# Patient Record
Sex: MALE | Race: BLACK OR AFRICAN AMERICAN | NOT HISPANIC OR LATINO | Employment: OTHER | URBAN - METROPOLITAN AREA
[De-identification: names, ages, dates, MRNs, and addresses within clinical notes are randomized per-mention and may not be internally consistent; named-entity substitution may affect disease eponyms.]

---

## 2017-02-17 ENCOUNTER — OFFICE VISIT (OUTPATIENT)
Dept: CARDIOLOGY | Facility: CLINIC | Age: 77
End: 2017-02-17
Attending: INTERNAL MEDICINE
Payer: MEDICARE

## 2017-02-17 ENCOUNTER — PRE VISIT (OUTPATIENT)
Dept: CARDIOLOGY | Facility: CLINIC | Age: 77
End: 2017-02-17

## 2017-02-17 VITALS
HEART RATE: 75 BPM | SYSTOLIC BLOOD PRESSURE: 168 MMHG | OXYGEN SATURATION: 98 % | HEIGHT: 67 IN | BODY MASS INDEX: 23.25 KG/M2 | WEIGHT: 148.1 LBS | DIASTOLIC BLOOD PRESSURE: 74 MMHG

## 2017-02-17 DIAGNOSIS — I48.91 ATRIAL FIBRILLATION, UNSPECIFIED TYPE (H): Primary | ICD-10-CM

## 2017-02-17 DIAGNOSIS — I48.91 ATRIAL FIBRILLATION, UNSPECIFIED TYPE (H): ICD-10-CM

## 2017-02-17 DIAGNOSIS — I10 HYPERTENSION: ICD-10-CM

## 2017-02-17 DIAGNOSIS — C22.0 HCC (HEPATOCELLULAR CARCINOMA) (H): ICD-10-CM

## 2017-02-17 PROCEDURE — 99213 OFFICE O/P EST LOW 20 MIN: CPT | Mod: ZF

## 2017-02-17 PROCEDURE — 93010 ELECTROCARDIOGRAM REPORT: CPT | Mod: ZP | Performed by: INTERNAL MEDICINE

## 2017-02-17 PROCEDURE — 99213 OFFICE O/P EST LOW 20 MIN: CPT | Mod: ZP | Performed by: INTERNAL MEDICINE

## 2017-02-17 ASSESSMENT — PAIN SCALES - GENERAL: PAINLEVEL: NO PAIN (0)

## 2017-02-17 NOTE — LETTER
2/17/2017      RE: Eh Callahan  [address]   [facility] [address]       Dear Colleague,    Thank you for the opportunity to participate in the care of your patient, Eh Callahan, at the [facility] at [facility]. Please see a copy of my visit note below.          Clinical Cardiac Electrophysiology    Chief Complaint: atrial fibrillation     11/15/16: I saw Mr. Callahan in the ED on 20Oct2016 when he presented with atrial fibrillation. He spontaneously converted back to sinus. His CHADS-Vasc score is 4. He is scheduled for surgery and was not started on oral anticoagulation. He reports (via ) that he has been feeling well. He has rare palpitations. He walks and exercises regularly without chest discomfort or unusual dyspnea on exertion.\    2/17/16 Interval History: ***      Current Outpatient Prescriptions   Medication Sig Dispense Refill     Multiple Vitamins-Minerals (MULTIVITAMIN & MINERAL PO)        oxyCODONE-acetaminophen (PERCOCET) 5-325 MG per tablet        UNABLE TO FIND 1 drop TIMOLOL OPHT       polyethylene glycol (MIRALAX/GLYCOLAX) packet Take 17 g by mouth daily as needed for constipation 30 packet 1     oxyCODONE (ROXICODONE) 5 MG immediate release tablet Take 1-2 tablets (5-10 mg) by mouth every 4 hours as needed for moderate to severe pain 30 tablet 0     acetaminophen (TYLENOL) 500 MG tablet Take 2 tablets (1,000 mg) by mouth 3 times daily (Patient taking differently: Take 500 mg by mouth 3 times daily as needed ) 100 tablet 0     senna-docusate (SENOKOT-S;PERICOLACE) 8.6-50 MG per tablet Take 1 tablet by mouth 2 times daily 14 tablet 0     FUROSEMIDE PO Take 40 mg by mouth daily       timolol (TIMOPTIC) 0.5 % ophthalmic solution Place 1 drop into both eyes daily       carboxymethylcellulose (REFRESH PLUS) 0.5 % SOLN Place 1 drop into both eyes 4 times daily        insulin detemir (LEVEMIR) 100 UNIT/ML soln Inject 15 Units Subcutaneous  "At Bedtime        insulin lispro (HUMALOG PEN) 100 UNIT/ML soln 4 - 12 units 3 x daily       pantoprazole (PROTONIX) 40 MG enteric coated tablet Take 40 mg by mouth daily        cholecalciferol (D 5000) 5000 UNITS CAPS 2 times weekly       alendronate (FOSAMAX) 70 MG tablet 70 mg every 7 days Take 1 tablet by mouth twice weekly.       Multiple vitamin TABS Take 1 tablet by mouth       simvastatin (ZOCOR) 20 MG tablet Take 1 tablet (20 mg) by mouth daily       losartan (COZAAR) 100 MG tablet Take 1 tablet by mouth daily         Past Medical History   Diagnosis Date     Anatomical narrow angle glaucoma      Atrial fibrillation (H)      Chronic infection      history of hepatitis C     CKD (chronic kidney disease) stage 2, GFR 60-89 ml/min      Diabetes mellitus (H)      Hepatitis C without mention of hepatic coma      Hypertension      Palpitations      PTSD (post-traumatic stress disorder)        Past Surgical History   Procedure Laterality Date     Appendectomy       Eye surgery       Laparoscopic hepatectomy partial Left 11/22/2016     Procedure: LAPAROSCOPIC HEPATECTOMY PARTIAL;  Surgeon: Rick Mckeon MD;  Location:  OR       Family History   Problem Relation Age of Onset     DIABETES No family hx of      He is not aware of any diabetes in his family     KIDNEY DISEASE No family hx of        Social History   Substance Use Topics     Smoking status: Former Smoker     Smokeless tobacco: Never Used     Alcohol use No       Allergies   Allergen Reactions     Amlodipine Swelling     Edema at 10 mg , fine at 5 mg     Lisinopril Cough     Metoprolol      Other reaction(s): Bradycardia  Metoprolol at 50mg bid -> HR 45 -50, unclear if sx (has fatigue)  May tolerate lower doses if needed         ROS: ten system review is negative.      Physical Examination:  Vitals: /74  Pulse 75  Ht 1.69 m (5' 6.53\")  Wt 67.2 kg (148 lb 1.6 oz)  SpO2 98%  BMI 23.52 kg/m2  BMI= Body mass index is 23.52 kg/(m^2).    GENERAL " APPEARANCE: elderly, healthy, alert and no distress  HEENT: no icterus, no xanthelasmas, mucosa moist, no cyanosis.  NECK: nJVP is not visible  CHEST: lungs clear to auscultation - no rales, rhonchi or wheezes, no use of accessory muscles, no retractions, respirations are unlabored, normal respiratory rate, no kyphosis, no scoliosis  CARDIOVASCULAR: regular rhythm, normal S1 with single S2, no S3 or S4 and no murmur, click or rub, precordium quiet with normal PMI.  ABDOMEN: soft, non tender, bowel sounds normal, no masses, no abdominal bruits  EXTREMITIES: no clubbing, cyanosis or edema  NEURO: alert, normal speech, gait and affect  VASC: Warm, well perfused      Laboratory:    EKG 2/17/17: ***    ========================================================    Assessment and Recommendations:    1) Paroxysmal atrial fibrillation - we discussed that this could recur after surgery but is not a reason to delay surgery. After surgery will need to discuss oral anticoagulation.    2) Pre-operative cardiovascular assessment - his functional status is excellent, he has no active cardiac conditions. His risk of cardiac complications should be low. No further pre-op testing needed from my standpoint.    I appreciate the chance to help with Mr. Callahan's care. Please let me know if you have any questions or concerns.    Yakov Tamez MD          Clinical Cardiac Electrophysiology    Chief Complaint: atrial fibrillation     HPI: I saw Mr. Callahan in the ED on 20Oct2016 when he presented with atrial fibrillation. He spontaneously converted back to sinus. His CHADS-Vasc score is 4. He is scheduled for surgery and was not started on oral anticoagulation.     He reports (via ) that he has been feeling well. He has rare palpitations. He walks and exercises regularly without chest discomfort or unusual dyspnea on exertion.    Current Outpatient Prescriptions   Medication Sig Dispense Refill     Multiple Vitamins-Minerals  (MULTIVITAMIN & MINERAL PO)        oxyCODONE-acetaminophen (PERCOCET) 5-325 MG per tablet        UNABLE TO FIND 1 drop TIMOLOL OPHT       polyethylene glycol (MIRALAX/GLYCOLAX) packet Take 17 g by mouth daily as needed for constipation 30 packet 1     oxyCODONE (ROXICODONE) 5 MG immediate release tablet Take 1-2 tablets (5-10 mg) by mouth every 4 hours as needed for moderate to severe pain 30 tablet 0     acetaminophen (TYLENOL) 500 MG tablet Take 2 tablets (1,000 mg) by mouth 3 times daily (Patient taking differently: Take 500 mg by mouth 3 times daily as needed ) 100 tablet 0     senna-docusate (SENOKOT-S;PERICOLACE) 8.6-50 MG per tablet Take 1 tablet by mouth 2 times daily 14 tablet 0     FUROSEMIDE PO Take 40 mg by mouth daily       timolol (TIMOPTIC) 0.5 % ophthalmic solution Place 1 drop into both eyes daily       carboxymethylcellulose (REFRESH PLUS) 0.5 % SOLN Place 1 drop into both eyes 4 times daily        insulin detemir (LEVEMIR) 100 UNIT/ML soln Inject 15 Units Subcutaneous At Bedtime        insulin lispro (HUMALOG PEN) 100 UNIT/ML soln 4 - 12 units 3 x daily       pantoprazole (PROTONIX) 40 MG enteric coated tablet Take 40 mg by mouth daily        cholecalciferol (D 5000) 5000 UNITS CAPS 2 times weekly       alendronate (FOSAMAX) 70 MG tablet 70 mg every 7 days Take 1 tablet by mouth twice weekly.       Multiple vitamin TABS Take 1 tablet by mouth       simvastatin (ZOCOR) 20 MG tablet Take 1 tablet (20 mg) by mouth daily       losartan (COZAAR) 100 MG tablet Take 1 tablet by mouth daily         Past Medical History   Diagnosis Date     Anatomical narrow angle glaucoma      Atrial fibrillation (H)      Chronic infection      history of hepatitis C     CKD (chronic kidney disease) stage 2, GFR 60-89 ml/min      Diabetes mellitus (H)      Hepatitis C without mention of hepatic coma      Hypertension      Palpitations      PTSD (post-traumatic stress disorder)        Past Surgical History   Procedure  "Laterality Date     Appendectomy       Eye surgery       Laparoscopic hepatectomy partial Left 11/22/2016     Procedure: LAPAROSCOPIC HEPATECTOMY PARTIAL;  Surgeon: Rick Mckeon MD;  Location: [facility]       Family History   Problem Relation Age of Onset     DIABETES No family hx of      He is not aware of any diabetes in his family     KIDNEY DISEASE No family hx of        Social History   Substance Use Topics     Smoking status: Former Smoker     Smokeless tobacco: Never Used     Alcohol use No       Allergies   Allergen Reactions     Amlodipine Swelling     Edema at 10 mg , fine at 5 mg     Lisinopril Cough     Metoprolol      Other reaction(s): Bradycardia  Metoprolol at 50mg bid -> HR 45 -50, unclear if sx (has fatigue)  May tolerate lower doses if needed         ROS: ten system review is negative. [id number]    Physical Examination:  Vitals: /74  Pulse 75  Ht 1.69 m (5' 6.53\")  Wt 67.2 kg (148 lb 1.6 oz)  SpO2 98%  BMI 23.52 kg/m2  BMI= Body mass index is 23.52 kg/(m^2).    GENERAL APPEARANCE: elderly, healthy, alert and no distress  HEENT: no icterus, no xanthelasmas, mucosa moist, no cyanosis.  NECK: nJVP is not visible  CHEST: lungs clear to auscultation - no rales, rhonchi or wheezes, no use of accessory muscles, no retractions, respirations are unlabored, normal respiratory rate, no kyphosis, no scoliosis  CARDIOVASCULAR: regular rhythm, normal S1 with single S2, no S3 or S4 and no murmur, click or rub, precordium quiet with normal PMI.  ABDOMEN: soft, non tender, bowel sounds normal, no masses, no abdominal bruits  EXTREMITIES: no clubbing, cyanosis or edema  NEURO: alert, normal speech, gait and affect  VASC: Warm, well perfused      Laboratory:    I personally reviewed the ECG obtained today. It shows sinus rhythm with early repolarization pattern.    Results for CHIKIS NEWBERRY (MRN [id number]) as of 11/15/2016 16:36   Ref. Range 10/20/2016 07:03   Sodium Latest Ref Range: 133-144 mmol/L 137 "   Potassium Latest Ref Range: 3.4-5.3 mmol/L 3.5   Chloride Latest Ref Range:  mmol/L 105   Carbon Dioxide Latest Ref Range: 20-32 mmol/L 24   Urea Nitrogen Latest Ref Range: 7-30 mg/dL 34 (H)   Creatinine Latest Ref Range: 0.66-1.25 mg/dL 1.06   GFR Estimate Latest Ref Range: >60 mL/min/1.7m2 68       Assessment and recommendations:    1) Paroxysmal atrial fibrillation - we discussed that this could recur after surgery but is not a reason to delay surgery. After surgery will need to discuss oral anticoagulation.    2) Pre-operative cardiovascular assessment - his functional status is excellent, he has no active cardiac conditions. His risk of cardiac complications should be low. No further pre-op testing needed from my standpoint.    I appreciate the chance to help with Mr. Callahan's care. Please let me know if you have any questions or concerns.    Yakov Tamez MD    Please do not hesitate to contact me if you have any questions/concerns.     Sincerely,     Yakov Tamez MD

## 2017-02-17 NOTE — PROGRESS NOTES
Clinical Cardiac Electrophysiology    Chief Complaint: atrial fibrillation     HPI: I saw Mr. Callahan in the ED on 20Oct2016 when he presented with atrial fibrillation. He spontaneously converted back to sinus. His CHADS-Vasc score is 4. He is scheduled for surgery and was not started on oral anticoagulation.     He reports (via ) that he has been feeling well. He has rare palpitations. He walks and exercises regularly without chest discomfort or unusual dyspnea on exertion.    [id number] Interval history: his surgery went well, he reports some continue abdominal pain but has not had any palpitations or other cardiac symptoms.     Current Outpatient Prescriptions   Medication Sig Dispense Refill     rivaroxaban ANTICOAGULANT (XARELTO) 20 MG TABS tablet Take 1 tablet (20 mg) by mouth daily (with dinner) 30 tablet 5     Multiple Vitamins-Minerals (MULTIVITAMIN & MINERAL PO)        oxyCODONE-acetaminophen (PERCOCET) 5-325 MG per tablet        UNABLE TO FIND 1 drop TIMOLOL OPHT       polyethylene glycol (MIRALAX/GLYCOLAX) packet Take 17 g by mouth daily as needed for constipation 30 packet 1     oxyCODONE (ROXICODONE) 5 MG immediate release tablet Take 1-2 tablets (5-10 mg) by mouth every 4 hours as needed for moderate to severe pain 30 tablet 0     acetaminophen (TYLENOL) 500 MG tablet Take 2 tablets (1,000 mg) by mouth 3 times daily (Patient taking differently: Take 500 mg by mouth 3 times daily as needed ) 100 tablet 0     senna-docusate (SENOKOT-S;PERICOLACE) 8.6-50 MG per tablet Take 1 tablet by mouth 2 times daily 14 tablet 0     FUROSEMIDE PO Take 40 mg by mouth daily       timolol (TIMOPTIC) 0.5 % ophthalmic solution Place 1 drop into both eyes daily       carboxymethylcellulose (REFRESH PLUS) 0.5 % SOLN Place 1 drop into both eyes 4 times daily        insulin detemir (LEVEMIR) 100 UNIT/ML soln Inject 15 Units Subcutaneous At Bedtime        insulin lispro (HUMALOG PEN) 100 UNIT/ML soln 4 - 12  "units 3 x daily       pantoprazole (PROTONIX) 40 MG enteric coated tablet Take 40 mg by mouth daily        cholecalciferol (D 5000) 5000 UNITS CAPS 2 times weekly       alendronate (FOSAMAX) 70 MG tablet 70 mg every 7 days Take 1 tablet by mouth twice weekly.       Multiple vitamin TABS Take 1 tablet by mouth       simvastatin (ZOCOR) 20 MG tablet Take 1 tablet (20 mg) by mouth daily       losartan (COZAAR) 100 MG tablet Take 1 tablet by mouth daily         Past Medical History   Diagnosis Date     Anatomical narrow angle glaucoma      Atrial fibrillation (H)      Chronic infection      history of hepatitis C     CKD (chronic kidney disease) stage 2, GFR 60-89 ml/min      Diabetes mellitus (H)      Hepatitis C without mention of hepatic coma      Hypertension      Palpitations      PTSD (post-traumatic stress disorder)        Past Surgical History   Procedure Laterality Date     Appendectomy       Eye surgery       Laparoscopic hepatectomy partial Left 11/22/2016     Procedure: LAPAROSCOPIC HEPATECTOMY PARTIAL;  Surgeon: Rick Mckeon MD;  Location:  OR       Family History   Problem Relation Age of Onset     DIABETES No family hx of      He is not aware of any diabetes in his family     KIDNEY DISEASE No family hx of        Social History   Substance Use Topics     Smoking status: Former Smoker     Smokeless tobacco: Never Used     Alcohol use No       Allergies   Allergen Reactions     Amlodipine Swelling     Edema at 10 mg , fine at 5 mg     Lisinopril Cough     Metoprolol      Other reaction(s): Bradycardia  Metoprolol at 50mg bid -> HR 45 -50, unclear if sx (has fatigue)  May tolerate lower doses if needed         ROS: ten system review is negative. [id number]    Physical Examination:  Vitals: /74  Pulse 75  Ht 1.69 m (5' 6.53\")  Wt 67.2 kg (148 lb 1.6 oz)  SpO2 98%  BMI 23.52 kg/m2  BMI= Body mass index is 23.52 kg/(m^2).    GENERAL APPEARANCE: elderly, healthy, alert and no distress  HEENT: no " icterus, no xanthelasmas, mucosa moist, no cyanosis.  NECK: JVP is not visible  CHEST: lungs clear to auscultation  CARDIOVASCULAR: regular rhythm, normal S1 with single S2, no murmur or gallop  ABDOMEN: soft, healing surgical incision, + BS  EXTREMITIES: no clubbing, cyanosis or edema  NEURO: alert  VASC: Warm, well perfused      Laboratory:    I personally reviewed the ECG obtained today. It shows sinus rhythm with early repolarization pattern. [id number]        Assessment and recommendations:    1) Paroxysmal atrial fibrillation - we discussed that this could recur after surgery but is not a reason to delay surgery. We again discussed recommendation for oral anticoagulation. After reviewing options we've elected to start rivaroxaban for thromboembolic prophylaxis.    I appreciate the chance to help with Mr. Callahan's care. Please let me know if you have any questions or concerns.    Yakov Tamez MD

## 2017-02-17 NOTE — PROGRESS NOTES
Clinical Cardiac Electrophysiology    Chief Complaint: atrial fibrillation     11/15/16: I saw Mr. Callahan in the ED on 20Oct2016 when he presented with atrial fibrillation. He spontaneously converted back to sinus. His CHADS-Vasc score is 4. He is scheduled for surgery and was not started on oral anticoagulation. He reports (via ) that he has been feeling well. He has rare palpitations. He walks and exercises regularly without chest discomfort or unusual dyspnea on exertion.    2/17/16 Interval History: Did well during partial hepatectomy. Mild abdominal pain and constipation since then, but recovering. Denies any known recurrence of palpitations, chest pain/pressure, dyspnea, syncope or presyncope. Now lower extremity edema. States overall he is feeling well.   Discussed indication and options for anticoagulation given paroxysmal AFib with CHADSVASc =4. No prior history of stroke. Reports occasional nose bleeds, and severe gastric ulcer bleed back in 1970s, no recurrence since then. No recent falls. Currently taking ASA.       Current Outpatient Prescriptions   Medication Sig Dispense Refill     Multiple Vitamins-Minerals (MULTIVITAMIN & MINERAL PO)        oxyCODONE-acetaminophen (PERCOCET) 5-325 MG per tablet        UNABLE TO FIND 1 drop TIMOLOL OPHT       polyethylene glycol (MIRALAX/GLYCOLAX) packet Take 17 g by mouth daily as needed for constipation 30 packet 1     oxyCODONE (ROXICODONE) 5 MG immediate release tablet Take 1-2 tablets (5-10 mg) by mouth every 4 hours as needed for moderate to severe pain 30 tablet 0     acetaminophen (TYLENOL) 500 MG tablet Take 2 tablets (1,000 mg) by mouth 3 times daily (Patient taking differently: Take 500 mg by mouth 3 times daily as needed ) 100 tablet 0     senna-docusate (SENOKOT-S;PERICOLACE) 8.6-50 MG per tablet Take 1 tablet by mouth 2 times daily 14 tablet 0     FUROSEMIDE PO Take 40 mg by mouth daily       timolol (TIMOPTIC) 0.5 % ophthalmic solution  Place 1 drop into both eyes daily       carboxymethylcellulose (REFRESH PLUS) 0.5 % SOLN Place 1 drop into both eyes 4 times daily        insulin detemir (LEVEMIR) 100 UNIT/ML soln Inject 15 Units Subcutaneous At Bedtime        insulin lispro (HUMALOG PEN) 100 UNIT/ML soln 4 - 12 units 3 x daily       pantoprazole (PROTONIX) 40 MG enteric coated tablet Take 40 mg by mouth daily        cholecalciferol (D 5000) 5000 UNITS CAPS 2 times weekly       alendronate (FOSAMAX) 70 MG tablet 70 mg every 7 days Take 1 tablet by mouth twice weekly.       Multiple vitamin TABS Take 1 tablet by mouth       simvastatin (ZOCOR) 20 MG tablet Take 1 tablet (20 mg) by mouth daily       losartan (COZAAR) 100 MG tablet Take 1 tablet by mouth daily         Past Medical History   Diagnosis Date     Anatomical narrow angle glaucoma      Atrial fibrillation (H)      Chronic infection      history of hepatitis C     CKD (chronic kidney disease) stage 2, GFR 60-89 ml/min      Diabetes mellitus (H)      Hepatitis C without mention of hepatic coma      Hypertension      Palpitations      PTSD (post-traumatic stress disorder)        Past Surgical History   Procedure Laterality Date     Appendectomy       Eye surgery       Laparoscopic hepatectomy partial Left 11/22/2016     Procedure: LAPAROSCOPIC HEPATECTOMY PARTIAL;  Surgeon: Rick Mckeon MD;  Location:  OR       Family History   Problem Relation Age of Onset     DIABETES No family hx of      He is not aware of any diabetes in his family     KIDNEY DISEASE No family hx of        Social History   Substance Use Topics     Smoking status: Former Smoker     Smokeless tobacco: Never Used     Alcohol use No       Allergies   Allergen Reactions     Amlodipine Swelling     Edema at 10 mg , fine at 5 mg     Lisinopril Cough     Metoprolol      Other reaction(s): Bradycardia  Metoprolol at 50mg bid -> HR 45 -50, unclear if sx (has fatigue)  May tolerate lower doses if needed         ROS: ten  "system review is negative (see HPI for pertinent positives).      Physical Examination:  Vitals: /74  Pulse 75  Ht 1.69 m (5' 6.53\")  Wt 67.2 kg (148 lb 1.6 oz)  SpO2 98%  BMI 23.52 kg/m2  BMI= Body mass index is 23.52 kg/(m^2).    GENERAL APPEARANCE: elderly, healthy, alert and no distress  HEENT: no icterus, no xanthelasmas, mucosa moist, no cyanosis.  NECK: nJVP is not visible  CHEST: lungs clear to auscultation - no rales, rhonchi or wheezes, no use of accessory muscles, no retractions, respirations are unlabored, normal respiratory rate, no kyphosis, no scoliosis  CARDIOVASCULAR: regular rhythm, normal S1 with single S2, no S3 or S4 and no murmur, click or rub, precordium quiet with normal PMI.  ABDOMEN: soft, non tender, bowel sounds normal, no masses, no abdominal bruits  EXTREMITIES: no clubbing, cyanosis or edema  NEURO: alert, normal speech, gait and affect  VASC: Warm, well perfused      Laboratory:    EKG 2/17/17: Sinus rhythm, normal axis, LVH by voltage criteria, normal intervals, early repolarization changes in anterior precordial leads, no ischemic ST-T changes.     ========================================================    Assessment and Recommendations:    1) Paroxysmal atrial fibrillation - today in sinus rhythm, no symptoms with paroxysmal AFib, no signs or symptoms of congestive heart failure. No need for rate or rhythm control agents at this time. Discussed at length the indication for anticoagulation for thromboembolic stroke protection, the risks and benefits of oral anticoagulants, and the choices for oral anticoagulants. CHADSVASc =4 (~4% annual stroke risk). We will recommend Rivaroxaban 20mg daily, to be taken with the largest meal of the day. Recommend to d/c ASA as he has no known CAD. He was agreeable to this plan.    RTC ~3 months w/ Emily Willoughby, APRN, CNP    We appreciate the chance to help with Mr. Callahan's care. Please let us know if you have any questions or " concerns.  Patient seen and discussed with Dr. Tamez.    Karen Garcia MD  Cardiology Fellow  [phone]

## 2017-02-17 NOTE — NURSING NOTE
Chief Complaint   Patient presents with     Follow Up For     EKG- afib, 3MFU (11/15/16)- here to discuss AC- s/p surgery.(liver ca- resection 12/2016) [id number] 4

## 2017-02-17 NOTE — PATIENT INSTRUCTIONS
You will be scheduled for a follow up visit as instructed.    Stop aspirin when you start xarelto.     If you have any questions regarding to your visit please contact your care team:     Cardiology  Telephone Number    Neida Mcmillan -[phone]   Or send a message to your provider via my chart.   For scheduling appts or procedures:    [facility]     [phone] or  [phone]   For the Device Clinic (Pacemakers and ICD's)   RN's :   Brenda Hoyos  During business hours: [phone]    After business hours:   [phone]- select option 4 and ask for job code [phone].    You can also contact us using My Chart. If you need assistance in setting this up, please contact our office or ask at your follow up visit.     If you need a medication refill please contact your pharmacy. Please allow 3 business days for your refill to be completed.     As always, Thank you for trusting us with your health care needs!

## 2017-02-17 NOTE — MR AVS SNAPSHOT
After Visit Summary   2/17/2017    Eh Callahan    MRN: [id number]           Patient Information     Date Of Birth          1940        Visit Information        Provider Department      2/17/2017 3:45 PM Julian Russell; Yakov Tamez MD [facility]        Today's Diagnoses     Atrial fibrillation, unspecified type (H)        Hypertension        HCC (hepatocellular carcinoma) (H)          Care Instructions    You will be scheduled for a follow up visit as instructed.    Stop aspirin when you start xarelto.     If you have any questions regarding to your visit please contact your care team:     Cardiology  Telephone Number    Neida Mcmillan [phone]   Or send a message to your provider via my chart.   For scheduling appts or procedures:    [facility]     [phone] or  [phone]   For the Device Clinic (Pacemakers and ICD's)   RN's :   Brenda Hoyos  During business hours: [phone]    After business hours:   [phone]- select option 4 and ask for job code [phone].    You can also contact us using My Chart. If you need assistance in setting this up, please contact our office or ask at your follow up visit.     If you need a medication refill please contact your pharmacy. Please allow 3 business days for your refill to be completed.     As always, Thank you for trusting us with your health care needs!        Follow-ups after your visit        Your next 10 appointments already scheduled     Mar 21, 2017  7:45 AM CDT   (Arrive by 7:30 AM)   MR ABDOMEN W/O & W CONTRAST with [address] [facility] ([facility])    [address] [address] [phone]           Take your medicines as usual, unless your doctor tells you not to. Bring a list of your current medicines to your exam (including vitamins, minerals and over-the-counter drugs). Also bring the results of similar scans you may  have had.    The day before your exam, drink extra fluids at least six 8-ounce glasses (unless your doctor tells you to restrict your fluids).   Have a blood test (creatinine test) within 30 days of your exam. Go to your clinic or Diagnostic Imaging Department for this test.   Do not eat or drink for 6 hours prior to exam.  The MRI machine uses a strong magnet. Please wear clothes without metal (snaps, zippers). A sweatsuit works well, or we may give you a hospital gown.  Please remove any body piercings and hair extensions before you arrive. You will also remove watches, jewelry, hairpins, wallets, dentures, partial dental plates and hearing aids. You may wear contact lenses, and you may be able to wear your rings. We have a safe place to keep your personal items, but it is safer to leave them at home.   **IMPORTANT** THE INSTRUCTIONS BELOW ARE ONLY FOR THOSE PATIENTS WHO HAVE BEEN TOLD THEY WILL RECEIVE SEDATION OR GENERAL ANESTHESIA DURING THEIR MRI PROCEDURE:  IF YOU WILL RECEIVE SEDATION (take medicine to help you relax during your exam):   You must get the medicine from your doctor before you arrive. Bring the medicine to the exam. Do not take it at home.   Arrive one hour early. Bring someone who can take you home after the test. Your medicine will make you sleepy. After the exam, you may not drive, take a bus or take a taxi by yourself.   No eating 8 hours before your exam. You may have clear liquids up until 4 hours before your exam. (Clear liquids include water, clear tea, black coffee and fruit juice without pulp.)  IF YOU WILL RECEIVE ANESTHESIA (be asleep for your exam):   Arrive 1 1/2 hours early. Bring someone who can take you home after the test. You may not drive, take a bus or take a taxi by yourself.   No eating 8 hours before your exam. You may have clear liquids up until 4 hours before your exam. (Clear liquids include water, clear tea, black coffee and fruit juice without pulp.)  If you have  any questions, please contact your Imaging Department exam site.            Mar 21, 2017  8:30 AM CDT   LAB with  LAB   [facility] ([facility])    [address] [place] [address]  [address] [facility] [address] [phone]           Patient must bring picture ID.  Patient should be prepared to give a urine specimen  Please do not eat 10-12 hours before your appointment if you are coming in fasting for labs on lipids, cholesterol, or glucose (sugar).  Pregnant women should follow their Care Team instructions. Water with medications is okay. Do not drink coffee or other fluids.   If you have concerns about taking  your medications, please ask at office or if scheduling via [employer], send a message by clicking on Secure Messaging, Message Your Care Team.            Mar 23, 2017  1:30 PM CDT   (Arrive by 1:15 PM)   Return Visit with Miri Antoine MD   [facility] ([facility])    [address] [place] [address]  [address] [facility] [address] [phone]            Apr 12, 2017 10:00 AM CDT   Lab with  LAB   [facility] ([facility])    [address] [place] [address] [phone]            Apr 12, 2017 11:10 AM CDT   (Arrive by 10:55 AM)   Return General Liver with Allan Justice MD   [facility] ([facility])    [address] [place] [id number] [phone]            Jun 05, 2017  6:00 PM CDT   (Arrive by 5:45 PM)   RETURN ARRHYTHMIA with Yakov Tamez MD   [facility] ([facility])    [address] [place] [address]  [address] [facility] [id number] [phone]              Who to contact     If you have questions or need follow up information about today's clinic visit or your schedule please contact [facility] directly at [phone].  Normal or non-critical lab and imaging  "results will be communicated to you by MyChart, letter or phone within 4 business days after the clinic has received the results. If you do not hear from us within 7 days, please contact the clinic through ShopGot or phone. If you have a critical or abnormal lab result, we will notify you by phone as soon as possible.  Submit refill requests through kalidea or call your pharmacy and they will forward the refill request to us. Please allow 3 business days for your refill to be completed.          Additional Information About Your Visit        [employer] Information     kalidea lets you send messages to your doctor, view your test results, renew your prescriptions, schedule appointments and more. To sign up, go to [email or web address] . Click on \"Log in\" on the left side of the screen, which will take you to the Welcome page. Then click on \"Sign up Now\" on the right side of the page.     You will be asked to enter the access code listed below, as well as some personal information. Please follow the directions to create your username and password.     Your access code is: [email or web address]  Expires: 3/6/2017 11:03 AM     Your access code will  in 90 days. If you need help or a new code, please call your [facility] clinic or [phone].        Care EveryWhere ID     This is your Care EveryWhere ID. This could be used by other organizations to access your [facility] medical records  [id number]        Your Vitals Were     Pulse Height Pulse Oximetry BMI (Body Mass Index)          75 1.69 m (5' 6.53\") 98% 23.52 kg/m2         Blood Pressure from Last 3 Encounters:   17 168/74   16 145/84   16 125/78    Weight from Last 3 Encounters:   17 67.2 kg (148 lb 1.6 oz)   16 64.5 kg (142 lb 4.8 oz)   16 65.3 kg (144 lb)              We Performed the Following     AFP tumor marker     EKG 12-lead, tracing only (Future)          Today's Medication Changes          These changes are accurate as of: " 2/17/17  5:02 PM.  If you have any questions, ask your nurse or doctor.               Start taking these medicines.        Dose/Directions    rivaroxaban ANTICOAGULANT 20 MG Tabs tablet   Commonly known as:  XARELTO   Used for:  Atrial fibrillation, unspecified type (H)   Started by:  Yakov Tamez MD        Dose:  20 mg   Take 1 tablet (20 mg) by mouth daily (with dinner)   Quantity:  30 tablet   Refills:  5         These medicines have changed or have updated prescriptions.        Dose/Directions    acetaminophen 500 MG tablet   Commonly known as:  TYLENOL   This may have changed:    - how much to take  - when to take this  - reasons to take this   Used for:  History of liver excision        Dose:  1000 mg   Take 2 tablets (1,000 mg) by mouth 3 times daily   Quantity:  100 tablet   Refills:  0            Where to get your medicines      These medications were sent to [facility] - [address] [address]     Phone:  [phone]     rivaroxaban ANTICOAGULANT 20 MG Tabs tablet                Primary Care Provider Office Phone # Fax #    Shalini Mac PA-C [phone]       [facility] [address]  [facility] [address]        Thank you!     Thank you for choosing [facility]  for your care. Our goal is always to provide you with excellent care. Hearing back from our patients is one way we can continue to improve our services. Please take a few minutes to complete the written survey that you may receive in the mail after your visit with us. Thank you!             Your Updated Medication List - Protect others around you: Learn how to safely use, store and throw away your medicines at www.disposemymeds.org.          This list is accurate as of: 2/17/17  5:02 PM.  Always use your most recent med list.                   Brand Name Dispense Instructions for use    acetaminophen 500 MG tablet    TYLENOL    100 tablet    Take 2  tablets (1,000 mg) by mouth 3 times daily       alendronate 70 MG tablet    FOSAMAX     70 mg every 7 days Take 1 tablet by mouth twice weekly.       carboxymethylcellulose 0.5 % Soln ophthalmic solution    REFRESH PLUS     Place 1 drop into both eyes 4 times daily       D 5000 5000 UNITS Caps   Generic drug:  cholecalciferol      2 times weekly       FUROSEMIDE PO      Take 40 mg by mouth daily       insulin detemir 100 UNIT/ML injection    LEVEMIR     Inject 15 Units Subcutaneous At Bedtime       insulin lispro 100 UNIT/ML injection    HumaLOG PEN     4 - 12 units 3 x daily       losartan 100 MG tablet    COZAAR     Take 1 tablet by mouth daily       Multiple vitamin Tabs      Take 1 tablet by mouth       MULTIVITAMIN & MINERAL PO          oxyCODONE 5 MG IR tablet    ROXICODONE    30 tablet    Take 1-2 tablets (5-10 mg) by mouth every 4 hours as needed for moderate to severe pain       oxyCODONE-acetaminophen 5-325 MG per tablet    PERCOCET         pantoprazole 40 MG EC tablet    PROTONIX     Take 40 mg by mouth daily       polyethylene glycol Packet    MIRALAX/GLYCOLAX    30 packet    Take 17 g by mouth daily as needed for constipation       rivaroxaban ANTICOAGULANT 20 MG Tabs tablet    XARELTO    30 tablet    Take 1 tablet (20 mg) by mouth daily (with dinner)       senna-docusate 8.6-50 MG per tablet    SENOKOT-S;PERICOLACE    14 tablet    Take 1 tablet by mouth 2 times daily       simvastatin 20 MG tablet    ZOCOR     Take 1 tablet (20 mg) by mouth daily       timolol 0.5 % ophthalmic solution    TIMOPTIC     Place 1 drop into both eyes daily       UNABLE TO FIND      1 drop TIMOLOL OPHT

## 2017-02-17 NOTE — LETTER
2/17/2017      RE: Eh Callahan  [address]   [facility] [address]             Clinical Cardiac Electrophysiology    Chief Complaint: atrial fibrillation     11/15/16: I saw Mr. Callahan in the ED on 20Oct2016 when he presented with atrial fibrillation. He spontaneously converted back to sinus. His CHADS-Vasc score is 4. He is scheduled for surgery and was not started on oral anticoagulation. He reports (via ) that he has been feeling well. He has rare palpitations. He walks and exercises regularly without chest discomfort or unusual dyspnea on exertion.    2/17/16 Interval History: Did well during partial hepatectomy. Mild abdominal pain and constipation since then, but recovering. Denies any known recurrence of palpitations, chest pain/pressure, dyspnea, syncope or presyncope. Now lower extremity edema. States overall he is feeling well.   Discussed indication and options for anticoagulation given paroxysmal AFib with CHADSVASc =4. No prior history of stroke. Reports occasional nose bleeds, and severe gastric ulcer bleed back in 1970s, no recurrence since then. No recent falls. Currently taking ASA.       Current Outpatient Prescriptions   Medication Sig Dispense Refill     Multiple Vitamins-Minerals (MULTIVITAMIN & MINERAL PO)        oxyCODONE-acetaminophen (PERCOCET) 5-325 MG per tablet        UNABLE TO FIND 1 drop TIMOLOL OPHT       polyethylene glycol (MIRALAX/GLYCOLAX) packet Take 17 g by mouth daily as needed for constipation 30 packet 1     oxyCODONE (ROXICODONE) 5 MG immediate release tablet Take 1-2 tablets (5-10 mg) by mouth every 4 hours as needed for moderate to severe pain 30 tablet 0     acetaminophen (TYLENOL) 500 MG tablet Take 2 tablets (1,000 mg) by mouth 3 times daily (Patient taking differently: Take 500 mg by mouth 3 times daily as needed ) 100 tablet 0     senna-docusate (SENOKOT-S;PERICOLACE) 8.6-50 MG per tablet Take 1 tablet by mouth 2 times daily 14 tablet 0      FUROSEMIDE PO Take 40 mg by mouth daily       timolol (TIMOPTIC) 0.5 % ophthalmic solution Place 1 drop into both eyes daily       carboxymethylcellulose (REFRESH PLUS) 0.5 % SOLN Place 1 drop into both eyes 4 times daily        insulin detemir (LEVEMIR) 100 UNIT/ML soln Inject 15 Units Subcutaneous At Bedtime        insulin lispro (HUMALOG PEN) 100 UNIT/ML soln 4 - 12 units 3 x daily       pantoprazole (PROTONIX) 40 MG enteric coated tablet Take 40 mg by mouth daily        cholecalciferol (D 5000) 5000 UNITS CAPS 2 times weekly       alendronate (FOSAMAX) 70 MG tablet 70 mg every 7 days Take 1 tablet by mouth twice weekly.       Multiple vitamin TABS Take 1 tablet by mouth       simvastatin (ZOCOR) 20 MG tablet Take 1 tablet (20 mg) by mouth daily       losartan (COZAAR) 100 MG tablet Take 1 tablet by mouth daily         Past Medical History   Diagnosis Date     Anatomical narrow angle glaucoma      Atrial fibrillation (H)      Chronic infection      history of hepatitis C     CKD (chronic kidney disease) stage 2, GFR 60-89 ml/min      Diabetes mellitus (H)      Hepatitis C without mention of hepatic coma      Hypertension      Palpitations      PTSD (post-traumatic stress disorder)        Past Surgical History   Procedure Laterality Date     Appendectomy       Eye surgery       Laparoscopic hepatectomy partial Left 11/22/2016     Procedure: LAPAROSCOPIC HEPATECTOMY PARTIAL;  Surgeon: Rick Mckeon MD;  Location:  OR       Family History   Problem Relation Age of Onset     DIABETES No family hx of      He is not aware of any diabetes in his family     KIDNEY DISEASE No family hx of        Social History   Substance Use Topics     Smoking status: Former Smoker     Smokeless tobacco: Never Used     Alcohol use No       Allergies   Allergen Reactions     Amlodipine Swelling     Edema at 10 mg , fine at 5 mg     Lisinopril Cough     Metoprolol      Other reaction(s): Bradycardia  Metoprolol at 50mg bid -> HR 45  "-50, unclear if sx (has fatigue)  May tolerate lower doses if needed         ROS: ten system review is negative (see HPI for pertinent positives).      Physical Examination:  Vitals: /74  Pulse 75  Ht 1.69 m (5' 6.53\")  Wt 67.2 kg (148 lb 1.6 oz)  SpO2 98%  BMI 23.52 kg/m2  BMI= Body mass index is 23.52 kg/(m^2).    GENERAL APPEARANCE: elderly, healthy, alert and no distress  HEENT: no icterus, no xanthelasmas, mucosa moist, no cyanosis.  NECK: nJVP is not visible  CHEST: lungs clear to auscultation - no rales, rhonchi or wheezes, no use of accessory muscles, no retractions, respirations are unlabored, normal respiratory rate, no kyphosis, no scoliosis  CARDIOVASCULAR: regular rhythm, normal S1 with single S2, no S3 or S4 and no murmur, click or rub, precordium quiet with normal PMI.  ABDOMEN: soft, non tender, bowel sounds normal, no masses, no abdominal bruits  EXTREMITIES: no clubbing, cyanosis or edema  NEURO: alert, normal speech, gait and affect  VASC: Warm, well perfused      Laboratory:    EKG 2/17/17: Sinus rhythm, normal axis, LVH by voltage criteria, normal intervals, early repolarization changes in anterior precordial leads, no ischemic ST-T changes.     ========================================================    Assessment and Recommendations:    1) Paroxysmal atrial fibrillation - today in sinus rhythm, no symptoms with paroxysmal AFib, no signs or symptoms of congestive heart failure. No need for rate or rhythm control agents at this time. Discussed at length the indication for anticoagulation for thromboembolic stroke protection, the risks and benefits of oral anticoagulants, and the choices for oral anticoagulants. CHADSVASc =4 (~4% annual stroke risk). We will recommend Rivaroxaban 20mg daily, to be taken with the largest meal of the day. Recommend to d/c ASA as he has no known CAD. He was agreeable to this plan.    RTC ~3 months w/ Emily Stoesz, APRN, CNP    We appreciate the chance to " help with Mr. Callahan's care. Please let us know if you have any questions or concerns.  Patient seen and discussed with Dr. Tamez.    Karen Garcia MD  Cardiology Fellow  [phone]            Clinical Cardiac Electrophysiology    Chief Complaint: atrial fibrillation     HPI: I saw Mr. Callahan in the ED on 20Oct2016 when he presented with atrial fibrillation. He spontaneously converted back to sinus. His CHADS-Vasc score is 4. He is scheduled for surgery and was not started on oral anticoagulation.     He reports (via ) that he has been feeling well. He has rare palpitations. He walks and exercises regularly without chest discomfort or unusual dyspnea on exertion.    [id number] Interval history: his surgery went well, he reports some continue abdominal pain but has not had any palpitations or other cardiac symptoms.     Current Outpatient Prescriptions   Medication Sig Dispense Refill     rivaroxaban ANTICOAGULANT (XARELTO) 20 MG TABS tablet Take 1 tablet (20 mg) by mouth daily (with dinner) 30 tablet 5     Multiple Vitamins-Minerals (MULTIVITAMIN & MINERAL PO)        oxyCODONE-acetaminophen (PERCOCET) 5-325 MG per tablet        UNABLE TO FIND 1 drop TIMOLOL OPHT       polyethylene glycol (MIRALAX/GLYCOLAX) packet Take 17 g by mouth daily as needed for constipation 30 packet 1     oxyCODONE (ROXICODONE) 5 MG immediate release tablet Take 1-2 tablets (5-10 mg) by mouth every 4 hours as needed for moderate to severe pain 30 tablet 0     acetaminophen (TYLENOL) 500 MG tablet Take 2 tablets (1,000 mg) by mouth 3 times daily (Patient taking differently: Take 500 mg by mouth 3 times daily as needed ) 100 tablet 0     senna-docusate (SENOKOT-S;PERICOLACE) 8.6-50 MG per tablet Take 1 tablet by mouth 2 times daily 14 tablet 0     FUROSEMIDE PO Take 40 mg by mouth daily       timolol (TIMOPTIC) 0.5 % ophthalmic solution Place 1 drop into both eyes daily       carboxymethylcellulose (REFRESH PLUS) 0.5 % SOLN  Place 1 drop into both eyes 4 times daily        insulin detemir (LEVEMIR) 100 UNIT/ML soln Inject 15 Units Subcutaneous At Bedtime        insulin lispro (HUMALOG PEN) 100 UNIT/ML soln 4 - 12 units 3 x daily       pantoprazole (PROTONIX) 40 MG enteric coated tablet Take 40 mg by mouth daily        cholecalciferol (D 5000) 5000 UNITS CAPS 2 times weekly       alendronate (FOSAMAX) 70 MG tablet 70 mg every 7 days Take 1 tablet by mouth twice weekly.       Multiple vitamin TABS Take 1 tablet by mouth       simvastatin (ZOCOR) 20 MG tablet Take 1 tablet (20 mg) by mouth daily       losartan (COZAAR) 100 MG tablet Take 1 tablet by mouth daily         Past Medical History   Diagnosis Date     Anatomical narrow angle glaucoma      Atrial fibrillation (H)      Chronic infection      history of hepatitis C     CKD (chronic kidney disease) stage 2, GFR 60-89 ml/min      Diabetes mellitus (H)      Hepatitis C without mention of hepatic coma      Hypertension      Palpitations      PTSD (post-traumatic stress disorder)        Past Surgical History   Procedure Laterality Date     Appendectomy       Eye surgery       Laparoscopic hepatectomy partial Left 11/22/2016     Procedure: LAPAROSCOPIC HEPATECTOMY PARTIAL;  Surgeon: Rick Mckeon MD;  Location:  OR       Family History   Problem Relation Age of Onset     DIABETES No family hx of      He is not aware of any diabetes in his family     KIDNEY DISEASE No family hx of        Social History   Substance Use Topics     Smoking status: Former Smoker     Smokeless tobacco: Never Used     Alcohol use No       Allergies   Allergen Reactions     Amlodipine Swelling     Edema at 10 mg , fine at 5 mg     Lisinopril Cough     Metoprolol      Other reaction(s): Bradycardia  Metoprolol at 50mg bid -> HR 45 -50, unclear if sx (has fatigue)  May tolerate lower doses if needed         ROS: ten system review is negative. [id number]    Physical Examination:  Vitals: /74  Pulse 75  " Ht 1.69 m (5' 6.53\")  Wt 67.2 kg (148 lb 1.6 oz)  SpO2 98%  BMI 23.52 kg/m2  BMI= Body mass index is 23.52 kg/(m^2).    GENERAL APPEARANCE: elderly, healthy, alert and no distress  HEENT: no icterus, no xanthelasmas, mucosa moist, no cyanosis.  NECK: JVP is not visible  CHEST: lungs clear to auscultation  CARDIOVASCULAR: regular rhythm, normal S1 with single S2, no murmur or gallop  ABDOMEN: soft, healing surgical incision, + BS  EXTREMITIES: no clubbing, cyanosis or edema  NEURO: alert  VASC: Warm, well perfused      Laboratory:    I personally reviewed the ECG obtained today. It shows sinus rhythm with early repolarization pattern. [id number]        Assessment and recommendations:    1) Paroxysmal atrial fibrillation - we discussed that this could recur after surgery but is not a reason to delay surgery. We again discussed recommendation for oral anticoagulation. After reviewing options we've elected to start rivaroxaban for thromboembolic prophylaxis.    I appreciate the chance to help with Mr. Callahan's care. Please let me know if you have any questions or concerns.    Yakov Tamez MD    "

## 2017-02-20 LAB — INTERPRETATION ECG - MUSE: NORMAL

## 2017-03-02 DIAGNOSIS — C22.0 HCC (HEPATOCELLULAR CARCINOMA) (H): ICD-10-CM

## 2017-03-02 LAB
AFP SERPL-MCNC: NORMAL UG/L (ref 0–8)
ALBUMIN SERPL-MCNC: 3.3 G/DL (ref 3.4–5)
ALP SERPL-CCNC: 146 U/L (ref 40–150)
ALT SERPL W P-5'-P-CCNC: 48 U/L (ref 0–70)
ANION GAP SERPL CALCULATED.3IONS-SCNC: 8 MMOL/L (ref 3–14)
AST SERPL W P-5'-P-CCNC: 29 U/L (ref 0–45)
BASOPHILS # BLD AUTO: 0 10E9/L (ref 0–0.2)
BASOPHILS NFR BLD AUTO: 0.7 %
BILIRUB SERPL-MCNC: 0.8 MG/DL (ref 0.2–1.3)
BUN SERPL-MCNC: 30 MG/DL (ref 7–30)
CALCIUM SERPL-MCNC: 8.6 MG/DL (ref 8.5–10.1)
CHLORIDE SERPL-SCNC: 103 MMOL/L (ref 94–109)
CO2 SERPL-SCNC: 29 MMOL/L (ref 20–32)
CREAT SERPL-MCNC: 1.26 MG/DL (ref 0.66–1.25)
DIFFERENTIAL METHOD BLD: ABNORMAL
EOSINOPHIL # BLD AUTO: 0.3 10E9/L (ref 0–0.7)
EOSINOPHIL NFR BLD AUTO: 5.9 %
ERYTHROCYTE [DISTWIDTH] IN BLOOD BY AUTOMATED COUNT: 12.9 % (ref 10–15)
GFR SERPL CREATININE-BSD FRML MDRD: 55 ML/MIN/1.7M2
GLUCOSE SERPL-MCNC: 166 MG/DL (ref 70–99)
HCT VFR BLD AUTO: 41.1 % (ref 40–53)
HGB BLD-MCNC: 13.7 G/DL (ref 13.3–17.7)
IMM GRANULOCYTES # BLD: 0 10E9/L (ref 0–0.4)
IMM GRANULOCYTES NFR BLD: 0.2 %
LYMPHOCYTES # BLD AUTO: 1.3 10E9/L (ref 0.8–5.3)
LYMPHOCYTES NFR BLD AUTO: 28.2 %
MCH RBC QN AUTO: 31.2 PG (ref 26.5–33)
MCHC RBC AUTO-ENTMCNC: 33.3 G/DL (ref 31.5–36.5)
MCV RBC AUTO: 94 FL (ref 78–100)
MONOCYTES # BLD AUTO: 0.5 10E9/L (ref 0–1.3)
MONOCYTES NFR BLD AUTO: 11 %
NEUTROPHILS # BLD AUTO: 2.5 10E9/L (ref 1.6–8.3)
NEUTROPHILS NFR BLD AUTO: 54 %
NRBC # BLD AUTO: 0 10*3/UL
NRBC BLD AUTO-RTO: 0 /100
PLATELET # BLD AUTO: 114 10E9/L (ref 150–450)
POTASSIUM SERPL-SCNC: 4 MMOL/L (ref 3.4–5.3)
PROT SERPL-MCNC: 6.8 G/DL (ref 6.8–8.8)
RBC # BLD AUTO: 4.39 10E12/L (ref 4.4–5.9)
SODIUM SERPL-SCNC: 141 MMOL/L (ref 133–144)
WBC # BLD AUTO: 4.5 10E9/L (ref 4–11)

## 2017-03-02 PROCEDURE — 82105 ALPHA-FETOPROTEIN SERUM: CPT | Performed by: INTERNAL MEDICINE

## 2017-03-21 DIAGNOSIS — C78.7 SECONDARY MALIGNANT NEOPLASM OF LIVER (H): ICD-10-CM

## 2017-03-21 DIAGNOSIS — C78.7 SECONDARY MALIGNANT NEOPLASM OF LIVER (H): Primary | ICD-10-CM

## 2017-03-21 LAB
AFP SERPL-MCNC: 1.7 UG/L (ref 0–8)
ALBUMIN SERPL-MCNC: 3.1 G/DL (ref 3.4–5)
ALP SERPL-CCNC: 87 U/L (ref 40–150)
ALT SERPL W P-5'-P-CCNC: 32 U/L (ref 0–70)
ANION GAP SERPL CALCULATED.3IONS-SCNC: 7 MMOL/L (ref 3–14)
AST SERPL W P-5'-P-CCNC: 20 U/L (ref 0–45)
BASOPHILS # BLD AUTO: 0 10E9/L (ref 0–0.2)
BASOPHILS NFR BLD AUTO: 0.5 %
BILIRUB SERPL-MCNC: 1.3 MG/DL (ref 0.2–1.3)
BUN SERPL-MCNC: 22 MG/DL (ref 7–30)
CALCIUM SERPL-MCNC: 8.3 MG/DL (ref 8.5–10.1)
CHLORIDE SERPL-SCNC: 107 MMOL/L (ref 94–109)
CO2 SERPL-SCNC: 29 MMOL/L (ref 20–32)
CREAT SERPL-MCNC: 1.14 MG/DL (ref 0.66–1.25)
DIFFERENTIAL METHOD BLD: ABNORMAL
EOSINOPHIL # BLD AUTO: 0.2 10E9/L (ref 0–0.7)
EOSINOPHIL NFR BLD AUTO: 5.3 %
ERYTHROCYTE [DISTWIDTH] IN BLOOD BY AUTOMATED COUNT: 12.6 % (ref 10–15)
GFR SERPL CREATININE-BSD FRML MDRD: 62 ML/MIN/1.7M2
GLUCOSE SERPL-MCNC: 78 MG/DL (ref 70–99)
HCT VFR BLD AUTO: 39.8 % (ref 40–53)
HGB BLD-MCNC: 13.3 G/DL (ref 13.3–17.7)
IMM GRANULOCYTES # BLD: 0 10E9/L (ref 0–0.4)
IMM GRANULOCYTES NFR BLD: 0.3 %
LYMPHOCYTES # BLD AUTO: 1.2 10E9/L (ref 0.8–5.3)
LYMPHOCYTES NFR BLD AUTO: 30.5 %
MCH RBC QN AUTO: 31.4 PG (ref 26.5–33)
MCHC RBC AUTO-ENTMCNC: 33.4 G/DL (ref 31.5–36.5)
MCV RBC AUTO: 94 FL (ref 78–100)
MONOCYTES # BLD AUTO: 0.4 10E9/L (ref 0–1.3)
MONOCYTES NFR BLD AUTO: 10.5 %
NEUTROPHILS # BLD AUTO: 2.1 10E9/L (ref 1.6–8.3)
NEUTROPHILS NFR BLD AUTO: 52.9 %
NRBC # BLD AUTO: 0 10*3/UL
NRBC BLD AUTO-RTO: 0 /100
PLATELET # BLD AUTO: 103 10E9/L (ref 150–450)
POTASSIUM SERPL-SCNC: 3.6 MMOL/L (ref 3.4–5.3)
PROT SERPL-MCNC: 6.4 G/DL (ref 6.8–8.8)
RBC # BLD AUTO: 4.23 10E12/L (ref 4.4–5.9)
SODIUM SERPL-SCNC: 143 MMOL/L (ref 133–144)
WBC # BLD AUTO: 4 10E9/L (ref 4–11)

## 2017-03-21 PROCEDURE — 82105 ALPHA-FETOPROTEIN SERUM: CPT | Performed by: INTERNAL MEDICINE

## 2017-03-23 ENCOUNTER — ONCOLOGY VISIT (OUTPATIENT)
Dept: ONCOLOGY | Facility: CLINIC | Age: 77
End: 2017-03-23
Attending: INTERNAL MEDICINE
Payer: MEDICARE

## 2017-03-23 VITALS
SYSTOLIC BLOOD PRESSURE: 165 MMHG | RESPIRATION RATE: 16 BRPM | TEMPERATURE: 97.6 F | DIASTOLIC BLOOD PRESSURE: 65 MMHG | OXYGEN SATURATION: 98 % | HEART RATE: 69 BPM | WEIGHT: 150.35 LBS | BODY MASS INDEX: 23.88 KG/M2

## 2017-03-23 DIAGNOSIS — C22.0 HCC (HEPATOCELLULAR CARCINOMA) (H): Primary | ICD-10-CM

## 2017-03-23 PROCEDURE — 99212 OFFICE O/P EST SF 10 MIN: CPT

## 2017-03-23 PROCEDURE — 99215 OFFICE O/P EST HI 40 MIN: CPT | Mod: ZP | Performed by: INTERNAL MEDICINE

## 2017-03-23 RX ORDER — TIMOLOL MALEATE 5 MG/ML
SOLUTION/ DROPS OPHTHALMIC
Status: ON HOLD | COMMUNITY
Start: 2017-02-20 | End: 2020-05-23

## 2017-03-23 RX ORDER — LANCETS
EACH MISCELLANEOUS
Status: ON HOLD | COMMUNITY
Start: 2017-03-09 | End: 2020-01-18

## 2017-03-23 RX ORDER — PEN NEEDLE, DIABETIC 30 GX5/16"
NEEDLE, DISPOSABLE MISCELLANEOUS
Status: ON HOLD | COMMUNITY
Start: 2017-03-09 | End: 2020-05-23

## 2017-03-23 RX ORDER — BLOOD PRESSURE TEST KIT-MEDIUM
KIT MISCELLANEOUS
Status: ON HOLD | COMMUNITY
Start: 2017-02-20 | End: 2021-08-05

## 2017-03-23 RX ORDER — TRAZODONE HYDROCHLORIDE 50 MG/1
50 TABLET, FILM COATED ORAL AT BEDTIME
Status: ON HOLD | COMMUNITY
Start: 2017-02-28 | End: 2020-01-18

## 2017-03-23 ASSESSMENT — PAIN SCALES - GENERAL: PAINLEVEL: NO PAIN (0)

## 2017-03-23 NOTE — LETTER
3/23/2017     RE: Eh Callahan  [address]   [facility] [address]     Dear Colleague,    Thank you for referring your patient, Eh Callahan, to the [facility]. Please see a copy of my visit note below.    Oncology Follow up visit:  Date on this visit: 3/23/2017      DIAGNOSIS  HCC    Primary Physician: Shalini Mac     History Of Present Illness:       Copied and updated from prior     Mr. Callahan is a 76-year-old male who has a history of hepatitis C for which he was treated with interferon and ribavirin in 2005 and 2006, and since then he has attained a sustained virologic response.  Then he was found to have a 3.6 cm lesion in the liver, which was concerning for malignancy.  A subsequent MRI of the liver confirmed the findings of the lesion; although, this lesion was not diagnostic of hepatocellular carcinoma, and the differential also included focal nodular hyperplasia.  On 11/22/16, he had a hand-assisted exploratory laparoscopy with extensive lysis of adhesion, and segment 2 and 3 of the liver were removed by Dr Mckeon.    The final pathology from the surgery revealed moderately differentiated hepatocellular carcinoma with negative margins, with a tumor size being 4.2 x 3.6 x 3 cm.  The tumors were confined to the liver.  All margins were negative.  There was no lymphovascular or perineural invasion present.  No lymph nodes were examined in the surgical specimen.  It was a pT1 solitary tumor without vascular invasion.  Prior to the surgery, he had a CAT scan of the chest, which showed subcentimeter pulmonary nodules, but no definite evidence of metastasis.   He recovered well after surgery. We opted for repeat scanning in 3 months      He comes in today for followup.  He tells me he has been doing well.  He has noticed very occasional right upper quadrant pain, but he is able to eat and drink well without any nausea, vomiting, diarrhea or constipation.  He denies any new  "pain.  He does feel off and on itching in his body.  He has not noticed any new skin rashes, per se.  He has noticed mild bilateral edema for which he takes Lasix and at times he has noticed it is worse if he lies down.  He denies any interval infections.  He denies any neurological problems.  No trouble breathing.  No bleeding.  His energy continues to be the same.     ROS:  Rest of ROS was negative    I reviewed other hx as below      Past Medical/Surgical History:  Past Medical History:   Diagnosis Date     Anatomical narrow angle glaucoma      Atrial fibrillation (H)      Chronic infection     history of hepatitis C     CKD (chronic kidney disease) stage 2, GFR 60-89 ml/min      Diabetes mellitus (H)      Hepatitis C without mention of hepatic coma      Hypertension      Palpitations      PTSD (post-traumatic stress disorder)      Past Surgical History:   Procedure Laterality Date     APPENDECTOMY       EYE SURGERY       LAPAROSCOPIC HEPATECTOMY PARTIAL Left 11/22/2016    Procedure: LAPAROSCOPIC HEPATECTOMY PARTIAL;  Surgeon: Rick Mckeon MD;  Location: [facility] OR     Cancer History:     As above    Allergies:  Allergies as of 03/23/2017 - Raul as Reviewed 03/23/2017   Allergen Reaction Noted     Amlodipine Swelling 09/26/2016     Lisinopril Cough 09/26/2016     Metoprolol  09/26/2016     Current Medications:  Current Outpatient Prescriptions   Medication Sig Dispense Refill     Blood Pressure Monitoring (RA BLOOD PRESSURE CUFF MONITOR) MISC Take blood pressure once daily       timolol (TIMOPTIC) 0.5 % ophthalmic solution PLACE 1 DROP INTO BOTH EYES ONCE DAILY.       traZODone (DESYREL) 50 MG tablet Take 50 mg by mouth       [employer] LANCETS 21G MISC Uses 6 times daily       blood glucose monitoring (GREGORIO CONTOUR) test strip USE TO MEASURE YOUR BLOOD GLUCOSE 6 TIMES DAILY       insulin degludec (TRESIBA) 100 UNIT/ML pen Inject 14 Units Subcutaneous       Insulin Pen Needle (PEN NEEDLES 5/16\") 31G X 8 MM MISC 4 x " daily       rivaroxaban ANTICOAGULANT (XARELTO) 20 MG TABS tablet Take 1 tablet (20 mg) by mouth daily (with dinner) 30 tablet 5     Multiple Vitamins-Minerals (MULTIVITAMIN & MINERAL PO)        oxyCODONE-acetaminophen (PERCOCET) 5-325 MG per tablet        UNABLE TO FIND 1 drop TIMOLOL OPHT       polyethylene glycol (MIRALAX/GLYCOLAX) packet Take 17 g by mouth daily as needed for constipation 30 packet 1     oxyCODONE (ROXICODONE) 5 MG immediate release tablet Take 1-2 tablets (5-10 mg) by mouth every 4 hours as needed for moderate to severe pain 30 tablet 0     acetaminophen (TYLENOL) 500 MG tablet Take 2 tablets (1,000 mg) by mouth 3 times daily (Patient taking differently: Take 500 mg by mouth 3 times daily as needed ) 100 tablet 0     senna-docusate (SENOKOT-S;PERICOLACE) 8.6-50 MG per tablet Take 1 tablet by mouth 2 times daily 14 tablet 0     FUROSEMIDE PO Take 40 mg by mouth daily       timolol (TIMOPTIC) 0.5 % ophthalmic solution Place 1 drop into both eyes daily       carboxymethylcellulose (REFRESH PLUS) 0.5 % SOLN Place 1 drop into both eyes 4 times daily        insulin detemir (LEVEMIR) 100 UNIT/ML soln Inject 15 Units Subcutaneous At Bedtime        insulin lispro (HUMALOG PEN) 100 UNIT/ML soln 4 - 12 units 3 x daily       pantoprazole (PROTONIX) 40 MG enteric coated tablet Take 40 mg by mouth daily        cholecalciferol (D 5000) 5000 UNITS CAPS 2 times weekly       alendronate (FOSAMAX) 70 MG tablet 70 mg every 7 days Take 1 tablet by mouth twice weekly.       Multiple vitamin TABS Take 1 tablet by mouth       simvastatin (ZOCOR) 20 MG tablet Take 1 tablet (20 mg) by mouth daily       losartan (COZAAR) 100 MG tablet Take 1 tablet by mouth daily        Family History:  Family History   Problem Relation Age of Onset     DIABETES No family hx of      He is not aware of any diabetes in his family     KIDNEY DISEASE No family hx of      Social History:  Social History     Social History     Marital status:       Spouse name: N/A     Number of children: N/A     Years of education: N/A     Occupational History     Not on file.     Social History Main Topics     Smoking status: Former Smoker     Smokeless tobacco: Never Used     Alcohol use No     Drug use: No     Sexual activity: Not on file     Other Topics Concern     Not on file     Social History Narrative    [facility]  Previously lived in [place] before moving here.  Daughter lives locally as well.       Physical Exam:  /65 (BP Location: Right arm)  Pulse 69  Temp 97.6  F (36.4  C) (Oral)  Resp 16  Wt 68.2 kg (150 lb 5.7 oz)  SpO2 98%  BMI 23.88 kg/m2  CONSTITUTIONAL: elderly male in no acute distress  EYES: PERRLA, no palor no icterus.   ENT/MOUTH: no mouth lesions. Ears normal  CVS: s1s2 normal .   RESPIRATORY: chest clear   GI: surgical scars have healed wel. There is minimal tenderness around the RUQ . Abdomen is soft otherwise non tender. Liver palpable 2-3 FBBCM  NEURO: AAOX3  Grossly non focal neuro exam  INTEGUMENT: no concerning skin rashes  LYMPHATIC: no palpable LNs  MUSCULOSKELETAL: Unremarkable. No bony tenderness.   EXTREMITIES: 1+ b/l pedal edema  PSYCH: Mentation, mood and affect are normal. Decision making capacity is intact      Laboratory/Imaging Studies    Results for CHIKIS NEWBERRY (MRN [id number]) as of 3/23/2017 12:37   Ref. Range 3/21/2017 09:01   Sodium Latest Ref Range: 133 - 144 mmol/L 143   Potassium Latest Ref Range: 3.4 - 5.3 mmol/L 3.6   Chloride Latest Ref Range: 94 - 109 mmol/L 107   Carbon Dioxide Latest Ref Range: 20 - 32 mmol/L 29   Urea Nitrogen Latest Ref Range: 7 - 30 mg/dL 22   Creatinine Latest Ref Range: 0.66 - 1.25 mg/dL 1.14   GFR Estimate Latest Ref Range: >60 mL/min/1.7m2 62   GFR Estimate If Black Latest Ref Range: >60 mL/min/1.7m2 75   Calcium Latest Ref Range: 8.5 - 10.1 mg/dL 8.3 (L)   Anion Gap Latest Ref Range: 3 - 14 mmol/L 7   Albumin Latest Ref Range: 3.4 - 5.0 g/dL 3.1  (L)   Protein Total Latest Ref Range: 6.8 - 8.8 g/dL 6.4 (L)   Bilirubin Total Latest Ref Range: 0.2 - 1.3 mg/dL 1.3   Alkaline Phosphatase Latest Ref Range: 40 - 150 U/L 87   ALT Latest Ref Range: 0 - 70 U/L 32   AST Latest Ref Range: 0 - 45 U/L 20   Alpha Fetoprotein Latest Ref Range: 0 - 8 ug/L 1.7   Glucose Latest Ref Range: 70 - 99 mg/dL 78   WBC Latest Ref Range: 4.0 - 11.0 10e9/L 4.0   Hemoglobin Latest Ref Range: 13.3 - 17.7 g/dL 13.3   Hematocrit Latest Ref Range: 40.0 - 53.0 % 39.8 (L)   Platelet Count Latest Ref Range: 150 - 450 10e9/L 103 (L)   RBC Count Latest Ref Range: 4.4 - 5.9 10e12/L 4.23 (L)   MCV Latest Ref Range: 78 - 100 fl 94   MCH Latest Ref Range: 26.5 - 33.0 pg 31.4   MCHC Latest Ref Range: 31.5 - 36.5 g/dL 33.4   RDW Latest Ref Range: 10.0 - 15.0 % 12.6   Diff Method Unknown Automated Method   % Neutrophils Latest Units: % 52.9   % Lymphocytes Latest Units: % 30.5   % Monocytes Latest Units: % 10.5   % Eosinophils Latest Units: % 5.3   % Basophils Latest Units: % 0.5   % Immature Granulocytes Latest Units: % 0.3   Nucleated RBCs Latest Ref Range: 0 /100 0   Absolute Neutrophil Latest Ref Range: 1.6 - 8.3 10e9/L 2.1     MRI Abdomen 3/21/17  FINDINGS:     Comparison study: 9/23/2016 abdominal MRI     Liver: New surgical changes of hepatic segment 2 and 3 resection with  irregularity of the resection margin but no suspicious enhancing  lesion at this location.     Lesion 1: New 12 x 11 x 12 mm late arterial early enhancing lesion at  the junction of hepatic segments 5 and 8 (series 8, image 32)  demonstrates washout on portal venous and later phase images (series  10, image 32) as well as a thin pseudocapsule. This lesion  demonstrates restricted diffusion and mild T2 hyperintensity.  Heterogeneous late arterial enhancement in the melisa hepatis without  associated washout or pseudocapsule. No evidence of fat or iron  deposition within the liver. No evidence of  cirrhosis.     Gallbladder/biliary tree: Tiny T2 hypointense stones layering in the  gallbladder neck. No gallbladder wall thickening or pericholecystic  fluid. No intra or extrahepatic biliary dilation.     Spleen: Normal.     Kidneys: Cortical thinning or along the anterior aspect of the right  kidney, likely scarring related to prior insult. Otherwise normal.     Adrenal glands: Normal.     Pancreas: Normal T1 parenchymal signal. No ductal dilation or mass.     Bowel: Normal where visualized.     Lymph nodes: No lymphadenopathy.     Blood vessels: All patent where visualized.     Lung bases: Linear atelectasis in the right lung base. Otherwise  clear.     Bones and soft tissues: Mild bilateral gynecomastia.     Mesentery and abdominal wall: Unremarkable.     Ascites: None.         IMPRESSION:   1. New surgical changes of hepatic segment 2 and 3 resection without  suspicious lesion along the resection margin.  2. New, 12 mm arterially enhancing lesion at the junction of hepatic  segments 5 and 8 which demonstrates washout and pseudocapsule, most  compatible with hepatocellular carcinoma but technically indeterminate  given lack of underlying cirrhosis.   3. Cholelithiasis.    ASSESSMENT/PLAN:    1.  Stage I, pT1 NX MX, moderately differentiated hepatocellular carcinoma with maximum tumor size being 4.2 cm of the left lower lobe, now status post resection with negative margins and no lymphovascular or perineural invasion found.  The background liver had minimal to mild focal chronic portal  grade 1 out of 4, and fibrosis and portal expansion, which is also stage 1-2 out of 4.  He does not have evidence of cirrhosis.      His repeat MRI shows a new 12 mm concerning lesion in Hepatic seg 5/8  AFP is stable.  I will have him seen by IR for liver directed therapy for what looks like recurrence of HCC  Going forward we will continue with every 3 months scans and labs    3.  Hepatitis C.  This has been treated in 2005  and 2006, and most recently in 11/2015.  His HCV RNA was undetectable.     4. HTN: BP elevated- follow with PCP    5. Itching: no concerning lesions- Can try Claritin prn     I would like to see him back in 3 months with repeat labs and imaging    All questions answered and they seem agreeable and comfortable with the plan    Miri Antoine

## 2017-03-23 NOTE — PROGRESS NOTES
Oncology Follow up visit:  Date on this visit: 3/23/2017      DIAGNOSIS  HCC    Primary Physician: Shalini Mac     History Of Present Illness:       Copied and updated from prior     Mr. Callahan is a 76-year-old male who has a history of hepatitis C for which he was treated with interferon and ribavirin in 2005 and 2006, and since then he has attained a sustained virologic response.  Then he was found to have a 3.6 cm lesion in the liver, which was concerning for malignancy.  A subsequent MRI of the liver confirmed the findings of the lesion; although, this lesion was not diagnostic of hepatocellular carcinoma, and the differential also included focal nodular hyperplasia.  On 11/22/16, he had a hand-assisted exploratory laparoscopy with extensive lysis of adhesion, and segment 2 and 3 of the liver were removed by Dr Mckeon.    The final pathology from the surgery revealed moderately differentiated hepatocellular carcinoma with negative margins, with a tumor size being 4.2 x 3.6 x 3 cm.  The tumors were confined to the liver.  All margins were negative.  There was no lymphovascular or perineural invasion present.  No lymph nodes were examined in the surgical specimen.  It was a pT1 solitary tumor without vascular invasion.  Prior to the surgery, he had a CAT scan of the chest, which showed subcentimeter pulmonary nodules, but no definite evidence of metastasis.   He recovered well after surgery. We opted for repeat scanning in 3 months      He comes in today for followup.  He tells me he has been doing well.  He has noticed very occasional right upper quadrant pain, but he is able to eat and drink well without any nausea, vomiting, diarrhea or constipation.  He denies any new pain.  He does feel off and on itching in his body.  He has not noticed any new skin rashes, per se.  He has noticed mild bilateral edema for which he takes Lasix and at times he has noticed it is worse if he lies down.  He denies any  "interval infections.  He denies any neurological problems.  No trouble breathing.  No bleeding.  His energy continues to be the same.     ROS:  Rest of ROS was negative    I reviewed other hx as below      Past Medical/Surgical History:  Past Medical History:   Diagnosis Date     Anatomical narrow angle glaucoma      Atrial fibrillation (H)      Chronic infection     history of hepatitis C     CKD (chronic kidney disease) stage 2, GFR 60-89 ml/min      Diabetes mellitus (H)      Hepatitis C without mention of hepatic coma      Hypertension      Palpitations      PTSD (post-traumatic stress disorder)      Past Surgical History:   Procedure Laterality Date     APPENDECTOMY       EYE SURGERY       LAPAROSCOPIC HEPATECTOMY PARTIAL Left 11/22/2016    Procedure: LAPAROSCOPIC HEPATECTOMY PARTIAL;  Surgeon: Rick Mckeon MD;  Location: [facility] OR     Cancer History:     As above    Allergies:  Allergies as of 03/23/2017 - Raul as Reviewed 03/23/2017   Allergen Reaction Noted     Amlodipine Swelling 09/26/2016     Lisinopril Cough 09/26/2016     Metoprolol  09/26/2016     Current Medications:  Current Outpatient Prescriptions   Medication Sig Dispense Refill     Blood Pressure Monitoring (RA BLOOD PRESSURE CUFF MONITOR) MISC Take blood pressure once daily       timolol (TIMOPTIC) 0.5 % ophthalmic solution PLACE 1 DROP INTO BOTH EYES ONCE DAILY.       traZODone (DESYREL) 50 MG tablet Take 50 mg by mouth       [employer] LANCETS 21G MISC Uses 6 times daily       blood glucose monitoring (GREGORIO CONTOUR) test strip USE TO MEASURE YOUR BLOOD GLUCOSE 6 TIMES DAILY       insulin degludec (TRESIBA) 100 UNIT/ML pen Inject 14 Units Subcutaneous       Insulin Pen Needle (PEN NEEDLES 5/16\") 31G X 8 MM MISC 4 x daily       rivaroxaban ANTICOAGULANT (XARELTO) 20 MG TABS tablet Take 1 tablet (20 mg) by mouth daily (with dinner) 30 tablet 5     Multiple Vitamins-Minerals (MULTIVITAMIN & MINERAL PO)        oxyCODONE-acetaminophen (PERCOCET) " 5-325 MG per tablet        UNABLE TO FIND 1 drop TIMOLOL OPHT       polyethylene glycol (MIRALAX/GLYCOLAX) packet Take 17 g by mouth daily as needed for constipation 30 packet 1     oxyCODONE (ROXICODONE) 5 MG immediate release tablet Take 1-2 tablets (5-10 mg) by mouth every 4 hours as needed for moderate to severe pain 30 tablet 0     acetaminophen (TYLENOL) 500 MG tablet Take 2 tablets (1,000 mg) by mouth 3 times daily (Patient taking differently: Take 500 mg by mouth 3 times daily as needed ) 100 tablet 0     senna-docusate (SENOKOT-S;PERICOLACE) 8.6-50 MG per tablet Take 1 tablet by mouth 2 times daily 14 tablet 0     FUROSEMIDE PO Take 40 mg by mouth daily       timolol (TIMOPTIC) 0.5 % ophthalmic solution Place 1 drop into both eyes daily       carboxymethylcellulose (REFRESH PLUS) 0.5 % SOLN Place 1 drop into both eyes 4 times daily        insulin detemir (LEVEMIR) 100 UNIT/ML soln Inject 15 Units Subcutaneous At Bedtime        insulin lispro (HUMALOG PEN) 100 UNIT/ML soln 4 - 12 units 3 x daily       pantoprazole (PROTONIX) 40 MG enteric coated tablet Take 40 mg by mouth daily        cholecalciferol (D 5000) 5000 UNITS CAPS 2 times weekly       alendronate (FOSAMAX) 70 MG tablet 70 mg every 7 days Take 1 tablet by mouth twice weekly.       Multiple vitamin TABS Take 1 tablet by mouth       simvastatin (ZOCOR) 20 MG tablet Take 1 tablet (20 mg) by mouth daily       losartan (COZAAR) 100 MG tablet Take 1 tablet by mouth daily        Family History:  Family History   Problem Relation Age of Onset     DIABETES No family hx of      He is not aware of any diabetes in his family     KIDNEY DISEASE No family hx of      Social History:  Social History     Social History     Marital status:      Spouse name: N/A     Number of children: N/A     Years of education: N/A     Occupational History     Not on file.     Social History Main Topics     Smoking status: Former Smoker     Smokeless tobacco: Never Used      Alcohol use No     Drug use: No     Sexual activity: Not on file     Other Topics Concern     Not on file     Social History Narrative    Immigrated in 2002 from [place].  Previously lived in [place] before moving here.  Daughter lives locally as well.       Physical Exam:  /65 (BP Location: Right arm)  Pulse 69  Temp 97.6  F (36.4  C) (Oral)  Resp 16  Wt 68.2 kg (150 lb 5.7 oz)  SpO2 98%  BMI 23.88 kg/m2  CONSTITUTIONAL: elderly male in no acute distress  EYES: PERRLA, no palor no icterus.   ENT/MOUTH: no mouth lesions. Ears normal  CVS: s1s2 normal .   RESPIRATORY: chest clear   GI: surgical scars have healed wel. There is minimal tenderness around the RUQ . Abdomen is soft otherwise non tender. Liver palpable 2-3 FBBCM  NEURO: AAOX3  Grossly non focal neuro exam  INTEGUMENT: no concerning skin rashes  LYMPHATIC: no palpable LNs  MUSCULOSKELETAL: Unremarkable. No bony tenderness.   EXTREMITIES: 1+ b/l pedal edema  PSYCH: Mentation, mood and affect are normal. Decision making capacity is intact      Laboratory/Imaging Studies    Results for CHIKIS NEWBERRY (MRN [id number]) as of 3/23/2017 12:37   Ref. Range 3/21/2017 09:01   Sodium Latest Ref Range: 133 - 144 mmol/L 143   Potassium Latest Ref Range: 3.4 - 5.3 mmol/L 3.6   Chloride Latest Ref Range: 94 - 109 mmol/L 107   Carbon Dioxide Latest Ref Range: 20 - 32 mmol/L 29   Urea Nitrogen Latest Ref Range: 7 - 30 mg/dL 22   Creatinine Latest Ref Range: 0.66 - 1.25 mg/dL 1.14   GFR Estimate Latest Ref Range: >60 mL/min/1.7m2 62   GFR Estimate If Black Latest Ref Range: >60 mL/min/1.7m2 75   Calcium Latest Ref Range: 8.5 - 10.1 mg/dL 8.3 (L)   Anion Gap Latest Ref Range: 3 - 14 mmol/L 7   Albumin Latest Ref Range: 3.4 - 5.0 g/dL 3.1 (L)   Protein Total Latest Ref Range: 6.8 - 8.8 g/dL 6.4 (L)   Bilirubin Total Latest Ref Range: 0.2 - 1.3 mg/dL 1.3   Alkaline Phosphatase Latest Ref Range: 40 - 150 U/L 87   ALT Latest Ref Range: 0 - 70 U/L 32   AST Latest Ref  Range: 0 - 45 U/L 20   Alpha Fetoprotein Latest Ref Range: 0 - 8 ug/L 1.7   Glucose Latest Ref Range: 70 - 99 mg/dL 78   WBC Latest Ref Range: 4.0 - 11.0 10e9/L 4.0   Hemoglobin Latest Ref Range: 13.3 - 17.7 g/dL 13.3   Hematocrit Latest Ref Range: 40.0 - 53.0 % 39.8 (L)   Platelet Count Latest Ref Range: 150 - 450 10e9/L 103 (L)   RBC Count Latest Ref Range: 4.4 - 5.9 10e12/L 4.23 (L)   MCV Latest Ref Range: 78 - 100 fl 94   MCH Latest Ref Range: 26.5 - 33.0 pg 31.4   MCHC Latest Ref Range: 31.5 - 36.5 g/dL 33.4   RDW Latest Ref Range: 10.0 - 15.0 % 12.6   Diff Method Unknown Automated Method   % Neutrophils Latest Units: % 52.9   % Lymphocytes Latest Units: % 30.5   % Monocytes Latest Units: % 10.5   % Eosinophils Latest Units: % 5.3   % Basophils Latest Units: % 0.5   % Immature Granulocytes Latest Units: % 0.3   Nucleated RBCs Latest Ref Range: 0 /100 0   Absolute Neutrophil Latest Ref Range: 1.6 - 8.3 10e9/L 2.1     MRI Abdomen 3/21/17  FINDINGS:     Comparison study: 9/23/2016 abdominal MRI     Liver: New surgical changes of hepatic segment 2 and 3 resection with  irregularity of the resection margin but no suspicious enhancing  lesion at this location.     Lesion 1: New 12 x 11 x 12 mm late arterial early enhancing lesion at  the junction of hepatic segments 5 and 8 (series 8, image 32)  demonstrates washout on portal venous and later phase images (series  10, image 32) as well as a thin pseudocapsule. This lesion  demonstrates restricted diffusion and mild T2 hyperintensity.  Heterogeneous late arterial enhancement in the melisa hepatis without  associated washout or pseudocapsule. No evidence of fat or iron  deposition within the liver. No evidence of cirrhosis.     Gallbladder/biliary tree: Tiny T2 hypointense stones layering in the  gallbladder neck. No gallbladder wall thickening or pericholecystic  fluid. No intra or extrahepatic biliary dilation.     Spleen: Normal.     Kidneys: Cortical thinning or  along the anterior aspect of the right  kidney, likely scarring related to prior insult. Otherwise normal.     Adrenal glands: Normal.     Pancreas: Normal T1 parenchymal signal. No ductal dilation or mass.     Bowel: Normal where visualized.     Lymph nodes: No lymphadenopathy.     Blood vessels: All patent where visualized.     Lung bases: Linear atelectasis in the right lung base. Otherwise  clear.     Bones and soft tissues: Mild bilateral gynecomastia.     Mesentery and abdominal wall: Unremarkable.     Ascites: None.         IMPRESSION:   1. New surgical changes of hepatic segment 2 and 3 resection without  suspicious lesion along the resection margin.  2. New, 12 mm arterially enhancing lesion at the junction of hepatic  segments 5 and 8 which demonstrates washout and pseudocapsule, most  compatible with hepatocellular carcinoma but technically indeterminate  given lack of underlying cirrhosis.   3. Cholelithiasis.      ASSESSMENT/PLAN:    1.  Stage I, pT1 NX MX, moderately differentiated hepatocellular carcinoma with maximum tumor size being 4.2 cm of the left lower lobe, now status post resection with negative margins and no lymphovascular or perineural invasion found.  The background liver had minimal to mild focal chronic portal  grade 1 out of 4, and fibrosis and portal expansion, which is also stage 1-2 out of 4.  He does not have evidence of cirrhosis.      His repeat MRI shows a new 12 mm concerning lesion in Hepatic seg 5/8  AFP is stable.  I will have him seen by IR for liver directed therapy for what looks like recurrence of HCC  Going forward we will continue with every 3 months scans and labs    3.  Hepatitis C.  This has been treated in 2005 and 2006, and most recently in 11/2015.  His HCV RNA was undetectable.     4. HTN: BP elevated- follow with PCP    5. Itching: no concerning lesions- Can try Claritin prn     I would like to see him back in 3 months with repeat labs and imaging    All  questions answered and they seem agreeable and comfortable with the plan        Miri Antoine

## 2017-03-23 NOTE — NURSING NOTE
Pt states his legs are swallen, when he was in the hospital he has has itching and thinks its from the IV, per patient .

## 2017-03-23 NOTE — NURSING NOTE
"Eh Callahan is a 77 year old male who presents for:  Chief Complaint   Patient presents with     Oncology Clinic Visit     Return for HCC        Initial Vitals:  /84 (BP Location: Left arm, Patient Position: Chair, Cuff Size: Adult Regular)  Pulse 69  Temp 97.6  F (36.4  C) (Oral)  Resp 16  Wt 68.2 kg (150 lb 5.7 oz)  SpO2 98%  BMI 23.88 kg/m2 Estimated body mass index is 23.88 kg/(m^2) as calculated from the following:    Height as of 2/17/17: 1.69 m (5' 6.53\").    Weight as of this encounter: 68.2 kg (150 lb 5.7 oz).. Body surface area is 1.79 meters squared. BP completed using cuff size: regular  No Pain (0) No LMP for male patient. Allergies and medications reviewed.     Medications: Medication refills not needed today.  Pharmacy name entered into [employer]: [facility] - [place], [place] - [facility] [address]    Comments:     7  minutes for nursing intake (face to face time)   Neema Zaman MA        "

## 2017-03-23 NOTE — PATIENT INSTRUCTIONS
Schedule appointment with IR asap    See me back in 3 months with repeat labs and MRI a few days prior to seeing me    Follow with PCP for BP control    Can try Claritin 10 mg daily for itching

## 2017-03-23 NOTE — MR AVS SNAPSHOT
After Visit Summary   3/23/2017    Eh Callahan    MRN: [id number]           Patient Information     Date Of Birth          1940        Visit Information        Provider Department      3/23/2017 1:15 PM Miri Antoine MD; [employer] [facility]        Today's Diagnoses     HCC (hepatocellular carcinoma) (H)    -  1      Care Instructions    Schedule appointment with IR asap    See me back in 3 months with repeat labs and MRI a few days prior to seeing me    Follow with PCP for BP control    Can try Claritin 10 mg daily for itching        Follow-ups after your visit        Additional Services     IR REFERRAL       [facility] REFERRAL  Call [phone] to schedule.    IR ONCOLOGY referral  Procedure requested: liver directed therapy for HCC  Associated Diagnosis: HCC  Date preferred: ASAP       The Interventional Radiologist will consult patients for these procedures:    Chemoembolization for Tumor -Referral from ONCOLOGIST only at this time  RFA (radio frequency ablation) Liver lesions, Kidney lesion, Lung lesion  Prostate Embolization for benign prostatic hyperplasia (BPH) ONLY at this time  Y90 embolization of liver lesions- Referral from ONCOLOGIST only at this time    If the procedure requested is not in the list above, please place this referral and call [phone].    The purpose of this referral is to make sure that   You are a candidate for this procedure  Adequate imaging is available to perform necessary procedures if indicated.    Please be aware that coverage of these services is subject to the terms and limitations of your health insurance plan.  Call member services at your health plan with any benefit or coverage questions.       Please bring the following to your appointment:  >>   Any x-rays, CTs or MRIs which have been performed related to this condition.  Please contact the facility where they were done to arrange for  prior to your scheduled  appointment.   We will need both images as well as reports.  Any new CT, MRI or other procedures ordered by your specialist must be performed at a [place] facility or coordinated by your clinic's referral office to ensure proper imaging can be obtained.     >>   List of current medications doses and, schedules.  >>   This referral request   >>   Any documents/labs given to you for this referral                  Your next 10 appointments already scheduled     Mar 28, 2017 12:00 PM CDT   (Arrive by 11:45 AM)   New Patient Visit with Michael Nair MD   [facility] ([facility])    [address] [place] [address]  2nd [facility] [address]   [phone]            Apr 12, 2017 10:00 AM CDT   Lab with  LAB   [facility] ([facility])    [address] [place] [address]   [phone]            Apr 12, 2017 11:10 AM CDT   (Arrive by 10:55 AM)   Return General Liver with Allan Justice MD   [facility] ([facility])    [address] [place] [address]   [phone]            Jun 05, 2017  6:00 PM CDT   (Arrive by 5:45 PM)   RETURN ARRHYTHMIA with Yakov Tamez MD   [facility] ([facility])    [address] [place] [address]   [phone]            Jun 19, 2017  9:30 AM CDT   LAB with  LAB   [facility] ([facility])    [address] [place] [address]   [phone]           Patient must bring picture ID.  Patient should be prepared to give a urine specimen  Please do not eat 10-12 hours before your appointment if you are coming in fasting for labs on lipids, cholesterol, or glucose (sugar).  Pregnant women should follow their Care Team instructions. Water with medications is okay. Do not drink coffee or other fluids.   If you  have concerns about taking  your medications, please ask at office or if scheduling via [employer], send a message by clicking on Secure Messaging, Message Your Care Team.            Jun 19, 2017 10:00 AM CDT   (Arrive by 9:45 AM)   MR ABDOMEN W/O & W CONTRAST with UCMR1   [facility] ([facility])    [address] [address] [phone]           Take your medicines as usual, unless your doctor tells you not to. Bring a list of your current medicines to your exam (including vitamins, minerals and over-the-counter drugs). Also bring the results of similar scans you may have had.    The day before your exam, drink extra fluids at least six 8-ounce glasses (unless your doctor tells you to restrict your fluids).   Have a blood test (creatinine test) within 30 days of your exam. Go to your clinic or Diagnostic Imaging Department for this test.   Do not eat or drink for 6 hours prior to exam.  The MRI machine uses a strong magnet. Please wear clothes without metal (snaps, zippers). A sweatsuit works well, or we may give you a hospital gown.  Please remove any body piercings and hair extensions before you arrive. You will also remove watches, jewelry, hairpins, wallets, dentures, partial dental plates and hearing aids. You may wear contact lenses, and you may be able to wear your rings. We have a safe place to keep your personal items, but it is safer to leave them at home.   **IMPORTANT** THE INSTRUCTIONS BELOW ARE ONLY FOR THOSE PATIENTS WHO HAVE BEEN TOLD THEY WILL RECEIVE SEDATION OR GENERAL ANESTHESIA DURING THEIR MRI PROCEDURE:  IF YOU WILL RECEIVE SEDATION (take medicine to help you relax during your exam):   You must get the medicine from your doctor before you arrive. Bring the medicine to the exam. Do not take it at home.   Arrive one hour early. Bring someone who can take you home after the test. Your medicine will make you sleepy. After  the exam, you may not drive, take a bus or take a taxi by yourself.   No eating 8 hours before your exam. You may have clear liquids up until 4 hours before your exam. (Clear liquids include water, clear tea, black coffee and fruit juice without pulp.)  IF YOU WILL RECEIVE ANESTHESIA (be asleep for your exam):   Arrive 1 1/2 hours early. Bring someone who can take you home after the test. You may not drive, take a bus or take a taxi by yourself.   No eating 8 hours before your exam. You may have clear liquids up until 4 hours before your exam. (Clear liquids include water, clear tea, black coffee and fruit juice without pulp.)  If you have any questions, please contact your Imaging Department exam site.            Jun 22, 2017  2:00 PM CDT   (Arrive by 1:45 PM)   Return Visit with Miri Antoine MD   [facility] ([facility])    [address] [place] [address] [phone]              Future tests that were ordered for you today     Open Future Orders        Priority Expected Expires Ordered    *CBC with platelets differential Routine 7/3/2017 3/23/2018 3/23/2017    Comprehensive metabolic panel Routine 7/3/2017 3/23/2018 3/23/2017    AFP tumor marker Routine 7/3/2017 3/23/2018 3/23/2017    MRI Abdomen w & w/o contrast Routine 7/3/2017 3/23/2018 3/23/2017            Who to contact     If you have questions or need follow up information about today's clinic visit or your schedule please contact [facility] directly at [phone].  Normal or non-critical lab and imaging results will be communicated to you by MyChart, letter or phone within 4 business days after the clinic has received the results. If you do not hear from us within 7 days, please contact the clinic through MyChart or phone. If you have a critical or abnormal lab result, we will notify you by phone as soon as possible.  Submit refill requests through [employer]ise or  "call your pharmacy and they will forward the refill request to us. Please allow 3 business days for your refill to be completed.          Additional Information About Your Visit        MyChart Information     [employer] lets you send messages to your doctor, view your test results, renew your prescriptions, schedule appointments and more. To sign up, go to [email or web address] . Click on \"Log in\" on the left side of the screen, which will take you to the Welcome page. Then click on \"Sign up Now\" on the right side of the page.     You will be asked to enter the access code listed below, as well as some personal information. Please follow the directions to create your username and password.     Your access code is: [email or web address]  Expires: 2017  7:30 AM     Your access code will  in 90 days. If you need help or a new code, please call your [place] clinic or [phone].        Care EveryWhere ID     This is your Care EveryWhere ID. This could be used by other organizations to access your [place] medical records  [id number]        Your Vitals Were     Pulse Temperature Respirations Pulse Oximetry BMI (Body Mass Index)       69 97.6  F (36.4  C) (Oral) 16 98% 23.88 kg/m2        Blood Pressure from Last 3 Encounters:   17 165/65   17 168/74   16 145/84    Weight from Last 3 Encounters:   17 68.2 kg (150 lb 5.7 oz)   17 67.2 kg (148 lb 1.6 oz)   16 64.5 kg (142 lb 4.8 oz)              We Performed the Following     IR REFERRAL          Today's Medication Changes          These changes are accurate as of: 3/23/17  2:39 PM.  If you have any questions, ask your nurse or doctor.               These medicines have changed or have updated prescriptions.        Dose/Directions    acetaminophen 500 MG tablet   Commonly known as:  TYLENOL   This may have changed:    - how much to take  - when to take this  - reasons to take this   Used for:  History of liver excision        Dose:  " 1000 mg   Take 2 tablets (1,000 mg) by mouth 3 times daily   Quantity:  100 tablet   Refills:  0                Primary Care Provider Office Phone # Fax #    Shalini CIERA Mac PA-C [phone]       [facility] [address] [facility] [address]        Thank you!     Thank you for choosing [facility]  for your care. Our goal is always to provide you with excellent care. Hearing back from our patients is one way we can continue to improve our services. Please take a few minutes to complete the written survey that you may receive in the mail after your visit with us. Thank you!             Your Updated Medication List - Protect others around you: Learn how to safely use, store and throw away your medicines at www.disposemymeds.org.          This list is accurate as of: 3/23/17  2:39 PM.  Always use your most recent med list.                   Brand Name Dispense Instructions for use    acetaminophen 500 MG tablet    TYLENOL    100 tablet    Take 2 tablets (1,000 mg) by mouth 3 times daily       alendronate 70 MG tablet    FOSAMAX     70 mg every 7 days Take 1 tablet by mouth twice weekly.       GREGORIO CONTOUR test strip   Generic drug:  blood glucose monitoring      USE TO MEASURE YOUR BLOOD GLUCOSE 6 TIMES DAILY       carboxymethylcellulose 0.5 % Soln ophthalmic solution    REFRESH PLUS     Place 1 drop into both eyes 4 times daily       D 5000 5000 UNITS Caps   Generic drug:  cholecalciferol      2 times weekly       FUROSEMIDE PO      Take 40 mg by mouth daily       insulin degludec 100 UNIT/ML pen    TRESIBA     Inject 14 Units Subcutaneous       insulin detemir 100 UNIT/ML injection    LEVEMIR     Inject 15 Units Subcutaneous At Bedtime       insulin lispro 100 UNIT/ML injection    HumaLOG PEN     4 - 12 units 3 x daily       [employer] LANCETS 21G Misc      Uses 6 times daily       losartan 100 MG tablet    COZAAR     Take 1 tablet by mouth daily       Multiple vitamin Tabs  "     Take 1 tablet by mouth       MULTIVITAMIN & MINERAL PO          oxyCODONE 5 MG IR tablet    ROXICODONE    30 tablet    Take 1-2 tablets (5-10 mg) by mouth every 4 hours as needed for moderate to severe pain       oxyCODONE-acetaminophen 5-325 MG per tablet    PERCOCET         pantoprazole 40 MG EC tablet    PROTONIX     Take 40 mg by mouth daily       pen needles 5/16\" 31G X 8 MM Misc      4 x daily       polyethylene glycol Packet    MIRALAX/GLYCOLAX    30 packet    Take 17 g by mouth daily as needed for constipation       RA BLOOD PRESSURE CUFF MONITOR Misc      Take blood pressure once daily       rivaroxaban ANTICOAGULANT 20 MG Tabs tablet    XARELTO    30 tablet    Take 1 tablet (20 mg) by mouth daily (with dinner)       senna-docusate 8.6-50 MG per tablet    SENOKOT-S;PERICOLACE    14 tablet    Take 1 tablet by mouth 2 times daily       simvastatin 20 MG tablet    ZOCOR     Take 1 tablet (20 mg) by mouth daily       * timolol 0.5 % ophthalmic solution    TIMOPTIC     Place 1 drop into both eyes daily       * timolol 0.5 % ophthalmic solution    TIMOPTIC     PLACE 1 DROP INTO BOTH EYES ONCE DAILY.       traZODone 50 MG tablet    DESYREL     Take 50 mg by mouth       UNABLE TO FIND      1 drop TIMOLOL OPHT       * Notice:  This list has 2 medication(s) that are the same as other medications prescribed for you. Read the directions carefully, and ask your doctor or other care provider to review them with you.      "

## 2017-03-28 ENCOUNTER — OFFICE VISIT (OUTPATIENT)
Dept: RADIOLOGY | Facility: CLINIC | Age: 77
End: 2017-03-28
Attending: RADIOLOGY
Payer: MEDICARE

## 2017-03-28 VITALS
OXYGEN SATURATION: 99 % | DIASTOLIC BLOOD PRESSURE: 79 MMHG | TEMPERATURE: 97.6 F | SYSTOLIC BLOOD PRESSURE: 153 MMHG | WEIGHT: 149.47 LBS | RESPIRATION RATE: 16 BRPM | BODY MASS INDEX: 23.74 KG/M2 | HEART RATE: 69 BPM

## 2017-03-28 DIAGNOSIS — C22.0 HCC (HEPATOCELLULAR CARCINOMA) (H): Primary | ICD-10-CM

## 2017-03-28 DIAGNOSIS — C22.8 MALIGNANT NEOPLASM OF LIVER, PRIMARY (H): Primary | ICD-10-CM

## 2017-03-28 PROCEDURE — 99212 OFFICE O/P EST SF 10 MIN: CPT

## 2017-03-28 PROCEDURE — T1013 SIGN LANG/ORAL INTERPRETER: HCPCS | Mod: U3,ZF

## 2017-03-28 ASSESSMENT — PAIN SCALES - GENERAL: PAINLEVEL: MILD PAIN (3)

## 2017-03-28 NOTE — NURSING NOTE
"Eh Callahan is a 77 year old male who presents for:  Chief Complaint   Patient presents with     Oncology Clinic Visit     New for HCC        Initial Vitals:  /79 (BP Location: Left arm, Patient Position: Chair, Cuff Size: Adult Regular)  Pulse 69  Temp 97.6  F (36.4  C) (Oral)  Resp 16  Wt 67.8 kg (149 lb 7.6 oz)  SpO2 99%  BMI 23.74 kg/m2 Estimated body mass index is 23.74 kg/(m^2) as calculated from the following:    Height as of 2/17/17: 1.69 m (5' 6.53\").    Weight as of this encounter: 67.8 kg (149 lb 7.6 oz).. Body surface area is 1.78 meters squared. BP completed using cuff size: regular  Mild Pain (3) No LMP for male patient. Allergies and medications reviewed.     Medications: Medication refills not needed today.  Pharmacy name entered into [facility]: [facility] - [place], [place] - [address]    Comments: pt states there is no change in his Medications , Per patient    6  minutes for nursing intake (face to face time)   Neema Zaman MA        "

## 2017-03-28 NOTE — MR AVS SNAPSHOT
After Visit Summary   3/28/2017    Eh Callahan    MRN: [id number]           Patient Information     Date Of Birth          1940        Visit Information        Provider Department      3/28/2017 11:45 AM Jose Harrington Jafar, MD [facility]        Today's Diagnoses     Malignant neoplasm of liver, primary (H)    -  1      Care Instructions    We will call you with the appointment details of your Chemoembolization procedure.     Please check into the [facility] Waiting room, located on the main level, at [facility], located at [address] [place] [address]    -You will check in 1.5 hours prior  to the appointment time.    Reminders:    -No solids or milk products 6 hours prior to procedure time.    -No clear liquids 2 hours prior to procedure time.     -Please take your morning medications as indicated with enough water to swallow.     --You will be admitted overnight after the procedure, so therefore you may pack an overnight bag.     -Post follow up: We will call you 2-3 days after you leave to follow up after the procedure.     We will also have you return in 1 mos with an MRI and follow upappt.       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  The reason for this is because the tumor is dying.     This includes:    -high fevers 101-102, which may last for a couple of days. We recommend using over the counter medications such as Tylenol or Ibuprofen.     -Nausea, in which we will give you medications after you are discharged home.     -Decreased appetite: We don't expect you to eat 3 full meals. Instead, we prefer that you  snack through out the day.     -Feeling fatigue: this is normal, however we recommend that you get up and walk around.     **Please keep in mind to stay hydrated after the procedure. At  Least 6 glasses of water a day.      *Abnormal symptoms in which to call or seek immediate help:  1.  Confusion!!-GO TO THE EMERGENCY ROOM.  2. Swelling of the lower legs  3. Severe abdominal pain that doesn't go away with pain medications.   4. High fevers that do not come down with over the counter medications.     Should ANY of the above symptoms are exhibited, please seek immediate help or call our hospital at [phone] and ask for the Interventional Radiologist On-call (after hours) or you can contact me (during business hours) 8-4pm.    Should you have any further questions, please call us at anytime. It has been a pleasure to see you today.        Otilia Arora RN  Interventional Radiology Nurse Coordinator   Phone: [phone]            Follow-ups after your visit        Your next 10 appointments already scheduled     May 10, 2017  9:00 AM CDT   (Arrive by 8:45 AM)   Return General Liver with Fay Bazan MD   [facility] ([facility] [place])    [address] [address] [phone]            May 26, 2017  8:00 AM CDT   MR ABDOMEN W/O & W CONTRAST with UUMR1   [facility] [place], MRI ([facility])    [address] [facility] [address] [phone]           Take your medicines as usual, unless your doctor tells you not to. Bring a list of your current medicines to your exam (including vitamins, minerals and over-the-counter drugs). Also bring the results of similar scans you may have had.    The day before your exam, drink extra fluids at least six 8-ounce glasses (unless your doctor tells you to restrict your fluids).   Have a blood test (creatinine test) within 30 days of your exam. Go to your clinic or Diagnostic Imaging Department for this test.   Do not eat or drink for 6 hours prior to exam.  The MRI machine uses a strong magnet. Please wear clothes without metal (snaps, zippers). A sweatsuit works well, or we may give you a hospital gown.  Please remove any  body piercings and hair extensions before you arrive. You will also remove watches, jewelry, hairpins, wallets, dentures, partial dental plates and hearing aids. You may wear contact lenses, and you may be able to wear your rings. We have a safe place to keep your personal items, but it is safer to leave them at home.   **IMPORTANT** THE INSTRUCTIONS BELOW ARE ONLY FOR THOSE PATIENTS WHO HAVE BEEN TOLD THEY WILL RECEIVE SEDATION OR GENERAL ANESTHESIA DURING THEIR MRI PROCEDURE:  IF YOU WILL RECEIVE SEDATION (take medicine to help you relax during your exam):   You must get the medicine from your doctor before you arrive. Bring the medicine to the exam. Do not take it at home.   Arrive one hour early. Bring someone who can take you home after the test. Your medicine will make you sleepy. After the exam, you may not drive, take a bus or take a taxi by yourself.   No eating 8 hours before your exam. You may have clear liquids up until 4 hours before your exam. (Clear liquids include water, clear tea, black coffee and fruit juice without pulp.)  IF YOU WILL RECEIVE ANESTHESIA (be asleep for your exam):   Arrive 1 1/2 hours early. Bring someone who can take you home after the test. You may not drive, take a bus or take a taxi by yourself.   No eating 8 hours before your exam. You may have clear liquids up until 4 hours before your exam. (Clear liquids include water, clear tea, black coffee and fruit juice without pulp.)  If you have any questions, please contact your Imaging Department exam site.            May 26, 2017  9:15 AM CDT   [facility] Lab Draw with  [employer] LAB DRAW   [facility] Lab Draw ([facility])    [address] [place] [id number] [phone]            May 26, 2017  9:45 AM CDT   (Arrive by 9:30 AM)   Return Visit with Adri Kidd MD   [facility] ([facility])    [address] [place] [address]  [facility] [address]   [phone]            Jun 05, 2017  6:00 PM CDT   (Arrive by 5:45 PM)   RETURN ARRHYTHMIA with Yakov Tamez MD   [facility] ([facility])    [address] [place] [address]  [address]rd [facility] [address]   [phone]            Jun 19, 2017  9:30 AM CDT   LAB with  LAB   [facility] Lab ([facility])    [address] [place] [id number] [phone]           Patient must bring picture ID.  Patient should be prepared to give a urine specimen  Please do not eat 10-12 hours before your appointment if you are coming in fasting for labs on lipids, cholesterol, or glucose (sugar).  Pregnant women should follow their Care Team instructions. Water with medications is okay. Do not drink coffee or other fluids.   If you have concerns about taking  your medications, please ask at office or if scheduling via [employer], send a message by clicking on Secure Messaging, Message Your Care Team.            Jun 19, 2017 10:00 AM CDT   (Arrive by 9:45 AM)   MR ABDOMEN W/O & W CONTRAST with [address] MRI ([facility])    [address] [place] [address] [phone]           Take your medicines as usual, unless your doctor tells you not to. Bring a list of your current medicines to your exam (including vitamins, minerals and over-the-counter drugs). Also bring the results of similar scans you may have had.    The day before your exam, drink extra fluids at least six 8-ounce glasses (unless your doctor tells you to restrict your fluids).   Have a blood test (creatinine test) within 30 days of your exam. Go to your clinic or Diagnostic Imaging Department for this test.   Do not eat or drink for 6 hours prior to exam.  The MRI machine uses a strong magnet. Please wear clothes without metal (snaps, zippers). A sweatsuit works well, or we may  give you a hospital gown.  Please remove any body piercings and hair extensions before you arrive. You will also remove watches, jewelry, hairpins, wallets, dentures, partial dental plates and hearing aids. You may wear contact lenses, and you may be able to wear your rings. We have a safe place to keep your personal items, but it is safer to leave them at home.   **IMPORTANT** THE INSTRUCTIONS BELOW ARE ONLY FOR THOSE PATIENTS WHO HAVE BEEN TOLD THEY WILL RECEIVE SEDATION OR GENERAL ANESTHESIA DURING THEIR MRI PROCEDURE:  IF YOU WILL RECEIVE SEDATION (take medicine to help you relax during your exam):   You must get the medicine from your doctor before you arrive. Bring the medicine to the exam. Do not take it at home.   Arrive one hour early. Bring someone who can take you home after the test. Your medicine will make you sleepy. After the exam, you may not drive, take a bus or take a taxi by yourself.   No eating 8 hours before your exam. You may have clear liquids up until 4 hours before your exam. (Clear liquids include water, clear tea, black coffee and fruit juice without pulp.)  IF YOU WILL RECEIVE ANESTHESIA (be asleep for your exam):   Arrive 1 1/2 hours early. Bring someone who can take you home after the test. You may not drive, take a bus or take a taxi by yourself.   No eating 8 hours before your exam. You may have clear liquids up until 4 hours before your exam. (Clear liquids include water, clear tea, black coffee and fruit juice without pulp.)  If you have any questions, please contact your Imaging Department exam site.            Jun 22, 2017  2:00 PM CDT   (Arrive by 1:45 PM)   Return Visit with Miri Antoine MD   [facility] ([facility])    [address] [facility]  2nd Floor  [facility] [address] [phone]              Future tests that were ordered for you today     Open Future Orders        Priority Expected Expires Ordered    CBC with platelets  "Routine  2018    Basic metabolic panel Routine  2018    INR Routine  2018    Hepatic panel Routine  2018    AFP tumor marker Routine  2018    MR Abdomen w/o & w Contrast Routine  2018            Who to contact     If you have questions or need follow up information about today's clinic visit or your schedule please contact [facility] directly at [phone].  Normal or non-critical lab and imaging results will be communicated to you by [employer], letter or phone within 4 business days after the clinic has received the results. If you do not hear from us within 7 days, please contact the clinic through [employer] or phone. If you have a critical or abnormal lab result, we will notify you by phone as soon as possible.  Submit refill requests through [employer] or call your pharmacy and they will forward the refill request to us. Please allow 3 business days for your refill to be completed.          Additional Information About Your Visit        MyChart Information     [employer] lets you send messages to your doctor, view your test results, renew your prescriptions, schedule appointments and more. To sign up, go to [email or web address] . Click on \"Log in\" on the left side of the screen, which will take you to the Welcome page. Then click on \"Sign up Now\" on the right side of the page.     You will be asked to enter the access code listed below, as well as some personal information. Please follow the directions to create your username and password.     Your access code is: [email or web address]  Expires: 2017  7:30 AM     Your access code will  in 90 days. If you need help or a new code, please call your [place] clinic or [phone].        Care EveryWhere ID     This is your Care EveryWhere ID. This could be used by other organizations to access your [place] medical records  [id number]        Your Vitals Were  "    Pulse Temperature Respirations Pulse Oximetry BMI (Body Mass Index)       69 97.6  F (36.4  C) (Oral) 16 99% 23.74 kg/m2        Blood Pressure from Last 3 Encounters:   04/19/17 110/70   04/12/17 146/76   04/05/17 157/75    Weight from Last 3 Encounters:   04/19/17 68.5 kg (151 lb 0.2 oz)   04/12/17 66.6 kg (146 lb 12.8 oz)   04/05/17 63.5 kg (140 lb)              Today, you had the following     No orders found for display         Today's Medication Changes          These changes are accurate as of: 3/28/17 11:59 PM.  If you have any questions, ask your nurse or doctor.               These medicines have changed or have updated prescriptions.        Dose/Directions    acetaminophen 500 MG tablet   Commonly known as:  TYLENOL   This may have changed:    - how much to take  - when to take this  - reasons to take this   Used for:  History of liver excision        Dose:  1000 mg   Take 2 tablets (1,000 mg) by mouth 3 times daily   Quantity:  100 tablet   Refills:  0                Primary Care Provider Office Phone # Fax #    Shalini VANG KAYCEE Mac [phone]       [place] [address]        Thank you!     Thank you for choosing [facility]  for your care. Our goal is always to provide you with excellent care. Hearing back from our patients is one way we can continue to improve our services. Please take a few minutes to complete the written survey that you may receive in the mail after your visit with us. Thank you!             Your Updated Medication List - Protect others around you: Learn how to safely use, store and throw away your medicines at www.disposemymeds.org.          This list is accurate as of: 3/28/17 11:59 PM.  Always use your most recent med list.                   Brand Name Dispense Instructions for use    acetaminophen 500 MG tablet    TYLENOL    100 tablet    Take 2 tablets (1,000 mg) by mouth 3 times daily       alendronate 70  "MG tablet    FOSAMAX     70 mg every 7 days Take 1 tablet by mouth twice weekly.       GREGORIO CONTOUR test strip   Generic drug:  blood glucose monitoring      USE TO MEASURE YOUR BLOOD GLUCOSE 6 TIMES DAILY       carboxymethylcellulose 0.5 % Soln ophthalmic solution    REFRESH PLUS     Place 1 drop into both eyes 4 times daily       D 5000 5000 UNITS Caps   Generic drug:  cholecalciferol      Reported on 4/12/2017       insulin degludec 100 UNIT/ML pen    TRESIBA     Inject 14 Units Subcutaneous       insulin lispro 100 UNIT/ML injection    HumaLOG PEN     4 - 12 units 3 x daily       [employer] LANCETS 21G Misc      Uses 6 times daily       losartan 100 MG tablet    COZAAR     Take 1 tablet by mouth daily       Multiple vitamin Tabs      Take 1 tablet by mouth       MULTIVITAMIN & MINERAL PO          oxyCODONE 5 MG IR tablet    ROXICODONE    30 tablet    Take 1-2 tablets (5-10 mg) by mouth every 4 hours as needed for moderate to severe pain       pantoprazole 40 MG EC tablet    PROTONIX     Take 40 mg by mouth daily       pen needles 5/16\" 31G X 8 MM Misc      4 x daily       polyethylene glycol Packet    MIRALAX/GLYCOLAX    30 packet    Take 17 g by mouth daily as needed for constipation       RA BLOOD PRESSURE CUFF MONITOR Misc      Take blood pressure once daily       rivaroxaban ANTICOAGULANT 20 MG Tabs tablet    XARELTO    30 tablet    Take 1 tablet (20 mg) by mouth daily (with dinner)       senna-docusate 8.6-50 MG per tablet    SENOKOT-S;PERICOLACE    14 tablet    Take 1 tablet by mouth 2 times daily       simvastatin 20 MG tablet    ZOCOR     Take 1 tablet (20 mg) by mouth daily       timolol 0.5 % ophthalmic solution    TIMOPTIC     PLACE 1 DROP INTO BOTH EYES ONCE DAILY.       traZODone 50 MG tablet    DESYREL     Take 50 mg by mouth         "

## 2017-03-28 NOTE — LETTER
"3/28/2017     RE: Eh Callahan  [address]   [facility] [address]     Dear Colleague,    Thank you for referring your patient, Eh Callahan, to the [facility]. Please see a copy of my visit note below.      Mr. Stauffer is a nice 77 year-old gentleman with HCC who is referred by Dr Floyd for evaluation for liver directed therapy. He had a left hepatectomy (segments 2,3) in Nov 2016. Imaging shows two lesions in segment 5 and 8 with washout (OPTN 5). Patient is complaining of fatigue, leg swelling and itching. His hepatic panel is good. I looked at the imaging that shows the two lesion previously discribed.    Lab Results   Component Value Date    AST 20 03/21/2017     Lab Results   Component Value Date    ALT 32 03/21/2017     Lab Results   Component Value Date    BILICONJ 0.0 10/02/2013      Lab Results   Component Value Date    BILITOTAL 1.3 03/21/2017     Lab Results   Component Value Date    ALBUMIN 3.1 03/21/2017     Lab Results   Component Value Date    PROTTOTAL 6.4 03/21/2017      Lab Results   Component Value Date    ALKPHOS 87 03/21/2017     Current Outpatient Prescriptions   Medication     Blood Pressure Monitoring (RA BLOOD PRESSURE CUFF MONITOR) MISC     timolol (TIMOPTIC) 0.5 % ophthalmic solution     traZODone (DESYREL) 50 MG tablet     [employer] LANCETS 21G MISC     blood glucose monitoring (GREGORIO CONTOUR) test strip     insulin degludec (TRESIBA) 100 UNIT/ML pen     Insulin Pen Needle (PEN NEEDLES 5/16\") 31G X 8 MM MISC     rivaroxaban ANTICOAGULANT (XARELTO) 20 MG TABS tablet     Multiple Vitamins-Minerals (MULTIVITAMIN & MINERAL PO)     oxyCODONE-acetaminophen (PERCOCET) 5-325 MG per tablet     UNABLE TO FIND     polyethylene glycol (MIRALAX/GLYCOLAX) packet     oxyCODONE (ROXICODONE) 5 MG immediate release tablet     acetaminophen (TYLENOL) 500 MG tablet     senna-docusate (SENOKOT-S;PERICOLACE) 8.6-50 MG per tablet     FUROSEMIDE PO     timolol (TIMOPTIC) 0.5 % ophthalmic " solution     carboxymethylcellulose (REFRESH PLUS) 0.5 % SOLN     insulin detemir (LEVEMIR) 100 UNIT/ML soln     insulin lispro (HUMALOG PEN) 100 UNIT/ML soln     pantoprazole (PROTONIX) 40 MG enteric coated tablet     cholecalciferol (D 5000) 5000 UNITS CAPS     alendronate (FOSAMAX) 70 MG tablet     Multiple vitamin TABS     simvastatin (ZOCOR) 20 MG tablet     losartan (COZAAR) 100 MG tablet     No current facility-administered medications for this visit.      Past Medical History:   Diagnosis Date     Anatomical narrow angle glaucoma      Atrial fibrillation (H)      Chronic infection     history of hepatitis C     CKD (chronic kidney disease) stage 2, GFR 60-89 ml/min      Diabetes mellitus (H)      Hepatitis C without mention of hepatic coma      Hypertension      Palpitations      PTSD (post-traumatic stress disorder)        Past Surgical History:   Procedure Laterality Date     APPENDECTOMY       EYE SURGERY       LAPAROSCOPIC HEPATECTOMY PARTIAL Left 11/22/2016    Procedure: LAPAROSCOPIC HEPATECTOMY PARTIAL;  Surgeon: Rick Mckeon MD;  Location:  OR       Family History   Problem Relation Age of Onset     DIABETES No family hx of      He is not aware of any diabetes in his family     KIDNEY DISEASE No family hx of        Social History   Substance Use Topics     Smoking status: Former Smoker     Smokeless tobacco: Never Used     Alcohol use No     /79 (BP Location: Left arm, Patient Position: Chair, Cuff Size: Adult Regular)  Pulse 69  Temp 97.6  F (36.4  C) (Oral)  Resp 16  Wt 67.8 kg (149 lb 7.6 oz)  SpO2 99%  BMI 23.74 kg/m2    Impression and Plan:  Patient seems to be a good candidate for cTACE. I reviewed the risks and benefits of the procedure with the patient and his wife with the help of an interpretor. They agreed with the plan and would like to proceed.    Again, thank you for allowing me to participate in the care of your patient.      Sincerely,    Michael Nair MD

## 2017-03-28 NOTE — PATIENT INSTRUCTIONS
We will call you with the appointment details of your Chemoembolization procedure.     Please check into the Gold Waiting room, located on the main level, at [facility], located at [address] [facility], [place] [address]    -You will check in 1.5 hours prior  to the appointment time.    Reminders:    -No solids or milk products 6 hours prior to procedure time.    -No clear liquids 2 hours prior to procedure time.     -Please take your morning medications as indicated with enough water to swallow.     --You will be admitted overnight after the procedure, so therefore you may pack an overnight bag.     -Post follow up: We will call you 2-3 days after you leave to follow up after the procedure.     We will also have you return in 1 mos with an MRI and follow upappt.       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  The reason for this is because the tumor is dying.     This includes:    -high fevers 101-102, which may last for a couple of days. We recommend using over the counter medications such as Tylenol or Ibuprofen.     -Nausea, in which we will give you medications after you are discharged home.     -Decreased appetite: We don't expect you to eat 3 full meals. Instead, we prefer that you  snack through out the day.     -Feeling fatigue: this is normal, however we recommend that you get up and walk around.     **Please keep in mind to stay hydrated after the procedure. At  Least 6 glasses of water a day.      *Abnormal symptoms in which to call or seek immediate help:  1. Confusion!!-GO TO THE EMERGENCY ROOM.  2. Swelling of the lower legs  3. Severe abdominal pain that doesn't go away with pain medications.   4. High fevers that do not come down with over the counter medications.     Should ANY of the above symptoms are exhibited, please seek immediate help or call our hospital at [phone] and ask for the Interventional Radiologist On-call (after hours) or you  can contact me (during business hours) 8-4pm.    Should you have any further questions, please call us at anytime. It has been a pleasure to see you today.        Otilia Arora RN  Interventional Radiology Nurse Coordinator   Phone: [phone]

## 2017-03-28 NOTE — PROGRESS NOTES
"  Mr. Stauffer is a nice 77 year-old gentleman with HCC who is referred by Dr Floyd for evaluation for liver directed therapy. He had a left hepatectomy (segments 2,3) in Nov 2016. Imaging shows two lesions in segment 5 and 8 with washout (OPTN 5). Patient is complaining of fatigue, leg swelling and itching. His hepatic panel is good. I looked at the imaging that shows the two lesion previously discribed.    Lab Results   Component Value Date    AST 20 03/21/2017     Lab Results   Component Value Date    ALT 32 03/21/2017     Lab Results   Component Value Date    BILICONJ 0.0 10/02/2013      Lab Results   Component Value Date    BILITOTAL 1.3 03/21/2017     Lab Results   Component Value Date    ALBUMIN 3.1 03/21/2017     Lab Results   Component Value Date    PROTTOTAL 6.4 03/21/2017      Lab Results   Component Value Date    ALKPHOS 87 03/21/2017     Current Outpatient Prescriptions   Medication     Blood Pressure Monitoring (RA BLOOD PRESSURE CUFF MONITOR) MISC     timolol (TIMOPTIC) 0.5 % ophthalmic solution     traZODone (DESYREL) 50 MG tablet     [employer] LANCETS 21G MISC     blood glucose monitoring (GREGORIO CONTOUR) test strip     insulin degludec (TRESIBA) 100 UNIT/ML pen     Insulin Pen Needle (PEN NEEDLES 5/16\") 31G X 8 MM MISC     rivaroxaban ANTICOAGULANT (XARELTO) 20 MG TABS tablet     Multiple Vitamins-Minerals (MULTIVITAMIN & MINERAL PO)     oxyCODONE-acetaminophen (PERCOCET) 5-325 MG per tablet     UNABLE TO FIND     polyethylene glycol (MIRALAX/GLYCOLAX) packet     oxyCODONE (ROXICODONE) 5 MG immediate release tablet     acetaminophen (TYLENOL) 500 MG tablet     senna-docusate (SENOKOT-S;PERICOLACE) 8.6-50 MG per tablet     FUROSEMIDE PO     timolol (TIMOPTIC) 0.5 % ophthalmic solution     carboxymethylcellulose (REFRESH PLUS) 0.5 % SOLN     insulin detemir (LEVEMIR) 100 UNIT/ML soln     insulin lispro (HUMALOG PEN) 100 UNIT/ML soln     pantoprazole (PROTONIX) 40 MG enteric coated tablet     cholecalciferol " (D 5000) 5000 UNITS CAPS     alendronate (FOSAMAX) 70 MG tablet     Multiple vitamin TABS     simvastatin (ZOCOR) 20 MG tablet     losartan (COZAAR) 100 MG tablet     No current facility-administered medications for this visit.      Past Medical History:   Diagnosis Date     Anatomical narrow angle glaucoma      Atrial fibrillation (H)      Chronic infection     history of hepatitis C     CKD (chronic kidney disease) stage 2, GFR 60-89 ml/min      Diabetes mellitus (H)      Hepatitis C without mention of hepatic coma      Hypertension      Palpitations      PTSD (post-traumatic stress disorder)        Past Surgical History:   Procedure Laterality Date     APPENDECTOMY       EYE SURGERY       LAPAROSCOPIC HEPATECTOMY PARTIAL Left 11/22/2016    Procedure: LAPAROSCOPIC HEPATECTOMY PARTIAL;  Surgeon: Rick Mckeon MD;  Location:  OR       Family History   Problem Relation Age of Onset     DIABETES No family hx of      He is not aware of any diabetes in his family     KIDNEY DISEASE No family hx of        Social History   Substance Use Topics     Smoking status: Former Smoker     Smokeless tobacco: Never Used     Alcohol use No     /79 (BP Location: Left arm, Patient Position: Chair, Cuff Size: Adult Regular)  Pulse 69  Temp 97.6  F (36.4  C) (Oral)  Resp 16  Wt 67.8 kg (149 lb 7.6 oz)  SpO2 99%  BMI 23.74 kg/m2      Impression and Plan:  Patient seems to be a good candidate for cTACE. I reviewed the risks and benefits of the procedure with the patient and his wife with the help of an interpretor. They agreed with the plan and would like to proceed.

## 2017-03-29 ENCOUNTER — TELEPHONE (OUTPATIENT)
Dept: INTERVENTIONAL RADIOLOGY/VASCULAR | Facility: CLINIC | Age: 77
End: 2017-03-29

## 2017-03-29 NOTE — TELEPHONE ENCOUNTER
=Xarelto- hold the day before  =Hold Humalog  =80% of levemir and degludec    Called pt using [facility]  to inform him of his scheduled TACE procedure with Dr. Nair.    Left VM  of his appt details. Informed him that this is scheduled for next week Wed 4/5/17. He is to check into [facility] waiting room at 10am for an 1130am procedure.   All prep reiterated and did go over insuline and Xarelto details however VM got cutt off. Will try again later.    Otilia Arora, RN, BSN  Interventional Radiology Nurse Coordinator   Phone: [phone]

## 2017-03-31 ENCOUNTER — TELEPHONE (OUTPATIENT)
Dept: INTERVENTIONAL RADIOLOGY/VASCULAR | Facility: CLINIC | Age: 77
End: 2017-03-31

## 2017-04-05 ENCOUNTER — APPOINTMENT (OUTPATIENT)
Dept: ULTRASOUND IMAGING | Facility: CLINIC | Age: 77
End: 2017-04-05
Attending: RADIOLOGY
Payer: MEDICARE

## 2017-04-05 ENCOUNTER — APPOINTMENT (OUTPATIENT)
Dept: INTERVENTIONAL RADIOLOGY/VASCULAR | Facility: CLINIC | Age: 77
End: 2017-04-05
Attending: RADIOLOGY
Payer: MEDICARE

## 2017-04-05 ENCOUNTER — HOSPITAL ENCOUNTER (OUTPATIENT)
Facility: CLINIC | Age: 77
Discharge: HOME | End: 2017-04-05
Attending: RADIOLOGY | Admitting: RADIOLOGY
Payer: MEDICARE

## 2017-04-05 ENCOUNTER — APPOINTMENT (OUTPATIENT)
Dept: MEDSURG UNIT | Facility: CLINIC | Age: 77
End: 2017-04-05
Attending: RADIOLOGY
Payer: MEDICARE

## 2017-04-05 VITALS
BODY MASS INDEX: 21.97 KG/M2 | HEIGHT: 67 IN | SYSTOLIC BLOOD PRESSURE: 157 MMHG | OXYGEN SATURATION: 100 % | RESPIRATION RATE: 16 BRPM | DIASTOLIC BLOOD PRESSURE: 75 MMHG | WEIGHT: 140 LBS

## 2017-04-05 DIAGNOSIS — C22.0 HCC (HEPATOCELLULAR CARCINOMA) (H): ICD-10-CM

## 2017-04-05 LAB
ALBUMIN SERPL-MCNC: 3.5 G/DL (ref 3.4–5)
ALP SERPL-CCNC: 119 U/L (ref 40–150)
ALT SERPL W P-5'-P-CCNC: 36 U/L (ref 0–70)
ANION GAP SERPL CALCULATED.3IONS-SCNC: 7 MMOL/L (ref 3–14)
AST SERPL W P-5'-P-CCNC: 20 U/L (ref 0–45)
BILIRUB DIRECT SERPL-MCNC: 0.2 MG/DL (ref 0–0.2)
BILIRUB SERPL-MCNC: 1.4 MG/DL (ref 0.2–1.3)
BUN SERPL-MCNC: 36 MG/DL (ref 7–30)
CALCIUM SERPL-MCNC: 8.8 MG/DL (ref 8.5–10.1)
CHLORIDE SERPL-SCNC: 100 MMOL/L (ref 94–109)
CO2 SERPL-SCNC: 31 MMOL/L (ref 20–32)
CREAT SERPL-MCNC: 1.38 MG/DL (ref 0.66–1.25)
ERYTHROCYTE [DISTWIDTH] IN BLOOD BY AUTOMATED COUNT: 12.8 % (ref 10–15)
GFR SERPL CREATININE-BSD FRML MDRD: 50 ML/MIN/1.7M2
GLUCOSE BLDC GLUCOMTR-MCNC: 176 MG/DL (ref 70–99)
GLUCOSE SERPL-MCNC: 205 MG/DL (ref 70–99)
HCT VFR BLD AUTO: 44.1 % (ref 40–53)
HGB BLD-MCNC: 15.1 G/DL (ref 13.3–17.7)
INR PPP: 1.53 (ref 0.86–1.14)
MCH RBC QN AUTO: 31.7 PG (ref 26.5–33)
MCHC RBC AUTO-ENTMCNC: 34.2 G/DL (ref 31.5–36.5)
MCV RBC AUTO: 93 FL (ref 78–100)
PLATELET # BLD AUTO: 128 10E9/L (ref 150–450)
POTASSIUM SERPL-SCNC: 3.5 MMOL/L (ref 3.4–5.3)
PROT SERPL-MCNC: 7.5 G/DL (ref 6.8–8.8)
RBC # BLD AUTO: 4.77 10E12/L (ref 4.4–5.9)
SODIUM SERPL-SCNC: 138 MMOL/L (ref 133–144)
WBC # BLD AUTO: 4.3 10E9/L (ref 4–11)

## 2017-04-05 PROCEDURE — 76705 ECHO EXAM OF ABDOMEN: CPT

## 2017-04-05 PROCEDURE — 36247 INS CATH ABD/L-EXT ART 3RD: CPT

## 2017-04-05 PROCEDURE — 27210995 ZZH RX 272: Performed by: RADIOLOGY

## 2017-04-05 PROCEDURE — 25000128 H RX IP 250 OP 636: Performed by: RADIOLOGY

## 2017-04-05 PROCEDURE — 99153 MOD SED SAME PHYS/QHP EA: CPT

## 2017-04-05 PROCEDURE — C1769 GUIDE WIRE: HCPCS

## 2017-04-05 PROCEDURE — 27210908 ZZH NEEDLE CR4

## 2017-04-05 PROCEDURE — 27210732 ZZH ACCESSORY CR1

## 2017-04-05 PROCEDURE — 75726 ARTERY X-RAYS ABDOMEN: CPT

## 2017-04-05 PROCEDURE — 85610 PROTHROMBIN TIME: CPT | Performed by: RADIOLOGY

## 2017-04-05 PROCEDURE — 80048 BASIC METABOLIC PNL TOTAL CA: CPT | Performed by: RADIOLOGY

## 2017-04-05 PROCEDURE — 25000128 H RX IP 250 OP 636: Performed by: PHYSICIAN ASSISTANT

## 2017-04-05 PROCEDURE — 85027 COMPLETE CBC AUTOMATED: CPT | Performed by: RADIOLOGY

## 2017-04-05 PROCEDURE — C1887 CATHETER, GUIDING: HCPCS

## 2017-04-05 PROCEDURE — 99152 MOD SED SAME PHYS/QHP 5/>YRS: CPT

## 2017-04-05 PROCEDURE — 25000125 ZZHC RX 250: Performed by: PHYSICIAN ASSISTANT

## 2017-04-05 PROCEDURE — 27210804 ZZH SHEATH CR3

## 2017-04-05 PROCEDURE — 27210780 ZZH KIT CR3

## 2017-04-05 PROCEDURE — 75774 ARTERY X-RAY EACH VESSEL: CPT

## 2017-04-05 PROCEDURE — 80076 HEPATIC FUNCTION PANEL: CPT | Performed by: RADIOLOGY

## 2017-04-05 PROCEDURE — 36248 INS CATH ABD/L-EXT ART ADDL: CPT

## 2017-04-05 PROCEDURE — 25000125 ZZHC RX 250: Performed by: RADIOLOGY

## 2017-04-05 PROCEDURE — 25500064 ZZH RX 255 OP 636: Performed by: RADIOLOGY

## 2017-04-05 PROCEDURE — 51702 INSERT TEMP BLADDER CATH: CPT

## 2017-04-05 PROCEDURE — 40000167 ZZH STATISTIC PP CARE STAGE 2

## 2017-04-05 PROCEDURE — 82962 GLUCOSE BLOOD TEST: CPT

## 2017-04-05 PROCEDURE — 27210905 ZZH KIT CR7

## 2017-04-05 RX ORDER — SCOLOPAMINE TRANSDERMAL SYSTEM 1 MG/1
1 PATCH, EXTENDED RELEASE TRANSDERMAL ONCE
Status: COMPLETED | OUTPATIENT
Start: 2017-04-05 | End: 2017-04-05

## 2017-04-05 RX ORDER — AMPICILLIN AND SULBACTAM 2; 1 G/1; G/1
3 INJECTION, POWDER, FOR SOLUTION INTRAMUSCULAR; INTRAVENOUS
Status: COMPLETED | OUTPATIENT
Start: 2017-04-05 | End: 2017-04-05

## 2017-04-05 RX ORDER — ONDANSETRON 2 MG/ML
4 INJECTION INTRAMUSCULAR; INTRAVENOUS ONCE
Status: COMPLETED | OUTPATIENT
Start: 2017-04-05 | End: 2017-04-05

## 2017-04-05 RX ORDER — IODIXANOL 320 MG/ML
150 INJECTION, SOLUTION INTRAVASCULAR ONCE
Status: COMPLETED | OUTPATIENT
Start: 2017-04-05 | End: 2017-04-05

## 2017-04-05 RX ORDER — FENTANYL CITRATE 50 UG/ML
25-50 INJECTION, SOLUTION INTRAMUSCULAR; INTRAVENOUS EVERY 5 MIN PRN
Status: DISCONTINUED | OUTPATIENT
Start: 2017-04-05 | End: 2017-04-05 | Stop reason: HOSPADM

## 2017-04-05 RX ORDER — FLUMAZENIL 0.1 MG/ML
0.2 INJECTION, SOLUTION INTRAVENOUS
Status: DISCONTINUED | OUTPATIENT
Start: 2017-04-05 | End: 2017-04-05 | Stop reason: HOSPADM

## 2017-04-05 RX ORDER — SODIUM CHLORIDE 9 MG/ML
INJECTION, SOLUTION INTRAVENOUS CONTINUOUS
Status: DISCONTINUED | OUTPATIENT
Start: 2017-04-05 | End: 2017-04-05 | Stop reason: HOSPADM

## 2017-04-05 RX ORDER — ACETAMINOPHEN 500 MG
500-1000 TABLET ORAL EVERY 6 HOURS PRN
Status: DISCONTINUED | OUTPATIENT
Start: 2017-04-05 | End: 2017-04-05 | Stop reason: HOSPADM

## 2017-04-05 RX ORDER — NALOXONE HYDROCHLORIDE 0.4 MG/ML
.1-.4 INJECTION, SOLUTION INTRAMUSCULAR; INTRAVENOUS; SUBCUTANEOUS
Status: DISCONTINUED | OUTPATIENT
Start: 2017-04-05 | End: 2017-04-05 | Stop reason: HOSPADM

## 2017-04-05 RX ADMIN — FENTANYL CITRATE 75 MCG: 50 INJECTION INTRAMUSCULAR; INTRAVENOUS at 14:30

## 2017-04-05 RX ADMIN — SCOPALAMINE 1 PATCH: 1 PATCH, EXTENDED RELEASE TRANSDERMAL at 11:37

## 2017-04-05 RX ADMIN — IODIXANOL 110 ML: 320 INJECTION, SOLUTION INTRAVASCULAR at 14:30

## 2017-04-05 RX ADMIN — LIDOCAINE HYDROCHLORIDE 15 ML: 10 INJECTION, SOLUTION EPIDURAL; INFILTRATION; INTRACAUDAL; PERINEURAL at 14:30

## 2017-04-05 RX ADMIN — HYDROCORTISONE SODIUM SUCCINATE 100 MG: 100 INJECTION, POWDER, FOR SOLUTION INTRAMUSCULAR; INTRAVENOUS at 11:34

## 2017-04-05 RX ADMIN — AMPICILLIN SODIUM AND SULBACTAM SODIUM 3 G: 2; 1 INJECTION, POWDER, FOR SOLUTION INTRAMUSCULAR; INTRAVENOUS at 11:38

## 2017-04-05 RX ADMIN — ONDANSETRON 4 MG: 2 INJECTION INTRAMUSCULAR; INTRAVENOUS at 11:35

## 2017-04-05 RX ADMIN — MIDAZOLAM 1.5 MG: 1 INJECTION INTRAMUSCULAR; INTRAVENOUS at 14:29

## 2017-04-05 RX ADMIN — SODIUM CHLORIDE: 9 INJECTION, SOLUTION INTRAVENOUS at 11:34

## 2017-04-05 NOTE — IP AVS SNAPSHOT
[address] [address]                                       After Visit Summary   4/5/2017    Eh Callahan    MRN: [id number]           After Visit Summary Signature Page     I have received my discharge instructions, and my questions have been answered. I have discussed any challenges I see with this plan with the nurse or doctor.    ..........................................................................................................................................  Patient/Patient Representative Signature      ..........................................................................................................................................  Patient Representative Print Name and Relationship to Patient    ..................................................               ................................................  Date                                            Time    ..........................................................................................................................................  Reviewed by Signature/Title    ...................................................              ..............................................  Date                                                            Time

## 2017-04-05 NOTE — PROGRESS NOTES
Interventional Radiology Intra-procedural Nursing Note    Patient Name: Eh Callahan  Medical Record Number: [id number]  Today's Date: April 5, 2017         Start Time: 1300  End of procedure time: 1420  Procedure: Chemotherapy embolization (TACE)  Fire Safety Score: 1    Consent Review/Timeout Performed by: Dr Alex Pallas/ Dr Michael Nair   Procedure Performed By: Dr Alex Pallas/ Dr Michael Nair    Fentanyl administered: 75 mcg  Versed administered: 1.5 mg    Start of Sedation Time: 1300  End of Sedation Time: 1420  Total Sedation Time: 80 minutes    Report given to: Yudith ABAD  Time pt departs:  1435  : Melissa ALCANTAR)    Other Notes:  Alert male transported via cart from 2A to IR Procedure Room 3 for planned intervention.  ID band confirmed and patient acknowledges understanding of planned procedure. Patient repositioned to procedure table via hover-mat and positioned supine.  Patient prepped and draped per policy see VS flowsheet, MAR for further information.       Prior to procedure, distal pulses were identified and marked via papation.    RFA identified under image guidance.  Sheath in place.      Introducer removed at 1420.   MYNXGRIP Closure device(LOT # [id number] / Expiration date 2019-02-28) deployed at 1420.  Groin site appearance is WDL.  Distal pulses post procedure are unchanged.  Right groin site is cleansed and dressed per protocol.     Per MD, bedrest for 3  Hours. BEDREST UNTIL 1730.    Patient condition post procedure is stable.   Patient admitted to   for post-procedure monitoring and overnight pain control.    Janeen Blanco RN

## 2017-04-05 NOTE — DISCHARGE INSTRUCTIONS
[facility]   Interventional Radiology  Discharge Instructions Post Peripheral Angiogram     AFTER YOU GO HOME          Do relax and take it easy for 24 hours.       Do drink plenty of fluids.       Do resume your regular diet, unless otherwise instructed by your Primary Physician.       DO NOT smoke for at least 24 hours, if you were given any sedation.       DO NOT drink alcoholic beverages the day of your procedure.       Do not drive or operate machinery at home or at work for 24 hours.          DO NOT do any strenuous exercise or lifting for at least 2 days following your Procedure.       DO NOT take a bath or shower for at least 12 hours following your procedure.       DO NOT make any important or legal decisions for 24 hours following your procedure.    CALL THE PHYSICIAN IF:      - You start bleeding from the procedure site.  If you do start to bleed from the site, lie down flat and hold pressure on the site. A small lump or bruise is common at the puncture site.Your physician will tell you if you need to return to the hospital.      - You develop numbness, coolness or a change in color of the arm or leg that was punctured.      - You experience increased pain or redness at the puncture site.      - You develop hives or a rash or unexplained itching.      - You develop a temperature of 101 degrees F or greater    Instructions for closure device: Minx Closure Device          [facility] INTERVENTIONAL RADIOLOGY DEPARTMENT         Procedure Physician: Drs. Pallas and Korey                            Date of Procedure: April 5, 2017       Telephone Numbers:   [phone]......Monday-Friday 8:00 to 4:30 pm                                        [phone].....After 4:30 pm Monday-Friday, Weekends and  Holidays. Ask for the Interventional Radiologist on call. Someone is on call 24 hrs/day.

## 2017-04-05 NOTE — PROGRESS NOTES
Returned from IR s/p failed TACE delivery.  Denies pain.  3+ pedal pulses.   at bedside.  Resting in bed.

## 2017-04-05 NOTE — PROGRESS NOTES
Interventional Radiology Pre-Procedure Sedation Assessment   Time of Assessment: 12:10 PM    Expected Level: Moderate Sedation    Indication: Sedation is required for the following type of Procedure: Arterial    Sedation and procedural consent: Risks, benefits and alternatives were discussed with Patient    PO Intake: Appropriately NPO for procedure    ASA Class: Class 3 - SEVERE SYSTEMIC DISEASE, DEFINITE FUNCTIONAL LIMITATIONS.    Mallampati: Grade 2:  Soft palate, base of uvula, tonsillar pillars, and portion of posterior pharyngeal wall visible    Lungs: Lungs Clear with good breath sounds bilaterally    Heart: Normal heart sounds and rate    History and physical reviewed and no updates needed. I have reviewed the lab findings, diagnostic data, medications, and the plan for sedation. I have determined this patient to be an appropriate candidate for the planned sedation and procedure and have reassessed the patient IMMEDIATELY PRIOR to sedation and procedure.    Alex P. Pallas, DO

## 2017-04-05 NOTE — PROGRESS NOTES
Unable to void, so fischer catheter was placed with 1100cc return of clear yellow urine.  Tolerated bedrest without problems.  Tolerated liquids without problems.  Fischer catheter removed.  Tolerated ambulation without problems.  Reviewed discharge instructions with patient, his daughters, and .  PIV removed.  Discharged to home with daughters.

## 2017-04-05 NOTE — IP AVS SNAPSHOT
MRN:[id number]                      After Visit Summary   4/5/2017    Eh Callahan    MRN: [id number]           Visit Information        Department      4/5/2017 10:47 AM [address] [facility]          Review of your medicines      UNREVIEWED medicines. Ask your doctor about these medicines        Dose / Directions    acetaminophen 500 MG tablet   Commonly known as:  TYLENOL   Used for:  History of liver excision        Dose:  1000 mg   Take 2 tablets (1,000 mg) by mouth 3 times daily   Quantity:  100 tablet   Refills:  0       alendronate 70 MG tablet   Commonly known as:  FOSAMAX        Dose:  70 mg   70 mg every 7 days Take 1 tablet by mouth twice weekly.   Refills:  0       carboxymethylcellulose 0.5 % Soln ophthalmic solution   Commonly known as:  REFRESH PLUS        Dose:  1 drop   Place 1 drop into both eyes 4 times daily   Refills:  0       D 5000 5000 UNITS Caps   Generic drug:  cholecalciferol        2 times weekly   Refills:  0       FUROSEMIDE PO        Dose:  40 mg   Take 40 mg by mouth daily   Refills:  0       insulin degludec 100 UNIT/ML pen   Commonly known as:  TRESIBA   Used for:  HCC (hepatocellular carcinoma) (H)        Dose:  14 Units   Inject 14 Units Subcutaneous   Refills:  0       insulin lispro 100 UNIT/ML injection   Commonly known as:  HumaLOG PEN        4 - 12 units 3 x daily   Refills:  0       losartan 100 MG tablet   Commonly known as:  COZAAR        Dose:  1 tablet   Take 1 tablet by mouth daily   Refills:  0       Multiple vitamin Tabs        Dose:  1 tablet   Take 1 tablet by mouth   Refills:  0       MULTIVITAMIN & MINERAL PO   Used for:  HCC (hepatocellular carcinoma) (H)        Refills:  0       oxyCODONE 5 MG IR tablet   Commonly known as:  ROXICODONE   Used for:  History of hepatitis C, History of liver excision        Dose:  5-10 mg   Take 1-2 tablets (5-10 mg) by mouth every 4 hours as needed for moderate to severe pain   Quantity:  30 tablet   Refills:  0        oxyCODONE-acetaminophen 5-325 MG per tablet   Commonly known as:  PERCOCET   Used for:  HCC (hepatocellular carcinoma) (H)        Refills:  0       pantoprazole 40 MG EC tablet   Commonly known as:  PROTONIX        Dose:  40 mg   Take 40 mg by mouth daily   Refills:  0       polyethylene glycol Packet   Commonly known as:  MIRALAX/GLYCOLAX   Used for:  History of liver excision        Dose:  17 g   Take 17 g by mouth daily as needed for constipation   Quantity:  30 packet   Refills:  1       rivaroxaban ANTICOAGULANT 20 MG Tabs tablet   Commonly known as:  XARELTO   Used for:  Atrial fibrillation, unspecified type (H)        Dose:  20 mg   Take 1 tablet (20 mg) by mouth daily (with dinner)   Quantity:  30 tablet   Refills:  5       senna-docusate 8.6-50 MG per tablet   Commonly known as:  SENOKOT-S;PERICOLACE   Used for:  History of liver excision        Dose:  1 tablet   Take 1 tablet by mouth 2 times daily   Quantity:  14 tablet   Refills:  0       simvastatin 20 MG tablet   Commonly known as:  ZOCOR   Used for:  Dyslipidaemia        Dose:  20 mg   Take 1 tablet (20 mg) by mouth daily   Refills:  0       * timolol 0.5 % ophthalmic solution   Commonly known as:  TIMOPTIC        Dose:  1 drop   Place 1 drop into both eyes daily   Refills:  0       * timolol 0.5 % ophthalmic solution   Commonly known as:  TIMOPTIC   Used for:  HCC (hepatocellular carcinoma) (H)        PLACE 1 DROP INTO BOTH EYES ONCE DAILY.   Refills:  0       traZODone 50 MG tablet   Commonly known as:  DESYREL   Used for:  HCC (hepatocellular carcinoma) (H)        Dose:  50 mg   Take 50 mg by mouth   Refills:  0       UNABLE TO FIND   Used for:  HCC (hepatocellular carcinoma) (H)        Dose:  1 drop   1 drop TIMOLOL OPHT   Refills:  0       * Notice:  This list has 2 medication(s) that are the same as other medications prescribed for you. Read the directions carefully, and ask your doctor or other care provider to review them with you.     "  CONTINUE these medicines which have NOT CHANGED        Dose / Directions    GREGORIO CONTOUR test strip   Used for:  HCC (hepatocellular carcinoma) (H)   Generic drug:  blood glucose monitoring        USE TO MEASURE YOUR BLOOD GLUCOSE 6 TIMES DAILY   Refills:  0       [employer] LANCETS 21G Misc   Used for:  HCC (hepatocellular carcinoma) (H)        Uses 6 times daily   Refills:  0       pen needles 5/16\" 31G X 8 MM Misc   Used for:  HCC (hepatocellular carcinoma) (H)        4 x daily   Refills:  0       RA BLOOD PRESSURE CUFF MONITOR Misc   Used for:  HCC (hepatocellular carcinoma) (H)        Take blood pressure once daily   Refills:  0                Protect others around you: Learn how to safely use, store and throw away your medicines at www.disposemymeds.org.         Follow-ups after your visit        Your next 10 appointments already scheduled     Apr 12, 2017 10:00 AM CDT   Lab with  LAB    [facility] ([facility])    [address] [place] [id number]   [phone]            Apr 12, 2017 11:10 AM CDT   (Arrive by 10:55 AM)   Return General Liver with Allan Justice MD   [facility] Hepatology ([facility])    [address] [place] [id number]   [phone]            Jun 05, 2017  6:00 PM CDT   (Arrive by 5:45 PM)   RETURN ARRHYTHMIA with Yakov Tamez MD   [facility] Heart Care ([facility])    [address] [place] [id number]   [phone]            Jun 19, 2017  9:30 AM CDT   LAB with [facility]    [facility] ([facility])    [address] [place] [id number]   [phone]           Patient must bring picture ID.  Patient should be prepared to give a urine specimen  Please do not eat 10-12 hours before your appointment if you are coming in fasting for labs on lipids, cholesterol, or glucose " (sugar).  Pregnant women should follow their Care Team instructions. Water with medications is okay. Do not drink coffee or other fluids.   If you have concerns about taking  your medications, please ask at office or if scheduling via [facility], send a message by clicking on Secure Messaging, Message Your Care Team.            Jun 19, 2017 10:00 AM CDT   (Arrive by 9:45 AM)   MR ABDOMEN W/O & W CONTRAST with [address] MRI ([facility])    [address] [address] [phone]           Take your medicines as usual, unless your doctor tells you not to. Bring a list of your current medicines to your exam (including vitamins, minerals and over-the-counter drugs). Also bring the results of similar scans you may have had.    The day before your exam, drink extra fluids at least six 8-ounce glasses (unless your doctor tells you to restrict your fluids).   Have a blood test (creatinine test) within 30 days of your exam. Go to your clinic or Diagnostic Imaging Department for this test.   Do not eat or drink for 6 hours prior to exam.  The MRI machine uses a strong magnet. Please wear clothes without metal (snaps, zippers). A sweatsuit works well, or we may give you a hospital gown.  Please remove any body piercings and hair extensions before you arrive. You will also remove watches, jewelry, hairpins, wallets, dentures, partial dental plates and hearing aids. You may wear contact lenses, and you may be able to wear your rings. We have a safe place to keep your personal items, but it is safer to leave them at home.   **IMPORTANT** THE INSTRUCTIONS BELOW ARE ONLY FOR THOSE PATIENTS WHO HAVE BEEN TOLD THEY WILL RECEIVE SEDATION OR GENERAL ANESTHESIA DURING THEIR MRI PROCEDURE:  IF YOU WILL RECEIVE SEDATION (take medicine to help you relax during your exam):   You must get the medicine from your doctor before you arrive. Bring the medicine to the  exam. Do not take it at home.   Arrive one hour early. Bring someone who can take you home after the test. Your medicine will make you sleepy. After the exam, you may not drive, take a bus or take a taxi by yourself.   No eating 8 hours before your exam. You may have clear liquids up until 4 hours before your exam. (Clear liquids include water, clear tea, black coffee and fruit juice without pulp.)  IF YOU WILL RECEIVE ANESTHESIA (be asleep for your exam):   Arrive 1 1/2 hours early. Bring someone who can take you home after the test. You may not drive, take a bus or take a taxi by yourself.   No eating 8 hours before your exam. You may have clear liquids up until 4 hours before your exam. (Clear liquids include water, clear tea, black coffee and fruit juice without pulp.)  If you have any questions, please contact your Imaging Department exam site.            Jun 22, 2017  2:00 PM CDT   (Arrive by 1:45 PM)   Return Visit with Miri Antoine MD   [facility] ([facility])    [address] [place] [address] [phone]               Care Instructions        Further instructions from your care team       [facility]   Interventional Radiology  Discharge Instructions Post Peripheral Angiogram     AFTER YOU GO HOME          Do relax and take it easy for 24 hours.       Do drink plenty of fluids.       Do resume your regular diet, unless otherwise instructed by your Primary Physician.       DO NOT smoke for at least 24 hours, if you were given any sedation.       DO NOT drink alcoholic beverages the day of your procedure.       Do not drive or operate machinery at home or at work for 24 hours.          DO NOT do any strenuous exercise or lifting for at least 2 days following your Procedure.       DO NOT take a bath or shower for at least 12 hours following your procedure.       DO NOT make any important or legal decisions for 24  "hours following your procedure.    CALL THE PHYSICIAN IF:      - You start bleeding from the procedure site.  If you do start to bleed from the site, lie down flat and hold pressure on the site. A small lump or bruise is common at the puncture site.Your physician will tell you if you need to return to the hospital.      - You develop numbness, coolness or a change in color of the arm or leg that was punctured.      - You experience increased pain or redness at the puncture site.      - You develop hives or a rash or unexplained itching.      - You develop a temperature of 101 degrees F or greater    Instructions for closure device: Minx Closure Device          [facility] INTERVENTIONAL RADIOLOGY DEPARTMENT         Procedure Physician: Drs. Pallas and Korey                            Date of Procedure: 2017       Telephone Numbers:   [phone]......Monday-Friday 8:00 to 4:30 pm                                        [phone].....After 4:30 pm Monday-Friday, Weekends and  Holidays. Ask for the Interventional Radiologist on call. Someone is on call 24 hrs/day.              Additional Information About Your Visit        [employer] Information     [employer] lets you send messages to your doctor, view your test results, renew your prescriptions, schedule appointments and more. To sign up, go to [email or web address] . Click on \"Log in\" on the left side of the screen, which will take you to the Welcome page. Then click on \"Sign up Now\" on the right side of the page.     You will be asked to enter the access code listed below, as well as some personal information. Please follow the directions to create your username and password.     Your access code is: [email or web address]  Expires: 2017  7:30 AM     Your access code will  in 90 days. If you need help or a new code, please call your [facility] clinic or [phone].        Care EveryWhere ID     This is your Care EveryWhere ID. This could be used by other " "organizations to access your [facility] medical records  [id number]        Your Vitals Were     Blood Pressure Respirations Height Weight Pulse Oximetry BMI (Body Mass Index)    165/83 (BP Location: Left arm) 16 1.7 m (5' 6.93\") 63.5 kg (140 lb) 100% 21.97 kg/m2       Primary Care Provider Office Phone # Fax #    Shalini VANG KAYCEE Mac [phone]      Thank you!     Thank you for choosing [facility] for your care. Our goal is always to provide you with excellent care. Hearing back from our patients is one way we can continue to improve our services. Please take a few minutes to complete the written survey that you may receive in the mail after you visit with us. Thank you!             Medication List: This is a list of all your medications and when to take them. Check marks below indicate your daily home schedule. Keep this list as a reference.      Medications           Morning Afternoon Evening Bedtime As Needed    acetaminophen 500 MG tablet   Commonly known as:  TYLENOL   Take 2 tablets (1,000 mg) by mouth 3 times daily                                alendronate 70 MG tablet   Commonly known as:  FOSAMAX   70 mg every 7 days Take 1 tablet by mouth twice weekly.                                GREGORIO CONTOUR test strip   USE TO MEASURE YOUR BLOOD GLUCOSE 6 TIMES DAILY   Generic drug:  blood glucose monitoring                                carboxymethylcellulose 0.5 % Soln ophthalmic solution   Commonly known as:  REFRESH PLUS   Place 1 drop into both eyes 4 times daily                                D 5000 5000 UNITS Caps   2 times weekly   Generic drug:  cholecalciferol                                FUROSEMIDE PO   Take 40 mg by mouth daily                                insulin degludec 100 UNIT/ML pen   Commonly known as:  TRESIBA   Inject 14 Units Subcutaneous                                insulin lispro 100 UNIT/ML injection   Commonly known as:  HumaLOG PEN   4 - 12 units 3 x daily          " "                      [employer] LANCETS 21G Misc   Uses 6 times daily                                losartan 100 MG tablet   Commonly known as:  COZAAR   Take 1 tablet by mouth daily                                Multiple vitamin Tabs   Take 1 tablet by mouth                                MULTIVITAMIN & MINERAL PO                                oxyCODONE 5 MG IR tablet   Commonly known as:  ROXICODONE   Take 1-2 tablets (5-10 mg) by mouth every 4 hours as needed for moderate to severe pain                                oxyCODONE-acetaminophen 5-325 MG per tablet   Commonly known as:  PERCOCET                                pantoprazole 40 MG EC tablet   Commonly known as:  PROTONIX   Take 40 mg by mouth daily                                pen needles 5/16\" 31G X 8 MM Misc   4 x daily                                polyethylene glycol Packet   Commonly known as:  MIRALAX/GLYCOLAX   Take 17 g by mouth daily as needed for constipation                                RA BLOOD PRESSURE CUFF MONITOR Misc   Take blood pressure once daily                                rivaroxaban ANTICOAGULANT 20 MG Tabs tablet   Commonly known as:  XARELTO   Take 1 tablet (20 mg) by mouth daily (with dinner)                                senna-docusate 8.6-50 MG per tablet   Commonly known as:  SENOKOT-S;PERICOLACE   Take 1 tablet by mouth 2 times daily                                simvastatin 20 MG tablet   Commonly known as:  ZOCOR   Take 1 tablet (20 mg) by mouth daily                                * timolol 0.5 % ophthalmic solution   Commonly known as:  TIMOPTIC   Place 1 drop into both eyes daily                                * timolol 0.5 % ophthalmic solution   Commonly known as:  TIMOPTIC   PLACE 1 DROP INTO BOTH EYES ONCE DAILY.                                traZODone 50 MG tablet   Commonly known as:  DESYREL   Take 50 mg by mouth                                UNABLE TO FIND   1 drop TIMOLOL OPHT                         "        * Notice:  This list has 2 medication(s) that are the same as other medications prescribed for you. Read the directions carefully, and ask your doctor or other care provider to review them with you.

## 2017-04-05 NOTE — PROGRESS NOTES
Interventional Radiology Brief Post Procedure Note    Procedure: @FVRISFRMTLINK([id number])@    Proceduralist: Michael Nair MD    Assistant: Alex P. Pallas,     Time Out: Prior to the start of the procedure and with procedural staff participation, I verbally confirmed the patient s identity using two indicators, relevant allergies, that the procedure was appropriate and matched the consent or emergent situation, and that the correct equipment/implants were available. Immediately prior to starting the procedure I conducted the Time Out with the procedural staff and re-confirmed the patient s name, procedure, and site/side. (The [facility] universal protocol was followed.)  Yes    Sedation: IR Nurse Monitored Care   Post Procedure Summary:  Prior to the start of the procedure and with procedural staff participation, I verbally confirmed the patient s identity using two indicators, relevant allergies, that the procedure was appropriate and matched the consent or emergent situation, and that the correct equipment/implants were available. Immediately prior to starting the procedure I conducted the Time Out with the procedural staff and re-confirmed the patient s name, procedure, and site/side. (The Joint Commission universal protocol was followed.)  Yes       Sedatives: Fentanyl and Midazolam (Versed)    Vital signs, airway and pulse oximetry were monitored and remained stable throughout the procedure and sedation was maintained until the procedure was complete.  The patient was monitored by staff until sedation discharge criteria were met.    Patient tolerance: Patient tolerated the procedure well with no immediate complications.    Time of sedation in minutes: 90 Minutes minutes from beginning to end of physician one to one monitoring.    Findings: Could not cannulate hepatic artery despite mutliple catheters. 50 mins fluoro time. No TACE delivered.    Estimated Blood Loss: Minimal    Fluoroscopy Time: 49  minutes    SPECIMENS: None    Complications: 1. None     Condition: Stable    Plan: See dictation.    Comments: See dictated procedure note for full details.    Alex P. Pallas, DO

## 2017-04-05 NOTE — PROGRESS NOTES
Arrived from home for a TACE procedure.  VSS.  C/O some tenderness at abdomen from surgery that he had last November.  PIV placed and labs sent.  H&P current.  Needs consent.   at bedside.  Resting in bed.  Is diabetic, and states that he has a tendency for blood sugar to plummet.

## 2017-04-06 ENCOUNTER — PRE VISIT (OUTPATIENT)
Dept: GASTROENTEROLOGY | Facility: CLINIC | Age: 77
End: 2017-04-06

## 2017-04-06 DIAGNOSIS — C22.0 HCC (HEPATOCELLULAR CARCINOMA) (H): Primary | ICD-10-CM

## 2017-04-06 NOTE — TELEPHONE ENCOUNTER
Was the patient contacted by phone and reminded of the upcoming visit? Yes via [place]      Was the patient instructed to bring a current list of all medications to the appointment or instructed to bring in all medication bottles? Yes, patient verbalized understanding    Patient instructed to arrive early for check-in    Carina Guillermo CMA

## 2017-04-11 ENCOUNTER — TELEPHONE (OUTPATIENT)
Dept: INTERVENTIONAL RADIOLOGY/VASCULAR | Facility: CLINIC | Age: 77
End: 2017-04-11

## 2017-04-11 DIAGNOSIS — C22.0 HCC (HEPATOCELLULAR CARCINOMA) (H): Primary | ICD-10-CM

## 2017-04-11 NOTE — TELEPHONE ENCOUNTER
Called pt using [facility]  to inform him that I am calling to f/u on the attempted TACE procedure last week. From my understanding Dr. Nair wanted him to come back and have a liver ablation with his colleague. Pt states that he has been waiting for a phone call    I informed him that Dr. Nair's colleague did review the images and states that she can ablate the lesion. With that said, I am wondering if he has any dates that he would like me to look at it. Pt states that he would like the sooner the better. He is wondering if this is surgery or so. I informed him that it is not and that this is percutaneous ablation. I asked if Dr. Nair did review the images and plan with pt after TACE procedure in which the pt states that he did. He was just wondering about surgery. 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 Plan is that since Dr Nair was not able to get a catheter into the feeding vessel then the next plan was to do an ablation in which we 'burn' the lesion and it does not come back.     I informed pt that I will look for dates and then call him back.   *called pt back. Spoke to Candis. Informed her that we have scheduled pt for liver ablation with Dr Ludwig for next week Weds 4/19 @ 11 am. He is to check into the 3rd floor Preop at 9am. All prep reviewed with her. Will also send out an appt reminder letter. I've asked them to return my call with any other questions.     Otilia Arora RN, BSN  Interventional Radiology Nurse Coordinator   Phone: [phone]

## 2017-04-12 ENCOUNTER — OFFICE VISIT (OUTPATIENT)
Dept: GASTROENTEROLOGY | Facility: CLINIC | Age: 77
End: 2017-04-12
Attending: INTERNAL MEDICINE
Payer: MEDICARE

## 2017-04-12 VITALS
SYSTOLIC BLOOD PRESSURE: 146 MMHG | HEIGHT: 67 IN | DIASTOLIC BLOOD PRESSURE: 76 MMHG | OXYGEN SATURATION: 98 % | HEART RATE: 73 BPM | WEIGHT: 146.8 LBS | BODY MASS INDEX: 23.04 KG/M2 | TEMPERATURE: 98.1 F

## 2017-04-12 DIAGNOSIS — Z86.19 HISTORY OF HEPATITIS C: ICD-10-CM

## 2017-04-12 DIAGNOSIS — R16.0 LIVER MASS: Primary | ICD-10-CM

## 2017-04-12 PROCEDURE — 99212 OFFICE O/P EST SF 10 MIN: CPT | Mod: ZF

## 2017-04-12 ASSESSMENT — PAIN SCALES - GENERAL: PAINLEVEL: MODERATE PAIN (5)

## 2017-04-12 NOTE — MR AVS SNAPSHOT
After Visit Summary   4/12/2017    Eh Callahan    MRN: [id number]           Patient Information     Date Of Birth          1940        Visit Information        Provider Department      4/12/2017 10:45 AM Dell Bolton; Fay Bazan MD [facility] Hepatology         Follow-ups after your visit        Your next 10 appointments already scheduled     Apr 19, 2017   Procedure with GENERIC ANESTHESIA PROVIDER   [facility], Same Day Surgery (--)    [address] [facility] [address] [phone]            Apr 19, 2017 11:00 AM CDT   Test/Procedure with  Generic    Services Department ([facility])    [facility] [facility] [address] [phone]            Apr 19, 2017 11:00 AM CDT   CT LIVER ABLATION with UUCT2   [facility] [place] ([facility])    [address] [facility] [address] [phone]           Plan for an adult to drive you home and stay with you until morning.  Tell your doctor in advance:   If you have any allergies.   If you are breastfeeding or there s any chance you are pregnant.  Please bring any scans or X-rays taken at other hospitals, if they may be helpful. Also bring a list of your medicines, including vitamins, minerals and over-the-counter drugs. It is safest to leave valuables at home.  If you take blood thinners, you may need to stop taking them a few days before treatment. Talk to your doctor before stopping these medicines. You will need a blood test the morning of your exam.   Stop taking Coumadin (warfarin) 5 days before treatment. Restart the day after treatment.   If you take aspirin, you may need to stop taking it 3 days before treatment.   If you take Plavix, Ticlid, Pletal or Persantine, you may need to stop taking them 5 days before your scan.  If you have diabetes:   If you take insulin, call your diabetes  care team. Ask if you should adjust your insulin before this test.   If your kidney function is normal, continue taking your metformin (Avandamet, Glucophage, Glucovance, Metaglip) on the day of your exam.   If your kidney function is abnormal, wait 48 hours before restarting this medicine.  If you have any questions, please call the imaging department where you will have your exam.  The day before your exam: Drink extra fluids at least six 8-ounce glasses (unless your doctor tells you to restrict fluids). The day of your exam: No eating or drinking for 4 hours before your test. You may take medicine with small sips of water.            May 10, 2017  9:00 AM CDT   (Arrive by 8:45 AM)   Return General Liver with Fay Bazan MD   [facility] Hepatology ([facility])    [address] [place] [address]   [phone]            Jun 05, 2017  6:00 PM CDT   (Arrive by 5:45 PM)   RETURN ARRHYTHMIA with Yakov Tamez MD   [facility] Heart Care ([facility])    [address] [place] [address] [phone]            Jun 19, 2017  9:30 AM CDT   LAB with  LAB   [facility] Lab ([facility])    [address] [place] [id number] [phone]           Patient must bring picture ID.  Patient should be prepared to give a urine specimen  Please do not eat 10-12 hours before your appointment if you are coming in fasting for labs on lipids, cholesterol, or glucose (sugar).  Pregnant women should follow their Care Team instructions. Water with medications is okay. Do not drink coffee or other fluids.   If you have concerns about taking  your medications, please ask at office or if scheduling via [employer], send a message by clicking on Secure Messaging, Message Your Care Team.            Jun 19, 2017 10:00 AM CDT   (Arrive by 9:45 AM)   MR ABDOMEN W/O & W CONTRAST with  UCMR1   [facility] MRI ([facility])    [address] [facility]  1st Floor  [facility] [address] [phone]           Take your medicines as usual, unless your doctor tells you not to. Bring a list of your current medicines to your exam (including vitamins, minerals and over-the-counter drugs). Also bring the results of similar scans you may have had.    The day before your exam, drink extra fluids at least six 8-ounce glasses (unless your doctor tells you to restrict your fluids).   Have a blood test (creatinine test) within 30 days of your exam. Go to your clinic or Diagnostic Imaging Department for this test.   Do not eat or drink for 6 hours prior to exam.  The MRI machine uses a strong magnet. Please wear clothes without metal (snaps, zippers). A sweatsuit works well, or we may give you a hospital gown.  Please remove any body piercings and hair extensions before you arrive. You will also remove watches, jewelry, hairpins, wallets, dentures, partial dental plates and hearing aids. You may wear contact lenses, and you may be able to wear your rings. We have a safe place to keep your personal items, but it is safer to leave them at home.   **IMPORTANT** THE INSTRUCTIONS BELOW ARE ONLY FOR THOSE PATIENTS WHO HAVE BEEN TOLD THEY WILL RECEIVE SEDATION OR GENERAL ANESTHESIA DURING THEIR MRI PROCEDURE:  IF YOU WILL RECEIVE SEDATION (take medicine to help you relax during your exam):   You must get the medicine from your doctor before you arrive. Bring the medicine to the exam. Do not take it at home.   Arrive one hour early. Bring someone who can take you home after the test. Your medicine will make you sleepy. After the exam, you may not drive, take a bus or take a taxi by yourself.   No eating 8 hours before your exam. You may have clear liquids up until 4 hours before your exam. (Clear liquids include water, clear tea, black coffee and fruit juice without pulp.)  IF YOU  "WILL RECEIVE ANESTHESIA (be asleep for your exam):   Arrive 1 1/2 hours early. Bring someone who can take you home after the test. You may not drive, take a bus or take a taxi by yourself.   No eating 8 hours before your exam. You may have clear liquids up until 4 hours before your exam. (Clear liquids include water, clear tea, black coffee and fruit juice without pulp.)  If you have any questions, please contact your Imaging Department exam site.            Jun 22, 2017  2:00 PM CDT   (Arrive by 1:45 PM)   Return Visit with Miri Antoine MD   [facility] ([facility])    [address] [facility]  2nd Floor  [facility] [address] [phone]              Who to contact     If you have questions or need follow up information about today's clinic visit or your schedule please contact [facility] HEPATOLOGY directly at [phone].  Normal or non-critical lab and imaging results will be communicated to you by [employer], letter or phone within 4 business days after the clinic has received the results. If you do not hear from us within 7 days, please contact the clinic through [employer] or phone. If you have a critical or abnormal lab result, we will notify you by phone as soon as possible.  Submit refill requests through [employer] or call your pharmacy and they will forward the refill request to us. Please allow 3 business days for your refill to be completed.          Additional Information About Your Visit        [employer] Information     [employer] lets you send messages to your doctor, view your test results, renew your prescriptions, schedule appointments and more. To sign up, go to [email or web address] . Click on \"Log in\" on the left side of the screen, which will take you to the Welcome page. Then click on \"Sign up Now\" on the right side of the page.     You will be asked to enter the access code listed below, as well as some personal information. Please follow the directions " "to create your username and password.     Your access code is: [email or web address]  Expires: 2017  7:30 AM     Your access code will  in 90 days. If you need help or a new code, please call your [facility] clinic or [phone].        Care EveryWhere ID     This is your Care EveryWhere ID. This could be used by other organizations to access your [facility] medical records  [id number]        Your Vitals Were     Pulse Temperature Height Pulse Oximetry BMI (Body Mass Index)       73 98.1  F (36.7  C) (Oral) 1.7 m (5' 6.93\") 98% 23.04 kg/m2        Blood Pressure from Last 3 Encounters:   17 146/76   17 157/75   17 153/79    Weight from Last 3 Encounters:   17 66.6 kg (146 lb 12.8 oz)   17 63.5 kg (140 lb)   17 67.8 kg (149 lb 7.6 oz)              Today, you had the following     No orders found for display         Today's Medication Changes          These changes are accurate as of: 17 12:34 PM.  If you have any questions, ask your nurse or doctor.               These medicines have changed or have updated prescriptions.        Dose/Directions    acetaminophen 500 MG tablet   Commonly known as:  TYLENOL   This may have changed:    - how much to take  - when to take this  - reasons to take this   Used for:  History of liver excision        Dose:  1000 mg   Take 2 tablets (1,000 mg) by mouth 3 times daily   Quantity:  100 tablet   Refills:  0                Primary Care Provider Office Phone # Fax #    Mendozaleoncioandrzej CIERA Mac PA-C [phone]       [facility] [address]  [facility] [address]        Thank you!     Thank you for choosing [facility] HEPATOLOGY  for your care. Our goal is always to provide you with excellent care. Hearing back from our patients is one way we can continue to improve our services. Please take a few minutes to complete the written survey that you may receive in the mail after your visit with us. Thank you!             Your Updated " "Medication List - Protect others around you: Learn how to safely use, store and throw away your medicines at www.disposemymeds.org.          This list is accurate as of: 4/12/17 12:34 PM.  Always use your most recent med list.                   Brand Name Dispense Instructions for use    acetaminophen 500 MG tablet    TYLENOL    100 tablet    Take 2 tablets (1,000 mg) by mouth 3 times daily       alendronate 70 MG tablet    FOSAMAX     70 mg every 7 days Take 1 tablet by mouth twice weekly.       GREGORIO CONTOUR test strip   Generic drug:  blood glucose monitoring      USE TO MEASURE YOUR BLOOD GLUCOSE 6 TIMES DAILY       carboxymethylcellulose 0.5 % Soln ophthalmic solution    REFRESH PLUS     Place 1 drop into both eyes 4 times daily       D 5000 5000 UNITS Caps   Generic drug:  cholecalciferol      Reported on 4/12/2017       FUROSEMIDE PO      Take 40 mg by mouth daily       insulin degludec 100 UNIT/ML pen    TRESIBA     Inject 14 Units Subcutaneous       insulin lispro 100 UNIT/ML injection    HumaLOG PEN     4 - 12 units 3 x daily       KROGER LANCETS 21G Misc      Uses 6 times daily       losartan 100 MG tablet    COZAAR     Take 1 tablet by mouth daily       Multiple vitamin Tabs      Take 1 tablet by mouth       MULTIVITAMIN & MINERAL PO          oxyCODONE 5 MG IR tablet    ROXICODONE    30 tablet    Take 1-2 tablets (5-10 mg) by mouth every 4 hours as needed for moderate to severe pain       oxyCODONE-acetaminophen 5-325 MG per tablet    PERCOCET         pantoprazole 40 MG EC tablet    PROTONIX     Take 40 mg by mouth daily       pen needles 5/16\" 31G X 8 MM Misc      4 x daily       polyethylene glycol Packet    MIRALAX/GLYCOLAX    30 packet    Take 17 g by mouth daily as needed for constipation       RA BLOOD PRESSURE CUFF MONITOR Misc      Take blood pressure once daily       rivaroxaban ANTICOAGULANT 20 MG Tabs tablet    XARELTO    30 tablet    Take 1 tablet (20 mg) by mouth daily (with dinner)       " senna-docusate 8.6-50 MG per tablet    SENOKOT-S;PERICOLACE    14 tablet    Take 1 tablet by mouth 2 times daily       simvastatin 20 MG tablet    ZOCOR     Take 1 tablet (20 mg) by mouth daily       timolol 0.5 % ophthalmic solution    TIMOPTIC     PLACE 1 DROP INTO BOTH EYES ONCE DAILY.       traZODone 50 MG tablet    DESYREL     Take 50 mg by mouth

## 2017-04-12 NOTE — NURSING NOTE
Chief Complaint   Patient presents with     RECHECK     HCC   Pt roomed, vitals, meds, and allergies reviewed with pt. Pt ready for provider.  Teo Crisostomo, CMA

## 2017-04-12 NOTE — PROGRESS NOTES
GI Clinic Fellow Note     Reason for Visit: Hepatocellular carcinoma     CC: Follow-up    HPI: Mr. Callahan is a 76 yo [place] immigrant with a history of hepatitis C status post treatment with interferon and ribavirin in the year 2005.  He achieved a sustained virological response.      The patient is well known to Dr. Justice.  He underwent imaging of the lungs which incidentally found a 3.6 cm lesion concerning for malignancy.  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  He underwent surgery for removal with Dr. Mckeon on 11/22/2016; s/p removal of segment 2 and 3, pathology with moderately differentiated hepatocellular carcinoma.  The background liver had minimal to mild focal chronic portal inflammation grade 1/4 and fibrosis portal expansion which is also stage 1-2/4.      Post-operatively the patient was noted to have two lesions in segment 5 and 8 (OPTN 5).  He underwent attempt at TACE last week, however the procedure was unsuccessful due to inability to cannulate the hepatic artery.  He is planned for a repeat attempt at the procedure next week.      The patient is otherwise feeling well today.  He denies nausea, vomiting, abdominal pain, diarrhea, constipation.  He has minimal lower extremity edema on lasix, resolves with leg elevation.  He does not have ascites.  No blood in his stool.  No jaundice or confusion.  He continues follow up with both IR and Oncology.  The patient tells me he is not interested in liver transplant.      ROS: otherwise negative    Medications:  Current Outpatient Prescriptions   Medication     Blood Pressure Monitoring (RA BLOOD PRESSURE CUFF MONITOR) MISC     timolol (TIMOPTIC) 0.5 % ophthalmic solution     traZODone (DESYREL) 50 MG tablet     KROGER LANCETS 21G MISC     blood glucose monitoring (GREGORIO CONTOUR) test strip     insulin degludec (TRESIBA) 100 UNIT/ML pen     Insulin  "Pen Needle (PEN NEEDLES 5/16\") 31G X 8 MM MISC     rivaroxaban ANTICOAGULANT (XARELTO) 20 MG TABS tablet     Multiple Vitamins-Minerals (MULTIVITAMIN & MINERAL PO)     oxyCODONE-acetaminophen (PERCOCET) 5-325 MG per tablet     polyethylene glycol (MIRALAX/GLYCOLAX) packet     oxyCODONE (ROXICODONE) 5 MG immediate release tablet     acetaminophen (TYLENOL) 500 MG tablet     senna-docusate (SENOKOT-S;PERICOLACE) 8.6-50 MG per tablet     FUROSEMIDE PO     carboxymethylcellulose (REFRESH PLUS) 0.5 % SOLN     insulin lispro (HUMALOG PEN) 100 UNIT/ML soln     pantoprazole (PROTONIX) 40 MG enteric coated tablet     alendronate (FOSAMAX) 70 MG tablet     Multiple vitamin TABS     simvastatin (ZOCOR) 20 MG tablet     losartan (COZAAR) 100 MG tablet     cholecalciferol (D 5000) 5000 UNITS CAPS     No current facility-administered medications for this visit.      Physical Exam  /76  Pulse 73  Temp 98.1  F (36.7  C) (Oral)  Ht 5' 6.93\" (1.7 m)  Wt 146 lb 12.8 oz (66.6 kg)  SpO2 98%  BMI 23.04 kg/m2  Well appearing male in nad, alert and oriented  Aniceteric, eomi, mmm  ctab  rrr  Soft, nt, nd, +BS   Warm and well perfused, trace edema b/l  Neuro grossly intact    Labs reviewed and notable for  INR 1.53 on rivarox  T Bili 1.4  Platelets 128  Cr 1.38  Otherwise acceptable  Last AFP was normal     Imaging reviewed  US 4/5/2017:  1. There is a 0.9 x 0.7 x 0.6 hyperechogenicity in the right lobe of  the liver, which correlates with the arterial enhancing lesion at the  junction of segments 5 and 8 seen on prior MR. This appears amenable  to ultrasound-guided procedure.  2. Cholelithiasis without evidence for cholecystitis    A/P:  Mr. Callahan is a 78 yo Maltese immigrant with a history of hepatitis C status post treatment with interferon and ribavirin in the year 2005 with SVR and recently diagnosed HCC s/p surgical resection hepatic segments 2 and 3 on 11/2016, now with a new 12 mm concerning lesion is hepatic segment " 5/8.  AFP is stable.  Recommended for TACE which is planned next week.      Background liver with minimal to mild focal chronic portal inflammation grade 1/4 and fibrosis portal expansion which is also stage 1-2/4.      -Labs reviewed with the patient today.  INR elevation in the setting of xarelto  -Stop lasix given mild acute kidney injury. No significant lower extremity edema or ascites. Follow up with PMD next week to reassess.    -Follow up with IR as scheduled for TACE  -Follow up with oncology   -The patient is not interested in liver transplant.    -Return to clinic in 3 months with Fay Bazan    Case d/w Dr. Justice who is in agremeent with the plan of care.  Please page with ? Or change in clinical status.    Fay Bazan   GI Fellow   [phone]    Attestation:  This patient has been seen and evaluated by me, Allan Justice.  Discussed with the house staff team or resident(s) and agree with the findings and plan in this note.

## 2017-04-12 NOTE — LETTER
4/12/2017      RE: Eh Callahan  [address]   [facility] [address]       GI Clinic Fellow Note     Reason for Visit: Hepatocellular carcinoma     CC: Follow-up    HPI: Mr. Callahan is a 76 yo [place] immigrant with a history of hepatitis C status post treatment with interferon and ribavirin in the year 2005.  He achieved a sustained virological response.      The patient is well known to Dr. Justice.  He underwent imaging of the lungs which incidentally found a 3.6 cm lesion concerning for malignancy.  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  He underwent surgery for removal with Dr. Mckeon on 11/22/2016; s/p removal of segment 2 and 3, pathology with moderately differentiated hepatocellular carcinoma.  The background liver had minimal to mild focal chronic portal inflammation grade 1/4 and fibrosis portal expansion which is also stage 1-2/4.      Post-operatively the patient was noted to have two lesions in segment 5 and 8 (OPTN 5).  He underwent attempt at TACE last week, however the procedure was unsuccessful due to inability to cannulate the hepatic artery.  He is planned for a repeat attempt at the procedure next week.      The patient is otherwise feeling well today.  He denies nausea, vomiting, abdominal pain, diarrhea, constipation.  He has minimal lower extremity edema on lasix, resolves with leg elevation.  He does not have ascites.  No blood in his stool.  No jaundice or confusion.  He continues follow up with both IR and Oncology.  The patient tells me he is not interested in liver transplant.      ROS: otherwise negative    Medications:  Current Outpatient Prescriptions   Medication     Blood Pressure Monitoring (RA BLOOD PRESSURE CUFF MONITOR) MISC     timolol (TIMOPTIC) 0.5 % ophthalmic solution     traZODone (DESYREL) 50 MG tablet     KROGER LANCETS 21G MISC     blood glucose monitoring  "(GREGORIO CONTOUR) test strip     insulin degludec (TRESIBA) 100 UNIT/ML pen     Insulin Pen Needle (PEN NEEDLES 5/16\") 31G X 8 MM MISC     rivaroxaban ANTICOAGULANT (XARELTO) 20 MG TABS tablet     Multiple Vitamins-Minerals (MULTIVITAMIN & MINERAL PO)     oxyCODONE-acetaminophen (PERCOCET) 5-325 MG per tablet     polyethylene glycol (MIRALAX/GLYCOLAX) packet     oxyCODONE (ROXICODONE) 5 MG immediate release tablet     acetaminophen (TYLENOL) 500 MG tablet     senna-docusate (SENOKOT-S;PERICOLACE) 8.6-50 MG per tablet     FUROSEMIDE PO     carboxymethylcellulose (REFRESH PLUS) 0.5 % SOLN     insulin lispro (HUMALOG PEN) 100 UNIT/ML soln     pantoprazole (PROTONIX) 40 MG enteric coated tablet     alendronate (FOSAMAX) 70 MG tablet     Multiple vitamin TABS     simvastatin (ZOCOR) 20 MG tablet     losartan (COZAAR) 100 MG tablet     cholecalciferol (D 5000) 5000 UNITS CAPS     No current facility-administered medications for this visit.      Physical Exam  /76  Pulse 73  Temp 98.1  F (36.7  C) (Oral)  Ht 5' 6.93\" (1.7 m)  Wt 146 lb 12.8 oz (66.6 kg)  SpO2 98%  BMI 23.04 kg/m2  Well appearing male in nad, alert and oriented  Aniceteric, eomi, mmm  ctab  rrr  Soft, nt, nd, +BS   Warm and well perfused, trace edema b/l  Neuro grossly intact    Labs reviewed and notable for  INR 1.53 on rivarox  T Bili 1.4  Platelets 128  Cr 1.38  Otherwise acceptable  Last AFP was normal     Imaging reviewed  US 4/5/2017:  1. There is a 0.9 x 0.7 x 0.6 hyperechogenicity in the right lobe of  the liver, which correlates with the arterial enhancing lesion at the  junction of segments 5 and 8 seen on prior MR. This appears amenable  to ultrasound-guided procedure.  2. Cholelithiasis without evidence for cholecystitis    A/P:  Mr. Callahan is a 78 yo [place] immigrant with a history of hepatitis C status post treatment with interferon and ribavirin in the year 2005 with SVR and recently diagnosed HCC s/p surgical resection hepatic " segments 2 and 3 on 11/2016, now with a new 12 mm concerning lesion is hepatic segment 5/8.  AFP is stable.  Recommended for TACE which is planned next week.      Background liver with minimal to mild focal chronic portal inflammation grade 1/4 and fibrosis portal expansion which is also stage 1-2/4.      -Labs reviewed with the patient today.  INR elevation in the setting of xarelto  -Stop lasix given mild acute kidney injury. No significant lower extremity edema or ascites. Follow up with PMD next week to reassess.    -Follow up with IR as scheduled for TACE  -Follow up with oncology   -The patient is not interested in liver transplant.    -Return to clinic in 3 months with Fay Bazan    Case d/w Dr. Justice who is in agremeent with the plan of care.  Please page with ? Or change in clinical status.    Fay Bazan   GI Fellow   [phone]    Attestation:  This patient has been seen and evaluated by me, Allan Justice.  Discussed with the house staff team or resident(s) and agree with the findings and plan in this note.       Fay Bazan MD

## 2017-04-18 ENCOUNTER — ANESTHESIA EVENT (OUTPATIENT)
Dept: SURGERY | Facility: CLINIC | Age: 77
End: 2017-04-18
Payer: MEDICARE

## 2017-04-19 ENCOUNTER — OFFICE VISIT (OUTPATIENT)
Dept: INTERPRETER SERVICES | Facility: CLINIC | Age: 77
End: 2017-04-19

## 2017-04-19 ENCOUNTER — HOSPITAL ENCOUNTER (OUTPATIENT)
Dept: CT IMAGING | Facility: CLINIC | Age: 77
End: 2017-04-19
Attending: RADIOLOGY | Admitting: RADIOLOGY
Payer: MEDICARE

## 2017-04-19 ENCOUNTER — ANESTHESIA (OUTPATIENT)
Dept: SURGERY | Facility: CLINIC | Age: 77
End: 2017-04-19
Payer: MEDICARE

## 2017-04-19 ENCOUNTER — HOSPITAL ENCOUNTER (OUTPATIENT)
Facility: CLINIC | Age: 77
Discharge: HOME | End: 2017-04-19
Attending: RADIOLOGY | Admitting: RADIOLOGY
Payer: MEDICARE

## 2017-04-19 VITALS
DIASTOLIC BLOOD PRESSURE: 70 MMHG | SYSTOLIC BLOOD PRESSURE: 110 MMHG | BODY MASS INDEX: 23.7 KG/M2 | TEMPERATURE: 97.4 F | HEIGHT: 67 IN | RESPIRATION RATE: 16 BRPM | OXYGEN SATURATION: 98 % | WEIGHT: 151.01 LBS

## 2017-04-19 DIAGNOSIS — R16.0 LIVER MASS: Primary | ICD-10-CM

## 2017-04-19 DIAGNOSIS — C22.0 HCC (HEPATOCELLULAR CARCINOMA) (H): ICD-10-CM

## 2017-04-19 LAB
ABO + RH BLD: NORMAL
ABO + RH BLD: NORMAL
ALBUMIN SERPL-MCNC: 3.2 G/DL (ref 3.4–5)
ALP SERPL-CCNC: 91 U/L (ref 40–150)
ALT SERPL W P-5'-P-CCNC: 44 U/L (ref 0–70)
ANION GAP SERPL CALCULATED.3IONS-SCNC: 4 MMOL/L (ref 3–14)
AST SERPL W P-5'-P-CCNC: 36 U/L (ref 0–45)
BILIRUB SERPL-MCNC: 0.8 MG/DL (ref 0.2–1.3)
BLD GP AB SCN SERPL QL: NORMAL
BLOOD BANK CMNT PATIENT-IMP: NORMAL
BUN SERPL-MCNC: 26 MG/DL (ref 7–30)
CALCIUM SERPL-MCNC: 8.4 MG/DL (ref 8.5–10.1)
CHLORIDE SERPL-SCNC: 102 MMOL/L (ref 94–109)
CO2 SERPL-SCNC: 29 MMOL/L (ref 20–32)
CREAT SERPL-MCNC: 1.09 MG/DL (ref 0.66–1.25)
ERYTHROCYTE [DISTWIDTH] IN BLOOD BY AUTOMATED COUNT: 12.6 % (ref 10–15)
GFR SERPL CREATININE-BSD FRML MDRD: 66 ML/MIN/1.7M2
GLUCOSE BLDC GLUCOMTR-MCNC: 193 MG/DL (ref 70–99)
GLUCOSE BLDC GLUCOMTR-MCNC: 204 MG/DL (ref 70–99)
GLUCOSE BLDC GLUCOMTR-MCNC: 212 MG/DL (ref 70–99)
GLUCOSE SERPL-MCNC: 222 MG/DL (ref 70–99)
HCT VFR BLD AUTO: 39.9 % (ref 40–53)
HGB BLD-MCNC: 13.3 G/DL (ref 13.3–17.7)
INR PPP: 1.34 (ref 0.86–1.14)
MCH RBC QN AUTO: 31.3 PG (ref 26.5–33)
MCHC RBC AUTO-ENTMCNC: 33.3 G/DL (ref 31.5–36.5)
MCV RBC AUTO: 94 FL (ref 78–100)
PLATELET # BLD AUTO: 123 10E9/L (ref 150–450)
POTASSIUM SERPL-SCNC: 4.1 MMOL/L (ref 3.4–5.3)
PROT SERPL-MCNC: 6.5 G/DL (ref 6.8–8.8)
RBC # BLD AUTO: 4.25 10E12/L (ref 4.4–5.9)
SODIUM SERPL-SCNC: 136 MMOL/L (ref 133–144)
SPECIMEN EXP DATE BLD: NORMAL
WBC # BLD AUTO: 3.7 10E9/L (ref 4–11)

## 2017-04-19 PROCEDURE — C9399 UNCLASSIFIED DRUGS OR BIOLOG: HCPCS | Performed by: NURSE ANESTHETIST, CERTIFIED REGISTERED

## 2017-04-19 PROCEDURE — 86850 RBC ANTIBODY SCREEN: CPT | Performed by: RADIOLOGY

## 2017-04-19 PROCEDURE — 80053 COMPREHEN METABOLIC PANEL: CPT | Performed by: RADIOLOGY

## 2017-04-19 PROCEDURE — 82962 GLUCOSE BLOOD TEST: CPT

## 2017-04-19 PROCEDURE — T1013 SIGN LANG/ORAL INTERPRETER: HCPCS | Mod: U3

## 2017-04-19 PROCEDURE — 37000009 ZZH ANESTHESIA TECHNICAL FEE, EACH ADDTL 15 MIN

## 2017-04-19 PROCEDURE — 37000008 ZZH ANESTHESIA TECHNICAL FEE, 1ST 30 MIN

## 2017-04-19 PROCEDURE — 25500064 ZZH RX 255 OP 636: Performed by: RADIOLOGY

## 2017-04-19 PROCEDURE — 25000128 H RX IP 250 OP 636: Performed by: NURSE ANESTHETIST, CERTIFIED REGISTERED

## 2017-04-19 PROCEDURE — 25000128 H RX IP 250 OP 636: Performed by: RADIOLOGY

## 2017-04-19 PROCEDURE — 71000014 ZZH RECOVERY PHASE 1 LEVEL 2 FIRST HR

## 2017-04-19 PROCEDURE — 25000125 ZZHC RX 250: Performed by: NURSE ANESTHETIST, CERTIFIED REGISTERED

## 2017-04-19 PROCEDURE — 25000125 ZZHC RX 250: Performed by: RADIOLOGY

## 2017-04-19 PROCEDURE — 36415 COLL VENOUS BLD VENIPUNCTURE: CPT | Performed by: RADIOLOGY

## 2017-04-19 PROCEDURE — 25800025 ZZH RX 258: Performed by: NURSE ANESTHETIST, CERTIFIED REGISTERED

## 2017-04-19 PROCEDURE — 86900 BLOOD TYPING SEROLOGIC ABO: CPT | Performed by: RADIOLOGY

## 2017-04-19 PROCEDURE — 85027 COMPLETE CBC AUTOMATED: CPT | Performed by: RADIOLOGY

## 2017-04-19 PROCEDURE — 25000565 ZZH ISOFLURANE, EA 15 MIN

## 2017-04-19 PROCEDURE — 71000027 ZZH RECOVERY PHASE 2 EACH 15 MINS

## 2017-04-19 PROCEDURE — 71000015 ZZH RECOVERY PHASE 1 LEVEL 2 EA ADDTL HR

## 2017-04-19 PROCEDURE — P9041 ALBUMIN (HUMAN),5%, 50ML: HCPCS | Performed by: NURSE ANESTHETIST, CERTIFIED REGISTERED

## 2017-04-19 PROCEDURE — 85610 PROTHROMBIN TIME: CPT | Performed by: RADIOLOGY

## 2017-04-19 PROCEDURE — 40000170 ZZH STATISTIC PRE-PROCEDURE ASSESSMENT II

## 2017-04-19 PROCEDURE — 86901 BLOOD TYPING SEROLOGIC RH(D): CPT | Performed by: RADIOLOGY

## 2017-04-19 PROCEDURE — 27210258 CT LIVER ABLATION (RF)

## 2017-04-19 RX ORDER — ALBUMIN, HUMAN INJ 5% 5 %
SOLUTION INTRAVENOUS CONTINUOUS PRN
Status: DISCONTINUED | OUTPATIENT
Start: 2017-04-19 | End: 2017-04-19

## 2017-04-19 RX ORDER — CEFAZOLIN SODIUM 2 G/100ML
2 INJECTION, SOLUTION INTRAVENOUS
Status: COMPLETED | OUTPATIENT
Start: 2017-04-19 | End: 2017-04-19

## 2017-04-19 RX ORDER — HYDROMORPHONE HYDROCHLORIDE 1 MG/ML
.3-.5 INJECTION, SOLUTION INTRAMUSCULAR; INTRAVENOUS; SUBCUTANEOUS EVERY 5 MIN PRN
Status: DISCONTINUED | OUTPATIENT
Start: 2017-04-19 | End: 2017-04-19 | Stop reason: HOSPADM

## 2017-04-19 RX ORDER — OXYCODONE HYDROCHLORIDE 5 MG/1
5 TABLET ORAL EVERY 4 HOURS PRN
Qty: 18 TABLET | Refills: 0 | Status: SHIPPED | OUTPATIENT
Start: 2017-04-19 | End: 2017-12-21

## 2017-04-19 RX ORDER — FENTANYL CITRATE 50 UG/ML
25-50 INJECTION, SOLUTION INTRAMUSCULAR; INTRAVENOUS
Status: DISCONTINUED | OUTPATIENT
Start: 2017-04-19 | End: 2017-04-19 | Stop reason: HOSPADM

## 2017-04-19 RX ORDER — ONDANSETRON 2 MG/ML
4 INJECTION INTRAMUSCULAR; INTRAVENOUS EVERY 30 MIN PRN
Status: DISCONTINUED | OUTPATIENT
Start: 2017-04-19 | End: 2017-04-19 | Stop reason: HOSPADM

## 2017-04-19 RX ORDER — FENTANYL CITRATE 50 UG/ML
INJECTION, SOLUTION INTRAMUSCULAR; INTRAVENOUS PRN
Status: DISCONTINUED | OUTPATIENT
Start: 2017-04-19 | End: 2017-04-19

## 2017-04-19 RX ORDER — EPHEDRINE SULFATE 50 MG/ML
INJECTION, SOLUTION INTRAMUSCULAR; INTRAVENOUS; SUBCUTANEOUS PRN
Status: DISCONTINUED | OUTPATIENT
Start: 2017-04-19 | End: 2017-04-19

## 2017-04-19 RX ORDER — SODIUM CHLORIDE, SODIUM LACTATE, POTASSIUM CHLORIDE, CALCIUM CHLORIDE 600; 310; 30; 20 MG/100ML; MG/100ML; MG/100ML; MG/100ML
INJECTION, SOLUTION INTRAVENOUS CONTINUOUS PRN
Status: DISCONTINUED | OUTPATIENT
Start: 2017-04-19 | End: 2017-04-19

## 2017-04-19 RX ORDER — IOPAMIDOL 755 MG/ML
92 INJECTION, SOLUTION INTRAVASCULAR ONCE
Status: COMPLETED | OUTPATIENT
Start: 2017-04-19 | End: 2017-04-19

## 2017-04-19 RX ORDER — NEOSTIGMINE METHYLSULFATE 1 MG/ML
VIAL (ML) INJECTION PRN
Status: DISCONTINUED | OUTPATIENT
Start: 2017-04-19 | End: 2017-04-19

## 2017-04-19 RX ORDER — LIDOCAINE HYDROCHLORIDE 20 MG/ML
INJECTION, SOLUTION INFILTRATION; PERINEURAL PRN
Status: DISCONTINUED | OUTPATIENT
Start: 2017-04-19 | End: 2017-04-19

## 2017-04-19 RX ORDER — ONDANSETRON 4 MG/1
4 TABLET, ORALLY DISINTEGRATING ORAL EVERY 30 MIN PRN
Status: DISCONTINUED | OUTPATIENT
Start: 2017-04-19 | End: 2017-04-19 | Stop reason: HOSPADM

## 2017-04-19 RX ORDER — HYDRALAZINE HYDROCHLORIDE 20 MG/ML
2.5-5 INJECTION INTRAMUSCULAR; INTRAVENOUS EVERY 10 MIN PRN
Status: DISCONTINUED | OUTPATIENT
Start: 2017-04-19 | End: 2017-04-19 | Stop reason: HOSPADM

## 2017-04-19 RX ORDER — CEFAZOLIN SODIUM 1 G/3ML
1 INJECTION, POWDER, FOR SOLUTION INTRAMUSCULAR; INTRAVENOUS SEE ADMIN INSTRUCTIONS
Status: DISCONTINUED | OUTPATIENT
Start: 2017-04-19 | End: 2017-04-19 | Stop reason: HOSPADM

## 2017-04-19 RX ORDER — GLYCOPYRROLATE 0.2 MG/ML
INJECTION, SOLUTION INTRAMUSCULAR; INTRAVENOUS PRN
Status: DISCONTINUED | OUTPATIENT
Start: 2017-04-19 | End: 2017-04-19

## 2017-04-19 RX ORDER — ACETAMINOPHEN 500 MG
1000 TABLET ORAL
Status: DISCONTINUED | OUTPATIENT
Start: 2017-04-19 | End: 2017-04-19 | Stop reason: HOSPADM

## 2017-04-19 RX ORDER — PROPOFOL 10 MG/ML
INJECTION, EMULSION INTRAVENOUS PRN
Status: DISCONTINUED | OUTPATIENT
Start: 2017-04-19 | End: 2017-04-19

## 2017-04-19 RX ORDER — SODIUM CHLORIDE, SODIUM LACTATE, POTASSIUM CHLORIDE, CALCIUM CHLORIDE 600; 310; 30; 20 MG/100ML; MG/100ML; MG/100ML; MG/100ML
INJECTION, SOLUTION INTRAVENOUS CONTINUOUS
Status: DISCONTINUED | OUTPATIENT
Start: 2017-04-19 | End: 2017-04-19 | Stop reason: HOSPADM

## 2017-04-19 RX ADMIN — FENTANYL CITRATE 200 MCG: 50 INJECTION, SOLUTION INTRAMUSCULAR; INTRAVENOUS at 11:34

## 2017-04-19 RX ADMIN — IOPAMIDOL 92 ML: 755 INJECTION, SOLUTION INTRAVENOUS at 13:52

## 2017-04-19 RX ADMIN — PROPOFOL 20 MG: 10 INJECTION, EMULSION INTRAVENOUS at 13:20

## 2017-04-19 RX ADMIN — PROPOFOL 40 MG: 10 INJECTION, EMULSION INTRAVENOUS at 13:25

## 2017-04-19 RX ADMIN — GLYCOPYRROLATE 0.1 MG: 0.2 INJECTION, SOLUTION INTRAMUSCULAR; INTRAVENOUS at 12:14

## 2017-04-19 RX ADMIN — ALBUMIN (HUMAN): 12.5 SOLUTION INTRAVENOUS at 12:06

## 2017-04-19 RX ADMIN — ROCURONIUM BROMIDE 50 MG: 10 INJECTION INTRAVENOUS at 11:34

## 2017-04-19 RX ADMIN — Medication 3.5 MG: at 13:38

## 2017-04-19 RX ADMIN — LIDOCAINE HYDROCHLORIDE 100 MG: 20 INJECTION, SOLUTION INFILTRATION; PERINEURAL at 11:34

## 2017-04-19 RX ADMIN — SODIUM CHLORIDE, POTASSIUM CHLORIDE, SODIUM LACTATE AND CALCIUM CHLORIDE: 600; 310; 30; 20 INJECTION, SOLUTION INTRAVENOUS at 11:10

## 2017-04-19 RX ADMIN — Medication 10 MG: at 12:12

## 2017-04-19 RX ADMIN — CEFAZOLIN SODIUM 2 G: 2 INJECTION, SOLUTION INTRAVENOUS at 12:05

## 2017-04-19 RX ADMIN — SUGAMMADEX 140 MG: 100 INJECTION, SOLUTION INTRAVENOUS at 14:07

## 2017-04-19 RX ADMIN — PROPOFOL 120 MG: 10 INJECTION, EMULSION INTRAVENOUS at 11:34

## 2017-04-19 RX ADMIN — MIDAZOLAM HYDROCHLORIDE 1 MG: 1 INJECTION, SOLUTION INTRAMUSCULAR; INTRAVENOUS at 11:10

## 2017-04-19 RX ADMIN — FENTANYL CITRATE 50 MCG: 50 INJECTION, SOLUTION INTRAMUSCULAR; INTRAVENOUS at 13:50

## 2017-04-19 RX ADMIN — GLYCOPYRROLATE 0.6 MG: 0.2 INJECTION, SOLUTION INTRAMUSCULAR; INTRAVENOUS at 13:38

## 2017-04-19 RX ADMIN — SODIUM CHLORIDE, PRESERVATIVE FREE 73 ML: 5 INJECTION INTRAVENOUS at 13:53

## 2017-04-19 ASSESSMENT — ENCOUNTER SYMPTOMS: DYSRHYTHMIAS: 1

## 2017-04-19 NOTE — IP AVS SNAPSHOT
Post Anesthesia Care Unit [address] [address]    Phone:  [phone]                                       After Visit Summary   4/19/2017    Eh Callahan    MRN: [id number]           After Visit Summary Signature Page     I have received my discharge instructions, and my questions have been answered. I have discussed any challenges I see with this plan with the nurse or doctor.    ..........................................................................................................................................  Patient/Patient Representative Signature      ..........................................................................................................................................  Patient Representative Print Name and Relationship to Patient    ..................................................               ................................................  Date                                            Time    ..........................................................................................................................................  Reviewed by Signature/Title    ...................................................              ..............................................  Date                                                            Time

## 2017-04-19 NOTE — ANESTHESIA PREPROCEDURE EVALUATION
Anesthesia Evaluation     . Pt has had prior anesthetic. Type: General and MAC    No history of anesthetic complications          ROS/MED HX    ENT/Pulmonary:     (+)BEULAH risk factors hypertension, other ENT- Narrow angle glaucoma, , . .    Neurologic:  - neg neurologic ROS     Cardiovascular:     (+) hypertension----. : . . . :. dysrhythmias a-fib, Irregular Heartbeat/Palpitations, .      (-) CHF and SAMS   METS/Exercise Tolerance:  >4 METS   Hematologic:  - neg hematologic  ROS       Musculoskeletal:  - neg musculoskeletal ROS       GI/Hepatic: Comment: History of Hepatitis C (treated)    (+) GERD Asymptomatic on medication, hepatitis type C, liver disease,       Renal/Genitourinary:     (+) chronic renal disease, type: CRI,       Endo:     (+) type II DM Using insulin - not using insulin pump Normal glucose range: 300s--difficult to regulate blood glucose levels not previously admitted for DM/DKA .      Psychiatric: Comment: PTSD    (+) psychiatric history anxiety, depression and other (comment)      Infectious Disease:  - neg infectious disease ROS       Malignancy:   (+) Malignancy History of GI  GI CA Active status post, Hepatocellular carcinoma        Other:    - neg other ROS                  Allergies   Allergen Reactions     Amlodipine Swelling     Edema at 10 mg , fine at 5 mg     Lisinopril Cough     Metoprolol      Other reaction(s): Bradycardia  Metoprolol at 50mg bid -> HR 45 -50, unclear if sx (has fatigue)  May tolerate lower doses if needed     Prescription Medications as of 4/19/2017             Blood Pressure Monitoring (RA BLOOD PRESSURE CUFF MONITOR) MISC Take blood pressure once daily    timolol (TIMOPTIC) 0.5 % ophthalmic solution PLACE 1 DROP INTO BOTH EYES ONCE DAILY.    traZODone (DESYREL) 50 MG tablet Take 50 mg by mouth    [employer] LANCETS 21G MISC Uses 6 times daily    blood glucose monitoring (Tattva CONTOUR) test strip USE TO MEASURE YOUR BLOOD GLUCOSE 6 TIMES DAILY    insulin degludec  "(TRESIBA) 100 UNIT/ML pen Inject 14 Units Subcutaneous    Insulin Pen Needle (PEN NEEDLES 5/16\") 31G X 8 MM MISC 4 x daily    rivaroxaban ANTICOAGULANT (XARELTO) 20 MG TABS tablet Take 1 tablet (20 mg) by mouth daily (with dinner)    Multiple Vitamins-Minerals (MULTIVITAMIN & MINERAL PO)     polyethylene glycol (MIRALAX/GLYCOLAX) packet Take 17 g by mouth daily as needed for constipation    oxyCODONE (ROXICODONE) 5 MG immediate release tablet Take 1-2 tablets (5-10 mg) by mouth every 4 hours as needed for moderate to severe pain    acetaminophen (TYLENOL) 500 MG tablet Take 2 tablets (1,000 mg) by mouth 3 times daily    senna-docusate (SENOKOT-S;PERICOLACE) 8.6-50 MG per tablet Take 1 tablet by mouth 2 times daily    carboxymethylcellulose (REFRESH PLUS) 0.5 % SOLN Place 1 drop into both eyes 4 times daily     insulin lispro (HUMALOG PEN) 100 UNIT/ML soln 4 - 12 units 3 x daily    pantoprazole (PROTONIX) 40 MG enteric coated tablet Take 40 mg by mouth daily     cholecalciferol (D 5000) 5000 UNITS CAPS Reported on 4/12/2017    alendronate (FOSAMAX) 70 MG tablet 70 mg every 7 days Take 1 tablet by mouth twice weekly.    Multiple vitamin TABS Take 1 tablet by mouth    simvastatin (ZOCOR) 20 MG tablet Take 1 tablet (20 mg) by mouth daily    losartan (COZAAR) 100 MG tablet Take 1 tablet by mouth daily      Facility Administered Medications as of 4/19/2017             ceFAZolin sodium-dextrose (ANCEF) infusion 2 g Inject 100 mLs (2 g) into the vein Pre-Op/Pre-procedure x 1 dose    ceFAZolin (ANCEF) 1 g vial to attach to  ml bag for ADULT or 50 ml bag for PEDS Inject 1 g into the vein See Admin Instructions    lactated ringers infusion Inject into the vein continuous prn    midazolam (VERSED) injection Inject into the vein as needed for anxiety    lidocaine injection 2% (MDV) Inject into the vein as needed    propofol (DIPRIVAN) injection 10 mg/mL vial Inject into the vein as needed    rocuronium (ZEMURON) injection " Inject into the vein as needed    glycopyrrolate (ROBINUL) injection Inject into the vein as needed    fentaNYL Citrate (PF) (SUBLIMAZE) injection Inject into the vein as needed for moderate to severe pain    albumin human 5 % injection continuous prn for other    ePHEDrine injection Inject into the vein as needed      No results found for this or any previous visit (from the past 4320 hour(s)).  PAST MEDICAL HISTORY:   Past Medical History:   Diagnosis Date     Anatomical narrow angle glaucoma      Atrial fibrillation (H)      Chronic infection     history of hepatitis C     CKD (chronic kidney disease) stage 2, GFR 60-89 ml/min      Diabetes mellitus (H)      Hepatitis C without mention of hepatic coma      Hypertension      Palpitations      PTSD (post-traumatic stress disorder)        PAST SURGICAL HISTORY:   Past Surgical History:   Procedure Laterality Date     APPENDECTOMY       EYE SURGERY       LAPAROSCOPIC HEPATECTOMY PARTIAL Left 11/22/2016    Procedure: LAPAROSCOPIC HEPATECTOMY PARTIAL;  Surgeon: Rick Mckeon MD;  Location:  OR       FAMILY HISTORY:   Family History   Problem Relation Age of Onset     DIABETES No family hx of      He is not aware of any diabetes in his family     KIDNEY DISEASE No family hx of        SOCIAL HISTORY:   Social History   Substance Use Topics     Smoking status: Former Smoker     Smokeless tobacco: Never Used     Alcohol use No     Lab Results   Component Value Date    WBC 3.7 (L) 04/19/2017    HGB 13.3 04/19/2017    HCT 39.9 (L) 04/19/2017     (L) 04/19/2017    ALT 44 04/19/2017    AST 36 04/19/2017     04/19/2017    BUN 26 04/19/2017    CO2 29 04/19/2017    TSH 2.18 10/20/2016    INR 1.34 (H) 04/19/2017                      Anesthesia Plan      History & Physical Review  History and physical reviewed and following examination; no interval change.    ASA Status:  3 .    NPO Status:  > 8 hours    Plan for General with Intravenous induction. Maintenance  will be Balanced.           Postoperative Care      Consents                          .

## 2017-04-19 NOTE — PROGRESS NOTES
Pt had CT guided liver ablation done with offsite anesthesia monitoring and cares.  Timeout done with staff MD CATIE Soni.  Pt tolerated procedure with anesthesia team doing cares.  Pt to PACU via stretcher still intubated and sedated;anesthesia team transporting pt.

## 2017-04-19 NOTE — DISCHARGE INSTRUCTIONS
[facility]  Same-Day Surgery   Adult Discharge Orders & Instructions     For 24 hours after surgery    1. Get plenty of rest.  A responsible adult must stay with you for at least 24 hours after you leave the hospital.   2. Do not drive or use heavy equipment.  If you have weakness or tingling, don't drive or use heavy equipment until this feeling goes away.  3. Do not drink alcohol.  4. Avoid strenuous or risky activities.  Ask for help when climbing stairs.   5. You may feel lightheaded.  IF so, sit for a few minutes before standing.  Have someone help you get up.   6. If you have nausea (feel sick to your stomach): Drink only clear liquids such as apple juice, ginger ale, broth or 7-Up.  Rest may also help.  Be sure to drink enough fluids.  Move to a regular diet as you feel able.  7. You may have a slight fever. Call the doctor if your fever is over 100 F (37.7 C) (taken under the tongue) or lasts longer than 24 hours.  8. You may have a dry mouth, a sore throat, muscle aches or trouble sleeping.  These should go away after 24 hours.  9. Do not make important or legal decisions.   Call your doctor for any of the followin.  Signs of infection (fever, growing tenderness at the surgery site, a large amount of drainage or bleeding, severe pain, foul-smelling drainage, redness, swelling).    2. It has been over 8 to 10 hours since surgery and you are still not able to urinate (pass water).    3.  Headache for over 24 hours.    To contact a doctor, call Dr Kidd's office at [phone] or:        [phone] and ask for the resident on call for  (answered 24 hours a day)      Emergency Department:    [facility]: [phone]       (TTY for hearing impaired: [phone])    [facility]: [phone]       (TTY for hearing impaired: [phone])      1. No heavy lifting for 2 days (no greater than 10 pounds)    2. Resume usual diet    3. Can shower  tomorrow    4. Dressing can come off at time of next shower    5. Pain killers as needed    6. Interventional Radiology will call you and arrange your follow up

## 2017-04-19 NOTE — OR NURSING
Dr. Kasey garcia-family and pt would like to have MD explain procedure. MD came to bedside in phase 2 and explained care to pt and family via . MD also prescribed narcotic pain medication and family will take with to have filled at their pharmacy.

## 2017-04-19 NOTE — OR NURSING
1400 Pt. Arrived PACU, intubated, placed on vent. BBS equal, clear.  Patient hypertensive.  1415 pt. Placed on CPAP, hydralazine given per anesthesia.  1420 Patient follows commands, suctioned and extubated per anesthesia. RR12, regular, O2 sat 100% on oxiplus mask.

## 2017-04-19 NOTE — PROGRESS NOTES
Interventional Radiology Brief Post Procedure Note    Procedure: Radiofrequency ablation of a hepatic lesion    Proceduralist: Adri Soni MD    Assistant: Killian Samson MD    Time Out: Prior to the start of the procedure and with procedural staff participation, I verbally confirmed the patient s identity using two indicators, relevant allergies, that the procedure was appropriate and matched the consent or emergent situation, and that the correct equipment/implants were available. Immediately prior to starting the procedure I conducted the Time Out with the procedural staff and re-confirmed the patient s name, procedure, and site/side. (The Joint Commission universal protocol was followed.)  Yes    Sedation: General Endotracheal Anesthesia (GET) administered and documented by Anesthesia Care Provider    Findings: Successful RF ablation of a hepatic segment 5/8 lesion.  Post procedure CT with contrast demonstrated treatment of the entire lesion.    Estimated Blood Loss: Less than 10 ml    Fluoroscopy Time: Not applicable    SPECIMENS: None    Complications: 1. None     Condition: Stable    Plan: Return to 3C.     Comments: See dictated procedure note for full details.    Killian Samson MD

## 2017-04-19 NOTE — IP AVS SNAPSHOT
MRN:[id number]                      After Visit Summary   4/19/2017    Eh Callahan    MRN: [id number]           Thank you!     Thank you for choosing [facility] for your care. Our goal is always to provide you with excellent care. Hearing back from our patients is one way we can continue to improve our services. Please take a few minutes to complete the written survey that you may receive in the mail after you visit with us. Thank you!        Patient Information     Date Of Birth          1940        About your hospital stay     You were admitted on:  April 19, 2017 You last received care in the:  Post Anesthesia Care Unit [facility]    You were discharged on:  April 19, 2017       Who to Call     For medical emergencies, please call 911.  For non-urgent questions about your medical care, please call your primary care provider or clinic, [phone]  For questions related to your surgery, please call your surgery clinic        Attending Provider     Provider Specialty    Adri Kidd MD Vascular and Interventional Radiology       Primary Care Provider Office Phone # Fax #    Shalini Mac PA-C [phone]       [address] [address]        Your next 10 appointments already scheduled     May 10, 2017  9:00 AM CDT   (Arrive by 8:45 AM)   Return General Liver with Fay Bazan MD   [facility] Hepatology ([facility])    [address] [place] [id number] [phone]            Jun 05, 2017  6:00 PM CDT   (Arrive by 5:45 PM)   RETURN ARRHYTHMIA with Yakov Tamez MD   [facility] Heart Care ([facility])    [address] [place] [id number] [phone]            Jun 19, 2017  9:30 AM CDT   LAB with  LAB   [facility] Lab ([facility])    [address] [place] [id number]    [phone]           Patient must bring picture ID.  Patient should be prepared to give a urine specimen  Please do not eat 10-12 hours before your appointment if you are coming in fasting for labs on lipids, cholesterol, or glucose (sugar).  Pregnant women should follow their Care Team instructions. Water with medications is okay. Do not drink coffee or other fluids.   If you have concerns about taking  your medications, please ask at office or if scheduling via [email or web address], send a message by clicking on Secure Messaging, Message Your Care Team.            Jun 19, 2017 10:00 AM CDT   (Arrive by 9:45 AM)   MR ABDOMEN W/O & W CONTRAST with [address] MRI ([facility])    [address] [facility]  1st Floor  [facility] [address] [phone]           Take your medicines as usual, unless your doctor tells you not to. Bring a list of your current medicines to your exam (including vitamins, minerals and over-the-counter drugs). Also bring the results of similar scans you may have had.    The day before your exam, drink extra fluids at least six 8-ounce glasses (unless your doctor tells you to restrict your fluids).   Have a blood test (creatinine test) within 30 days of your exam. Go to your clinic or Diagnostic Imaging Department for this test.   Do not eat or drink for 6 hours prior to exam.  The MRI machine uses a strong magnet. Please wear clothes without metal (snaps, zippers). A sweatsuit works well, or we may give you a hospital gown.  Please remove any body piercings and hair extensions before you arrive. You will also remove watches, jewelry, hairpins, wallets, dentures, partial dental plates and hearing aids. You may wear contact lenses, and you may be able to wear your rings. We have a safe place to keep your personal items, but it is safer to leave them at home.   **IMPORTANT** THE INSTRUCTIONS BELOW ARE ONLY FOR THOSE PATIENTS WHO HAVE BEEN TOLD THEY WILL  RECEIVE SEDATION OR GENERAL ANESTHESIA DURING THEIR MRI PROCEDURE:  IF YOU WILL RECEIVE SEDATION (take medicine to help you relax during your exam):   You must get the medicine from your doctor before you arrive. Bring the medicine to the exam. Do not take it at home.   Arrive one hour early. Bring someone who can take you home after the test. Your medicine will make you sleepy. After the exam, you may not drive, take a bus or take a taxi by yourself.   No eating 8 hours before your exam. You may have clear liquids up until 4 hours before your exam. (Clear liquids include water, clear tea, black coffee and fruit juice without pulp.)  IF YOU WILL RECEIVE ANESTHESIA (be asleep for your exam):   Arrive 1 1/2 hours early. Bring someone who can take you home after the test. You may not drive, take a bus or take a taxi by yourself.   No eating 8 hours before your exam. You may have clear liquids up until 4 hours before your exam. (Clear liquids include water, clear tea, black coffee and fruit juice without pulp.)  If you have any questions, please contact your Imaging Department exam site.            Jun 22, 2017  2:00 PM CDT   (Arrive by 1:45 PM)   Return Visit with Miri Antoine MD   [facility] ([facility])    [address] [place] [address] [phone]              Further instructions from your care team       [facility]  Same-Day Surgery   Adult Discharge Orders & Instructions     For 24 hours after surgery    1. Get plenty of rest.  A responsible adult must stay with you for at least 24 hours after you leave the hospital.   2. Do not drive or use heavy equipment.  If you have weakness or tingling, don't drive or use heavy equipment until this feeling goes away.  3. Do not drink alcohol.  4. Avoid strenuous or risky activities.  Ask for help when climbing stairs.   5. You may feel lightheaded.  IF so, sit  for a few minutes before standing.  Have someone help you get up.   6. If you have nausea (feel sick to your stomach): Drink only clear liquids such as apple juice, ginger ale, broth or 7-Up.  Rest may also help.  Be sure to drink enough fluids.  Move to a regular diet as you feel able.  7. You may have a slight fever. Call the doctor if your fever is over 100 F (37.7 C) (taken under the tongue) or lasts longer than 24 hours.  8. You may have a dry mouth, a sore throat, muscle aches or trouble sleeping.  These should go away after 24 hours.  9. Do not make important or legal decisions.   Call your doctor for any of the followin.  Signs of infection (fever, growing tenderness at the surgery site, a large amount of drainage or bleeding, severe pain, foul-smelling drainage, redness, swelling).    2. It has been over 8 to 10 hours since surgery and you are still not able to urinate (pass water).    3.  Headache for over 24 hours.    To contact a doctor, call Dr Kidd's office at [phone] or:        [phone] and ask for the resident on call for  (answered 24 hours a day)      Emergency Department:    [facility]: [phone]       (TTY for hearing impaired: [phone])    [facility]: [phone]       (TTY for hearing impaired: [phone])      1. No heavy lifting for 2 days (no greater than 10 pounds)    2. Resume usual diet    3. Can shower tomorrow    4. Dressing can come off at time of next shower    5. Pain killers as needed    6. Interventional Radiology will call you and arrange your follow up      Pending Results     Date and Time Order Name Status Description    2017 1055 CT Liver Ablation (RF) In process             Statement of Approval     Ordered          17 8270  I have reviewed and agree with all the recommendations and orders detailed in this document.  EFFECTIVE NOW     Approved and electronically signed by:  Adri Kidd MD             Admission  "Information     Date & Time Provider Department Dept. Phone    2017 Adri Kidd MD Post Anesthesia Care Unit [facility] [phone]      Your Vitals Were     Blood Pressure Temperature Respirations Height Weight Pulse Oximetry    128/68 96.8  F (36  C) (Tympanic) 18 1.7 m (5' 6.93\") 68.5 kg (151 lb 0.2 oz) 100%    BMI (Body Mass Index)                   23.7 kg/m2           Getyoo Information     Getyoo lets you send messages to your doctor, view your test results, renew your prescriptions, schedule appointments and more. To sign up, go to [email or web address] . Click on \"Log in\" on the left side of the screen, which will take you to the Welcome page. Then click on \"Sign up Now\" on the right side of the page.     You will be asked to enter the access code listed below, as well as some personal information. Please follow the directions to create your username and password.     Your access code is: [email or web address]  Expires: 2017  7:30 AM     Your access code will  in 90 days. If you need help or a new code, please call your [facility] clinic or [phone].        Care EveryWhere ID     This is your Care EveryWhere ID. This could be used by other organizations to access your [facility] medical records  [id number]           Review of your medicines      CONTINUE these medicines which may have CHANGED, or have new prescriptions. If we are uncertain of the size of tablets/capsules you have at home, strength may be listed as something that might have changed.        Dose / Directions    acetaminophen 500 MG tablet   Commonly known as:  TYLENOL   This may have changed:    - how much to take  - when to take this  - reasons to take this   Used for:  History of liver excision        Dose:  1000 mg   Take 2 tablets (1,000 mg) by mouth 3 times daily   Quantity:  100 tablet   Refills:  0         CONTINUE these medicines which have NOT CHANGED        Dose / Directions    alendronate 70 MG tablet " "  Commonly known as:  FOSAMAX        Dose:  70 mg   70 mg every 7 days Take 1 tablet by mouth twice weekly.   Refills:  0       GREGORIO CONTOUR test strip   Used for:  HCC (hepatocellular carcinoma) (H)   Generic drug:  blood glucose monitoring        USE TO MEASURE YOUR BLOOD GLUCOSE 6 TIMES DAILY   Refills:  0       carboxymethylcellulose 0.5 % Soln ophthalmic solution   Commonly known as:  REFRESH PLUS        Dose:  1 drop   Place 1 drop into both eyes 4 times daily   Refills:  0       D 5000 5000 UNITS Caps   Generic drug:  cholecalciferol        Reported on 4/12/2017   Refills:  0       insulin degludec 100 UNIT/ML pen   Commonly known as:  TRESIBA   Used for:  HCC (hepatocellular carcinoma) (H)        Dose:  14 Units   Inject 14 Units Subcutaneous   Refills:  0       insulin lispro 100 UNIT/ML injection   Commonly known as:  HumaLOG PEN        4 - 12 units 3 x daily   Refills:  0       [employer] LANCETS 21G Misc   Used for:  HCC (hepatocellular carcinoma) (H)        Uses 6 times daily   Refills:  0       losartan 100 MG tablet   Commonly known as:  COZAAR        Dose:  1 tablet   Take 1 tablet by mouth daily   Refills:  0       Multiple vitamin Tabs        Dose:  1 tablet   Take 1 tablet by mouth   Refills:  0       MULTIVITAMIN & MINERAL PO   Used for:  HCC (hepatocellular carcinoma) (H)        Refills:  0       oxyCODONE 5 MG IR tablet   Commonly known as:  ROXICODONE   Used for:  History of hepatitis C, History of liver excision        Dose:  5-10 mg   Take 1-2 tablets (5-10 mg) by mouth every 4 hours as needed for moderate to severe pain   Quantity:  30 tablet   Refills:  0       pantoprazole 40 MG EC tablet   Commonly known as:  PROTONIX        Dose:  40 mg   Take 40 mg by mouth daily   Refills:  0       pen needles 5/16\" 31G X 8 MM Misc   Used for:  HCC (hepatocellular carcinoma) (H)        4 x daily   Refills:  0       polyethylene glycol Packet   Commonly known as:  MIRALAX/GLYCOLAX   Used for:  History of " liver excision        Dose:  17 g   Take 17 g by mouth daily as needed for constipation   Quantity:  30 packet   Refills:  1       RA BLOOD PRESSURE CUFF MONITOR Misc   Used for:  HCC (hepatocellular carcinoma) (H)        Take blood pressure once daily   Refills:  0       rivaroxaban ANTICOAGULANT 20 MG Tabs tablet   Commonly known as:  XARELTO   Used for:  Atrial fibrillation, unspecified type (H)        Dose:  20 mg   Take 1 tablet (20 mg) by mouth daily (with dinner)   Quantity:  30 tablet   Refills:  5       senna-docusate 8.6-50 MG per tablet   Commonly known as:  SENOKOT-S;PERICOLACE   Used for:  History of liver excision        Dose:  1 tablet   Take 1 tablet by mouth 2 times daily   Quantity:  14 tablet   Refills:  0       simvastatin 20 MG tablet   Commonly known as:  ZOCOR   Used for:  Dyslipidaemia        Dose:  20 mg   Take 1 tablet (20 mg) by mouth daily   Refills:  0       timolol 0.5 % ophthalmic solution   Commonly known as:  TIMOPTIC   Used for:  HCC (hepatocellular carcinoma) (H)        PLACE 1 DROP INTO BOTH EYES ONCE DAILY.   Refills:  0       traZODone 50 MG tablet   Commonly known as:  DESYREL   Used for:  HCC (hepatocellular carcinoma) (H)        Dose:  50 mg   Take 50 mg by mouth   Refills:  0                Protect others around you: Learn how to safely use, store and throw away your medicines at www.disposemymeds.org.             Medication List: This is a list of all your medications and when to take them. Check marks below indicate your daily home schedule. Keep this list as a reference.      Medications           Morning Afternoon Evening Bedtime As Needed    acetaminophen 500 MG tablet   Commonly known as:  TYLENOL   Take 2 tablets (1,000 mg) by mouth 3 times daily                                alendronate 70 MG tablet   Commonly known as:  FOSAMAX   70 mg every 7 days Take 1 tablet by mouth twice weekly.                                GREGORIO CONTOUR test strip   USE TO MEASURE YOUR  "BLOOD GLUCOSE 6 TIMES DAILY   Generic drug:  blood glucose monitoring                                carboxymethylcellulose 0.5 % Soln ophthalmic solution   Commonly known as:  REFRESH PLUS   Place 1 drop into both eyes 4 times daily                                D 5000 5000 UNITS Caps   Reported on 4/12/2017   Generic drug:  cholecalciferol                                insulin degludec 100 UNIT/ML pen   Commonly known as:  TRESIBA   Inject 14 Units Subcutaneous                                insulin lispro 100 UNIT/ML injection   Commonly known as:  HumaLOG PEN   4 - 12 units 3 x daily                                [employer] LANCETS 21G Misc   Uses 6 times daily                                losartan 100 MG tablet   Commonly known as:  COZAAR   Take 1 tablet by mouth daily                                Multiple vitamin Tabs   Take 1 tablet by mouth                                MULTIVITAMIN & MINERAL PO                                oxyCODONE 5 MG IR tablet   Commonly known as:  ROXICODONE   Take 1-2 tablets (5-10 mg) by mouth every 4 hours as needed for moderate to severe pain                                pantoprazole 40 MG EC tablet   Commonly known as:  PROTONIX   Take 40 mg by mouth daily                                pen needles 5/16\" 31G X 8 MM Misc   4 x daily                                polyethylene glycol Packet   Commonly known as:  MIRALAX/GLYCOLAX   Take 17 g by mouth daily as needed for constipation                                RA BLOOD PRESSURE CUFF MONITOR Misc   Take blood pressure once daily                                rivaroxaban ANTICOAGULANT 20 MG Tabs tablet   Commonly known as:  XARELTO   Take 1 tablet (20 mg) by mouth daily (with dinner)                                senna-docusate 8.6-50 MG per tablet   Commonly known as:  SENOKOT-S;PERICOLACE   Take 1 tablet by mouth 2 times daily                                simvastatin 20 MG tablet   Commonly known as:  ZOCOR   Take 1 " tablet (20 mg) by mouth daily                                timolol 0.5 % ophthalmic solution   Commonly known as:  TIMOPTIC   PLACE 1 DROP INTO BOTH EYES ONCE DAILY.                                traZODone 50 MG tablet   Commonly known as:  DESYREL   Take 50 mg by mouth

## 2017-04-19 NOTE — ANESTHESIA CARE TRANSFER NOTE
Patient: Awed Ismail    Procedure(s):  Anesthesia Offsite Coverage Liver Ablation @1100    Diagnosis: Hepatocellular Carcinoma  Diagnosis Additional Information: No value filed.    Anesthesia Type:   No value filed.     Note:  Airway :ETT and Ventilator  Patient transferred to:PACU  Comments: VSS on arrival, BP elevated with sedation off. MDA aware. Report to RN.      Vitals: (Last set prior to Anesthesia Care Transfer)    CRNA VITALS  4/19/2017 1330 - 4/19/2017 1408      4/19/2017             Resp Rate (set): 10                Electronically Signed By: TANNER Walker CRNA  April 19, 2017  2:08 PM

## 2017-04-20 DIAGNOSIS — C22.0 HCC (HEPATOCELLULAR CARCINOMA) (H): Primary | ICD-10-CM

## 2017-04-24 ENCOUNTER — TELEPHONE (OUTPATIENT)
Dept: INTERVENTIONAL RADIOLOGY/VASCULAR | Facility: CLINIC | Age: 77
End: 2017-04-24

## 2017-04-24 NOTE — TELEPHONE ENCOUNTER
Called pt to f/u on him s/p liver ablation. He states that he is doing fine but he states the puncture sites are painful. He did take the pain medication for this, which helps. . I advised that he can put a warm pack over the sites to help alleviate this pain also.     He also states that his lower legs are swollen. They are swollen from Knee to toes. I did review his chart and noticed that he saw Dr. Justice team a couple of days after the first attempted TACE procedure.  I will send a msg to see if they can put him back on it for the swelling.     Otherwise pt states that he does have fevers on and off. This is under control at this time.     I did inform pt of his followup appt. Will also send letter as I explained that the MRI is scheduled at the hospital and his labs and f/u appt with Dr Soni is at the [facility].    I've asked him to call me with any other questions.     Otilia Arora RN, BSN  Interventional Radiology Nurse Coordinator   Phone: [phone]

## 2017-04-25 ENCOUNTER — TELEPHONE (OUTPATIENT)
Dept: GASTROENTEROLOGY | Facility: CLINIC | Age: 77
End: 2017-04-25

## 2017-04-25 DIAGNOSIS — R60.0 PERIPHERAL EDEMA: Primary | ICD-10-CM

## 2017-04-25 DIAGNOSIS — Z86.19 HISTORY OF HEPATITIS C: ICD-10-CM

## 2017-04-25 RX ORDER — FUROSEMIDE 20 MG
20 TABLET ORAL DAILY
Qty: 30 TABLET | Refills: 3 | Status: ON HOLD | OUTPATIENT
Start: 2017-04-25 | End: 2020-01-18

## 2017-04-25 NOTE — TELEPHONE ENCOUNTER
Writer spoke with patient regarding restarting Lasix once daily. Sent to patient's preferred pharmacy. Updated patient of upcoming appointments in Hepatology/lab/Imaging/Interventional Radiology.

## 2017-04-25 NOTE — TELEPHONE ENCOUNTER
----- Message from Augustin Arora, RN sent at 4/25/2017  1:42 PM CDT -----  Alla Hurt, can you call pt to inform.     Thanks augustin    ----- Message -----     From: Allan Justice MD     Sent: 4/24/2017  10:09 PM       To: Raghu Self LPN, Augustin Arora RN    If creatinine remains normal. Can restart at lasix 20 mg qd.    ----- Message -----     From: Augustin Arora, POLLO     Sent: 4/24/2017   1:09 PM       To: Allan Justice MD, #    Hi, I spoke with pt for f/u today and his legs are swollen. I see that Res[facility] took him off his lasix prior to his TACE appt. . Please check to see if he can go back on it and call him back.     Thanks augustin

## 2017-05-10 ENCOUNTER — OFFICE VISIT (OUTPATIENT)
Dept: GASTROENTEROLOGY | Facility: CLINIC | Age: 77
End: 2017-05-10
Attending: INTERNAL MEDICINE
Payer: MEDICARE

## 2017-05-10 VITALS
SYSTOLIC BLOOD PRESSURE: 168 MMHG | WEIGHT: 146.2 LBS | BODY MASS INDEX: 22.95 KG/M2 | OXYGEN SATURATION: 100 % | HEIGHT: 67 IN | HEART RATE: 67 BPM | DIASTOLIC BLOOD PRESSURE: 95 MMHG | TEMPERATURE: 97.5 F

## 2017-05-10 DIAGNOSIS — C22.0 HCC (HEPATOCELLULAR CARCINOMA) (H): Primary | ICD-10-CM

## 2017-05-10 DIAGNOSIS — C22.0 HCC (HEPATOCELLULAR CARCINOMA) (H): ICD-10-CM

## 2017-05-10 LAB
AFP SERPL-MCNC: 1.5 UG/L (ref 0–8)
ALBUMIN SERPL-MCNC: 3.1 G/DL (ref 3.4–5)
ALP SERPL-CCNC: 188 U/L (ref 40–150)
ALT SERPL W P-5'-P-CCNC: 145 U/L (ref 0–70)
ANION GAP SERPL CALCULATED.3IONS-SCNC: 6 MMOL/L (ref 3–14)
AST SERPL W P-5'-P-CCNC: 37 U/L (ref 0–45)
BILIRUB DIRECT SERPL-MCNC: 0.3 MG/DL (ref 0–0.2)
BILIRUB SERPL-MCNC: 1.1 MG/DL (ref 0.2–1.3)
BUN SERPL-MCNC: 37 MG/DL (ref 7–30)
CALCIUM SERPL-MCNC: 8.8 MG/DL (ref 8.5–10.1)
CHLORIDE SERPL-SCNC: 102 MMOL/L (ref 94–109)
CO2 SERPL-SCNC: 31 MMOL/L (ref 20–32)
CREAT SERPL-MCNC: 1.16 MG/DL (ref 0.66–1.25)
ERYTHROCYTE [DISTWIDTH] IN BLOOD BY AUTOMATED COUNT: 12.6 % (ref 10–15)
GFR SERPL CREATININE-BSD FRML MDRD: 61 ML/MIN/1.7M2
GLUCOSE SERPL-MCNC: 208 MG/DL (ref 70–99)
HCT VFR BLD AUTO: 41.7 % (ref 40–53)
HGB BLD-MCNC: 13.7 G/DL (ref 13.3–17.7)
INR PPP: 1.03 (ref 0.86–1.14)
MCH RBC QN AUTO: 31.2 PG (ref 26.5–33)
MCHC RBC AUTO-ENTMCNC: 32.9 G/DL (ref 31.5–36.5)
MCV RBC AUTO: 95 FL (ref 78–100)
PLATELET # BLD AUTO: 117 10E9/L (ref 150–450)
POTASSIUM SERPL-SCNC: 4.4 MMOL/L (ref 3.4–5.3)
PROT SERPL-MCNC: 6.8 G/DL (ref 6.8–8.8)
RBC # BLD AUTO: 4.39 10E12/L (ref 4.4–5.9)
SODIUM SERPL-SCNC: 139 MMOL/L (ref 133–144)
WBC # BLD AUTO: 3.6 10E9/L (ref 4–11)

## 2017-05-10 PROCEDURE — 36415 COLL VENOUS BLD VENIPUNCTURE: CPT | Performed by: RADIOLOGY

## 2017-05-10 PROCEDURE — 99212 OFFICE O/P EST SF 10 MIN: CPT | Mod: ZF

## 2017-05-10 PROCEDURE — 82105 ALPHA-FETOPROTEIN SERUM: CPT | Performed by: RADIOLOGY

## 2017-05-10 ASSESSMENT — PAIN SCALES - GENERAL: PAINLEVEL: MODERATE PAIN (5)

## 2017-05-10 NOTE — MR AVS SNAPSHOT
After Visit Summary   5/10/2017    Eh Callahan    MRN: [id number]           Patient Information     Date Of Birth          1940        Visit Information        Provider Department      5/10/2017 8:45 AM Thom Justice; Fay Bazan MD [facility]        Today's Diagnoses     HCC (hepatocellular carcinoma) (H)    -  1       Follow-ups after your visit        Follow-up notes from your care team     Return in about 3 months (around 8/10/2017).      Your next 10 appointments already scheduled     May 10, 2017 10:15 AM CDT   Lab with  LAB    Health Lab ([facility])    [address]  [facility] [address] [phone]            May 26, 2017  8:00 AM CDT   MR ABDOMEN W/O & W CONTRAST with UUMR1   [facility] [place], MRI ([facility], [facility])    [address] [facility] [address] [phone]           Take your medicines as usual, unless your doctor tells you not to. Bring a list of your current medicines to your exam (including vitamins, minerals and over-the-counter drugs). Also bring the results of similar scans you may have had.    The day before your exam, drink extra fluids at least six 8-ounce glasses (unless your doctor tells you to restrict your fluids).   Have a blood test (creatinine test) within 30 days of your exam. Go to your clinic or Diagnostic Imaging Department for this test.   Do not eat or drink for 6 hours prior to exam.  The MRI machine uses a strong magnet. Please wear clothes without metal (snaps, zippers). A sweatsuit works well, or we may give you a hospital gown.  Please remove any body piercings and hair extensions before you arrive. You will also remove watches, jewelry, hairpins, wallets, dentures, partial dental plates and hearing aids. You may wear contact lenses, and you may be able to wear your rings. We have a safe place to keep your personal  items, but it is safer to leave them at home.   **IMPORTANT** THE INSTRUCTIONS BELOW ARE ONLY FOR THOSE PATIENTS WHO HAVE BEEN TOLD THEY WILL RECEIVE SEDATION OR GENERAL ANESTHESIA DURING THEIR MRI PROCEDURE:  IF YOU WILL RECEIVE SEDATION (take medicine to help you relax during your exam):   You must get the medicine from your doctor before you arrive. Bring the medicine to the exam. Do not take it at home.   Arrive one hour early. Bring someone who can take you home after the test. Your medicine will make you sleepy. After the exam, you may not drive, take a bus or take a taxi by yourself.   No eating 8 hours before your exam. You may have clear liquids up until 4 hours before your exam. (Clear liquids include water, clear tea, black coffee and fruit juice without pulp.)  IF YOU WILL RECEIVE ANESTHESIA (be asleep for your exam):   Arrive 1 1/2 hours early. Bring someone who can take you home after the test. You may not drive, take a bus or take a taxi by yourself.   No eating 8 hours before your exam. You may have clear liquids up until 4 hours before your exam. (Clear liquids include water, clear tea, black coffee and fruit juice without pulp.)  If you have any questions, please contact your Imaging Department exam site.            May 26, 2017  9:15 AM CDT   [facility] Lab Draw with  [facility] LAB DRAW   [facility] Lab Draw ([facility])    [address] [place] [address] [id number] [phone]            May 26, 2017 10:00 AM CDT   (Arrive by 9:45 AM)   Return Visit with Adri Kidd MD   [facility] ([facility])    [address] [place] [id number] [phone]            Jun 05, 2017  6:00 PM CDT   (Arrive by 5:45 PM)   RETURN ARRHYTHMIA with Yakov Tamez MD   [facility] ([facility])    [address] [place] [address]    [phone]            Jun 19, 2017  9:30 AM CDT   LAB with  LAB   [facility] ([facility])    [address] [place] [address] [phone]           Patient must bring picture ID.  Patient should be prepared to give a urine specimen  Please do not eat 10-12 hours before your appointment if you are coming in fasting for labs on lipids, cholesterol, or glucose (sugar).  Pregnant women should follow their Care Team instructions. Water with medications is okay. Do not drink coffee or other fluids.   If you have concerns about taking  your medications, please ask at office or if scheduling via Cubicl, send a message by clicking on Secure Messaging, Message Your Care Team.            Jun 19, 2017 10:00 AM CDT   (Arrive by 9:45 AM)   MR ABDOMEN W/O & W CONTRAST with [address] MRI ([facility])    [address] [place] [id number] [phone]           Take your medicines as usual, unless your doctor tells you not to. Bring a list of your current medicines to your exam (including vitamins, minerals and over-the-counter drugs). Also bring the results of similar scans you may have had.    The day before your exam, drink extra fluids at least six 8-ounce glasses (unless your doctor tells you to restrict your fluids).   Have a blood test (creatinine test) within 30 days of your exam. Go to your clinic or Diagnostic Imaging Department for this test.   Do not eat or drink for 6 hours prior to exam.  The MRI machine uses a strong magnet. Please wear clothes without metal (snaps, zippers). A sweatsuit works well, or we may give you a hospital gown.  Please remove any body piercings and hair extensions before you arrive. You will also remove watches, jewelry, hairpins, wallets, dentures, partial dental plates and hearing aids. You may wear contact lenses, and you may be able to wear your rings. We  have a safe place to keep your personal items, but it is safer to leave them at home.   **IMPORTANT** THE INSTRUCTIONS BELOW ARE ONLY FOR THOSE PATIENTS WHO HAVE BEEN TOLD THEY WILL RECEIVE SEDATION OR GENERAL ANESTHESIA DURING THEIR MRI PROCEDURE:  IF YOU WILL RECEIVE SEDATION (take medicine to help you relax during your exam):   You must get the medicine from your doctor before you arrive. Bring the medicine to the exam. Do not take it at home.   Arrive one hour early. Bring someone who can take you home after the test. Your medicine will make you sleepy. After the exam, you may not drive, take a bus or take a taxi by yourself.   No eating 8 hours before your exam. You may have clear liquids up until 4 hours before your exam. (Clear liquids include water, clear tea, black coffee and fruit juice without pulp.)  IF YOU WILL RECEIVE ANESTHESIA (be asleep for your exam):   Arrive 1 1/2 hours early. Bring someone who can take you home after the test. You may not drive, take a bus or take a taxi by yourself.   No eating 8 hours before your exam. You may have clear liquids up until 4 hours before your exam. (Clear liquids include water, clear tea, black coffee and fruit juice without pulp.)  If you have any questions, please contact your Imaging Department exam site.            Jun 29, 2017 12:00 PM CDT   (Arrive by 11:45 AM)   Return Visit with Miri Antoine MD   [facility] ([facility])    [address] [place] [address]  [address] [facility] [address]   [phone]            Aug 16, 2017  9:30 AM CDT   Lab with  LAB   [facility])    [address] [place] [address]   [phone]            Aug 16, 2017 10:30 AM CDT   (Arrive by 10:15 AM)   Return General Liver with Fay Bazan MD   [facility])    [address] [place] [address]  [id number] [phone]              Future tests that were ordered for you today     Open Future Orders        Priority Expected Expires Ordered    Hepatic panel Routine  2017 5/10/2017    INR Routine  2017 5/10/2017    Basic metabolic panel Routine  2017 5/10/2017            Who to contact     If you have questions or need follow up information about today's clinic visit or your schedule please contact [facility] directly at [phone].  Normal or non-critical lab and imaging results will be communicated to you by ONtheAIRhart, letter or phone within 4 business days after the clinic has received the results. If you do not hear from us within 7 days, please contact the clinic through ONtheAIRhart or phone. If you have a critical or abnormal lab result, we will notify you by phone as soon as possible.  Submit refill requests through Mosaic or call your pharmacy and they will forward the refill request to us. Please allow 3 business days for your refill to be completed.          Additional Information About Your Visit        Mosaic Information     Mosaic lets you send messages to your doctor, view your test results, renew your prescriptions, schedule appointments and more. To sign up, go to [email or web address] . Click on \"Log in\" on the left side of the screen, which will take you to the Welcome page. Then click on \"Sign up Now\" on the right side of the page.     You will be asked to enter the access code listed below, as well as some personal information. Please follow the directions to create your username and password.     Your access code is: [email or web address]  Expires: 2017  7:30 AM     Your access code will  in 90 days. If you need help or a new code, please call your [facility] clinic or [phone].        Care EveryWhere ID     This is your Care EveryWhere ID. This could be used by other organizations to access your [facility] medical records  [id number]        Your Vitals Were     " "Pulse Temperature Height Pulse Oximetry BMI (Body Mass Index)       67 97.5  F (36.4  C) (Oral) 1.7 m (5' 6.93\") 100% 22.95 kg/m2        Blood Pressure from Last 3 Encounters:   05/10/17 (!) 168/95   04/19/17 110/70   04/12/17 146/76    Weight from Last 3 Encounters:   05/10/17 66.3 kg (146 lb 3.2 oz)   04/19/17 68.5 kg (151 lb 0.2 oz)   04/12/17 66.6 kg (146 lb 12.8 oz)                 Today's Medication Changes          These changes are accurate as of: 5/10/17 10:12 AM.  If you have any questions, ask your nurse or doctor.               These medicines have changed or have updated prescriptions.        Dose/Directions    acetaminophen 500 MG tablet   Commonly known as:  TYLENOL   This may have changed:    - how much to take  - when to take this  - reasons to take this   Used for:  History of liver excision        Dose:  1000 mg   Take 2 tablets (1,000 mg) by mouth 3 times daily   Quantity:  100 tablet   Refills:  0                Primary Care Provider Office Phone # Fax #    Shalini VANG KAYCEE Mac [phone]       [place] [address]        Thank you!     Thank you for choosing [facility]  for your care. Our goal is always to provide you with excellent care. Hearing back from our patients is one way we can continue to improve our services. Please take a few minutes to complete the written survey that you may receive in the mail after your visit with us. Thank you!             Your Updated Medication List - Protect others around you: Learn how to safely use, store and throw away your medicines at www.disposemymeds.org.          This list is accurate as of: 5/10/17 10:12 AM.  Always use your most recent med list.                   Brand Name Dispense Instructions for use    acetaminophen 500 MG tablet    TYLENOL    100 tablet    Take 2 tablets (1,000 mg) by mouth 3 times daily       alendronate 70 MG tablet    FOSAMAX     70 mg every 7 days Take 1 tablet by " "mouth twice weekly.       GREGORIO CONTOUR test strip   Generic drug:  blood glucose monitoring      USE TO MEASURE YOUR BLOOD GLUCOSE 6 TIMES DAILY       carboxymethylcellulose 0.5 % Soln ophthalmic solution    REFRESH PLUS     Place 1 drop into both eyes 4 times daily       D 5000 5000 UNITS Caps   Generic drug:  cholecalciferol      Reported on 4/12/2017       furosemide 20 MG tablet    LASIX    30 tablet    Take 1 tablet (20 mg) by mouth daily       insulin degludec 100 UNIT/ML pen    TRESIBA     Inject 14 Units Subcutaneous       insulin lispro 100 UNIT/ML injection    HumaLOG PEN     4 - 12 units 3 x daily       KROGER LANCETS 21G Misc      Uses 6 times daily       losartan 100 MG tablet    COZAAR     Take 1 tablet by mouth daily       Multiple vitamin Tabs      Take 1 tablet by mouth       MULTIVITAMIN & MINERAL PO          * oxyCODONE 5 MG IR tablet    ROXICODONE    30 tablet    Take 1-2 tablets (5-10 mg) by mouth every 4 hours as needed for moderate to severe pain       * oxyCODONE 5 MG IR tablet    ROXICODONE    18 tablet    Take 1 tablet (5 mg) by mouth every 4 hours as needed for pain       pantoprazole 40 MG EC tablet    PROTONIX     Take 40 mg by mouth daily       pen needles 5/16\" 31G X 8 MM Misc      4 x daily       polyethylene glycol Packet    MIRALAX/GLYCOLAX    30 packet    Take 17 g by mouth daily as needed for constipation       RA BLOOD PRESSURE CUFF MONITOR Misc      Take blood pressure once daily       rivaroxaban ANTICOAGULANT 20 MG Tabs tablet    XARELTO    30 tablet    Take 1 tablet (20 mg) by mouth daily (with dinner)       senna-docusate 8.6-50 MG per tablet    SENOKOT-S;PERICOLACE    14 tablet    Take 1 tablet by mouth 2 times daily       simvastatin 20 MG tablet    ZOCOR     Take 1 tablet (20 mg) by mouth daily       timolol 0.5 % ophthalmic solution    TIMOPTIC     PLACE 1 DROP INTO BOTH EYES ONCE DAILY.       traZODone 50 MG tablet    DESYREL     Take 50 mg by mouth       * Notice:  " This list has 2 medication(s) that are the same as other medications prescribed for you. Read the directions carefully, and ask your doctor or other care provider to review them with you.

## 2017-05-10 NOTE — LETTER
5/10/2017      RE: Eh Callahan  [address]   [facility] [address]       GI Clinic Fellow Note     Reason for Visit: Hepatocellular carcinoma      CC: Follow-up     HPI: Mr. Callahan is a 78 yo [place] immigrant with a history of hepatitis C status post treatment with interferon and ribavirin in the year 2005. He achieved a sustained virological response.      The patient is well known to Dr. Justice. He underwent imaging of the lungs which incidentally found a 3.6 cm lesion concerning for malignancy. 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 He underwent surgery for removal with Dr. Mckeon on 11/22/2016; s/p removal of segment 2 and 3, pathology with moderately differentiated hepatocellular carcinoma. The background liver had minimal to mild focal chronic portal inflammation grade 1/4 and fibrosis portal expansion which is also stage 1-2/4.      Post-operatively the patient was noted to a new 12 mm lesion at the junction of segment 5 and 8 (OPTN 5). He underwent an attempt at TACE, however the procedure was unsuccessful due to inability to cannulate the hepatic artery.  Attempt for CT guided microwave ablation of segment 5 lesion on 4/19/2017 was successful.   Post-procedure the patient does report intermittent RUQ abdominal pain, every other day but overall improving.  Associated nausea and vomiting, also improving.  He has minimal lower extremity edema now back on lasix (held at last visit due to TRANG), resolves with leg elevation. He denies abdominal distension. No blood in his stool. No jaundice or confusion. He is scheduled for follow up with both IR and Oncology. The patient tells me he is not interested in liver transplant.      ROS: otherwise negative     Medications:  Current Outpatient Prescriptions   Medication     furosemide (LASIX) 20 MG tablet     oxyCODONE (ROXICODONE) 5 MG IR tablet      "Blood Pressure Monitoring ( BLOOD PRESSURE CUFF MONITOR) MISC     timolol (TIMOPTIC) 0.5 % ophthalmic solution     traZODone (DESYREL) 50 MG tablet     KROGER LANCETS 21G MISC     blood glucose monitoring (GREGORIO CONTOUR) test strip     insulin degludec (TRESIBA) 100 UNIT/ML pen     Insulin Pen Needle (PEN NEEDLES 5/16\") 31G X 8 MM MISC     rivaroxaban ANTICOAGULANT (XARELTO) 20 MG TABS tablet     Multiple Vitamins-Minerals (MULTIVITAMIN & MINERAL PO)     polyethylene glycol (MIRALAX/GLYCOLAX) packet     oxyCODONE (ROXICODONE) 5 MG immediate release tablet     acetaminophen (TYLENOL) 500 MG tablet     senna-docusate (SENOKOT-S;PERICOLACE) 8.6-50 MG per tablet     carboxymethylcellulose (REFRESH PLUS) 0.5 % SOLN     insulin lispro (HUMALOG PEN) 100 UNIT/ML soln     pantoprazole (PROTONIX) 40 MG enteric coated tablet     cholecalciferol (D 5000) 5000 UNITS CAPS     alendronate (FOSAMAX) 70 MG tablet     Multiple vitamin TABS     simvastatin (ZOCOR) 20 MG tablet     losartan (COZAAR) 100 MG tablet     No current facility-administered medications for this visit.      Physical Exam  BP (!) 168/95  Pulse 67  Temp 97.5  F (36.4  C) (Oral)  Ht 5' 6.93\" (1.7 m)  Wt 146 lb 3.2 oz (66.3 kg)  SpO2 100%  BMI 22.95 kg/m2  Well appearing male in nad, alert and oriented  Aniceteric, eomi, mmm  ctab  rrr  Soft, nt, nd, +BS   Warm and well perfused, trace edema b/l  Neuro grossly intact     Labs reviewed.       Imaging reviewed  US 4/5/2017:  1. There is a 0.9 x 0.7 x 0.6 hyperechogenicity in the right lobe of  the liver, which correlates with the arterial enhancing lesion at the  junction of segments 5 and 8 seen on prior MR. This appears amenable  to ultrasound-guided procedure.  2. Cholelithiasis without evidence for cholecystitis     A/P:  Mr. Callahan is a 76 yo [place] immigrant with a history of hepatitis C status post treatment with interferon and ribavirin in the year 2005 with SVR and recently diagnosed HCC s/p surgical " resection hepatic segments 2 and 3 on 11/2016, now with a new 12 mm concerning lesion is hepatic segment 5/8. AFP is stable. Now s/p successful microwave ablation of lesion on 4/19 with intermittent post-procedure pain and nausea, overall improving.      Background liver with minimal to mild focal chronic portal inflammation grade 1/4 and fibrosis portal expansion which is also stage 1-2/4.      -Recheck labs today.    -Follow up with IR and Oncology as scheduled.    -Return to clinic in 3 months with Fay Bazan    Case d/w Dr. Justice who is in agremeent with the plan of care. Please page with ? Or change in clinical status.     Fay Bazan   GI Fellow   [phone]  Attestation:  This patient has been seen and evaluated by me, Allan Justice.  Discussed with the house staff team or resident(s) and agree with the findings and plan in this note.       Fay Bazan MD

## 2017-05-10 NOTE — PROGRESS NOTES
GI Clinic Fellow Note     Reason for Visit: Hepatocellular carcinoma      CC: Follow-up     HPI: Mr. Callahan is a 78 yo [place]ian immigrant with a history of hepatitis C status post treatment with interferon and ribavirin in the year 2005. He achieved a sustained virological response.      The patient is well known to Dr. Justice. He underwent imaging of the lungs which incidentally found a 3.6 cm lesion concerning for malignancy. 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 He underwent surgery for removal with Dr. Mckeon on 11/22/2016; s/p removal of segment 2 and 3, pathology with moderately differentiated hepatocellular carcinoma. The background liver had minimal to mild focal chronic portal inflammation grade 1/4 and fibrosis portal expansion which is also stage 1-2/4.      Post-operatively the patient was noted to a new 12 mm lesion at the junction of segment 5 and 8 (OPTN 5). He underwent an attempt at TACE, however the procedure was unsuccessful due to inability to cannulate the hepatic artery.  Attempt for CT guided microwave ablation of segment 5 lesion on 4/19/2017 was successful.   Post-procedure the patient does report intermittent RUQ abdominal pain, every other day but overall improving.  Associated nausea and vomiting, also improving.  He has minimal lower extremity edema now back on lasix (held at last visit due to TRANG), resolves with leg elevation. He denies abdominal distension. No blood in his stool. No jaundice or confusion. He is scheduled for follow up with both IR and Oncology. The patient tells me he is not interested in liver transplant.      ROS: otherwise negative     Medications:  Current Outpatient Prescriptions   Medication     furosemide (LASIX) 20 MG tablet     oxyCODONE (ROXICODONE) 5 MG IR tablet     Blood Pressure Monitoring (RA BLOOD PRESSURE CUFF MONITOR) MISC     timolol (TIMOPTIC)  "0.5 % ophthalmic solution     traZODone (DESYREL) 50 MG tablet     KROGER LANCETS 21G MISC     blood glucose monitoring (GREGORIO CONTOUR) test strip     insulin degludec (TRESIBA) 100 UNIT/ML pen     Insulin Pen Needle (PEN NEEDLES 5/16\") 31G X 8 MM MISC     rivaroxaban ANTICOAGULANT (XARELTO) 20 MG TABS tablet     Multiple Vitamins-Minerals (MULTIVITAMIN & MINERAL PO)     polyethylene glycol (MIRALAX/GLYCOLAX) packet     oxyCODONE (ROXICODONE) 5 MG immediate release tablet     acetaminophen (TYLENOL) 500 MG tablet     senna-docusate (SENOKOT-S;PERICOLACE) 8.6-50 MG per tablet     carboxymethylcellulose (REFRESH PLUS) 0.5 % SOLN     insulin lispro (HUMALOG PEN) 100 UNIT/ML soln     pantoprazole (PROTONIX) 40 MG enteric coated tablet     cholecalciferol (D 5000) 5000 UNITS CAPS     alendronate (FOSAMAX) 70 MG tablet     Multiple vitamin TABS     simvastatin (ZOCOR) 20 MG tablet     losartan (COZAAR) 100 MG tablet     No current facility-administered medications for this visit.      Physical Exam  BP (!) 168/95  Pulse 67  Temp 97.5  F (36.4  C) (Oral)  Ht 5' 6.93\" (1.7 m)  Wt 146 lb 3.2 oz (66.3 kg)  SpO2 100%  BMI 22.95 kg/m2  Well appearing male in nad, alert and oriented  Aniceteric, eomi, mmm  ctab  rrr  Soft, nt, nd, +BS   Warm and well perfused, trace edema b/l  Neuro grossly intact     Labs reviewed.       Imaging reviewed  US 4/5/2017:  1. There is a 0.9 x 0.7 x 0.6 hyperechogenicity in the right lobe of  the liver, which correlates with the arterial enhancing lesion at the  junction of segments 5 and 8 seen on prior MR. This appears amenable  to ultrasound-guided procedure.  2. Cholelithiasis without evidence for cholecystitis     A/P:  Mr. Callahan is a 78 yo [place]ian immigrant with a history of hepatitis C status post treatment with interferon and ribavirin in the year 2005 with SVR and recently diagnosed HCC s/p surgical resection hepatic segments 2 and 3 on 11/2016, now with a new 12 mm concerning lesion " is hepatic segment 5/8. AFP is stable. Now s/p successful microwave ablation of lesion on 4/19 with intermittent post-procedure pain and nausea, overall improving.      Background liver with minimal to mild focal chronic portal inflammation grade 1/4 and fibrosis portal expansion which is also stage 1-2/4.      -Recheck labs today.    -Follow up with IR and Oncology as scheduled.    -Return to clinic in 3 months with Fay Bazan    Case d/w Dr. Justice who is in agremeent with the plan of care. Please page with ? Or change in clinical status.     Fay Bazan   GI Fellow   [phone]  Attestation:  This patient has been seen and evaluated by me, Allan Justice.  Discussed with the house staff team or resident(s) and agree with the findings and plan in this note.

## 2017-05-25 DIAGNOSIS — C22.0 HCC (HEPATOCELLULAR CARCINOMA) (H): Primary | ICD-10-CM

## 2017-05-26 ENCOUNTER — OFFICE VISIT (OUTPATIENT)
Dept: RADIOLOGY | Facility: CLINIC | Age: 77
End: 2017-05-26
Attending: RADIOLOGY
Payer: MEDICARE

## 2017-05-26 ENCOUNTER — HOSPITAL ENCOUNTER (OUTPATIENT)
Dept: MRI IMAGING | Facility: CLINIC | Age: 77
Discharge: HOME | End: 2017-05-26
Attending: RADIOLOGY | Admitting: RADIOLOGY
Payer: MEDICARE

## 2017-05-26 DIAGNOSIS — C22.8 MALIGNANT NEOPLASM OF LIVER, PRIMARY (H): Primary | ICD-10-CM

## 2017-05-26 DIAGNOSIS — C22.0 HCC (HEPATOCELLULAR CARCINOMA) (H): ICD-10-CM

## 2017-05-26 LAB
AFP SERPL-MCNC: NORMAL UG/L (ref 0–8)
ALBUMIN SERPL-MCNC: 2.8 G/DL (ref 3.4–5)
ALP SERPL-CCNC: 127 U/L (ref 40–150)
ALT SERPL W P-5'-P-CCNC: 25 U/L (ref 0–70)
ANION GAP SERPL CALCULATED.3IONS-SCNC: 8 MMOL/L (ref 3–14)
AST SERPL W P-5'-P-CCNC: 21 U/L (ref 0–45)
BILIRUB DIRECT SERPL-MCNC: 0.2 MG/DL (ref 0–0.2)
BILIRUB SERPL-MCNC: 1 MG/DL (ref 0.2–1.3)
BUN SERPL-MCNC: 27 MG/DL (ref 7–30)
CALCIUM SERPL-MCNC: 8.4 MG/DL (ref 8.5–10.1)
CHLORIDE SERPL-SCNC: 101 MMOL/L (ref 94–109)
CO2 SERPL-SCNC: 28 MMOL/L (ref 20–32)
CREAT SERPL-MCNC: 1.03 MG/DL (ref 0.66–1.25)
ERYTHROCYTE [DISTWIDTH] IN BLOOD BY AUTOMATED COUNT: 12.3 % (ref 10–15)
GFR SERPL CREATININE-BSD FRML MDRD: 70 ML/MIN/1.7M2
GLUCOSE SERPL-MCNC: 266 MG/DL (ref 70–99)
HCT VFR BLD AUTO: 39.9 % (ref 40–53)
HGB BLD-MCNC: 13.2 G/DL (ref 13.3–17.7)
INR PPP: 1.13 (ref 0.86–1.14)
MCH RBC QN AUTO: 30.8 PG (ref 26.5–33)
MCHC RBC AUTO-ENTMCNC: 33.1 G/DL (ref 31.5–36.5)
MCV RBC AUTO: 93 FL (ref 78–100)
PLATELET # BLD AUTO: 104 10E9/L (ref 150–450)
POTASSIUM SERPL-SCNC: 4.3 MMOL/L (ref 3.4–5.3)
PROT SERPL-MCNC: 6.4 G/DL (ref 6.8–8.8)
RBC # BLD AUTO: 4.28 10E12/L (ref 4.4–5.9)
SODIUM SERPL-SCNC: 137 MMOL/L (ref 133–144)
WBC # BLD AUTO: 3.7 10E9/L (ref 4–11)

## 2017-05-26 PROCEDURE — A9581 GADOXETATE DISODIUM INJ: HCPCS | Performed by: RADIOLOGY

## 2017-05-26 PROCEDURE — 25000128 H RX IP 250 OP 636: Performed by: RADIOLOGY

## 2017-05-26 PROCEDURE — 25500064 ZZH RX 255 OP 636: Performed by: RADIOLOGY

## 2017-05-26 PROCEDURE — 82105 ALPHA-FETOPROTEIN SERUM: CPT | Performed by: RADIOLOGY

## 2017-05-26 PROCEDURE — 74183 MRI ABD W/O CNTR FLWD CNTR: CPT

## 2017-05-26 RX ADMIN — SODIUM CHLORIDE 50 ML: 9 INJECTION, SOLUTION INTRAVENOUS at 08:30

## 2017-05-26 RX ADMIN — GADOXETATE DISODIUM 7 ML: 181.43 INJECTION, SOLUTION INTRAVENOUS at 08:29

## 2017-05-26 NOTE — MR AVS SNAPSHOT
After Visit Summary   5/26/2017    Eh Callahan    MRN: [id number]           Patient Information     Date Of Birth          1940        Visit Information        Provider Department      5/26/2017 9:45 AM Adri Kidd MD; [employer] [facility]        Today's Diagnoses     Malignant neoplasm of liver, primary (H)    -  1       Follow-ups after your visit        Your next 10 appointments already scheduled     Jun 05, 2017  5:45 PM CDT   RETURN ARRHYTHMIA with Yakov Tamez MD   [facility] ([facility])    [address] [place] [address] [id number] [phone]            Jun 19, 2017  9:30 AM CDT   LAB with  LAB   [facility] ([facility])    [address] [place] [address] [id number] [phone]           Patient must bring picture ID.  Patient should be prepared to give a urine specimen  Please do not eat 10-12 hours before your appointment if you are coming in fasting for labs on lipids, cholesterol, or glucose (sugar).  Pregnant women should follow their Care Team instructions. Water with medications is okay. Do not drink coffee or other fluids.   If you have concerns about taking  your medications, please ask at office or if scheduling via [employer], send a message by clicking on Secure Messaging, Message Your Care Team.            Jun 19, 2017 10:00 AM CDT   (Arrive by 9:45 AM)   MR ABDOMEN W/O & W CONTRAST with [address] [facility] [place] [facility] ([facility])    [address] [place] [address] [id number] [phone]           Take your medicines as usual, unless your doctor tells you not to. Bring a list of your current medicines to your exam (including vitamins, minerals and over-the-counter drugs). Also bring the results of similar scans you may have had.    The day before your exam, drink extra fluids at least six  8-ounce glasses (unless your doctor tells you to restrict your fluids).   Have a blood test (creatinine test) within 30 days of your exam. Go to your clinic or Diagnostic Imaging Department for this test.   Do not eat or drink for 6 hours prior to exam.  The MRI machine uses a strong magnet. Please wear clothes without metal (snaps, zippers). A sweatsuit works well, or we may give you a hospital gown.  Please remove any body piercings and hair extensions before you arrive. You will also remove watches, jewelry, hairpins, wallets, dentures, partial dental plates and hearing aids. You may wear contact lenses, and you may be able to wear your rings. We have a safe place to keep your personal items, but it is safer to leave them at home.   **IMPORTANT** THE INSTRUCTIONS BELOW ARE ONLY FOR THOSE PATIENTS WHO HAVE BEEN TOLD THEY WILL RECEIVE SEDATION OR GENERAL ANESTHESIA DURING THEIR MRI PROCEDURE:  IF YOU WILL RECEIVE SEDATION (take medicine to help you relax during your exam):   You must get the medicine from your doctor before you arrive. Bring the medicine to the exam. Do not take it at home.   Arrive one hour early. Bring someone who can take you home after the test. Your medicine will make you sleepy. After the exam, you may not drive, take a bus or take a taxi by yourself.   No eating 8 hours before your exam. You may have clear liquids up until 4 hours before your exam. (Clear liquids include water, clear tea, black coffee and fruit juice without pulp.)  IF YOU WILL RECEIVE ANESTHESIA (be asleep for your exam):   Arrive 1 1/2 hours early. Bring someone who can take you home after the test. You may not drive, take a bus or take a taxi by yourself.   No eating 8 hours before your exam. You may have clear liquids up until 4 hours before your exam. (Clear liquids include water, clear tea, black coffee and fruit juice without pulp.)  If you have any questions, please contact your Imaging Department exam site.          "   Jun 29, 2017 12:00 PM CDT   (Arrive by 11:45 AM)   Return Visit with Miri Antoine MD   [facility] ([facility])    [address]  2nd Floor  [facility] [place] [address] [phone]            Aug 16, 2017  9:30 AM CDT   Lab with UC LAB   [facility] ([facility])    [address]  1st Floor  [place] [address] [phone]            Aug 16, 2017 10:30 AM CDT   (Arrive by 10:15 AM)   Return General Liver with Fay Bazan MD   [facility] ([facility])    [address]  3rd Floor  [place] [address] [phone]              Who to contact     If you have questions or need follow up information about today's clinic visit or your schedule please contact [facility] directly at [phone].  Normal or non-critical lab and imaging results will be communicated to you by [employer], letter or phone within 4 business days after the clinic has received the results. If you do not hear from us within 7 days, please contact the clinic through [facility]t or phone. If you have a critical or abnormal lab result, we will notify you by phone as soon as possible.  Submit refill requests through [facility] or call your pharmacy and they will forward the refill request to us. Please allow 3 business days for your refill to be completed.          Additional Information About Your Visit        [facility] Information     [facility] lets you send messages to your doctor, view your test results, renew your prescriptions, schedule appointments and more. To sign up, go to [email or web address] . Click on \"Log in\" on the left side of the screen, which will take you to the Welcome page. Then click on \"Sign up Now\" on the right side of the page.     You will be asked to enter the access code listed below, as well as some personal information. Please follow the directions to " create your username and password.     Your access code is: [email or web address]  Expires: 2017  7:30 AM     Your access code will  in 90 days. If you need help or a new code, please call your [facility] or [phone].        Care EveryWhere ID     This is your Care EveryWhere ID. This could be used by other organizations to access your [facility] medical records  [id number]         Blood Pressure from Last 3 Encounters:   05/10/17 (!) 168/95   17 110/70   17 146/76    Weight from Last 3 Encounters:   05/10/17 66.3 kg (146 lb 3.2 oz)   17 68.5 kg (151 lb 0.2 oz)   17 66.6 kg (146 lb 12.8 oz)              Today, you had the following     No orders found for display         Today's Medication Changes          These changes are accurate as of: 17 11:59 PM.  If you have any questions, ask your nurse or doctor.               These medicines have changed or have updated prescriptions.        Dose/Directions    acetaminophen 500 MG tablet   Commonly known as:  TYLENOL   This may have changed:    - how much to take  - when to take this  - reasons to take this   Used for:  History of liver excision        Dose:  1000 mg   Take 2 tablets (1,000 mg) by mouth 3 times daily   Quantity:  100 tablet   Refills:  0                Primary Care Provider Office Phone # Fax #    Shalini I KAYCEE Mac [phone]       [address] [address]        Thank you!     Thank you for choosing [facility]  for your care. Our goal is always to provide you with excellent care. Hearing back from our patients is one way we can continue to improve our services. Please take a few minutes to complete the written survey that you may receive in the mail after your visit with us. Thank you!             Your Updated Medication List - Protect others around you: Learn how to safely use, store and throw away your medicines at www.disposemymeds.org.         "  This list is accurate as of: 5/26/17 11:59 PM.  Always use your most recent med list.                   Brand Name Dispense Instructions for use    acetaminophen 500 MG tablet    TYLENOL    100 tablet    Take 2 tablets (1,000 mg) by mouth 3 times daily       alendronate 70 MG tablet    FOSAMAX     70 mg every 7 days Take 1 tablet by mouth twice weekly.       GREGORIO CONTOUR test strip   Generic drug:  blood glucose monitoring      USE TO MEASURE YOUR BLOOD GLUCOSE 6 TIMES DAILY       carboxymethylcellulose 0.5 % Soln ophthalmic solution    REFRESH PLUS     Place 1 drop into both eyes 4 times daily       D 5000 5000 UNITS Caps   Generic drug:  cholecalciferol      Reported on 4/12/2017       furosemide 20 MG tablet    LASIX    30 tablet    Take 1 tablet (20 mg) by mouth daily       insulin degludec 100 UNIT/ML pen    TRESIBA     Inject 14 Units Subcutaneous       insulin lispro 100 UNIT/ML injection    HumaLOG PEN     4 - 12 units 3 x daily       [employer] LANCETS 21G Misc      Uses 6 times daily       losartan 100 MG tablet    COZAAR     Take 1 tablet by mouth daily       Multiple vitamin Tabs      Take 1 tablet by mouth       MULTIVITAMIN & MINERAL PO          * oxyCODONE 5 MG IR tablet    ROXICODONE    30 tablet    Take 1-2 tablets (5-10 mg) by mouth every 4 hours as needed for moderate to severe pain       * oxyCODONE 5 MG IR tablet    ROXICODONE    18 tablet    Take 1 tablet (5 mg) by mouth every 4 hours as needed for pain       pantoprazole 40 MG EC tablet    PROTONIX     Take 40 mg by mouth daily       pen needles 5/16\" 31G X 8 MM Misc      4 x daily       polyethylene glycol Packet    MIRALAX/GLYCOLAX    30 packet    Take 17 g by mouth daily as needed for constipation       RA BLOOD PRESSURE CUFF MONITOR Misc      Take blood pressure once daily       rivaroxaban ANTICOAGULANT 20 MG Tabs tablet    XARELTO    30 tablet    Take 1 tablet (20 mg) by mouth daily (with dinner)       senna-docusate 8.6-50 MG per tablet "    SENOKOT-S;PERICOLACE    14 tablet    Take 1 tablet by mouth 2 times daily       simvastatin 20 MG tablet    ZOCOR     Take 1 tablet (20 mg) by mouth daily       timolol 0.5 % ophthalmic solution    TIMOPTIC     PLACE 1 DROP INTO BOTH EYES ONCE DAILY.       traZODone 50 MG tablet    DESYREL     Take 50 mg by mouth       * Notice:  This list has 2 medication(s) that are the same as other medications prescribed for you. Read the directions carefully, and ask your doctor or other care provider to review them with you.

## 2017-05-26 NOTE — LETTER
5/26/2017       RE: Eh Callahan  [address]   [facility] [address]     Dear Colleague,    Thank you for referring your patient, Eh Callahan, to the [facility]. Please see a copy of my visit note below.        Interventional Radiology Clinic Visit    Date of this visit: 5/26/2017    Eh Callahan returns for follow up post microwave ablation of segment 5 hepatocellular carcinoma (HCC) on 4/19/2017.     Primary Physician: Shalini Mac        History Of Present Illness:    Eh Callahan is a 77 year old male with a diagnosis of HCC on a background of HCV who underwent microwave ablation of segment 5 HCC on 4/19/2017. He has nausea post ablation which is resolved. Otherwise, his recovery went well. He has no abdominal pain and did not have a fever.     Review of Systems:    General: Negative for recent fever.  Skin: Negative for jaundice.  Eyes: Negative for jaundice.  Respiratory: Negative for shortness of breath.  Cardiovascular: Negative for chest pain.  Gastrointestinal: Negative for abdominal pain or swelling, nausea, vomiting, or diarrhea.  Musculoskeletal: Negative for ankle swelling.       Past Medical/Surgical History:    Past Medical History:   Diagnosis Date     Anatomical narrow angle glaucoma      Atrial fibrillation (H)      Chronic infection     history of hepatitis C     CKD (chronic kidney disease) stage 2, GFR 60-89 ml/min      Diabetes mellitus (H)      Hepatitis C without mention of hepatic coma      Hypertension      Palpitations      PTSD (post-traumatic stress disorder)      Past Surgical History:   Procedure Laterality Date     APPENDECTOMY       EYE SURGERY       LAPAROSCOPIC HEPATECTOMY PARTIAL Left 11/22/2016    Procedure: LAPAROSCOPIC HEPATECTOMY PARTIAL;  Surgeon: Rick Mckeon MD;  Location:  OR       Current Medications:    Current Outpatient Prescriptions   Medication Sig Dispense Refill     furosemide (LASIX) 20 MG tablet Take 1 tablet (20 mg)  "by mouth daily 30 tablet 3     oxyCODONE (ROXICODONE) 5 MG IR tablet Take 1 tablet (5 mg) by mouth every 4 hours as needed for pain 18 tablet 0     Blood Pressure Monitoring (RA BLOOD PRESSURE CUFF MONITOR) MISC Take blood pressure once daily       timolol (TIMOPTIC) 0.5 % ophthalmic solution PLACE 1 DROP INTO BOTH EYES ONCE DAILY.       traZODone (DESYREL) 50 MG tablet Take 50 mg by mouth       [employer]ER LANCETS 21G MISC Uses 6 times daily       blood glucose monitoring ([employer] CONTOUR) test strip USE TO MEASURE YOUR BLOOD GLUCOSE 6 TIMES DAILY       insulin degludec (TRESIBA) 100 UNIT/ML pen Inject 14 Units Subcutaneous       Insulin Pen Needle (PEN NEEDLES 5/16\") 31G X 8 MM MISC 4 x daily       rivaroxaban ANTICOAGULANT (XARELTO) 20 MG TABS tablet Take 1 tablet (20 mg) by mouth daily (with dinner) 30 tablet 5     Multiple Vitamins-Minerals (MULTIVITAMIN & MINERAL PO)        polyethylene glycol (MIRALAX/GLYCOLAX) packet Take 17 g by mouth daily as needed for constipation 30 packet 1     oxyCODONE (ROXICODONE) 5 MG immediate release tablet Take 1-2 tablets (5-10 mg) by mouth every 4 hours as needed for moderate to severe pain 30 tablet 0     acetaminophen (TYLENOL) 500 MG tablet Take 2 tablets (1,000 mg) by mouth 3 times daily (Patient taking differently: Take 500 mg by mouth 3 times daily as needed ) 100 tablet 0     senna-docusate (SENOKOT-S;PERICOLACE) 8.6-50 MG per tablet Take 1 tablet by mouth 2 times daily 14 tablet 0     carboxymethylcellulose (REFRESH PLUS) 0.5 % SOLN Place 1 drop into both eyes 4 times daily        insulin lispro (HUMALOG PEN) 100 UNIT/ML soln 4 - 12 units 3 x daily       pantoprazole (PROTONIX) 40 MG enteric coated tablet Take 40 mg by mouth daily        cholecalciferol (D 5000) 5000 UNITS CAPS Reported on 4/12/2017       alendronate (FOSAMAX) 70 MG tablet 70 mg every 7 days Take 1 tablet by mouth twice weekly.       Multiple vitamin TABS Take 1 tablet by mouth       simvastatin (ZOCOR) 20 MG " tablet Take 1 tablet (20 mg) by mouth daily       losartan (COZAAR) 100 MG tablet Take 1 tablet by mouth daily         Allergies:    Amlodipine; Lisinopril; and Metoprolol    Family History:    Family History   Problem Relation Age of Onset     DIABETES No family hx of      He is not aware of any diabetes in his family     KIDNEY DISEASE No family hx of        Social History:      Social History     Social History     Marital status:      Spouse name: N/A     Number of children: N/A     Years of education: N/A     Social History Main Topics     Smoking status: Former Smoker     Smokeless tobacco: Never Used     Alcohol use No     Drug use: No     Sexual activity: Not on file     Other Topics Concern     Parent/Sibling W/ Cabg, Mi Or Angioplasty Before 65f 55m? No     Social History Narrative    Immigrated in 2002 from [facility] [place].  Previously lived in [place] before moving here.  Daughter lives locally as well.         Physical Exam:    GENERAL APPEARANCE: healthy, alert and no distress  PSYCHIATRIC: mentation appears normal and affect normal.  EYES: No jaundice.  SKIN: No jaundice.     Laboratory Studies:    Lab Results   Component Value Date    HGB 13.2 05/26/2017     Lab Results   Component Value Date     05/26/2017     Lab Results   Component Value Date    WBC 3.7 05/26/2017       Lab Results   Component Value Date    INR 1.13 05/26/2017       Lab Results   Component Value Date    PROTTOTAL 6.4 05/26/2017      Lab Results   Component Value Date    ALBUMIN 2.8 05/26/2017     Lab Results   Component Value Date    BILITOTAL 1.0 05/26/2017     Lab Results   Component Value Date    BILICONJ 0.0 10/02/2013      Lab Results   Component Value Date    ALKPHOS 127 05/26/2017     Lab Results   Component Value Date    AST 21 05/26/2017     Lab Results   Component Value Date    ALT 25 05/26/2017       Lab Results   Component Value Date    CR 1.03 05/26/2017     Lab Results   Component Value Date    BUN 27  05/26/2017       Alpha Fetoprotein   Date Value Ref Range Status   05/26/2017  0 - 8 ug/L Final    <1.5  Assay Method:  Chemiluminescence using Siemens Centaur XP         Imaging:     I reviewed his MRI 5/26/2017 and it shows no evidence of residual tumor at the ablation site and no definite new HCC lesion in the liver.      ASSESSMENT/PLAN:      Eh Callahan is a 77 year old male with a diagnosis of HCC on a background of HCV who underwent microwave ablation of segment 5 HCC on 4/19/2017 with good recovery. Recent MRI demonstrates no residual tumor. At this point, we would refer him back to his oncologist, Dr Antoine to continue his tumour surveillance. We are happy to be involved in his care again, if there is any need for liver directed therapy again in future.       A total of 20 minutes was spent in care for the patient.    It was a pleasure seeing the patient.     Signed,    Adri Soni M.D. & Azalea Martínez MD  Department of Interventional Radiology  [facility]      I, Dr Adri Soni, was present with the fellow during the history and exam. I discussed the case with the fellow and agree with the findings as documented in the assessment and plan.      CC  Patient Care Team:  Shalini Mac PA-C as PCP - General  Della Hair MD as MD (Nephrology)  Shalini Mac PA-C Thompson, Carrie A, MD, MD as MD (Oncology)  Michael Nair MD as MD (Vascular and Interventional Radiology)  Master Antoine MD as MD (Hematology & Oncology)  Yakov Tamez MD as MD (Clinical Cardiac Electrophysiology)  MASTER ANTOINE

## 2017-05-27 NOTE — PROGRESS NOTES
Interventional Radiology Clinic Visit    Date of this visit: 5/26/2017    Eh Callahan returns for follow up post microwave ablation of segment 5 hepatocellular carcinoma (HCC) on 4/19/2017.     Primary Physician: Shalini Mac        History Of Present Illness:    Eh Callahan is a 77 year old male with a diagnosis of HCC on a background of HCV who underwent microwave ablation of segment 5 HCC on 4/19/2017. He has nausea post ablation which is resolved. Otherwise, his recovery went well. He has no abdominal pain and did not have a fever.     Review of Systems:    General: Negative for recent fever.  Skin: Negative for jaundice.  Eyes: Negative for jaundice.  Respiratory: Negative for shortness of breath.  Cardiovascular: Negative for chest pain.  Gastrointestinal: Negative for abdominal pain or swelling, nausea, vomiting, or diarrhea.  Musculoskeletal: Negative for ankle swelling.       Past Medical/Surgical History:    Past Medical History:   Diagnosis Date     Anatomical narrow angle glaucoma      Atrial fibrillation (H)      Chronic infection     history of hepatitis C     CKD (chronic kidney disease) stage 2, GFR 60-89 ml/min      Diabetes mellitus (H)      Hepatitis C without mention of hepatic coma      Hypertension      Palpitations      PTSD (post-traumatic stress disorder)      Past Surgical History:   Procedure Laterality Date     APPENDECTOMY       EYE SURGERY       LAPAROSCOPIC HEPATECTOMY PARTIAL Left 11/22/2016    Procedure: LAPAROSCOPIC HEPATECTOMY PARTIAL;  Surgeon: Rick Mckeon MD;  Location:  OR       Current Medications:    Current Outpatient Prescriptions   Medication Sig Dispense Refill     furosemide (LASIX) 20 MG tablet Take 1 tablet (20 mg) by mouth daily 30 tablet 3     oxyCODONE (ROXICODONE) 5 MG IR tablet Take 1 tablet (5 mg) by mouth every 4 hours as needed for pain 18 tablet 0     Blood Pressure Monitoring (RA BLOOD PRESSURE CUFF MONITOR) MISC Take blood pressure once  "daily       timolol (TIMOPTIC) 0.5 % ophthalmic solution PLACE 1 DROP INTO BOTH EYES ONCE DAILY.       traZODone (DESYREL) 50 MG tablet Take 50 mg by mouth       [employer] LANCETS 21G MISC Uses 6 times daily       blood glucose monitoring (IM-Sense CONTOUR) test strip USE TO MEASURE YOUR BLOOD GLUCOSE 6 TIMES DAILY       insulin degludec (TRESIBA) 100 UNIT/ML pen Inject 14 Units Subcutaneous       Insulin Pen Needle (PEN NEEDLES 5/16\") 31G X 8 MM MISC 4 x daily       rivaroxaban ANTICOAGULANT (XARELTO) 20 MG TABS tablet Take 1 tablet (20 mg) by mouth daily (with dinner) 30 tablet 5     Multiple Vitamins-Minerals (MULTIVITAMIN & MINERAL PO)        polyethylene glycol (MIRALAX/GLYCOLAX) packet Take 17 g by mouth daily as needed for constipation 30 packet 1     oxyCODONE (ROXICODONE) 5 MG immediate release tablet Take 1-2 tablets (5-10 mg) by mouth every 4 hours as needed for moderate to severe pain 30 tablet 0     acetaminophen (TYLENOL) 500 MG tablet Take 2 tablets (1,000 mg) by mouth 3 times daily (Patient taking differently: Take 500 mg by mouth 3 times daily as needed ) 100 tablet 0     senna-docusate (SENOKOT-S;PERICOLACE) 8.6-50 MG per tablet Take 1 tablet by mouth 2 times daily 14 tablet 0     carboxymethylcellulose (REFRESH PLUS) 0.5 % SOLN Place 1 drop into both eyes 4 times daily        insulin lispro (HUMALOG PEN) 100 UNIT/ML soln 4 - 12 units 3 x daily       pantoprazole (PROTONIX) 40 MG enteric coated tablet Take 40 mg by mouth daily        cholecalciferol (D 5000) 5000 UNITS CAPS Reported on 4/12/2017       alendronate (FOSAMAX) 70 MG tablet 70 mg every 7 days Take 1 tablet by mouth twice weekly.       Multiple vitamin TABS Take 1 tablet by mouth       simvastatin (ZOCOR) 20 MG tablet Take 1 tablet (20 mg) by mouth daily       losartan (COZAAR) 100 MG tablet Take 1 tablet by mouth daily         Allergies:    Amlodipine; Lisinopril; and Metoprolol    Family History:    Family History   Problem Relation Age of " Onset     DIABETES No family hx of      He is not aware of any diabetes in his family     KIDNEY DISEASE No family hx of        Social History:      Social History     Social History     Marital status:      Spouse name: N/A     Number of children: N/A     Years of education: N/A     Social History Main Topics     Smoking status: Former Smoker     Smokeless tobacco: Never Used     Alcohol use No     Drug use: No     Sexual activity: Not on file     Other Topics Concern     Parent/Sibling W/ Cabg, Mi Or Angioplasty Before 65f 55m? No     Social History Narrative    Immigrated in 2002 from [facility].  Previously lived in [place] before moving here.  Daughter lives locally as well.         Physical Exam:    GENERAL APPEARANCE: healthy, alert and no distress  PSYCHIATRIC: mentation appears normal and affect normal.  EYES: No jaundice.  SKIN: No jaundice.     Laboratory Studies:    Lab Results   Component Value Date    HGB 13.2 05/26/2017     Lab Results   Component Value Date     05/26/2017     Lab Results   Component Value Date    WBC 3.7 05/26/2017       Lab Results   Component Value Date    INR 1.13 05/26/2017       Lab Results   Component Value Date    PROTTOTAL 6.4 05/26/2017      Lab Results   Component Value Date    ALBUMIN 2.8 05/26/2017     Lab Results   Component Value Date    BILITOTAL 1.0 05/26/2017     Lab Results   Component Value Date    BILICONJ 0.0 10/02/2013      Lab Results   Component Value Date    ALKPHOS 127 05/26/2017     Lab Results   Component Value Date    AST 21 05/26/2017     Lab Results   Component Value Date    ALT 25 05/26/2017       Lab Results   Component Value Date    CR 1.03 05/26/2017     Lab Results   Component Value Date    BUN 27 05/26/2017       Alpha Fetoprotein   Date Value Ref Range Status   05/26/2017  0 - 8 ug/L Final    <1.5  Assay Method:  Chemiluminescence using Siemens Centaur XP         Imaging:     I reviewed his MRI 5/26/2017 and it shows no evidence of  residual tumor at the ablation site and no definite new HCC lesion in the liver.      ASSESSMENT/PLAN:      Eh Callahan is a 77 year old male with a diagnosis of HCC on a background of HCV who underwent microwave ablation of segment 5 HCC on 4/19/2017 with good recovery. Recent MRI demonstrates no residual tumor. At this point, we would refer him back to his oncologist, Dr Antoine to continue his tumour surveillance. We are happy to be involved in his care again, if there is any need for liver directed therapy again in future.       A total of 20 minutes was spent in care for the patient.    It was a pleasure seeing the patient.     SignedAdri M.D. & Azalea Martínez MD  Department of Interventional Radiology  [facility]      I, Dr Adri Soni, was present with the fellow during the history and exam. I discussed the case with the fellow and agree with the findings as documented in the assessment and plan.      CC  Patient Care Team:  Shalini Mac PA-C as PCP - General  Della Hair MD as MD (Nephrology)  Shalini Mac PA-C Thompson, Carrie A, MD MD as MD (Oncology)  Michael Nair MD as MD (Vascular and Interventional Radiology)  Mastre Antoine MD as MD (Hematology & Oncology)  Yakov Tamez MD as MD (Clinical Cardiac Electrophysiology)  MASTER [employer]TOINE

## 2017-08-04 DIAGNOSIS — R16.0 LIVER MASS: ICD-10-CM

## 2017-08-04 DIAGNOSIS — Z86.19 HISTORY OF HEPATITIS C: Primary | ICD-10-CM

## 2017-09-11 ENCOUNTER — HOSPITAL ENCOUNTER (OUTPATIENT)
Dept: MRI IMAGING | Facility: CLINIC | Age: 77
Discharge: HOME | End: 2017-09-11
Attending: INTERNAL MEDICINE | Admitting: INTERNAL MEDICINE
Payer: MEDICARE

## 2017-09-11 DIAGNOSIS — C22.0 HCC (HEPATOCELLULAR CARCINOMA) (H): ICD-10-CM

## 2017-09-11 LAB
CREAT BLD-MCNC: 1.2 MG/DL (ref 0.66–1.25)
GFR SERPL CREATININE-BSD FRML MDRD: 59 ML/MIN/1.7M2

## 2017-09-11 PROCEDURE — T1013 SIGN LANG/ORAL INTERPRETER: HCPCS | Mod: U3

## 2017-09-11 PROCEDURE — 25000128 H RX IP 250 OP 636: Performed by: INTERNAL MEDICINE

## 2017-09-11 PROCEDURE — 74183 MRI ABD W/O CNTR FLWD CNTR: CPT

## 2017-09-11 PROCEDURE — A9585 GADOBUTROL INJECTION: HCPCS | Performed by: INTERNAL MEDICINE

## 2017-09-11 PROCEDURE — 82565 ASSAY OF CREATININE: CPT

## 2017-09-11 RX ORDER — GADOBUTROL 604.72 MG/ML
7.5 INJECTION INTRAVENOUS ONCE
Status: COMPLETED | OUTPATIENT
Start: 2017-09-11 | End: 2017-09-11

## 2017-09-11 RX ADMIN — SODIUM CHLORIDE 50 ML: 9 INJECTION, SOLUTION INTRAVENOUS at 08:08

## 2017-09-11 RX ADMIN — GADOBUTROL 7 ML: 604.72 INJECTION INTRAVENOUS at 08:07

## 2017-09-14 ENCOUNTER — ONCOLOGY VISIT (OUTPATIENT)
Dept: ONCOLOGY | Facility: CLINIC | Age: 77
End: 2017-09-14
Attending: INTERNAL MEDICINE
Payer: MEDICARE

## 2017-09-14 ENCOUNTER — APPOINTMENT (OUTPATIENT)
Dept: LAB | Facility: CLINIC | Age: 77
End: 2017-09-14
Attending: INTERNAL MEDICINE
Payer: MEDICARE

## 2017-09-14 VITALS
BODY MASS INDEX: 23.13 KG/M2 | WEIGHT: 147.4 LBS | OXYGEN SATURATION: 99 % | HEART RATE: 83 BPM | SYSTOLIC BLOOD PRESSURE: 174 MMHG | HEIGHT: 67 IN | RESPIRATION RATE: 16 BRPM | TEMPERATURE: 98.3 F | DIASTOLIC BLOOD PRESSURE: 89 MMHG

## 2017-09-14 DIAGNOSIS — C22.0 HCC (HEPATOCELLULAR CARCINOMA) (H): ICD-10-CM

## 2017-09-14 LAB
AFP SERPL-MCNC: <1.5 UG/L (ref 0–8)
ALBUMIN SERPL-MCNC: 2.8 G/DL (ref 3.4–5)
ALP SERPL-CCNC: 113 U/L (ref 40–150)
ALT SERPL W P-5'-P-CCNC: 35 U/L (ref 0–70)
ANION GAP SERPL CALCULATED.3IONS-SCNC: 4 MMOL/L (ref 3–14)
AST SERPL W P-5'-P-CCNC: 27 U/L (ref 0–45)
BASOPHILS # BLD AUTO: 0 10E9/L (ref 0–0.2)
BASOPHILS NFR BLD AUTO: 0.5 %
BILIRUB SERPL-MCNC: 1 MG/DL (ref 0.2–1.3)
BUN SERPL-MCNC: 35 MG/DL (ref 7–30)
CALCIUM SERPL-MCNC: 8.5 MG/DL (ref 8.5–10.1)
CHLORIDE SERPL-SCNC: 101 MMOL/L (ref 94–109)
CO2 SERPL-SCNC: 32 MMOL/L (ref 20–32)
CREAT SERPL-MCNC: 1.26 MG/DL (ref 0.66–1.25)
DIFFERENTIAL METHOD BLD: ABNORMAL
EOSINOPHIL # BLD AUTO: 0.1 10E9/L (ref 0–0.7)
EOSINOPHIL NFR BLD AUTO: 2 %
ERYTHROCYTE [DISTWIDTH] IN BLOOD BY AUTOMATED COUNT: 12.1 % (ref 10–15)
GFR SERPL CREATININE-BSD FRML MDRD: 55 ML/MIN/1.7M2
GLUCOSE SERPL-MCNC: 202 MG/DL (ref 70–99)
HCT VFR BLD AUTO: 42.5 % (ref 40–53)
HGB BLD-MCNC: 14.6 G/DL (ref 13.3–17.7)
IMM GRANULOCYTES # BLD: 0 10E9/L (ref 0–0.4)
IMM GRANULOCYTES NFR BLD: 0.3 %
LYMPHOCYTES # BLD AUTO: 1.2 10E9/L (ref 0.8–5.3)
LYMPHOCYTES NFR BLD AUTO: 30.3 %
MCH RBC QN AUTO: 32.1 PG (ref 26.5–33)
MCHC RBC AUTO-ENTMCNC: 34.4 G/DL (ref 31.5–36.5)
MCV RBC AUTO: 93 FL (ref 78–100)
MONOCYTES # BLD AUTO: 0.4 10E9/L (ref 0–1.3)
MONOCYTES NFR BLD AUTO: 9.1 %
NEUTROPHILS # BLD AUTO: 2.3 10E9/L (ref 1.6–8.3)
NEUTROPHILS NFR BLD AUTO: 57.8 %
NRBC # BLD AUTO: 0 10*3/UL
NRBC BLD AUTO-RTO: 0 /100
PLATELET # BLD AUTO: 121 10E9/L (ref 150–450)
POTASSIUM SERPL-SCNC: 4.5 MMOL/L (ref 3.4–5.3)
PROT SERPL-MCNC: 6.7 G/DL (ref 6.8–8.8)
RBC # BLD AUTO: 4.55 10E12/L (ref 4.4–5.9)
SODIUM SERPL-SCNC: 137 MMOL/L (ref 133–144)
WBC # BLD AUTO: 4 10E9/L (ref 4–11)

## 2017-09-14 PROCEDURE — 99214 OFFICE O/P EST MOD 30 MIN: CPT | Mod: ZP | Performed by: INTERNAL MEDICINE

## 2017-09-14 PROCEDURE — 36415 COLL VENOUS BLD VENIPUNCTURE: CPT

## 2017-09-14 PROCEDURE — 80053 COMPREHEN METABOLIC PANEL: CPT | Performed by: INTERNAL MEDICINE

## 2017-09-14 PROCEDURE — 85025 COMPLETE CBC W/AUTO DIFF WBC: CPT | Performed by: INTERNAL MEDICINE

## 2017-09-14 PROCEDURE — 82105 ALPHA-FETOPROTEIN SERUM: CPT | Performed by: INTERNAL MEDICINE

## 2017-09-14 PROCEDURE — 99213 OFFICE O/P EST LOW 20 MIN: CPT | Mod: ZF

## 2017-09-14 RX ORDER — LORAZEPAM 1 MG/1
1 TABLET ORAL PRN
Status: ON HOLD | COMMUNITY
End: 2020-01-18

## 2017-09-14 RX ORDER — INSULIN PUMP SYRINGE, 3 ML
EACH MISCELLANEOUS
Status: ON HOLD | COMMUNITY
Start: 2017-07-10 | End: 2021-08-05

## 2017-09-14 ASSESSMENT — PAIN SCALES - GENERAL: PAINLEVEL: MODERATE PAIN (4)

## 2017-09-14 NOTE — MR AVS SNAPSHOT
After Visit Summary   9/14/2017    Eh Callahan    MRN: [id number]           Patient Information     Date Of Birth          1940        Visit Information        Provider Department      9/14/2017 4:15 PM Mary Russell Aazim K, MD [facility]        Today's Diagnoses     HCC (hepatocellular carcinoma) (H)          Care Instructions    In 3months, repeat labs and MRI and a few days after that, see me back          Follow-ups after your visit        Your next 10 appointments already scheduled     Dec 18, 2017 11:45 AM CST   Lab with  LAB   [facility] ([facility])    [address] [place] [address] [id number] [phone]            Dec 18, 2017 12:15 PM CST   (Arrive by 12:00 PM)   MR ABDOMEN W/O & W CONTRAST with [address] MRI ([facility])    [address] [place] [address] [id number] [phone]           Take your medicines as usual, unless your doctor tells you not to. Bring a list of your current medicines to your exam (including vitamins, minerals and over-the-counter drugs). Also bring the results of similar scans you may have had.    The day before your exam, drink extra fluids at least six 8-ounce glasses (unless your doctor tells you to restrict your fluids).   Have a blood test (creatinine test) within 30 days of your exam. Go to your clinic or Diagnostic Imaging Department for this test.   Do not eat or drink for 6 hours prior to exam.  The MRI machine uses a strong magnet. Please wear clothes without metal (snaps, zippers). A sweatsuit works well, or we may give you a hospital gown.  Please remove any body piercings and hair extensions before you arrive. You will also remove watches, jewelry, hairpins, wallets, dentures, partial dental plates and hearing aids. You may wear contact lenses, and you may be able to wear your rings. We have a safe place  to keep your personal items, but it is safer to leave them at home.   **IMPORTANT** THE INSTRUCTIONS BELOW ARE ONLY FOR THOSE PATIENTS WHO HAVE BEEN TOLD THEY WILL RECEIVE SEDATION OR GENERAL ANESTHESIA DURING THEIR MRI PROCEDURE:  IF YOU WILL RECEIVE SEDATION (take medicine to help you relax during your exam):   You must get the medicine from your doctor before you arrive. Bring the medicine to the exam. Do not take it at home.   Arrive one hour early. Bring someone who can take you home after the test. Your medicine will make you sleepy. After the exam, you may not drive, take a bus or take a taxi by yourself.   No eating 8 hours before your exam. You may have clear liquids up until 4 hours before your exam. (Clear liquids include water, clear tea, black coffee and fruit juice without pulp.)  IF YOU WILL RECEIVE ANESTHESIA (be asleep for your exam):   Arrive 1 1/2 hours early. Bring someone who can take you home after the test. You may not drive, take a bus or take a taxi by yourself.   No eating 8 hours before your exam. You may have clear liquids up until 4 hours before your exam. (Clear liquids include water, clear tea, black coffee and fruit juice without pulp.)  If you have any questions, please contact your Imaging Department exam site.            Dec 21, 2017  4:00 PM CST   (Arrive by 3:45 PM)   Return Visit with Miri Antoine MD   [facility] ([facility])    [address] [id number] [phone]              Who to contact     If you have questions or need follow up information about today's clinic visit or your schedule please contact [facility] directly at [phone].  Normal or non-critical lab and imaging results will be communicated to you by MyChart, letter or phone within 4 business days after the clinic has received the results. If you do not hear from us within 7 days, please contact the clinic  "through [employer] or phone. If you have a critical or abnormal lab result, we will notify you by phone as soon as possible.  Submit refill requests through [employer] or call your pharmacy and they will forward the refill request to us. Please allow 3 business days for your refill to be completed.          Additional Information About Your Visit        [employer] Information     [employer] lets you send messages to your doctor, view your test results, renew your prescriptions, schedule appointments and more. To sign up, go to [email or web address] . Click on \"Log in\" on the left side of the screen, which will take you to the Welcome page. Then click on \"Sign up Now\" on the right side of the page.     You will be asked to enter the access code listed below, as well as some personal information. Please follow the directions to create your username and password.     Your access code is: [email or web address]  Expires: 2017  6:30 AM     Your access code will  in 90 days. If you need help or a new code, please call your [facility] clinic or [phone].        Care EveryWhere ID     This is your Care EveryWhere ID. This could be used by other organizations to access your [facility] medical records  [id number]        Your Vitals Were     Pulse Temperature Respirations Height Pulse Oximetry BMI (Body Mass Index)    83 98.3  F (36.8  C) (Oral) 16 1.7 m (5' 6.93\") 99% 23.14 kg/m2       Blood Pressure from Last 3 Encounters:   17 174/89   05/10/17 (!) 168/95   17 110/70    Weight from Last 3 Encounters:   17 66.9 kg (147 lb 6.4 oz)   05/10/17 66.3 kg (146 lb 3.2 oz)   17 68.5 kg (151 lb 0.2 oz)              We Performed the Following     *CBC with platelets differential     AFP tumor marker     Comprehensive metabolic panel          Today's Medication Changes          These changes are accurate as of: 17 11:59 PM.  If you have any questions, ask your nurse or doctor.               These medicines have " changed or have updated prescriptions.        Dose/Directions    acetaminophen 500 MG tablet   Commonly known as:  TYLENOL   This may have changed:    - how much to take  - when to take this  - reasons to take this   Used for:  History of liver excision        Dose:  1000 mg   Take 2 tablets (1,000 mg) by mouth 3 times daily   Quantity:  100 tablet   Refills:  0                Primary Care Provider Office Phone # Fax #    Shalini VANG KAYCEE Mac [phone]       [facility] [address]  [facility] [address]        Equal Access to Services     DEE COOMBS : Hadii dawit ku hadasho Soomaali, waaxda luqadaha, qaybta kaalmada adeegyada, beryl mendiola . So [facility] [phone].    ATENCIÓN: Si habla español, tiene a jones disposición servicios gratuitos de asistencia lingüística. Ann Marie al [phone].    We comply with applicable federal civil rights laws and [place] laws. We do not discriminate on the basis of race, color, national origin, age, disability sex, sexual orientation or gender identity.            Thank you!     Thank you for choosing [facility]  for your care. Our goal is always to provide you with excellent care. Hearing back from our patients is one way we can continue to improve our services. Please take a few minutes to complete the written survey that you may receive in the mail after your visit with us. Thank you!             Your Updated Medication List - Protect others around you: Learn how to safely use, store and throw away your medicines at www.disposemymeds.org.          This list is accurate as of: 9/14/17 11:59 PM.  Always use your most recent med list.                   Brand Name Dispense Instructions for use Diagnosis    acetaminophen 500 MG tablet    TYLENOL    100 tablet    Take 2 tablets (1,000 mg) by mouth 3 times daily    History of liver excision       alendronate 70 MG tablet    FOSAMAX     70 mg every 7 days Take 1  "tablet by mouth twice weekly.        GREGORIO CONTOUR test strip   Generic drug:  blood glucose monitoring      USE TO MEASURE YOUR BLOOD GLUCOSE 6 TIMES DAILY    HCC (hepatocellular carcinoma) (H)       carboxymethylcellulose 0.5 % Soln ophthalmic solution    REFRESH PLUS     Place 1 drop into both eyes 4 times daily        D 5000 5000 UNITS Caps   Generic drug:  cholecalciferol      Reported on 4/12/2017        FIFTY50 GLUCOSE METER 2.0 W/DEVICE Kit      as needed for blood glucose monitoring    HCC (hepatocellular carcinoma) (H)       furosemide 20 MG tablet    LASIX    30 tablet    Take 1 tablet (20 mg) by mouth daily    Peripheral edema, History of hepatitis C       insulin aspart 100 UNIT/ML injection    NovoLOG PEN     3 units before each meal.    HCC (hepatocellular carcinoma) (H)       insulin degludec 100 UNIT/ML pen    TRESIBA     Inject 14 Units Subcutaneous    HCC (hepatocellular carcinoma) (H)       insulin lispro 100 UNIT/ML injection    HumaLOG PEN     4 - 12 units 3 x daily        KROGER LANCETS 21G Misc      Uses 6 times daily    HCC (hepatocellular carcinoma) (H)       LORazepam 1 MG tablet    ATIVAN     Take 1 mg by mouth as needed    HCC (hepatocellular carcinoma) (H)       losartan 100 MG tablet    COZAAR     Take 1 tablet by mouth daily        Multiple vitamin Tabs      Take 1 tablet by mouth        MULTIVITAMIN & MINERAL PO       HCC (hepatocellular carcinoma) (H)       * oxyCODONE 5 MG IR tablet    ROXICODONE    30 tablet    Take 1-2 tablets (5-10 mg) by mouth every 4 hours as needed for moderate to severe pain    History of hepatitis C, History of liver excision       * oxyCODONE 5 MG IR tablet    ROXICODONE    18 tablet    Take 1 tablet (5 mg) by mouth every 4 hours as needed for pain    Liver mass       pantoprazole 40 MG EC tablet    PROTONIX     Take 40 mg by mouth daily        pen needles 5/16\" 31G X 8 MM Misc      4 x daily    HCC (hepatocellular carcinoma) (H)       polyethylene glycol " Packet    MIRALAX/GLYCOLAX    30 packet    Take 17 g by mouth daily as needed for constipation    History of liver excision       RA BLOOD PRESSURE CUFF MONITOR Misc      Take blood pressure once daily    HCC (hepatocellular carcinoma) (H)       REFRESH OPTIVE ADVANCED 0.5-1-0.5 % Soln   Generic drug:  Carboxymeth-Glycerin-Polysorb      PLACE 1 DROP INTO BOTH EYES 4 (FOUR) TIMES DAILY.    HCC (hepatocellular carcinoma) (H)       rivaroxaban ANTICOAGULANT 20 MG Tabs tablet    XARELTO    30 tablet    Take 1 tablet (20 mg) by mouth daily (with dinner)    Atrial fibrillation, unspecified type (H)       senna-docusate 8.6-50 MG per tablet    SENOKOT-S;PERICOLACE    14 tablet    Take 1 tablet by mouth 2 times daily    History of liver excision       simvastatin 20 MG tablet    ZOCOR     Take 1 tablet (20 mg) by mouth daily    Dyslipidaemia       timolol 0.5 % ophthalmic solution    TIMOPTIC     PLACE 1 DROP INTO BOTH EYES ONCE DAILY.    HCC (hepatocellular carcinoma) (H)       traZODone 50 MG tablet    DESYREL     Take 50 mg by mouth    HCC (hepatocellular carcinoma) (H)       * Notice:  This list has 2 medication(s) that are the same as other medications prescribed for you. Read the directions carefully, and ask your doctor or other care provider to review them with you.

## 2017-09-14 NOTE — PROGRESS NOTES
Oncology Follow up visit:  Date on this visit: 9/14/2017          DIAGNOSIS  HCC    Primary Physician: Shalini Mac     History Of Present Illness:       Copied and updated from prior     Mr. Callahan is a 77-year-old male who has a history of hepatitis C for which he was treated with interferon and ribavirin in 2005 and 2006, and since then he has attained a sustained virologic response.  Then he was found to have a 3.6 cm lesion in the liver, which was concerning for malignancy.  A subsequent MRI of the liver confirmed the findings of the lesion; although, this lesion was not diagnostic of hepatocellular carcinoma, and the differential also included focal nodular hyperplasia.  On 11/22/16, he had a hand-assisted exploratory laparoscopy with extensive lysis of adhesion, and segment 2 and 3 of the liver were removed by Dr Mckeon.    The final pathology from the surgery revealed moderately differentiated hepatocellular carcinoma with negative margins, with a tumor size being 4.2 x 3.6 x 3 cm.  The tumors were confined to the liver.  All margins were negative.  There was no lymphovascular or perineural invasion present.  No lymph nodes were examined in the surgical specimen.  It was a pT1 solitary tumor without vascular invasion.  Prior to the surgery, he had a CAT scan of the chest, which showed subcentimeter pulmonary nodules, but no definite evidence of metastasis.   He recovered well after surgery. We opted for repeat scanning in 3 months  His repeat MRI shows a new 12 mm concerning lesion in Hepatic seg 5/8. He underwent microwave ablation of segment 5 HCC on 4/19/2017.  Repeat MRI after the MWA on 5/26/17 showed post treatment changes. There was an indeterminate lesion in seg 5       Interval History  He tells me that he is doing well. Off-and-on he has noticed pain in the right upper quadrant for which he has to take occasional Tylenol or ibuprofen. He notices it more if he twists or turns. He has not  noticed any nausea vomiting or diarrhea. Off-and-on he has become constipated for which he has to take MiraLAX or Dulcolax. He tries to eat fruits and vegetables and eat healthy. His energy is good and he continues to be functional. He walks regularly. Denies interval infections. Denies any other pains or any other swellings apart from occasional bilateral lower extremity swelling. He denies any bleeding. He continues to take xarelto for atrial fibrillation. He is working with his primary care physician to control his blood pressure which has been an issue.     I reviewed other hx in EPIC as below      Past Medical/Surgical History:  Past Medical History:   Diagnosis Date     Anatomical narrow angle glaucoma      Atrial fibrillation (H)      Chronic infection     history of hepatitis C     CKD (chronic kidney disease) stage 2, GFR 60-89 ml/min      Diabetes mellitus (H)      Hepatitis C without mention of hepatic coma      Hypertension      Palpitations      PTSD (post-traumatic stress disorder)      Past Surgical History:   Procedure Laterality Date     APPENDECTOMY       EYE SURGERY       LAPAROSCOPIC HEPATECTOMY PARTIAL Left 11/22/2016    Procedure: LAPAROSCOPIC HEPATECTOMY PARTIAL;  Surgeon: Rick Mckeon MD;  Location: [facility] OR     Cancer History:     As above    Allergies:  Allergies as of 09/14/2017 - Raul as Reviewed 05/10/2017   Allergen Reaction Noted     Amlodipine Swelling 09/26/2016     Lisinopril Cough 09/26/2016     Metoprolol  09/26/2016     Current Medications:  Current Outpatient Prescriptions   Medication Sig Dispense Refill     furosemide (LASIX) 20 MG tablet Take 1 tablet (20 mg) by mouth daily 30 tablet 3     oxyCODONE (ROXICODONE) 5 MG IR tablet Take 1 tablet (5 mg) by mouth every 4 hours as needed for pain 18 tablet 0     Blood Pressure Monitoring (RA BLOOD PRESSURE CUFF MONITOR) MISC Take blood pressure once daily       timolol (TIMOPTIC) 0.5 % ophthalmic solution PLACE 1 DROP INTO BOTH  "EYES ONCE DAILY.       traZODone (DESYREL) 50 MG tablet Take 50 mg by mouth       [employer] LANCETS 21G MISC Uses 6 times daily       blood glucose monitoring (GREGORIO CONTOUR) test strip USE TO MEASURE YOUR BLOOD GLUCOSE 6 TIMES DAILY       insulin degludec (TRESIBA) 100 UNIT/ML pen Inject 14 Units Subcutaneous       Insulin Pen Needle (PEN NEEDLES 5/16\") 31G X 8 MM MISC 4 x daily       rivaroxaban ANTICOAGULANT (XARELTO) 20 MG TABS tablet Take 1 tablet (20 mg) by mouth daily (with dinner) 30 tablet 5     Multiple Vitamins-Minerals (MULTIVITAMIN & MINERAL PO)        polyethylene glycol (MIRALAX/GLYCOLAX) packet Take 17 g by mouth daily as needed for constipation 30 packet 1     oxyCODONE (ROXICODONE) 5 MG immediate release tablet Take 1-2 tablets (5-10 mg) by mouth every 4 hours as needed for moderate to severe pain 30 tablet 0     acetaminophen (TYLENOL) 500 MG tablet Take 2 tablets (1,000 mg) by mouth 3 times daily (Patient taking differently: Take 500 mg by mouth 3 times daily as needed ) 100 tablet 0     senna-docusate (SENOKOT-S;PERICOLACE) 8.6-50 MG per tablet Take 1 tablet by mouth 2 times daily 14 tablet 0     carboxymethylcellulose (REFRESH PLUS) 0.5 % SOLN Place 1 drop into both eyes 4 times daily        insulin lispro (HUMALOG PEN) 100 UNIT/ML soln 4 - 12 units 3 x daily       pantoprazole (PROTONIX) 40 MG enteric coated tablet Take 40 mg by mouth daily        cholecalciferol (D 5000) 5000 UNITS CAPS Reported on 4/12/2017       alendronate (FOSAMAX) 70 MG tablet 70 mg every 7 days Take 1 tablet by mouth twice weekly.       Multiple vitamin TABS Take 1 tablet by mouth       simvastatin (ZOCOR) 20 MG tablet Take 1 tablet (20 mg) by mouth daily       losartan (COZAAR) 100 MG tablet Take 1 tablet by mouth daily        Family History:  Family History   Problem Relation Age of Onset     DIABETES No family hx of      He is not aware of any diabetes in his family     KIDNEY DISEASE No family hx of      Social " History:  Social History     Social History     Marital status:      Spouse name: N/A     Number of children: N/A     Years of education: N/A     Occupational History     Not on file.     Social History Main Topics     Smoking status: Former Smoker     Smokeless tobacco: Never Used     Alcohol use No     Drug use: No     Sexual activity: Not on file     Other Topics Concern     Parent/Sibling W/ Cabg, Mi Or Angioplasty Before 65f 55m? No     Social History Narrative    Immigrated in 2002 from [place].  Previously lived in [place] before moving here.  Daughter lives locally as well.       Physical Exam:  /89 (BP Location: Right arm, Patient Position: Sitting, Cuff Size: Adult Regular)  Pulse 83  Temp 98.3  F (36.8  C) (Oral)  Resp 16  Wt 66.9 kg (147 lb 6.4 oz)  SpO2 99%  BMI 23.14 kg/m2    CONSTITUTIONAL: no acute distress  EYES: PERRLA, no palor or icterus.   ENT/MOUTH: no mouth lesions. Oropharynx normal  CVS: s1s2 no m r g .   RESPIRATORY: clear to auscultation b/l  GI: soft, there is mild tenderness along the lower part of the right lower quadrant. Liver edge is palpable 3-4 fingerbreadths below the costal margin. There is no guarding or rigidity or rebound. No splenomegaly  Neurological: AAOX3  Grossly non focal neuro exam  INTEGUMENT: no obvious rashes  LYMPHATIC: no palpable cervical, supraclavicular, axillary or inguinal LAD  MUSCULOSKELETAL: Unremarkable. No bony tenderness.   EXTREMTrace pedal edema bilaterally  PSYCH: Mentation, mood and affect are normal. Decision making capacity is intact      Laboratory/Imaging Studies  CBC RESULTS:   Recent Labs   Lab Test  09/14/17   1626   WBC  4.0   RBC  4.55   HGB  14.6   HCT  42.5   MCV  93   MCH  32.1   MCHC  34.4   RDW  12.1   PLT  121*     Recent Labs   Lab Test  09/14/17   1626  05/26/17   1037   NA  137  137   POTASSIUM  4.5  4.3   CHLORIDE  101  101   CO2  32  28   ANIONGAP  4  8   GLC  202*  266*   BUN  35*  27   CR  1.26*  1.03   DOMINGO   8.5  8.4*     Liver Function Studies -   Recent Labs   Lab Test  09/14/17   1626   PROTTOTAL  6.7*   ALBUMIN  2.8*   BILITOTAL  1.0   ALKPHOS  113   AST  27   ALT  35       MRI Abdomen 9/11/17  IMPRESSION:    1. Cirrhosis. Postsurgical changes of partial hepatectomy with  resection of segments 2 and 3. No evidence for residual and/or  recurrent tumor at the surgical margin.  2. Posttreatment changes of microwave ablation in the segment 5 of the  liver with no evidence for residual or recurrent tumor.   3. 5 mm arterially enhancing lesion in segment 5 near the gallbladder,  as described above, with washout and mild diffusion restriction,  previously hypoenhancing. OPTN/LIRADS 4. Close attention on follow-up  studies is recommended.  4. Additional subcentimeter arterially enhancing lesions in segment  4A, 5 and 6, indeterminant. OPTN/LI-RADS 3. Continued attention on  follow-up studies is recommended.  5. Based on this exam only, the patient is within [place] criteria.  6. Cholelithiasis.    MRI abdomen 5/26/17      IMPRESSION:    1. Postoperative changes of partial left hepatectomy and right lobe  microwave ablation with no residual tumor at these sites.  2. Indeterminate, hypoenhancing lesion near the gallbladder fossa  hepatic segment 5, not definitively visualized on prior exams. This is  indeterminate, and follow-up is recommended.   3. Cholelithiasis.            ASSESSMENT/PLAN:      1.  Stage I, pT1 NX MX, moderately differentiated hepatocellular carcinoma with maximum tumor size being 4.2 cm of the left lower lobe, now status post resection with negative margins and no lymphovascular or perineural invasion found.  The background liver had minimal to mild focal chronic portal  grade 1 out of 4, and fibrosis and portal expansion, which is also stage 1-2 out of 4.  He does not have evidence of cirrhosis.      His repeat MRI shows a new 12 mm concerning lesion in Hepatic seg 5/8. He underwent microwave ablation of  segment 5 HCC on 4/19/2017. Repeat MRI after the MWA on 5/26/17 showed post treatment changes. There was an indeterminate lesion in seg 5   His repeat imaging does not show any definite evidence off residual or recurrent cancer but it shows findings consistent with postsurgical changes in hepatic segments 2 and 3 and post-ablation changes in segment 5 and without evidence of recurrence or residual cancer. He has a 5 mm OPTN/LIRADS 4 lesion close to hepatic segment 5. This was also seen on the MRI in May although at that time it was hypoenhancing. This will need close attention.  In addition to that he has LIRADS 3 lesions which would also need attention on follow-up imaging      AFP is pending from today.    I would like to repeat his labs and imaging in 3 months    2. He does have mild stable asymptomatic thrombocytopenia and we will keep observing that with serial blood counts every 3 months    3.  Hepatitis C.  This has been treated in 2005 and 2006, and most recently in 11/2015, HCV RNA was undetectable.     4. He is complaining of off and on pain in the right upper quadrant. He occasionally has to take ibuprofen or Tylenol for it. He does not take it on an daily basis. I wonder if this could be related to post microwave ablation although it has been few months since the procedure was done. There is no evidence of cancer recurrence on the MRI and otherwise there is no acute pathology seen on the MRI. Although he is mildly tender but there is no other concerning finding on physical exam. At this time I plan would be to wait and watch and treated with as needed pain medications. If it gets worse then we will need to be evaluated. Hopefully with time it will get better.    5. He is hypertensive. At this time is asymptomatic from it. I told him to follow closely with his primary care physician regarding hypertension.    I would like to see him back in 3 months with repeat labs and imaging prior to that    I answered  all of his questions to his satisfaction and he is agreeable and comfortable with the plan      Miri Antoine

## 2017-09-14 NOTE — NURSING NOTE
Chief Complaint   Patient presents with     Blood Draw     labs drawn by vpt by rn.  vs taken.     Labs drawn by vpt by rn.  Vital signs taken.  Pt checked in to next appointment.  Cindy Levin RN

## 2017-09-14 NOTE — LETTER
9/14/2017       RE: Eh Callahan  [address]   [facility] [address]     Dear Colleague,    Thank you for referring your patient, Eh Callahan, to [facility]. Please see a copy of my visit note below.    Oncology Follow up visit:  Date on this visit: 9/14/2017          DIAGNOSIS  HCC    Primary Physician: Shalini Mac     History Of Present Illness:       Copied and updated from prior     Mr. Callahan is a 77-year-old male who has a history of hepatitis C for which he was treated with interferon and ribavirin in 2005 and 2006, and since then he has attained a sustained virologic response.  Then he was found to have a 3.6 cm lesion in the liver, which was concerning for malignancy.  A subsequent MRI of the liver confirmed the findings of the lesion; although, this lesion was not diagnostic of hepatocellular carcinoma, and the differential also included focal nodular hyperplasia.  On 11/22/16, he had a hand-assisted exploratory laparoscopy with extensive lysis of adhesion, and segment 2 and 3 of the liver were removed by Dr Mckeon.    The final pathology from the surgery revealed moderately differentiated hepatocellular carcinoma with negative margins, with a tumor size being 4.2 x 3.6 x 3 cm.  The tumors were confined to the liver.  All margins were negative.  There was no lymphovascular or perineural invasion present.  No lymph nodes were examined in the surgical specimen.  It was a pT1 solitary tumor without vascular invasion.  Prior to the surgery, he had a CAT scan of the chest, which showed subcentimeter pulmonary nodules, but no definite evidence of metastasis.   He recovered well after surgery. We opted for repeat scanning in 3 months  His repeat MRI shows a new 12 mm concerning lesion in Hepatic seg 5/8. He underwent microwave ablation of segment 5 HCC on 4/19/2017.  Repeat MRI after the MWA on 5/26/17 showed post treatment changes. There was an indeterminate lesion in  seg 5       Interval History  He tells me that he is doing well. Off-and-on he has noticed pain in the right upper quadrant for which he has to take occasional Tylenol or ibuprofen. He notices it more if he twists or turns. He has not noticed any nausea vomiting or diarrhea. Off-and-on he has become constipated for which he has to take MiraLAX or Dulcolax. He tries to eat fruits and vegetables and eat healthy. His energy is good and he continues to be functional. He walks regularly. Denies interval infections. Denies any other pains or any other swellings apart from occasional bilateral lower extremity swelling. He denies any bleeding. He continues to take xarelto for atrial fibrillation. He is working with his primary care physician to control his blood pressure which has been an issue.     I reviewed other hx in EPIC as below      Past Medical/Surgical History:  Past Medical History:   Diagnosis Date     Anatomical narrow angle glaucoma      Atrial fibrillation (H)      Chronic infection     history of hepatitis C     CKD (chronic kidney disease) stage 2, GFR 60-89 ml/min      Diabetes mellitus (H)      Hepatitis C without mention of hepatic coma      Hypertension      Palpitations      PTSD (post-traumatic stress disorder)      Past Surgical History:   Procedure Laterality Date     APPENDECTOMY       EYE SURGERY       LAPAROSCOPIC HEPATECTOMY PARTIAL Left 11/22/2016    Procedure: LAPAROSCOPIC HEPATECTOMY PARTIAL;  Surgeon: Rick Mckeon MD;  Location: [facility] OR     Cancer History:     As above    Allergies:  Allergies as of 09/14/2017 - Raul as Reviewed 05/10/2017   Allergen Reaction Noted     Amlodipine Swelling 09/26/2016     Lisinopril Cough 09/26/2016     Metoprolol  09/26/2016     Current Medications:  Current Outpatient Prescriptions   Medication Sig Dispense Refill     furosemide (LASIX) 20 MG tablet Take 1 tablet (20 mg) by mouth daily 30 tablet 3     oxyCODONE (ROXICODONE) 5 MG IR tablet Take 1 tablet  "(5 mg) by mouth every 4 hours as needed for pain 18 tablet 0     Blood Pressure Monitoring (RA BLOOD PRESSURE CUFF MONITOR) MISC Take blood pressure once daily       timolol (TIMOPTIC) 0.5 % ophthalmic solution PLACE 1 DROP INTO BOTH EYES ONCE DAILY.       traZODone (DESYREL) 50 MG tablet Take 50 mg by mouth       [employer] LANCETS 21G MISC Uses 6 times daily       blood glucose monitoring (GREGORIO CONTOUR) test strip USE TO MEASURE YOUR BLOOD GLUCOSE 6 TIMES DAILY       insulin degludec (TRESIBA) 100 UNIT/ML pen Inject 14 Units Subcutaneous       Insulin Pen Needle (PEN NEEDLES 5/16\") 31G X 8 MM MISC 4 x daily       rivaroxaban ANTICOAGULANT (XARELTO) 20 MG TABS tablet Take 1 tablet (20 mg) by mouth daily (with dinner) 30 tablet 5     Multiple Vitamins-Minerals (MULTIVITAMIN & MINERAL PO)        polyethylene glycol (MIRALAX/GLYCOLAX) packet Take 17 g by mouth daily as needed for constipation 30 packet 1     oxyCODONE (ROXICODONE) 5 MG immediate release tablet Take 1-2 tablets (5-10 mg) by mouth every 4 hours as needed for moderate to severe pain 30 tablet 0     acetaminophen (TYLENOL) 500 MG tablet Take 2 tablets (1,000 mg) by mouth 3 times daily (Patient taking differently: Take 500 mg by mouth 3 times daily as needed ) 100 tablet 0     senna-docusate (SENOKOT-S;PERICOLACE) 8.6-50 MG per tablet Take 1 tablet by mouth 2 times daily 14 tablet 0     carboxymethylcellulose (REFRESH PLUS) 0.5 % SOLN Place 1 drop into both eyes 4 times daily        insulin lispro (HUMALOG PEN) 100 UNIT/ML soln 4 - 12 units 3 x daily       pantoprazole (PROTONIX) 40 MG enteric coated tablet Take 40 mg by mouth daily        cholecalciferol (D 5000) 5000 UNITS CAPS Reported on 4/12/2017       alendronate (FOSAMAX) 70 MG tablet 70 mg every 7 days Take 1 tablet by mouth twice weekly.       Multiple vitamin TABS Take 1 tablet by mouth       simvastatin (ZOCOR) 20 MG tablet Take 1 tablet (20 mg) by mouth daily       losartan (COZAAR) 100 MG tablet " Take 1 tablet by mouth daily        Family History:  Family History   Problem Relation Age of Onset     DIABETES No family hx of      He is not aware of any diabetes in his family     KIDNEY DISEASE No family hx of      Social History:  Social History     Social History     Marital status:      Spouse name: N/A     Number of children: N/A     Years of education: N/A     Occupational History     Not on file.     Social History Main Topics     Smoking status: Former Smoker     Smokeless tobacco: Never Used     Alcohol use No     Drug use: No     Sexual activity: Not on file     Other Topics Concern     Parent/Sibling W/ Cabg, Mi Or Angioplasty Before 65f 55m? No     Social History Narrative    Immigrated in 2002 from [place].  Previously lived in [place] before moving here.  Daughter lives locally as well.       Physical Exam:  /89 (BP Location: Right arm, Patient Position: Sitting, Cuff Size: Adult Regular)  Pulse 83  Temp 98.3  F (36.8  C) (Oral)  Resp 16  Wt 66.9 kg (147 lb 6.4 oz)  SpO2 99%  BMI 23.14 kg/m2    CONSTITUTIONAL: no acute distress  EYES: PERRLA, no palor or icterus.   ENT/MOUTH: no mouth lesions. Oropharynx normal  CVS: s1s2 no m r g .   RESPIRATORY: clear to auscultation b/l  GI: soft, there is mild tenderness along the lower part of the right lower quadrant. Liver edge is palpable 3-4 fingerbreadths below the costal margin. There is no guarding or rigidity or rebound. No splenomegaly  Neurological: AAOX3  Grossly non focal neuro exam  INTEGUMENT: no obvious rashes  LYMPHATIC: no palpable cervical, supraclavicular, axillary or inguinal LAD  MUSCULOSKELETAL: Unremarkable. No bony tenderness.   EXTREMTrace pedal edema bilaterally  PSYCH: Mentation, mood and affect are normal. Decision making capacity is intact      Laboratory/Imaging Studies  CBC RESULTS:   Recent Labs   Lab Test  09/14/17   1626   WBC  4.0   RBC  4.55   HGB  14.6   HCT  42.5   MCV  93   MCH  32.1   MCHC  34.4   RDW   12.1   PLT  121*     Recent Labs   Lab Test  09/14/17   1626  05/26/17   1037   NA  137  137   POTASSIUM  4.5  4.3   CHLORIDE  101  101   CO2  32  28   ANIONGAP  4  8   GLC  202*  266*   BUN  35*  27   CR  1.26*  1.03   DOMINGO  8.5  8.4*     Liver Function Studies -   Recent Labs   Lab Test  09/14/17   1626   PROTTOTAL  6.7*   ALBUMIN  2.8*   BILITOTAL  1.0   ALKPHOS  113   AST  27   ALT  35       MRI Abdomen 9/11/17  IMPRESSION:    1. Cirrhosis. Postsurgical changes of partial hepatectomy with  resection of segments 2 and 3. No evidence for residual and/or  recurrent tumor at the surgical margin.  2. Posttreatment changes of microwave ablation in the segment 5 of the  liver with no evidence for residual or recurrent tumor.   3. 5 mm arterially enhancing lesion in segment 5 near the gallbladder,  as described above, with washout and mild diffusion restriction,  previously hypoenhancing. OPTN/LIRADS 4. Close attention on follow-up  studies is recommended.  4. Additional subcentimeter arterially enhancing lesions in segment  4A, 5 and 6, indeterminant. OPTN/LI-RADS 3. Continued attention on  follow-up studies is recommended.  5. Based on this exam only, the patient is within [place] criteria.  6. Cholelithiasis.    MRI abdomen 5/26/17      IMPRESSION:    1. Postoperative changes of partial left hepatectomy and right lobe  microwave ablation with no residual tumor at these sites.  2. Indeterminate, hypoenhancing lesion near the gallbladder fossa  hepatic segment 5, not definitively visualized on prior exams. This is  indeterminate, and follow-up is recommended.   3. Cholelithiasis.            ASSESSMENT/PLAN:      1.  Stage I, pT1 NX MX, moderately differentiated hepatocellular carcinoma with maximum tumor size being 4.2 cm of the left lower lobe, now status post resection with negative margins and no lymphovascular or perineural invasion found.  The background liver had minimal to mild focal chronic portal  grade 1 out of  4, and fibrosis and portal expansion, which is also stage 1-2 out of 4.  He does not have evidence of cirrhosis.      His repeat MRI shows a new 12 mm concerning lesion in Hepatic seg 5/8. He underwent microwave ablation of segment 5 HCC on 4/19/2017. Repeat MRI after the MWA on 5/26/17 showed post treatment changes. There was an indeterminate lesion in seg 5   His repeat imaging does not show any definite evidence off residual or recurrent cancer but it shows findings consistent with postsurgical changes in hepatic segments 2 and 3 and post-ablation changes in segment 5 and without evidence of recurrence or residual cancer. He has a 5 mm OPTN/LIRADS 4 lesion close to hepatic segment 5. This was also seen on the MRI in May although at that time it was hypoenhancing. This will need close attention.  In addition to that he has LIRADS 3 lesions which would also need attention on follow-up imaging      AFP is pending from today.    I would like to repeat his labs and imaging in 3 months    2. He does have mild stable asymptomatic thrombocytopenia and we will keep observing that with serial blood counts every 3 months    3.  Hepatitis C.  This has been treated in 2005 and 2006, and most recently in 11/2015, HCV RNA was undetectable.     4. He is complaining of off and on pain in the right upper quadrant. He occasionally has to take ibuprofen or Tylenol for it. He does not take it on an daily basis. I wonder if this could be related to post microwave ablation although it has been few months since the procedure was done. There is no evidence of cancer recurrence on the MRI and otherwise there is no acute pathology seen on the MRI. Although he is mildly tender but there is no other concerning finding on physical exam. At this time I plan would be to wait and watch and treated with as needed pain medications. If it gets worse then we will need to be evaluated. Hopefully with time it will get better.    5. He is hypertensive.  At this time is asymptomatic from it. I told him to follow closely with his primary care physician regarding hypertension.    I would like to see him back in 3 months with repeat labs and imaging prior to that    I answered all of his questions to his satisfaction and he is agreeable and comfortable with the plan        Again, thank you for allowing me to participate in the care of your patient.      Sincerely,    iMri Antoine MD

## 2017-09-14 NOTE — NURSING NOTE
"Oncology Rooming Note    September 14, 2017 5:00 PM   Eh Callahan is a 77 year old male who presents for:    Chief Complaint   Patient presents with     Blood Draw     labs drawn by vpt by rn.  vs taken.     Oncology Clinic Visit     Return visit related to HCC     Initial Vitals: /89 (BP Location: Right arm, Patient Position: Sitting, Cuff Size: Adult Regular)  Pulse 83  Temp 98.3  F (36.8  C) (Oral)  Resp 16  Ht 1.7 m (5' 6.93\")  Wt 66.9 kg (147 lb 6.4 oz)  SpO2 99%  BMI 23.14 kg/m2 Estimated body mass index is 23.14 kg/(m^2) as calculated from the following:    Height as of this encounter: 1.7 m (5' 6.93\").    Weight as of this encounter: 66.9 kg (147 lb 6.4 oz). Body surface area is 1.78 meters squared.  Moderate Pain (4) Comment: Data Unavailable   No LMP for male patient.  Allergies reviewed: Yes  Medications reviewed: Yes    Medications: Medication refills not needed today.  Pharmacy name entered into [employer]:    [facility] - [place], [place] - [address] [facility] - [place], [place] - [address]    Clinical concerns: Patient is concerned that his Losartan has been changed to 50 MG tablet  Would like an increase. Blood pressure was 174/89. Provider was notified.    10   minutes for nursing intake (face to face time)     Rachel Marx LPN            "

## 2017-09-14 NOTE — ADDENDUM NOTE
Encounter addended by: Monserrat Mcbride on: 9/14/2017  2:35 PM<BR>     Actions taken: Visit Navigator Flowsheet section accepted, Charge Capture section accepted

## 2017-12-18 ENCOUNTER — RADIANT APPOINTMENT (OUTPATIENT)
Dept: MRI IMAGING | Facility: CLINIC | Age: 77
End: 2017-12-18
Attending: INTERNAL MEDICINE
Payer: MEDICARE

## 2017-12-18 DIAGNOSIS — C22.0 HCC (HEPATOCELLULAR CARCINOMA) (H): ICD-10-CM

## 2017-12-18 LAB
AFP SERPL-MCNC: <1.5 UG/L (ref 0–8)
ALBUMIN SERPL-MCNC: 2.9 G/DL (ref 3.4–5)
ALP SERPL-CCNC: 98 U/L (ref 40–150)
ALT SERPL W P-5'-P-CCNC: 32 U/L (ref 0–70)
ANION GAP SERPL CALCULATED.3IONS-SCNC: 4 MMOL/L (ref 3–14)
AST SERPL W P-5'-P-CCNC: 26 U/L (ref 0–45)
BASOPHILS # BLD AUTO: 0 10E9/L (ref 0–0.2)
BASOPHILS NFR BLD AUTO: 0.7 %
BILIRUB SERPL-MCNC: 1.2 MG/DL (ref 0.2–1.3)
BUN SERPL-MCNC: 33 MG/DL (ref 7–30)
CALCIUM SERPL-MCNC: 8.2 MG/DL (ref 8.5–10.1)
CHLORIDE SERPL-SCNC: 101 MMOL/L (ref 94–109)
CO2 SERPL-SCNC: 30 MMOL/L (ref 20–32)
CREAT SERPL-MCNC: 1.28 MG/DL (ref 0.66–1.25)
DIFFERENTIAL METHOD BLD: ABNORMAL
EOSINOPHIL # BLD AUTO: 0.1 10E9/L (ref 0–0.7)
EOSINOPHIL NFR BLD AUTO: 3.1 %
ERYTHROCYTE [DISTWIDTH] IN BLOOD BY AUTOMATED COUNT: 11.8 % (ref 10–15)
GFR SERPL CREATININE-BSD FRML MDRD: 54 ML/MIN/1.7M2
GLUCOSE SERPL-MCNC: 164 MG/DL (ref 70–99)
HCT VFR BLD AUTO: 41.8 % (ref 40–53)
HGB BLD-MCNC: 13.9 G/DL (ref 13.3–17.7)
IMM GRANULOCYTES # BLD: 0 10E9/L (ref 0–0.4)
IMM GRANULOCYTES NFR BLD: 0 %
LYMPHOCYTES # BLD AUTO: 1 10E9/L (ref 0.8–5.3)
LYMPHOCYTES NFR BLD AUTO: 23.7 %
MCH RBC QN AUTO: 30.9 PG (ref 26.5–33)
MCHC RBC AUTO-ENTMCNC: 33.3 G/DL (ref 31.5–36.5)
MCV RBC AUTO: 93 FL (ref 78–100)
MONOCYTES # BLD AUTO: 0.4 10E9/L (ref 0–1.3)
MONOCYTES NFR BLD AUTO: 9.2 %
NEUTROPHILS # BLD AUTO: 2.7 10E9/L (ref 1.6–8.3)
NEUTROPHILS NFR BLD AUTO: 63.3 %
NRBC # BLD AUTO: 0 10*3/UL
NRBC BLD AUTO-RTO: 0 /100
PLATELET # BLD AUTO: 147 10E9/L (ref 150–450)
POTASSIUM SERPL-SCNC: 4.7 MMOL/L (ref 3.4–5.3)
PROT SERPL-MCNC: 6.7 G/DL (ref 6.8–8.8)
RBC # BLD AUTO: 4.5 10E12/L (ref 4.4–5.9)
SODIUM SERPL-SCNC: 136 MMOL/L (ref 133–144)
WBC # BLD AUTO: 4.3 10E9/L (ref 4–11)

## 2017-12-18 PROCEDURE — 82105 ALPHA-FETOPROTEIN SERUM: CPT | Performed by: INTERNAL MEDICINE

## 2017-12-18 RX ORDER — GADOBUTROL 604.72 MG/ML
7.5 INJECTION INTRAVENOUS ONCE
Status: COMPLETED | OUTPATIENT
Start: 2017-12-18 | End: 2017-12-18

## 2017-12-18 RX ADMIN — GADOBUTROL 7.5 ML: 604.72 INJECTION INTRAVENOUS at 12:45

## 2017-12-18 NOTE — DISCHARGE INSTRUCTIONS
MRI Contrast Discharge Instructions    The IV contrast you received today will pass out of your body in your  urine. This will happen in the next 24 hours. You will not feel this process.  Your urine will not change color.    Drink at least 4 extra glasses of water or juice today (unless your doctor  has restricted your fluids). This reduces the stress on your kidneys.  You may take your regular medicines.    If you are on dialysis: It is best to have dialysis today.    If you have a reaction: Most reactions happen right away. If you have  any new symptoms after leaving the hospital (such as hives or swelling),  call your hospital at the correct number below. Or call your family doctor.  If you have breathing distress or wheezing, call 911.    Special instructions: ***    I have read and understand the above information.    Signature:______________________________________ Date:___________    Staff:__________________________________________ Date:___________     Time:__________    [facility] Departments:    ___[place]: [phone]  ___[facility]: [phone]  ___[place]: [phone] ___[facility]: [phone]  ___[facility]: [phone]  ___[facility]: [phone]  ___[phone]  ___[facility]: [phone]  ___[phone]

## 2017-12-21 ENCOUNTER — ONCOLOGY VISIT (OUTPATIENT)
Dept: ONCOLOGY | Facility: CLINIC | Age: 77
End: 2017-12-21
Attending: INTERNAL MEDICINE
Payer: MEDICARE

## 2017-12-21 VITALS
RESPIRATION RATE: 16 BRPM | HEART RATE: 79 BPM | DIASTOLIC BLOOD PRESSURE: 86 MMHG | OXYGEN SATURATION: 98 % | HEIGHT: 66 IN | BODY MASS INDEX: 25.55 KG/M2 | SYSTOLIC BLOOD PRESSURE: 152 MMHG | WEIGHT: 159 LBS

## 2017-12-21 DIAGNOSIS — C22.0 HCC (HEPATOCELLULAR CARCINOMA) (H): Primary | ICD-10-CM

## 2017-12-21 PROCEDURE — 99214 OFFICE O/P EST MOD 30 MIN: CPT | Mod: ZF

## 2017-12-21 PROCEDURE — 99214 OFFICE O/P EST MOD 30 MIN: CPT | Mod: ZP | Performed by: INTERNAL MEDICINE

## 2017-12-21 PROCEDURE — T1013 SIGN LANG/ORAL INTERPRETER: HCPCS | Mod: U3,ZF

## 2017-12-21 RX ORDER — TRAMADOL HYDROCHLORIDE 50 MG/1
25-50 TABLET ORAL EVERY 6 HOURS PRN
Qty: 30 TABLET | Refills: 0 | Status: SHIPPED | OUTPATIENT
Start: 2017-12-21 | End: 2018-01-20

## 2017-12-21 ASSESSMENT — PAIN SCALES - GENERAL: PAINLEVEL: MODERATE PAIN (5)

## 2017-12-21 NOTE — PATIENT INSTRUCTIONS
Start Tramadol 25-50mg every 6 hrs as needed for pain    In 3 months, repeat MRI and labs and a few days after that, see me back    Follow with PCP for leg edema and high BP

## 2017-12-21 NOTE — PROGRESS NOTES
Oncology Follow up visit:  Date on this visit: 12/21/2017       DIAGNOSIS  HCC    Primary Physician: Shalini Mac     History Of Present Illness:       Copied and updated from prior notes     Mr. Callahan is a 77-year-old male who has a history of previously treated hepatitis C(interferon and ribavirin in 2005 and 2006) attaining a sustained virologic response, was found to have a 3.6 cm lesion in the liver, which was concerning for malignancy.  On 11/22/16, he had a hand-assisted exploratory laparoscopy with extensive lysis of adhesion, and segment 2 and 3 of the liver were removed by Dr Mckeon.    The final pathology from the surgery revealed moderately differentiated hepatocellular carcinoma with negative margins, with a tumor size being 4.2 x 3.6 x 3 cm.  The tumors were confined to the liver.  All margins were negative.  There was no lymphovascular or perineural invasion present.  No lymph nodes were examined in the surgical specimen.  It was a pT1 solitary tumor without vascular invasion.  Prior to the surgery, he had a CAT scan of the chest, which showed subcentimeter pulmonary nodules, but no definite evidence of metastasis.   He recovered well after surgery.     His repeat MRI showed a new 12 mm concerning lesion in Hepatic seg 5/8.   He had MWA of segment 5 HCC on 4/19/2017.    Repeat MRI on 5/26/17 showed post treatment changes. There was an indeterminate lesion in seg 5 which was stable on MRI done in Sept 2017    Since then he has been on observation    Interval History    Professional  is available throughout the interview   He continues to have right upper quadrant pain especially after eating. He does not report significant nausea or vomiting. He's been able to otherwise eating well. Bowels are working well. He denies bleeding. She has not tried anything for the pain. He denies any fevers or infections. Over the last few months he has noticed bilateral lower extremity swelling. He is  on Lasix but I do not believe that recently the dose was increased. He continues to be on Xarelto for atrial fibrillation. She denies any other pains. He denies any other swellings. His energy is low but is stable. He is still doing his usual activities but he was the most part stays at home. No infections. He is wondering if the abdominal pain is due to cancer and he is asking me for a pill for liver cancer which he should be on     ROS:  A comprehensive ROS was otherwise neg      I reviewed other hx in EPIC as below      Past Medical/Surgical History:  Past Medical History:   Diagnosis Date     Anatomical narrow angle glaucoma      Atrial fibrillation (H)      Chronic infection     history of hepatitis C     CKD (chronic kidney disease) stage 2, GFR 60-89 ml/min      Diabetes mellitus (H)      Hepatitis C without mention of hepatic coma      Hypertension      Palpitations      PTSD (post-traumatic stress disorder)      Past Surgical History:   Procedure Laterality Date     APPENDECTOMY       EYE SURGERY       LAPAROSCOPIC HEPATECTOMY PARTIAL Left 11/22/2016    Procedure: LAPAROSCOPIC HEPATECTOMY PARTIAL;  Surgeon: Rick Mckeon MD;  Location: [facility] OR     Cancer History:     As above    Allergies:  Allergies as of 12/21/2017 - Raul as Reviewed 12/21/2017   Allergen Reaction Noted     Amlodipine Swelling 09/26/2016     Lisinopril Cough 09/26/2016     Metoprolol  09/26/2016     Current Medications:  Current Outpatient Prescriptions   Medication Sig Dispense Refill     traMADol (ULTRAM) 50 MG tablet Take 0.5-1 tablets (25-50 mg) by mouth every 6 hours as needed for moderate pain 30 tablet 0     Blood Glucose Monitoring Suppl (FIFTY50 GLUCOSE METER 2.0) W/DEVICE KIT as needed for blood glucose monitoring       LORazepam (ATIVAN) 1 MG tablet Take 1 mg by mouth as needed       insulin aspart (NOVOLOG PEN) 100 UNIT/ML injection 3 units before each meal.       furosemide (LASIX) 20 MG tablet Take 1 tablet (20 mg) by  "mouth daily 30 tablet 3     Blood Pressure Monitoring ( BLOOD PRESSURE CUFF MONITOR) MISC Take blood pressure once daily       timolol (TIMOPTIC) 0.5 % ophthalmic solution PLACE 1 DROP INTO BOTH EYES ONCE DAILY.       traZODone (DESYREL) 50 MG tablet Take 50 mg by mouth       [employer] LANCETS 21G MISC Uses 6 times daily       blood glucose monitoring (Workstir CONTOUR) test strip USE TO MEASURE YOUR BLOOD GLUCOSE 6 TIMES DAILY       insulin degludec (TRESIBA) 100 UNIT/ML pen Inject 14 Units Subcutaneous       Insulin Pen Needle (PEN NEEDLES 5/16\") 31G X 8 MM MISC 4 x daily       rivaroxaban ANTICOAGULANT (XARELTO) 20 MG TABS tablet Take 1 tablet (20 mg) by mouth daily (with dinner) 30 tablet 5     polyethylene glycol (MIRALAX/GLYCOLAX) packet Take 17 g by mouth daily as needed for constipation 30 packet 1     acetaminophen (TYLENOL) 500 MG tablet Take 2 tablets (1,000 mg) by mouth 3 times daily (Patient taking differently: Take 500 mg by mouth 3 times daily as needed ) 100 tablet 0     senna-docusate (SENOKOT-S;PERICOLACE) 8.6-50 MG per tablet Take 1 tablet by mouth 2 times daily 14 tablet 0     carboxymethylcellulose (REFRESH PLUS) 0.5 % SOLN Place 1 drop into both eyes 4 times daily        insulin lispro (HUMALOG PEN) 100 UNIT/ML soln 4 - 12 units 3 x daily       pantoprazole (PROTONIX) 40 MG enteric coated tablet Take 40 mg by mouth daily        cholecalciferol (D 5000) 5000 UNITS CAPS Reported on 4/12/2017       alendronate (FOSAMAX) 70 MG tablet 70 mg every 7 days Take 1 tablet by mouth twice weekly.       Multiple vitamin TABS Take 1 tablet by mouth       simvastatin (ZOCOR) 20 MG tablet Take 1 tablet (20 mg) by mouth daily       losartan (COZAAR) 100 MG tablet Take 1 tablet by mouth daily       Carboxymeth-Glycerin-Polysorb (REFRESH OPTIVE ADVANCED) 0.5-1-0.5 % SOLN PLACE 1 DROP INTO BOTH EYES 4 (FOUR) TIMES DAILY.       Multiple Vitamins-Minerals (MULTIVITAMIN & MINERAL PO)         Family History:  Family " "History   Problem Relation Age of Onset     DIABETES No family hx of      He is not aware of any diabetes in his family     KIDNEY DISEASE No family hx of      Social History:  Social History     Social History     Marital status:      Spouse name: N/A     Number of children: N/A     Years of education: N/A     Occupational History     Not on file.     Social History Main Topics     Smoking status: Former Smoker     Smokeless tobacco: Never Used     Alcohol use No     Drug use: No     Sexual activity: Not on file     Other Topics Concern     Parent/Sibling W/ Cabg, Mi Or Angioplasty Before 65f 55m? No     Social History Narrative    Immigrated in 2002 from [facility].  Previously lived in [place] before moving here.  Daughter lives locally as well.       Physical Exam:  /86  Pulse 79  Resp 16  Ht 1.676 m (5' 6\")  Wt 72.1 kg (159 lb)  SpO2 98%  BMI 25.66 kg/m2    CONSTITUTIONAL: No apparent distress  EYES: PERRLA, without pallor or jaundice  ENT/MOUTH: Ears unremarkable. No oral lesions  CVS: s1s2 normal  RESPIRATORY: Chest is clear  GI: Abdomen is soft, there is some tenderness along the right upper quadrant around the surgical incision but there is no guarding or rigidity or rebound. The surgical scars have healed well. Liver edge is palpable 3-4 fingerbreadths below the costal margin. No splenomegaly  NEURO: He is alert and oriented ×3  INTEGUMENT: no concerning his skin rashes   LYMPHATIC: no palpable lymphadenopathy  MUSCULOSKELETAL: Unremarkable. No bony tenderness.   EXTREMITIES: 1+ pedal edema bilaterally  PSYCH: Mentation, mood and affect are appropriate          Laboratory/Imaging Studies    Results for CHIKIS NEWBERRY (MRN [id number]) as of 12/21/2017 15:18   Ref. Range 9/14/2017 16:26 12/18/2017 13:02 12/18/2017 13:11   Sodium Latest Ref Range: 133 - 144 mmol/L 137  136   Potassium Latest Ref Range: 3.4 - 5.3 mmol/L 4.5  4.7   Chloride Latest Ref Range: 94 - 109 mmol/L 101  101   Carbon " Dioxide Latest Ref Range: 20 - 32 mmol/L 32  30   Urea Nitrogen Latest Ref Range: 7 - 30 mg/dL 35 (H)  33 (H)   Creatinine Latest Ref Range: 0.66 - 1.25 mg/dL 1.26 (H)  1.28 (H)   GFR Estimate Latest Ref Range: >60 mL/min/1.7m2 55 (L)  54 (L)   GFR Estimate If Black Latest Ref Range: >60 mL/min/1.7m2 67  66   Calcium Latest Ref Range: 8.5 - 10.1 mg/dL 8.5  8.2 (L)   Anion Gap Latest Ref Range: 3 - 14 mmol/L 4  4   Albumin Latest Ref Range: 3.4 - 5.0 g/dL 2.8 (L)  2.9 (L)   Protein Total Latest Ref Range: 6.8 - 8.8 g/dL 6.7 (L)  6.7 (L)   Bilirubin Total Latest Ref Range: 0.2 - 1.3 mg/dL 1.0  1.2   Alkaline Phosphatase Latest Ref Range: 40 - 150 U/L 113  98   ALT Latest Ref Range: 0 - 70 U/L 35  32   AST Latest Ref Range: 0 - 45 U/L 27  26   Alpha Fetoprotein Latest Ref Range: 0 - 8 ug/L <1.5  <1.5   Glucose Latest Ref Range: 70 - 99 mg/dL 202 (H)  164 (H)   WBC Latest Ref Range: 4.0 - 11.0 10e9/L 4.0  4.3   Hemoglobin Latest Ref Range: 13.3 - 17.7 g/dL 14.6  13.9   Hematocrit Latest Ref Range: 40.0 - 53.0 % 42.5  41.8   Platelet Count Latest Ref Range: 150 - 450 10e9/L 121 (L)  147 (L)   RBC Count Latest Ref Range: 4.4 - 5.9 10e12/L 4.55  4.50   MCV Latest Ref Range: 78 - 100 fl 93  93   MCH Latest Ref Range: 26.5 - 33.0 pg 32.1  30.9   MCHC Latest Ref Range: 31.5 - 36.5 g/dL 34.4  33.3   RDW Latest Ref Range: 10.0 - 15.0 % 12.1  11.8   Diff Method Unknown Automated Method  Automated Method   % Neutrophils Latest Units: % 57.8  63.3   % Lymphocytes Latest Units: % 30.3  23.7   % Monocytes Latest Units: % 9.1  9.2   % Eosinophils Latest Units: % 2.0  3.1   % Basophils Latest Units: % 0.5  0.7   % Immature Granulocytes Latest Units: % 0.3  0.0   Nucleated RBCs Latest Ref Range: 0 /100 0  0   Absolute Neutrophil Latest Ref Range: 1.6 - 8.3 10e9/L 2.3  2.7   Absolute Lymphocytes Latest Ref Range: 0.8 - 5.3 10e9/L 1.2  1.0   Absolute Monocytes Latest Ref Range: 0.0 - 1.3 10e9/L 0.4  0.4   Absolute Eosinophils Latest Ref  Range: 0.0 - 0.7 10e9/L 0.1  0.1   Absolute Basophils Latest Ref Range: 0.0 - 0.2 10e9/L 0.0  0.0   Abs Immature Granulocytes Latest Ref Range: 0 - 0.4 10e9/L 0.0  0.0   Absolute Nucleated RBC Unknown 0.0  0.0       MRI Abdomen 12/18/17  Liver: Postoperative changes of partial left hepatectomy. Mild  increased precontrast T1 signal along the surgical margin, likely  representing a small hematoma without features concerning for residual  tumor; no worrisome nodularity or enhancement.  No significant hepatic  steatosis or iron deposition. There is patchy heterogeneous arterial  enhancement throughout the liver parenchyma with a delayed reticular  pattern likely due to fibrotic changes.     Post ablation changes in the subcapsular aspect of segment 5/8 (series  9, image 36). There is a small focus of new arterial phase  hyperenhancement at the margin of the ablation zone which measures 0.9  mm (series 10, image 32). There is no associated washout. No increased  T2 signal or hyperintense signal on high be value diffusion-weighted  images.  LR-TR equivocal.     Lesion 1: There is a 7 mm arterially enhancing lesion in hepatic  segment  5 adjacent to the gallbladder fossa (series 9 image 45). It  does washout on portal venous and delayed phase imaging, series 11  image 45 and series 14 image 43. Pseudocapsule is not convincingly  identified. There is no associated increased T2 signal, but there is  mild increased signal on diffusion weighted images. Previously this  measured 5 mm. There is not evidence of venous invasion.  OPTN/LIRADS  class 4.     Scattered subcentimeter foci of arterial enhancement throughout the  liver parenchyma with no restricted diffusion, washout or  pseudocapsule, indeterminate. A representative lesion can be seen  anterior to the right portal vein segment 5/8 (series 3, image 36).  OPTN/LIRADS 3.     Gallbladder: Normally distended. Cholelithiasis. No gallbladder wall  thickening. No  pericholecystic fluid.     Spleen: Not enlarged. No focal lesions.     Kidneys: No suspicious lesions. Focal cortical retraction in the  superior/interpolar region of the right kidney, likely from prior  infection/infarction. Tiny bilateral cortical renal cysts. Stable mild  left pelvocaliectasis. No right hydronephrosis.     Adrenal glands: Unremarkable.     Pancreas: Partial atrophic pancreatic parenchyma with mild diffuse  decreased precontrast T1 signal. No focal lesions. Main pancreatic  duct is normal diameter. No pancreas divisum.     Bowel: No dilated loops of bowel.     Lymph nodes: Prominent melisa hepatis lymph nodes, likely reactive.     Blood vessels: Patent abdominal vasculature. No abdominal aortic  aneurysm.     Lung bases: Small bilateral pleural effusions, right greater than  left. Mild bilateral lower lobes dependent atelectasis. No pericardial  effusion. Cardiac size within normal limits.     Bones and soft tissues: Degenerative changes of the spine. Partial  fusion along the right lateral aspect at L3-L4. No suspicious lesions  in the bones. Bilateral gynecomastia.     Mesentery and abdominal wall: Unremarkable.     Ascites: No ascites.         IMPRESSION:    1. Cirrhosis without convincing evidence of portal hypertension.  2. Postoperative changes of microwave ablation involving hepatic  segments 5/8. There is a new small focus of associated enhancement  peripherally without washout or additional suspicious characteristics.  LR TR equivocal. Recommend short-term (3 months) follow-up MR.  3. Postsurgical changes of partial left hepatectomy. Small  postoperative hematoma/blood products along the surgical margin. No  evidence for viable tumor at the surgical margin.   4. 7 mm OPTN 4 lesion in segment 5, slightly increased in size from  the prior. OPTN/LIRADS 4. Additional indeterminate LIRADS 3 lesions.  These can be reevaluated at follow-up.  5. Based on this exam only, the patient is within [place]  criteria.  6. Cholelithiasis. No associated findings of acute cholecystitis.    MRI abdomen 5/26/17      IMPRESSION:    1. Postoperative changes of partial left hepatectomy and right lobe  microwave ablation with no residual tumor at these sites.  2. Indeterminate, hypoenhancing lesion near the gallbladder fossa  hepatic segment 5, not definitively visualized on prior exams. This is  indeterminate, and follow-up is recommended.   3. Cholelithiasis.            ASSESSMENT/PLAN:      Stage I, pT1 NX MX, moderately differentiated hepatocellular carcinoma with maximum tumor size being 4.2 cm of the left lower lobe, status post resection with negative margins and no lymphovascular or perineural invasion found.  The background liver had minimal to mild focal chronic portal  grade 1 out of 4, and fibrosis and portal expansion, which is also stage 1-2 out of 4.  He does not have evidence of cirrhosis.      His repeat MRI shows a new 12 mm concerning lesion in Hepatic seg 5/8. He underwent microwave ablation of segment 5 HCC on 4/19/2017. Repeat MRI after the MWA on 5/26/17 showed post treatment changes. There was an indeterminate lesion in seg 5.  On most recent scans, he has findings c/w post treatment changes. There is a small focus of new arterial phase hyperenhancement at the margin of the ablation zone which measures 0.9 mm which is LR-TR equivocal.  The S5 lesion has slightly increased in size to 7mm and remains OPTN 4.  AFP remains undetected but it has never been elevated.    At this time, I think it is reasonable to repeat imaging in 3 months    Abdominal pain. I believe this is due to prior surgery and microwave ablation and he still continues to have pain secondary to it. He does not have any evidence of cancer causing the pain. I believe we need to manage this symptomatically. I gave him a prescription for tramadol 25-50 mg every 6 hours as needed. I gave him 30 tablets. If this does not improve his pain and then  I will have him see palliative care. I explained to him that this is not cancer causing the pain. We also discussed in detail that he does not need to be on any systemic chemotherapy for hepatocellular carcinoma at this time and we usually reserve that systemic treatment for patients with metastatic disease and at this time he does not have any evidence of it      Thrombocytopenia- mild and improved. Cont serial CBC      Hepatitis C.  This has been treated in 2005 and 2006, and last HCV RNA was undetectable in 11/2015    Bilateral lower extremity edema. I encouraged him to follow with his primary care physician regarding that.  He is on Lasix but most likely the dose needs to be increased.He does have mild hypoalbuminemia which could be contributing. I encouraged him to eat protein rich foods.  He also has history of atrial fibrillation and he continues to be on Xarelto for that. If he needs further cardiology workup at this time but I will leave that up to his primary care physician.    Elevated blood pressure. I strongly encouraged him to follow with his primary care physician regarding that. I am not making any changes to his antihypertensive medication at present.    I would like to see him back in 3 months with repeat scans and blood work prior to that.    All of his questions were answered to his satisfaction and he is comfortable with this plan      Miri Antoine

## 2017-12-21 NOTE — LETTER
12/21/2017       RE: Eh Callahan  [address]   [facility] [address]     Dear Colleague,    Thank you for referring your patient, Eh Callahan, to the [facility]. Please see a copy of my visit note below.    Oncology Follow up visit:  Date on this visit: 12/21/2017       DIAGNOSIS  HCC    Primary Physician: Shalini Mac     History Of Present Illness:       Copied and updated from prior notes     Mr. Callahan is a 77-year-old male who has a history of previously treated hepatitis C(interferon and ribavirin in 2005 and 2006) attaining a sustained virologic response, was found to have a 3.6 cm lesion in the liver, which was concerning for malignancy.  On 11/22/16, he had a hand-assisted exploratory laparoscopy with extensive lysis of adhesion, and segment 2 and 3 of the liver were removed by Dr Mckeon.    The final pathology from the surgery revealed moderately differentiated hepatocellular carcinoma with negative margins, with a tumor size being 4.2 x 3.6 x 3 cm.  The tumors were confined to the liver.  All margins were negative.  There was no lymphovascular or perineural invasion present.  No lymph nodes were examined in the surgical specimen.  It was a pT1 solitary tumor without vascular invasion.  Prior to the surgery, he had a CAT scan of the chest, which showed subcentimeter pulmonary nodules, but no definite evidence of metastasis.   He recovered well after surgery.     His repeat MRI showed a new 12 mm concerning lesion in Hepatic seg 5/8.   He had MWA of segment 5 HCC on 4/19/2017.    Repeat MRI on 5/26/17 showed post treatment changes. There was an indeterminate lesion in seg 5 which was stable on MRI done in Sept 2017    Since then he has been on observation    Interval History    Professional  is available throughout the interview   He continues to have right upper quadrant pain especially after eating. He does not report significant nausea or vomiting. He's  been able to otherwise eating well. Bowels are working well. He denies bleeding. She has not tried anything for the pain. He denies any fevers or infections. Over the last few months he has noticed bilateral lower extremity swelling. He is on Lasix but I do not believe that recently the dose was increased. He continues to be on Xarelto for atrial fibrillation. She denies any other pains. He denies any other swellings. His energy is low but is stable. He is still doing his usual activities but he was the most part stays at home. No infections. He is wondering if the abdominal pain is due to cancer and he is asking me for a pill for liver cancer which he should be on     ROS:  A comprehensive ROS was otherwise neg      I reviewed other hx in EPIC as below      Past Medical/Surgical History:  Past Medical History:   Diagnosis Date     Anatomical narrow angle glaucoma      Atrial fibrillation (H)      Chronic infection     history of hepatitis C     CKD (chronic kidney disease) stage 2, GFR 60-89 ml/min      Diabetes mellitus (H)      Hepatitis C without mention of hepatic coma      Hypertension      Palpitations      PTSD (post-traumatic stress disorder)      Past Surgical History:   Procedure Laterality Date     APPENDECTOMY       EYE SURGERY       LAPAROSCOPIC HEPATECTOMY PARTIAL Left 11/22/2016    Procedure: LAPAROSCOPIC HEPATECTOMY PARTIAL;  Surgeon: Rick Mckeon MD;  Location: [facility] OR     Cancer History:     As above    Allergies:  Allergies as of 12/21/2017 - Raul as Reviewed 12/21/2017   Allergen Reaction Noted     Amlodipine Swelling 09/26/2016     Lisinopril Cough 09/26/2016     Metoprolol  09/26/2016     Current Medications:  Current Outpatient Prescriptions   Medication Sig Dispense Refill     traMADol (ULTRAM) 50 MG tablet Take 0.5-1 tablets (25-50 mg) by mouth every 6 hours as needed for moderate pain 30 tablet 0     Blood Glucose Monitoring Suppl (FIFTY50 GLUCOSE METER 2.0) W/DEVICE KIT as needed for  "blood glucose monitoring       LORazepam (ATIVAN) 1 MG tablet Take 1 mg by mouth as needed       insulin aspart (NOVOLOG PEN) 100 UNIT/ML injection 3 units before each meal.       furosemide (LASIX) 20 MG tablet Take 1 tablet (20 mg) by mouth daily 30 tablet 3     Blood Pressure Monitoring (RA BLOOD PRESSURE CUFF MONITOR) MISC Take blood pressure once daily       timolol (TIMOPTIC) 0.5 % ophthalmic solution PLACE 1 DROP INTO BOTH EYES ONCE DAILY.       traZODone (DESYREL) 50 MG tablet Take 50 mg by mouth       [employer] LANCETS 21G MISC Uses 6 times daily       blood glucose monitoring (Precise Software CONTOUR) test strip USE TO MEASURE YOUR BLOOD GLUCOSE 6 TIMES DAILY       insulin degludec (TRESIBA) 100 UNIT/ML pen Inject 14 Units Subcutaneous       Insulin Pen Needle (PEN NEEDLES 5/16\") 31G X 8 MM MISC 4 x daily       rivaroxaban ANTICOAGULANT (XARELTO) 20 MG TABS tablet Take 1 tablet (20 mg) by mouth daily (with dinner) 30 tablet 5     polyethylene glycol (MIRALAX/GLYCOLAX) packet Take 17 g by mouth daily as needed for constipation 30 packet 1     acetaminophen (TYLENOL) 500 MG tablet Take 2 tablets (1,000 mg) by mouth 3 times daily (Patient taking differently: Take 500 mg by mouth 3 times daily as needed ) 100 tablet 0     senna-docusate (SENOKOT-S;PERICOLACE) 8.6-50 MG per tablet Take 1 tablet by mouth 2 times daily 14 tablet 0     carboxymethylcellulose (REFRESH PLUS) 0.5 % SOLN Place 1 drop into both eyes 4 times daily        insulin lispro (HUMALOG PEN) 100 UNIT/ML soln 4 - 12 units 3 x daily       pantoprazole (PROTONIX) 40 MG enteric coated tablet Take 40 mg by mouth daily        cholecalciferol (D 5000) 5000 UNITS CAPS Reported on 4/12/2017       alendronate (FOSAMAX) 70 MG tablet 70 mg every 7 days Take 1 tablet by mouth twice weekly.       Multiple vitamin TABS Take 1 tablet by mouth       simvastatin (ZOCOR) 20 MG tablet Take 1 tablet (20 mg) by mouth daily       losartan (COZAAR) 100 MG tablet Take 1 tablet by " "mouth daily       Carboxymeth-Glycerin-Polysorb (REFRESH OPTIVE ADVANCED) 0.5-1-0.5 % SOLN PLACE 1 DROP INTO BOTH EYES 4 (FOUR) TIMES DAILY.       Multiple Vitamins-Minerals (MULTIVITAMIN & MINERAL PO)         Family History:  Family History   Problem Relation Age of Onset     DIABETES No family hx of      He is not aware of any diabetes in his family     KIDNEY DISEASE No family hx of      Social History:  Social History     Social History     Marital status:      Spouse name: N/A     Number of children: N/A     Years of education: N/A     Occupational History     Not on file.     Social History Main Topics     Smoking status: Former Smoker     Smokeless tobacco: Never Used     Alcohol use No     Drug use: No     Sexual activity: Not on file     Other Topics Concern     Parent/Sibling W/ Cabg, Mi Or Angioplasty Before 65f 55m? No     Social History Narrative    Immigrated in 2002 from [facility].  Previously lived in [place] before moving here.  Daughter lives locally as well.       Physical Exam:  /86  Pulse 79  Resp 16  Ht 1.676 m (5' 6\")  Wt 72.1 kg (159 lb)  SpO2 98%  BMI 25.66 kg/m2    CONSTITUTIONAL: No apparent distress  EYES: PERRLA, without pallor or jaundice  ENT/MOUTH: Ears unremarkable. No oral lesions  CVS: s1s2 normal  RESPIRATORY: Chest is clear  GI: Abdomen is soft, there is some tenderness along the right upper quadrant around the surgical incision but there is no guarding or rigidity or rebound. The surgical scars have healed well. Liver edge is palpable 3-4 fingerbreadths below the costal margin. No splenomegaly  NEURO: He is alert and oriented ×3  INTEGUMENT: no concerning his skin rashes   LYMPHATIC: no palpable lymphadenopathy  MUSCULOSKELETAL: Unremarkable. No bony tenderness.   EXTREMITIES: 1+ pedal edema bilaterally  PSYCH: Mentation, mood and affect are appropriate          Laboratory/Imaging Studies    Results for CHIKIS NEWBERRY (MRN [id number]) as of 12/21/2017 15:18   " Ref. Range 9/14/2017 16:26 12/18/2017 13:02 12/18/2017 13:11   Sodium Latest Ref Range: 133 - 144 mmol/L 137  136   Potassium Latest Ref Range: 3.4 - 5.3 mmol/L 4.5  4.7   Chloride Latest Ref Range: 94 - 109 mmol/L 101  101   Carbon Dioxide Latest Ref Range: 20 - 32 mmol/L 32  30   Urea Nitrogen Latest Ref Range: 7 - 30 mg/dL 35 (H)  33 (H)   Creatinine Latest Ref Range: 0.66 - 1.25 mg/dL 1.26 (H)  1.28 (H)   GFR Estimate Latest Ref Range: >60 mL/min/1.7m2 55 (L)  54 (L)   GFR Estimate If Black Latest Ref Range: >60 mL/min/1.7m2 67  66   Calcium Latest Ref Range: 8.5 - 10.1 mg/dL 8.5  8.2 (L)   Anion Gap Latest Ref Range: 3 - 14 mmol/L 4  4   Albumin Latest Ref Range: 3.4 - 5.0 g/dL 2.8 (L)  2.9 (L)   Protein Total Latest Ref Range: 6.8 - 8.8 g/dL 6.7 (L)  6.7 (L)   Bilirubin Total Latest Ref Range: 0.2 - 1.3 mg/dL 1.0  1.2   Alkaline Phosphatase Latest Ref Range: 40 - 150 U/L 113  98   ALT Latest Ref Range: 0 - 70 U/L 35  32   AST Latest Ref Range: 0 - 45 U/L 27  26   Alpha Fetoprotein Latest Ref Range: 0 - 8 ug/L <1.5  <1.5   Glucose Latest Ref Range: 70 - 99 mg/dL 202 (H)  164 (H)   WBC Latest Ref Range: 4.0 - 11.0 10e9/L 4.0  4.3   Hemoglobin Latest Ref Range: 13.3 - 17.7 g/dL 14.6  13.9   Hematocrit Latest Ref Range: 40.0 - 53.0 % 42.5  41.8   Platelet Count Latest Ref Range: 150 - 450 10e9/L 121 (L)  147 (L)   RBC Count Latest Ref Range: 4.4 - 5.9 10e12/L 4.55  4.50   MCV Latest Ref Range: 78 - 100 fl 93  93   MCH Latest Ref Range: 26.5 - 33.0 pg 32.1  30.9   MCHC Latest Ref Range: 31.5 - 36.5 g/dL 34.4  33.3   RDW Latest Ref Range: 10.0 - 15.0 % 12.1  11.8   Diff Method Unknown Automated Method  Automated Method   % Neutrophils Latest Units: % 57.8  63.3   % Lymphocytes Latest Units: % 30.3  23.7   % Monocytes Latest Units: % 9.1  9.2   % Eosinophils Latest Units: % 2.0  3.1   % Basophils Latest Units: % 0.5  0.7   % Immature Granulocytes Latest Units: % 0.3  0.0   Nucleated RBCs Latest Ref Range: 0 /100 0  0    Absolute Neutrophil Latest Ref Range: 1.6 - 8.3 10e9/L 2.3  2.7   Absolute Lymphocytes Latest Ref Range: 0.8 - 5.3 10e9/L 1.2  1.0   Absolute Monocytes Latest Ref Range: 0.0 - 1.3 10e9/L 0.4  0.4   Absolute Eosinophils Latest Ref Range: 0.0 - 0.7 10e9/L 0.1  0.1   Absolute Basophils Latest Ref Range: 0.0 - 0.2 10e9/L 0.0  0.0   Abs Immature Granulocytes Latest Ref Range: 0 - 0.4 10e9/L 0.0  0.0   Absolute Nucleated RBC Unknown 0.0  0.0       MRI Abdomen 12/18/17  Liver: Postoperative changes of partial left hepatectomy. Mild  increased precontrast T1 signal along the surgical margin, likely  representing a small hematoma without features concerning for residual  tumor; no worrisome nodularity or enhancement.  No significant hepatic  steatosis or iron deposition. There is patchy heterogeneous arterial  enhancement throughout the liver parenchyma with a delayed reticular  pattern likely due to fibrotic changes.     Post ablation changes in the subcapsular aspect of segment 5/8 (series  9, image 36). There is a small focus of new arterial phase  hyperenhancement at the margin of the ablation zone which measures 0.9  mm (series 10, image 32). There is no associated washout. No increased  T2 signal or hyperintense signal on high be value diffusion-weighted  images.  LR-TR equivocal.     Lesion 1: There is a 7 mm arterially enhancing lesion in hepatic  segment  5 adjacent to the gallbladder fossa (series 9 image 45). It  does washout on portal venous and delayed phase imaging, series 11  image 45 and series 14 image 43. Pseudocapsule is not convincingly  identified. There is no associated increased T2 signal, but there is  mild increased signal on diffusion weighted images. Previously this  measured 5 mm. There is not evidence of venous invasion.  OPTN/LIRADS  class 4.     Scattered subcentimeter foci of arterial enhancement throughout the  liver parenchyma with no restricted diffusion, washout or  pseudocapsule,  indeterminate. A representative lesion can be seen  anterior to the right portal vein segment 5/8 (series 3, image 36).  OPTN/LIRADS 3.     Gallbladder: Normally distended. Cholelithiasis. No gallbladder wall  thickening. No pericholecystic fluid.     Spleen: Not enlarged. No focal lesions.     Kidneys: No suspicious lesions. Focal cortical retraction in the  superior/interpolar region of the right kidney, likely from prior  infection/infarction. Tiny bilateral cortical renal cysts. Stable mild  left pelvocaliectasis. No right hydronephrosis.     Adrenal glands: Unremarkable.     Pancreas: Partial atrophic pancreatic parenchyma with mild diffuse  decreased precontrast T1 signal. No focal lesions. Main pancreatic  duct is normal diameter. No pancreas divisum.     Bowel: No dilated loops of bowel.     Lymph nodes: Prominent melisa hepatis lymph nodes, likely reactive.     Blood vessels: Patent abdominal vasculature. No abdominal aortic  aneurysm.     Lung bases: Small bilateral pleural effusions, right greater than  left. Mild bilateral lower lobes dependent atelectasis. No pericardial  effusion. Cardiac size within normal limits.     Bones and soft tissues: Degenerative changes of the spine. Partial  fusion along the right lateral aspect at L3-L4. No suspicious lesions  in the bones. Bilateral gynecomastia.     Mesentery and abdominal wall: Unremarkable.     Ascites: No ascites.         IMPRESSION:    1. Cirrhosis without convincing evidence of portal hypertension.  2. Postoperative changes of microwave ablation involving hepatic  segments 5/8. There is a new small focus of associated enhancement  peripherally without washout or additional suspicious characteristics.  LR TR equivocal. Recommend short-term (3 months) follow-up MR.  3. Postsurgical changes of partial left hepatectomy. Small  postoperative hematoma/blood products along the surgical margin. No  evidence for viable tumor at the surgical margin.   4. 7 mm  OPTN 4 lesion in segment 5, slightly increased in size from  the prior. OPTN/LIRADS 4. Additional indeterminate LIRADS 3 lesions.  These can be reevaluated at follow-up.  5. Based on this exam only, the patient is within [place] criteria.  6. Cholelithiasis. No associated findings of acute cholecystitis.    MRI abdomen 5/26/17      IMPRESSION:    1. Postoperative changes of partial left hepatectomy and right lobe  microwave ablation with no residual tumor at these sites.  2. Indeterminate, hypoenhancing lesion near the gallbladder fossa  hepatic segment 5, not definitively visualized on prior exams. This is  indeterminate, and follow-up is recommended.   3. Cholelithiasis.            ASSESSMENT/PLAN:      Stage I, pT1 NX MX, moderately differentiated hepatocellular carcinoma with maximum tumor size being 4.2 cm of the left lower lobe, status post resection with negative margins and no lymphovascular or perineural invasion found.  The background liver had minimal to mild focal chronic portal  grade 1 out of 4, and fibrosis and portal expansion, which is also stage 1-2 out of 4.  He does not have evidence of cirrhosis.      His repeat MRI shows a new 12 mm concerning lesion in Hepatic seg 5/8. He underwent microwave ablation of segment 5 HCC on 4/19/2017. Repeat MRI after the MWA on 5/26/17 showed post treatment changes. There was an indeterminate lesion in seg 5.  On most recent scans, he has findings c/w post treatment changes. There is a small focus of new arterial phase hyperenhancement at the margin of the ablation zone which measures 0.9 mm which is LR-TR equivocal.  The S5 lesion has slightly increased in size to 7mm and remains OPTN 4.  AFP remains undetected but it has never been elevated.    At this time, I think it is reasonable to repeat imaging in 3 months    Abdominal pain. I believe this is due to prior surgery and microwave ablation and he still continues to have pain secondary to it. He does not have  any evidence of cancer causing the pain. I believe we need to manage this symptomatically. I gave him a prescription for tramadol 25-50 mg every 6 hours as needed. I gave him 30 tablets. If this does not improve his pain and then I will have him see palliative care. I explained to him that this is not cancer causing the pain. We also discussed in detail that he does not need to be on any systemic chemotherapy for hepatocellular carcinoma at this time and we usually reserve that systemic treatment for patients with metastatic disease and at this time he does not have any evidence of it      Thrombocytopenia- mild and improved. Cont serial CBC      Hepatitis C.  This has been treated in 2005 and 2006, and last HCV RNA was undetectable in 11/2015    Bilateral lower extremity edema. I encouraged him to follow with his primary care physician regarding that.  He is on Lasix but most likely the dose needs to be increased.He does have mild hypoalbuminemia which could be contributing. I encouraged him to eat protein rich foods.  He also has history of atrial fibrillation and he continues to be on Xarelto for that. If he needs further cardiology workup at this time but I will leave that up to his primary care physician.    Elevated blood pressure. I strongly encouraged him to follow with his primary care physician regarding that. I am not making any changes to his antihypertensive medication at present.    I would like to see him back in 3 months with repeat scans and blood work prior to that.    All of his questions were answered to his satisfaction and he is comfortable with this plan      Miri Antoine

## 2017-12-21 NOTE — NURSING NOTE
"Oncology Rooming Note    December 21, 2017 4:09 PM   Eh Callahan is a 77 year old male who presents for:    Chief Complaint   Patient presents with     Oncology Clinic Visit     Return: Liver mass     Initial Vitals: Resp 16  Ht 1.676 m (5' 6\")  Wt 72.1 kg (159 lb)  BMI 25.66 kg/m2 Estimated body mass index is 25.66 kg/(m^2) as calculated from the following:    Height as of this encounter: 1.676 m (5' 6\").    Weight as of this encounter: 72.1 kg (159 lb). Body surface area is 1.83 meters squared.  Moderate Pain (5) Comment: Data Unavailable   No LMP for male patient.  Allergies reviewed: Yes  Medications reviewed: Yes    Medications: Medication refills not needed today.  Pharmacy name entered into Nabto:    [facility] - [place], [place] - [address] [facility] - [place] - [address]    Clinical concerns: Pt. Refused flu shot.    15 minutes for nursing intake (face to face time)     YUNG Adrian      "

## 2017-12-21 NOTE — MR AVS SNAPSHOT
After Visit Summary   12/21/2017    Eh Callahan    MRN: [id number]           Patient Information     Date Of Birth          1940        Visit Information        Provider Department      12/21/2017 3:45 PM Lala Canas Aazim K, MD [facility]        Today's Diagnoses     HCC (hepatocellular carcinoma) (H)    -  1      Care Instructions    Start Tramadol 25-50mg every 6 hrs as needed for pain    In 3 months, repeat MRI and labs and a few days after that, see me back    Follow with PCP for leg edema and high BP          Follow-ups after your visit        Your next 10 appointments already scheduled     Mar 05, 2018  8:30 AM CST   LAB with [facility])    [address] [place] [id number] [phone]           Please do not eat 10-12 hours before your appointment if you are coming in fasting for labs on lipids, cholesterol, or glucose (sugar). This does not apply to pregnant women. Water, hot tea and black coffee (with nothing added) are okay. Do not drink other fluids, diet soda or chew gum.            Mar 05, 2018  9:15 AM CST   (Arrive by 9:00 AM)   MR ABDOMEN W/O & W CONTRAST with [address] MRI ([facility])    [address] [place] [id number] [phone]           Take your medicines as usual, unless your doctor tells you not to. Bring a list of your current medicines to your exam (including vitamins, minerals and over-the-counter drugs). Also bring the results of similar scans you may have had.    The day before your exam, drink extra fluids at least six 8-ounce glasses (unless your doctor tells you to restrict your fluids).   Have a blood test (creatinine test) within 30 days of your exam. Go to your clinic or Diagnostic Imaging Department for this test.   Do not eat or drink for 6 hours prior to exam.  The MRI machine  uses a strong magnet. Please wear clothes without metal (snaps, zippers). A sweatsuit works well, or we may give you a hospital gown.  Please remove any body piercings and hair extensions before you arrive. You will also remove watches, jewelry, hairpins, wallets, dentures, partial dental plates and hearing aids. You may wear contact lenses, and you may be able to wear your rings. We have a safe place to keep your personal items, but it is safer to leave them at home.   **IMPORTANT** THE INSTRUCTIONS BELOW ARE ONLY FOR THOSE PATIENTS WHO HAVE BEEN TOLD THEY WILL RECEIVE SEDATION OR GENERAL ANESTHESIA DURING THEIR MRI PROCEDURE:  IF YOU WILL RECEIVE SEDATION (take medicine to help you relax during your exam):   You must get the medicine from your doctor before you arrive. Bring the medicine to the exam. Do not take it at home.   Arrive one hour early. Bring someone who can take you home after the test. Your medicine will make you sleepy. After the exam, you may not drive, take a bus or take a taxi by yourself.   No eating 8 hours before your exam. You may have clear liquids up until 4 hours before your exam. (Clear liquids include water, clear tea, black coffee and fruit juice without pulp.)  IF YOU WILL RECEIVE ANESTHESIA (be asleep for your exam):   Arrive 1 1/2 hours early. Bring someone who can take you home after the test. You may not drive, take a bus or take a taxi by yourself.   No eating 8 hours before your exam. You may have clear liquids up until 4 hours before your exam. (Clear liquids include water, clear tea, black coffee and fruit juice without pulp.)  If you have any questions, please contact your Imaging Department exam site.            Mar 08, 2018 12:00 PM CST   (Arrive by 11:45 AM)   Return Visit with Miri Antoine MD   [facility] ([facility] [place])    [address] [address] [phone]              Future tests that  "were ordered for you today     Open Future Orders        Priority Expected Expires Ordered    Comprehensive metabolic panel Routine 3/21/2018 2018 2017    AFP tumor marker Routine 3/21/2018 2018 2017    *CBC with platelets differential Routine 3/21/2018 2018 2017    MRI Abdomen w & w/o contrast Routine 3/21/2018 2018 2017            Who to contact     If you have questions or need follow up information about today's clinic visit or your schedule please contact [facility] directly at [phone].  Normal or non-critical lab and imaging results will be communicated to you by Scotty Gearhart, letter or phone within 4 business days after the clinic has received the results. If you do not hear from us within 7 days, please contact the clinic through Scotty Gearhart or phone. If you have a critical or abnormal lab result, we will notify you by phone as soon as possible.  Submit refill requests through [employer] or call your pharmacy and they will forward the refill request to us. Please allow 3 business days for your refill to be completed.          Additional Information About Your Visit        Scotty GearharGrinbath Information     [employer] lets you send messages to your doctor, view your test results, renew your prescriptions, schedule appointments and more. To sign up, go to [email or web address] . Click on \"Log in\" on the left side of the screen, which will take you to the Welcome page. Then click on \"Sign up Now\" on the right side of the page.     You will be asked to enter the access code listed below, as well as some personal information. Please follow the directions to create your username and password.     Your access code is: [email or web address]  Expires: 3/15/2018  6:31 AM     Your access code will  in 90 days. If you need help or a new code, please call your [facility] clinic or [phone].        Care EveryWhere ID     This is your Care EveryWhere ID. This could be used by " "other organizations to access your [facility] medical records  [id number]        Your Vitals Were     Pulse Respirations Height Pulse Oximetry BMI (Body Mass Index)       79 16 1.676 m (5' 6\") 98% 25.66 kg/m2        Blood Pressure from Last 3 Encounters:   12/21/17 152/86   09/14/17 174/89   05/10/17 (!) 168/95    Weight from Last 3 Encounters:   12/21/17 72.1 kg (159 lb)   09/14/17 66.9 kg (147 lb 6.4 oz)   05/10/17 66.3 kg (146 lb 3.2 oz)                 Today's Medication Changes          These changes are accurate as of: 12/21/17  5:22 PM.  If you have any questions, ask your nurse or doctor.               Start taking these medicines.        Dose/Directions    traMADol 50 MG tablet   Commonly known as:  ULTRAM   Used for:  HCC (hepatocellular carcinoma) (H)   Started by:  Miri Antoine MD        Dose:  25-50 mg   Take 0.5-1 tablets (25-50 mg) by mouth every 6 hours as needed for moderate pain   Quantity:  30 tablet   Refills:  0         These medicines have changed or have updated prescriptions.        Dose/Directions    acetaminophen 500 MG tablet   Commonly known as:  TYLENOL   This may have changed:    - how much to take  - when to take this  - reasons to take this   Used for:  History of liver excision        Dose:  1000 mg   Take 2 tablets (1,000 mg) by mouth 3 times daily   Quantity:  100 tablet   Refills:  0            Where to get your medicines      Some of these will need a paper prescription and others can be bought over the counter.  Ask your nurse if you have questions.     Bring a paper prescription for each of these medications     traMADol 50 MG tablet                Primary Care Provider Office Phone # Fax #    Shalini CIERA Mac PA-C [phone]       [facility] [address]  [facility] [address]        Equal Access to Services     DEE COOMBS AH: [place], waaxda luqadaha, qaybta kaalmada adeblancayada, beryl cline. So wa " [phone].    ATENCIÓN: Si felecia matt, tiene a jones disposición servicios gratuitos de asistencia lingüística. Ann Marie hernandez [phone].    We comply with applicable federal civil rights laws and [place] laws. We do not discriminate on the basis of race, color, national origin, age, disability, sex, sexual orientation, or gender identity.            Thank you!     Thank you for choosing [facility]  for your care. Our goal is always to provide you with excellent care. Hearing back from our patients is one way we can continue to improve our services. Please take a few minutes to complete the written survey that you may receive in the mail after your visit with us. Thank you!             Your Updated Medication List - Protect others around you: Learn how to safely use, store and throw away your medicines at www.disposemymeds.org.          This list is accurate as of: 12/21/17  5:22 PM.  Always use your most recent med list.                   Brand Name Dispense Instructions for use Diagnosis    acetaminophen 500 MG tablet    TYLENOL    100 tablet    Take 2 tablets (1,000 mg) by mouth 3 times daily    History of liver excision       alendronate 70 MG tablet    FOSAMAX     70 mg every 7 days Take 1 tablet by mouth twice weekly.        GREGORIO CONTOUR test strip   Generic drug:  blood glucose monitoring      USE TO MEASURE YOUR BLOOD GLUCOSE 6 TIMES DAILY    HCC (hepatocellular carcinoma) (H)       carboxymethylcellulose 0.5 % Soln ophthalmic solution    REFRESH PLUS     Place 1 drop into both eyes 4 times daily        D 5000 5000 UNITS Caps   Generic drug:  cholecalciferol      Reported on 4/12/2017        FIFTY50 GLUCOSE METER 2.0 W/DEVICE Kit      as needed for blood glucose monitoring    HCC (hepatocellular carcinoma) (H)       furosemide 20 MG tablet    LASIX    30 tablet    Take 1 tablet (20 mg) by mouth daily    Peripheral edema, History of hepatitis C       insulin aspart 100 UNIT/ML injection  "   NovoLOG PEN     3 units before each meal.    HCC (hepatocellular carcinoma) (H)       insulin degludec 100 UNIT/ML pen    TRESIBA     Inject 14 Units Subcutaneous    HCC (hepatocellular carcinoma) (H)       insulin lispro 100 UNIT/ML injection    HumaLOG PEN     4 - 12 units 3 x daily        [employer] LANCETS 21G Misc      Uses 6 times daily    HCC (hepatocellular carcinoma) (H)       LORazepam 1 MG tablet    ATIVAN     Take 1 mg by mouth as needed    HCC (hepatocellular carcinoma) (H)       losartan 100 MG tablet    COZAAR     Take 1 tablet by mouth daily        Multiple vitamin Tabs      Take 1 tablet by mouth        MULTIVITAMIN & MINERAL PO       HCC (hepatocellular carcinoma) (H)       pantoprazole 40 MG EC tablet    PROTONIX     Take 40 mg by mouth daily        pen needles 5/16\" 31G X 8 MM Misc      4 x daily    HCC (hepatocellular carcinoma) (H)       polyethylene glycol Packet    MIRALAX/GLYCOLAX    30 packet    Take 17 g by mouth daily as needed for constipation    History of liver excision       RA BLOOD PRESSURE CUFF MONITOR Misc      Take blood pressure once daily    HCC (hepatocellular carcinoma) (H)       REFRESH OPTIVE ADVANCED 0.5-1-0.5 % Soln   Generic drug:  Carboxymeth-Glycerin-Polysorb      PLACE 1 DROP INTO BOTH EYES 4 (FOUR) TIMES DAILY.    HCC (hepatocellular carcinoma) (H)       rivaroxaban ANTICOAGULANT 20 MG Tabs tablet    XARELTO    30 tablet    Take 1 tablet (20 mg) by mouth daily (with dinner)    Atrial fibrillation, unspecified type (H)       senna-docusate 8.6-50 MG per tablet    SENOKOT-S;PERICOLACE    14 tablet    Take 1 tablet by mouth 2 times daily    History of liver excision       simvastatin 20 MG tablet    ZOCOR     Take 1 tablet (20 mg) by mouth daily    Dyslipidaemia       timolol 0.5 % ophthalmic solution    TIMOPTIC     PLACE 1 DROP INTO BOTH EYES ONCE DAILY.    HCC (hepatocellular carcinoma) (H)       traMADol 50 MG tablet    ULTRAM    30 tablet    Take 0.5-1 tablets " (25-50 mg) by mouth every 6 hours as needed for moderate pain    HCC (hepatocellular carcinoma) (H)       traZODone 50 MG tablet    DESYREL     Take 50 mg by mouth    HCC (hepatocellular carcinoma) (H)

## 2018-02-05 DIAGNOSIS — I48.91 ATRIAL FIBRILLATION, UNSPECIFIED TYPE (H): ICD-10-CM

## 2018-02-06 RX ORDER — RIVAROXABAN 20 MG/1
TABLET, FILM COATED ORAL
Qty: 90 TABLET | Refills: 1 | Status: SHIPPED | OUTPATIENT
Start: 2018-02-06 | End: 2018-07-23

## 2018-03-05 DIAGNOSIS — C22.0 HCC (HEPATOCELLULAR CARCINOMA) (H): ICD-10-CM

## 2018-03-05 LAB
AFP SERPL-MCNC: <1.5 UG/L (ref 0–8)
ALBUMIN SERPL-MCNC: 2.7 G/DL (ref 3.4–5)
ALP SERPL-CCNC: 94 U/L (ref 40–150)
ALT SERPL W P-5'-P-CCNC: 25 U/L (ref 0–70)
ANION GAP SERPL CALCULATED.3IONS-SCNC: 4 MMOL/L (ref 3–14)
AST SERPL W P-5'-P-CCNC: 19 U/L (ref 0–45)
BASOPHILS # BLD AUTO: 0 10E9/L (ref 0–0.2)
BASOPHILS NFR BLD AUTO: 0.5 %
BILIRUB SERPL-MCNC: 1 MG/DL (ref 0.2–1.3)
BUN SERPL-MCNC: 32 MG/DL (ref 7–30)
CALCIUM SERPL-MCNC: 8.7 MG/DL (ref 8.5–10.1)
CHLORIDE SERPL-SCNC: 98 MMOL/L (ref 94–109)
CO2 SERPL-SCNC: 31 MMOL/L (ref 20–32)
CREAT SERPL-MCNC: 1.44 MG/DL (ref 0.66–1.25)
DIFFERENTIAL METHOD BLD: ABNORMAL
EOSINOPHIL # BLD AUTO: 0.1 10E9/L (ref 0–0.7)
EOSINOPHIL NFR BLD AUTO: 2.7 %
ERYTHROCYTE [DISTWIDTH] IN BLOOD BY AUTOMATED COUNT: 11.7 % (ref 10–15)
GFR SERPL CREATININE-BSD FRML MDRD: 47 ML/MIN/1.7M2
GLUCOSE SERPL-MCNC: 235 MG/DL (ref 70–99)
HCT VFR BLD AUTO: 41.3 % (ref 40–53)
HGB BLD-MCNC: 13.8 G/DL (ref 13.3–17.7)
IMM GRANULOCYTES # BLD: 0 10E9/L (ref 0–0.4)
IMM GRANULOCYTES NFR BLD: 0 %
LYMPHOCYTES # BLD AUTO: 1.2 10E9/L (ref 0.8–5.3)
LYMPHOCYTES NFR BLD AUTO: 28.8 %
MCH RBC QN AUTO: 31.3 PG (ref 26.5–33)
MCHC RBC AUTO-ENTMCNC: 33.4 G/DL (ref 31.5–36.5)
MCV RBC AUTO: 94 FL (ref 78–100)
MONOCYTES # BLD AUTO: 0.4 10E9/L (ref 0–1.3)
MONOCYTES NFR BLD AUTO: 8.8 %
NEUTROPHILS # BLD AUTO: 2.4 10E9/L (ref 1.6–8.3)
NEUTROPHILS NFR BLD AUTO: 59.2 %
NRBC # BLD AUTO: 0 10*3/UL
NRBC BLD AUTO-RTO: 0 /100
PLATELET # BLD AUTO: 125 10E9/L (ref 150–450)
POTASSIUM SERPL-SCNC: 4.2 MMOL/L (ref 3.4–5.3)
PROT SERPL-MCNC: 6.4 G/DL (ref 6.8–8.8)
RBC # BLD AUTO: 4.41 10E12/L (ref 4.4–5.9)
SODIUM SERPL-SCNC: 133 MMOL/L (ref 133–144)
WBC # BLD AUTO: 4.1 10E9/L (ref 4–11)

## 2018-03-05 PROCEDURE — 82105 ALPHA-FETOPROTEIN SERUM: CPT | Performed by: INTERNAL MEDICINE

## 2018-03-08 ENCOUNTER — RADIANT APPOINTMENT (OUTPATIENT)
Dept: GENERAL RADIOLOGY | Facility: CLINIC | Age: 78
End: 2018-03-08
Attending: INTERNAL MEDICINE
Payer: MEDICARE

## 2018-03-08 ENCOUNTER — ONCOLOGY VISIT (OUTPATIENT)
Dept: ONCOLOGY | Facility: CLINIC | Age: 78
End: 2018-03-08
Attending: INTERNAL MEDICINE
Payer: MEDICARE

## 2018-03-08 VITALS
OXYGEN SATURATION: 99 % | HEIGHT: 66 IN | DIASTOLIC BLOOD PRESSURE: 71 MMHG | WEIGHT: 159.7 LBS | SYSTOLIC BLOOD PRESSURE: 145 MMHG | RESPIRATION RATE: 16 BRPM | BODY MASS INDEX: 25.67 KG/M2 | TEMPERATURE: 98 F | HEART RATE: 78 BPM

## 2018-03-08 DIAGNOSIS — R07.81 RIB PAIN ON LEFT SIDE: ICD-10-CM

## 2018-03-08 DIAGNOSIS — C22.0 HCC (HEPATOCELLULAR CARCINOMA) (H): Primary | ICD-10-CM

## 2018-03-08 PROCEDURE — T1013 SIGN LANG/ORAL INTERPRETER: HCPCS | Mod: U3,ZF

## 2018-03-08 PROCEDURE — G0463 HOSPITAL OUTPT CLINIC VISIT: HCPCS | Mod: ZF

## 2018-03-08 PROCEDURE — 99214 OFFICE O/P EST MOD 30 MIN: CPT | Mod: ZP | Performed by: INTERNAL MEDICINE

## 2018-03-08 RX ORDER — AMLODIPINE BESYLATE 5 MG/1
5 TABLET ORAL DAILY
Status: ON HOLD | COMMUNITY
Start: 2018-02-17 | End: 2020-01-18

## 2018-03-08 RX ORDER — OXYCODONE HYDROCHLORIDE 5 MG/1
5 TABLET ORAL EVERY 6 HOURS PRN
Status: ON HOLD | COMMUNITY
Start: 2018-02-05 | End: 2020-08-01

## 2018-03-08 ASSESSMENT — PAIN SCALES - GENERAL: PAINLEVEL: SEVERE PAIN (7)

## 2018-03-08 NOTE — LETTER
3/8/2018       RE: Eh Callahan  [address]   [facility] [address]     Dear Colleague,    Thank you for referring your patient, Eh Callahan, to the [facility]. Please see a copy of my visit note below.    Oncology Follow up visit:  Date on this visit: 3/8/2018       DIAGNOSIS  HCC    Primary Physician: Shalini Mac     History Of Present Illness:       Copied and updated from prior notes     Mr. Callahan is a 77-year-old male who has a history of previously treated hepatitis C(interferon and ribavirin in 2005 and 2006) attaining a sustained virologic response, was found to have a 3.6 cm lesion in the liver, which was concerning for malignancy.  On 11/22/16, he had a hand-assisted exploratory laparoscopy with extensive lysis of adhesion, and segment 2 and 3 of the liver were removed by Dr Mckeon.    The final pathology from the surgery revealed moderately differentiated hepatocellular carcinoma with negative margins, with a tumor size being 4.2 x 3.6 x 3 cm.  The tumors were confined to the liver.  All margins were negative.  There was no lymphovascular or perineural invasion present.  No lymph nodes were examined in the surgical specimen.  It was a pT1 solitary tumor without vascular invasion.  Prior to the surgery, he had a CAT scan of the chest, which showed subcentimeter pulmonary nodules, but no definite evidence of metastasis.   He recovered well after surgery.     His repeat MRI showed a new 12 mm concerning lesion in Hepatic seg 5/8.   He had MWA of segment 5 HCC on 4/19/2017.    Repeat MRI on 5/26/17 showed post treatment changes. There was an indeterminate lesion in seg 5 which was stable on MRI done in Sept 2017  MRI in Dec 2017 showed findings c/w post treatment changes. There is a small focus of new arterial phase hyperenhancement at the margin of the ablation zone which measures 0.9 mm which is LR-TR equivocal.  The S5 lesion has slightly increased in size to 7mm and  remains OPTN 4.  AFP remains undetected but it has never been elevated.      Since then he has been on observation    Interval History    Professional  is available throughout the interview   He tells me that he is doing well. His right upper quadrant pain is much better now to the point that he is not taking any pain medications. 3 days ago he was coming out of the bathroom and felt lightheaded and fell down and hit the left rib cage to the wall but there was no loss of consciousness. He did not hit his head. Since then he has noticed pain in the left rib cage area although the pain is slowly getting better. There is no obvious bruises. Otherwise no new neurological problems. He is not sure whether he is taking Rivaroxaban or not. He denies nausea or vomiting diarrhea or constipation or any bleeding. Energy is about the same as before.  Denies serious infections. He continues to have lower extremity swelling and he is following with his primary care physician regarding that. Denies any other swellings. No trouble breathing.  Because of this fall he missed the MRI appointment which she was supposed to do before this visit      ECOG 2  ROS:  Rest of the review of the system was unremarkable    I reviewed other hx in EPIC as below      Past Medical/Surgical History:  Past Medical History:   Diagnosis Date     Anatomical narrow angle glaucoma      Atrial fibrillation (H)      Chronic infection     history of hepatitis C     CKD (chronic kidney disease) stage 2, GFR 60-89 ml/min      Diabetes mellitus (H)      Hepatitis C without mention of hepatic coma      Hypertension      Palpitations      PTSD (post-traumatic stress disorder)      Past Surgical History:   Procedure Laterality Date     APPENDECTOMY       EYE SURGERY       LAPAROSCOPIC HEPATECTOMY PARTIAL Left 11/22/2016    Procedure: LAPAROSCOPIC HEPATECTOMY PARTIAL;  Surgeon: Rick Mckeon MD;  Location: [facility] OR     Cancer History:     As  "above    Allergies:  Allergies as of 03/08/2018 - Raul as Reviewed 03/08/2018   Allergen Reaction Noted     Amlodipine Swelling 09/26/2016     Lisinopril Cough 09/26/2016     Metoprolol  09/26/2016     Current Medications:  Current Outpatient Prescriptions   Medication Sig Dispense Refill     amLODIPine (NORVASC) 5 MG tablet Take 5 mg by mouth       oxyCODONE IR (ROXICODONE) 5 MG tablet Take 5 mg by mouth       Blood Glucose Monitoring Suppl (FIFTY50 GLUCOSE METER 2.0) W/DEVICE KIT as needed for blood glucose monitoring       Carboxymeth-Glycerin-Polysorb (REFRESH OPTIVE ADVANCED) 0.5-1-0.5 % SOLN PLACE 1 DROP INTO BOTH EYES 4 (FOUR) TIMES DAILY.       LORazepam (ATIVAN) 1 MG tablet Take 1 mg by mouth as needed       insulin aspart (NOVOLOG PEN) 100 UNIT/ML injection 3 units before each meal.       furosemide (LASIX) 20 MG tablet Take 1 tablet (20 mg) by mouth daily 30 tablet 3     Blood Pressure Monitoring (RA BLOOD PRESSURE CUFF MONITOR) MISC Take blood pressure once daily       timolol (TIMOPTIC) 0.5 % ophthalmic solution PLACE 1 DROP INTO BOTH EYES ONCE DAILY.       traZODone (DESYREL) 50 MG tablet Take 50 mg by mouth       [employer] LANCETS 21G MISC Uses 6 times daily       blood glucose monitoring (RefferedAgent.com CONTOUR) test strip USE TO MEASURE YOUR BLOOD GLUCOSE 6 TIMES DAILY       insulin degludec (TRESIBA) 100 UNIT/ML pen Inject 14 Units Subcutaneous       Insulin Pen Needle (PEN NEEDLES 5/16\") 31G X 8 MM MISC 4 x daily       acetaminophen (TYLENOL) 500 MG tablet Take 2 tablets (1,000 mg) by mouth 3 times daily (Patient taking differently: Take 500 mg by mouth 3 times daily as needed ) 100 tablet 0     carboxymethylcellulose (REFRESH PLUS) 0.5 % SOLN Place 1 drop into both eyes 4 times daily        pantoprazole (PROTONIX) 40 MG enteric coated tablet Take 40 mg by mouth daily        cholecalciferol (D 5000) 5000 UNITS CAPS Reported on 4/12/2017       alendronate (FOSAMAX) 70 MG tablet 70 mg every 7 days Take 1 tablet " "by mouth twice weekly.       Multiple vitamin TABS Take 1 tablet by mouth       simvastatin (ZOCOR) 20 MG tablet Take 1 tablet (20 mg) by mouth daily       losartan (COZAAR) 100 MG tablet Take 1 tablet by mouth daily       XARELTO 20 MG TABS tablet TAKE 1 TABLET (20 MG) BY MOUTH DAILY (WITH DINNER) (Patient not taking: Reported on 3/8/2018) 90 tablet 1     Multiple Vitamins-Minerals (MULTIVITAMIN & MINERAL PO)        polyethylene glycol (MIRALAX/GLYCOLAX) packet Take 17 g by mouth daily as needed for constipation (Patient not taking: Reported on 3/8/2018) 30 packet 1     senna-docusate (SENOKOT-S;PERICOLACE) 8.6-50 MG per tablet Take 1 tablet by mouth 2 times daily (Patient not taking: Reported on 3/8/2018) 14 tablet 0     insulin lispro (HUMALOG PEN) 100 UNIT/ML soln 4 - 12 units 3 x daily        Family History:  Family History   Problem Relation Age of Onset     DIABETES No family hx of      He is not aware of any diabetes in his family     KIDNEY DISEASE No family hx of      Social History:  Social History     Social History     Marital status:      Spouse name: N/A     Number of children: N/A     Years of education: N/A     Occupational History     Not on file.     Social History Main Topics     Smoking status: Former Smoker     Smokeless tobacco: Never Used     Alcohol use No     Drug use: No     Sexual activity: Not on file     Other Topics Concern     Parent/Sibling W/ Cabg, Mi Or Angioplasty Before 65f 55m? No     Social History Narrative    Immigrated in 2002 from [facility].  Previously lived in [place] before moving here.  Daughter lives locally as well.       Physical Exam:  /71 (BP Location: Right arm, Patient Position: Chair, Cuff Size: Adult Regular)  Pulse 78  Temp 98  F (36.7  C) (Oral)  Resp 16  Ht 1.676 m (5' 5.98\")  Wt 72.4 kg (159 lb 11.2 oz)  SpO2 99%  BMI 25.79 kg/m2    CONSTITUTIONAL: no acute distress  EYES: PERRLA, no palor or icterus.   ENT/MOUTH: no mouth lesions. Ears " normal  CVS: s1s2 no m r g .   RESPIRATORY: clear to auscultation b/l  GI: soft non tender . Mild hepatomegaly and no splenomegaly  NEURO: AAOX3  Grossly non focal neuro exam  INTEGUMENT: no obvious rashes  LYMPHATIC: no palpable cervical, supraclavicular, axillary LAD  MUSCULOSKELETAL: He has tenderness on the left side of the rib cage although there is no obvious proves or underlying fluctuance or crepitus which I was able to appreciate  EXTREMITIES: 1+ bilateral edema   PSYCH: Mentation, mood and affect are normal. Decision making capacity is intact          Laboratory/Imaging Studies  Results for CHIKIS NEWBERRY (MRN [id number]) as of 3/8/2018 12:15   Ref. Range 3/5/2018 10:34   Sodium Latest Ref Range: 133 - 144 mmol/L 133   Potassium Latest Ref Range: 3.4 - 5.3 mmol/L 4.2   Chloride Latest Ref Range: 94 - 109 mmol/L 98   Carbon Dioxide Latest Ref Range: 20 - 32 mmol/L 31   Urea Nitrogen Latest Ref Range: 7 - 30 mg/dL 32 (H)   Creatinine Latest Ref Range: 0.66 - 1.25 mg/dL 1.44 (H)   GFR Estimate Latest Ref Range: >60 mL/min/1.7m2 47 (L)   GFR Estimate If Black Latest Ref Range: >60 mL/min/1.7m2 57 (L)   Calcium Latest Ref Range: 8.5 - 10.1 mg/dL 8.7   Anion Gap Latest Ref Range: 3 - 14 mmol/L 4   Albumin Latest Ref Range: 3.4 - 5.0 g/dL 2.7 (L)   Protein Total Latest Ref Range: 6.8 - 8.8 g/dL 6.4 (L)   Bilirubin Total Latest Ref Range: 0.2 - 1.3 mg/dL 1.0   Alkaline Phosphatase Latest Ref Range: 40 - 150 U/L 94   ALT Latest Ref Range: 0 - 70 U/L 25   AST Latest Ref Range: 0 - 45 U/L 19   Alpha Fetoprotein Latest Ref Range: 0 - 8 ug/L <1.5   Glucose Latest Ref Range: 70 - 99 mg/dL 235 (H)   WBC Latest Ref Range: 4.0 - 11.0 10e9/L 4.1   Hemoglobin Latest Ref Range: 13.3 - 17.7 g/dL 13.8   Hematocrit Latest Ref Range: 40.0 - 53.0 % 41.3   Platelet Count Latest Ref Range: 150 - 450 10e9/L 125 (L)   RBC Count Latest Ref Range: 4.4 - 5.9 10e12/L 4.41   MCV Latest Ref Range: 78 - 100 fl 94   MCH Latest Ref Range: 26.5 -  33.0 pg 31.3   MCHC Latest Ref Range: 31.5 - 36.5 g/dL 33.4   RDW Latest Ref Range: 10.0 - 15.0 % 11.7   Diff Method Unknown Automated Method   % Neutrophils Latest Units: % 59.2   % Lymphocytes Latest Units: % 28.8   % Monocytes Latest Units: % 8.8   % Eosinophils Latest Units: % 2.7   % Basophils Latest Units: % 0.5   % Immature Granulocytes Latest Units: % 0.0   Nucleated RBCs Latest Ref Range: 0 /100 0   Absolute Neutrophil Latest Ref Range: 1.6 - 8.3 10e9/L 2.4   Absolute Lymphocytes Latest Ref Range: 0.8 - 5.3 10e9/L 1.2   Absolute Monocytes Latest Ref Range: 0.0 - 1.3 10e9/L 0.4   Absolute Eosinophils Latest Ref Range: 0.0 - 0.7 10e9/L 0.1     MRI Abdomen 12/18/17  Liver: Postoperative changes of partial left hepatectomy. Mild  increased precontrast T1 signal along the surgical margin, likely  representing a small hematoma without features concerning for residual  tumor; no worrisome nodularity or enhancement.  No significant hepatic  steatosis or iron deposition. There is patchy heterogeneous arterial  enhancement throughout the liver parenchyma with a delayed reticular  pattern likely due to fibrotic changes.     Post ablation changes in the subcapsular aspect of segment 5/8 (series  9, image 36). There is a small focus of new arterial phase  hyperenhancement at the margin of the ablation zone which measures 0.9  mm (series 10, image 32). There is no associated washout. No increased  T2 signal or hyperintense signal on high be value diffusion-weighted  images.  LR-TR equivocal.     Lesion 1: There is a 7 mm arterially enhancing lesion in hepatic  segment  5 adjacent to the gallbladder fossa (series 9 image 45). It  does washout on portal venous and delayed phase imaging, series 11  image 45 and series 14 image 43. Pseudocapsule is not convincingly  identified. There is no associated increased T2 signal, but there is  mild increased signal on diffusion weighted images. Previously this  measured 5 mm. There  is not evidence of venous invasion.  OPTN/LIRADS  class 4.     Scattered subcentimeter foci of arterial enhancement throughout the  liver parenchyma with no restricted diffusion, washout or  pseudocapsule, indeterminate. A representative lesion can be seen  anterior to the right portal vein segment 5/8 (series 3, image 36).  OPTN/LIRADS 3.     Gallbladder: Normally distended. Cholelithiasis. No gallbladder wall  thickening. No pericholecystic fluid.     Spleen: Not enlarged. No focal lesions.     Kidneys: No suspicious lesions. Focal cortical retraction in the  superior/interpolar region of the right kidney, likely from prior  infection/infarction. Tiny bilateral cortical renal cysts. Stable mild  left pelvocaliectasis. No right hydronephrosis.     Adrenal glands: Unremarkable.     Pancreas: Partial atrophic pancreatic parenchyma with mild diffuse  decreased precontrast T1 signal. No focal lesions. Main pancreatic  duct is normal diameter. No pancreas divisum.     Bowel: No dilated loops of bowel.     Lymph nodes: Prominent melisa hepatis lymph nodes, likely reactive.     Blood vessels: Patent abdominal vasculature. No abdominal aortic  aneurysm.     Lung bases: Small bilateral pleural effusions, right greater than  left. Mild bilateral lower lobes dependent atelectasis. No pericardial  effusion. Cardiac size within normal limits.     Bones and soft tissues: Degenerative changes of the spine. Partial  fusion along the right lateral aspect at L3-L4. No suspicious lesions  in the bones. Bilateral gynecomastia.     Mesentery and abdominal wall: Unremarkable.     Ascites: No ascites.         IMPRESSION:    1. Cirrhosis without convincing evidence of portal hypertension.  2. Postoperative changes of microwave ablation involving hepatic  segments 5/8. There is a new small focus of associated enhancement  peripherally without washout or additional suspicious characteristics.  LR TR equivocal. Recommend short-term (3 months)  follow-up MR.  3. Postsurgical changes of partial left hepatectomy. Small  postoperative hematoma/blood products along the surgical margin. No  evidence for viable tumor at the surgical margin.   4. 7 mm OPTN 4 lesion in segment 5, slightly increased in size from  the prior. OPTN/LIRADS 4. Additional indeterminate LIRADS 3 lesions.  These can be reevaluated at follow-up.  5. Based on this exam only, the patient is within Dimitri criteria.  6. Cholelithiasis. No associated findings of acute cholecystitis.    MRI abdomen 5/26/17      IMPRESSION:    1. Postoperative changes of partial left hepatectomy and right lobe  microwave ablation with no residual tumor at these sites.  2. Indeterminate, hypoenhancing lesion near the gallbladder fossa  hepatic segment 5, not definitively visualized on prior exams. This is  indeterminate, and follow-up is recommended.   3. Cholelithiasis.            ASSESSMENT/PLAN:      Stage I, pT1 NX MX, moderately differentiated hepatocellular carcinoma with maximum tumor size being 4.2 cm of the left lower lobe, status post resection with negative margins and no lymphovascular or perineural invasion found.  The background liver had minimal to mild focal chronic portal  grade 1 out of 4, and fibrosis and portal expansion, which is also stage 1-2 out of 4.  He does not have evidence of cirrhosis.      His repeat MRI shows a new 12 mm concerning lesion in Hepatic seg 5/8. He underwent microwave ablation of segment 5 HCC on 4/19/2017. Repeat MRI after the MWA on 5/26/17 showed post treatment changes. There was an indeterminate lesion in seg 5.  On most recent scans, he has findings c/w post treatment changes. There is a small focus of new arterial phase hyperenhancement at the margin of the ablation zone which measures 0.9 mm which is LR-TR equivocal.  The S5 lesion has slightly increased in size to 7mm and remains OPTN 4.  AFP remains undetected but it has never been elevated.  He missed the MRI  which she was supposed to get because of the recent fall and we will reschedule that. Based upon its findings and we will decide what to do in terms of his management. If it is unremarkable then I would favor repeating his scans in 3 months.    Recent fall and pain on the left side of the rib cage. He did not lose consciousness although he felt lightheaded. Since then he has not felt lightheaded it was an isolated episode. Denies loss of consciousness. His pain is getting better. We will check x-rays of the left side of the hip. He will continue to take oxycodone as needed for pain. He will follow with his primary care physician.    Abdominal pain. This is almost resolved and at this point he is not requiring any pain medications. This likely was in the setting of microwave ablation and prior surgery.    Thrombocytopenia. This is mild and stable we will continue to watch.    Bilateral lower extremity edema. Continue Lasix. He will follow with primary care physician.    He has some confusion as to what medications he was taking. He was taking Rivaroxaban for atrial fibrillation but he is not sure whether he is taking or not. I will have my nurse coordinator talk to him so as to figure things out so that he has better medical management. I also encouraged him to follow closely with his primary care physician.    The following was not addressed today     Hepatitis C.  This has been treated in 2005 and 2006, and last HCV RNA was undetectable in 11/2015      I would like to see him back in 3 months with repeat scans and blood work prior to that.    All of his questions were answered to his satisfaction and he is comfortable with this plan      Miri Antoine              Again, thank you for allowing me to participate in the care of your patient.      Sincerely,    Miri Antoine MD

## 2018-03-08 NOTE — PROGRESS NOTES
Oncology Follow up visit:  Date on this visit: 3/8/2018       DIAGNOSIS  HCC    Primary Physician: Shalini Mac     History Of Present Illness:       Copied and updated from prior notes     Mr. Callahan is a 77-year-old male who has a history of previously treated hepatitis C(interferon and ribavirin in 2005 and 2006) attaining a sustained virologic response, was found to have a 3.6 cm lesion in the liver, which was concerning for malignancy.  On 11/22/16, he had a hand-assisted exploratory laparoscopy with extensive lysis of adhesion, and segment 2 and 3 of the liver were removed by Dr Mckeon.    The final pathology from the surgery revealed moderately differentiated hepatocellular carcinoma with negative margins, with a tumor size being 4.2 x 3.6 x 3 cm.  The tumors were confined to the liver.  All margins were negative.  There was no lymphovascular or perineural invasion present.  No lymph nodes were examined in the surgical specimen.  It was a pT1 solitary tumor without vascular invasion.  Prior to the surgery, he had a CAT scan of the chest, which showed subcentimeter pulmonary nodules, but no definite evidence of metastasis.   He recovered well after surgery.     His repeat MRI showed a new 12 mm concerning lesion in Hepatic seg 5/8.   He had MWA of segment 5 HCC on 4/19/2017.    Repeat MRI on 5/26/17 showed post treatment changes. There was an indeterminate lesion in seg 5 which was stable on MRI done in Sept 2017  MRI in Dec 2017 showed findings c/w post treatment changes. There is a small focus of new arterial phase hyperenhancement at the margin of the ablation zone which measures 0.9 mm which is LR-TR equivocal.  The S5 lesion has slightly increased in size to 7mm and remains OPTN 4.  AFP remains undetected but it has never been elevated.      Since then he has been on observation    Interval History    Professional  is available throughout the interview   He tells me that he is doing  well. His right upper quadrant pain is much better now to the point that he is not taking any pain medications. 3 days ago he was coming out of the bathroom and felt lightheaded and fell down and hit the left rib cage to the wall but there was no loss of consciousness. He did not hit his head. Since then he has noticed pain in the left rib cage area although the pain is slowly getting better. There is no obvious bruises. Otherwise no new neurological problems. He is not sure whether he is taking Rivaroxaban or not. He denies nausea or vomiting diarrhea or constipation or any bleeding. Energy is about the same as before.  Denies serious infections. He continues to have lower extremity swelling and he is following with his primary care physician regarding that. Denies any other swellings. No trouble breathing.  Because of this fall he missed the MRI appointment which she was supposed to do before this visit      ECOG 2  ROS:  Rest of the review of the system was unremarkable    I reviewed other hx in EPIC as below      Past Medical/Surgical History:  Past Medical History:   Diagnosis Date     Anatomical narrow angle glaucoma      Atrial fibrillation (H)      Chronic infection     history of hepatitis C     CKD (chronic kidney disease) stage 2, GFR 60-89 ml/min      Diabetes mellitus (H)      Hepatitis C without mention of hepatic coma      Hypertension      Palpitations      PTSD (post-traumatic stress disorder)      Past Surgical History:   Procedure Laterality Date     APPENDECTOMY       EYE SURGERY       LAPAROSCOPIC HEPATECTOMY PARTIAL Left 11/22/2016    Procedure: LAPAROSCOPIC HEPATECTOMY PARTIAL;  Surgeon: Rick Mckeon MD;  Location: [facility] OR     Cancer History:     As above    Allergies:  Allergies as of 03/08/2018 - Raul as Reviewed 03/08/2018   Allergen Reaction Noted     Amlodipine Swelling 09/26/2016     Lisinopril Cough 09/26/2016     Metoprolol  09/26/2016     Current Medications:  Current Outpatient  "Prescriptions   Medication Sig Dispense Refill     amLODIPine (NORVASC) 5 MG tablet Take 5 mg by mouth       oxyCODONE IR (ROXICODONE) 5 MG tablet Take 5 mg by mouth       Blood Glucose Monitoring Suppl (FIFTY50 GLUCOSE METER 2.0) W/DEVICE KIT as needed for blood glucose monitoring       Carboxymeth-Glycerin-Polysorb (REFRESH OPTIVE ADVANCED) 0.5-1-0.5 % SOLN PLACE 1 DROP INTO BOTH EYES 4 (FOUR) TIMES DAILY.       LORazepam (ATIVAN) 1 MG tablet Take 1 mg by mouth as needed       insulin aspart (NOVOLOG PEN) 100 UNIT/ML injection 3 units before each meal.       furosemide (LASIX) 20 MG tablet Take 1 tablet (20 mg) by mouth daily 30 tablet 3     Blood Pressure Monitoring (RA BLOOD PRESSURE CUFF MONITOR) MISC Take blood pressure once daily       timolol (TIMOPTIC) 0.5 % ophthalmic solution PLACE 1 DROP INTO BOTH EYES ONCE DAILY.       traZODone (DESYREL) 50 MG tablet Take 50 mg by mouth       [employer] LANCETS 21G MISC Uses 6 times daily       blood glucose monitoring (GREGORIO CONTOUR) test strip USE TO MEASURE YOUR BLOOD GLUCOSE 6 TIMES DAILY       insulin degludec (TRESIBA) 100 UNIT/ML pen Inject 14 Units Subcutaneous       Insulin Pen Needle (PEN NEEDLES 5/16\") 31G X 8 MM MISC 4 x daily       acetaminophen (TYLENOL) 500 MG tablet Take 2 tablets (1,000 mg) by mouth 3 times daily (Patient taking differently: Take 500 mg by mouth 3 times daily as needed ) 100 tablet 0     carboxymethylcellulose (REFRESH PLUS) 0.5 % SOLN Place 1 drop into both eyes 4 times daily        pantoprazole (PROTONIX) 40 MG enteric coated tablet Take 40 mg by mouth daily        cholecalciferol (D 5000) 5000 UNITS CAPS Reported on 4/12/2017       alendronate (FOSAMAX) 70 MG tablet 70 mg every 7 days Take 1 tablet by mouth twice weekly.       Multiple vitamin TABS Take 1 tablet by mouth       simvastatin (ZOCOR) 20 MG tablet Take 1 tablet (20 mg) by mouth daily       losartan (COZAAR) 100 MG tablet Take 1 tablet by mouth daily       XARELTO 20 MG TABS " "tablet TAKE 1 TABLET (20 MG) BY MOUTH DAILY (WITH DINNER) (Patient not taking: Reported on 3/8/2018) 90 tablet 1     Multiple Vitamins-Minerals (MULTIVITAMIN & MINERAL PO)        polyethylene glycol (MIRALAX/GLYCOLAX) packet Take 17 g by mouth daily as needed for constipation (Patient not taking: Reported on 3/8/2018) 30 packet 1     senna-docusate (SENOKOT-S;PERICOLACE) 8.6-50 MG per tablet Take 1 tablet by mouth 2 times daily (Patient not taking: Reported on 3/8/2018) 14 tablet 0     insulin lispro (HUMALOG PEN) 100 UNIT/ML soln 4 - 12 units 3 x daily        Family History:  Family History   Problem Relation Age of Onset     DIABETES No family hx of      He is not aware of any diabetes in his family     KIDNEY DISEASE No family hx of      Social History:  Social History     Social History     Marital status:      Spouse name: N/A     Number of children: N/A     Years of education: N/A     Occupational History     Not on file.     Social History Main Topics     Smoking status: Former Smoker     Smokeless tobacco: Never Used     Alcohol use No     Drug use: No     Sexual activity: Not on file     Other Topics Concern     Parent/Sibling W/ Cabg, Mi Or Angioplasty Before 65f 55m? No     Social History Narrative    Immigrated in 2002 from [facility].  Previously lived in [place] before moving here.  Daughter lives locally as well.       Physical Exam:  /71 (BP Location: Right arm, Patient Position: Chair, Cuff Size: Adult Regular)  Pulse 78  Temp 98  F (36.7  C) (Oral)  Resp 16  Ht 1.676 m (5' 5.98\")  Wt 72.4 kg (159 lb 11.2 oz)  SpO2 99%  BMI 25.79 kg/m2    CONSTITUTIONAL: no acute distress  EYES: PERRLA, no palor or icterus.   ENT/MOUTH: no mouth lesions. Ears normal  CVS: s1s2 no m r g .   RESPIRATORY: clear to auscultation b/l  GI: soft non tender . Mild hepatomegaly and no splenomegaly  NEURO: AAOX3  Grossly non focal neuro exam  INTEGUMENT: no obvious rashes  LYMPHATIC: no palpable cervical, " supraclavicular, axillary LAD  MUSCULOSKELETAL: He has tenderness on the left side of the rib cage although there is no obvious proves or underlying fluctuance or crepitus which I was able to appreciate  EXTREMITIES: 1+ bilateral edema   PSYCH: Mentation, mood and affect are normal. Decision making capacity is intact          Laboratory/Imaging Studies  Results for CHIKIS NEWBERRY (MRN [id number]) as of 3/8/2018 12:15   Ref. Range 3/5/2018 10:34   Sodium Latest Ref Range: 133 - 144 mmol/L 133   Potassium Latest Ref Range: 3.4 - 5.3 mmol/L 4.2   Chloride Latest Ref Range: 94 - 109 mmol/L 98   Carbon Dioxide Latest Ref Range: 20 - 32 mmol/L 31   Urea Nitrogen Latest Ref Range: 7 - 30 mg/dL 32 (H)   Creatinine Latest Ref Range: 0.66 - 1.25 mg/dL 1.44 (H)   GFR Estimate Latest Ref Range: >60 mL/min/1.7m2 47 (L)   GFR Estimate If Black Latest Ref Range: >60 mL/min/1.7m2 57 (L)   Calcium Latest Ref Range: 8.5 - 10.1 mg/dL 8.7   Anion Gap Latest Ref Range: 3 - 14 mmol/L 4   Albumin Latest Ref Range: 3.4 - 5.0 g/dL 2.7 (L)   Protein Total Latest Ref Range: 6.8 - 8.8 g/dL 6.4 (L)   Bilirubin Total Latest Ref Range: 0.2 - 1.3 mg/dL 1.0   Alkaline Phosphatase Latest Ref Range: 40 - 150 U/L 94   ALT Latest Ref Range: 0 - 70 U/L 25   AST Latest Ref Range: 0 - 45 U/L 19   Alpha Fetoprotein Latest Ref Range: 0 - 8 ug/L <1.5   Glucose Latest Ref Range: 70 - 99 mg/dL 235 (H)   WBC Latest Ref Range: 4.0 - 11.0 10e9/L 4.1   Hemoglobin Latest Ref Range: 13.3 - 17.7 g/dL 13.8   Hematocrit Latest Ref Range: 40.0 - 53.0 % 41.3   Platelet Count Latest Ref Range: 150 - 450 10e9/L 125 (L)   RBC Count Latest Ref Range: 4.4 - 5.9 10e12/L 4.41   MCV Latest Ref Range: 78 - 100 fl 94   MCH Latest Ref Range: 26.5 - 33.0 pg 31.3   MCHC Latest Ref Range: 31.5 - 36.5 g/dL 33.4   RDW Latest Ref Range: 10.0 - 15.0 % 11.7   Diff Method Unknown Automated Method   % Neutrophils Latest Units: % 59.2   % Lymphocytes Latest Units: % 28.8   % Monocytes Latest  Units: % 8.8   % Eosinophils Latest Units: % 2.7   % Basophils Latest Units: % 0.5   % Immature Granulocytes Latest Units: % 0.0   Nucleated RBCs Latest Ref Range: 0 /100 0   Absolute Neutrophil Latest Ref Range: 1.6 - 8.3 10e9/L 2.4   Absolute Lymphocytes Latest Ref Range: 0.8 - 5.3 10e9/L 1.2   Absolute Monocytes Latest Ref Range: 0.0 - 1.3 10e9/L 0.4   Absolute Eosinophils Latest Ref Range: 0.0 - 0.7 10e9/L 0.1     MRI Abdomen 12/18/17  Liver: Postoperative changes of partial left hepatectomy. Mild  increased precontrast T1 signal along the surgical margin, likely  representing a small hematoma without features concerning for residual  tumor; no worrisome nodularity or enhancement.  No significant hepatic  steatosis or iron deposition. There is patchy heterogeneous arterial  enhancement throughout the liver parenchyma with a delayed reticular  pattern likely due to fibrotic changes.     Post ablation changes in the subcapsular aspect of segment 5/8 (series  9, image 36). There is a small focus of new arterial phase  hyperenhancement at the margin of the ablation zone which measures 0.9  mm (series 10, image 32). There is no associated washout. No increased  T2 signal or hyperintense signal on high be value diffusion-weighted  images.  LR-TR equivocal.     Lesion 1: There is a 7 mm arterially enhancing lesion in hepatic  segment  5 adjacent to the gallbladder fossa (series 9 image 45). It  does washout on portal venous and delayed phase imaging, series 11  image 45 and series 14 image 43. Pseudocapsule is not convincingly  identified. There is no associated increased T2 signal, but there is  mild increased signal on diffusion weighted images. Previously this  measured 5 mm. There is not evidence of venous invasion.  OPTN/LIRADS  class 4.     Scattered subcentimeter foci of arterial enhancement throughout the  liver parenchyma with no restricted diffusion, washout or  pseudocapsule, indeterminate. A representative  lesion can be seen  anterior to the right portal vein segment 5/8 (series 3, image 36).  OPTN/LIRADS 3.     Gallbladder: Normally distended. Cholelithiasis. No gallbladder wall  thickening. No pericholecystic fluid.     Spleen: Not enlarged. No focal lesions.     Kidneys: No suspicious lesions. Focal cortical retraction in the  superior/interpolar region of the right kidney, likely from prior  infection/infarction. Tiny bilateral cortical renal cysts. Stable mild  left pelvocaliectasis. No right hydronephrosis.     Adrenal glands: Unremarkable.     Pancreas: Partial atrophic pancreatic parenchyma with mild diffuse  decreased precontrast T1 signal. No focal lesions. Main pancreatic  duct is normal diameter. No pancreas divisum.     Bowel: No dilated loops of bowel.     Lymph nodes: Prominent melisa hepatis lymph nodes, likely reactive.     Blood vessels: Patent abdominal vasculature. No abdominal aortic  aneurysm.     Lung bases: Small bilateral pleural effusions, right greater than  left. Mild bilateral lower lobes dependent atelectasis. No pericardial  effusion. Cardiac size within normal limits.     Bones and soft tissues: Degenerative changes of the spine. Partial  fusion along the right lateral aspect at L3-L4. No suspicious lesions  in the bones. Bilateral gynecomastia.     Mesentery and abdominal wall: Unremarkable.     Ascites: No ascites.         IMPRESSION:    1. Cirrhosis without convincing evidence of portal hypertension.  2. Postoperative changes of microwave ablation involving hepatic  segments 5/8. There is a new small focus of associated enhancement  peripherally without washout or additional suspicious characteristics.  LR TR equivocal. Recommend short-term (3 months) follow-up MR.  3. Postsurgical changes of partial left hepatectomy. Small  postoperative hematoma/blood products along the surgical margin. No  evidence for viable tumor at the surgical margin.   4. 7 mm OPTN 4 lesion in segment 5,  slightly increased in size from  the prior. OPTN/LIRADS 4. Additional indeterminate LIRADS 3 lesions.  These can be reevaluated at follow-up.  5. Based on this exam only, the patient is within Dimitri criteria.  6. Cholelithiasis. No associated findings of acute cholecystitis.    MRI abdomen 5/26/17      IMPRESSION:    1. Postoperative changes of partial left hepatectomy and right lobe  microwave ablation with no residual tumor at these sites.  2. Indeterminate, hypoenhancing lesion near the gallbladder fossa  hepatic segment 5, not definitively visualized on prior exams. This is  indeterminate, and follow-up is recommended.   3. Cholelithiasis.            ASSESSMENT/PLAN:      Stage I, pT1 NX MX, moderately differentiated hepatocellular carcinoma with maximum tumor size being 4.2 cm of the left lower lobe, status post resection with negative margins and no lymphovascular or perineural invasion found.  The background liver had minimal to mild focal chronic portal  grade 1 out of 4, and fibrosis and portal expansion, which is also stage 1-2 out of 4.  He does not have evidence of cirrhosis.      His repeat MRI shows a new 12 mm concerning lesion in Hepatic seg 5/8. He underwent microwave ablation of segment 5 HCC on 4/19/2017. Repeat MRI after the MWA on 5/26/17 showed post treatment changes. There was an indeterminate lesion in seg 5.  On most recent scans, he has findings c/w post treatment changes. There is a small focus of new arterial phase hyperenhancement at the margin of the ablation zone which measures 0.9 mm which is LR-TR equivocal.  The S5 lesion has slightly increased in size to 7mm and remains OPTN 4.  AFP remains undetected but it has never been elevated.  He missed the MRI which she was supposed to get because of the recent fall and we will reschedule that. Based upon its findings and we will decide what to do in terms of his management. If it is unremarkable then I would favor repeating his scans in 3  months.    Recent fall and pain on the left side of the rib cage. He did not lose consciousness although he felt lightheaded. Since then he has not felt lightheaded it was an isolated episode. Denies loss of consciousness. His pain is getting better. We will check x-rays of the left side of the hip. He will continue to take oxycodone as needed for pain. He will follow with his primary care physician.    Abdominal pain. This is almost resolved and at this point he is not requiring any pain medications. This likely was in the setting of microwave ablation and prior surgery.    Thrombocytopenia. This is mild and stable we will continue to watch.    Bilateral lower extremity edema. Continue Lasix. He will follow with primary care physician.    He has some confusion as to what medications he was taking. He was taking Rivaroxaban for atrial fibrillation but he is not sure whether he is taking or not. I will have my nurse coordinator talk to him so as to figure things out so that he has better medical management. I also encouraged him to follow closely with his primary care physician.    The following was not addressed today     Hepatitis C.  This has been treated in 2005 and 2006, and last HCV RNA was undetectable in 11/2015      I would like to see him back in 3 months with repeat scans and blood work prior to that.    All of his questions were answered to his satisfaction and he is comfortable with this plan      Miri Antoine

## 2018-03-08 NOTE — PATIENT INSTRUCTIONS
Rib Xrays today    Schedule MRI abdomen next few days    Follow with PCP    See me back in 3 months with labs and repeat MRI

## 2018-03-08 NOTE — MR AVS SNAPSHOT
After Visit Summary   3/8/2018    Eh Callahan    MRN: [id number]           Patient Information     Date Of Birth          1940        Visit Information        Provider Department      3/8/2018 12:00 PM Charlene Justice Aazim K, MD [facility]        Today's Diagnoses     HCC (hepatocellular carcinoma) (H)    -  1    Rib pain on left side          Care Instructions    Rib Xrays today    Schedule MRI abdomen next few days    Follow with PCP    See me back in 3 months with labs and repeat MRI              Follow-ups after your visit        Your next 10 appointments already scheduled     Mar 16, 2018  7:45 PM CDT   (Arrive by 7:30 PM)   MR ABDOMEN W/O & W CONTRAST with [address] [facility] [place] [facility] ([facility])    [address] [address] [phone]           Take your medicines as usual, unless your doctor tells you not to. Bring a list of your current medicines to your exam (including vitamins, minerals and over-the-counter drugs). Also bring the results of similar scans you may have had.    You may or may not receive IV contrast for this exam pending the discretion of the Radiologist.   Do not eat or drink for 6 hours prior to exam.  The MRI machine uses a strong magnet. Please wear clothes without metal (snaps, zippers). A sweatsuit works well, or we may give you a hospital gown.  Please remove any body piercings and hair extensions before you arrive. You will also remove watches, jewelry, hairpins, wallets, dentures, partial dental plates and hearing aids. You may wear contact lenses, and you may be able to wear your rings. We have a safe place to keep your personal items, but it is safer to leave them at home.  **IMPORTANT** THE INSTRUCTIONS BELOW ARE ONLY FOR THOSE PATIENTS WHO HAVE BEEN PRESCRIBED SEDATION OR GENERAL ANESTHESIA DURING THEIR MRI PROCEDURE:  IF YOUR DOCTOR PRESCRIBED ORAL SEDATION (take  medicine to help you relax during your exam):   You must get the medicine from your doctor (oral medication) before you arrive. Bring the medicine to the exam. Do not take it at home. You ll be told when to take it upon arriving for your exam.   Arrive one hour early. Bring someone who can take you home after the test. Your medicine will make you sleepy. After the exam, you may not drive, take a bus or take a taxi by yourself.  IF YOUR DOCTOR PRESCRIBED IV SEDATION:   Arrive one hour early. Bring someone who can take you home after the test. Your medicine will make you sleepy. After the exam, you may not drive, take a bus or take a taxi by yourself.   No eating 6 hours before your exam. You may have clear liquids up until 4 hours before your exam. (Clear liquids include water, clear tea, black coffee and fruit juice without pulp.)  IF YOUR DOCTOR PRESCRIBED ANESTHESIA (be asleep for your exam):   Arrive 1 1/2 hours early. Bring someone who can take you home after the test. You may not drive, take a bus or take a taxi by yourself.   No eating 8 hours before your exam. You may have clear liquids up until 4 hours before your exam. (Clear liquids include water, clear tea, black coffee and fruit juice without pulp.)   You will spend four to five hours in the recovery room.  If you have any questions, please contact your Imaging Department exam site.            Jun 04, 2018  9:15 AM CDT   (Arrive by 9:00 AM)   MR ABDOMEN W/O & W CONTRAST with [address] [facility] ([facility])    [address] [address] [phone]           Take your medicines as usual, unless your doctor tells you not to. Bring a list of your current medicines to your exam (including vitamins, minerals and over-the-counter drugs). Also bring the results of similar scans you may have had.    You may or may not receive IV contrast for this exam pending the discretion of the  Radiologist.   Do not eat or drink for 6 hours prior to exam.  The MRI machine uses a strong magnet. Please wear clothes without metal (snaps, zippers). A sweatsuit works well, or we may give you a hospital gown.  Please remove any body piercings and hair extensions before you arrive. You will also remove watches, jewelry, hairpins, wallets, dentures, partial dental plates and hearing aids. You may wear contact lenses, and you may be able to wear your rings. We have a safe place to keep your personal items, but it is safer to leave them at home.  **IMPORTANT** THE INSTRUCTIONS BELOW ARE ONLY FOR THOSE PATIENTS WHO HAVE BEEN PRESCRIBED SEDATION OR GENERAL ANESTHESIA DURING THEIR MRI PROCEDURE:  IF YOUR DOCTOR PRESCRIBED ORAL SEDATION (take medicine to help you relax during your exam):   You must get the medicine from your doctor (oral medication) before you arrive. Bring the medicine to the exam. Do not take it at home. You ll be told when to take it upon arriving for your exam.   Arrive one hour early. Bring someone who can take you home after the test. Your medicine will make you sleepy. After the exam, you may not drive, take a bus or take a taxi by yourself.  IF YOUR DOCTOR PRESCRIBED IV SEDATION:   Arrive one hour early. Bring someone who can take you home after the test. Your medicine will make you sleepy. After the exam, you may not drive, take a bus or take a taxi by yourself.   No eating 6 hours before your exam. You may have clear liquids up until 4 hours before your exam. (Clear liquids include water, clear tea, black coffee and fruit juice without pulp.)  IF YOUR DOCTOR PRESCRIBED ANESTHESIA (be asleep for your exam):   Arrive 1 1/2 hours early. Bring someone who can take you home after the test. You may not drive, take a bus or take a taxi by yourself.   No eating 8 hours before your exam. You may have clear liquids up until 4 hours before your exam. (Clear liquids include water, clear tea, black coffee  "and fruit juice without pulp.)   You will spend four to five hours in the recovery room.  If you have any questions, please contact your Imaging Department exam site.            Jun 07, 2018 11:30 AM CDT   [facility] Lab Draw with  [facility] LAB DRAW   [facility] ([facility])    [address]  [address]  [facility] [address] [phone]            Jun 07, 2018 12:00 PM CDT   (Arrive by 11:45 AM)   Return Visit with Miri Antoine MD   [facility] ([facility])    [address] [place] [address]  [address]  [facility] [id number] [phone]              Future tests that were ordered for you today     Open Future Orders        Priority Expected Expires Ordered    XR Ribs Left 2 Views STAT 3/8/2018 3/8/2019 3/8/2018    MRI Abdomen w & w/o contrast Routine  6/6/2018 3/8/2018            Who to contact     If you have questions or need follow up information about today's clinic visit or your schedule please contact [facility] directly at [phone].  Normal or non-critical lab and imaging results will be communicated to you by MyChart, letter or phone within 4 business days after the clinic has received the results. If you do not hear from us within 7 days, please contact the clinic through My-Appshart or phone. If you have a critical or abnormal lab result, we will notify you by phone as soon as possible.  Submit refill requests through [employer] or call your pharmacy and they will forward the refill request to us. Please allow 3 business days for your refill to be completed.          Additional Information About Your Visit        My-Appshart Information     [employer] lets you send messages to your doctor, view your test results, renew your prescriptions, schedule appointments and more. To sign up, go to [email or web address] . Click on \"Log in\" on the left side of the screen, which will take you to the Welcome page. " "Then click on \"Sign up Now\" on the right side of the page.     You will be asked to enter the access code listed below, as well as some personal information. Please follow the directions to create your username and password.     Your access code is: [email or web address]  Expires: 3/15/2018  6:31 AM     Your access code will  in 90 days. If you need help or a new code, please call your [place] clinic or [phone].        Care EveryWhere ID     This is your Care EveryWhere ID. This could be used by other organizations to access your [place] medical records  [id number]        Your Vitals Were     Pulse Temperature Respirations Height Pulse Oximetry BMI (Body Mass Index)    78 98  F (36.7  C) (Oral) 16 1.676 m (5' 5.98\") 99% 25.79 kg/m2       Blood Pressure from Last 3 Encounters:   18 145/71   17 152/86   17 174/89    Weight from Last 3 Encounters:   18 72.4 kg (159 lb 11.2 oz)   17 72.1 kg (159 lb)   17 66.9 kg (147 lb 6.4 oz)                 Today's Medication Changes          These changes are accurate as of 3/8/18 12:55 PM.  If you have any questions, ask your nurse or doctor.               These medicines have changed or have updated prescriptions.        Dose/Directions    acetaminophen 500 MG tablet   Commonly known as:  TYLENOL   This may have changed:    - how much to take  - when to take this  - reasons to take this   Used for:  History of liver excision        Dose:  1000 mg   Take 2 tablets (1,000 mg) by mouth 3 times daily   Quantity:  100 tablet   Refills:  0                Primary Care Provider Office Phone # Fax #    Mendozaleoncioandrzej CIERA Mac PA-C [phone]       [facility] [address] [facility] [address]        Equal Access to Services     J LUIS COOMBS : vinnie Sousa, beryl stone . So [facility] [phone].    ATENCIÓN: Si georgina harvey " disposición servicios gratuitos de asistencia lingüística. Ann Marie hernandez [phone].    We comply with applicable federal civil rights laws and [place] laws. We do not discriminate on the basis of race, color, national origin, age, disability, sex, sexual orientation, or gender identity.            Thank you!     Thank you for choosing [facility]  for your care. Our goal is always to provide you with excellent care. Hearing back from our patients is one way we can continue to improve our services. Please take a few minutes to complete the written survey that you may receive in the mail after your visit with us. Thank you!             Your Updated Medication List - Protect others around you: Learn how to safely use, store and throw away your medicines at www.disposemymeds.org.          This list is accurate as of 3/8/18 12:55 PM.  Always use your most recent med list.                   Brand Name Dispense Instructions for use Diagnosis    acetaminophen 500 MG tablet    TYLENOL    100 tablet    Take 2 tablets (1,000 mg) by mouth 3 times daily    History of liver excision       alendronate 70 MG tablet    FOSAMAX     70 mg every 7 days Take 1 tablet by mouth twice weekly.        amLODIPine 5 MG tablet    NORVASC     Take 5 mg by mouth        GREGORIO CONTOUR test strip   Generic drug:  blood glucose monitoring      USE TO MEASURE YOUR BLOOD GLUCOSE 6 TIMES DAILY    HCC (hepatocellular carcinoma) (H)       carboxymethylcellulose 0.5 % Soln ophthalmic solution    REFRESH PLUS     Place 1 drop into both eyes 4 times daily        D 5000 5000 UNITS Caps   Generic drug:  cholecalciferol      Reported on 4/12/2017        FIFTY50 GLUCOSE METER 2.0 W/DEVICE Kit      as needed for blood glucose monitoring    HCC (hepatocellular carcinoma) (H)       furosemide 20 MG tablet    LASIX    30 tablet    Take 1 tablet (20 mg) by mouth daily    Peripheral edema, History of hepatitis C       insulin aspart 100 UNIT/ML  "injection    NovoLOG PEN     3 units before each meal.    HCC (hepatocellular carcinoma) (H)       insulin degludec 100 UNIT/ML pen    TRESIBA     Inject 14 Units Subcutaneous    HCC (hepatocellular carcinoma) (H)       insulin lispro 100 UNIT/ML injection    HumaLOG PEN     4 - 12 units 3 x daily        [employer] LANCETS 21G Misc      Uses 6 times daily    HCC (hepatocellular carcinoma) (H)       LORazepam 1 MG tablet    ATIVAN     Take 1 mg by mouth as needed    HCC (hepatocellular carcinoma) (H)       losartan 100 MG tablet    COZAAR     Take 1 tablet by mouth daily        Multiple vitamin Tabs      Take 1 tablet by mouth        MULTIVITAMIN & MINERAL PO       HCC (hepatocellular carcinoma) (H)       oxyCODONE IR 5 MG tablet    ROXICODONE     Take 5 mg by mouth        pantoprazole 40 MG EC tablet    PROTONIX     Take 40 mg by mouth daily        pen needles 5/16\" 31G X 8 MM Misc      4 x daily    HCC (hepatocellular carcinoma) (H)       polyethylene glycol Packet    MIRALAX/GLYCOLAX    30 packet    Take 17 g by mouth daily as needed for constipation    History of liver excision       RA BLOOD PRESSURE CUFF MONITOR Misc      Take blood pressure once daily    HCC (hepatocellular carcinoma) (H)       REFRESH OPTIVE ADVANCED 0.5-1-0.5 % Soln   Generic drug:  Carboxymeth-Glycerin-Polysorb      PLACE 1 DROP INTO BOTH EYES 4 (FOUR) TIMES DAILY.    HCC (hepatocellular carcinoma) (H)       senna-docusate 8.6-50 MG per tablet    SENOKOT-S;PERICOLACE    14 tablet    Take 1 tablet by mouth 2 times daily    History of liver excision       simvastatin 20 MG tablet    ZOCOR     Take 1 tablet (20 mg) by mouth daily    Dyslipidaemia       timolol 0.5 % ophthalmic solution    TIMOPTIC     PLACE 1 DROP INTO BOTH EYES ONCE DAILY.    HCC (hepatocellular carcinoma) (H)       traZODone 50 MG tablet    DESYREL     Take 50 mg by mouth    HCC (hepatocellular carcinoma) (H)       XARELTO 20 MG Tabs tablet   Generic drug:  rivaroxaban " ANTICOAGULANT     90 tablet    TAKE 1 TABLET (20 MG) BY MOUTH DAILY (WITH DINNER)    Atrial fibrillation, unspecified type (H)

## 2018-03-08 NOTE — NURSING NOTE
"Oncology Rooming Note    March 8, 2018 12:16 PM   Eh Callahan is a 78 year old male who presents for:    Chief Complaint   Patient presents with     Oncology Clinic Visit     Return: hepatocellular carcinoma     Initial Vitals: /71 (BP Location: Right arm, Patient Position: Chair, Cuff Size: Adult Regular)  Pulse 78  Temp 98  F (36.7  C) (Oral)  Resp 16  Ht 1.676 m (5' 5.98\")  Wt 72.4 kg (159 lb 11.2 oz)  SpO2 99%  BMI 25.79 kg/m2 Estimated body mass index is 25.79 kg/(m^2) as calculated from the following:    Height as of this encounter: 1.676 m (5' 5.98\").    Weight as of this encounter: 72.4 kg (159 lb 11.2 oz). Body surface area is 1.84 meters squared.  Severe Pain (7) Comment: left side of abdomen due to fall    No LMP for male patient.  Allergies reviewed: Yes  Medications reviewed: Yes    Medications: Medication refills not needed today.  Pharmacy name entered into EXO5:    [facility] - [place], [place] - [address] [facility] - [place], [place] - [address] [facility]    Clinical concerns: pain level 7 on left side of abdomen due to falling.  I informed pt to remind the Provider/ADRIANA about  pain level in case i dont touch bases with them, if the provider was in the exam room while i attend on rooming the next pt. Pt verbalized understandings.  Milagros Padilla CMA  Completed Fall Risk Screening and updated Health Maintenance .  See screenings             6 minutes for nursing intake (face to face time)     Milagros Padilla CMA                  "

## 2018-03-08 NOTE — LETTER
3/8/2018       RE: Eh Callahan  [address]   [facility] [address]       Oncology Follow up visit:  Date on this visit: 3/8/2018       DIAGNOSIS  HCC    Primary Physician: Shalini Mac     History Of Present Illness:       Copied and updated from prior notes     Mr. Callahan is a 77-year-old male who has a history of previously treated hepatitis C(interferon and ribavirin in 2005 and 2006) attaining a sustained virologic response, was found to have a 3.6 cm lesion in the liver, which was concerning for malignancy.  On 11/22/16, he had a hand-assisted exploratory laparoscopy with extensive lysis of adhesion, and segment 2 and 3 of the liver were removed by Dr Mckeon.    The final pathology from the surgery revealed moderately differentiated hepatocellular carcinoma with negative margins, with a tumor size being 4.2 x 3.6 x 3 cm.  The tumors were confined to the liver.  All margins were negative.  There was no lymphovascular or perineural invasion present.  No lymph nodes were examined in the surgical specimen.  It was a pT1 solitary tumor without vascular invasion.  Prior to the surgery, he had a CAT scan of the chest, which showed subcentimeter pulmonary nodules, but no definite evidence of metastasis.   He recovered well after surgery.     His repeat MRI showed a new 12 mm concerning lesion in Hepatic seg 5/8.   He had MWA of segment 5 HCC on 4/19/2017.    Repeat MRI on 5/26/17 showed post treatment changes. There was an indeterminate lesion in seg 5 which was stable on MRI done in Sept 2017  MRI in Dec 2017 showed findings c/w post treatment changes. There is a small focus of new arterial phase hyperenhancement at the margin of the ablation zone which measures 0.9 mm which is LR-TR equivocal.  The S5 lesion has slightly increased in size to 7mm and remains OPTN 4.  AFP remains undetected but it has never been elevated.      Since then he has been on observation    Interval History    Professional   is available throughout the interview   He tells me that he is doing well. His right upper quadrant pain is much better now to the point that he is not taking any pain medications. 3 days ago he was coming out of the bathroom and felt lightheaded and fell down and hit the left rib cage to the wall but there was no loss of consciousness. He did not hit his head. Since then he has noticed pain in the left rib cage area although the pain is slowly getting better. There is no obvious bruises. Otherwise no new neurological problems. He is not sure whether he is taking Rivaroxaban or not. He denies nausea or vomiting diarrhea or constipation or any bleeding. Energy is about the same as before.  Denies serious infections. He continues to have lower extremity swelling and he is following with his primary care physician regarding that. Denies any other swellings. No trouble breathing.  Because of this fall he missed the MRI appointment which she was supposed to do before this visit      ECOG 2  ROS:  Rest of the review of the system was unremarkable    I reviewed other hx in EPIC as below      Past Medical/Surgical History:  Past Medical History:   Diagnosis Date     Anatomical narrow angle glaucoma      Atrial fibrillation (H)      Chronic infection     history of hepatitis C     CKD (chronic kidney disease) stage 2, GFR 60-89 ml/min      Diabetes mellitus (H)      Hepatitis C without mention of hepatic coma      Hypertension      Palpitations      PTSD (post-traumatic stress disorder)      Past Surgical History:   Procedure Laterality Date     APPENDECTOMY       EYE SURGERY       LAPAROSCOPIC HEPATECTOMY PARTIAL Left 11/22/2016    Procedure: LAPAROSCOPIC HEPATECTOMY PARTIAL;  Surgeon: Rick Mckeon MD;  Location: [facility] OR     Cancer History:     As above    Allergies:  Allergies as of 03/08/2018 - Raul as Reviewed 03/08/2018   Allergen Reaction Noted     Amlodipine Swelling 09/26/2016     Lisinopril Cough  "09/26/2016     Metoprolol  09/26/2016     Current Medications:  Current Outpatient Prescriptions   Medication Sig Dispense Refill     amLODIPine (NORVASC) 5 MG tablet Take 5 mg by mouth       oxyCODONE IR (ROXICODONE) 5 MG tablet Take 5 mg by mouth       Blood Glucose Monitoring Suppl (FIFTY50 GLUCOSE METER 2.0) W/DEVICE KIT as needed for blood glucose monitoring       Carboxymeth-Glycerin-Polysorb (REFRESH OPTIVE ADVANCED) 0.5-1-0.5 % SOLN PLACE 1 DROP INTO BOTH EYES 4 (FOUR) TIMES DAILY.       LORazepam (ATIVAN) 1 MG tablet Take 1 mg by mouth as needed       insulin aspart (NOVOLOG PEN) 100 UNIT/ML injection 3 units before each meal.       furosemide (LASIX) 20 MG tablet Take 1 tablet (20 mg) by mouth daily 30 tablet 3     Blood Pressure Monitoring ( BLOOD PRESSURE CUFF MONITOR) MISC Take blood pressure once daily       timolol (TIMOPTIC) 0.5 % ophthalmic solution PLACE 1 DROP INTO BOTH EYES ONCE DAILY.       traZODone (DESYREL) 50 MG tablet Take 50 mg by mouth       [employer] LANCETS 21G MISC Uses 6 times daily       blood glucose monitoring (Edhub CONTOUR) test strip USE TO MEASURE YOUR BLOOD GLUCOSE 6 TIMES DAILY       insulin degludec (TRESIBA) 100 UNIT/ML pen Inject 14 Units Subcutaneous       Insulin Pen Needle (PEN NEEDLES 5/16\") 31G X 8 MM MISC 4 x daily       acetaminophen (TYLENOL) 500 MG tablet Take 2 tablets (1,000 mg) by mouth 3 times daily (Patient taking differently: Take 500 mg by mouth 3 times daily as needed ) 100 tablet 0     carboxymethylcellulose (REFRESH PLUS) 0.5 % SOLN Place 1 drop into both eyes 4 times daily        pantoprazole (PROTONIX) 40 MG enteric coated tablet Take 40 mg by mouth daily        cholecalciferol (D 5000) 5000 UNITS CAPS Reported on 4/12/2017       alendronate (FOSAMAX) 70 MG tablet 70 mg every 7 days Take 1 tablet by mouth twice weekly.       Multiple vitamin TABS Take 1 tablet by mouth       simvastatin (ZOCOR) 20 MG tablet Take 1 tablet (20 mg) by mouth daily       " "losartan (COZAAR) 100 MG tablet Take 1 tablet by mouth daily       XARELTO 20 MG TABS tablet TAKE 1 TABLET (20 MG) BY MOUTH DAILY (WITH DINNER) (Patient not taking: Reported on 3/8/2018) 90 tablet 1     Multiple Vitamins-Minerals (MULTIVITAMIN & MINERAL PO)        polyethylene glycol (MIRALAX/GLYCOLAX) packet Take 17 g by mouth daily as needed for constipation (Patient not taking: Reported on 3/8/2018) 30 packet 1     senna-docusate (SENOKOT-S;PERICOLACE) 8.6-50 MG per tablet Take 1 tablet by mouth 2 times daily (Patient not taking: Reported on 3/8/2018) 14 tablet 0     insulin lispro (HUMALOG PEN) 100 UNIT/ML soln 4 - 12 units 3 x daily        Family History:  Family History   Problem Relation Age of Onset     DIABETES No family hx of      He is not aware of any diabetes in his family     KIDNEY DISEASE No family hx of      Social History:  Social History     Social History     Marital status:      Spouse name: N/A     Number of children: N/A     Years of education: N/A     Occupational History     Not on file.     Social History Main Topics     Smoking status: Former Smoker     Smokeless tobacco: Never Used     Alcohol use No     Drug use: No     Sexual activity: Not on file     Other Topics Concern     Parent/Sibling W/ Cabg, Mi Or Angioplasty Before 65f 55m? No     Social History Narrative    Immigrated in 2002 from [facility].  Previously lived in [place] before moving here.  Daughter lives locally as well.       Physical Exam:  /71 (BP Location: Right arm, Patient Position: Chair, Cuff Size: Adult Regular)  Pulse 78  Temp 98  F (36.7  C) (Oral)  Resp 16  Ht 1.676 m (5' 5.98\")  Wt 72.4 kg (159 lb 11.2 oz)  SpO2 99%  BMI 25.79 kg/m2    CONSTITUTIONAL: no acute distress  EYES: PERRLA, no palor or icterus.   ENT/MOUTH: no mouth lesions. Ears normal  CVS: s1s2 no m r g .   RESPIRATORY: clear to auscultation b/l  GI: soft non tender . Mild hepatomegaly and no splenomegaly  NEURO: AAOX3  Grossly non " focal neuro exam  INTEGUMENT: no obvious rashes  LYMPHATIC: no palpable cervical, supraclavicular, axillary LAD  MUSCULOSKELETAL: He has tenderness on the left side of the rib cage although there is no obvious proves or underlying fluctuance or crepitus which I was able to appreciate  EXTREMITIES: 1+ bilateral edema   PSYCH: Mentation, mood and affect are normal. Decision making capacity is intact          Laboratory/Imaging Studies  Results for CHIKIS NEWBERRY (MRN [id number]) as of 3/8/2018 12:15   Ref. Range 3/5/2018 10:34   Sodium Latest Ref Range: 133 - 144 mmol/L 133   Potassium Latest Ref Range: 3.4 - 5.3 mmol/L 4.2   Chloride Latest Ref Range: 94 - 109 mmol/L 98   Carbon Dioxide Latest Ref Range: 20 - 32 mmol/L 31   Urea Nitrogen Latest Ref Range: 7 - 30 mg/dL 32 (H)   Creatinine Latest Ref Range: 0.66 - 1.25 mg/dL 1.44 (H)   GFR Estimate Latest Ref Range: >60 mL/min/1.7m2 47 (L)   GFR Estimate If Black Latest Ref Range: >60 mL/min/1.7m2 57 (L)   Calcium Latest Ref Range: 8.5 - 10.1 mg/dL 8.7   Anion Gap Latest Ref Range: 3 - 14 mmol/L 4   Albumin Latest Ref Range: 3.4 - 5.0 g/dL 2.7 (L)   Protein Total Latest Ref Range: 6.8 - 8.8 g/dL 6.4 (L)   Bilirubin Total Latest Ref Range: 0.2 - 1.3 mg/dL 1.0   Alkaline Phosphatase Latest Ref Range: 40 - 150 U/L 94   ALT Latest Ref Range: 0 - 70 U/L 25   AST Latest Ref Range: 0 - 45 U/L 19   Alpha Fetoprotein Latest Ref Range: 0 - 8 ug/L <1.5   Glucose Latest Ref Range: 70 - 99 mg/dL 235 (H)   WBC Latest Ref Range: 4.0 - 11.0 10e9/L 4.1   Hemoglobin Latest Ref Range: 13.3 - 17.7 g/dL 13.8   Hematocrit Latest Ref Range: 40.0 - 53.0 % 41.3   Platelet Count Latest Ref Range: 150 - 450 10e9/L 125 (L)   RBC Count Latest Ref Range: 4.4 - 5.9 10e12/L 4.41   MCV Latest Ref Range: 78 - 100 fl 94   MCH Latest Ref Range: 26.5 - 33.0 pg 31.3   MCHC Latest Ref Range: 31.5 - 36.5 g/dL 33.4   RDW Latest Ref Range: 10.0 - 15.0 % 11.7   Diff Method Unknown Automated Method   % Neutrophils  Latest Units: % 59.2   % Lymphocytes Latest Units: % 28.8   % Monocytes Latest Units: % 8.8   % Eosinophils Latest Units: % 2.7   % Basophils Latest Units: % 0.5   % Immature Granulocytes Latest Units: % 0.0   Nucleated RBCs Latest Ref Range: 0 /100 0   Absolute Neutrophil Latest Ref Range: 1.6 - 8.3 10e9/L 2.4   Absolute Lymphocytes Latest Ref Range: 0.8 - 5.3 10e9/L 1.2   Absolute Monocytes Latest Ref Range: 0.0 - 1.3 10e9/L 0.4   Absolute Eosinophils Latest Ref Range: 0.0 - 0.7 10e9/L 0.1     MRI Abdomen 12/18/17  Liver: Postoperative changes of partial left hepatectomy. Mild  increased precontrast T1 signal along the surgical margin, likely  representing a small hematoma without features concerning for residual  tumor; no worrisome nodularity or enhancement.  No significant hepatic  steatosis or iron deposition. There is patchy heterogeneous arterial  enhancement throughout the liver parenchyma with a delayed reticular  pattern likely due to fibrotic changes.     Post ablation changes in the subcapsular aspect of segment 5/8 (series  9, image 36). There is a small focus of new arterial phase  hyperenhancement at the margin of the ablation zone which measures 0.9  mm (series 10, image 32). There is no associated washout. No increased  T2 signal or hyperintense signal on high be value diffusion-weighted  images.  LR-TR equivocal.     Lesion 1: There is a 7 mm arterially enhancing lesion in hepatic  segment  5 adjacent to the gallbladder fossa (series 9 image 45). It  does washout on portal venous and delayed phase imaging, series 11  image 45 and series 14 image 43. Pseudocapsule is not convincingly  identified. There is no associated increased T2 signal, but there is  mild increased signal on diffusion weighted images. Previously this  measured 5 mm. There is not evidence of venous invasion.  OPTN/LIRADS  class 4.     Scattered subcentimeter foci of arterial enhancement throughout the  liver parenchyma with no  restricted diffusion, washout or  pseudocapsule, indeterminate. A representative lesion can be seen  anterior to the right portal vein segment 5/8 (series 3, image 36).  OPTN/LIRADS 3.     Gallbladder: Normally distended. Cholelithiasis. No gallbladder wall  thickening. No pericholecystic fluid.     Spleen: Not enlarged. No focal lesions.     Kidneys: No suspicious lesions. Focal cortical retraction in the  superior/interpolar region of the right kidney, likely from prior  infection/infarction. Tiny bilateral cortical renal cysts. Stable mild  left pelvocaliectasis. No right hydronephrosis.     Adrenal glands: Unremarkable.     Pancreas: Partial atrophic pancreatic parenchyma with mild diffuse  decreased precontrast T1 signal. No focal lesions. Main pancreatic  duct is normal diameter. No pancreas divisum.     Bowel: No dilated loops of bowel.     Lymph nodes: Prominent melisa hepatis lymph nodes, likely reactive.     Blood vessels: Patent abdominal vasculature. No abdominal aortic  aneurysm.     Lung bases: Small bilateral pleural effusions, right greater than  left. Mild bilateral lower lobes dependent atelectasis. No pericardial  effusion. Cardiac size within normal limits.     Bones and soft tissues: Degenerative changes of the spine. Partial  fusion along the right lateral aspect at L3-L4. No suspicious lesions  in the bones. Bilateral gynecomastia.     Mesentery and abdominal wall: Unremarkable.     Ascites: No ascites.         IMPRESSION:    1. Cirrhosis without convincing evidence of portal hypertension.  2. Postoperative changes of microwave ablation involving hepatic  segments 5/8. There is a new small focus of associated enhancement  peripherally without washout or additional suspicious characteristics.  LR TR equivocal. Recommend short-term (3 months) follow-up MR.  3. Postsurgical changes of partial left hepatectomy. Small  postoperative hematoma/blood products along the surgical margin. No  evidence for  viable tumor at the surgical margin.   4. 7 mm OPTN 4 lesion in segment 5, slightly increased in size from  the prior. OPTN/LIRADS 4. Additional indeterminate LIRADS 3 lesions.  These can be reevaluated at follow-up.  5. Based on this exam only, the patient is within [place] criteria.  6. Cholelithiasis. No associated findings of acute cholecystitis.    MRI abdomen 5/26/17      IMPRESSION:    1. Postoperative changes of partial left hepatectomy and right lobe  microwave ablation with no residual tumor at these sites.  2. Indeterminate, hypoenhancing lesion near the gallbladder fossa  hepatic segment 5, not definitively visualized on prior exams. This is  indeterminate, and follow-up is recommended.   3. Cholelithiasis.            ASSESSMENT/PLAN:      Stage I, pT1 NX MX, moderately differentiated hepatocellular carcinoma with maximum tumor size being 4.2 cm of the left lower lobe, status post resection with negative margins and no lymphovascular or perineural invasion found.  The background liver had minimal to mild focal chronic portal  grade 1 out of 4, and fibrosis and portal expansion, which is also stage 1-2 out of 4.  He does not have evidence of cirrhosis.      His repeat MRI shows a new 12 mm concerning lesion in Hepatic seg 5/8. He underwent microwave ablation of segment 5 HCC on 4/19/2017. Repeat MRI after the MWA on 5/26/17 showed post treatment changes. There was an indeterminate lesion in seg 5.  On most recent scans, he has findings c/w post treatment changes. There is a small focus of new arterial phase hyperenhancement at the margin of the ablation zone which measures 0.9 mm which is LR-TR equivocal.  The S5 lesion has slightly increased in size to 7mm and remains OPTN 4.  AFP remains undetected but it has never been elevated.  He missed the MRI which she was supposed to get because of the recent fall and we will reschedule that. Based upon its findings and we will decide what to do in terms of his  management. If it is unremarkable then I would favor repeating his scans in 3 months.    Recent fall and pain on the left side of the rib cage. He did not lose consciousness although he felt lightheaded. Since then he has not felt lightheaded it was an isolated episode. Denies loss of consciousness. His pain is getting better. We will check x-rays of the left side of the hip. He will continue to take oxycodone as needed for pain. He will follow with his primary care physician.    Abdominal pain. This is almost resolved and at this point he is not requiring any pain medications. This likely was in the setting of microwave ablation and prior surgery.    Thrombocytopenia. This is mild and stable we will continue to watch.    Bilateral lower extremity edema. Continue Lasix. He will follow with primary care physician.    He has some confusion as to what medications he was taking. He was taking Rivaroxaban for atrial fibrillation but he is not sure whether he is taking or not. I will have my nurse coordinator talk to him so as to figure things out so that he has better medical management. I also encouraged him to follow closely with his primary care physician.    The following was not addressed today     Hepatitis C.  This has been treated in 2005 and 2006, and last HCV RNA was undetectable in 11/2015      I would like to see him back in 3 months with repeat scans and blood work prior to that.    All of his questions were answered to his satisfaction and he is comfortable with this plan                Miri Antoine MD

## 2018-03-16 ENCOUNTER — RADIANT APPOINTMENT (OUTPATIENT)
Dept: MRI IMAGING | Facility: CLINIC | Age: 78
End: 2018-03-16
Attending: INTERNAL MEDICINE
Payer: MEDICARE

## 2018-03-16 DIAGNOSIS — C22.0 HCC (HEPATOCELLULAR CARCINOMA) (H): ICD-10-CM

## 2018-03-16 RX ORDER — GADOBUTROL 604.72 MG/ML
7.5 INJECTION INTRAVENOUS ONCE
Status: COMPLETED | OUTPATIENT
Start: 2018-03-16 | End: 2018-03-16

## 2018-03-16 RX ADMIN — GADOBUTROL 7.5 ML: 604.72 INJECTION INTRAVENOUS at 20:13

## 2018-03-17 NOTE — DISCHARGE INSTRUCTIONS
MRI Contrast Discharge Instructions    The IV contrast you received today will pass out of your body in your  urine. This will happen in the next 24 hours. You will not feel this process.  Your urine will not change color.    Drink at least 4 extra glasses of water or juice today (unless your doctor  has restricted your fluids). This reduces the stress on your kidneys.  You may take your regular medicines.    If you are on dialysis: It is best to have dialysis today.    If you have a reaction: Most reactions happen right away. If you have  any new symptoms after leaving the hospital (such as hives or swelling),  call your hospital at the correct number below. Or call your family doctor.  If you have breathing distress or wheezing, call 911.    Special instructions: ***    I have read and understand the above information.    Signature:______________________________________ Date:___________    Staff:__________________________________________ Date:___________     Time:__________    [facility] Departments:    ___[place]: [phone]  ___[facility]: [phone]  ___[place]: [phone] ___[facility]: [phone]  ___[facility]: [phone]  ___[facility]: [phone]  ___[phone]  ___[facility]: [phone]  ___[phone]

## 2018-03-20 DIAGNOSIS — C22.0 HCC (HEPATOCELLULAR CARCINOMA) (H): Primary | ICD-10-CM

## 2018-05-31 ENCOUNTER — RADIANT APPOINTMENT (OUTPATIENT)
Dept: MRI IMAGING | Facility: CLINIC | Age: 78
End: 2018-05-31
Attending: INTERNAL MEDICINE
Payer: MEDICARE

## 2018-05-31 ENCOUNTER — APPOINTMENT (OUTPATIENT)
Dept: LAB | Facility: CLINIC | Age: 78
End: 2018-05-31
Attending: INTERNAL MEDICINE
Payer: MEDICARE

## 2018-05-31 ENCOUNTER — ONCOLOGY VISIT (OUTPATIENT)
Dept: ONCOLOGY | Facility: CLINIC | Age: 78
End: 2018-05-31
Attending: INTERNAL MEDICINE
Payer: MEDICARE

## 2018-05-31 VITALS
HEART RATE: 74 BPM | TEMPERATURE: 97.7 F | BODY MASS INDEX: 26.56 KG/M2 | OXYGEN SATURATION: 98 % | WEIGHT: 164.5 LBS | RESPIRATION RATE: 16 BRPM | DIASTOLIC BLOOD PRESSURE: 81 MMHG | SYSTOLIC BLOOD PRESSURE: 176 MMHG

## 2018-05-31 DIAGNOSIS — C22.0 HCC (HEPATOCELLULAR CARCINOMA) (H): ICD-10-CM

## 2018-05-31 LAB
AFP SERPL-MCNC: <1.5 UG/L (ref 0–8)
ALBUMIN SERPL-MCNC: 3 G/DL (ref 3.4–5)
ALP SERPL-CCNC: 90 U/L (ref 40–150)
ALT SERPL W P-5'-P-CCNC: 32 U/L (ref 0–70)
ANION GAP SERPL CALCULATED.3IONS-SCNC: 6 MMOL/L (ref 3–14)
AST SERPL W P-5'-P-CCNC: 24 U/L (ref 0–45)
BASOPHILS # BLD AUTO: 0 10E9/L (ref 0–0.2)
BASOPHILS NFR BLD AUTO: 0.5 %
BILIRUB SERPL-MCNC: 1 MG/DL (ref 0.2–1.3)
BUN SERPL-MCNC: 36 MG/DL (ref 7–30)
CALCIUM SERPL-MCNC: 8.3 MG/DL (ref 8.5–10.1)
CHLORIDE SERPL-SCNC: 105 MMOL/L (ref 94–109)
CO2 SERPL-SCNC: 28 MMOL/L (ref 20–32)
CREAT SERPL-MCNC: 1.46 MG/DL (ref 0.66–1.25)
DIFFERENTIAL METHOD BLD: ABNORMAL
EOSINOPHIL # BLD AUTO: 0.1 10E9/L (ref 0–0.7)
EOSINOPHIL NFR BLD AUTO: 3.8 %
ERYTHROCYTE [DISTWIDTH] IN BLOOD BY AUTOMATED COUNT: 12.1 % (ref 10–15)
GFR SERPL CREATININE-BSD FRML MDRD: 47 ML/MIN/1.7M2
GLUCOSE SERPL-MCNC: 58 MG/DL (ref 70–99)
HCT VFR BLD AUTO: 40.6 % (ref 40–53)
HGB BLD-MCNC: 14 G/DL (ref 13.3–17.7)
IMM GRANULOCYTES # BLD: 0 10E9/L (ref 0–0.4)
IMM GRANULOCYTES NFR BLD: 0.3 %
LYMPHOCYTES # BLD AUTO: 1.1 10E9/L (ref 0.8–5.3)
LYMPHOCYTES NFR BLD AUTO: 29.9 %
MCH RBC QN AUTO: 31.7 PG (ref 26.5–33)
MCHC RBC AUTO-ENTMCNC: 34.5 G/DL (ref 31.5–36.5)
MCV RBC AUTO: 92 FL (ref 78–100)
MONOCYTES # BLD AUTO: 0.4 10E9/L (ref 0–1.3)
MONOCYTES NFR BLD AUTO: 11.6 %
NEUTROPHILS # BLD AUTO: 2 10E9/L (ref 1.6–8.3)
NEUTROPHILS NFR BLD AUTO: 53.9 %
NRBC # BLD AUTO: 0 10*3/UL
NRBC BLD AUTO-RTO: 0 /100
PLATELET # BLD AUTO: 140 10E9/L (ref 150–450)
POTASSIUM SERPL-SCNC: 3.8 MMOL/L (ref 3.4–5.3)
PROT SERPL-MCNC: 6.7 G/DL (ref 6.8–8.8)
RBC # BLD AUTO: 4.42 10E12/L (ref 4.4–5.9)
SODIUM SERPL-SCNC: 140 MMOL/L (ref 133–144)
WBC # BLD AUTO: 3.7 10E9/L (ref 4–11)

## 2018-05-31 PROCEDURE — 82105 ALPHA-FETOPROTEIN SERUM: CPT | Performed by: INTERNAL MEDICINE

## 2018-05-31 PROCEDURE — 99215 OFFICE O/P EST HI 40 MIN: CPT | Mod: ZP | Performed by: INTERNAL MEDICINE

## 2018-05-31 PROCEDURE — 85025 COMPLETE CBC W/AUTO DIFF WBC: CPT | Performed by: INTERNAL MEDICINE

## 2018-05-31 PROCEDURE — 36592 COLLECT BLOOD FROM PICC: CPT

## 2018-05-31 PROCEDURE — 80053 COMPREHEN METABOLIC PANEL: CPT | Performed by: INTERNAL MEDICINE

## 2018-05-31 PROCEDURE — G0463 HOSPITAL OUTPT CLINIC VISIT: HCPCS | Mod: ZF

## 2018-05-31 RX ORDER — GADOBUTROL 604.72 MG/ML
7.5 INJECTION INTRAVENOUS ONCE
Status: COMPLETED | OUTPATIENT
Start: 2018-05-31 | End: 2018-05-31

## 2018-05-31 RX ADMIN — GADOBUTROL 7.5 ML: 604.72 INJECTION INTRAVENOUS at 08:16

## 2018-05-31 ASSESSMENT — PAIN SCALES - GENERAL: PAINLEVEL: EXTREME PAIN (8)

## 2018-05-31 NOTE — DISCHARGE INSTRUCTIONS

## 2018-05-31 NOTE — PROGRESS NOTES
Oncology Follow up visit:  Date on this visit: 5/31/2018       DIAGNOSIS  HCC    Primary Physician: Shalini Mac     History Of Present Illness:       Copied and updated from prior notes     Mr. Callahan is a 77-year-old male who has a history of previously treated hepatitis C(interferon and ribavirin in 2005 and 2006) attaining a sustained virologic response, was found to have a 3.6 cm lesion in the liver, which was concerning for malignancy.  On 11/22/16, he had a hand-assisted exploratory laparoscopy with extensive lysis of adhesion, and segment 2 and 3 of the liver were removed by Dr Mckeon.    The final pathology from the surgery revealed moderately differentiated hepatocellular carcinoma with negative margins, with a tumor size being 4.2 x 3.6 x 3 cm.  The tumors were confined to the liver.  All margins were negative.  There was no lymphovascular or perineural invasion present.  No lymph nodes were examined in the surgical specimen.  It was a pT1 solitary tumor without vascular invasion.  Prior to the surgery, he had a CAT scan of the chest, which showed subcentimeter pulmonary nodules, but no definite evidence of metastasis.   He recovered well after surgery.     His repeat MRI showed a new 12 mm concerning lesion in Hepatic seg 5/8.   He had MWA of segment 5 HCC on 4/19/2017.    Repeat MRI on 5/26/17 showed post treatment changes. There was an indeterminate lesion in seg 5 which was stable on MRI done in Sept 2017  MRI in Dec 2017 showed findings c/w post treatment changes. There is a small focus of new arterial phase hyperenhancement at the margin of the ablation zone which measures 0.9 mm which is LR-TR equivocal.  The S5 lesion has slightly increased in size to 7mm and remains OPTN 4.  AFP remains undetected but it has never been elevated.    He has been on observation    Interval History    Professional  is available throughout the interview   Overall he is feeling well. Occasionally he  still has pain in the right upper quadrant where he had the surgery. Occasionally has nausea but he does not take any medications for it. No vomiting. No diarrhea or constipation. No bleeding. He denies any new swellings apart from chronic lower extremity edema. No infections. No trouble breathing. He is also having pain in his right knee. His energy is stable. Today he had labs and MRI done. He did not eat before the MRI and his serum glucose level was 58. He ate after that. He does not report any lightheadedness     ECOG 2    ROS:  A comprehensive ROS was otherwise neg      I reviewed other hx in EPIC as below      Past Medical/Surgical History:  Past Medical History:   Diagnosis Date     Anatomical narrow angle glaucoma      Atrial fibrillation (H)      Chronic infection     history of hepatitis C     CKD (chronic kidney disease) stage 2, GFR 60-89 ml/min      Diabetes mellitus (H)      Hepatitis C without mention of hepatic coma      Hypertension      Palpitations      PTSD (post-traumatic stress disorder)      Past Surgical History:   Procedure Laterality Date     APPENDECTOMY       EYE SURGERY       LAPAROSCOPIC HEPATECTOMY PARTIAL Left 11/22/2016    Procedure: LAPAROSCOPIC HEPATECTOMY PARTIAL;  Surgeon: Rick Mckeon MD;  Location: [facility] OR     Cancer History:     As above    Allergies:  Allergies as of 05/31/2018 - Raul as Reviewed 05/31/2018   Allergen Reaction Noted     Amlodipine Swelling 09/26/2016     Lisinopril Cough 09/26/2016     Metoprolol  09/26/2016     Current Medications:  Current Outpatient Prescriptions   Medication Sig Dispense Refill     alendronate (FOSAMAX) 70 MG tablet 70 mg every 7 days Take 1 tablet by mouth twice weekly.       acetaminophen (TYLENOL) 500 MG tablet Take 2 tablets (1,000 mg) by mouth 3 times daily (Patient taking differently: Take 500 mg by mouth 3 times daily as needed ) 100 tablet 0     amLODIPine (NORVASC) 5 MG tablet Take 5 mg by mouth       blood glucose  "monitoring ([employer] CONTOUR) test strip USE TO MEASURE YOUR BLOOD GLUCOSE 6 TIMES DAILY       Blood Glucose Monitoring Suppl (FIFTY50 GLUCOSE METER 2.0) W/DEVICE KIT as needed for blood glucose monitoring       Blood Pressure Monitoring ( BLOOD PRESSURE CUFF MONITOR) MISC Take blood pressure once daily       Carboxymeth-Glycerin-Polysorb (REFRESH OPTIVE ADVANCED) 0.5-1-0.5 % SOLN PLACE 1 DROP INTO BOTH EYES 4 (FOUR) TIMES DAILY.       carboxymethylcellulose (REFRESH PLUS) 0.5 % SOLN Place 1 drop into both eyes 4 times daily        cholecalciferol (D 5000) 5000 UNITS CAPS Reported on 4/12/2017       furosemide (LASIX) 20 MG tablet Take 1 tablet (20 mg) by mouth daily 30 tablet 3     insulin aspart (NOVOLOG PEN) 100 UNIT/ML injection 3 units before each meal.       insulin degludec (TRESIBA) 100 UNIT/ML pen Inject 14 Units Subcutaneous       insulin lispro (HUMALOG PEN) 100 UNIT/ML soln 4 - 12 units 3 x daily       Insulin Pen Needle (PEN NEEDLES 5/16\") 31G X 8 MM MISC 4 x daily       [employer] LANCETS 21G MISC Uses 6 times daily       LORazepam (ATIVAN) 1 MG tablet Take 1 mg by mouth as needed       losartan (COZAAR) 100 MG tablet Take 1 tablet by mouth daily       Multiple vitamin TABS Take 1 tablet by mouth       Multiple Vitamins-Minerals (MULTIVITAMIN & MINERAL PO)        oxyCODONE IR (ROXICODONE) 5 MG tablet Take 5 mg by mouth       pantoprazole (PROTONIX) 40 MG enteric coated tablet Take 40 mg by mouth daily        polyethylene glycol (MIRALAX/GLYCOLAX) packet Take 17 g by mouth daily as needed for constipation (Patient not taking: Reported on 3/8/2018) 30 packet 1     senna-docusate (SENOKOT-S;PERICOLACE) 8.6-50 MG per tablet Take 1 tablet by mouth 2 times daily (Patient not taking: Reported on 3/8/2018) 14 tablet 0     simvastatin (ZOCOR) 20 MG tablet Take 1 tablet (20 mg) by mouth daily       timolol (TIMOPTIC) 0.5 % ophthalmic solution PLACE 1 DROP INTO BOTH EYES ONCE DAILY.       traZODone (DESYREL) 50 MG " tablet Take 50 mg by mouth       XARELTO 20 MG TABS tablet TAKE 1 TABLET (20 MG) BY MOUTH DAILY (WITH DINNER) (Patient not taking: Reported on 3/8/2018) 90 tablet 1      Family History:  Family History   Problem Relation Age of Onset     DIABETES No family hx of      He is not aware of any diabetes in his family     KIDNEY DISEASE No family hx of      Social History:  Social History     Social History     Marital status:      Spouse name: N/A     Number of children: N/A     Years of education: N/A     Occupational History     Not on file.     Social History Main Topics     Smoking status: Former Smoker     Smokeless tobacco: Never Used     Alcohol use No     Drug use: No     Sexual activity: Not on file     Other Topics Concern     Parent/Sibling W/ Cabg, Mi Or Angioplasty Before 65f 55m? No     Social History Narrative    Immigrated in 2002 from [facility].  Previously lived in [place] before moving here.  Daughter lives locally as well.       Physical Exam:  /81  Pulse 74  Temp 97.7  F (36.5  C)  Resp 16  Wt 74.6 kg (164 lb 8 oz)  SpO2 98%  BMI 26.56 kg/m2    CONSTITUTIONAL: No apparent distress  EYES: PERRLA, without pallor or jaundice  ENT/MOUTH: Ears unremarkable. No oral lesions  CVS: s1s2 normal  RESPIRATORY: Chest is clear  GI: Abdomen is soft. There is mild hepatomegaly. No splenomegaly. There is mild tenderness in the right upper quadrant  NEURO: He is alert and oriented ×3  INTEGUMENT: no concerning skin rashes   LYMPHATIC: no palpable lymphadenopathy  MUSCULOSKELETAL: Unremarkable. No bony tenderness.   EXTREMITIES: 1+ bilateral pedal edema. The right knee does not look very swollen. There is no warmth or tenderness.  PSYCH: Mentation, mood and affect are appropriate            Laboratory/Imaging Studies  Results for CHIKIS NEWBERRY (MRN [id number]) as of 5/31/2018 13:13   Ref. Range 5/31/2018 09:34   Sodium Latest Ref Range: 133 - 144 mmol/L 140   Potassium Latest Ref Range: 3.4 - 5.3  mmol/L 3.8   Chloride Latest Ref Range: 94 - 109 mmol/L 105   Carbon Dioxide Latest Ref Range: 20 - 32 mmol/L 28   Urea Nitrogen Latest Ref Range: 7 - 30 mg/dL 36 (H)   Creatinine Latest Ref Range: 0.66 - 1.25 mg/dL 1.46 (H)   GFR Estimate Latest Ref Range: >60 mL/min/1.7m2 47 (L)   GFR Estimate If Black Latest Ref Range: >60 mL/min/1.7m2 56 (L)   Calcium Latest Ref Range: 8.5 - 10.1 mg/dL 8.3 (L)   Anion Gap Latest Ref Range: 3 - 14 mmol/L 6   Albumin Latest Ref Range: 3.4 - 5.0 g/dL 3.0 (L)   Protein Total Latest Ref Range: 6.8 - 8.8 g/dL 6.7 (L)   Bilirubin Total Latest Ref Range: 0.2 - 1.3 mg/dL 1.0   Alkaline Phosphatase Latest Ref Range: 40 - 150 U/L 90   ALT Latest Ref Range: 0 - 70 U/L 32   AST Latest Ref Range: 0 - 45 U/L 24   Alpha Fetoprotein Latest Ref Range: 0 - 8 ug/L <1.5   Glucose Latest Ref Range: 70 - 99 mg/dL 58 (L)   WBC Latest Ref Range: 4.0 - 11.0 10e9/L 3.7 (L)   Hemoglobin Latest Ref Range: 13.3 - 17.7 g/dL 14.0   Hematocrit Latest Ref Range: 40.0 - 53.0 % 40.6   Platelet Count Latest Ref Range: 150 - 450 10e9/L 140 (L)   RBC Count Latest Ref Range: 4.4 - 5.9 10e12/L 4.42   MCV Latest Ref Range: 78 - 100 fl 92   MCH Latest Ref Range: 26.5 - 33.0 pg 31.7   MCHC Latest Ref Range: 31.5 - 36.5 g/dL 34.5   RDW Latest Ref Range: 10.0 - 15.0 % 12.1   Diff Method Unknown Automated Method   % Neutrophils Latest Units: % 53.9   % Lymphocytes Latest Units: % 29.9   % Monocytes Latest Units: % 11.6   % Eosinophils Latest Units: % 3.8   % Basophils Latest Units: % 0.5   % Immature Granulocytes Latest Units: % 0.3   Nucleated RBCs Latest Ref Range: 0 /100 0   Absolute Neutrophil Latest Ref Range: 1.6 - 8.3 10e9/L 2.0   Absolute Lymphocytes Latest Ref Range: 0.8 - 5.3 10e9/L 1.1   Absolute Monocytes Latest Ref Range: 0.0 - 1.3 10e9/L 0.4   Absolute Eosinophils Latest Ref Range: 0.0 - 0.7 10e9/L 0.1     MRI Abdomen 5/31/2018  MRI ABDOMEN     CLINICAL HISTORY:  Follow up HCC.     TECHNIQUE: Images were  acquired with and without intravenous contrast  through the abdomen. The following MR images were acquired: TrueFISP,  multiplanar T2 weighted, axial T1 in/out of phase, diffusion-weighted.  Multiplanar T1-weighted images with fat saturation were before  contrast administration and at multiple time points following the  administration of intravenous contrast.     FINDINGS:     Comparison study: 3/16/2018.     Liver: Postoperative changes of partial left hepatectomy. There is a  small amount of persistent precontrast T1 hyperintensity at the cut  edge of the liver. This is favored to represent postsurgical change  and/or blood products/evolving hematoma. This is slightly less  conspicuous than on the prior No evidence for recurrent and/or  residual tumor along the surgical margin. Nodular hepatic contour,  parenchymal T2 signal and delayed reticular pattern of contrast  enhancement likely secondary to fibrosis. There is patchy arterial  enhancement throughout the liver which may represent perfusional  change/inflammation and/or fibrosis. No significant hepatic steatosis  or iron deposition.     Posttreatment changes of microwave ablation in hepatic segments 5/8  with increased precontrast T1 signal likely due to coagulative  necrosis. Expected thin rim of enhancement with no suspicious nodular  component to concern for recurrent and/or residual tumor.  LR-TR  nonviable.     Lesion 1: There is a 9 mm arterially enhancing lesion in hepatic  segment  5 (series 8 and series 9, image 44). It does washout on  portal venous and delayed phase imaging, series 13 image 45. There is  the suggestion of a pseudocapsule. There is associated increased T2  signal.  There is increased signal on diffusion weighted images.  Previously this measured 9 mm and does not meet criteria for threshold  growth There is not evidence of venous invasion.  LIRADS class 4.     A previously seen arterially enhancing observation inferiorly in  the  right hepatic lobe is not well visualized on the current examination.  There is a patchy geographic/nonmass-like area of enhancement  peripherally in the right hepatic lobe (series 8, image 47); this is  favored to be perfusional. No abnormal signal on additional sequences  and no washout on delayed phases of contrast enhancement.     No new or enlarging arterial enhancing lesions and no lesions which  meet imaging criteria for HCC.     Previously described 6 mm ill-defined arterially enhancing lesion in  hepatic segment 6 is not visualized in the present study. No new focal  liver lesions meeting diagnostic criteria for HCC.      Gallbladder: Cholelithiasis. No gallbladder wall thickening. No  pericholecystic fluid. No biliary tree dilation.     Spleen: The spleen is not enlarged. No focal splenic lesions.     Kidneys: No suspicious renal lesions. Again seen is cortical scarring  and thinning involving the posterior aspect of the right kidney,  likely sequela of prior infection or ischemia. No right  hydronephrosis. Stable mild left hydronephrosis.     Adrenal glands: Unremarkable.     Pancreas: Preserved increased precontrast T1 signal. No focal lesions.  Main pancreatic duct is not dilated. No pancreas divisum.     Bowel: No dilated loops of bowel.     Lymph nodes: Mildly prominent melisa hepatis lymph nodes, likely  reactive.     Blood vessels: No abdominal aortic aneurysm. Hepatic and portal venous  systems are patent. Mild unchanged ectasia of the celiac trunk.     Lung bases: New small/moderate right pleural effusion. Trace left  pleural effusion. Bilateral lower lobes dependent atelectasis, right  greater than left.     Bones and soft tissues: Subcutaneous soft tissue edema along the back  is again seen. Mild bilateral gynecomastia. Degenerative changes of  the spine. Apparent partial bony fusion involving the right aspect of  the L3-L4 vertebral bodies. No suspicious bone lesions.     Mesentery and  abdominal wall: Unremarkable.     Ascites: No ascites.         IMPRESSION:    1. Postoperative changes of partial left hepatectomy. MRI findings of  chronic intrinsic hepatic parenchymal disease with fibrosis and  perfusional/inflammatory changes. No convincing findings of portal  hypertension; no splenomegaly or ascites.  2. Posttreatment changes of microwave ablation in hepatic segments 5/8  without evidence of recurrent and/or residual tumor. LIRADS LR-TR no  viable.  3. Unchanged 9 mm LIRADS class 4 observation in hepatic segment 5,  probably HCC. Attention on follow-up studies.  4. Based on this exam only, the patient is within [place] criteria.  5. Cholelithiasis. No associated findings of acute cholecystitis.   6. New small/moderate right pleural effusion and trace left pleural  effusion with overlying atelectasis.    MRI Abdomen 3/16/18  MRI ABDOMEN     CLINICAL HISTORY:  ; HCC (hepatocellular carcinoma) (H)     TECHNIQUE: Images were acquired with and without intravenous contrast  through the abdomen. The following MR images were acquired: TrueFISP,  multiplanar T2 weighted, axial T1 in/out of phase, diffusion-weighted.  Multiplanar T1-weighted images with fat saturation were before  contrast administration and at multiple time points following the  administration of intravenous contrast. Contrast dose: 7.5mL Gadavist     FINDINGS:     Comparison study: 12/18/2017     Liver: Postoperative changes of partial left hepatectomy.  Postoperative changes along surgical margin/cut edge of the liver  without evidence for local recurrence. Mild delayed lacelike  enhancement throughout the hepatic parenchyma, consistent with  fibrosis.      Microwave ablation changes in segment 5/8 (series 8, image 30). No  suspicious nodularity or enhancement to suggest residual viable tumor.  LR TR nonviable.     Lesion 1: There continues to be an 9 mm arterial enhancing lesion in  hepatic segment 5. This lesion demonstrates washout,  as well as a  pseudocapsule, increased T2 signal and increased diffusion signal. No  evidence of venous invasion. Threshold criteria for growth are not met  when compared to MR 12/18/2017. OPTN/LIRADS 4.     Lesion 2: 6 mm ill-defined arterial enhancing lesion along the  periphery of the right lobe of the liver (series 7 image 50). No  abnormality seen on delayed images, T2 sequences, or  diffusion-weighted images. This is indeterminate, and may represent a  vascular shunt. Relatively stable. Criteria not mentioned for  threshold growth. OPTN/LIRADS 3.     Gallbladder: Cholelithiasis. No evidence for cholecystitis.     Spleen: Normal. Not enlarged.     Kidneys: Focal cortical thinning within the midpole the right kidney,  likely sequela from infection or ischemia. Tiny cortically based  cystic-appearing lesions. Stable mild left-sided hydronephrosis.     Adrenal glands: Unremarkable     Pancreas: Unremarkable. Pancreatic duct is normal in caliber.     Bowel: Unremarkable     Lymph nodes: No mesenteric lymphadenopathy.     Blood vessels: Abdominal and pelvic vasculature are unremarkable.     Lung bases: Clear     Bones and soft tissues: Marrow signal is normal. Partial fusion along  the right aspect of the vertebral bodies of L3 and L4.     Mesentery and abdominal wall: Unremarkable     Ascites: None         IMPRESSION:    1. Cirrhosis without evidence of portal hypertension. Postoperative  changes of partial left hepatectomy. Evolving hematoma at the cut edge  of the liver, decreased in size.  2. Postoperative changes of microlith ablation in hepatic segments  5/8. No definite evidence for local recurrence in this examination.  The previously seen suspicious area of peripheral enhancement is not  visualized current examination. LR-TR nonviable.  3. 8-9 mm lesion in segment 5 shows suspicious features, but does not  yet meet size criteria for HCC. OPTN/LIRADS 4. Follow-up imaging in 6  months would be beneficial.  4.  Additional indeterminate arterial enhancing lesions, without  additional abnormality on remainder of sequences. LIRADS 3. These can  be reassessed at follow-up.  5. Based on this exam only, the patient is within [place] criteria.  6. Cholelithiasis.      MRI Abdomen 12/18/17  Liver: Postoperative changes of partial left hepatectomy. Mild  increased precontrast T1 signal along the surgical margin, likely  representing a small hematoma without features concerning for residual  tumor; no worrisome nodularity or enhancement.  No significant hepatic  steatosis or iron deposition. There is patchy heterogeneous arterial  enhancement throughout the liver parenchyma with a delayed reticular  pattern likely due to fibrotic changes.     Post ablation changes in the subcapsular aspect of segment 5/8 (series  9, image 36). There is a small focus of new arterial phase  hyperenhancement at the margin of the ablation zone which measures 0.9  mm (series 10, image 32). There is no associated washout. No increased  T2 signal or hyperintense signal on high be value diffusion-weighted  images.  LR-TR equivocal.     Lesion 1: There is a 7 mm arterially enhancing lesion in hepatic  segment  5 adjacent to the gallbladder fossa (series 9 image 45). It  does washout on portal venous and delayed phase imaging, series 11  image 45 and series 14 image 43. Pseudocapsule is not convincingly  identified. There is no associated increased T2 signal, but there is  mild increased signal on diffusion weighted images. Previously this  measured 5 mm. There is not evidence of venous invasion.  OPTN/LIRADS  class 4.     Scattered subcentimeter foci of arterial enhancement throughout the  liver parenchyma with no restricted diffusion, washout or  pseudocapsule, indeterminate. A representative lesion can be seen  anterior to the right portal vein segment 5/8 (series 3, image 36).  OPTN/LIRADS 3.     Gallbladder: Normally distended. Cholelithiasis. No  gallbladder wall  thickening. No pericholecystic fluid.     Spleen: Not enlarged. No focal lesions.     Kidneys: No suspicious lesions. Focal cortical retraction in the  superior/interpolar region of the right kidney, likely from prior  infection/infarction. Tiny bilateral cortical renal cysts. Stable mild  left pelvocaliectasis. No right hydronephrosis.     Adrenal glands: Unremarkable.     Pancreas: Partial atrophic pancreatic parenchyma with mild diffuse  decreased precontrast T1 signal. No focal lesions. Main pancreatic  duct is normal diameter. No pancreas divisum.     Bowel: No dilated loops of bowel.     Lymph nodes: Prominent melisa hepatis lymph nodes, likely reactive.     Blood vessels: Patent abdominal vasculature. No abdominal aortic  aneurysm.     Lung bases: Small bilateral pleural effusions, right greater than  left. Mild bilateral lower lobes dependent atelectasis. No pericardial  effusion. Cardiac size within normal limits.     Bones and soft tissues: Degenerative changes of the spine. Partial  fusion along the right lateral aspect at L3-L4. No suspicious lesions  in the bones. Bilateral gynecomastia.     Mesentery and abdominal wall: Unremarkable.     Ascites: No ascites.         IMPRESSION:    1. Cirrhosis without convincing evidence of portal hypertension.  2. Postoperative changes of microwave ablation involving hepatic  segments 5/8. There is a new small focus of associated enhancement  peripherally without washout or additional suspicious characteristics.  LR TR equivocal. Recommend short-term (3 months) follow-up MR.  3. Postsurgical changes of partial left hepatectomy. Small  postoperative hematoma/blood products along the surgical margin. No  evidence for viable tumor at the surgical margin.   4. 7 mm OPTN 4 lesion in segment 5, slightly increased in size from  the prior. OPTN/LIRADS 4. Additional indeterminate LIRADS 3 lesions.  These can be reevaluated at follow-up.  5. Based on this exam  only, the patient is within [place] criteria.  6. Cholelithiasis. No associated findings of acute cholecystitis.    MRI abdomen 5/26/17      IMPRESSION:    1. Postoperative changes of partial left hepatectomy and right lobe  microwave ablation with no residual tumor at these sites.  2. Indeterminate, hypoenhancing lesion near the gallbladder fossa  hepatic segment 5, not definitively visualized on prior exams. This is  indeterminate, and follow-up is recommended.   3. Cholelithiasis.      ASSESSMENT/PLAN:      Stage I, pT1 NX MX, moderately differentiated hepatocellular carcinoma with maximum tumor size being 4.2 cm of the left lower lobe, status post resection with negative margins and no lymphovascular or perineural invasion found.  The background liver had minimal to mild focal chronic portal  grade 1 out of 4, and fibrosis and portal expansion, which is also stage 1-2 out of 4.  He does not have evidence of cirrhosis.      His repeat MRI showed a new 12 mm concerning lesion in Hepatic seg 5/8. He underwent microwave ablation of segment 5 HCC on 4/19/2017. Repeat MRI after the MWA on 5/26/17 showed post treatment changes. There was an indeterminate lesion in seg 5 LIRADS4.     MRI today shows posttreatment changes of microwave ablation in hepatic segments 5/8 without evidence of recurrent or residual tumor.  Hepatic segment 5 lesion is unchanged and measures 9 mm and consistent with LIRADS 4.  Alpha-fetoprotein remains undetectable  I would recommend a repeat MRI in 6 months. We'll repeat labs at that time.     Abdominal pain. He has had right upper quadrant abdominal pain since the time of his surgery. Occasionally he takes ibuprofen for that. At this time there is no evidence of cancer recurrence.    Thrombocytopenia. This is asymptomatic and platelets are 140. We will continue to observe    We discussed that he has several comorbid conditions which are unrelated to the cancer and he needs to have a very close  follow-up with his primary care physician. His blood pressure needs to be under better control. His diabetes also needs to be under better control. He had a glucose of 58 today but then he ate after that. He tells me that in the early morning his blood sugar was 270. He also has chronic kidney disease and bilateral lower extremity edema and knee pain and I strongly encouraged him to follow with his primary care physician to have better management of all these symptoms. He is not sure exactly what medications he is on and I told him that he needs to sit down with his primary care physician and get a better understanding of his other comorbid conditions as well as all the medications he is taking so that going forward he does not have confusion with regards to medications and the indication should take them      The following was not addressed today     Hepatitis C.  This has been treated in 2005 and 2006, and last HCV RNA was undetectable in 11/2015    He will return to clinic in 3 months with a repeat MRI and labs prior to that.    All of his questions were answered to his satisfaction and he is comfortable with this plan      Miri Antoine

## 2018-05-31 NOTE — NURSING NOTE
"Oncology Rooming Note    May 31, 2018 1:07 PM   Eh Callahan is a 78 year old male who presents for:    Chief Complaint   Patient presents with     Labs Only     drawn form PIV placed in imaging     Oncology Clinic Visit     HCC 3 month f.u     Initial Vitals: /81  Pulse 74  Temp 97.7  F (36.5  C)  Resp 16  Wt 74.6 kg (164 lb 8 oz)  SpO2 98%  BMI 26.56 kg/m2 Estimated body mass index is 26.56 kg/(m^2) as calculated from the following:    Height as of 3/8/18: 1.676 m (5' 5.98\").    Weight as of this encounter: 74.6 kg (164 lb 8 oz). Body surface area is 1.86 meters squared.  Extreme Pain (8) Comment: right leg   No LMP for male patient.  Allergies reviewed: Yes  Medications reviewed: Yes    Medications: Medication refills not needed today.  Pharmacy name entered into [employer]:    [facility] - [place], [place] - [address] [facility] - [place], [place] - [address]    Clinical concerns: Right leg pain, ongoing for past two weeks. Dr Antoine was notified.    10 minutes for nursing intake (face to face time)     Neida Kay CMA              "

## 2018-05-31 NOTE — LETTER
5/31/2018       RE: Eh Callahan  [id number]  [facility] [address]     Dear Colleague,    Thank you for referring your patient, Eh Callahan, to the [facility]. Please see a copy of my visit note below.    Oncology Follow up visit:  Date on this visit: 5/31/2018       DIAGNOSIS  HCC    Primary Physician: Shalini Mca     History Of Present Illness:       Copied and updated from prior notes     Mr. Callahan is a 77-year-old male who has a history of previously treated hepatitis C(interferon and ribavirin in 2005 and 2006) attaining a sustained virologic response, was found to have a 3.6 cm lesion in the liver, which was concerning for malignancy.  On 11/22/16, he had a hand-assisted exploratory laparoscopy with extensive lysis of adhesion, and segment 2 and 3 of the liver were removed by Dr Mckeon.    The final pathology from the surgery revealed moderately differentiated hepatocellular carcinoma with negative margins, with a tumor size being 4.2 x 3.6 x 3 cm.  The tumors were confined to the liver.  All margins were negative.  There was no lymphovascular or perineural invasion present.  No lymph nodes were examined in the surgical specimen.  It was a pT1 solitary tumor without vascular invasion.  Prior to the surgery, he had a CAT scan of the chest, which showed subcentimeter pulmonary nodules, but no definite evidence of metastasis.   He recovered well after surgery.     His repeat MRI showed a new 12 mm concerning lesion in Hepatic seg 5/8.   He had MWA of segment 5 HCC on 4/19/2017.    Repeat MRI on 5/26/17 showed post treatment changes. There was an indeterminate lesion in seg 5 which was stable on MRI done in Sept 2017  MRI in Dec 2017 showed findings c/w post treatment changes. There is a small focus of new arterial phase hyperenhancement at the margin of the ablation zone which measures 0.9 mm which is LR-TR equivocal.  The S5 lesion has slightly increased in size to 7mm  and remains OPTN 4.  AFP remains undetected but it has never been elevated.    He has been on observation    Interval History    Professional  is available throughout the interview   Overall he is feeling well. Occasionally he still has pain in the right upper quadrant where he had the surgery. Occasionally has nausea but he does not take any medications for it. No vomiting. No diarrhea or constipation. No bleeding. He denies any new swellings apart from chronic lower extremity edema. No infections. No trouble breathing. He is also having pain in his right knee. His energy is stable. Today he had labs and MRI done. He did not eat before the MRI and his serum glucose level was 58. He ate after that. He does not report any lightheadedness     ECOG 2    ROS:  A comprehensive ROS was otherwise neg      I reviewed other hx in EPIC as below      Past Medical/Surgical History:  Past Medical History:   Diagnosis Date     Anatomical narrow angle glaucoma      Atrial fibrillation (H)      Chronic infection     history of hepatitis C     CKD (chronic kidney disease) stage 2, GFR 60-89 ml/min      Diabetes mellitus (H)      Hepatitis C without mention of hepatic coma      Hypertension      Palpitations      PTSD (post-traumatic stress disorder)      Past Surgical History:   Procedure Laterality Date     APPENDECTOMY       EYE SURGERY       LAPAROSCOPIC HEPATECTOMY PARTIAL Left 11/22/2016    Procedure: LAPAROSCOPIC HEPATECTOMY PARTIAL;  Surgeon: Rick Mckeon MD;  Location: [facility] OR     Cancer History:     As above    Allergies:  Allergies as of 05/31/2018 - Raul as Reviewed 05/31/2018   Allergen Reaction Noted     Amlodipine Swelling 09/26/2016     Lisinopril Cough 09/26/2016     Metoprolol  09/26/2016     Current Medications:  Current Outpatient Prescriptions   Medication Sig Dispense Refill     alendronate (FOSAMAX) 70 MG tablet 70 mg every 7 days Take 1 tablet by mouth twice weekly.       acetaminophen (TYLENOL)  "500 MG tablet Take 2 tablets (1,000 mg) by mouth 3 times daily (Patient taking differently: Take 500 mg by mouth 3 times daily as needed ) 100 tablet 0     amLODIPine (NORVASC) 5 MG tablet Take 5 mg by mouth       blood glucose monitoring (GREGORIO CONTOUR) test strip USE TO MEASURE YOUR BLOOD GLUCOSE 6 TIMES DAILY       Blood Glucose Monitoring Suppl (FIFTY50 GLUCOSE METER 2.0) W/DEVICE KIT as needed for blood glucose monitoring       Blood Pressure Monitoring (RA BLOOD PRESSURE CUFF MONITOR) MISC Take blood pressure once daily       Carboxymeth-Glycerin-Polysorb (REFRESH OPTIVE ADVANCED) 0.5-1-0.5 % SOLN PLACE 1 DROP INTO BOTH EYES 4 (FOUR) TIMES DAILY.       carboxymethylcellulose (REFRESH PLUS) 0.5 % SOLN Place 1 drop into both eyes 4 times daily        cholecalciferol (D 5000) 5000 UNITS CAPS Reported on 4/12/2017       furosemide (LASIX) 20 MG tablet Take 1 tablet (20 mg) by mouth daily 30 tablet 3     insulin aspart (NOVOLOG PEN) 100 UNIT/ML injection 3 units before each meal.       insulin degludec (TRESIBA) 100 UNIT/ML pen Inject 14 Units Subcutaneous       insulin lispro (HUMALOG PEN) 100 UNIT/ML soln 4 - 12 units 3 x daily       Insulin Pen Needle (PEN NEEDLES 5/16\") 31G X 8 MM MISC 4 x daily       [employer] LANCETS 21G MISC Uses 6 times daily       LORazepam (ATIVAN) 1 MG tablet Take 1 mg by mouth as needed       losartan (COZAAR) 100 MG tablet Take 1 tablet by mouth daily       Multiple vitamin TABS Take 1 tablet by mouth       Multiple Vitamins-Minerals (MULTIVITAMIN & MINERAL PO)        oxyCODONE IR (ROXICODONE) 5 MG tablet Take 5 mg by mouth       pantoprazole (PROTONIX) 40 MG enteric coated tablet Take 40 mg by mouth daily        polyethylene glycol (MIRALAX/GLYCOLAX) packet Take 17 g by mouth daily as needed for constipation (Patient not taking: Reported on 3/8/2018) 30 packet 1     senna-docusate (SENOKOT-S;PERICOLACE) 8.6-50 MG per tablet Take 1 tablet by mouth 2 times daily (Patient not taking: " Reported on 3/8/2018) 14 tablet 0     simvastatin (ZOCOR) 20 MG tablet Take 1 tablet (20 mg) by mouth daily       timolol (TIMOPTIC) 0.5 % ophthalmic solution PLACE 1 DROP INTO BOTH EYES ONCE DAILY.       traZODone (DESYREL) 50 MG tablet Take 50 mg by mouth       XARELTO 20 MG TABS tablet TAKE 1 TABLET (20 MG) BY MOUTH DAILY (WITH DINNER) (Patient not taking: Reported on 3/8/2018) 90 tablet 1      Family History:  Family History   Problem Relation Age of Onset     DIABETES No family hx of      He is not aware of any diabetes in his family     KIDNEY DISEASE No family hx of      Social History:  Social History     Social History     Marital status:      Spouse name: N/A     Number of children: N/A     Years of education: N/A     Occupational History     Not on file.     Social History Main Topics     Smoking status: Former Smoker     Smokeless tobacco: Never Used     Alcohol use No     Drug use: No     Sexual activity: Not on file     Other Topics Concern     Parent/Sibling W/ Cabg, Mi Or Angioplasty Before 65f 55m? No     Social History Narrative    Immigrated in 2002 from [facility].  Previously lived in [place] before moving here.  Daughter lives locally as well.       Physical Exam:  /81  Pulse 74  Temp 97.7  F (36.5  C)  Resp 16  Wt 74.6 kg (164 lb 8 oz)  SpO2 98%  BMI 26.56 kg/m2    CONSTITUTIONAL: No apparent distress  EYES: PERRLA, without pallor or jaundice  ENT/MOUTH: Ears unremarkable. No oral lesions  CVS: s1s2 normal  RESPIRATORY: Chest is clear  GI: Abdomen is soft. There is mild hepatomegaly. No splenomegaly. There is mild tenderness in the right upper quadrant  NEURO: He is alert and oriented ×3  INTEGUMENT: no concerning skin rashes   LYMPHATIC: no palpable lymphadenopathy  MUSCULOSKELETAL: Unremarkable. No bony tenderness.   EXTREMITIES: 1+ bilateral pedal edema. The right knee does not look very swollen. There is no warmth or tenderness.  PSYCH: Mentation, mood and affect are  appropriate            Laboratory/Imaging Studies  Results for CHIKIS NEWBERRY (MRN [id number]) as of 5/31/2018 13:13   Ref. Range 5/31/2018 09:34   Sodium Latest Ref Range: 133 - 144 mmol/L 140   Potassium Latest Ref Range: 3.4 - 5.3 mmol/L 3.8   Chloride Latest Ref Range: 94 - 109 mmol/L 105   Carbon Dioxide Latest Ref Range: 20 - 32 mmol/L 28   Urea Nitrogen Latest Ref Range: 7 - 30 mg/dL 36 (H)   Creatinine Latest Ref Range: 0.66 - 1.25 mg/dL 1.46 (H)   GFR Estimate Latest Ref Range: >60 mL/min/1.7m2 47 (L)   GFR Estimate If Black Latest Ref Range: >60 mL/min/1.7m2 56 (L)   Calcium Latest Ref Range: 8.5 - 10.1 mg/dL 8.3 (L)   Anion Gap Latest Ref Range: 3 - 14 mmol/L 6   Albumin Latest Ref Range: 3.4 - 5.0 g/dL 3.0 (L)   Protein Total Latest Ref Range: 6.8 - 8.8 g/dL 6.7 (L)   Bilirubin Total Latest Ref Range: 0.2 - 1.3 mg/dL 1.0   Alkaline Phosphatase Latest Ref Range: 40 - 150 U/L 90   ALT Latest Ref Range: 0 - 70 U/L 32   AST Latest Ref Range: 0 - 45 U/L 24   Alpha Fetoprotein Latest Ref Range: 0 - 8 ug/L <1.5   Glucose Latest Ref Range: 70 - 99 mg/dL 58 (L)   WBC Latest Ref Range: 4.0 - 11.0 10e9/L 3.7 (L)   Hemoglobin Latest Ref Range: 13.3 - 17.7 g/dL 14.0   Hematocrit Latest Ref Range: 40.0 - 53.0 % 40.6   Platelet Count Latest Ref Range: 150 - 450 10e9/L 140 (L)   RBC Count Latest Ref Range: 4.4 - 5.9 10e12/L 4.42   MCV Latest Ref Range: 78 - 100 fl 92   MCH Latest Ref Range: 26.5 - 33.0 pg 31.7   MCHC Latest Ref Range: 31.5 - 36.5 g/dL 34.5   RDW Latest Ref Range: 10.0 - 15.0 % 12.1   Diff Method Unknown Automated Method   % Neutrophils Latest Units: % 53.9   % Lymphocytes Latest Units: % 29.9   % Monocytes Latest Units: % 11.6   % Eosinophils Latest Units: % 3.8   % Basophils Latest Units: % 0.5   % Immature Granulocytes Latest Units: % 0.3   Nucleated RBCs Latest Ref Range: 0 /100 0   Absolute Neutrophil Latest Ref Range: 1.6 - 8.3 10e9/L 2.0   Absolute Lymphocytes Latest Ref Range: 0.8 - 5.3 10e9/L 1.1    Absolute Monocytes Latest Ref Range: 0.0 - 1.3 10e9/L 0.4   Absolute Eosinophils Latest Ref Range: 0.0 - 0.7 10e9/L 0.1     MRI Abdomen 5/31/2018  MRI ABDOMEN     CLINICAL HISTORY:  Follow up HCC.     TECHNIQUE: Images were acquired with and without intravenous contrast  through the abdomen. The following MR images were acquired: TrueFISP,  multiplanar T2 weighted, axial T1 in/out of phase, diffusion-weighted.  Multiplanar T1-weighted images with fat saturation were before  contrast administration and at multiple time points following the  administration of intravenous contrast.     FINDINGS:     Comparison study: 3/16/2018.     Liver: Postoperative changes of partial left hepatectomy. There is a  small amount of persistent precontrast T1 hyperintensity at the cut  edge of the liver. This is favored to represent postsurgical change  and/or blood products/evolving hematoma. This is slightly less  conspicuous than on the prior No evidence for recurrent and/or  residual tumor along the surgical margin. Nodular hepatic contour,  parenchymal T2 signal and delayed reticular pattern of contrast  enhancement likely secondary to fibrosis. There is patchy arterial  enhancement throughout the liver which may represent perfusional  change/inflammation and/or fibrosis. No significant hepatic steatosis  or iron deposition.     Posttreatment changes of microwave ablation in hepatic segments 5/8  with increased precontrast T1 signal likely due to coagulative  necrosis. Expected thin rim of enhancement with no suspicious nodular  component to concern for recurrent and/or residual tumor.  LR-TR  nonviable.     Lesion 1: There is a 9 mm arterially enhancing lesion in hepatic  segment  5 (series 8 and series 9, image 44). It does washout on  portal venous and delayed phase imaging, series 13 image 45. There is  the suggestion of a pseudocapsule. There is associated increased T2  signal.  There is increased signal on diffusion  weighted images.  Previously this measured 9 mm and does not meet criteria for threshold  growth There is not evidence of venous invasion.  LIRADS class 4.     A previously seen arterially enhancing observation inferiorly in the  right hepatic lobe is not well visualized on the current examination.  There is a patchy geographic/nonmass-like area of enhancement  peripherally in the right hepatic lobe (series 8, image 47); this is  favored to be perfusional. No abnormal signal on additional sequences  and no washout on delayed phases of contrast enhancement.     No new or enlarging arterial enhancing lesions and no lesions which  meet imaging criteria for HCC.     Previously described 6 mm ill-defined arterially enhancing lesion in  hepatic segment 6 is not visualized in the present study. No new focal  liver lesions meeting diagnostic criteria for HCC.      Gallbladder: Cholelithiasis. No gallbladder wall thickening. No  pericholecystic fluid. No biliary tree dilation.     Spleen: The spleen is not enlarged. No focal splenic lesions.     Kidneys: No suspicious renal lesions. Again seen is cortical scarring  and thinning involving the posterior aspect of the right kidney,  likely sequela of prior infection or ischemia. No right  hydronephrosis. Stable mild left hydronephrosis.     Adrenal glands: Unremarkable.     Pancreas: Preserved increased precontrast T1 signal. No focal lesions.  Main pancreatic duct is not dilated. No pancreas divisum.     Bowel: No dilated loops of bowel.     Lymph nodes: Mildly prominent melisa hepatis lymph nodes, likely  reactive.     Blood vessels: No abdominal aortic aneurysm. Hepatic and portal venous  systems are patent. Mild unchanged ectasia of the celiac trunk.     Lung bases: New small/moderate right pleural effusion. Trace left  pleural effusion. Bilateral lower lobes dependent atelectasis, right  greater than left.     Bones and soft tissues: Subcutaneous soft tissue edema along the  back  is again seen. Mild bilateral gynecomastia. Degenerative changes of  the spine. Apparent partial bony fusion involving the right aspect of  the L3-L4 vertebral bodies. No suspicious bone lesions.     Mesentery and abdominal wall: Unremarkable.     Ascites: No ascites.         IMPRESSION:    1. Postoperative changes of partial left hepatectomy. MRI findings of  chronic intrinsic hepatic parenchymal disease with fibrosis and  perfusional/inflammatory changes. No convincing findings of portal  hypertension; no splenomegaly or ascites.  2. Posttreatment changes of microwave ablation in hepatic segments 5/8  without evidence of recurrent and/or residual tumor. LIRADS LR-TR no  viable.  3. Unchanged 9 mm LIRADS class 4 observation in hepatic segment 5,  probably HCC. Attention on follow-up studies.  4. Based on this exam only, the patient is within [place] criteria.  5. Cholelithiasis. No associated findings of acute cholecystitis.   6. New small/moderate right pleural effusion and trace left pleural  effusion with overlying atelectasis.    MRI Abdomen 3/16/18  MRI ABDOMEN     CLINICAL HISTORY:  ; HCC (hepatocellular carcinoma) (H)     TECHNIQUE: Images were acquired with and without intravenous contrast  through the abdomen. The following MR images were acquired: TrueFISP,  multiplanar T2 weighted, axial T1 in/out of phase, diffusion-weighted.  Multiplanar T1-weighted images with fat saturation were before  contrast administration and at multiple time points following the  administration of intravenous contrast. Contrast dose: 7.5mL Gadavist     FINDINGS:     Comparison study: 12/18/2017     Liver: Postoperative changes of partial left hepatectomy.  Postoperative changes along surgical margin/cut edge of the liver  without evidence for local recurrence. Mild delayed lacelike  enhancement throughout the hepatic parenchyma, consistent with  fibrosis.      Microwave ablation changes in segment 5/8 (series 8, image 30).  No  suspicious nodularity or enhancement to suggest residual viable tumor.  LR TR nonviable.     Lesion 1: There continues to be an 9 mm arterial enhancing lesion in  hepatic segment 5. This lesion demonstrates washout, as well as a  pseudocapsule, increased T2 signal and increased diffusion signal. No  evidence of venous invasion. Threshold criteria for growth are not met  when compared to MR 12/18/2017. OPTN/LIRADS 4.     Lesion 2: 6 mm ill-defined arterial enhancing lesion along the  periphery of the right lobe of the liver (series 7 image 50). No  abnormality seen on delayed images, T2 sequences, or  diffusion-weighted images. This is indeterminate, and may represent a  vascular shunt. Relatively stable. Criteria not mentioned for  threshold growth. OPTN/LIRADS 3.     Gallbladder: Cholelithiasis. No evidence for cholecystitis.     Spleen: Normal. Not enlarged.     Kidneys: Focal cortical thinning within the midpole the right kidney,  likely sequela from infection or ischemia. Tiny cortically based  cystic-appearing lesions. Stable mild left-sided hydronephrosis.     Adrenal glands: Unremarkable     Pancreas: Unremarkable. Pancreatic duct is normal in caliber.     Bowel: Unremarkable     Lymph nodes: No mesenteric lymphadenopathy.     Blood vessels: Abdominal and pelvic vasculature are unremarkable.     Lung bases: Clear     Bones and soft tissues: Marrow signal is normal. Partial fusion along  the right aspect of the vertebral bodies of L3 and L4.     Mesentery and abdominal wall: Unremarkable     Ascites: None         IMPRESSION:    1. Cirrhosis without evidence of portal hypertension. Postoperative  changes of partial left hepatectomy. Evolving hematoma at the cut edge  of the liver, decreased in size.  2. Postoperative changes of microlith ablation in hepatic segments  5/8. No definite evidence for local recurrence in this examination.  The previously seen suspicious area of peripheral enhancement is  not  visualized current examination. LR-TR nonviable.  3. 8-9 mm lesion in segment 5 shows suspicious features, but does not  yet meet size criteria for HCC. OPTN/LIRADS 4. Follow-up imaging in 6  months would be beneficial.  4. Additional indeterminate arterial enhancing lesions, without  additional abnormality on remainder of sequences. LIRADS 3. These can  be reassessed at follow-up.  5. Based on this exam only, the patient is within [place] criteria.  6. Cholelithiasis.      MRI Abdomen 12/18/17  Liver: Postoperative changes of partial left hepatectomy. Mild  increased precontrast T1 signal along the surgical margin, likely  representing a small hematoma without features concerning for residual  tumor; no worrisome nodularity or enhancement.  No significant hepatic  steatosis or iron deposition. There is patchy heterogeneous arterial  enhancement throughout the liver parenchyma with a delayed reticular  pattern likely due to fibrotic changes.     Post ablation changes in the subcapsular aspect of segment 5/8 (series  9, image 36). There is a small focus of new arterial phase  hyperenhancement at the margin of the ablation zone which measures 0.9  mm (series 10, image 32). There is no associated washout. No increased  T2 signal or hyperintense signal on high be value diffusion-weighted  images.  LR-TR equivocal.     Lesion 1: There is a 7 mm arterially enhancing lesion in hepatic  segment  5 adjacent to the gallbladder fossa (series 9 image 45). It  does washout on portal venous and delayed phase imaging, series 11  image 45 and series 14 image 43. Pseudocapsule is not convincingly  identified. There is no associated increased T2 signal, but there is  mild increased signal on diffusion weighted images. Previously this  measured 5 mm. There is not evidence of venous invasion.  OPTN/LIRADS  class 4.     Scattered subcentimeter foci of arterial enhancement throughout the  liver parenchyma with no restricted diffusion,  washout or  pseudocapsule, indeterminate. A representative lesion can be seen  anterior to the right portal vein segment 5/8 (series 3, image 36).  OPTN/LIRADS 3.     Gallbladder: Normally distended. Cholelithiasis. No gallbladder wall  thickening. No pericholecystic fluid.     Spleen: Not enlarged. No focal lesions.     Kidneys: No suspicious lesions. Focal cortical retraction in the  superior/interpolar region of the right kidney, likely from prior  infection/infarction. Tiny bilateral cortical renal cysts. Stable mild  left pelvocaliectasis. No right hydronephrosis.     Adrenal glands: Unremarkable.     Pancreas: Partial atrophic pancreatic parenchyma with mild diffuse  decreased precontrast T1 signal. No focal lesions. Main pancreatic  duct is normal diameter. No pancreas divisum.     Bowel: No dilated loops of bowel.     Lymph nodes: Prominent melisa hepatis lymph nodes, likely reactive.     Blood vessels: Patent abdominal vasculature. No abdominal aortic  aneurysm.     Lung bases: Small bilateral pleural effusions, right greater than  left. Mild bilateral lower lobes dependent atelectasis. No pericardial  effusion. Cardiac size within normal limits.     Bones and soft tissues: Degenerative changes of the spine. Partial  fusion along the right lateral aspect at L3-L4. No suspicious lesions  in the bones. Bilateral gynecomastia.     Mesentery and abdominal wall: Unremarkable.     Ascites: No ascites.         IMPRESSION:    1. Cirrhosis without convincing evidence of portal hypertension.  2. Postoperative changes of microwave ablation involving hepatic  segments 5/8. There is a new small focus of associated enhancement  peripherally without washout or additional suspicious characteristics.  LR TR equivocal. Recommend short-term (3 months) follow-up MR.  3. Postsurgical changes of partial left hepatectomy. Small  postoperative hematoma/blood products along the surgical margin. No  evidence for viable tumor at the  surgical margin.   4. 7 mm OPTN 4 lesion in segment 5, slightly increased in size from  the prior. OPTN/LIRADS 4. Additional indeterminate LIRADS 3 lesions.  These can be reevaluated at follow-up.  5. Based on this exam only, the patient is within [place] criteria.  6. Cholelithiasis. No associated findings of acute cholecystitis.    MRI abdomen 5/26/17      IMPRESSION:    1. Postoperative changes of partial left hepatectomy and right lobe  microwave ablation with no residual tumor at these sites.  2. Indeterminate, hypoenhancing lesion near the gallbladder fossa  hepatic segment 5, not definitively visualized on prior exams. This is  indeterminate, and follow-up is recommended.   3. Cholelithiasis.      ASSESSMENT/PLAN:      Stage I, pT1 NX MX, moderately differentiated hepatocellular carcinoma with maximum tumor size being 4.2 cm of the left lower lobe, status post resection with negative margins and no lymphovascular or perineural invasion found.  The background liver had minimal to mild focal chronic portal  grade 1 out of 4, and fibrosis and portal expansion, which is also stage 1-2 out of 4.  He does not have evidence of cirrhosis.      His repeat MRI showed a new 12 mm concerning lesion in Hepatic seg 5/8. He underwent microwave ablation of segment 5 HCC on 4/19/2017. Repeat MRI after the MWA on 5/26/17 showed post treatment changes. There was an indeterminate lesion in seg 5 LIRADS4.     MRI today shows posttreatment changes of microwave ablation in hepatic segments 5/8 without evidence of recurrent or residual tumor.  Hepatic segment 5 lesion is unchanged and measures 9 mm and consistent with LIRADS 4.  Alpha-fetoprotein remains undetectable  I would recommend a repeat MRI in 6 months. We'll repeat labs at that time.     Abdominal pain. He has had right upper quadrant abdominal pain since the time of his surgery. Occasionally he takes ibuprofen for that. At this time there is no evidence of cancer  recurrence.    Thrombocytopenia. This is asymptomatic and platelets are 140. We will continue to observe    We discussed that he has several comorbid conditions which are unrelated to the cancer and he needs to have a very close follow-up with his primary care physician. His blood pressure needs to be under better control. His diabetes also needs to be under better control. He had a glucose of 58 today but then he ate after that. He tells me that in the early morning his blood sugar was 270. He also has chronic kidney disease and bilateral lower extremity edema and knee pain and I strongly encouraged him to follow with his primary care physician to have better management of all these symptoms. He is not sure exactly what medications he is on and I told him that he needs to sit down with his primary care physician and get a better understanding of his other comorbid conditions as well as all the medications he is taking so that going forward he does not have confusion with regards to medications and the indication should take them      The following was not addressed today     Hepatitis C.  This has been treated in 2005 and 2006, and last HCV RNA was undetectable in 11/2015    He will return to clinic in 3 months with a repeat MRI and labs prior to that.    All of his questions were answered to his satisfaction and he is comfortable with this plan      Miri Antoine

## 2018-07-23 DIAGNOSIS — I48.91 ATRIAL FIBRILLATION, UNSPECIFIED TYPE (H): ICD-10-CM

## 2018-07-24 RX ORDER — RIVAROXABAN 20 MG/1
TABLET, FILM COATED ORAL
Qty: 90 TABLET | Refills: 1 | Status: SHIPPED | OUTPATIENT
Start: 2018-07-24 | End: 2018-09-19

## 2018-09-19 ENCOUNTER — HOSPITAL ENCOUNTER (EMERGENCY)
Facility: CLINIC | Age: 78
Discharge: HOME | End: 2018-09-19
Attending: INTERNAL MEDICINE | Admitting: INTERNAL MEDICINE
Payer: MEDICARE

## 2018-09-19 ENCOUNTER — APPOINTMENT (OUTPATIENT)
Dept: GENERAL RADIOLOGY | Facility: CLINIC | Age: 78
End: 2018-09-19
Attending: INTERNAL MEDICINE
Payer: MEDICARE

## 2018-09-19 ENCOUNTER — APPOINTMENT (OUTPATIENT)
Dept: CT IMAGING | Facility: CLINIC | Age: 78
End: 2018-09-19
Attending: INTERNAL MEDICINE
Payer: MEDICARE

## 2018-09-19 VITALS
BODY MASS INDEX: 25.35 KG/M2 | HEART RATE: 60 BPM | DIASTOLIC BLOOD PRESSURE: 87 MMHG | TEMPERATURE: 98.1 F | RESPIRATION RATE: 14 BRPM | WEIGHT: 157 LBS | OXYGEN SATURATION: 100 % | SYSTOLIC BLOOD PRESSURE: 175 MMHG

## 2018-09-19 DIAGNOSIS — G50.0 TRIGEMINAL NEURALGIA: ICD-10-CM

## 2018-09-19 LAB
ALBUMIN SERPL-MCNC: 3 G/DL (ref 3.4–5)
ALBUMIN UR-MCNC: 100 MG/DL
ALP SERPL-CCNC: 104 U/L (ref 40–150)
ALT SERPL W P-5'-P-CCNC: 35 U/L (ref 0–70)
ANION GAP SERPL CALCULATED.3IONS-SCNC: 7 MMOL/L (ref 3–14)
APPEARANCE UR: CLEAR
AST SERPL W P-5'-P-CCNC: 21 U/L (ref 0–45)
BASOPHILS # BLD AUTO: 0 10E9/L (ref 0–0.2)
BASOPHILS NFR BLD AUTO: 0.2 %
BILIRUB SERPL-MCNC: 1.1 MG/DL (ref 0.2–1.3)
BILIRUB UR QL STRIP: NEGATIVE
BUN SERPL-MCNC: 34 MG/DL (ref 7–30)
CALCIUM SERPL-MCNC: 8.3 MG/DL (ref 8.5–10.1)
CHLORIDE SERPL-SCNC: 98 MMOL/L (ref 94–109)
CO2 SERPL-SCNC: 29 MMOL/L (ref 20–32)
COLOR UR AUTO: ABNORMAL
CREAT SERPL-MCNC: 1.36 MG/DL (ref 0.66–1.25)
CRP SERPL-MCNC: <2.9 MG/L (ref 0–8)
DIFFERENTIAL METHOD BLD: ABNORMAL
EOSINOPHIL # BLD AUTO: 0.2 10E9/L (ref 0–0.7)
EOSINOPHIL NFR BLD AUTO: 2.9 %
ERYTHROCYTE [DISTWIDTH] IN BLOOD BY AUTOMATED COUNT: 11.8 % (ref 10–15)
ERYTHROCYTE [SEDIMENTATION RATE] IN BLOOD BY WESTERGREN METHOD: 14 MM/H (ref 0–20)
GFR SERPL CREATININE-BSD FRML MDRD: 51 ML/MIN/1.7M2
GLUCOSE SERPL-MCNC: 289 MG/DL (ref 70–99)
GLUCOSE UR STRIP-MCNC: >1000 MG/DL
HCT VFR BLD AUTO: 42 % (ref 40–53)
HGB BLD-MCNC: 14.5 G/DL (ref 13.3–17.7)
HGB UR QL STRIP: NEGATIVE
IMM GRANULOCYTES # BLD: 0 10E9/L (ref 0–0.4)
IMM GRANULOCYTES NFR BLD: 0.2 %
INR PPP: 1.05 (ref 0.86–1.14)
KETONES UR STRIP-MCNC: NEGATIVE MG/DL
LEUKOCYTE ESTERASE UR QL STRIP: NEGATIVE
LYMPHOCYTES # BLD AUTO: 1.3 10E9/L (ref 0.8–5.3)
LYMPHOCYTES NFR BLD AUTO: 23.4 %
MCH RBC QN AUTO: 30.9 PG (ref 26.5–33)
MCHC RBC AUTO-ENTMCNC: 34.5 G/DL (ref 31.5–36.5)
MCV RBC AUTO: 89 FL (ref 78–100)
MONOCYTES # BLD AUTO: 0.4 10E9/L (ref 0–1.3)
MONOCYTES NFR BLD AUTO: 7.4 %
NEUTROPHILS # BLD AUTO: 3.6 10E9/L (ref 1.6–8.3)
NEUTROPHILS NFR BLD AUTO: 65.9 %
NITRATE UR QL: NEGATIVE
NRBC # BLD AUTO: 0 10*3/UL
NRBC BLD AUTO-RTO: 0 /100
PH UR STRIP: 6.5 PH (ref 5–7)
PLATELET # BLD AUTO: 131 10E9/L (ref 150–450)
POTASSIUM SERPL-SCNC: 4.2 MMOL/L (ref 3.4–5.3)
PROT SERPL-MCNC: 6.6 G/DL (ref 6.8–8.8)
RBC # BLD AUTO: 4.7 10E12/L (ref 4.4–5.9)
RBC #/AREA URNS AUTO: 1 /HPF (ref 0–2)
SODIUM SERPL-SCNC: 134 MMOL/L (ref 133–144)
SOURCE: ABNORMAL
SP GR UR STRIP: 1.01 (ref 1–1.03)
TROPONIN I SERPL-MCNC: <0.015 UG/L (ref 0–0.04)
UROBILINOGEN UR STRIP-MCNC: NORMAL MG/DL (ref 0–2)
WBC # BLD AUTO: 5.4 10E9/L (ref 4–11)
WBC #/AREA URNS AUTO: 0 /HPF (ref 0–5)

## 2018-09-19 PROCEDURE — 70450 CT HEAD/BRAIN W/O DYE: CPT

## 2018-09-19 PROCEDURE — 71046 X-RAY EXAM CHEST 2 VIEWS: CPT

## 2018-09-19 PROCEDURE — 80053 COMPREHEN METABOLIC PANEL: CPT | Performed by: INTERNAL MEDICINE

## 2018-09-19 PROCEDURE — 86140 C-REACTIVE PROTEIN: CPT | Performed by: INTERNAL MEDICINE

## 2018-09-19 PROCEDURE — 85025 COMPLETE CBC W/AUTO DIFF WBC: CPT | Performed by: INTERNAL MEDICINE

## 2018-09-19 PROCEDURE — 85652 RBC SED RATE AUTOMATED: CPT | Performed by: INTERNAL MEDICINE

## 2018-09-19 PROCEDURE — 84484 ASSAY OF TROPONIN QUANT: CPT | Performed by: INTERNAL MEDICINE

## 2018-09-19 PROCEDURE — 93005 ELECTROCARDIOGRAM TRACING: CPT | Performed by: INTERNAL MEDICINE

## 2018-09-19 PROCEDURE — 85610 PROTHROMBIN TIME: CPT | Performed by: INTERNAL MEDICINE

## 2018-09-19 PROCEDURE — 81001 URINALYSIS AUTO W/SCOPE: CPT | Performed by: INTERNAL MEDICINE

## 2018-09-19 PROCEDURE — 93010 ELECTROCARDIOGRAM REPORT: CPT | Mod: Z6 | Performed by: INTERNAL MEDICINE

## 2018-09-19 PROCEDURE — 96374 THER/PROPH/DIAG INJ IV PUSH: CPT | Performed by: INTERNAL MEDICINE

## 2018-09-19 PROCEDURE — 99285 EMERGENCY DEPT VISIT HI MDM: CPT | Mod: 25 | Performed by: INTERNAL MEDICINE

## 2018-09-19 PROCEDURE — 25000128 H RX IP 250 OP 636: Performed by: INTERNAL MEDICINE

## 2018-09-19 RX ORDER — CARBAMAZEPINE 100 MG/1
100 TABLET, EXTENDED RELEASE ORAL 2 TIMES DAILY PRN
Qty: 30 TABLET | Refills: 0 | Status: ON HOLD | OUTPATIENT
Start: 2018-09-19 | End: 2020-01-18

## 2018-09-19 RX ORDER — KETOROLAC TROMETHAMINE 15 MG/ML
15 INJECTION, SOLUTION INTRAMUSCULAR; INTRAVENOUS ONCE
Status: COMPLETED | OUTPATIENT
Start: 2018-09-19 | End: 2018-09-19

## 2018-09-19 RX ADMIN — KETOROLAC TROMETHAMINE 15 MG: 15 INJECTION, SOLUTION INTRAMUSCULAR; INTRAVENOUS at 18:32

## 2018-09-19 ASSESSMENT — ENCOUNTER SYMPTOMS
SLEEP DISTURBANCE: 1
HEADACHES: 1

## 2018-09-19 NOTE — ED AVS SNAPSHOT
[facility], Emergency Department    [address]    [place] [address]    Phone:  [phone]    Fax:  [phone]                                       Eh Callahan   MRN: [id number]    Department:  [facility], Emergency Department   Date of Visit:  9/19/2018           Patient Information     Date Of Birth          1940        Your diagnoses for this visit were:     Trigeminal neuralgia        You were seen by Magdalena Santiago MD.        Discharge Instructions         Trigeminal Neuralgia  You have trigeminal neuralgia. This is pain caused by irritation of the trigeminal nerve on your face. Symptoms include sudden, sharp pain in your head or face. It may feel like an electric shock. It can last for several seconds or minutes. It usually happens on only 1 side of your face. Pain may be triggered by things like moving your jaw or a touch on the skin of your face. The pain may be caused by something irritating the trigeminal nerve, such as a blood vessel pressing against it. But the exact cause of this problem often isn t known. Although it can be quite painful, the condition isn t dangerous.  Trigeminal neuralgia is often treated with medicines. These include anti-seizure medicines or antidepressants. Certain other treatments may also help. In some cases, you may need surgery.    Home care  Your healthcare provider may prescribe medicines to help relieve and prevent pain. Take all medicines as directed. Please note that it may take several changes in dose and medicines before the right combination is found that controls the pain.  General care:    Plan to rest at home today.    Avoid any specific activities that seem to trigger the pain.    Over the next few weeks, keep a pain diary. Write down when your symptoms happen and how they feel. Certain activities such as touching your face, chewing, talking, or brushing your teeth may bring on the pain. Cold air can also trigger the pain. Make sure you  write down any triggers and discuss these with your healthcare provider. This will help guide treatment.  Follow-up care  Follow up with your healthcare provider, or as advised. If you were referred to a neurologist, be sure to make an appointment.  For more information on your condition, visit:    [employer] www.fpa-support.org  When to seek medical advice  Call your healthcare provider right away if any of these occur:    Fever of 100.4 F (38 C) or higher, or as advised    Headache with very stiff neck    You aren t able to keep liquids down (repeated vomiting)    Extreme drowsiness or confusion    Dizziness or fainting    A new feeling of weakness or numbness or tingling in your arm, leg, or face    Difficulty speaking or seeing  Date Last Reviewed: 8/1/2016 2000-2017 [employer]. [address] [place] [address]. All rights reserved. This information is not intended as a substitute for professional medical care. Always follow your healthcare professional's instructions.      Please make an appointment to follow up with Your Primary Care Provider and Neurology Clinic (phone: [phone]) as soon as possible even if entirely better.     24 Hour Appointment Hotline       To make an appointment at any [facility], call [phone] ([phone]). If you don't have a family doctor or clinic, we will help you find one. [place] clinics are conveniently located to serve the needs of you and your family.             Review of your medicines      START taking        Dose / Directions Last dose taken    carBAMazepine 100 MG 12 hr tablet   Commonly known as:  TEGretol XR   Dose:  100 mg   Quantity:  30 tablet        Take 1 tablet (100 mg) by mouth 2 times daily as needed   Refills:  0          Our records show that you are taking the medicines listed below. If these are incorrect, please call your family doctor or clinic.        Dose / Directions Last dose taken     acetaminophen 500 MG tablet   Commonly known as:  TYLENOL   Dose:  1000 mg   Quantity:  100 tablet        Take 2 tablets (1,000 mg) by mouth 3 times daily   Refills:  0        alendronate 70 MG tablet   Commonly known as:  FOSAMAX   Dose:  70 mg        70 mg every 7 days Take 1 tablet by mouth twice weekly.   Refills:  0        amLODIPine 5 MG tablet   Commonly known as:  NORVASC   Dose:  5 mg        Take 5 mg by mouth   Refills:  0        GREGORIO CONTOUR test strip   Generic drug:  blood glucose monitoring        USE TO MEASURE YOUR BLOOD GLUCOSE 6 TIMES DAILY   Refills:  0        carboxymethylcellulose 0.5 % Soln ophthalmic solution   Commonly known as:  REFRESH PLUS   Dose:  1 drop        Place 1 drop into both eyes 4 times daily   Refills:  0        D 5000 5000 units Caps   Generic drug:  cholecalciferol        Reported on 4/12/2017   Refills:  0        FIFTY50 GLUCOSE METER 2.0 w/Device Kit        as needed for blood glucose monitoring   Refills:  0        furosemide 20 MG tablet   Commonly known as:  LASIX   Dose:  20 mg   Quantity:  30 tablet        Take 1 tablet (20 mg) by mouth daily   Refills:  3        insulin aspart 100 UNIT/ML injection   Commonly known as:  NovoLOG PEN        3 units before each meal.   Refills:  0        insulin degludec 100 UNIT/ML pen   Commonly known as:  TRESIBA   Dose:  14 Units        Inject 14 Units Subcutaneous   Refills:  0        [employer] LANCETS 21G Misc        Uses 6 times daily   Refills:  0        LORazepam 1 MG tablet   Commonly known as:  ATIVAN   Dose:  1 mg        Take 1 mg by mouth as needed   Refills:  0        losartan 100 MG tablet   Commonly known as:  COZAAR   Dose:  1 tablet        Take 1 tablet by mouth daily   Refills:  0        Multiple vitamin Tabs   Dose:  1 tablet        Take 1 tablet by mouth   Refills:  0        MULTIVITAMIN & MINERAL PO        Refills:  0        oxyCODONE IR 5 MG tablet   Commonly known as:  ROXICODONE   Dose:  5 mg        Take 5 mg by  "mouth   Refills:  0        pantoprazole 40 MG EC tablet   Commonly known as:  PROTONIX   Dose:  40 mg        Take 40 mg by mouth daily   Refills:  0        pen needles 5/16\" 31G X 8 MM Misc        4 x daily   Refills:  0        polyethylene glycol Packet   Commonly known as:  MIRALAX/GLYCOLAX   Dose:  17 g   Quantity:  30 packet        Take 17 g by mouth daily as needed for constipation   Refills:  1        RA BLOOD PRESSURE CUFF MONITOR Misc        Take blood pressure once daily   Refills:  0        REFRESH OPTIVE ADVANCED 0.5-1-0.5 % Soln   Generic drug:  Carboxymeth-Glycerin-Polysorb        PLACE 1 DROP INTO BOTH EYES 4 (FOUR) TIMES DAILY.   Refills:  0        senna-docusate 8.6-50 MG per tablet   Commonly known as:  SENOKOT-S;PERICOLACE   Dose:  1 tablet   Quantity:  14 tablet        Take 1 tablet by mouth 2 times daily   Refills:  0        simvastatin 20 MG tablet   Commonly known as:  ZOCOR   Dose:  20 mg        Take 1 tablet (20 mg) by mouth daily   Refills:  0        timolol 0.5 % ophthalmic solution   Commonly known as:  TIMOPTIC        PLACE 1 DROP INTO BOTH EYES ONCE DAILY.   Refills:  0        traZODone 50 MG tablet   Commonly known as:  DESYREL   Dose:  50 mg        Take 50 mg by mouth   Refills:  0                Prescriptions were sent or printed at these locations (1 Prescription)                   Other Prescriptions                Printed at Department/Unit printer (1 of 1)         carBAMazepine (TEGRETOL XR) 100 MG 12 hr tablet                Procedures and tests performed during your visit     CBC with platelets differential    CRP inflammation    CT Head w/o Contrast    Cardiac Continuous Monitoring    Comprehensive metabolic panel    EKG 12-lead, tracing only    Erythrocyte sedimentation rate auto    INR    Troponin I    UA with Microscopic    XR Chest 2 Views      Orders Needing Specimen Collection     None      Pending Results     No orders found from 9/17/2018 to 9/20/2018.          " "  Pending Culture Results     No orders found from 2018 to 2018.            Pending Results Instructions     If you had any lab results that were not finalized at the time of your Discharge, you can call the ED Lab Result RN at [phone]. You will be contacted by this team for any positive Lab results or changes in treatment. The nurses are available 7 days a week from 10A to 6:30P.  You can leave a message 24 hours per day and they will return your call.        Thank you for choosing [facility]       Thank you for choosing [facility] for your care. Our goal is always to provide you with excellent care. Hearing back from our patients is one way we can continue to improve our services. Please take a few minutes to complete the written survey that you may receive in the mail after you visit with us. Thank you!        [employer] Information     [employer] lets you send messages to your doctor, view your test results, renew your prescriptions, schedule appointments and more. To sign up, go to [email or web address] . Click on \"Log in\" on the left side of the screen, which will take you to the Welcome page. Then click on \"Sign up Now\" on the right side of the page.     You will be asked to enter the access code listed below, as well as some personal information. Please follow the directions to create your username and password.     Your access code is: [email or web address]  Expires: 2018  6:30 AM     Your access code will  in 90 days. If you need help or a new code, please call your [facility] clinic or [phone].        Care EveryWhere ID     This is your Care EveryWhere ID. This could be used by other organizations to access your [facility] medical records  [id number]        Equal Access to Services     EDE COOMBS : [place], vinnie bowen, beryl stone. So [facility] [phone].    ATENCIÓN: Si habla español, tiene a jones disposición " servicios gratuitos de asistencia lingüística. Ann Marie hernandez [phone].    We comply with applicable federal civil rights laws and [place] laws. We do not discriminate on the basis of race, color, national origin, age, disability, sex, sexual orientation, or gender identity.            After Visit Summary       This is your record. Keep this with you and show to your community pharmacist(s) and doctor(s) at your next visit.

## 2018-09-19 NOTE — ED PROVIDER NOTES
[facility] [place] ([facility])    9/19/18       History     Chief Complaint   Patient presents with     Headache     left side of head, eye and face has been hurting for about two months, has seen an eye Dr and a dentist with no change in symptoms.     The history is provided by the patient, medical records and a relative.     Eh Callahan is a 78 year old male who presents with headache. He has a history of type I diabetes mellitus, hepatitis C status post treatment with interferon and ribavirin, hypertension, atrial fibrillation, chronic kidney disease and stage I hepatocellular carcinoma status post partial left hepatectomy in 2016 followed by microwave ablation in 2017.  Today patient presents with a left-sided headache with left eye pain that has been ongoing for the past 2 months. The pain is sharp and focal around his left eye and left periorbital area. He saw his primary care provider, an ophthalologist and dentist who didn't find anything. He has been taking ibuprofen and Tylenol for this without improvement. He has not been able to sleep due to pain which has been continuous. He was brought in by family who was concerned about his ongoing pain causing difficulty sleeping. She states that he has not had any testing to evaluate this.     Patient is followed by Dr. Mac at the [facility].    I have reviewed the Medications, Allergies, Past Medical and Surgical History, and Social History in the [employer] system.  PAST MEDICAL HISTORY:   Past Medical History:   Diagnosis Date     Anatomical narrow angle glaucoma      Atrial fibrillation (H)      Chronic infection     history of hepatitis C     CKD (chronic kidney disease) stage 2, GFR 60-89 ml/min      Diabetes mellitus (H)      Hepatitis C without mention of hepatic coma      Hypertension      Palpitations      PTSD (post-traumatic stress disorder)        PAST SURGICAL HISTORY:   Past Surgical History:   Procedure Laterality Date      APPENDECTOMY       EYE SURGERY       LAPAROSCOPIC HEPATECTOMY PARTIAL Left 11/22/2016    Procedure: LAPAROSCOPIC HEPATECTOMY PARTIAL;  Surgeon: Rick Mckeon MD;  Location: [facility] OR       FAMILY HISTORY:   Family History   Problem Relation Age of Onset     Diabetes No family hx of      He is not aware of any diabetes in his family     KIDNEY DISEASE No family hx of        SOCIAL HISTORY:   Social History   Substance Use Topics     Smoking status: Former Smoker     Smokeless tobacco: Never Used     Alcohol use No       Discharge Medication List as of 9/19/2018  8:29 PM      START taking these medications    Details   carBAMazepine (TEGRETOL XR) 100 MG 12 hr tablet Take 1 tablet (100 mg) by mouth 2 times daily as needed, Disp-30 tablet, R-0, Local Print         CONTINUE these medications which have NOT CHANGED    Details   acetaminophen (TYLENOL) 500 MG tablet Take 2 tablets (1,000 mg) by mouth 3 times daily, Disp-100 tablet, R-0, E-Prescribe      alendronate (FOSAMAX) 70 MG tablet 70 mg every 7 days Take 1 tablet by mouth twice weekly., Historical      amLODIPine (NORVASC) 5 MG tablet Take 5 mg by mouth, Historical      Carboxymeth-Glycerin-Polysorb (REFRESH OPTIVE ADVANCED) 0.5-1-0.5 % SOLN PLACE 1 DROP INTO BOTH EYES 4 (FOUR) TIMES DAILY., Historical      carboxymethylcellulose (REFRESH PLUS) 0.5 % SOLN Place 1 drop into both eyes 4 times daily , Historical      insulin aspart (NOVOLOG PEN) 100 UNIT/ML injection 3 units before each meal., Historical      insulin degludec (TRESIBA) 100 UNIT/ML pen Inject 14 Units Subcutaneous, Historical      losartan (COZAAR) 100 MG tablet Take 1 tablet by mouth daily, Historical      oxyCODONE IR (ROXICODONE) 5 MG tablet Take 5 mg by mouth, Historical      simvastatin (ZOCOR) 20 MG tablet Take 1 tablet (20 mg) by mouth daily, No Print Out      timolol (TIMOPTIC) 0.5 % ophthalmic solution PLACE 1 DROP INTO BOTH EYES ONCE DAILY., Historical      traZODone (DESYREL) 50 MG tablet  "Take 50 mg by mouth, Historical      blood glucose monitoring (GREGORIO CONTOUR) test strip USE TO MEASURE YOUR BLOOD GLUCOSE 6 TIMES DAILY, Historical      Blood Glucose Monitoring Suppl (FIFTY50 GLUCOSE METER 2.0) W/DEVICE KIT as needed for blood glucose monitoring, Historical      Blood Pressure Monitoring (RA BLOOD PRESSURE CUFF MONITOR) MISC Take blood pressure once daily, Historical      cholecalciferol (D 5000) 5000 UNITS CAPS Reported on 4/12/2017, Historical      furosemide (LASIX) 20 MG tablet Take 1 tablet (20 mg) by mouth daily, Disp-30 tablet, R-3, E-Prescribe      Insulin Pen Needle (PEN NEEDLES 5/16\") 31G X 8 MM MISC 4 x daily, Historical      [employer] LANCETS 21G MISC Uses 6 times daily, Historical      LORazepam (ATIVAN) 1 MG tablet Take 1 mg by mouth as needed, Historical      Multiple vitamin TABS Take 1 tablet by mouth, Historical      Multiple Vitamins-Minerals (MULTIVITAMIN & MINERAL PO) Historical      pantoprazole (PROTONIX) 40 MG enteric coated tablet Take 40 mg by mouth daily , Historical      polyethylene glycol (MIRALAX/GLYCOLAX) packet Take 17 g by mouth daily as needed for constipation, Disp-30 packet, R-1, E-Prescribe      senna-docusate (SENOKOT-S;PERICOLACE) 8.6-50 MG per tablet Take 1 tablet by mouth 2 times daily, Disp-14 tablet, R-0, E-PrescribeTake while on narcotics                Allergies   Allergen Reactions     Amlodipine Swelling     Edema at 10 mg , fine at 5 mg     Lisinopril Cough     Metoprolol      Other reaction(s): Bradycardia  Metoprolol at 50mg bid -> HR 45 -50, unclear if sx (has fatigue)  May tolerate lower doses if needed      Review of Systems   Neurological: Positive for headaches.   Psychiatric/Behavioral: Positive for sleep disturbance.       Physical Exam   BP: 167/76  Pulse: 58  Temp: 98.4  F (36.9  C)  Resp: 16  Weight: 71.2 kg (157 lb)  SpO2: 100 %      Physical Exam   Constitutional: He is oriented to person, place, and time. No distress.   HENT:   Head: " Atraumatic.       Mouth/Throat: Oropharynx is clear and moist. No oropharyngeal exudate.   Eyes: Pupils are equal, round, and reactive to light. No scleral icterus.   Neck: Neck supple. No JVD present.   Cardiovascular: Normal rate, normal heart sounds and intact distal pulses.  Exam reveals no gallop and no friction rub.    No murmur heard.  Pulmonary/Chest: Effort normal and breath sounds normal. No respiratory distress. He has no wheezes. He has no rales. He exhibits no tenderness.   Abdominal: Soft. Bowel sounds are normal. He exhibits no distension and no mass. There is no tenderness. There is no rebound and no guarding.   Musculoskeletal: He exhibits no edema or tenderness.   Neurological: He is alert and oriented to person, place, and time. No cranial nerve deficit. Coordination normal.   Skin: Skin is warm. No rash noted. He is not diaphoretic.       ED Course     ED Course     Procedures             EKG Interpretation:      Interpreted by Magdalena Santiago MD  Time reviewed: 1819  Symptoms at time of EKG: headache   Rhythm: sinus bradycardia  Rate: 58  Axis: normal  Ectopy: none  Conduction: normal  ST Segments/ T Waves: No ST-T wave changes  Q Waves: none  Comparison to prior: sinus bradycardia new but otherwise waveform unchanged from 2/17/17    Clinical Impression:   normal EKG           Results for orders placed or performed during the hospital encounter of 09/19/18 (from the past 24 hour(s))   CBC with platelets differential     Status: Abnormal    Collection Time: 09/19/18  6:29 PM   Result Value Ref Range    WBC 5.4 4.0 - 11.0 10e9/L    RBC Count 4.70 4.4 - 5.9 10e12/L    Hemoglobin 14.5 13.3 - 17.7 g/dL    Hematocrit 42.0 40.0 - 53.0 %    MCV 89 78 - 100 fl    MCH 30.9 26.5 - 33.0 pg    MCHC 34.5 31.5 - 36.5 g/dL    RDW 11.8 10.0 - 15.0 %    Platelet Count 131 (L) 150 - 450 10e9/L    Diff Method Automated Method     % Neutrophils 65.9 %    % Lymphocytes 23.4 %    % Monocytes 7.4 %    % Eosinophils 2.9 %     % Basophils 0.2 %    % Immature Granulocytes 0.2 %    Nucleated RBCs 0 0 /100    Absolute Neutrophil 3.6 1.6 - 8.3 10e9/L    Absolute Lymphocytes 1.3 0.8 - 5.3 10e9/L    Absolute Monocytes 0.4 0.0 - 1.3 10e9/L    Absolute Eosinophils 0.2 0.0 - 0.7 10e9/L    Absolute Basophils 0.0 0.0 - 0.2 10e9/L    Abs Immature Granulocytes 0.0 0 - 0.4 10e9/L    Absolute Nucleated RBC 0.0    INR     Status: None    Collection Time: 09/19/18  6:29 PM   Result Value Ref Range    INR 1.05 0.86 - 1.14   Comprehensive metabolic panel     Status: Abnormal    Collection Time: 09/19/18  6:29 PM   Result Value Ref Range    Sodium 134 133 - 144 mmol/L    Potassium 4.2 3.4 - 5.3 mmol/L    Chloride 98 94 - 109 mmol/L    Carbon Dioxide 29 20 - 32 mmol/L    Anion Gap 7 3 - 14 mmol/L    Glucose 289 (H) 70 - 99 mg/dL    Urea Nitrogen 34 (H) 7 - 30 mg/dL    Creatinine 1.36 (H) 0.66 - 1.25 mg/dL    GFR Estimate 51 (L) >60 mL/min/1.7m2    GFR Estimate If Black 61 >60 mL/min/1.7m2    Calcium 8.3 (L) 8.5 - 10.1 mg/dL    Bilirubin Total 1.1 0.2 - 1.3 mg/dL    Albumin 3.0 (L) 3.4 - 5.0 g/dL    Protein Total 6.6 (L) 6.8 - 8.8 g/dL    Alkaline Phosphatase 104 40 - 150 U/L    ALT 35 0 - 70 U/L    AST 21 0 - 45 U/L   CRP inflammation     Status: None    Collection Time: 09/19/18  6:29 PM   Result Value Ref Range    CRP Inflammation <2.9 0.0 - 8.0 mg/L   Erythrocyte sedimentation rate auto     Status: None    Collection Time: 09/19/18  6:29 PM   Result Value Ref Range    Sed Rate 14 0 - 20 mm/h   Troponin I     Status: None    Collection Time: 09/19/18  6:29 PM   Result Value Ref Range    Troponin I ES <0.015 0.000 - 0.045 ug/L   UA with Microscopic     Status: Abnormal    Collection Time: 09/19/18  6:49 PM   Result Value Ref Range    Color Urine Light Yellow     Appearance Urine Clear     Glucose Urine >1000 (A) NEG^Negative mg/dL    Bilirubin Urine Negative NEG^Negative    Ketones Urine Negative NEG^Negative mg/dL    Specific Gravity Urine 1.013 1.003 -  1.035    Blood Urine Negative NEG^Negative    pH Urine 6.5 5.0 - 7.0 pH    Protein Albumin Urine 100 (A) NEG^Negative mg/dL    Urobilinogen mg/dL Normal 0.0 - 2.0 mg/dL    Nitrite Urine Negative NEG^Negative    Leukocyte Esterase Urine Negative NEG^Negative    Source Unspecified Urine     WBC Urine 0 0 - 5 /HPF    RBC Urine 1 0 - 2 /HPF   XR Chest 2 Views     Status: None    Collection Time: 09/19/18  7:26 PM    Narrative    CHEST TWO VIEW   9/19/2018 7:26 PM     HISTORY: Left facial pain.     COMPARISON: 3/8/2018.     FINDINGS: Cardiomediastinal silhouette within normal limits. No focal  airspace opacities. No pleural effusion. No pneumothorax identified.  The bones are unremarkable.       Impression    IMPRESSION: No acute cardiopulmonary abnormalities.    KWAME RICO MD   CT Head w/o Contrast     Status: None    Collection Time: 09/19/18  7:37 PM    Narrative    CT SCAN OF THE HEAD WITHOUT CONTRAST   9/19/2018 7:37 PM     HISTORY: Left facial pain.     TECHNIQUE:  Axial images of the head and coronal reformations without  IV contrast material. Radiation dose for this scan was reduced using  automated exposure control, adjustment of the mA and/or kV according  to patient size, or iterative reconstruction technique.    COMPARISON: Head CT 9/30/2015.    FINDINGS: There is no evidence of intracranial hemorrhage, mass, acute  infarct or anomaly. There is generalized atrophy of the brain. There  is low attenuation in the white matter of the cerebral hemispheres  consistent with sequelae of small vessel ischemic disease. Ventricular  size is within normal limits without evidence of hydrocephalus.     The visualized portions of the sinuses and mastoids appear normal. The  bony calvarium and bones of the skull base appear intact.       Impression    IMPRESSION:     1. No evidence of acute intracranial hemorrhage, mass, or herniation.  2. There is generalized atrophy of the brain. White matter changes are  present in the  cerebral hemispheres that are consistent with small  vessel ischemic disease in this age patient.      KWAME RICO MD           Labs Ordered and Resulted from Time of ED Arrival Up to the Time of Departure from the ED   CBC WITH PLATELETS DIFFERENTIAL - Abnormal; Notable for the following:        Result Value    Platelet Count 131 (*)     All other components within normal limits   COMPREHENSIVE METABOLIC PANEL - Abnormal; Notable for the following:     Glucose 289 (*)     Urea Nitrogen 34 (*)     Creatinine 1.36 (*)     GFR Estimate 51 (*)     Calcium 8.3 (*)     Albumin 3.0 (*)     Protein Total 6.6 (*)     All other components within normal limits   ROUTINE UA WITH MICROSCOPIC - Abnormal; Notable for the following:     Glucose Urine >1000 (*)     Protein Albumin Urine 100 (*)     All other components within normal limits   INR   CRP INFLAMMATION   ERYTHROCYTE SEDIMENTATION RATE AUTO   TROPONIN I   CARDIAC CONTINUOUS MONITORING            Assessments & Plan (with Medical Decision Making)  Left facial sharp pain over V second branch distribution, CT head neg, EKG and trop and other labs neg, clinically suspected trigeminal neuralgia, improving with toradol, will discharge with tegretol prn and follow up with Neurology.       I have reviewed the nursing notes.    I have reviewed the findings, diagnosis, plan and need for follow up with the patient.    Discharge Medication List as of 9/19/2018  8:29 PM      START taking these medications    Details   carBAMazepine (TEGRETOL XR) 100 MG 12 hr tablet Take 1 tablet (100 mg) by mouth 2 times daily as needed, Disp-30 tablet, R-0, Local Print             Final diagnoses:   Trigeminal neuralgia     Kathy VANG, am serving as a trained medical scribe to document services personally performed by Magdalena Santiago MD based on the provider's statements to me on September 19, 2018.  This document has been checked and approved by the attending provider.    IMagdalena,  MD, was physically present and have reviewed and verified the accuracy of this note documented by Kathy March, medical scribe.     9/19/2018   [facility], [place], EMERGENCY DEPARTMENT     Magdalena Santiago MD  09/20/18 0041

## 2018-09-19 NOTE — ED AVS SNAPSHOT
[facility], Emergency Department    [address]    [place] [address]    Phone:  [phone]    Fax:  [phone]                                       Eh Callahan   MRN: [id number]    Department:  [facility], Emergency Department   Date of Visit:  9/19/2018           After Visit Summary Signature Page     I have received my discharge instructions, and my questions have been answered. I have discussed any challenges I see with this plan with the nurse or doctor.    ..........................................................................................................................................  Patient/Patient Representative Signature      ..........................................................................................................................................  Patient Representative Print Name and Relationship to Patient    ..................................................               ................................................  Date                                   Time    ..........................................................................................................................................  Reviewed by Signature/Title    ...................................................              ..............................................  Date                                               Time          22EPIC Rev 08/18

## 2018-09-20 LAB — INTERPRETATION ECG - MUSE: NORMAL

## 2018-09-20 NOTE — DISCHARGE INSTRUCTIONS
Trigeminal Neuralgia  You have trigeminal neuralgia. This is pain caused by irritation of the trigeminal nerve on your face. Symptoms include sudden, sharp pain in your head or face. It may feel like an electric shock. It can last for several seconds or minutes. It usually happens on only 1 side of your face. Pain may be triggered by things like moving your jaw or a touch on the skin of your face. The pain may be caused by something irritating the trigeminal nerve, such as a blood vessel pressing against it. But the exact cause of this problem often isn t known. Although it can be quite painful, the condition isn t dangerous.  Trigeminal neuralgia is often treated with medicines. These include anti-seizure medicines or antidepressants. Certain other treatments may also help. In some cases, you may need surgery.    Home care  Your healthcare provider may prescribe medicines to help relieve and prevent pain. Take all medicines as directed. Please note that it may take several changes in dose and medicines before the right combination is found that controls the pain.  General care:    Plan to rest at home today.    Avoid any specific activities that seem to trigger the pain.    Over the next few weeks, keep a pain diary. Write down when your symptoms happen and how they feel. Certain activities such as touching your face, chewing, talking, or brushing your teeth may bring on the pain. Cold air can also trigger the pain. Make sure you write down any triggers and discuss these with your healthcare provider. This will help guide treatment.  Follow-up care  Follow up with your healthcare provider, or as advised. If you were referred to a neurologist, be sure to make an appointment.  For more information on your condition, visit:    [employer] www.fpa-support.org  When to seek medical advice  Call your healthcare provider right away if any of these occur:    Fever of 100.4 F (38 C) or higher, or as  advised    Headache with very stiff neck    You aren t able to keep liquids down (repeated vomiting)    Extreme drowsiness or confusion    Dizziness or fainting    A new feeling of weakness or numbness or tingling in your arm, leg, or face    Difficulty speaking or seeing  Date Last Reviewed: 8/1/2016 2000-2017 [employer]. [address] [place] [address]. All rights reserved. This information is not intended as a substitute for professional medical care. Always follow your healthcare professional's instructions.      Please make an appointment to follow up with Your Primary Care Provider and Neurology Clinic (phone: [phone]) as soon as possible even if entirely better.

## 2018-09-25 NOTE — TELEPHONE ENCOUNTER
FUTURE VISIT INFORMATION      FUTURE VISIT INFORMATION:    Date: 09/26/2018    Time: 11:30 am     [place] [facility] [place] [facility] [place]ERRAL INFORMATION:    Referring provider:      Referring providers clinic:      Reason for visit/diagnosis  headaches per Keri at [facility]. Shalini Mac PA-C referring. referral/recs to be faxed to clinic      NOTES (FOR ALL VISITS) STATUS DETAILS   OFFICE NOTE from referring provider Care Everywhere 09/21/2018   OFFICE NOTE from other specialist N/A    DISCHARGE SUMMARY from hospital Internal  Care Everywhere  04/19/2017, 04/05/2017, 11/22/2016,    DISCHARGE REPORT from the ER Internal 09/19/2018, 02/04/2018, 10/20/2015, 06/02/2016, 05/17/2016, 03/28/2017, 06/02/2016, 05/17/2016, 03/28/2016, 09/30/2015   OPERATIVE REPORT Internal  04/19/2017, 11/22/2016, 10/20/2016, 07/02/2013, 06/18//2012   MEDICATION LIST Internal    IMAGING  (FOR ALL VISITS)     EMG N/A    EEG N/A    ECT N/A    MRI (HEAD, NECK, SPINE) N/A    CT (HEAD, NECK, SPINE) Internal  PACS  09/19/2018, 09/30/2018   OTHER

## 2018-09-26 ENCOUNTER — PRE VISIT (OUTPATIENT)
Dept: NEUROLOGY | Facility: CLINIC | Age: 78
End: 2018-09-26

## 2018-09-26 ENCOUNTER — OFFICE VISIT (OUTPATIENT)
Dept: NEUROLOGY | Facility: CLINIC | Age: 78
End: 2018-09-26
Payer: MEDICARE

## 2018-09-26 VITALS
OXYGEN SATURATION: 99 % | SYSTOLIC BLOOD PRESSURE: 173 MMHG | HEART RATE: 67 BPM | DIASTOLIC BLOOD PRESSURE: 87 MMHG | TEMPERATURE: 98.1 F

## 2018-09-26 DIAGNOSIS — Z86.39 H/O DIABETES MELLITUS: ICD-10-CM

## 2018-09-26 DIAGNOSIS — G50.1 ATYPICAL FACIAL PAIN: ICD-10-CM

## 2018-09-26 DIAGNOSIS — R51.9 UNILATERAL HEADACHE: Primary | ICD-10-CM

## 2018-09-26 RX ORDER — GABAPENTIN 300 MG/1
CAPSULE ORAL
Qty: 60 CAPSULE | Refills: 1 | Status: SHIPPED | OUTPATIENT
Start: 2018-09-26 | End: 2018-10-17

## 2018-09-26 ASSESSMENT — PAIN SCALES - GENERAL: PAINLEVEL: WORST PAIN (10)

## 2018-09-26 NOTE — LETTER
"9/26/2018       RE: Eh Callahan  [address]  [facility] [address]     Dear Colleague,    Thank you for referring your patient, Eh Callahan, to the [facility] NEUROLOGY at [facility]. Please see a copy of my visit note below.    Re: Eh Callahan      MRN# [id number]  YOB: 1940  Date of Visit:9/26/2018     OUTPATIENT NEUROLOGY VISIT NOTE    Reason for Visit:  Headache evaluation    History of Present Illness  Eh Callahan is a 78-year-old male presents to the clinic today for headache evaluation. History of Bell's palsy, anxiety, chronic hep C, HTN, PTSD, type 1 DM insulin dependent  Accompanied by son and [place] .   History of hep C post treatment, hypertension, CKD, DM  Persistent and continuous Left sided sharp pain -left temple, left eye, left jaw and left eye tearing and left facial swelling. Onset 20 days ago. Reports light sensitivity and noise sensitivity. The pain is sharp and 9-10/10 numeric pain scale. Reports having a \"double vision\" and seen and ophthalmology. Reports that chewing or smiling or teeth brushing does not trigger or make pain worse. No able to sleep due to pain.   Patient has been given carbamazepine which patient stopped because it made his pain worse.   Appetite is Ok but sleeping is bad.   No history of migraine headaches or any similar pain.   No problems with swallowing or taste.   Patient has been taking ibuprofen which does not help anymore.     Neurodiagnostic Testing  Head CT reviewed  CT SCAN OF THE HEAD WITHOUT CONTRAST   9/19/2018 7:37 PM      HISTORY: Left facial pain.      TECHNIQUE:  Axial images of the head and coronal reformations without  IV contrast material. Radiation dose for this scan was reduced using  automated exposure control, adjustment of the mA and/or kV according  to patient size, or iterative reconstruction technique.     COMPARISON: Head CT 9/30/2015.     FINDINGS: There is no " evidence of intracranial hemorrhage, mass, acute  infarct or anomaly. There is generalized atrophy of the brain. There  is low attenuation in the white matter of the cerebral hemispheres  consistent with sequelae of small vessel ischemic disease. Ventricular  size is within normal limits without evidence of hydrocephalus.      The visualized portions of the sinuses and mastoids appear normal. The  bony calvarium and bones of the skull base appear intact.          IMPRESSION:     1. No evidence of acute intracranial hemorrhage, mass, or herniation.  2. There is generalized atrophy of the brain. White matter changes are  present in the cerebral hemispheres that are consistent with small  vessel ischemic disease in this age patient.        KWAME RICO MD  Lab reviewed  Results for CHIKIS NEWBERRY (MRN [id number]) as of 9/26/2018 12:47   Ref. Range 9/19/2018 18:29   WBC Latest Ref Range: 4.0 - 11.0 10e9/L 5.4   Hemoglobin Latest Ref Range: 13.3 - 17.7 g/dL 14.5   Hematocrit Latest Ref Range: 40.0 - 53.0 % 42.0   Platelet Count Latest Ref Range: 150 - 450 10e9/L 131 (L)   RBC Count Latest Ref Range: 4.4 - 5.9 10e12/L 4.70   MCV Latest Ref Range: 78 - 100 fl 89   MCH Latest Ref Range: 26.5 - 33.0 pg 30.9   MCHC Latest Ref Range: 31.5 - 36.5 g/dL 34.5   RDW Latest Ref Range: 10.0 - 15.0 % 11.8   Diff Method Unknown Automated Method   % Neutrophils Latest Units: % 65.9   % Lymphocytes Latest Units: % 23.4   % Monocytes Latest Units: % 7.4   % Eosinophils Latest Units: % 2.9   % Basophils Latest Units: % 0.2   % Immature Granulocytes Latest Units: % 0.2   Nucleated RBCs Latest Ref Range: 0 /100 0   Absolute Neutrophil Latest Ref Range: 1.6 - 8.3 10e9/L 3.6   Absolute Lymphocytes Latest Ref Range: 0.8 - 5.3 10e9/L 1.3   Absolute Monocytes Latest Ref Range: 0.0 - 1.3 10e9/L 0.4   Absolute Eosinophils Latest Ref Range: 0.0 - 0.7 10e9/L 0.2   Absolute Basophils Latest Ref Range: 0.0 - 0.2 10e9/L 0.0   Abs Immature Granulocytes  Latest Ref Range: 0 - 0.4 10e9/L 0.0   Absolute Nucleated RBC Unknown 0.0   Sed Rate Latest Ref Range: 0 - 20 mm/h 14     Results for CHIKIS NEWBERRY (MRN [id number]) as of 9/26/2018 12:47   Ref. Range 9/19/2018 18:29   CRP Inflammation Latest Ref Range: 0.0 - 8.0 mg/L <2.9     Results for CHIKIS NEWBERRY (MRN [id number]) as of 9/26/2018 12:47   Ref. Range 11/15/2016 17:09   Hemoglobin A1C Latest Ref Range: 4.3 - 6.0 % 8.4 (H)     Past Medical History reviewed and verified with the patient  Past Medical History:   Diagnosis Date     Anatomical narrow angle glaucoma      Atrial fibrillation (H)      Chronic infection     history of hepatitis C     CKD (chronic kidney disease) stage 2, GFR 60-89 ml/min      Diabetes mellitus (H)      Hepatitis C without mention of hepatic coma      Hypertension      Palpitations      PTSD (post-traumatic stress disorder)        Past Surgical History reviewed and verified with the patient  Past Surgical History:   Procedure Laterality Date     APPENDECTOMY       EYE SURGERY       LAPAROSCOPIC HEPATECTOMY PARTIAL Left 11/22/2016    Procedure: LAPAROSCOPIC HEPATECTOMY PARTIAL;  Surgeon: Rick Mckeon MD;  Location:  OR       Family History reviewed and verified with the patient  Unknown family history  Social History:  Lives with family and has 13 kids  Social History   Substance Use Topics     Smoking status: Former Smoker     Smokeless tobacco: Never Used     Alcohol use No    reviewed and verified with the patient     Allergies   Allergen Reactions     Amlodipine Swelling     Edema at 10 mg , fine at 5 mg     Lisinopril Cough     Metoprolol      Other reaction(s): Bradycardia  Metoprolol at 50mg bid -> HR 45 -50, unclear if sx (has fatigue)  May tolerate lower doses if needed       Current Outpatient Prescriptions   Medication Sig Dispense Refill     Blood Glucose Monitoring Suppl (FIFTY50 GLUCOSE METER 2.0) W/DEVICE KIT as needed for blood glucose monitoring       Blood Pressure  "Monitoring ( BLOOD PRESSURE CUFF MONITOR) MISC Take blood pressure once daily       furosemide (LASIX) 20 MG tablet Take 1 tablet (20 mg) by mouth daily 30 tablet 3     insulin aspart (NOVOLOG PEN) 100 UNIT/ML injection 3 units before each meal.       insulin degludec (TRESIBA) 100 UNIT/ML pen Inject 14 Units Subcutaneous       Insulin Pen Needle (PEN NEEDLES 5/16\") 31G X 8 MM MISC 4 x daily       [employer] LANCETS 21G MISC Uses 6 times daily       LORazepam (ATIVAN) 1 MG tablet Take 1 mg by mouth as needed       losartan (COZAAR) 100 MG tablet Take 1 tablet by mouth daily       Multiple vitamin TABS Take 1 tablet by mouth       acetaminophen (TYLENOL) 500 MG tablet Take 2 tablets (1,000 mg) by mouth 3 times daily (Patient not taking: Reported on 9/26/2018) 100 tablet 0     alendronate (FOSAMAX) 70 MG tablet 70 mg every 7 days Take 1 tablet by mouth twice weekly.       amLODIPine (NORVASC) 5 MG tablet Take 5 mg by mouth       blood glucose monitoring (Infobright CONTOUR) test strip USE TO MEASURE YOUR BLOOD GLUCOSE 6 TIMES DAILY       carBAMazepine (TEGRETOL XR) 100 MG 12 hr tablet Take 1 tablet (100 mg) by mouth 2 times daily as needed (Patient not taking: Reported on 9/26/2018) 30 tablet 0     Carboxymeth-Glycerin-Polysorb (REFRESH OPTIVE ADVANCED) 0.5-1-0.5 % SOLN PLACE 1 DROP INTO BOTH EYES 4 (FOUR) TIMES DAILY.       carboxymethylcellulose (REFRESH PLUS) 0.5 % SOLN Place 1 drop into both eyes 4 times daily        cholecalciferol (D 5000) 5000 UNITS CAPS Reported on 4/12/2017       Multiple Vitamins-Minerals (MULTIVITAMIN & MINERAL PO)        oxyCODONE IR (ROXICODONE) 5 MG tablet Take 5 mg by mouth       pantoprazole (PROTONIX) 40 MG enteric coated tablet Take 40 mg by mouth daily        polyethylene glycol (MIRALAX/GLYCOLAX) packet Take 17 g by mouth daily as needed for constipation (Patient not taking: Reported on 3/8/2018) 30 packet 1     senna-docusate (SENOKOT-S;PERICOLACE) 8.6-50 MG per tablet Take 1 tablet by " mouth 2 times daily (Patient not taking: Reported on 3/8/2018) 14 tablet 0     simvastatin (ZOCOR) 20 MG tablet Take 1 tablet (20 mg) by mouth daily (Patient not taking: Reported on 9/26/2018)       timolol (TIMOPTIC) 0.5 % ophthalmic solution PLACE 1 DROP INTO BOTH EYES ONCE DAILY.       traZODone (DESYREL) 50 MG tablet Take 50 mg by mouth     reviewed and verified with the patient    Review of Systems:  A 12-point ROS including constitutional, eyes, ENT, respiratory, cardiovascular, gastroenterology, genitourinary, integumentary, musculoskeletal, neurology, hematology and psychiatric were all reviewed with the patient and completed at the Neuroscience Services Question nary and as mentioned in the HPI.     General Exam:   /87 (BP Location: Left arm, Patient Position: Chair, Cuff Size: Adult Regular)  Pulse 67  Temp 98.1  F (36.7  C) (Oral)  SpO2 99%   Rechecked BP   GEN: Awake, NAD; good eye contact, responses appropriately, pain today 9-10/10   HEENT: Head atraumatic/Normocephalic. Scalp normal. Pupils equally round, 4 mm, reactive to light and accommodation, sclera and conjunctiva normal. Fundoscopic examination reveals normal vessels no papilledema.   Neck: Easily moveable without resistance  Heart: S1/S2 appreciated, RRR, no m/r/g, no carotid bruits  Lungs:Lungs are clear to auscultation bilaterally, no wheezes or crackles.   Neurological Examination:  The patient is alert and oriented times four. Has good attention and concentration.  Speech is fluent without dysarthria in [facility].   Cranial nerves:  CN I deferred.   CN II: Intact and full visual fields to confrontation bilaterally.   CN III, IV, VI: EOM intact. There is no nystagmus. Has conjugated gaze. Intact direct and consensual pupillary light reflexes.   CN V: Intact and symmetrical to facial sensation in the V1 through V3 bilaterally.   CN VII: Intact and symmetrical eyebrow and lid raise and eyelid closure, smiles and frown.   CN  VIII: Intact to finger rub bilaterally.   CN IX and X: The palates elevates symmetrical. The uvula is midline.   CN XII: The tongue protrudes midline with no atrophy or fasciculations.   Motor exam: The patient has a normal bulk and tone throughout. There is no atrophy, fasciculations, clonus, or abnormal movements appreciated.   Strength Exam:  5/5 strength at shoulder abduction, elbow flexion or extension, wrist flexion or extension, finger abduction, , hip flexion and extension, knee flexion and extension, and dorsiflexion and plantarflexion bilaterally.   Sensation is intact to light touch and pinprick throughout. Has good vibration and proprioception sensation at great toes bilaterally.   Reflexes are decreased but symmetrical at biceps, triceps, brachioradialis, patellar, and Achilles.   Negative Babinski with downgoing toes bilaterally.   Coordination reveals finger-nose-finger without dysmetria. Walks slow with the cane.       Assessment and Plan:  New onset of left sided -temple, eye and left facial pain and left eye perceived double vision -sharp and continuous pain with autonomic features-left eye tearing, facial swelling. Possible differentials  autonomic cephalgia, hemicrania continua, TN, tumor, vascular, atypical facial pain  Patient is in apparent discomfort and keeps his left eye close due to diplopia.   [facility] on 9/12 and presumed normal  Plan:  Brain MRI and MRA for any structural causes evaluation  A trial of gabapentin 300 mg at bedtime for 5 days then 300 mg am and 300 mg at bedtime  Follow up after the imaging    Elevated blood pressure today. Recommended to follow up with PCP    I discussed all my recommendation with Eh Callahan. The patient verbalizes understanding and comfortable with the plan. The patient has our clinic phone number to call with any questions or concerns. All of the patient's questions were answered from the best of my current knowledge.     Thank you for  letting me be a part of the treatment team for Awed Ismail      Time spent with pt answering questions, discussing findings, counseling and coordinating care was more than 50% the appointment time, 46 minutes.       Again, thank you for allowing me to participate in the care of your patient.      Sincerely,    TANNER Powell CNP

## 2018-09-26 NOTE — PROGRESS NOTES
"Re: Eh Callahan      MRN# [id number]  YOB: 1940  Date of Visit:9/26/2018     OUTPATIENT NEUROLOGY VISIT NOTE    Reason for Visit:  Headache evaluation    History of Present Illness  Eh Callahan is a 78-year-old male presents to the clinic today for headache evaluation. History of Bell's palsy, anxiety, chronic hep C, HTN, PTSD, type 1 DM insulin dependent  Accompanied by son and [place] .   History of hep C post treatment, hypertension, CKD, DM  Persistent and continuous Left sided sharp pain -left temple, left eye, left jaw and left eye tearing and left facial swelling. Onset 20 days ago. Reports light sensitivity and noise sensitivity. The pain is sharp and 9-10/10 numeric pain scale. Reports having a \"double vision\" and seen and ophthalmology. Reports that chewing or smiling or teeth brushing does not trigger or make pain worse. No able to sleep due to pain.   Patient has been given carbamazepine which patient stopped because it made his pain worse.   Appetite is Ok but sleeping is bad.   No history of migraine headaches or any similar pain.   No problems with swallowing or taste.   Patient has been taking ibuprofen which does not help anymore.     Neurodiagnostic Testing  Head CT reviewed  CT SCAN OF THE HEAD WITHOUT CONTRAST   9/19/2018 7:37 PM      HISTORY: Left facial pain.      TECHNIQUE:  Axial images of the head and coronal reformations without  IV contrast material. Radiation dose for this scan was reduced using  automated exposure control, adjustment of the mA and/or kV according  to patient size, or iterative reconstruction technique.     COMPARISON: Head CT 9/30/2015.     FINDINGS: There is no evidence of intracranial hemorrhage, mass, acute  infarct or anomaly. There is generalized atrophy of the brain. There  is low attenuation in the white matter of the cerebral hemispheres  consistent with sequelae of small vessel ischemic disease. Ventricular  size is within normal " limits without evidence of hydrocephalus.      The visualized portions of the sinuses and mastoids appear normal. The  bony calvarium and bones of the skull base appear intact.          IMPRESSION:     1. No evidence of acute intracranial hemorrhage, mass, or herniation.  2. There is generalized atrophy of the brain. White matter changes are  present in the cerebral hemispheres that are consistent with small  vessel ischemic disease in this age patient.        KWAME RICO MD  Lab reviewed  Results for CHIKIS NEWBERRY (MRN [id number]) as of 9/26/2018 12:47   Ref. Range 9/19/2018 18:29   WBC Latest Ref Range: 4.0 - 11.0 10e9/L 5.4   Hemoglobin Latest Ref Range: 13.3 - 17.7 g/dL 14.5   Hematocrit Latest Ref Range: 40.0 - 53.0 % 42.0   Platelet Count Latest Ref Range: 150 - 450 10e9/L 131 (L)   RBC Count Latest Ref Range: 4.4 - 5.9 10e12/L 4.70   MCV Latest Ref Range: 78 - 100 fl 89   MCH Latest Ref Range: 26.5 - 33.0 pg 30.9   MCHC Latest Ref Range: 31.5 - 36.5 g/dL 34.5   RDW Latest Ref Range: 10.0 - 15.0 % 11.8   Diff Method Unknown Automated Method   % Neutrophils Latest Units: % 65.9   % Lymphocytes Latest Units: % 23.4   % Monocytes Latest Units: % 7.4   % Eosinophils Latest Units: % 2.9   % Basophils Latest Units: % 0.2   % Immature Granulocytes Latest Units: % 0.2   Nucleated RBCs Latest Ref Range: 0 /100 0   Absolute Neutrophil Latest Ref Range: 1.6 - 8.3 10e9/L 3.6   Absolute Lymphocytes Latest Ref Range: 0.8 - 5.3 10e9/L 1.3   Absolute Monocytes Latest Ref Range: 0.0 - 1.3 10e9/L 0.4   Absolute Eosinophils Latest Ref Range: 0.0 - 0.7 10e9/L 0.2   Absolute Basophils Latest Ref Range: 0.0 - 0.2 10e9/L 0.0   Abs Immature Granulocytes Latest Ref Range: 0 - 0.4 10e9/L 0.0   Absolute Nucleated RBC Unknown 0.0   Sed Rate Latest Ref Range: 0 - 20 mm/h 14     Results for CHIKIS NEWBERRY (MRN [id number]) as of 9/26/2018 12:47   Ref. Range 9/19/2018 18:29   CRP Inflammation Latest Ref Range: 0.0 - 8.0 mg/L <2.9     Results  for CHIKIS NEWBERRY (MRN [id number]) as of 9/26/2018 12:47   Ref. Range 11/15/2016 17:09   Hemoglobin A1C Latest Ref Range: 4.3 - 6.0 % 8.4 (H)     Past Medical History reviewed and verified with the patient  Past Medical History:   Diagnosis Date     Anatomical narrow angle glaucoma      Atrial fibrillation (H)      Chronic infection     history of hepatitis C     CKD (chronic kidney disease) stage 2, GFR 60-89 ml/min      Diabetes mellitus (H)      Hepatitis C without mention of hepatic coma      Hypertension      Palpitations      PTSD (post-traumatic stress disorder)        Past Surgical History reviewed and verified with the patient  Past Surgical History:   Procedure Laterality Date     APPENDECTOMY       EYE SURGERY       LAPAROSCOPIC HEPATECTOMY PARTIAL Left 11/22/2016    Procedure: LAPAROSCOPIC HEPATECTOMY PARTIAL;  Surgeon: Rick Mckeon MD;  Location:  OR       Family History reviewed and verified with the patient  Unknown family history  Social History:  Lives with family and has 13 kids  Social History   Substance Use Topics     Smoking status: Former Smoker     Smokeless tobacco: Never Used     Alcohol use No    reviewed and verified with the patient     Allergies   Allergen Reactions     Amlodipine Swelling     Edema at 10 mg , fine at 5 mg     Lisinopril Cough     Metoprolol      Other reaction(s): Bradycardia  Metoprolol at 50mg bid -> HR 45 -50, unclear if sx (has fatigue)  May tolerate lower doses if needed       Current Outpatient Prescriptions   Medication Sig Dispense Refill     Blood Glucose Monitoring Suppl (FIFTY50 GLUCOSE METER 2.0) W/DEVICE KIT as needed for blood glucose monitoring       Blood Pressure Monitoring (RA BLOOD PRESSURE CUFF MONITOR) MISC Take blood pressure once daily       furosemide (LASIX) 20 MG tablet Take 1 tablet (20 mg) by mouth daily 30 tablet 3     insulin aspart (NOVOLOG PEN) 100 UNIT/ML injection 3 units before each meal.       insulin degludec (TRESIBA) 100  "UNIT/ML pen Inject 14 Units Subcutaneous       Insulin Pen Needle (PEN NEEDLES 5/16\") 31G X 8 MM MISC 4 x daily       [employer] LANCETS 21G MISC Uses 6 times daily       LORazepam (ATIVAN) 1 MG tablet Take 1 mg by mouth as needed       losartan (COZAAR) 100 MG tablet Take 1 tablet by mouth daily       Multiple vitamin TABS Take 1 tablet by mouth       acetaminophen (TYLENOL) 500 MG tablet Take 2 tablets (1,000 mg) by mouth 3 times daily (Patient not taking: Reported on 9/26/2018) 100 tablet 0     alendronate (FOSAMAX) 70 MG tablet 70 mg every 7 days Take 1 tablet by mouth twice weekly.       amLODIPine (NORVASC) 5 MG tablet Take 5 mg by mouth       blood glucose monitoring (Raise5 CONTOUR) test strip USE TO MEASURE YOUR BLOOD GLUCOSE 6 TIMES DAILY       carBAMazepine (TEGRETOL XR) 100 MG 12 hr tablet Take 1 tablet (100 mg) by mouth 2 times daily as needed (Patient not taking: Reported on 9/26/2018) 30 tablet 0     Carboxymeth-Glycerin-Polysorb (REFRESH OPTIVE ADVANCED) 0.5-1-0.5 % SOLN PLACE 1 DROP INTO BOTH EYES 4 (FOUR) TIMES DAILY.       carboxymethylcellulose (REFRESH PLUS) 0.5 % SOLN Place 1 drop into both eyes 4 times daily        cholecalciferol (D 5000) 5000 UNITS CAPS Reported on 4/12/2017       Multiple Vitamins-Minerals (MULTIVITAMIN & MINERAL PO)        oxyCODONE IR (ROXICODONE) 5 MG tablet Take 5 mg by mouth       pantoprazole (PROTONIX) 40 MG enteric coated tablet Take 40 mg by mouth daily        polyethylene glycol (MIRALAX/GLYCOLAX) packet Take 17 g by mouth daily as needed for constipation (Patient not taking: Reported on 3/8/2018) 30 packet 1     senna-docusate (SENOKOT-S;PERICOLACE) 8.6-50 MG per tablet Take 1 tablet by mouth 2 times daily (Patient not taking: Reported on 3/8/2018) 14 tablet 0     simvastatin (ZOCOR) 20 MG tablet Take 1 tablet (20 mg) by mouth daily (Patient not taking: Reported on 9/26/2018)       timolol (TIMOPTIC) 0.5 % ophthalmic solution PLACE 1 DROP INTO BOTH EYES ONCE DAILY.   "     traZODone (DESYREL) 50 MG tablet Take 50 mg by mouth     reviewed and verified with the patient    Review of Systems:  A 12-point ROS including constitutional, eyes, ENT, respiratory, cardiovascular, gastroenterology, genitourinary, integumentary, musculoskeletal, neurology, hematology and psychiatric were all reviewed with the patient and completed at the Neuroscience Services Question anjum and as mentioned in the HPI.     General Exam:   /87 (BP Location: Left arm, Patient Position: Chair, Cuff Size: Adult Regular)  Pulse 67  Temp 98.1  F (36.7  C) (Oral)  SpO2 99%   Rechecked BP   GEN: Awake, NAD; good eye contact, responses appropriately, pain today 9-10/10   HEENT: Head atraumatic/Normocephalic. Scalp normal. Pupils equally round, 4 mm, reactive to light and accommodation, sclera and conjunctiva normal. Fundoscopic examination reveals normal vessels no papilledema.   Neck: Easily moveable without resistance  Heart: S1/S2 appreciated, RRR, no m/r/g, no carotid bruits  Lungs:Lungs are clear to auscultation bilaterally, no wheezes or crackles.   Neurological Examination:  The patient is alert and oriented times four. Has good attention and concentration.  Speech is fluent without dysarthria in [facility].   Cranial nerves:  CN I deferred.   CN II: Intact and full visual fields to confrontation bilaterally.   CN III, IV, VI: EOM intact. There is no nystagmus. Has conjugated gaze. Intact direct and consensual pupillary light reflexes.   CN V: Intact and symmetrical to facial sensation in the V1 through V3 bilaterally.   CN VII: Intact and symmetrical eyebrow and lid raise and eyelid closure, smiles and frown.   CN VIII: Intact to finger rub bilaterally.   CN IX and X: The palates elevates symmetrical. The uvula is midline.   CN XII: The tongue protrudes midline with no atrophy or fasciculations.   Motor exam: The patient has a normal bulk and tone throughout. There is no atrophy, fasciculations,  clonus, or abnormal movements appreciated.   Strength Exam:  5/5 strength at shoulder abduction, elbow flexion or extension, wrist flexion or extension, finger abduction, , hip flexion and extension, knee flexion and extension, and dorsiflexion and plantarflexion bilaterally.   Sensation is intact to light touch and pinprick throughout. Has good vibration and proprioception sensation at great toes bilaterally.   Reflexes are decreased but symmetrical at biceps, triceps, brachioradialis, patellar, and Achilles.   Negative Babinski with downgoing toes bilaterally.   Coordination reveals finger-nose-finger without dysmetria. Walks slow with the cane.       Assessment and Plan:  New onset of left sided -temple, eye and left facial pain and left eye perceived double vision -sharp and continuous pain with autonomic features-left eye tearing, facial swelling. Possible differentials  autonomic cephalgia, hemicrania continua, TN, tumor, vascular, atypical facial pain  Patient is in apparent discomfort and keeps his left eye close due to diplopia.   [facility] on 9/12 and presumed normal  Plan:  Brain MRI and MRA for any structural causes evaluation  A trial of gabapentin 300 mg at bedtime for 5 days then 300 mg am and 300 mg at bedtime  Follow up after the imaging    Elevated blood pressure today. Recommended to follow up with PCP    I discussed all my recommendation with Eh Callahan. The patient verbalizes understanding and comfortable with the plan. The patient has our clinic phone number to call with any questions or concerns. All of the patient's questions were answered from the best of my current knowledge.     Thank you for letting me be a part of the treatment team for Eh Callahan      Time spent with pt answering questions, discussing findings, counseling and coordinating care was more than 50% the appointment time, 46 minutes.         TANNER Hanson, CNP  [facility]

## 2018-09-26 NOTE — PATIENT INSTRUCTIONS
Plan:  Brain MRI and MRA for any structural causes evaluation  A trial of gabapentin 300 mg at bedtime for 5 days then 300 mg am and 300 mg at bedtime  Follow up after the imaging    Elevated blood pressure today. Recommended to follow up with PCP      Patient Education    Gabapentin enacarbil Oral tablet, extended-release    Gabapentin Oral capsule    Gabapentin Oral solution    Gabapentin Oral tablet    Gabapentin Oral tablet, extended-release    Gabapentin Oral tablet, extended-release, Gabapentin Oral tablet, extended-release  Gabapentin Oral capsule  What is this medicine?  GABAPENTIN (GA ba pen tin) is used to control partial seizures in adults with epilepsy. It is also used to treat certain types of nerve pain.  This medicine may be used for other purposes; ask your health care provider or pharmacist if you have questions.  What should I tell my health care provider before I take this medicine?  They need to know if you have any of these conditions:    kidney disease    suicidal thoughts, plans, or attempt; a previous suicide attempt by you or a family member    an unusual or allergic reaction to gabapentin, other medicines, foods, dyes, or preservatives    pregnant or trying to get pregnant    breast-feeding  How should I use this medicine?  Take this medicine by mouth with a glass of water. Follow the directions on the prescription label. You can take it with or without food. If it upsets your stomach, take it with food.Take your medicine at regular intervals. Do not take it more often than directed. Do not stop taking except on your doctor's advice.  If you are directed to break the 600 or 800 mg tablets in half as part of your dose, the extra half tablet should be used for the next dose. If you have not used the extra half tablet within 28 days, it should be thrown away.  A special MedGuide will be given to you by the pharmacist with each prescription and refill. Be sure to read this information carefully  each time.  Talk to your pediatrician regarding the use of this medicine in children. Special care may be needed.  Overdosage: If you think you have taken too much of this medicine contact a poison control center or emergency room at once.  NOTE: This medicine is only for you. Do not share this medicine with others.  What if I miss a dose?  If you miss a dose, take it as soon as you can. If it is almost time for your next dose, take only that dose. Do not take double or extra doses.  What may interact with this medicine?  Do not take this medicine with any of the following medications:    other gabapentin products  This medicine may also interact with the following medications:    alcohol    antacids    antihistamines for allergy, cough and cold    certain medicines for anxiety or sleep    certain medicines for depression or psychotic disturbances    homatropine; hydrocodone    naproxen    narcotic medicines (opiates) for pain    phenothiazines like chlorpromazine, mesoridazine, prochlorperazine, thioridazine  This list may not describe all possible interactions. Give your health care provider a list of all the medicines, herbs, non-prescription drugs, or dietary supplements you use. Also tell them if you smoke, drink alcohol, or use illegal drugs. Some items may interact with your medicine.  What should I watch for while using this medicine?  Visit your doctor or health care professional for regular checks on your progress. You may want to keep a record at home of how you feel your condition is responding to treatment. You may want to share this information with your doctor or health care professional at each visit. You should contact your doctor or health care professional if your seizures get worse or if you have any new types of seizures. Do not stop taking this medicine or any of your seizure medicines unless instructed by your doctor or health care professional. Stopping your medicine suddenly can increase  your seizures or their severity.  Wear a medical identification bracelet or chain if you are taking this medicine for seizures, and carry a card that lists all your medications.  You may get drowsy, dizzy, or have blurred vision. Do not drive, use machinery, or do anything that needs mental alertness until you know how this medicine affects you. To reduce dizzy or fainting spells, do not sit or stand up quickly, especially if you are an older patient. Alcohol can increase drowsiness and dizziness. Avoid alcoholic drinks.  Your mouth may get dry. Chewing sugarless gum or sucking hard candy, and drinking plenty of water will help.  The use of this medicine may increase the chance of suicidal thoughts or actions. Pay special attention to how you are responding while on this medicine. Any worsening of mood, or thoughts of suicide or dying should be reported to your health care professional right away.  Women who become pregnant while using this medicine may enroll in the [place] Antiepileptic Drug Pregnancy Registry by calling 1-472.728.8765. This registry collects information about the safety of antiepileptic drug use during pregnancy.  What side effects may I notice from receiving this medicine?  Side effects that you should report to your doctor or health care professional as soon as possible:    allergic reactions like skin rash, itching or hives, swelling of the face, lips, or tongue    worsening of mood, thoughts or actions of suicide or dying  Side effects that usually do not require medical attention (report to your doctor or health care professional if they continue or are bothersome):    constipation    difficulty walking or controlling muscle movements    dizziness    nausea    slurred speech    tiredness    tremors    weight gain  This list may not describe all possible side effects. Call your doctor for medical advice about side effects. You may report side effects to FDA at 0-969-FDA-2295.  Where  should I keep my medicine?  Keep out of reach of children.  Store at room temperature between 15 and 30 degrees C (59 and 86 degrees F). Throw away any unused medicine after the expiration date.  NOTE:This sheet is a summary. It may not cover all possible information. If you have questions about this medicine, talk to your doctor, pharmacist, or health care provider. Copyright  2016 [employer]

## 2018-09-26 NOTE — NURSING NOTE
Chief Complaint   Patient presents with     New Patient     [facility] NEW HEADACHES     Pt. Taking medications for diabetes and blood pressure, but not any new medications. Does not remember all the names of the medications he is taking.   Isabel Garcia, EMT

## 2018-09-26 NOTE — MR AVS SNAPSHOT
After Visit Summary   9/26/2018    Eh Callahan    MRN: [id number]           Patient Information     Date Of Birth          1940        Visit Information        Provider Department      9/26/2018 11:15 AM Lelo Patel APRN CNP[facility]        Today's Diagnoses     Unilateral headache    -  1    H/O diabetes mellitus        Atypical facial pain          Care Instructions    Plan:  Brain MRI and MRA for any structural causes evaluation  A trial of gabapentin 300 mg at bedtime for 5 days then 300 mg am and 300 mg at bedtime  Follow up after the imaging    Elevated blood pressure today. Recommended to follow up with PCP      Patient Education    Gabapentin enacarbil Oral tablet, extended-release    Gabapentin Oral capsule    Gabapentin Oral solution    Gabapentin Oral tablet    Gabapentin Oral tablet, extended-release    Gabapentin Oral tablet, extended-release, Gabapentin Oral tablet, extended-release  Gabapentin Oral capsule  What is this medicine?  GABAPENTIN (GA ba pen tin) is used to control partial seizures in adults with epilepsy. It is also used to treat certain types of nerve pain.  This medicine may be used for other purposes; ask your health care provider or pharmacist if you have questions.  What should I tell my health care provider before I take this medicine?  They need to know if you have any of these conditions:    kidney disease    suicidal thoughts, plans, or attempt; a previous suicide attempt by you or a family member    an unusual or allergic reaction to gabapentin, other medicines, foods, dyes, or preservatives    pregnant or trying to get pregnant    breast-feeding  How should I use this medicine?  Take this medicine by mouth with a glass of water. Follow the directions on the prescription label. You can take it with or without food. If it upsets your stomach, take it with food.Take your medicine at regular intervals. Do not take it  more often than directed. Do not stop taking except on your doctor's advice.  If you are directed to break the 600 or 800 mg tablets in half as part of your dose, the extra half tablet should be used for the next dose. If you have not used the extra half tablet within 28 days, it should be thrown away.  A special MedGuide will be given to you by the pharmacist with each prescription and refill. Be sure to read this information carefully each time.  Talk to your pediatrician regarding the use of this medicine in children. Special care may be needed.  Overdosage: If you think you have taken too much of this medicine contact a poison control center or emergency room at once.  NOTE: This medicine is only for you. Do not share this medicine with others.  What if I miss a dose?  If you miss a dose, take it as soon as you can. If it is almost time for your next dose, take only that dose. Do not take double or extra doses.  What may interact with this medicine?  Do not take this medicine with any of the following medications:    other gabapentin products  This medicine may also interact with the following medications:    alcohol    antacids    antihistamines for allergy, cough and cold    certain medicines for anxiety or sleep    certain medicines for depression or psychotic disturbances    homatropine; hydrocodone    naproxen    narcotic medicines (opiates) for pain    phenothiazines like chlorpromazine, mesoridazine, prochlorperazine, thioridazine  This list may not describe all possible interactions. Give your health care provider a list of all the medicines, herbs, non-prescription drugs, or dietary supplements you use. Also tell them if you smoke, drink alcohol, or use illegal drugs. Some items may interact with your medicine.  What should I watch for while using this medicine?  Visit your doctor or health care professional for regular checks on your progress. You may want to keep a record at home of how you feel your  condition is responding to treatment. You may want to share this information with your doctor or health care professional at each visit. You should contact your doctor or health care professional if your seizures get worse or if you have any new types of seizures. Do not stop taking this medicine or any of your seizure medicines unless instructed by your doctor or health care professional. Stopping your medicine suddenly can increase your seizures or their severity.  Wear a medical identification bracelet or chain if you are taking this medicine for seizures, and carry a card that lists all your medications.  You may get drowsy, dizzy, or have blurred vision. Do not drive, use machinery, or do anything that needs mental alertness until you know how this medicine affects you. To reduce dizzy or fainting spells, do not sit or stand up quickly, especially if you are an older patient. Alcohol can increase drowsiness and dizziness. Avoid alcoholic drinks.  Your mouth may get dry. Chewing sugarless gum or sucking hard candy, and drinking plenty of water will help.  The use of this medicine may increase the chance of suicidal thoughts or actions. Pay special attention to how you are responding while on this medicine. Any worsening of mood, or thoughts of suicide or dying should be reported to your health care professional right away.  Women who become pregnant while using this medicine may enroll in the [place] American Antiepileptic Drug Pregnancy Registry by calling 1-567.953.8678. This registry collects information about the safety of antiepileptic drug use during pregnancy.  What side effects may I notice from receiving this medicine?  Side effects that you should report to your doctor or health care professional as soon as possible:    allergic reactions like skin rash, itching or hives, swelling of the face, lips, or tongue    worsening of mood, thoughts or actions of suicide or dying  Side effects that usually do not  require medical attention (report to your doctor or health care professional if they continue or are bothersome):    constipation    difficulty walking or controlling muscle movements    dizziness    nausea    slurred speech    tiredness    tremors    weight gain  This list may not describe all possible side effects. Call your doctor for medical advice about side effects. You may report side effects to FDA at 0-842-DOK-4897.  Where should I keep my medicine?  Keep out of reach of children.  Store at room temperature between 15 and 30 degrees C (59 and 86 degrees F). Throw away any unused medicine after the expiration date.  NOTE:This sheet is a summary. It may not cover all possible information. If you have questions about this medicine, talk to your doctor, pharmacist, or health care provider. Copyright  2016 Gold Standard                Follow-ups after your visit        Your next 10 appointments already scheduled     Oct 09, 2018  6:15 PM CDT   MRA BRAIN ([place] WO CONTRAST with [address] MRI ([facility])    [address] [address] [phone]           How do I prepare for my exam? (Food and drink instructions) **If you will be receiving sedation or general anesthesia, please see special notes below.**  How do I prepare for my exam? (Other instructions) Take your medicines as usual, unless your doctor tells you not to. Please remove any body piercings and hair extensions before you arrive. Follow your doctor s orders. If you do not, we may have to postpone your exam. You may or may not receive IV contrast for this exam pending the discretion of the Radiologist.  You do not need to do anything special to prepare. **If you will be receiving sedation or general anesthesia, please see special notes below.**  What should I wear:  The MRI machine uses a strong magnet. Please wear clothes without metal (snaps, zippers). A  Nifti works well, or we may give you a hospital gown.  How long does the exam take: Most tests take 30 to 60 minutes.  HOWEVER, IF YOUR DOCTOR PRESCRIBES ANESTHESIA please plan on spending four to five hours in the recovery room.  What should I bring: Bring a list of your current medicines to your exam (including vitamins, minerals and over-the-counter drugs). Also bring the results of similar scans you may have had.  Do I need a : **If you will be receiving sedation or general anesthesia, please see special notes below.**  What should I do after the exam? No Restrictions, You may resume normal activities.  What is this test: MRI (magnetic resonance imaging) uses a strong magnet and radio waves to look inside the body. An MRA (magnetic resonance angiogram) does the same thing, but it lets us look at your blood vessels. A computer turns the radio waves into pictures showing cross sections of the body, much like slices of bread. This helps us see any problems more clearly.  Who should I call with questions: Please call the Imaging Department at your exam site with any questions. Directions, parking instructions, and other information is available on our website, [email or web address].  How do I prepare if I m having sedation or anesthesia? **IMPORTANT** THE INSTRUCTIONS BELOW ARE ONLY FOR THOSE PATIENTS WHO HAVE BEEN TOLD THEY WILL RECEIVE SEDATION OR GENERAL ANESTHESIA DURING THEIR MRI PROCEDURE:  IF YOU WILL RECEIVE SEDATION (take medicine to help you relax during your exam): You must get the medicine from your doctor before you arrive. Bring the medicine to the exam. Do not take it at home. Arrive one hour early. Bring someone who can take you home after the test. Your medicine will make you sleepy. After the exam, you may not drive, take a bus or take a taxi by yourself. No eating 8 hours before your exam. You may have clear liquids up until 4 hours before your exam. (Clear liquids include water, clear  tea, black coffee and fruit juice without pulp.)  IF YOU WILL RECEIVE ANESTHESIA (be asleep for your exam): Arrive 1 1/2 hours early. Bring someone who can take you home after the test. You may not drive, take a bus or take a taxi by yourself. No eating 8 hours before your exam. You may have clear liquids up until 4 hours before your exam. (Clear liquids include water, clear tea, black coffee and fruit juice without pulp.) You will spend four to five hours in the recovery room.            Oct 09, 2018  7:00 PM CDT   MR BRAIN W/O & W CONTRAST with [address] MRI ([facility] [place])    [address] [address] [phone]           How do I prepare for my exam? (Food and drink instructions) **If you will be receiving sedation or general anesthesia, please see special notes below.**  How do I prepare for my exam? (Other instructions) Take your medicines as usual, unless your doctor tells you not to. You may or may not receive intravenous (IV) contrast for this exam pending the discretion of the Radiologist.  You do not need to do anything special to prepare.  **If you will be receiving sedation or general anesthesia, please see special notes below.**  What should I wear: The MRI machine uses a strong magnet. Please wear clothes without metal (snaps, zippers). A sweatsuit works well, or we may give you a hospital gown. Please remove any body piercings and hair extensions before you arrive. You will also remove watches, jewelry, hairpins, wallets, dentures, partial dental plates and hearing aids. You may wear contact lenses, and you may be able to wear your rings. We have a safe place to keep your personal items, but it is safer to leave them at home.  How long does the exam take: Most tests take 30 to 60 minutes.  HOWEVER, IF YOUR DOCTOR PRESCRIBES ANESTHESIA please plan on spending four to five hours in the recovery room.  What should I bring:   Bring a list of your current medicines to your exam (including vitamins, minerals and over-the-counter drugs).  Do I need a :  **If you will be receiving sedation or general anesthesia, please see special notes below.**  What should I do after the exam: No Restrictions, You may resume normal activities.  What is this test: MRI (magnetic resonance imaging) uses a strong magnet and radio waves to look inside the body. An MRA (magnetic resonance angiogram) does the same thing, but it lets us look at your blood vessels. A computer turns the radio waves into pictures showing cross sections of the body, much like slices of bread. This helps us see any problems more clearly. You may receive fluid (called  contrast ) before or during your scan. The fluid helps us see the pictures better. We give the fluid through an IV (small needle in your arm).  Who should I call with questions:  Please call the Imaging Department at your exam site with any questions. Directions, parking instructions, and other information is available on our website, [email or web address].  How do I prepare if I m having sedation or anesthesia? **IMPORTANT** THE INSTRUCTIONS BELOW ARE ONLY FOR THOSE PATIENTS WHO HAVE BEEN TOLD THEY WILL RECEIVE SEDATION OR GENERAL ANESTHESIA DURING THEIR MRI PROCEDURE:  IF YOU WILL RECEIVE SEDATION (take medicine to help you relax during your exam): You must get the medicine from your doctor before you arrive. Bring the medicine to the exam. Do not take it at home. Arrive one hour early. Bring someone who can take you home after the test. Your medicine will make you sleepy. After the exam, you may not drive, take a bus or take a taxi by yourself. No eating 8 hours before your exam. You may have clear liquids up until 4 hours before your exam. (Clear liquids include water, clear tea, black coffee and fruit juice without pulp.)  IF YOU WILL RECEIVE ANESTHESIA (be asleep for your exam): Arrive 1 1/2 hours early. Bring  someone who can take you home after the test. You may not drive, take a bus or take a taxi by yourself. No eating 8 hours before your exam. You may have clear liquids up until 4 hours before your exam. (Clear liquids include water, clear tea, black coffee and fruit juice without pulp.)            Oct 12, 2018 10:30 AM CDT   (Arrive by 10:15 AM)   Return Visit with TANNER Powell [facility] Neurology ([email or web address] [place])    [address] [facility]  3rd [facility] [address] [phone]            Oct 18, 2018  9:00 AM CDT   MR ABDOMEN W/O & W CONTRAST with UUMR1   [facility] [place], MRI ([facility])    [address] [facility] [id number] [phone]           How do I prepare for my exam? (Food and drink instructions) Do not eat or drink for 6 hours prior to exam. **If you will be receiving sedation or general anesthesia, please see special notes below.**  How do I prepare for my exam? (Other instructions) Take your medicines as usual, unless your doctor tells you not to. You may or may not receive IV contrast for this exam pending the discretion of the Radiologist.  **If you will be receiving sedation or general anesthesia, please see special notes below.**  What should I wear: The MRI machine uses a strong magnet. Please wear clothes without metal (snaps, zippers). A sweatsuit works well, or we may give you a hospital gown. Please remove any body piercings and hair extensions before you arrive. You will also remove watches, jewelry, hairpins, wallets, dentures, partial dental plates and hearing aids. You may wear contact lenses, and you may be able to wear your rings. We have a safe place to keep your personal items, but it is safer to leave them at home.  How long does the exam take: Most tests take 30 to 60 minutes.  HOWEVER, IF YOUR DOCTOR PRESCRIBES ANESTHESIA please plan on spending four to five  hours in the recovery room.  What should I bring: Bring a list of your current medicines to your exam (including vitamins, minerals and over-the-counter drugs). Also bring the results of similar scans you may have had.  Do I need a : **If you will be receiving sedation or general anesthesia, please see special notes below.**  What should I do after the exam: No Restrictions, You may resume normal activities.  What is this test: MRI (magnetic resonance imaging) uses a strong magnet and radio waves to look inside the body. An MRA (magnetic resonance angiogram) does the same thing, but it lets us look at your blood vessels. A computer turns the radio waves into pictures showing cross sections of the body, much like slices of bread. This helps us see any problems more clearly. You may receive fluid (called  contrast ) before or during your scan. The fluid helps us see the pictures better. We give the fluid through an IV (small needle in your arm).  Who should I call with questions: If you have any questions, please contact your Imaging Department exam site. Directions, parking instructions, and other information is available on our website, [email or web address].            Oct 18, 2018 12:00 PM CDT   [facility] Draw with  [facility] LAB DRAW   [facility] Lab Draw ([facility])    [address] [place] [address]  [address] [place] [id number] [phone]            Oct 18, 2018 12:30 PM CDT   (Arrive by 12:15 PM)   Return Visit with Miri Antoine MD   [facility] ([facility])    [address] [place] [address]  [address] [place] [id number] [phone]              Future tests that were ordered for you today     Open Future Orders        Priority Expected Expires Ordered    MR Brain w/o & w Contrast Routine  9/26/2019 9/26/2018    MRA Brain ([place]) wo Contrast Routine  9/26/2019 9/26/2018            Who to contact     Please call  your clinic at [phone] to:    Ask questions about your health    Make or cancel appointments    Discuss your medicines    Learn about your test results    Speak to your doctor            Additional Information About Your Visit        Care EveryWhere ID     This is your Care EveryWhere ID. This could be used by other organizations to access your [facility] medical records  [id number]        Your Vitals Were     Pulse Temperature Pulse Oximetry             67 98.1  F (36.7  C) (Oral) 99%          Blood Pressure from Last 3 Encounters:   09/26/18 173/87   09/19/18 175/87   05/31/18 176/81    Weight from Last 3 Encounters:   09/19/18 71.2 kg (157 lb)   05/31/18 74.6 kg (164 lb 8 oz)   03/08/18 72.4 kg (159 lb 11.2 oz)                 Today's Medication Changes          These changes are accurate as of 9/26/18  1:56 PM.  If you have any questions, ask your nurse or doctor.               Start taking these medicines.        Dose/Directions    gabapentin 300 MG capsule   Commonly known as:  NEURONTIN   Used for:  Unilateral headache, H/O diabetes mellitus, Atypical facial pain   Started by:  Lelo Patel APRN CNP        Take one cap at bedtime for 5 days then one cap am and at bedtime   Quantity:  60 capsule   Refills:  1            Where to get your medicines      These medications were sent to [facility] [place] [id number]     Phone:  [phone]     gabapentin 300 MG capsule                Primary Care Provider Office Phone # Fax #    Shalini CIERA Mac PA-C [phone]       [facility] [address]  [place] [id number]        Equal Access to Services     [facility] Center[facility]ANE AH: Hadii dawit [place], vinnie bowen, qaberyl samuel. So [facility] [phone].    ATENCIÓN: Si habla español, tiene a jones disposición servicios gratuitos de asistencia lingüística. Llame al  [phone].    We comply with applicable federal civil rights laws and [place] laws. We do not discriminate on the basis of race, color, national origin, age, disability, sex, sexual orientation, or gender identity.            Thank you!     Thank you for choosing [facility] NEUROLOGY  for your care. Our goal is always to provide you with excellent care. Hearing back from our patients is one way we can continue to improve our services. Please take a few minutes to complete the written survey that you may receive in the mail after your visit with us. Thank you!             Your Updated Medication List - Protect others around you: Learn how to safely use, store and throw away your medicines at www.disposemymeds.org.          This list is accurate as of 9/26/18  1:56 PM.  Always use your most recent med list.                   Brand Name Dispense Instructions for use Diagnosis    acetaminophen 500 MG tablet    TYLENOL    100 tablet    Take 2 tablets (1,000 mg) by mouth 3 times daily    History of liver excision       alendronate 70 MG tablet    FOSAMAX     70 mg every 7 days Take 1 tablet by mouth twice weekly.        amLODIPine 5 MG tablet    NORVASC     Take 5 mg by mouth        GREGORIO CONTOUR test strip   Generic drug:  blood glucose monitoring      USE TO MEASURE YOUR BLOOD GLUCOSE 6 TIMES DAILY    HCC (hepatocellular carcinoma) (H)       carBAMazepine 100 MG 12 hr tablet    TEGretol XR    30 tablet    Take 1 tablet (100 mg) by mouth 2 times daily as needed        carboxymethylcellulose 0.5 % Soln ophthalmic solution    REFRESH PLUS     Place 1 drop into both eyes 4 times daily        D 5000 5000 units Caps   Generic drug:  cholecalciferol      Reported on 4/12/2017        FIFTY50 GLUCOSE METER 2.0 w/Device Kit      as needed for blood glucose monitoring    HCC (hepatocellular carcinoma) (H)       furosemide 20 MG tablet    LASIX    30 tablet    Take 1 tablet (20 mg) by mouth daily    Peripheral edema, History of  "hepatitis C       gabapentin 300 MG capsule    NEURONTIN    60 capsule    Take one cap at bedtime for 5 days then one cap am and at bedtime    Unilateral headache, H/O diabetes mellitus, Atypical facial pain       insulin aspart 100 UNIT/ML injection    NovoLOG PEN     3 units before each meal.    HCC (hepatocellular carcinoma) (H)       insulin degludec 100 UNIT/ML pen    TRESIBA     Inject 14 Units Subcutaneous    HCC (hepatocellular carcinoma) (H)       [employer] LANCETS 21G Misc      Uses 6 times daily    HCC (hepatocellular carcinoma) (H)       LORazepam 1 MG tablet    ATIVAN     Take 1 mg by mouth as needed    HCC (hepatocellular carcinoma) (H)       losartan 100 MG tablet    COZAAR     Take 1 tablet by mouth daily        Multiple vitamin Tabs      Take 1 tablet by mouth        MULTIVITAMIN & MINERAL PO       HCC (hepatocellular carcinoma) (H)       oxyCODONE IR 5 MG tablet    ROXICODONE     Take 5 mg by mouth        pantoprazole 40 MG EC tablet    PROTONIX     Take 40 mg by mouth daily        pen needles 5/16\" 31G X 8 MM Misc      4 x daily    HCC (hepatocellular carcinoma) (H)       polyethylene glycol Packet    MIRALAX/GLYCOLAX    30 packet    Take 17 g by mouth daily as needed for constipation    History of liver excision       RA BLOOD PRESSURE CUFF MONITOR Misc      Take blood pressure once daily    HCC (hepatocellular carcinoma) (H)       REFRESH OPTIVE ADVANCED 0.5-1-0.5 % Soln   Generic drug:  Carboxymeth-Glycerin-Polysorb      PLACE 1 DROP INTO BOTH EYES 4 (FOUR) TIMES DAILY.    HCC (hepatocellular carcinoma) (H)       senna-docusate 8.6-50 MG per tablet    SENOKOT-S;PERICOLACE    14 tablet    Take 1 tablet by mouth 2 times daily    History of liver excision       simvastatin 20 MG tablet    ZOCOR     Take 1 tablet (20 mg) by mouth daily    Dyslipidaemia       timolol 0.5 % ophthalmic solution    TIMOPTIC     PLACE 1 DROP INTO BOTH EYES ONCE DAILY.    HCC (hepatocellular carcinoma) (H)       traZODone " 50 MG tablet    DESYREL     Take 50 mg by mouth    HCC (hepatocellular carcinoma) (H)

## 2018-10-12 ENCOUNTER — HOSPITAL ENCOUNTER (OUTPATIENT)
Dept: MRI IMAGING | Facility: CLINIC | Age: 78
Discharge: HOME | End: 2018-10-12
Attending: NURSE PRACTITIONER | Admitting: NURSE PRACTITIONER
Payer: MEDICARE

## 2018-10-12 ENCOUNTER — HOSPITAL ENCOUNTER (OUTPATIENT)
Dept: MRI IMAGING | Facility: CLINIC | Age: 78
End: 2018-10-12
Attending: NURSE PRACTITIONER
Payer: MEDICARE

## 2018-10-12 DIAGNOSIS — Z86.39 H/O DIABETES MELLITUS: ICD-10-CM

## 2018-10-12 DIAGNOSIS — G50.1 ATYPICAL FACIAL PAIN: ICD-10-CM

## 2018-10-12 DIAGNOSIS — R51.9 UNILATERAL HEADACHE: ICD-10-CM

## 2018-10-12 PROCEDURE — 25500064 ZZH RX 255 OP 636: Performed by: NURSE PRACTITIONER

## 2018-10-12 PROCEDURE — A9585 GADOBUTROL INJECTION: HCPCS | Performed by: NURSE PRACTITIONER

## 2018-10-12 PROCEDURE — 70553 MRI BRAIN STEM W/O & W/DYE: CPT

## 2018-10-12 PROCEDURE — 70544 MR ANGIOGRAPHY HEAD W/O DYE: CPT | Mod: XS

## 2018-10-12 PROCEDURE — T1013 SIGN LANG/ORAL INTERPRETER: HCPCS | Mod: U3

## 2018-10-12 RX ORDER — GADOBUTROL 604.72 MG/ML
7.5 INJECTION INTRAVENOUS ONCE
Status: COMPLETED | OUTPATIENT
Start: 2018-10-12 | End: 2018-10-12

## 2018-10-12 RX ADMIN — GADOBUTROL 7.1 ML: 604.72 INJECTION INTRAVENOUS at 17:33

## 2018-10-13 LAB — RADIOLOGIST FLAGS: NORMAL

## 2018-10-15 ENCOUNTER — CARE COORDINATION (OUTPATIENT)
Dept: NEUROLOGY | Facility: CLINIC | Age: 78
End: 2018-10-15

## 2018-10-15 NOTE — PROGRESS NOTES
Results reviewed. Patient is no show for follow up. . Recommended to follow up in Stroke Clinic. Results sent to the PCP for follow up.

## 2018-10-15 NOTE — PROGRESS NOTES
Lelo Patel APRN CNP  Maribel Mcknight, POLLO                   Patient is no show for his follow up today. Please call him and ask to return for abnormal MRI findings of subacute stroke.   Please refer him to the ED if he has any weakness, speech difficulties or any stroke symptoms. Patient has to be scheduled in the Stroke Clinic. Please sent results to his PCP   Thank       MRI results faxed to [facility] [phone].  Message sent to our schedulers to reschedule patient.  He needs to be seen for abnormal MRI

## 2018-10-17 ENCOUNTER — OFFICE VISIT (OUTPATIENT)
Dept: NEUROLOGY | Facility: CLINIC | Age: 78
End: 2018-10-17
Payer: MEDICARE

## 2018-10-17 VITALS
DIASTOLIC BLOOD PRESSURE: 75 MMHG | HEIGHT: 70 IN | WEIGHT: 159.8 LBS | SYSTOLIC BLOOD PRESSURE: 161 MMHG | HEART RATE: 69 BPM | OXYGEN SATURATION: 99 % | BODY MASS INDEX: 22.88 KG/M2

## 2018-10-17 DIAGNOSIS — I63.422 CEREBROVASCULAR ACCIDENT (CVA) DUE TO EMBOLISM OF LEFT ANTERIOR CEREBRAL ARTERY (H): Primary | ICD-10-CM

## 2018-10-17 DIAGNOSIS — G50.0 TRIGEMINAL NEURALGIA: ICD-10-CM

## 2018-10-17 DIAGNOSIS — Z86.39 H/O DIABETES MELLITUS: ICD-10-CM

## 2018-10-17 DIAGNOSIS — C22.0 HCC (HEPATOCELLULAR CARCINOMA) (H): ICD-10-CM

## 2018-10-17 DIAGNOSIS — G50.1 ATYPICAL FACIAL PAIN: ICD-10-CM

## 2018-10-17 DIAGNOSIS — R51.9 UNILATERAL HEADACHE: ICD-10-CM

## 2018-10-17 DIAGNOSIS — R51.9 LEFT-SIDED FACE PAIN: ICD-10-CM

## 2018-10-17 DIAGNOSIS — H53.8 BLURRY VISION, LEFT EYE: ICD-10-CM

## 2018-10-17 DIAGNOSIS — Z86.79 HISTORY OF ATRIAL FIBRILLATION: ICD-10-CM

## 2018-10-17 LAB
AFP SERPL-MCNC: <1.5 UG/L (ref 0–8)
ALBUMIN SERPL-MCNC: 3.1 G/DL (ref 3.4–5)
ALP SERPL-CCNC: 92 U/L (ref 40–150)
ALT SERPL W P-5'-P-CCNC: 41 U/L (ref 0–70)
ANION GAP SERPL CALCULATED.3IONS-SCNC: 3 MMOL/L (ref 3–14)
AST SERPL W P-5'-P-CCNC: 25 U/L (ref 0–45)
BASOPHILS # BLD AUTO: 0 10E9/L (ref 0–0.2)
BASOPHILS NFR BLD AUTO: 0.8 %
BILIRUB SERPL-MCNC: 0.8 MG/DL (ref 0.2–1.3)
BUN SERPL-MCNC: 40 MG/DL (ref 7–30)
CALCIUM SERPL-MCNC: 8.3 MG/DL (ref 8.5–10.1)
CHLORIDE SERPL-SCNC: 100 MMOL/L (ref 94–109)
CO2 SERPL-SCNC: 29 MMOL/L (ref 20–32)
CREAT SERPL-MCNC: 1.39 MG/DL (ref 0.66–1.25)
DIFFERENTIAL METHOD BLD: NORMAL
EOSINOPHIL # BLD AUTO: 0.2 10E9/L (ref 0–0.7)
EOSINOPHIL NFR BLD AUTO: 3.9 %
ERYTHROCYTE [DISTWIDTH] IN BLOOD BY AUTOMATED COUNT: 12.1 % (ref 10–15)
GFR SERPL CREATININE-BSD FRML MDRD: 49 ML/MIN/1.7M2
GLUCOSE SERPL-MCNC: 201 MG/DL (ref 70–99)
HCT VFR BLD AUTO: 42.1 % (ref 40–53)
HGB BLD-MCNC: 14.1 G/DL (ref 13.3–17.7)
IMM GRANULOCYTES # BLD: 0 10E9/L (ref 0–0.4)
IMM GRANULOCYTES NFR BLD: 0.2 %
LYMPHOCYTES # BLD AUTO: 1.3 10E9/L (ref 0.8–5.3)
LYMPHOCYTES NFR BLD AUTO: 25.2 %
MCH RBC QN AUTO: 31.2 PG (ref 26.5–33)
MCHC RBC AUTO-ENTMCNC: 33.5 G/DL (ref 31.5–36.5)
MCV RBC AUTO: 93 FL (ref 78–100)
MONOCYTES # BLD AUTO: 0.5 10E9/L (ref 0–1.3)
MONOCYTES NFR BLD AUTO: 9 %
NEUTROPHILS # BLD AUTO: 3.1 10E9/L (ref 1.6–8.3)
NEUTROPHILS NFR BLD AUTO: 60.9 %
NRBC # BLD AUTO: 0 10*3/UL
NRBC BLD AUTO-RTO: 0 /100
PLATELET # BLD AUTO: 160 10E9/L (ref 150–450)
POTASSIUM SERPL-SCNC: 4.2 MMOL/L (ref 3.4–5.3)
PROT SERPL-MCNC: 6.9 G/DL (ref 6.8–8.8)
RBC # BLD AUTO: 4.52 10E12/L (ref 4.4–5.9)
SODIUM SERPL-SCNC: 132 MMOL/L (ref 133–144)
WBC # BLD AUTO: 5.1 10E9/L (ref 4–11)

## 2018-10-17 PROCEDURE — 86618 LYME DISEASE ANTIBODY: CPT | Performed by: NURSE PRACTITIONER

## 2018-10-17 PROCEDURE — 82105 ALPHA-FETOPROTEIN SERUM: CPT | Performed by: INTERNAL MEDICINE

## 2018-10-17 RX ORDER — GABAPENTIN 300 MG/1
CAPSULE ORAL
Qty: 90 CAPSULE | Refills: 3 | Status: ON HOLD | OUTPATIENT
Start: 2018-10-17 | End: 2020-01-18

## 2018-10-17 ASSESSMENT — PAIN SCALES - GENERAL: PAINLEVEL: SEVERE PAIN (7)

## 2018-10-17 NOTE — PATIENT INSTRUCTIONS
Plan:  Neck MRA for better vertebral visualization  Follow up with primary care for stroke prevention-blood pressure control, blood pressure control, to restart simvastatin (Zocor) and continue asprin 81 mg daily   Cardionet for a arrhythmia monitoring and ECHO  Increase gabapentin for facial pain and referral to Dr Wood for facial pain and trigeminal neuralgia  Lab -Lyme Antibodies  Ophthalmology referral for left eye ptosis and blurry vision but patient reports that he saw an ophthalmologist at [facility]  Follow up in 6-8 weeks or sooner if needed

## 2018-10-17 NOTE — MR AVS SNAPSHOT
After Visit Summary   10/17/2018    Eh Callahan    MRN: [id number]           Patient Information     Date Of Birth          1940        Visit Information        Provider Department      10/17/2018 3:15 PM Lelo Patel APRN CNP; [place] [facility]        Today's Diagnoses     Cerebrovascular accident (CVA) due to embolism of left anterior cerebral artery (H)    -  1    History of atrial fibrillation        Trigeminal neuralgia        Left-sided face pain        Unilateral headache        H/O diabetes mellitus        Atypical facial pain        Blurry vision, left eye          Care Instructions    Plan:  Neck MRA for better vertebral visualization  Follow up with primary care for stroke prevention-blood pressure control, blood pressure control, to restart simvastatin (Zocor) and continue asprin 81 mg daily   Cardionet for a arrhythmia monitoring and ECHO  Increase gabapentin for facial pain and referral to Dr Wood for facial pain and trigeminal neuralgia  Lab -Lyme Antibodies  Ophthalmology referral for left eye ptosis and blurry vision but patient reports that he saw an ophthalmologist at [facility]  Follow up in 6-8 weeks or sooner if needed                  Follow-ups after your visit        Additional Services     NEUROSURGERY REFERRAL       Your provider has referred you to: [facility]: Neurosurgery Clinic - [place] [phone]   [email or web address]  With Dr Wood    Please be aware that coverage of these services is subject to the terms and limitations of your health insurance plan.  Call member services at your health plan with any benefit or coverage questions.      Please bring the following with you to your appointment:    (1) Any X-Rays, CTs or MRIs which have been performed.  Contact the facility where they were done to arrange for  prior to your scheduled appointment.   (2) List of  current medications  (3) This referral request   (4) Any documents/labs given to you for this referral                  Your next 10 appointments already scheduled     Oct 18, 2018  8:30 AM CDT   MR ABDOMEN W/O & W CONTRAST with UUMR1   [facility] [place], MRI ([facility])    [address] [address]   [phone]           How do I prepare for my exam? (Food and drink instructions) Do not eat or drink for 6 hours prior to exam. **If you will be receiving sedation or general anesthesia, please see special notes below.**  How do I prepare for my exam? (Other instructions) Take your medicines as usual, unless your doctor tells you not to. You may or may not receive IV contrast for this exam pending the discretion of the Radiologist.  **If you will be receiving sedation or general anesthesia, please see special notes below.**  What should I wear: The MRI machine uses a strong magnet. Please wear clothes without metal (snaps, zippers). A sweatsuit works well, or we may give you a hospital gown. Please remove any body piercings and hair extensions before you arrive. You will also remove watches, jewelry, hairpins, wallets, dentures, partial dental plates and hearing aids. You may wear contact lenses, and you may be able to wear your rings. We have a safe place to keep your personal items, but it is safer to leave them at home.  How long does the exam take: Most tests take 30 to 60 minutes.  HOWEVER, IF YOUR DOCTOR PRESCRIBES ANESTHESIA please plan on spending four to five hours in the recovery room.  What should I bring: Bring a list of your current medicines to your exam (including vitamins, minerals and over-the-counter drugs). Also bring the results of similar scans you may have had.  Do I need a : **If you will be receiving sedation or general anesthesia, please see special notes below.**  What should I do after the exam: No Restrictions,  You may resume normal activities.  What is this test: MRI (magnetic resonance imaging) uses a strong magnet and radio waves to look inside the body. An MRA (magnetic resonance angiogram) does the same thing, but it lets us look at your blood vessels. A computer turns the radio waves into pictures showing cross sections of the body, much like slices of bread. This helps us see any problems more clearly. You may receive fluid (called  contrast ) before or during your scan. The fluid helps us see the pictures better. We give the fluid through an IV (small needle in your arm).  Who should I call with questions: If you have any questions, please contact your Imaging Department exam site. Directions, parking instructions, and other information is available on our website, "[email or web address].            Oct 18, 2018 12:00 PM CDT   [facility] Draw with  [facility] DRAW   [facility] Draw ([facility])    [address]  [address]  [place] [id number] [phone]            Oct 18, 2018 12:15 PM CDT   Return Visit with Miri Antoine MD   [facility] ([facility])    [address]  [address]  [place] [address] [phone]            Oct 25, 2018  1:30 PM CDT   Ech Complete with ROSE   [facility] Echo ([facility])    [address]  3rd Floor  [place] [facility] [place] [id number] [phone]           1.  Please bring or wear a comfortable two-piece outfit. 2.  You may eat, drink and take your normal medicines. 3.  For any questions that cannot be answered, please contact the ordering physician 4.  Please do not wear perfumes or scented lotions on the day of your exam.            Oct 25, 2018  3:00 PM CDT   (Arrive by 2:45 PM)   Event Monitor Visit with  Cvc Monitor Tech, TH   [facility])    [address] [place] [address]  [address]  [facility]  [id number] [phone]            Oct 28, 2018  5:30 PM CDT   MRA NECK (CAROTIDS) WO CONTRAST with [id number]   [facility] MRI ([email or web address])    [address]  1st Floor  [facility] [id number] [phone]           Take your medicines as usual, unless your doctor tells you not to. Bring a list of your current medicines to your exam (including vitamins, minerals and over-the-counter drugs). Also bring the results of similar scans you may have had.  Please remove any body piercings and hair extensions before you arrive.  Follow your doctor s orders. If you do not, we may have to postpone your exam.  You will not have contrast for this exam. You do not need to do anything special to prepare.  The MRI machine uses a strong magnet. Please wear clothes without metal (snaps, zippers). A sweatsuit works well, or we may give you a hospital gown.   **IMPORTANT** THE INSTRUCTIONS BELOW ARE ONLY FOR THOSE PATIENTS WHO HAVE BEEN TOLD THEY WILL RECEIVE SEDATION OR GENERAL ANESTHESIA DURING THEIR MRI PROCEDURE:  IF YOU WILL RECEIVE SEDATION (take medicine to help you relax during your exam):   You must get the medicine from your doctor before you arrive. Bring the medicine to the exam. Do not take it at home.   Arrive one hour early. Bring someone who can take you home after the test. Your medicine will make you sleepy. After the exam, you may not drive, take a bus or take a taxi by yourself.   No eating 8 hours before your exam. You may have clear liquids up until 4 hours before your exam. (Clear liquids include water, clear tea, black coffee and fruit juice without pulp.)  IF YOU WILL RECEIVE ANESTHESIA (be asleep for your exam):   Arrive 1 1/2 hours early. Bring someone who can take you home after the test. You may not drive, take a bus or take a taxi by yourself.   No eating 8 hours before your exam. You may have clear liquids up until 4 hours before your exam. (Clear liquids  "include water, clear tea, black coffee and fruit juice without pulp.)   You will spend four to five hours in the recovery room.  Please call the Imaging Department at your exam site with any questions.            Nov 01, 2018  2:00 PM CDT   (Arrive by 1:45 PM)   Return Visit with TANNER Powell CNP   [facility] Neurology ([facility] [place])    [address]  3rd Floor  [place] [facility] [place] [address] [phone]            Nov 12, 2018  2:30 PM CST   (Arrive by 2:15 PM)   NEW PLASTICS with Brenden Méndez MD   [facility] Ophthalmology ([facility])    [address]  4th Floor  [place] [facility] [facility] [address] [phone]              Future tests that were ordered for you today     Open Future Orders        Priority Expected Expires Ordered    Echocardiogram Routine  10/17/2019 10/17/2018    Cardiac Event Monitor - Peds/Adult Routine  10/17/2019 10/17/2018    MRI Angiogram neck w/o contrast Routine  10/17/2019 10/17/2018            Who to contact     Please call your clinic at [phone] to:    Ask questions about your health    Make or cancel appointments    Discuss your medicines    Learn about your test results    Speak to your doctor            Additional Information About Your Visit        Care EveryWhere ID     This is your Care EveryWhere ID. This could be used by other organizations to access your [facility] medical records  [id number]        Your Vitals Were     Pulse Height Pulse Oximetry BMI (Body Mass Index)          69 1.765 m (5' 9.5\") 99% 23.26 kg/m2         Blood Pressure from Last 3 Encounters:   10/17/18 161/75   09/26/18 173/87   09/19/18 175/87    Weight from Last 3 Encounters:   10/17/18 72.5 kg (159 lb 12.8 oz)   09/19/18 71.2 kg (157 lb)   05/31/18 74.6 kg (164 lb 8 oz)              We Performed the Following     Lyme Disease Deanna with reflex to WB Serum     NEUROSURGERY REFERRAL          Today's Medication Changes        "   These changes are accurate as of 10/17/18  5:57 PM.  If you have any questions, ask your nurse or doctor.               These medicines have changed or have updated prescriptions.        Dose/Directions    gabapentin 300 MG capsule   Commonly known as:  NEURONTIN   This may have changed:  additional instructions   Used for:  Unilateral headache, H/O diabetes mellitus, Atypical facial pain   Changed by:  Lelo Patel APRN CNP        Take one cap three times daily   Quantity:  90 capsule   Refills:  3            Where to get your medicines      These medications were sent to [facility] [address]  [address] [address]     Phone:  [phone]     gabapentin 300 MG capsule                Primary Care Provider Office Phone # Fax #    Shalini CIERA Mac PA-C [phone]       [facility] [address]  [facility] [address]        Equal Access to Services     DEE COOMBS : Hadii aad ku hadasho Soyazan, waaxda luqadaha, qaybta kaalmada adeegyada, beryl mendiola . So [facility] [phone].    ATENCIÓN: Si habla español, tiene a jones disposición servicios gratuitos de asistencia lingüística. Ann Marie al [phone].    We comply with applicable federal civil rights laws and [place] laws. We do not discriminate on the basis of race, color, national origin, age, disability, sex, sexual orientation, or gender identity.            Thank you!     Thank you for choosing [facility] NEUROLOGY  for your care. Our goal is always to provide you with excellent care. Hearing back from our patients is one way we can continue to improve our services. Please take a few minutes to complete the written survey that you may receive in the mail after your visit with us. Thank you!             Your Updated Medication List - Protect others around you: Learn how to safely use, store and throw away your medicines at www.disposemymeds.org.           This list is accurate as of 10/17/18  5:57 PM.  Always use your most recent med list.                   Brand Name Dispense Instructions for use Diagnosis    acetaminophen 500 MG tablet    TYLENOL    100 tablet    Take 2 tablets (1,000 mg) by mouth 3 times daily    History of liver excision       alendronate 70 MG tablet    FOSAMAX     70 mg every 7 days Take 1 tablet by mouth twice weekly.        amLODIPine 5 MG tablet    NORVASC     Take 5 mg by mouth        ASPIRIN PO      Take 81 mg by mouth        GREGORIO CONTOUR test strip   Generic drug:  blood glucose monitoring      USE TO MEASURE YOUR BLOOD GLUCOSE 6 TIMES DAILY    HCC (hepatocellular carcinoma) (H)       carBAMazepine 100 MG 12 hr tablet    TEGretol XR    30 tablet    Take 1 tablet (100 mg) by mouth 2 times daily as needed        carboxymethylcellulose 0.5 % Soln ophthalmic solution    REFRESH PLUS     Place 1 drop into both eyes 4 times daily        D 5000 5000 units Caps   Generic drug:  cholecalciferol      Reported on 4/12/2017        FIFTY50 GLUCOSE METER 2.0 w/Device Kit      as needed for blood glucose monitoring    HCC (hepatocellular carcinoma) (H)       furosemide 20 MG tablet    LASIX    30 tablet    Take 1 tablet (20 mg) by mouth daily    Peripheral edema, History of hepatitis C       gabapentin 300 MG capsule    NEURONTIN    90 capsule    Take one cap three times daily    Unilateral headache, H/O diabetes mellitus, Atypical facial pain       insulin aspart 100 UNIT/ML injection    NovoLOG PEN     3 units before each meal.    HCC (hepatocellular carcinoma) (H)       insulin degludec 100 UNIT/ML pen    TRESIBA     Inject 14 Units Subcutaneous    HCC (hepatocellular carcinoma) (H)       [employer] LANCETS 21G Misc      Uses 6 times daily    HCC (hepatocellular carcinoma) (H)       LORazepam 1 MG tablet    ATIVAN     Take 1 mg by mouth as needed    HCC (hepatocellular carcinoma) (H)       losartan 100 MG tablet    COZAAR     Take 1 tablet by mouth  "daily        Multiple vitamin Tabs      Take 1 tablet by mouth        MULTIVITAMIN & MINERAL PO       HCC (hepatocellular carcinoma) (H)       oxyCODONE IR 5 MG tablet    ROXICODONE     Take 5 mg by mouth        pantoprazole 40 MG EC tablet    PROTONIX     Take 40 mg by mouth daily        pen needles 5/16\" 31G X 8 MM Misc      4 x daily    HCC (hepatocellular carcinoma) (H)       polyethylene glycol Packet    MIRALAX/GLYCOLAX    30 packet    Take 17 g by mouth daily as needed for constipation    History of liver excision       RA BLOOD PRESSURE CUFF MONITOR Misc      Take blood pressure once daily    HCC (hepatocellular carcinoma) (H)       REFRESH OPTIVE ADVANCED 0.5-1-0.5 % Soln   Generic drug:  Carboxymeth-Glycerin-Polysorb      PLACE 1 DROP INTO BOTH EYES 4 (FOUR) TIMES DAILY.    HCC (hepatocellular carcinoma) (H)       senna-docusate 8.6-50 MG per tablet    SENOKOT-S;PERICOLACE    14 tablet    Take 1 tablet by mouth 2 times daily    History of liver excision       simvastatin 20 MG tablet    ZOCOR     Take 1 tablet (20 mg) by mouth daily    Dyslipidaemia       timolol 0.5 % ophthalmic solution    TIMOPTIC     PLACE 1 DROP INTO BOTH EYES ONCE DAILY.    HCC (hepatocellular carcinoma) (H)       traZODone 50 MG tablet    DESYREL     Take 50 mg by mouth    HCC (hepatocellular carcinoma) (H)         "

## 2018-10-17 NOTE — LETTER
Fidelinajeannette Sindy  [address]  [facility] [address]        10/19/2018      Dear Mr. Callahan:    Thank you for allowing me to participate in your care. Your recent test results were reviewed and listed below.      Normal results.       Your results are provided below for your review        Results for orders placed or performed in visit on 10/17/18   Lyme Disease Deanna with reflex to WB Serum   Result Value Ref Range    Lyme Disease Antibodies Serum 0.26 0.00 - 0.89         Thank you for choosing [facility]. As a result, please continue with the treatment plan discussed in the office. Return as discussed or sooner if symptoms worsens or fail to improve. If you have any further questions or concerns, please do not hesitate to contact us.     Sincerely,    TANNER Hanson [facility]  NEUROLOGY CLINIC  Phone: [phone]

## 2018-10-17 NOTE — PROGRESS NOTES
"Re: Eh Callahan      MRN# [id number]  YOB: 1940  Date of Visit:10/17/2018     OUTPATIENT NEUROLOGY VISIT NOTE    Reason for Visit:  Left facial pain and images follow up    Interval History:  Eh Callahan is a 78-year-old male presents to the clinic today for persistent left facial pain and brain MRI findings of stroke.   History of right Bell's palsy, anxiety, chronic hep C, HTN, PTSD, type 1 DM insulin dependent, status post treatment with interferon and ribavirin, hypertension, atrial fibrillation, chronic kidney disease and stage I hepatocellular carcinoma status post partial left hepatectomy in 2016 followed by microwave ablation in 2017.  Accompanied to today's appointment by son and [facility] .   Initial Neurology visit for left sided facial \"sharp pain\" stabbing pain is \"all the time\" with onset in July-August 2018. No headache history. Was seen in the ED and started on carbamezapine which did not help with the pain.     Persistent and continuous Left sided sharp pain -left temple, left eye, left jaw and left eye tearing and left facial swelling. New onset 09/06/2018. Reports light sensitivity and noise sensitivity. The pain is sharp and 9-10/10 numeric pain scale. Patient reports that gabapentin is helping but still he has some pain. Patient has tried Tegretol but it was not effective or poor compliance (patient took Tegretol for a brief time and stopped it due to ineffectiveness).   Patient reports that his vision has been \"blurry\" and left sided pain for about two months.Patient reports that keeping his left eye closed helps with double vision.   Discussed brain MRI findings with patient and his son via  -left trigeminal nerve vessel close proximaty but no reported mass effect, left frontal lobe subacute stroke and right vertebral hypoplastic vs occluded but not able to see well on the MRI. Will order neck MRA for better visualization of his vertebral arteries. "   Patient was seen in the ED in 2016 for atrial fibrillation which spontaneously converted back to sinus. Patient was seen by Dr Tamez in February of 2017 for paroxysmal afib and was taking oral anticoagulation with Xarelto -not on his med list currently. Patient takes ASA but not Xarelto. Given findings of embolic stroke recommended to follow up with his cardiologist for afib and restart  Anticoagulation -patient was on Xarelto but stopped it and need to restart it. Will proceed with ECHO and cardionet monitoring.  Patient was prescribed simvastatin 20 mg which he he does not take it -need to reinstate simvastatin.   Patient takes ASA 81 mg daily     Plan discussed with the patient and his son via  :    1. Left frontal lobe subacute stroke and right vertebral hypoplastic vs occluded but not able to see well on the MRI.  Neck MRA for better vertebral visualization  Follow up with primary care for stroke prevention-blood pressure control, blood sugar control, to restart simvastatin (Zocor) and continue asprin 81 mg daily   Follow up with cardiology for anticoagulation and afib with new brain MRI findings of left frontal lobe subacute stroke.   Cardionet for a arrhythmia monitoring and ECHO  ICall 911 with any stroke like symptoms discussed with the patient-patient is at risk of repeated stroke. [id number] score of 6. Stroke risk was 9.7% per year in 90,000 patients (the [place] Atrial Fibrillation Cohort Study) and 13.6% risk of stroke/TIA/systemic embolism.  Follow up in 3 months or sooner if needed    2. Left facial pain and question of TN. Patient tried Tegretol which was not helping. Patient reports that gabapentin helps better and discussed to increase gabapentin dose per response and tolerance.     Increase gabapentin for facial pain and referral to Dr Wood for facial pain and trigeminal neuralgia  Lab -Lyme Antibodies    3Left eye ptosis and blurry vision  Ophthalmology referral for left eye  ptosis and blurry vision but patient reports that he saw an ophthalmologist at [facility] and stroke history      Results for CHIKIS NEWBERRY (MRN [id number]) as of 10/17/2018 16:18   Ref. Range 11/15/2016 17:09   Hemoglobin A1C Latest Ref Range: 4.3 - 6.0 % 8.4 (H)     Results for CHIKIS NEWBERRY (MRN [id number]) as of 10/17/2018 16:18   Ref. Range 5/26/2017 10:37 9/19/2018 18:29   INR Latest Ref Range: 0.86 - 1.14  1.13 1.05     Results for CHIKIS NEWBERRY (MRN [id number]) as of 10/17/2018 16:18   Ref. Range 9/19/2018 18:29   WBC Latest Ref Range: 4.0 - 11.0 10e9/L 5.4   Hemoglobin Latest Ref Range: 13.3 - 17.7 g/dL 14.5   Hematocrit Latest Ref Range: 40.0 - 53.0 % 42.0   Platelet Count Latest Ref Range: 150 - 450 10e9/L 131 (L)   RBC Count Latest Ref Range: 4.4 - 5.9 10e12/L 4.70   MCV Latest Ref Range: 78 - 100 fl 89   MCH Latest Ref Range: 26.5 - 33.0 pg 30.9   MCHC Latest Ref Range: 31.5 - 36.5 g/dL 34.5   RDW Latest Ref Range: 10.0 - 15.0 % 11.8   Diff Method Unknown Automated Method   % Neutrophils Latest Units: % 65.9   % Lymphocytes Latest Units: % 23.4   % Monocytes Latest Units: % 7.4   % Eosinophils Latest Units: % 2.9   % Basophils Latest Units: % 0.2   % Immature Granulocytes Latest Units: % 0.2   Nucleated RBCs Latest Ref Range: 0 /100 0   Absolute Neutrophil Latest Ref Range: 1.6 - 8.3 10e9/L 3.6   Absolute Lymphocytes Latest Ref Range: 0.8 - 5.3 10e9/L 1.3   Absolute Monocytes Latest Ref Range: 0.0 - 1.3 10e9/L 0.4   Absolute Eosinophils Latest Ref Range: 0.0 - 0.7 10e9/L 0.2   Absolute Basophils Latest Ref Range: 0.0 - 0.2 10e9/L 0.0   Abs Immature Granulocytes Latest Ref Range: 0 - 0.4 10e9/L 0.0   Absolute Nucleated RBC Unknown 0.0   Sed Rate Latest Ref Range: 0 - 20 mm/h 14       MR BRAIN W/O & W CONTRAST 10/12/2018 6:31 PM     Provided History: Unilateral headache; Atypical facial pain  ICD-10: Unilateral headache; Atypical facial pain     Comparison: CT head 9/19/2018     Technique: Axial  T2-weighted images with fat saturation and axial  T1-weighted images were obtained without intravenous contrast from the  level of the foramen magnum to the corpus callosum.  Axial  diffusion-weighted with ADC map images of the whole brain and sagittal  T1-weighted images through the midskull were also obtained without  intravenous contrast. Following intravenous administration of  gadolinium, axial and coronal T1-weighted images with fat saturation  were obtained with focus on the region of the 3rd-6th cranial nerves.     Contrast: 7.1 mL Gadavist IV     Findings: Small left superior cerebellar artery branch courses  adjacent to the nerve root entry zone of the left trigeminal nerve  without significant associated mass effect. Unremarkable course of the  cisternal right trigeminal nerve. There is no mass, abnormal signal,  or abnormal enhancement along the course of the 5th cranial nerves on  either side. There is no abnormal signal or enhancement along the  course of the 7th or 8th cranial nerves on either side. Vestibular and  auditory structures exhibit normal signal on T2-weighted imaging.   Incidental vascular mass effect on the cisternal segment of the right  abducens nerve.     Diffusion-weighted weighted imaging reveals a single punctate focus of  reduced diffusion within the parasagittal left superior frontal  centrum semiovale, with mild ADC hypointensity and very subtle  postcontrast enhancement.      There is no midline shift, mass effect or abnormal extra-axial fluid  collection. Ventricles are proportionate to the cerebral sulci, no  evidence for hydrocephalus. There is moderate cerebral atrophy.  Several T2/FLAIR hyperintense foci within the periventricular and  subcortical white matter are nonspecific but likely represent chronic  small vessel ischemic disease in patient this age. Susceptibility  weighted imaging reveals no intracranial hemorrhage. Flow voids within  the major intracranial vessels  are present.     Mild scattered mucosal thickening within the ethmoid air cells.  Mastoid air cells are clear. Bilateral pseudophakia.         Impression:    1. Small left superior cerebellar artery branch abuts the left  trigeminal nerve in the region of the root entry zone without  significant associated mass effect. Unremarkable right trigeminal  nerve.  2. Tiny subacute infarct within the parasagittal superior left frontal  lobe.  3. Mild leukoaraiosis and moderate cerebral atrophy.      [Consider Follow Up: Tiny subacute embolic infarct in the left  superior frontal lobe]     This report will be copied to the [facility] to ensure a  provider acknowledges the finding.      I have personally reviewed the examination and initial interpretation  and I agree with the findings.     MAURICIO HIGGINS MD    Past Medical History reviewed and verified with the patient  Past Medical History:   Diagnosis Date     Anatomical narrow angle glaucoma      Atrial fibrillation (H)      Chronic infection     history of hepatitis C     CKD (chronic kidney disease) stage 2, GFR 60-89 ml/min      Diabetes mellitus (H)      Hepatitis C without mention of hepatic coma      Hypertension      Palpitations      PTSD (post-traumatic stress disorder)          Social History   Substance Use Topics     Smoking status: Former Smoker     Smokeless tobacco: Never Used     Alcohol use No    reviewed and verified with the patient     Allergies   Allergen Reactions     Amlodipine Swelling     Edema at 10 mg , fine at 5 mg     Lisinopril Cough     Metoprolol      Other reaction(s): Bradycardia  Metoprolol at 50mg bid -> HR 45 -50, unclear if sx (has fatigue)  May tolerate lower doses if needed       Current Outpatient Prescriptions   Medication Sig Dispense Refill     acetaminophen (TYLENOL) 500 MG tablet Take 2 tablets (1,000 mg) by mouth 3 times daily (Patient not taking: Reported on 9/26/2018) 100 tablet 0     alendronate (FOSAMAX) 70  "MG tablet 70 mg every 7 days Take 1 tablet by mouth twice weekly.       amLODIPine (NORVASC) 5 MG tablet Take 5 mg by mouth       blood glucose monitoring (GREGORIO CONTOUR) test strip USE TO MEASURE YOUR BLOOD GLUCOSE 6 TIMES DAILY       Blood Glucose Monitoring Suppl (FIFTY50 GLUCOSE METER 2.0) W/DEVICE KIT as needed for blood glucose monitoring       Blood Pressure Monitoring ( BLOOD PRESSURE CUFF MONITOR) MISC Take blood pressure once daily       carBAMazepine (TEGRETOL XR) 100 MG 12 hr tablet Take 1 tablet (100 mg) by mouth 2 times daily as needed (Patient not taking: Reported on 9/26/2018) 30 tablet 0     Carboxymeth-Glycerin-Polysorb (REFRESH OPTIVE ADVANCED) 0.5-1-0.5 % SOLN PLACE 1 DROP INTO BOTH EYES 4 (FOUR) TIMES DAILY.       carboxymethylcellulose (REFRESH PLUS) 0.5 % SOLN Place 1 drop into both eyes 4 times daily        cholecalciferol (D 5000) 5000 UNITS CAPS Reported on 4/12/2017       furosemide (LASIX) 20 MG tablet Take 1 tablet (20 mg) by mouth daily 30 tablet 3     gabapentin (NEURONTIN) 300 MG capsule Take one cap at bedtime for 5 days then one cap am and at bedtime 60 capsule 1     insulin aspart (NOVOLOG PEN) 100 UNIT/ML injection 3 units before each meal.       insulin degludec (TRESIBA) 100 UNIT/ML pen Inject 14 Units Subcutaneous       Insulin Pen Needle (PEN NEEDLES 5/16\") 31G X 8 MM MISC 4 x daily       [employer] LANCETS 21G MISC Uses 6 times daily       LORazepam (ATIVAN) 1 MG tablet Take 1 mg by mouth as needed       losartan (COZAAR) 100 MG tablet Take 1 tablet by mouth daily       Multiple vitamin TABS Take 1 tablet by mouth       Multiple Vitamins-Minerals (MULTIVITAMIN & MINERAL PO)        oxyCODONE IR (ROXICODONE) 5 MG tablet Take 5 mg by mouth       pantoprazole (PROTONIX) 40 MG enteric coated tablet Take 40 mg by mouth daily        polyethylene glycol (MIRALAX/GLYCOLAX) packet Take 17 g by mouth daily as needed for constipation (Patient not taking: Reported on 3/8/2018) 30 packet 1 " "    senna-docusate (SENOKOT-S;PERICOLACE) 8.6-50 MG per tablet Take 1 tablet by mouth 2 times daily (Patient not taking: Reported on 3/8/2018) 14 tablet 0     simvastatin (ZOCOR) 20 MG tablet Take 1 tablet (20 mg) by mouth daily (Patient not taking: Reported on 9/26/2018)       timolol (TIMOPTIC) 0.5 % ophthalmic solution PLACE 1 DROP INTO BOTH EYES ONCE DAILY.       traZODone (DESYREL) 50 MG tablet Take 50 mg by mouth     reviewed and verified with the patient    Review of Systems:   A 10-point ROS including constitutional, eyes, respiratory, cardiovascular, gastroenterology, genitourinary, integumentary, musculoskeletal, neurology and psychiatric were all negative except as mentioned in the interval history.    General Exam:   [id number] Score    Date Calculated:  10/17/2018  4:04 PM   [id number] Score:  6                 /75 (BP Location: Right arm, Patient Position: Sitting, Cuff Size: Adult Regular)  Pulse 69  Ht 1.765 m (5' 9.5\")  Wt 72.5 kg (159 lb 12.8 oz)  SpO2 99%  BMI 23.26 kg/m2  GEN: Awake, NAD; good eye contact, responses appropriately   HEENT: Head atraumatic/Normocephalic. Scalp normal. TA appears intact bilaterally. Pupils equally round, 4 mm, reactive to light and accommodation, sclera and conjunctiva normal. The patient is alert and oriented times four. Has good attention and concentration. Speech is fluent without dysarthria in Turkmen. EOM intact. There is no nystagmus. Has conjugated gaze. Face is symmetrical but may be slightly right facial drop (Bell's palsy). Intact and symmetrical eyebrow and lid raise and eyelid closure, smiles. Hearing Intact to conversation speech. The palates elevates symmetrical. The tongue protrudes midline with no atrophy or fasciculations. Strength  5/5 in the upper and lower extremities bilaterally. Sensation is intact to  touch throughout.   Normal casual gait.    DATA:  Lab reviewed     Ref. Range 10/17/2018 18:37   Lyme Disease Antibodies Serum " Latest Ref Range: 0.00 - 0.89  0.26     CRP <2.9      Assessment and Plan:  See Interval History for our discussion and plan    I discussed all my recommendation with Eh Callahan. The patient verbalizes understanding and comfortable with the plan.  All of the patient's questions were answered from the best of my current knowledge.       Time spent with pt answering questions, discussing findings, counseling and coordinating care was more than 50% the appointment time, 31  minutes.         TANNER Hanson, CNP  [facility]

## 2018-10-18 LAB — B BURGDOR IGG+IGM SER QL: 0.26 (ref 0–0.89)

## 2018-10-25 ENCOUNTER — TRANSFERRED RECORDS (OUTPATIENT)
Dept: HEALTH INFORMATION MANAGEMENT | Facility: CLINIC | Age: 78
End: 2018-10-25

## 2018-10-25 ENCOUNTER — RADIANT APPOINTMENT (OUTPATIENT)
Dept: CARDIOLOGY | Facility: CLINIC | Age: 78
End: 2018-10-25
Attending: NURSE PRACTITIONER
Payer: MEDICARE

## 2018-10-25 ENCOUNTER — ALLIED HEALTH/NURSE VISIT (OUTPATIENT)
Dept: CARDIOLOGY | Facility: CLINIC | Age: 78
End: 2018-10-25
Attending: INTERNAL MEDICINE
Payer: MEDICARE

## 2018-10-25 DIAGNOSIS — Z86.79 HISTORY OF ATRIAL FIBRILLATION: ICD-10-CM

## 2018-10-25 DIAGNOSIS — I63.422 CEREBROVASCULAR ACCIDENT (CVA) DUE TO EMBOLISM OF LEFT ANTERIOR CEREBRAL ARTERY (H): ICD-10-CM

## 2018-10-25 PROCEDURE — 0296T ZIO PATCH HOLTER: CPT | Mod: ZF

## 2018-10-25 PROCEDURE — T1013 SIGN LANG/ORAL INTERPRETER: HCPCS | Mod: U3,ZF

## 2018-10-25 PROCEDURE — 0298T ZZC EXT ECG > 48HR TO 21 DAY REVIEW AND INTERPRETATN: CPT | Performed by: INTERNAL MEDICINE

## 2018-10-25 NOTE — PROGRESS NOTES
S-(situation): patient came to clinic today for ECHO and cardionet placement per TANNER Powell CNP for CVA.  He has seen cardiology in the past for atrial fib and was placed on Xarelto. Called his pharmacy and they have not filled the medication since July 2018. When talking with Awed, he was unsure what the medication is for and why he was even taking it. Patient also stated that he is going to [facility] on November 18th.    B-(background): seen by Dr. Tamez in February 2017 for 1) Paroxysmal atrial fibrillation - we discussed that this could recur after surgery but is not a reason to delay surgery. We again discussed recommendation for oral anticoagulation. After reviewing options we've elected to start rivaroxaban for thromboembolic prophylaxis.    A-(assessment): atrial fibrillation    R-(recommendations): talked with Mike Patel CNP and changed the cardionet to a Ziopatch 7 days.  Date: 10/25/2018    Time of Call: 3:08 PM     Diagnosis:  Atrial fibrillation     [ TORB ] Ordering provider:  Mike Patel CNP  Order: change cardionet to ziopatch 7 days     Order received by: Jessica Gates RN     Follow-up/additional notes: patient understands and agrees to the plan.

## 2018-10-25 NOTE — LETTER
Eh Newberry  [address]  [facility] [address]        10/26/2018      Dear Mr. Newberry:    Thank you for allowing me to participate in your care. Your recent test results were reviewed and listed below.      Your results are provided below for your review. Normal ejection fraction and no acute changes. Please follow up with your Primary Care provider.      [id number]  [id number]  [id number]  [id number]^TAZ^LEXI           [facility]  Diagnostic and Treamtent-3rd Floor  [address] [place] [address]     Name: EH NEWBERRY  MRN: [id number]  : 1940  Study Date: 10/25/2018 01:41 PM  Age: 78 yrs  Gender: Male  Patient Location: [facility] [place] [facility]  Reason For Study: Afib, CVA  History: Afib, CVA  Ordering Physician: TAZ PARISI  Referring Physician: TAZ PARISI  Performed By: Sari Go RDCS     BSA: 1.9 m2  Height: 69 in  Weight: 159 lb  BP: 173/87 mmHg  _____________________________________________________________________________  __        Procedure  Echocardiogram with two-dimensional, color and spectral Doppler performed.  _____________________________________________________________________________  __        Interpretation Summary  Global and regional left ventricular function is normal with an EF of 60-65%.  Grade II or moderate diastolic dysfunction.  Right ventricular function, chamber size, wall motion, and thickness are  normal.  Mild aortic insufficiency is present.  Mild tricuspid insufficiency is present.  Right ventricular systolic pressure is 42mmHg above the right atrial pressure,  consistent with mild pulmonary hypertension.  The inferior vena cava cannot be assessed.  No pericardial effusion is present.     Previous study not available for comparison.     _____________________________________________________________________________  __        Left Ventricle  Global and regional left ventricular function  is normal with an EF of 60-65%.  Left ventricular wall thickness is normal. Left ventricular size is normal.  Grade II or moderate diastolic dysfunction. No regional wall motion  abnormalities are seen.     Right Ventricle  Right ventricular function, chamber size, wall motion, and thickness are  normal. TAPSE 2.6 cm.     Atria  The left atrium appears normal. The right atria appears normal.     Mitral Valve  The mitral valve is normal. Trace mitral insufficiency is present.        Aortic Valve  The aortic valve is tricuspid. Mild aortic valve sclerosis is present. Mild  aortic insufficiency is present.     Tricuspid Valve  Mild tricuspid insufficiency is present. Right ventricular systolic pressure  is 42mmHg above the right atrial pressure.     Pulmonic Valve  The pulmonic valve is normal.     Vessels  The aorta root is normal. Proximal aorta indexes to normal for BSA. The  inferior vena cava cannot be assessed. The pulmonary artery and bifurcation  cannot be assessed. Sinuses of Valsalva 2.4 cm. Ascending aorta 2.8 cm.     Pericardium  No pericardial effusion is present.        Compared to Previous Study  Previous study not available for comparison.     Attestation  I have personally viewed the imaging and agree with the interpretation and  report as documented by the fellow, Karen Jett, and/or edited by me.  _____________________________________________________________________________  __     MMode/2D Measurements & Calculations  IVSd: 0.72 cm  LVIDd: 4.0 cm  LVIDs: 1.7 cm  LVPWd: 0.76 cm  FS: 58.1 %  LV mass(C)d: 85.2 grams  LV mass(C)dI: 45.4 grams/m2  Ao root diam: 2.4 cm  LA dimension: 4.2 cm  asc Aorta Diam: 2.8 cm  LA/Ao: 1.8  LVOT diam: 1.7 cm  LVOT area: 2.2 cm2  LA Volume (BP): 39.9 ml     LA Volume Index (BP): 21.3 ml/m2  RWT: 0.38        Doppler Measurements & Calculations  MV E max dagoberto: 84.8 cm/sec  MV A max dagoberto: 91.3 cm/sec  MV E/A: 0.93  MV dec time: 0.19 sec  Ao V2 max: 161.5 cm/sec  Ao max  PG: 10.0 mmHg  Ao V2 mean: 124.1 cm/sec  Ao mean P.9 mmHg  Ao V2 VTI: 37.1 cm  SERGO(I,D): 1.5 cm2  SERGO(V,D): 1.7 cm2  AI P1/2t: 481.0 msec  LV V1 max P.3 mmHg  LV V1 max: 125.5 cm/sec  LV V1 VTI: 26.5 cm  SV(LVOT): 57.1 ml  SI(LVOT): 30.4 ml/m2  PA acc time: 0.08 sec  TR max dalton: 307.8 cm/sec  TR max P.1 mmHg  AV Dalton Ratio (DI): 0.78  SERGO Index (cm2/m2): 0.82  E/E' av.7     Lateral E/e': 19.4  Medial E/e': 18.0     _____________________________________________________________________________  __           Report approved by: Molly NAVARRO 10/25/2018 04:40 PM                      If you have any further questions or concerns, please do not hesitate to contact us.     Sincerely,    TANNER Hanson [facility]  NEUROLOGY CLINIC  Phone: [phone]

## 2018-10-25 NOTE — MR AVS SNAPSHOT
After Visit Summary   10/25/2018    Eh Callahan    MRN: [id number]           Patient Information     Date Of Birth          1940        Visit Information        Provider Department      10/25/2018 2:45 PM Lala Canas [facility] C[facility] Monitor, [facility]        Today's Diagnoses     Cerebrovascular accident (CVA) due to embolism of left anterior cerebral artery (H)        History of atrial fibrillation           Follow-ups after your visit        Your next 10 appointments already scheduled     Oct 28, 2018  5:15 PM CDT   MRA NECK (CAROTIDS) WO CONTRAST with [id number]   [facility] ([facility])    [address]  1st Floor  [facility] [address] [phone]           Take your medicines as usual, unless your doctor tells you not to. Bring a list of your current medicines to your exam (including vitamins, minerals and over-the-counter drugs). Also bring the results of similar scans you may have had.  Please remove any body piercings and hair extensions before you arrive.  Follow your doctor s orders. If you do not, we may have to postpone your exam.  You will not have contrast for this exam. You do not need to do anything special to prepare.  The MRI machine uses a strong magnet. Please wear clothes without metal (snaps, zippers). A sweatsuit works well, or we may give you a hospital gown.   **IMPORTANT** THE INSTRUCTIONS BELOW ARE ONLY FOR THOSE PATIENTS WHO HAVE BEEN TOLD THEY WILL RECEIVE SEDATION OR GENERAL ANESTHESIA DURING THEIR MRI PROCEDURE:  IF YOU WILL RECEIVE SEDATION (take medicine to help you relax during your exam):   You must get the medicine from your doctor before you arrive. Bring the medicine to the exam. Do not take it at home.   Arrive one hour early. Bring someone who can take you home after the test. Your medicine will make you sleepy. After the exam, you may not drive, take a bus or take a taxi by yourself.   No  eating 8 hours before your exam. You may have clear liquids up until 4 hours before your exam. (Clear liquids include water, clear tea, black coffee and fruit juice without pulp.)  IF YOU WILL RECEIVE ANESTHESIA (be asleep for your exam):   Arrive 1 1/2 hours early. Bring someone who can take you home after the test. You may not drive, take a bus or take a taxi by yourself.   No eating 8 hours before your exam. You may have clear liquids up until 4 hours before your exam. (Clear liquids include water, clear tea, black coffee and fruit juice without pulp.)   You will spend four to five hours in the recovery room.  Please call the Imaging Department at your exam site with any questions.            Nov 01, 2018  2:00 PM CDT   (Arrive by 1:45 PM)   Return Visit with TANNER Powell CNP   [facility] ([facility])    [address] [place] [address] [phone]              Future tests that were ordered for you today     Open Future Orders        Priority Expected Expires Ordered    Ziopatch Holter Monitor - Adult Routine  12/24/2018 10/25/2018            Who to contact     If you have questions or need follow up information about today's clinic visit or your schedule please contact [facility] directly at [phone].  Normal or non-critical lab and imaging results will be communicated to you by MyChart, letter or phone within 4 business days after the clinic has received the results. If you do not hear from us within 7 days, please contact the clinic through MyChart or phone. If you have a critical or abnormal lab result, we will notify you by phone as soon as possible.  Submit refill requests through [employer] or call your pharmacy and they will forward the refill request to us. Please allow 3 business days for your refill to be completed.          Additional Information About Your Visit        Care EveryWhere ID     This is your  Care EveryWhere ID. This could be used by other organizations to access your [place] medical records  [id number]         Blood Pressure from Last 3 Encounters:   10/17/18 161/75   09/26/18 173/87   09/19/18 175/87    Weight from Last 3 Encounters:   10/17/18 72.5 kg (159 lb 12.8 oz)   09/19/18 71.2 kg (157 lb)   05/31/18 74.6 kg (164 lb 8 oz)              We Performed the Following     Cardiac Event Monitor - Peds/Adult        Primary Care Provider Office Phone # Fax #    Shalini VANG KAYCEE Mac [phone]       [facility] [address]  [facility] [address]        Equal Access to Services     DEE COOMBS : Hadii dawit soliso Cruzito, waaxda luqadaha, qaybta kaalmada adeegyada, beryl mendiola . So [facility] [phone].    ATENCIÓN: Si habla español, tiene a jones disposición servicios gratuitos de asistencia lingüística. [facility] [phone].    We comply with applicable federal civil rights laws and [place] laws. We do not discriminate on the basis of race, color, national origin, age, disability, sex, sexual orientation, or gender identity.            Thank you!     Thank you for choosing [facility]  for your care. Our goal is always to provide you with excellent care. Hearing back from our patients is one way we can continue to improve our services. Please take a few minutes to complete the written survey that you may receive in the mail after your visit with us. Thank you!             Your Updated Medication List - Protect others around you: Learn how to safely use, store and throw away your medicines at www.disposemymeds.org.          This list is accurate as of 10/25/18  6:48 PM.  Always use your most recent med list.                   Brand Name Dispense Instructions for use Diagnosis    acetaminophen 500 MG tablet    TYLENOL    100 tablet    Take 2 tablets (1,000 mg) by mouth 3 times daily    History of liver excision       alendronate 70 MG tablet  "   FOSAMAX     70 mg every 7 days Take 1 tablet by mouth twice weekly.        amLODIPine 5 MG tablet    NORVASC     Take 5 mg by mouth        ASPIRIN PO      Take 81 mg by mouth        GREGORIO CONTOUR test strip   Generic drug:  blood glucose monitoring      USE TO MEASURE YOUR BLOOD GLUCOSE 6 TIMES DAILY    HCC (hepatocellular carcinoma) (H)       carBAMazepine 100 MG 12 hr tablet    TEGretol XR    30 tablet    Take 1 tablet (100 mg) by mouth 2 times daily as needed        carboxymethylcellulose 0.5 % Soln ophthalmic solution    REFRESH PLUS     Place 1 drop into both eyes 4 times daily        D 5000 5000 units Caps   Generic drug:  cholecalciferol      Reported on 4/12/2017        FIFTY50 GLUCOSE METER 2.0 w/Device Kit      as needed for blood glucose monitoring    HCC (hepatocellular carcinoma) (H)       furosemide 20 MG tablet    LASIX    30 tablet    Take 1 tablet (20 mg) by mouth daily    Peripheral edema, History of hepatitis C       gabapentin 300 MG capsule    NEURONTIN    90 capsule    Take one cap three times daily    Unilateral headache, H/O diabetes mellitus, Atypical facial pain       insulin aspart 100 UNIT/ML injection    NovoLOG PEN     3 units before each meal.    HCC (hepatocellular carcinoma) (H)       insulin degludec 100 UNIT/ML pen    TRESIBA     Inject 14 Units Subcutaneous    HCC (hepatocellular carcinoma) (H)       [employer] LANCETS 21G Misc      Uses 6 times daily    HCC (hepatocellular carcinoma) (H)       LORazepam 1 MG tablet    ATIVAN     Take 1 mg by mouth as needed    HCC (hepatocellular carcinoma) (H)       losartan 100 MG tablet    COZAAR     Take 1 tablet by mouth daily        Multiple vitamin Tabs      Take 1 tablet by mouth        MULTIVITAMIN & MINERAL PO       HCC (hepatocellular carcinoma) (H)       oxyCODONE IR 5 MG tablet    ROXICODONE     Take 5 mg by mouth        pantoprazole 40 MG EC tablet    PROTONIX     Take 40 mg by mouth daily        pen needles 5/16\" 31G X 8 MM Misc     "  4 x daily    HCC (hepatocellular carcinoma) (H)       polyethylene glycol Packet    MIRALAX/GLYCOLAX    30 packet    Take 17 g by mouth daily as needed for constipation    History of liver excision       RA BLOOD PRESSURE CUFF MONITOR Misc      Take blood pressure once daily    HCC (hepatocellular carcinoma) (H)       REFRESH OPTIVE ADVANCED 0.5-1-0.5 % Soln   Generic drug:  Carboxymeth-Glycerin-Polysorb      PLACE 1 DROP INTO BOTH EYES 4 (FOUR) TIMES DAILY.    HCC (hepatocellular carcinoma) (H)       senna-docusate 8.6-50 MG per tablet    SENOKOT-S;PERICOLACE    14 tablet    Take 1 tablet by mouth 2 times daily    History of liver excision       simvastatin 20 MG tablet    ZOCOR     Take 1 tablet (20 mg) by mouth daily    Dyslipidaemia       timolol 0.5 % ophthalmic solution    TIMOPTIC     PLACE 1 DROP INTO BOTH EYES ONCE DAILY.    HCC (hepatocellular carcinoma) (H)       traZODone 50 MG tablet    DESYREL     Take 50 mg by mouth    HCC (hepatocellular carcinoma) (H)

## 2018-10-26 NOTE — NURSING NOTE
Per Dr. Patel, patient to have Ziopatch 14 days  monitor placed.  Diagnosis: history of a fib  Monitor placed: Yes  Patient Instructed: Yes  Patient verbalized understanding: Yes  Holter # [id number]    Placed By Patsy BARRAGAN

## 2018-11-01 ENCOUNTER — OFFICE VISIT (OUTPATIENT)
Dept: NEUROLOGY | Facility: CLINIC | Age: 78
End: 2018-11-01
Payer: MEDICARE

## 2018-11-01 VITALS — HEART RATE: 69 BPM | SYSTOLIC BLOOD PRESSURE: 157 MMHG | OXYGEN SATURATION: 98 % | DIASTOLIC BLOOD PRESSURE: 79 MMHG

## 2018-11-01 DIAGNOSIS — G50.1 ATYPICAL FACIAL PAIN: ICD-10-CM

## 2018-11-01 DIAGNOSIS — H53.8 BLURRY VISION, LEFT EYE: ICD-10-CM

## 2018-11-01 DIAGNOSIS — Z86.39 H/O DIABETES MELLITUS: ICD-10-CM

## 2018-11-01 DIAGNOSIS — I63.422 CEREBROVASCULAR ACCIDENT (CVA) DUE TO EMBOLISM OF LEFT ANTERIOR CEREBRAL ARTERY (H): Primary | ICD-10-CM

## 2018-11-01 DIAGNOSIS — Z86.79 HISTORY OF ATRIAL FIBRILLATION: ICD-10-CM

## 2018-11-01 DIAGNOSIS — R51.9 LEFT-SIDED FACE PAIN: ICD-10-CM

## 2018-11-01 ASSESSMENT — PAIN SCALES - GENERAL: PAINLEVEL: EXTREME PAIN (8)

## 2018-11-01 NOTE — PROGRESS NOTES
"Re: Eh Callahan      MRN# [id number]  YOB: 1940  Date of Visit:11/1/2018     OUTPATIENT NEUROLOGY VISIT NOTE    Reason for Visit:  brain MRI results and facial pain follow up.     Interval History:  Eh Callahan is a 78-year-old male presents to the clinic today for brain MRI results and facial pain follow up. Initial Neurology visit for persistent left sided headache on 10/15/2018, see initial Neurology visit note for history details.   Accompanied by a [facility]  and his son.   History of Bell's palsy, anxiety, chronic hep C, HTN, PTSD, type 1 DM insulin dependent  Patient reports the pain in his left sided headache and left eye has improved.   Today Patient reports that he is is able to sleep now and pain is low. Patient has been taking gabapentin but not sure of the dose-family manages his meds. Unfortunately who manages patient's meds are not here today. Denies any gabapentin related side effects.  Patient reports his left temple still throbbing but less than before. It appears that gabapentin has been helping.    No new symptoms reported but persistent left eye a little bit blurry. Patient reports that he keeps his left eye closed because he sees \"double\". Patient was seen by an outside ophthalmologist.   Ophthalmology evaluation at least a months ago.   Patient last was seen by cardiology for A-fib in February of 2017 and was recommended to stop and start rivaroxaban 20 mg daily for anticoagulation for thromboembolic stroke prevention. ts not clear if patient was re-instated Xaralto for anticoagulation for a-fib.    Patient is suppose to return his Ziopatch today to cardiology.   Patient reports that he is going on the trip for [place] for a few month. Discussed stroke risk and need of stroke prevention with anticoagulation and to discuss with cardiology.   Patient reports that he does not know what medications he takes. He would like to bring his meds in for review and see if " some medications not needed. Will set up MTM with Lisa Randhawa Pharmacist to go over patient's meds and need.     Plan discussed with the patient via :  Continue gabapentin for pain treatment-may take 300 mg three times daily and may try to take 300 mg am 300 mg at lunch/afternoon and 600 mg at bedtime for left facial pain/neuralgia. If persists, schedule appointment with Dr Wood for [place].   Secondary stroke prevention with afib control and anticoagulation-see cardiology. Call 911 with any stroke symptoms. Discussed stroke symptoms with the patient via .    Follow up after your return back from your trip-patient is traveling to [facility] and will be visiting there for 3 months.     Lab  Results for CHIKIS NEWBERRY (MRN [id number]) as of 11/1/2018 15:08   Ref. Range 9/19/2018 18:29   INR Latest Ref Range: 0.86 - 1.14  1.05     Results for CHIKIS NEWBERRY (MRN [id number]) as of 11/1/2018 15:08   Ref. Range 9/19/2018 18:29   CRP Inflammation Latest Ref Range: 0.0 - 8.0 mg/L <2.9     Results for CHIKIS NEWBERRY (MRN [id number]) as of 11/1/2018 15:08   Ref. Range 9/19/2018 18:29   Sed Rate Latest Ref Range: 0 - 20 mm/h 14       Past Medical History reviewed   Social History   Substance Use Topics     Smoking status: Former Smoker     Smokeless tobacco: Never Used     Alcohol use No    reviewed and verified with the patient    Current Outpatient Prescriptions   Medication Sig Dispense Refill     acetaminophen (TYLENOL) 500 MG tablet Take 2 tablets (1,000 mg) by mouth 3 times daily 100 tablet 0     alendronate (FOSAMAX) 70 MG tablet 70 mg every 7 days Take 1 tablet by mouth twice weekly.       amLODIPine (NORVASC) 5 MG tablet Take 5 mg by mouth       ASPIRIN PO Take 81 mg by mouth       blood glucose monitoring (GREGORIO CONTOUR) test strip USE TO MEASURE YOUR BLOOD GLUCOSE 6 TIMES DAILY       Blood Glucose Monitoring Suppl (FIFTY50 GLUCOSE METER 2.0) W/DEVICE KIT as needed for blood glucose monitoring       Blood  "Pressure Monitoring ( BLOOD PRESSURE CUFF MONITOR) MISC Take blood pressure once daily       carBAMazepine (TEGRETOL XR) 100 MG 12 hr tablet Take 1 tablet (100 mg) by mouth 2 times daily as needed (Patient not taking: Reported on 9/26/2018) 30 tablet 0     Carboxymeth-Glycerin-Polysorb (REFRESH OPTIVE ADVANCED) 0.5-1-0.5 % SOLN PLACE 1 DROP INTO BOTH EYES 4 (FOUR) TIMES DAILY.       carboxymethylcellulose (REFRESH PLUS) 0.5 % SOLN Place 1 drop into both eyes 4 times daily        cholecalciferol (D 5000) 5000 UNITS CAPS Reported on 4/12/2017       furosemide (LASIX) 20 MG tablet Take 1 tablet (20 mg) by mouth daily 30 tablet 3     gabapentin (NEURONTIN) 300 MG capsule Take one cap three times daily 90 capsule 3     insulin aspart (NOVOLOG PEN) 100 UNIT/ML injection 3 units before each meal.       insulin degludec (TRESIBA) 100 UNIT/ML pen Inject 14 Units Subcutaneous       Insulin Pen Needle (PEN NEEDLES 5/16\") 31G X 8 MM MISC 4 x daily       [employer] LANCETS 21G MISC Uses 6 times daily       LORazepam (ATIVAN) 1 MG tablet Take 1 mg by mouth as needed       losartan (COZAAR) 100 MG tablet Take 1 tablet by mouth daily       Multiple vitamin TABS Take 1 tablet by mouth       Multiple Vitamins-Minerals (MULTIVITAMIN & MINERAL PO)        oxyCODONE IR (ROXICODONE) 5 MG tablet Take 5 mg by mouth       pantoprazole (PROTONIX) 40 MG enteric coated tablet Take 40 mg by mouth daily        polyethylene glycol (MIRALAX/GLYCOLAX) packet Take 17 g by mouth daily as needed for constipation (Patient not taking: Reported on 3/8/2018) 30 packet 1     senna-docusate (SENOKOT-S;PERICOLACE) 8.6-50 MG per tablet Take 1 tablet by mouth 2 times daily (Patient not taking: Reported on 3/8/2018) 14 tablet 0     simvastatin (ZOCOR) 20 MG tablet Take 1 tablet (20 mg) by mouth daily (Patient not taking: Reported on 9/26/2018)       timolol (TIMOPTIC) 0.5 % ophthalmic solution PLACE 1 DROP INTO BOTH EYES ONCE DAILY.       traZODone (DESYREL) 50 MG " tablet Take 50 mg by mouth     reviewed and verified with the patient    Review of Systems:   A 10-point ROS including constitutional, eyes, respiratory, cardiovascular, gastroenterology, genitourinary, integumentary, musculoskeletal, neurology and psychiatric were all negative except as mentioned in the interval history.      General Exam:   /79 (BP Location: Left arm, Patient Position: Chair, Cuff Size: Adult Regular)  Pulse 69  SpO2 98%  GEN: Awake, NAD; good eye contact, responses appropriately, pain is low today.    HEENT: Head atraumatic/Normocephalic.  Speech is fluent without dysarthria. EOM intact. There is no nystagmus. Has conjugated gaze. Face is symmetrical. Intact and symmetrical eyebrow and lid raise and eyelid closure, smiles. Hearing Intact to conversation speech. The palates elevates symmetrical. The tongue protrudes midline with no atrophy or fasciculations. Strength  5/5 in the upper and lower extremities bilaterally. Sensation is intact to  touch throughout.  Reflexes symmetrical at biceps, triceps, brachioradialis, patellar, and Achilles.Coordination reveals finger-nose-finger, rapid alternating movements with normal speed and accuracy. Normal casual gait.  Assessment and Plan:  See Interval History for our discussion and plan.     I discussed all my recommendation with Eh Callahan and his son. The patient verbalizes understanding and comfortable with the plan. The patient has our clinic phone number to call with any questions or concerns. All of the patient's questions were answered from the best of my current knowledge.     Time spent with pt answering questions, discussing findings, counseling and coordinating care was more than 50% the appointment time, 26  minutes.         TANNER Hanson, CNP  [facility]    Patient was to see ophthalmology and cardiology (Dr Fonseca) for  anticoagulation and a-fib history follow up given his recent embolic stroke findings  "but was no show with Dr Fonseca and cancelled his opthalmology appointment.   Zio patch Holter results reviewed and \"basic rhythm was sinus\" per cardiology reading.       "

## 2018-11-01 NOTE — PATIENT INSTRUCTIONS
Plan:  Continue gabapentin for pain treatment-may take 300 mg three times daily and may try to take 300 mg am 300 mg at lunch/afternoon and 600 mg at bedtime for left facial pain/neuralgia. If persists, schedule appointment with Dr Wood  Secondary stroke prevention with afib control and anticoagulation-see cardiology. Call 911 with any stroke symptoms.    Follow up after your return back from your trip.

## 2018-11-01 NOTE — MR AVS SNAPSHOT
After Visit Summary   11/1/2018    Eh Callahan    MRN: [id number]           Patient Information     Date Of Birth          1940        Visit Information        Provider Department      11/1/2018 1:45 PM Mick Saez Ludmila Stanislavivna, TAN[facility] Neurology        Care Instructions    Plan:  Continue gabapentin for pain treatment-may take 300 mg three times daily and may try to take 300 mg am 300 mg at lunch/afternoon and 600 mg at bedtime for left facial pain/neuralgia. If persists, schedule appointment with Dr Wood  Secondary stroke prevention with afib control and anticoagulation-see cardiology. Call 911 with any stroke symptoms.    Follow up after your return back from your trip.                 Follow-ups after your visit        Additional Services     Medication Therapy Management Referral       Your provider has referred you to: **[facility] Scheduling (numerous locations) [phone]   [email or web address]  [facility]: Neurology [phone]   [email or web address]    Reason for Referral: Lisa Randhawa    The [facility] department will contact you to schedule an appointment.  You may also schedule the appointment by calling [phone].  For [facility] patients, please call [phone] to confirm/schedule your appointment on the next business day.    This service is designed to help you get the most from your medications.  A specially trained Pharmacist will work closely with you and your providers to solve any questions, concerns, issues or problems related to your medications.    Please bring all of your prescription and non-prescription medications (such as vitamins, over-the-counter medications, and herbals) or a detailed medication list to your appointment.    If you have a glucose meter or other home monitoring  information, please also bring this to your appointment (i.e. blood glucose log, blood pressure log, pain log, etc.).                  Who to contact     Please call your clinic at [phone] to:    Ask questions about your health    Make or cancel appointments    Discuss your medicines    Learn about your test results    Speak to your doctor            Additional Information About Your Visit        Care EveryWhere ID     This is your Care EveryWhere ID. This could be used by other organizations to access your [facility] medical records  [id number]        Your Vitals Were     Pulse Pulse Oximetry                69 98%           Blood Pressure from Last 3 Encounters:   11/01/18 157/79   10/17/18 161/75   09/26/18 173/87    Weight from Last 3 Encounters:   10/17/18 72.5 kg (159 lb 12.8 oz)   09/19/18 71.2 kg (157 lb)   05/31/18 74.6 kg (164 lb 8 oz)              We Performed the Following     Medication Therapy Management Referral        Primary Care Provider Office Phone # Fax #    Shalini VANG KAYCEE Mac [phone]       [address] [place] [address]        Equal Access to Services     [facility] : Hadii dawit [place], wadinada jessi, qaybta christina tello, beryl mendiola . So [facility] [phone].    ATENCIÓN: Si habla español, tiene a jones disposición servicios gratAcoma-Canoncito-Laguna Service Unitos de asistencia lingüística. [facility] [phone].    We comply with applicable federal civil rights laws and [place] laws. We do not discriminate on the basis of race, color, national origin, age, disability, sex, sexual orientation, or gender identity.            Thank you!     Thank you for choosing [facility] NEUROLOGY  for your care. Our goal is always to provide you with excellent care. Hearing back from our patients is one way we can continue to improve our services. Please take a few minutes to complete the written survey that you may receive in the mail after  your visit with us. Thank you!             Your Updated Medication List - Protect others around you: Learn how to safely use, store and throw away your medicines at www.disposemymeds.org.          This list is accurate as of 11/1/18  3:23 PM.  Always use your most recent med list.                   Brand Name Dispense Instructions for use Diagnosis    acetaminophen 500 MG tablet    TYLENOL    100 tablet    Take 2 tablets (1,000 mg) by mouth 3 times daily    History of liver excision       alendronate 70 MG tablet    FOSAMAX     70 mg every 7 days Take 1 tablet by mouth twice weekly.        amLODIPine 5 MG tablet    NORVASC     Take 5 mg by mouth        ASPIRIN PO      Take 81 mg by mouth        GREGORIO CONTOUR test strip   Generic drug:  blood glucose monitoring      USE TO MEASURE YOUR BLOOD GLUCOSE 6 TIMES DAILY    HCC (hepatocellular carcinoma) (H)       carBAMazepine 100 MG 12 hr tablet    TEGretol XR    30 tablet    Take 1 tablet (100 mg) by mouth 2 times daily as needed        carboxymethylcellulose 0.5 % Soln ophthalmic solution    REFRESH PLUS     Place 1 drop into both eyes 4 times daily        D 5000 5000 units Caps   Generic drug:  cholecalciferol      Reported on 4/12/2017        FIFTY50 GLUCOSE METER 2.0 w/Device Kit      as needed for blood glucose monitoring    HCC (hepatocellular carcinoma) (H)       furosemide 20 MG tablet    LASIX    30 tablet    Take 1 tablet (20 mg) by mouth daily    Peripheral edema, History of hepatitis C       gabapentin 300 MG capsule    NEURONTIN    90 capsule    Take one cap three times daily    Unilateral headache, H/O diabetes mellitus, Atypical facial pain       insulin aspart 100 UNIT/ML injection    NovoLOG PEN     3 units before each meal.    HCC (hepatocellular carcinoma) (H)       insulin degludec 100 UNIT/ML pen    TRESIBA     Inject 14 Units Subcutaneous    HCC (hepatocellular carcinoma) (H)       [employer] LANCETS 21G Misc      Uses 6 times daily    HCC  "(hepatocellular carcinoma) (H)       LORazepam 1 MG tablet    ATIVAN     Take 1 mg by mouth as needed    HCC (hepatocellular carcinoma) (H)       losartan 100 MG tablet    COZAAR     Take 1 tablet by mouth daily        Multiple vitamin Tabs      Take 1 tablet by mouth        MULTIVITAMIN & MINERAL PO       HCC (hepatocellular carcinoma) (H)       oxyCODONE IR 5 MG tablet    ROXICODONE     Take 5 mg by mouth        pantoprazole 40 MG EC tablet    PROTONIX     Take 40 mg by mouth daily        pen needles 5/16\" 31G X 8 MM Misc      4 x daily    HCC (hepatocellular carcinoma) (H)       polyethylene glycol Packet    MIRALAX/GLYCOLAX    30 packet    Take 17 g by mouth daily as needed for constipation    History of liver excision       RA BLOOD PRESSURE CUFF MONITOR Misc      Take blood pressure once daily    HCC (hepatocellular carcinoma) (H)       REFRESH OPTIVE ADVANCED 0.5-1-0.5 % Soln   Generic drug:  Carboxymeth-Glycerin-Polysorb      PLACE 1 DROP INTO BOTH EYES 4 (FOUR) TIMES DAILY.    HCC (hepatocellular carcinoma) (H)       senna-docusate 8.6-50 MG per tablet    SENOKOT-S;PERICOLACE    14 tablet    Take 1 tablet by mouth 2 times daily    History of liver excision       simvastatin 20 MG tablet    ZOCOR     Take 1 tablet (20 mg) by mouth daily    Dyslipidaemia       timolol 0.5 % ophthalmic solution    TIMOPTIC     PLACE 1 DROP INTO BOTH EYES ONCE DAILY.    HCC (hepatocellular carcinoma) (H)       traZODone 50 MG tablet    DESYREL     Take 50 mg by mouth    HCC (hepatocellular carcinoma) (H)         "

## 2018-11-02 ENCOUNTER — TELEPHONE (OUTPATIENT)
Dept: PHARMACY | Facility: OTHER | Age: 78
End: 2018-11-02

## 2018-11-02 NOTE — TELEPHONE ENCOUNTER
MTM referral from: [facility] visit (referral by provider)    MTM referral outreach attempt #2 on November 2, 2018 at 10:45 AM      Outcome: Patient not reachable after several attempts, will route to MTM Pharmacist/Provider as an FYI. Thank you for the referral.    Katelin Dawn, MTM Coordinator

## 2018-11-21 ENCOUNTER — DOCUMENTATION ONLY (OUTPATIENT)
Dept: NEUROLOGY | Facility: CLINIC | Age: 78
End: 2018-11-21

## 2018-11-21 NOTE — PROGRESS NOTES
Records received from [facility] has been sent to scan 11/21/18    Tamie Vallejo CMA

## 2019-10-05 ENCOUNTER — HOSPITAL ENCOUNTER (EMERGENCY)
Facility: CLINIC | Age: 79
Discharge: HOME | End: 2019-10-05
Attending: EMERGENCY MEDICINE | Admitting: EMERGENCY MEDICINE
Payer: MEDICARE

## 2019-10-05 VITALS
TEMPERATURE: 98 F | RESPIRATION RATE: 18 BRPM | OXYGEN SATURATION: 99 % | WEIGHT: 176.15 LBS | SYSTOLIC BLOOD PRESSURE: 162 MMHG | BODY MASS INDEX: 25.64 KG/M2 | DIASTOLIC BLOOD PRESSURE: 75 MMHG

## 2019-10-05 DIAGNOSIS — E16.2 HYPOGLYCEMIA: ICD-10-CM

## 2019-10-05 DIAGNOSIS — T38.3X1A INSULIN OVERDOSE, ACCIDENTAL OR UNINTENTIONAL, INITIAL ENCOUNTER: ICD-10-CM

## 2019-10-05 LAB
GLUCOSE BLDC GLUCOMTR-MCNC: 140 MG/DL (ref 70–99)
GLUCOSE BLDC GLUCOMTR-MCNC: 91 MG/DL (ref 70–99)

## 2019-10-05 PROCEDURE — 99283 EMERGENCY DEPT VISIT LOW MDM: CPT

## 2019-10-05 PROCEDURE — 00000146 ZZHCL STATISTIC GLUCOSE BY METER IP

## 2019-10-05 NOTE — ED PROVIDER NOTES
History     Chief Complaint:    Medication Error    The history is provided by the patient and a relative.      Eh Callahan is a 79 year old male with a history of diabetes mellitus, hypertension, and atrial fibrillation who presents after a medication error that occurred at 0145. The patient reports taking 20 units of Novolog instead of Tresiba. The patient reports feeling dizzy before presenting at the ER. The patient's son gave him cookies and juice at home to raise his blood sugar.    Allergies:  Amlodipine  Lisinopril  Metoprolol     Medications:    Ecotrin  Novasc  Lasix  Novolog  Tresiba  Miralax  Zocor  Tylenol  Fosamax  Asprin  Tegretol XR  Cholecalciferol  Ativan  Cozaar  Multivitamins  Roxicodone  Protonix  Senokot-S  Timoptic  Desyrel  Dulcolax    Past Medical History:    Hypoglycemia  Hepatitis C  Primary insomnia  Hepatocellular carcinoma  Liver mass  Memory deficits  Cognitive disorder  Major depression  Generalized anxiety disorder  Insomnia  Albuminuria  Degenerative joint disease  Anatomical narrow angle glaucoma  Atrial fibrillation  Chronic infection  CKD, stage 2  Diabetes mellitus, type 2  Hypertension  Palpitations  PTSD  Hypertension  Acid reflux  Major depression  Glaucoma  Bell's palsy    Past Surgical History:    Appendectomy  Eye surgery  Right total knee arthroplasty  Laparoscopic hepatectomy partial    Family History:    No past pertinent family history.    Social History:  Negative for tobacco use - former smoker.  Negative for alcohol use.  Negative for drug use.  Marital Status:   [2]     Review of Systems   All other systems reviewed and are negative.        Physical Exam     Patient Vitals for the past 24 hrs:   BP Temp Heart Rate Resp SpO2 Weight   10/05/19 0443 (!) 162/75 98  F (36.7  C) 80 18 99 % 79.9 kg (176 lb 2.4 oz)     Physical Exam    Constitutional:  Oriented to person, place, and time. Well appearing.  HENT:   Mouth/Throat:   Oropharynx is clear and moist.    Eyes:    EOM are normal. Pupils are equal, round, and reactive to light.   Neck:    Neck supple.   Cardiovascular:  Normal rate, regular rhythm and normal heart sounds.      Exam reveals no gallop and no friction rub.       No murmur heard.  Musculoskeletal:  Normal range of motion.   Neurological:   Alert and oriented to person, place, and time.           Moves all 4 extremities spontaneously    Skin:    No rash noted. No pallor.     Emergency Department Course     Laboratory:  Laboratory findings were communicated with the patient who voiced understanding of the findings.    Glucose by meter (0622): 91  Glucose by meter (0624): 140 H    Emergency Department Course:  Past medical records, nursing notes, and vitals reviewed.    0440: I performed an exam of the patient as documented above.     V was inserted and blood was drawn for laboratory testing, results above.    I personally reviewed the laboratory results with the Patient and answered all related questions prior to discharge.     Findings and plan explained to the Patient. Patient discharged home with instructions regarding supportive care, medications, and reasons to return. The importance of close follow-up was reviewed.     Impression & Plan     Medical Decision Making:  Eh Callahan is a 79 year old male who presents for evaluation after a medication error.  This is consistent with hypoglycemia.  The most likely etiology of the hypoglycemia is a medication error, but nonetheless a broad differential was considered including infection, medication noncompliance, overdose of medication, other metabolic derangement, lack of adequate PO intake, etc.  The patient is on long-acting insulin.  They are on oral diabetes medications.  Workup and detailed history done and appears outpatient management is indicated as sugars have been stable and they ate a large meal here without evidence of recurrent hypoglycemia.  Discussed in detail with patient about this problem  and my recommendations for management in the future.      Discharge Diagnosis:    ICD-10-CM   1. Hypoglycemia E16.2   2. Insulin overdose, accidental or unintentional, initial encounter T38.3X1A     Disposition:  Discharged to home.    Scribe Disclosure:  I, Jing Chan, am serving as a scribe at 6:45 AM on 10/5/2019 to document services personally performed by Jonathon Ryan MD based on my observations and the provider's statements to me.     Jing Chan  10/5/2019   [facility] EMERGENCY DEPARTMENT       Jonathon Ryan MD  10/06/19 0028

## 2019-10-05 NOTE — ED AVS SNAPSHOT
[facility]  [address]  [facility] [address]  Phone:  [phone]  Fax:  [phone]                                    Eh Callahan   MRN: [id number]    Department:  [facility]   Date of Visit:  10/5/2019           After Visit Summary Signature Page    I have received my discharge instructions, and my questions have been answered. I have discussed any challenges I see with this plan with the nurse or doctor.    ..........................................................................................................................................  Patient/Patient Representative Signature      ..........................................................................................................................................  Patient Representative Print Name and Relationship to Patient    ..................................................               ................................................  Date                                   Time    ..........................................................................................................................................  Reviewed by Signature/Title    ...................................................              ..............................................  Date                                               Time          22EPIC Rev 08/18

## 2019-10-05 NOTE — ED TRIAGE NOTES
Took 20 units of novalog instead of tresiba  At  0145 lowest sugar at home was 50, son was giving him cookies and juice, sugar now is 91, alert and oriented ABC intact

## 2020-01-05 ENCOUNTER — APPOINTMENT (OUTPATIENT)
Dept: GENERAL RADIOLOGY | Facility: CLINIC | Age: 80
End: 2020-01-05
Attending: EMERGENCY MEDICINE
Payer: MEDICARE

## 2020-01-05 ENCOUNTER — APPOINTMENT (OUTPATIENT)
Dept: CT IMAGING | Facility: CLINIC | Age: 80
End: 2020-01-05
Attending: EMERGENCY MEDICINE
Payer: MEDICARE

## 2020-01-05 ENCOUNTER — HOSPITAL ENCOUNTER (OUTPATIENT)
Facility: CLINIC | Age: 80
Setting detail: OBSERVATION
Discharge: HOME | End: 2020-01-06
Attending: EMERGENCY MEDICINE | Admitting: INTERNAL MEDICINE
Payer: MEDICARE

## 2020-01-05 DIAGNOSIS — E16.2 HYPOGLYCEMIA: ICD-10-CM

## 2020-01-05 DIAGNOSIS — R41.0 DELIRIUM: ICD-10-CM

## 2020-01-05 LAB
ALBUMIN SERPL-MCNC: 2.7 G/DL (ref 3.4–5)
ALBUMIN UR-MCNC: 300 MG/DL
ALP SERPL-CCNC: 104 U/L (ref 40–150)
ALT SERPL W P-5'-P-CCNC: 56 U/L (ref 0–70)
ANION GAP SERPL CALCULATED.3IONS-SCNC: 5 MMOL/L (ref 3–14)
APPEARANCE UR: CLEAR
AST SERPL W P-5'-P-CCNC: 45 U/L (ref 0–45)
BASOPHILS # BLD AUTO: 0 10E9/L (ref 0–0.2)
BASOPHILS NFR BLD AUTO: 0.3 %
BILIRUB SERPL-MCNC: 0.5 MG/DL (ref 0.2–1.3)
BILIRUB UR QL STRIP: NEGATIVE
BUN SERPL-MCNC: 43 MG/DL (ref 7–30)
CALCIUM SERPL-MCNC: 8.1 MG/DL (ref 8.5–10.1)
CHLORIDE SERPL-SCNC: 101 MMOL/L (ref 94–109)
CO2 BLDCOV-SCNC: 28 MMOL/L (ref 21–28)
CO2 SERPL-SCNC: 28 MMOL/L (ref 20–32)
COLOR UR AUTO: ABNORMAL
CREAT SERPL-MCNC: 1.46 MG/DL (ref 0.66–1.25)
DIFFERENTIAL METHOD BLD: ABNORMAL
EOSINOPHIL # BLD AUTO: 0.2 10E9/L (ref 0–0.7)
EOSINOPHIL NFR BLD AUTO: 3.5 %
ERYTHROCYTE [DISTWIDTH] IN BLOOD BY AUTOMATED COUNT: 11.7 % (ref 10–15)
GFR SERPL CREATININE-BSD FRML MDRD: 45 ML/MIN/{1.73_M2}
GLUCOSE BLDC GLUCOMTR-MCNC: 127 MG/DL (ref 70–99)
GLUCOSE BLDC GLUCOMTR-MCNC: 146 MG/DL (ref 70–99)
GLUCOSE BLDC GLUCOMTR-MCNC: 152 MG/DL (ref 70–99)
GLUCOSE BLDC GLUCOMTR-MCNC: 158 MG/DL (ref 70–99)
GLUCOSE BLDC GLUCOMTR-MCNC: 176 MG/DL (ref 70–99)
GLUCOSE BLDC GLUCOMTR-MCNC: 179 MG/DL (ref 70–99)
GLUCOSE BLDC GLUCOMTR-MCNC: 189 MG/DL (ref 70–99)
GLUCOSE BLDC GLUCOMTR-MCNC: 192 MG/DL (ref 70–99)
GLUCOSE BLDC GLUCOMTR-MCNC: 269 MG/DL (ref 70–99)
GLUCOSE BLDC GLUCOMTR-MCNC: 376 MG/DL (ref 70–99)
GLUCOSE BLDC GLUCOMTR-MCNC: 383 MG/DL (ref 70–99)
GLUCOSE BLDC GLUCOMTR-MCNC: 410 MG/DL (ref 70–99)
GLUCOSE BLDC GLUCOMTR-MCNC: 50 MG/DL (ref 70–99)
GLUCOSE SERPL-MCNC: 266 MG/DL (ref 70–99)
GLUCOSE UR STRIP-MCNC: 150 MG/DL
HBA1C MFR BLD: 8 % (ref 0–5.6)
HCT VFR BLD AUTO: 40.2 % (ref 40–53)
HGB BLD-MCNC: 13.1 G/DL (ref 13.3–17.7)
HGB UR QL STRIP: ABNORMAL
IMM GRANULOCYTES # BLD: 0 10E9/L (ref 0–0.4)
IMM GRANULOCYTES NFR BLD: 0.3 %
INTERPRETATION ECG - MUSE: NORMAL
KETONES UR STRIP-MCNC: NEGATIVE MG/DL
LACTATE BLD-SCNC: 0.7 MMOL/L (ref 0.7–2.1)
LEUKOCYTE ESTERASE UR QL STRIP: NEGATIVE
LYMPHOCYTES # BLD AUTO: 1.8 10E9/L (ref 0.8–5.3)
LYMPHOCYTES NFR BLD AUTO: 27.7 %
MCH RBC QN AUTO: 30.3 PG (ref 26.5–33)
MCHC RBC AUTO-ENTMCNC: 32.6 G/DL (ref 31.5–36.5)
MCV RBC AUTO: 93 FL (ref 78–100)
MONOCYTES # BLD AUTO: 0.6 10E9/L (ref 0–1.3)
MONOCYTES NFR BLD AUTO: 8.5 %
MUCOUS THREADS #/AREA URNS LPF: PRESENT /LPF
NEUTROPHILS # BLD AUTO: 3.9 10E9/L (ref 1.6–8.3)
NEUTROPHILS NFR BLD AUTO: 59.7 %
NITRATE UR QL: NEGATIVE
NRBC # BLD AUTO: 0 10*3/UL
NRBC BLD AUTO-RTO: 0 /100
PCO2 BLDV: 60 MM HG (ref 40–50)
PH BLDV: 7.28 PH (ref 7.32–7.43)
PH UR STRIP: 6.5 PH (ref 5–7)
PLATELET # BLD AUTO: 140 10E9/L (ref 150–450)
PO2 BLDV: 32 MM HG (ref 25–47)
POTASSIUM SERPL-SCNC: 3.6 MMOL/L (ref 3.4–5.3)
PROLACTIN SERPL-MCNC: 23 UG/L (ref 2–18)
PROT SERPL-MCNC: 6.4 G/DL (ref 6.8–8.8)
RBC # BLD AUTO: 4.32 10E12/L (ref 4.4–5.9)
RBC #/AREA URNS AUTO: 3 /HPF (ref 0–2)
SAO2 % BLDV FROM PO2: 52 %
SODIUM SERPL-SCNC: 134 MMOL/L (ref 133–144)
SOURCE: ABNORMAL
SP GR UR STRIP: 1.01 (ref 1–1.03)
TROPONIN I SERPL-MCNC: <0.015 UG/L (ref 0–0.04)
UROBILINOGEN UR STRIP-MCNC: NORMAL MG/DL (ref 0–2)
WBC # BLD AUTO: 6.6 10E9/L (ref 4–11)
WBC #/AREA URNS AUTO: 2 /HPF (ref 0–5)

## 2020-01-05 PROCEDURE — 96361 HYDRATE IV INFUSION ADD-ON: CPT

## 2020-01-05 PROCEDURE — 25800030 ZZH RX IP 258 OP 636: Performed by: EMERGENCY MEDICINE

## 2020-01-05 PROCEDURE — 00000146 ZZHCL STATISTIC GLUCOSE BY METER IP

## 2020-01-05 PROCEDURE — 84484 ASSAY OF TROPONIN QUANT: CPT | Performed by: EMERGENCY MEDICINE

## 2020-01-05 PROCEDURE — 70450 CT HEAD/BRAIN W/O DYE: CPT

## 2020-01-05 PROCEDURE — 93005 ELECTROCARDIOGRAM TRACING: CPT

## 2020-01-05 PROCEDURE — 25800025 ZZH RX 258: Performed by: EMERGENCY MEDICINE

## 2020-01-05 PROCEDURE — G0378 HOSPITAL OBSERVATION PER HR: HCPCS

## 2020-01-05 PROCEDURE — 71045 X-RAY EXAM CHEST 1 VIEW: CPT

## 2020-01-05 PROCEDURE — 85025 COMPLETE CBC W/AUTO DIFF WBC: CPT | Performed by: EMERGENCY MEDICINE

## 2020-01-05 PROCEDURE — 25000131 ZZH RX MED GY IP 250 OP 636 PS 637: Performed by: INTERNAL MEDICINE

## 2020-01-05 PROCEDURE — 82803 BLOOD GASES ANY COMBINATION: CPT

## 2020-01-05 PROCEDURE — 96374 THER/PROPH/DIAG INJ IV PUSH: CPT

## 2020-01-05 PROCEDURE — 99285 EMERGENCY DEPT VISIT HI MDM: CPT | Mod: 25

## 2020-01-05 PROCEDURE — 81001 URINALYSIS AUTO W/SCOPE: CPT | Performed by: EMERGENCY MEDICINE

## 2020-01-05 PROCEDURE — 40000275 ZZH STATISTIC RCP TIME EA 10 MIN

## 2020-01-05 PROCEDURE — 96372 THER/PROPH/DIAG INJ SC/IM: CPT | Mod: XS

## 2020-01-05 PROCEDURE — 83036 HEMOGLOBIN GLYCOSYLATED A1C: CPT | Performed by: EMERGENCY MEDICINE

## 2020-01-05 PROCEDURE — 99220 ZZC INITIAL OBSERVATION CARE,LEVL III: CPT | Performed by: INTERNAL MEDICINE

## 2020-01-05 PROCEDURE — 80053 COMPREHEN METABOLIC PANEL: CPT | Performed by: EMERGENCY MEDICINE

## 2020-01-05 PROCEDURE — 84146 ASSAY OF PROLACTIN: CPT | Performed by: EMERGENCY MEDICINE

## 2020-01-05 PROCEDURE — 83605 ASSAY OF LACTIC ACID: CPT

## 2020-01-05 RX ORDER — TRIAMTERENE AND HYDROCHLOROTHIAZIDE 37.5; 25 MG/1; MG/1
1 CAPSULE ORAL EVERY MORNING
Status: ON HOLD | COMMUNITY
End: 2020-08-02

## 2020-01-05 RX ORDER — NICOTINE POLACRILEX 4 MG
15-30 LOZENGE BUCCAL
Status: DISCONTINUED | OUTPATIENT
Start: 2020-01-05 | End: 2020-01-05

## 2020-01-05 RX ORDER — NICOTINE POLACRILEX 4 MG
15-30 LOZENGE BUCCAL
Status: DISCONTINUED | OUTPATIENT
Start: 2020-01-05 | End: 2020-01-06 | Stop reason: HOSPADM

## 2020-01-05 RX ORDER — SENNOSIDES 8.6 MG
1-2 TABLET ORAL 2 TIMES DAILY PRN
Status: DISCONTINUED | OUTPATIENT
Start: 2020-01-05 | End: 2020-01-06 | Stop reason: HOSPADM

## 2020-01-05 RX ORDER — ONDANSETRON 2 MG/ML
4 INJECTION INTRAMUSCULAR; INTRAVENOUS EVERY 6 HOURS PRN
Status: DISCONTINUED | OUTPATIENT
Start: 2020-01-05 | End: 2020-01-06 | Stop reason: HOSPADM

## 2020-01-05 RX ORDER — SODIUM CHLORIDE 9 MG/ML
1000 INJECTION, SOLUTION INTRAVENOUS CONTINUOUS
Status: DISCONTINUED | OUTPATIENT
Start: 2020-01-05 | End: 2020-01-06 | Stop reason: HOSPADM

## 2020-01-05 RX ORDER — ACETAMINOPHEN 650 MG/1
650 SUPPOSITORY RECTAL EVERY 4 HOURS PRN
Status: DISCONTINUED | OUTPATIENT
Start: 2020-01-05 | End: 2020-01-06 | Stop reason: HOSPADM

## 2020-01-05 RX ORDER — ACETAMINOPHEN 325 MG/1
650 TABLET ORAL EVERY 4 HOURS PRN
Status: DISCONTINUED | OUTPATIENT
Start: 2020-01-05 | End: 2020-01-06 | Stop reason: HOSPADM

## 2020-01-05 RX ORDER — AMLODIPINE BESYLATE 5 MG/1
5 TABLET ORAL EVERY EVENING
Status: ON HOLD | COMMUNITY
End: 2020-08-02

## 2020-01-05 RX ORDER — DEXTROSE MONOHYDRATE 25 G/50ML
25-50 INJECTION, SOLUTION INTRAVENOUS
Status: DISCONTINUED | OUTPATIENT
Start: 2020-01-05 | End: 2020-01-05

## 2020-01-05 RX ORDER — TRAZODONE HYDROCHLORIDE 50 MG/1
50 TABLET, FILM COATED ORAL AT BEDTIME
Status: DISCONTINUED | OUTPATIENT
Start: 2020-01-05 | End: 2020-01-06 | Stop reason: HOSPADM

## 2020-01-05 RX ORDER — ONDANSETRON 4 MG/1
4 TABLET, ORALLY DISINTEGRATING ORAL EVERY 6 HOURS PRN
Status: DISCONTINUED | OUTPATIENT
Start: 2020-01-05 | End: 2020-01-06 | Stop reason: HOSPADM

## 2020-01-05 RX ORDER — NALOXONE HYDROCHLORIDE 0.4 MG/ML
.1-.4 INJECTION, SOLUTION INTRAMUSCULAR; INTRAVENOUS; SUBCUTANEOUS
Status: DISCONTINUED | OUTPATIENT
Start: 2020-01-05 | End: 2020-01-06 | Stop reason: HOSPADM

## 2020-01-05 RX ORDER — DEXTROSE MONOHYDRATE 25 G/50ML
25-50 INJECTION, SOLUTION INTRAVENOUS
Status: DISCONTINUED | OUTPATIENT
Start: 2020-01-05 | End: 2020-01-06 | Stop reason: HOSPADM

## 2020-01-05 RX ORDER — TRAZODONE HYDROCHLORIDE 150 MG/1
75 TABLET ORAL AT BEDTIME
COMMUNITY

## 2020-01-05 RX ADMIN — DEXTROSE MONOHYDRATE 50 ML: 25 INJECTION, SOLUTION INTRAVENOUS at 07:09

## 2020-01-05 RX ADMIN — SODIUM CHLORIDE 1000 ML: 9 INJECTION, SOLUTION INTRAVENOUS at 04:37

## 2020-01-05 RX ADMIN — SODIUM CHLORIDE 1000 ML: 9 INJECTION, SOLUTION INTRAVENOUS at 22:01

## 2020-01-05 RX ADMIN — INSULIN ASPART 1 UNITS: 100 INJECTION, SOLUTION INTRAVENOUS; SUBCUTANEOUS at 15:01

## 2020-01-05 ASSESSMENT — COLUMBIA-SUICIDE SEVERITY RATING SCALE - C-SSRS
1. IN THE PAST MONTH, HAVE YOU WISHED YOU WERE DEAD OR WISHED YOU COULD GO TO SLEEP AND NOT WAKE UP?: NO
2. HAVE YOU ACTUALLY HAD ANY THOUGHTS OF KILLING YOURSELF IN THE PAST MONTH?: NO

## 2020-01-05 NOTE — LETTER
Transition Communication Hand-off for Care Transitions to Next Level of Care Provider    Name: Eh Callahan  : 1940  MRN #: [id number]  Primary Care Provider: No primary care provider on file.  Primary Care MD Name: Dr. Basilio Alfaro  Primary Clinic: No primary provider on file.  Primary Care Clinic Name: [facility]  Reason for Hospitalization:  Delirium [R41.0]  Hypoglycemia [E16.2]  Admit Date/Time: 2020  4:25 AM  Discharge Date: 2020  Payor Source: Payor: UCARE / Plan: UCARE FOR S[employer]IORS [employer]ERS / Product Type: HMO /     Readmission Assessment Measure (AMANDA) Risk Score/category: none           Reason for Communication Hand-off Referral: Other A1C 8.0    Discharge Plan: home       Concern for non-adherence with plan of care:   no  Discharge Needs Assessment:  Needs      Most Recent Value   # of Referrals Placed by CTS  Scheduled Follow-up appointments          Already enrolled in Tele-monitoring program and name of program:  none  Follow-up specialty is recommended: No    Follow-up plan:  No future appointments.    Any outstanding tests or procedures:              Key Recommendations:  Patient admitted  with Hypoglycemia. CTS following for Diabetes education, no pcp listed on facesheet and  Discharge planning. Met with patient and his daughter, Candis. Patient living at home alone. He ambulates independently and is independent with most ADL's. Family supportive and involved. Daughter reports patient has a large  family that assist him with cares. A family member is usually with him.   Family provides support with meals, blood sugar monitoring and medication set up. Daughter states patient has a PCP, Dr. Basilio Alfaro, with [facility].  Reviewed understanding diabetes handbook. Discussed sxs of hypo and hyperglycemia. Encouraged blood sugar monitoring. Daughter report patient has glucometer at home and takes his BS 3-4x/day. Patient has  insulin and diabetes supplies at home. Discussed and encouraged possible home care RN support. Daughter declines Home Care RN at this time. Encouraged ongoing follow up with his PCP, connecting with a Endocrinologist and DNE. Daughter encouraged to attend follow up appointment with patient. A post hospitalization follow up was scheduled with his PCP, Dr. Alfaro, on Friday, January 10th at 11:00am.      A handoff sent to his PCP clinic care coordination as patient can benefit from ongoing diabetes education and support with managing his health.   Tanvir Foster RN    AVS/Discharge Summary is the source of truth; this is a helpful guide for improved communication of patient story

## 2020-01-05 NOTE — PLAN OF CARE
PRIMARY DIAGNOSIS: HYPOGLYCEMIA    OUTPATIENT/OBSERVATION GOALS TO BE MET BEFORE DISCHARGE  BG greater than 100 and less than 250 on two consecutive readings: No, >200  Recent Labs   Lab Test 01/05/20  1705 01/05/20  1500 01/05/20  1314   * 269* 192*         Ketones absent from urine  (hyperglycemia): NA    Tolerating oral intake to maintain hydration: Yes    Return to near baseline physical activity: Yes, stand by assistance in room    Discharge Planner Nurse   Safe discharge environment identified: Yes  Barriers to discharge: No       Entered by: Elisabeth Garcia 01/05/2020 5:58 PM     Please review provider order for any additional goals.   Nurse to notify provider when observation goals have been met and patient is ready for discharge.    [id number]  VS BP (!) 162/75 (BP Location: Left arm)   Pulse 58   Temp 97.7  F (36.5  C) (Oral)   Resp 16   Wt 73.6 kg (162 lb 3.2 oz)   SpO2 98%   Lung sounds clear  O2 room air  Bowel sounds active  Tolerating moderate carb diet  IVF saline locked  Tests blood glucose monitored per orders  CMS intact, patient alert and orientated--was sleepy this morning, back to baseline  Activity up independently in room  Pain denies  Patient/family centered care daughter present on/off throughout shift, attentive to patient  Discharge plan continue to monitor blood glucose levels, possible discharge home tomorrow if remains stable

## 2020-01-05 NOTE — ED PROVIDER NOTES
History     Chief Complaint:  Hypoglycemia    The history is provided by a relative (daughter).      Eh Callahan is a 80 year old male who presents with hypoglycemia. The patient is currently living with his daughter due to memory issues, but is able to ambulate well with a cane and manage his own medications. He was fine before going to bed last night. His daughter awoke to hearing gurgling from the patient, then finding him unresponsive. EMS was called, who suggested the family started chest compressions. EMS reports the patient was able to breath on his own upon their arrival. Blood sugar was 28 at site and 1 mg glucagon was given. Repeat blood sugar was 44 en route. The patient's daughter suggests that he may have mixed up his long-acting and short-acting insulins at bedtime, causing the hypoglycemia episode. Health history below provided by daughter, who also notes he has been seen at [facility].      Allergies:  NKDA    Medications:    Lantus  Novolog    Past Medical History:    Diabetes  Hypertension   Liver problems    Past Surgical History:    The patient does not have any pertinent past surgical history.    Family History:    No past pertinent family history.     Social History:  Negative for tobacco use.  Negative for alcohol use.     Review of Systems   Unable to perform ROS: Acuity of condition         Physical Exam     Patient Vitals for the past 24 hrs:   BP Temp Temp src Pulse Heart Rate Resp SpO2   01/05/20 0545 124/68 -- -- -- 57 17 97 %   01/05/20 0530 129/68 -- -- 56 55 17 100 %   01/05/20 0500 115/60 -- -- 57 58 15 99 %   01/05/20 0450 124/67 -- -- 62 60 14 99 %   01/05/20 0440 130/68 -- -- 64 65 16 99 %   01/05/20 0432 (!) 146/75 95.8  F (35.4  C) Temporal -- 68 14 99 %        Physical Exam  Constitutional:  Oriented to person, place, and time. Infused. Answering basic questions.   HENT:   Head:    Normocephalic. Atraumatic.   Mouth/Throat:   Oropharynx is clear and moist.   Eyes:     EOM are normal. Pupils are equal, round, and reactive to light.   Neck:    Neck supple.   Cardiovascular:  Normal rate, regular rhythm and normal heart sounds.      Exam reveals no gallop and no friction rub.       No murmur heard.  Pulmonary/Chest:  Effort normal and breath sounds normal.      No respiratory distress. No wheezes. No rales.      No reproducible chest wall pain.  Abdominal:   Soft. No distension. No tenderness. No rebound and no guarding.   Musculoskeletal:  Normal range of motion.   Neurological:   Awake, alert, mildly disoriented and confused, but answering basic questions and following commands.            A symmetric strength in all 4 extremities  Skin:    No rash noted. No pallor.     Emergency Department Course     ECG:  Indication: unresponsive  Time: 0427  Vent. Rate 75 bpm. PA interval 188. QRS duration 112. QT/QTc 430/480. P-R-T axis 68 56 62.  Normal sinus rhythm. Prolonged QT. Abnormal ECG. Read time: 0427     Imaging:  Radiology findings were communicated with the patient and family who voiced understanding of the findings.    CT head w/o IV contrast:   No acute intracranial process, as per radiology.     XR chest port 1 view:  Mild bibasilar probable atelectasis, as per radiology.     Laboratory:  Laboratory findings were communicated with the patient and family who voiced understanding of the findings.    CBC: WBC 6.6, HGB 13.1 (L),  (L)   CMP: glucose 266 (H), BUN 43 (H), creatinine 1.46 (H), GFR 45 (L), calcium 8.1 (L), albumin 2.7 (L), protein total 6.4 (L) o/w WNL     0616 ISTAT gases lactate mona POCT: pH 7.28 (L), pCO2 60 (H), pO2 32, bicarbonate 28, O2 sat 52, lactic acid 0.7   0528 Glucose: 152 (H)  0453 Glucose: 176 (H)   0435 Troponin: <0.015  Prolactin: pending    Interventions:  0437 NS 1 L IV     Emergency Department Course:  Past medical records, nursing notes, and vitals reviewed.    0421 I performed an exam of the patient as documented above.     0424 IV was  inserted and blood was drawn for laboratory testing, results above. Blood sugar 179.    EKG obtained in the ED, see results above.   The patient was sent for a chest xray and head CT while in the emergency department, results above.     0436 Patient rechecked and updated.      0533 Patient rechecked and updated.     0540 Patient remains confused, not baseline per family.     0556: I consulted with Dr. Barrera of the hospitalist services. They were in agreement to accept the patient for admission.    Findings and plan explained to the Patient and daughter who consents to admission. Discussed the patient with Dr. Barrera, who will admit the patient to a med bed for further monitoring, evaluation, and treatment.    0622 Walking without assistance. Still mildly confused per family, but improved.     Impression & Plan     Medical Decision Making:  Eh Callahan is a 80 year old male, diabetic, who presents to the emergency department today for unresponsiveness and a hypoglycemic event. Per family, he was acting appropriate prior to bedtime. They are fairly certain that he mixed up his long-acting and short-acting insulin. Per EMS, family started compressions. Initial blood sugar was in the 20s and was improved by the time he arrived here. Patient was able to answer simple questions, but has remained profoundly confused throughout his stay here despite the correction of his hypoglycemia. Per family, he does have what sounds like dementia/ memory issues, but this is below his baseline where he is otherwise quite independent. Workup this far is otherwise nonspecific, including CT of his head, laboratory work, as well as chest x-ray not showing rib fractures or pneumothorax post compressions. His symptoms are non-focal and would not be suggestive of a stroke. But as patient remains encephalopathic and confused, he will be admitted for further monitoring.     Disposition: admission.     Discharge Diagnosis:    ICD-10-CM    1.  Hypoglycemia E16.2 Prolactin     Prolactin     CANCELED: Prolactin   2. Delirium R41.0      Disposition:  Admitted to med bed    Scribe Disclosure:  I, Erin Garcia, am serving as a scribe at 4:29 AM on 1/5/2020 to document services personally performed by Jonathon Ryan MD based on my observations and the provider's statements to me.       Jonathon Ryan MD  01/05/20 0244

## 2020-01-05 NOTE — ED TRIAGE NOTES
Pt presents via EMS after being found unresponsive at home. Per family reports, pt was moaning, not breathing or able to speak. Per instruction from 911, pt started chest compressions. BG 28 at scene, 1 mg glucagon given, BG recheck 44. Pt was posturing at home. Per EMS pt had prolonged downtown. BG now 179 at 0425 VSS. ABCs intact.

## 2020-01-05 NOTE — ED NOTES
[facility]  ED Nurse Handoff Report    Eh Callahan is a 80 year old male   ED Chief complaint: Hypoglycemia  . ED Diagnosis:   Final diagnoses:   Hypoglycemia   Delirium     Allergies: No Known Allergies    Code Status: Full Code  Activity level - Baseline/Home:  Assist X 1. Activity Level - Current:   Assist X 2. Lift room needed: No. Bariatric: No   Needed: No   Isolation: No. Infection: Not Applicable.     Vital Signs:   Vitals:    01/05/20 0450 01/05/20 0500 01/05/20 0530 01/05/20 0545   BP: 124/67 115/60 129/68 124/68   Pulse: 62 57 56    Resp: 14 15 17 17   Temp:       TempSrc:       SpO2: 99% 99% 100% 97%       Cardiac Rhythm:  ,      Pain level:    Patient confused: Yes. Patient Falls Risk: Yes.   Elimination Status: Has voided   Patient Report - Initial Complaint: Hypoglycemia. Focused Assessment:  Pt presents via EMS after being found unresponsive at home. Per family reports, pt was moaning, not breathing or able to speak. Per instruction from 911, pt started chest compressions. BG 28 at scene, 1 mg glucagon given, BG recheck 44. Pt was posturing at home. Per EMS pt had prolonged downtown. BG now 179 at 0425 VSS. ABCs intact.    Pt reported to take Lantus 20 units at midnight. RN spoke with pharmacy and Lantus has duration to 10-24 hrs and peaks evenly over duration. Pt BG now 176. Will continue to monitor.     Respiratory - Respiratory WDL: -WDL except; secretions  Secretions Amount: small  Secretions Color: creamy; clear  Secretions Characteristics: frothy; thin   Suction - Suction Method: oral   Respiratory Secretions Assessment - Secretions Amount: small  Secretions Color: creamy; clear  Secretions Characteristics: frothy; thin     Initially, pt found unresponsive posturing but after medication administration pt became more alert and able to follow commands, pt appears to follow commands, per family, pt not at baseline mentation per family (pt forgetful but able to follow  commands and answer questions at baseline), pt alert to self here in ED but follows commands  Tests Performed:   Head CT w/o contrast   Final Result   IMPRESSION:   1.  No acute intracranial process.      XR Chest Port 1 View   Final Result   IMPRESSION: Mild bibasilar probable atelectasis.           . Abnormal Results:   Labs Ordered and Resulted from Time of ED Arrival Up to the Time of Departure from the ED   CBC WITH PLATELETS DIFFERENTIAL - Abnormal; Notable for the following components:       Result Value    RBC Count 4.32 (*)     Hemoglobin 13.1 (*)     Platelet Count 140 (*)     All other components within normal limits   COMPREHENSIVE METABOLIC PANEL - Abnormal; Notable for the following components:    Glucose 266 (*)     Urea Nitrogen 43 (*)     Creatinine 1.46 (*)     GFR Estimate 45 (*)     GFR Estimate If Black 52 (*)     Calcium 8.1 (*)     Albumin 2.7 (*)     Protein Total 6.4 (*)     All other components within normal limits   GLUCOSE BY METER - Abnormal; Notable for the following components:    Glucose 176 (*)     All other components within normal limits   GLUCOSE BY METER - Abnormal; Notable for the following components:    Glucose 152 (*)     All other components within normal limits   TROPONIN I   GLUCOSE MONITOR NURSING POCT   ROUTINE UA WITH MICROSCOPIC   PULSE OXIMETRY NURSING   CARDIAC CONTINUOUS MONITORING   PERIPHERAL IV CATHETER     .   Treatments provided: Meds, IVF, imaging, labs, tele monitoring   Family Comments: SonSalima, updated on plan of care   OBS brochure/video discussed/provided to patient:  N/A  ED Medications:   Medications   0.9% sodium chloride BOLUS (1,000 mLs Intravenous New Bag 1/5/20 4876)     Followed by   sodium chloride 0.9% infusion (has no administration in time range)     Drips infusing:  No  For the majority of the shift, the patient's behavior Green. Interventions performed were N/A.     Severe Sepsis OR Septic Shock Diagnosis Present: No      ED Nurse  Name/Phone Number: Jennifer Wesley, RN,   5:59 AM    RECEIVING UNIT ED HANDOFF REVIEW    Above ED Nurse Handoff Report was reviewed: Yes  Reviewed by: Elisabeth Garcia, RN on January 5, 2020 at 7:38 AM (from Ortho)  Did you vocera the ED RN: Yes

## 2020-01-05 NOTE — ED NOTES
Order in. Please do referral and let pt know when ready. Thanks.   Pt reported to take Lantus 20 units at midnight. RN spoke with pharmacy and Lantus has duration to 10-24 hrs and peaks evenly over duration. Pt BG now 176. Will continue to monitor.

## 2020-01-05 NOTE — PROGRESS NOTES
Brief Update Note    Patient seen and examined.  Admission H&P was reviewed.  Also discussed the history with the patient's daughter, who was at the bedside.  She stated that the patient is back to his baseline mentation.  She believes that he mixed up with his short acting and long-acting insulins at bedtime.  His blood sugar checks have been trending up without further episodes of hypoglycemia today.  Will hold long-acting insulin for today and continue monitoring blood sugars and plan for discharge tomorrow if things remain stable.    Zev Charles MD

## 2020-01-05 NOTE — PROGRESS NOTES
Responded to silent red to assist with respiratory support.    Pt's sat's 98 % on RA.    No complications noted.    Angela Bingham, RT on 1/5/2020 at 5:44 AM

## 2020-01-05 NOTE — ED NOTES
Bed: ED03  Expected date: 1/5/20  Expected time: 4:18 AM  Means of arrival: Ambulance  Comments:  BV4

## 2020-01-05 NOTE — ED NOTES
", pt opening eyes and following commands.  Per pt family member, pt given \"extra\" dose of lantus at bedtime.   "

## 2020-01-05 NOTE — PHARMACY-ADMISSION MEDICATION HISTORY
"Admission medication history interview status for this patient is complete. See [facility] admission navigator for allergy information, prior to admission medications and immunization status.     Medication history interview source(s):Patient + patient's daughter  Medication history resources (including written lists, pill bottles, clinic record):None  Primary pharmacy: [facility]    Changes made to PTA medication list:  Added: all medications  Deleted: nothing  Changed: nothing    Actions taken by pharmacist (provider contacted, etc): called [facility] pharmacy (they are closed until 2 pm), called [facility] for triam/HCTZ and amlodipine dose/strength    Additional medication history information: I went over his medications with his daughter and was able to get medication names, and last doses, but unable to determine strength. I called [facility] to determine strength and dose of triam/HCTZ and amlodipine. They do not have the other prescriptions - I called [facility] and they are closed until 2 pm; I left a message and will call again at 2pm. All the medications are in, but the strengths are missing. I will update when I find out.     Medication reconciliation/reorder completed by provider prior to medication history?  No     Do you take OTC medications (eg tylenol, ibuprofen, fish oil, eye/ear drops, etc)? Yes     For patients on insulin therapy: Yes  Lantus/levemir/NPH/toujeo/tresiba/Mix 70/30 dose:  Unsure -- will update when determined (on tresiba and novolog)  Sliding scale Novolog: Yes  If Yes, do you have a baseline novolog pre-meal dose:  Completely uses sliding scale.    Sliding scale: 350  Mg/dL - 3 units, 450 mg/dL - 4 units  Patients eat three meals a day: Yes - will eat again before bed if blood sugar is low   How many episodes of hypoglycemia do you have per week: \"continuously\"  How many missed doses do you have per week: none  How many times do you check your blood glucose per day:  4  Do you have " a Continuous glucose monitor (CGM) : No (remind pt that not approved for hospital use)  Any Barriers to therapy - Be specific :  Yes: Patient's daughter said because his blood sugar is low very frequently, they are testing his blood sugar more, and running out of test strips. They sometimes have to check it 5-6 times/day. They really want a CGM, but it's not covered by their insurance. They also want an injectable emergency insulin, as the patient has lost consciousness frequently. They had it but it .        Prior to Admission medications    Medication Sig Last Dose Taking? Auth Provider   amLODIPine (NORVASC) 5 MG tablet Take 5 mg by mouth every evening 2020 at Unknown time Yes Unknown, Entered By History   Cyanocobalamin (B-12 PO) Take 1 capsule by mouth daily 2020 at Unknown time Yes Unknown, Entered By History   Insulin Aspart (NOVOLOG SC) Inject Subcutaneous 3 times daily (with meals) Sliding scale: 350 mg/dL - 3 units, 450 mg/dL - 4 units 2020 at Unknown time Yes Unknown, Entered By History   Insulin Degludec (TRESIBA SC) Inject 20 Units Subcutaneous At Bedtime 2020 at Unknown time Yes Unknown, Entered By History   SIMVASTATIN PO Take 1 tablet by mouth At Bedtime 2020 at Unknown time Yes Unknown, Entered By History   TRAZODONE HCL PO Take 0.5 tablets by mouth At Bedtime 2020 at Unknown time Yes Unknown, Entered By History   triamterene-HCTZ (DYAZIDE) 37.5-25 MG capsule Take 1 capsule by mouth every morning 2020 at Unknown time Yes Unknown, Entered By History   VITAMIN D PO Take 1 capsule by mouth daily 2020 at Unknown time Yes Unknown, Entered By History

## 2020-01-05 NOTE — H&P
[facility]    Hospitalist History and Physical    Name: Eh Callahan    MRN: [id number]  YOB: 1940    Age: 80 year old  Date of Admission:  1/5/2020  Date of Service (when I saw the patient): 01/05/20    Assessment & Plan   Eh Callahan is a 80 year old male with past medical history significant for diabetes mellitus on insulin, hypertension, liver problems was brought to the emergency room via EMS after patient was found to be unresponsive.  At home chest compressions were started by family.  EMS found that patient was responsive and had pulses. Per EMS his blood glucose to be 28 on arrival.  Received 1 mg glucagon and D10.  Initially in ER patient was delirious.  At the time of my evaluation patient was at baseline per daughter.    Delirium secondary to episode of hypoglycemia  --Resolved  --Per patient's daughter, It is suspected that patient took his long acting insulin instead of his short acting insulin resulting in iatrogenic hypoglycemia  --Family found him unresponsive.  Daughter heard him gasping  --She called 911 and was instructed to start chest compressions  --Onsite EMS found patient had pulses and was responsive CPR was not continued.  Blood glucose however was down to 28  --Patient received glucagon and D10  --Brought to the emergency room received D50 and blood glucose was up to 158  --Patient initially remained delirious despite correction of blood glucose.  Vomiting further evaluation.  CT head and labs negative  --Subsequently patient responded and is currently at his baseline  --Patient will be observed for hypoglycemic episodes  --Frequent blood glucose monitoring  --Sliding scale insulin for now  --Diabetic diet  --Prolactin was initially ordered as patient remained confused after correction of glucose to consider possible diagnosis of seizure      Renal insufficiency  --Creatinine up to 1.49  --Unable to get patient's past record.  Not sure what is his baseline  creatinine  --Patient started on IV fluids and mildly dehydrated on exam  --Recheck renal functions  --We will attempt to get prior medical records from [facility].  Patient's record not available on care everywhere     History of hypertension  --Patient is on meds prior to admission med list not available will need to reconcile  --Pharmacy contacted to reconcile prior to admission meds  --Blood pressure within normal limits at this time.  Patient is mildly dehydrated on exam    Constipation  --Added Senokot    Pharmacy consult to reconcile meds      DVT Prophylaxis: Ambulate every shift  Code Status: Full Code    Disposition: Expected discharge in 1 days no further episodes of hypoglycemia or delirium    Primary Care Physician   Physician No Ref-Primary    Chief Complaint   Acute delirium, hypoglycemia  Episode of  unresponsiveness prompting initiation of CPR by family    History is obtained from the patient and patient's daughter and patient.  He does not recall what happened at home    History of Present Illness   Eh Callahan is a 80 year old male with past medical history significant for diabetes mellitus hypertension and liver problems was brought to the emergency room after an episode of unresponsiveness.  Patient recently moved to live with his daughters.  He has had generalized weakness and hospitalization.  He has had episodes of hypoglycemia in past.  Per report his daughter helps with administration of his meds.  Yesterday daughter was out of home and he took his meds.  Daughter suspects that he may have accidentally mixed his 2 different kinds of insulin and took his long-acting insulin instead of his short acting insulin.  Patient went to bed at around 1 AM in the morning and at about 3 AM in the morning they heard him gasping.  Family found him to be unresponsive called 911.  As directed by 911 family started chest compressions.  EMS arrived and found patient to have pulse and he was spontaneously opening  eyes.  At that time his blood glucose was 28.  He got glucagon and D10 with blood glucose levels improving to 44.  In the emergency room he received D50.  Subsequently hypoglycemia was corrected.    Initially in the emergency room despite correction of blood glucose patient remained delirious and confused.  This prompted further evaluation for altered mental status.  Including CT head and lab work which were all negative.  Patient's renal function was slightly elevated.  Baseline was not available.    After 2 hours in the emergency room patient was back to his baseline.  Oriented x3 and ambulating at baseline.  He is admitted for further observation to monitor for hypoglycemic episodes    Review of all the other symptoms are negative except for complains of constipation.  This bowel movement was 3 days ago.  Denies any abdominal pain and is passing gas      Past Medical History    No past medical history on file.   Insulin-dependent diabetes mellitus, hypertension, liver problems      Past Surgical History   No past surgical history on file.    Prior to Admission Medications   None     Allergies   No Known Allergies    Social History   Social History     Tobacco Use     Smoking status: Not on file   Substance Use Topics     Alcohol use: Not on file     Social History     Social History Narrative     Not on file     Patient denies smoking no use of alcohol.  For the last 3 months he is now living with his daughters.  Prior to that he was living independently    Family History   Patient is not sure about any significant medical problems in his family.  He suspects that his grandparents perhaps had diabetes    Review of Systems   A Comprehensive greater than 10 system review of systems was carried out.  Pertinent positives and negatives are noted above.  Otherwise negative for contributory information.    Physical Exam   Temp: 95.8  F (35.4  C)(Simultaneous filing. User may not have seen previous data.) Temp src:  Temporal(Simultaneous filing. User may not have seen previous data.) BP: 129/68 Pulse: 56 Heart Rate: 55 Resp: 17 SpO2: 100 % O2 Device: None (Room air)    Vital Signs with Ranges  Temp:  [95.8  F (35.4  C)] 95.8  F (35.4  C)  Pulse:  [56-64] 56  Heart Rate:  [55-68] 55  Resp:  [14-17] 17  BP: (115-146)/(60-75) 129/68  SpO2:  [99 %-100 %] 100 %  0 lbs 0 oz    GEN:  Alert, oriented x 3, appears comfortable, no overt distress  HEENT:  Normocephalic/atraumatic, no scleral icterus, no nasal discharge, mouth moist.  CV:  Regular rate and rhythm, no murmur or JVD.  S1 + S2 noted, no S3 or S4.  LUNGS: Few basilar crackles otherwise clear to auscultation bilaterally without rales/rhonchi/wheezing/retractions.  Symmetric chest rise on inhalation noted.  ABD:  Active bowel sounds, soft, non-tender/non-distended.  No rebound/guarding/rigidity.  EXT:  No edema.  No cyanosis.    SKIN:  Dry to touch, no exanthems noted in the visualized areas.  NEURO:  Symmetric muscle strength,   No new focal deficits appreciated.    Data   Data reviewed today:  I personally reviewed the EKG tracing showing Normal sinus rhythm with slightly prolonged QT.    No results for input(s): PH, PHV, PO2, PO2V, SAT, PCO2, PCO2V, HCO3, HCO3V in the last 168 hours.  Recent Labs   Lab 01/05/20  0435   WBC 6.6   HGB 13.1*   HCT 40.2   MCV 93   *     Recent Labs   Lab 01/05/20  0435      POTASSIUM 3.6   CHLORIDE 101   CO2 28   ANIONGAP 5   *   BUN 43*   CR 1.46*   GFRESTIMATED 45*   GFRESTBLACK 52*   DOMINGO 8.1*     No results for input(s): CULT in the last 168 hours.  No results for input(s): NTBNPI, NTBNP in the last 168 hours.  Recent Labs   Lab 01/05/20  0435   AST 45   ALT 56   ALKPHOS 104   BILITOTAL 0.5     No results for input(s): INR in the last 168 hours.  No results for input(s): LACT in the last 168 hours.  No results for input(s): TSH in the last 168 hours.  Recent Labs   Lab 01/05/20  0435   TROPI <0.015     No results for  input(s): COLOR, APPEARANCE, URINEGLC, URINEBILI, URINEKETONE, SG, UBLD, URINEPH, PROTEIN, UROBILINOGEN, NITRITE, LEUKEST, RBCU, WBCU in the last 168 hours.    Recent Results (from the past 24 hour(s))   XR Chest Port 1 View    Narrative    EXAM: XR CHEST PORTABLE 1 VIEW  LOCATION: [facility]  DATE/TIME: 01/05/2020, 4:40 AM    INDICATION: Unconscious.  COMPARISON: 09/19/2018.    FINDINGS: Upright portable chest. The heart size is normal. The thoracic aorta is calcified. There are mild bibasilar infiltrates. The lungs are otherwise clear. No pneumothorax.      Impression    IMPRESSION: Mild bibasilar probable atelectasis.     Head CT w/o contrast    Narrative    EXAM: CT HEAD W/O CONTRAST  LOCATION: [facility]  DATE/TIME: 1/5/2020 5:12 AM    INDICATION: Confusion.  COMPARISON: 10/12/2018  TECHNIQUE: Routine without IV contrast. Multiplanar reformats. Dose reduction techniques were used.    FINDINGS:  INTRACRANIAL CONTENTS: No intracranial hemorrhage, extraaxial collection, or mass effect.  No CT evidence of acute infarct. Mild presumed chronic small vessel ischemic changes. Mild to moderate generalized volume loss. No hydrocephalus.     VISUALIZED ORBITS/SINUSES/MASTOIDS: No intraorbital abnormality. No paranasal sinus mucosal disease. No middle ear or mastoid effusion.    BONES/SOFT TISSUES: No acute abnormality.      Impression    IMPRESSION:  1.  No acute intracranial process.

## 2020-01-05 NOTE — PLAN OF CARE
PRIMARY DIAGNOSIS: HYPOGLYCEMIA    OUTPATIENT/OBSERVATION GOALS TO BE MET BEFORE DISCHARGE  BG greater than 100 and less than 250 on two consecutive readings: Yes  Recent Labs   Lab Test 01/05/20  1314 01/05/20  1117 01/05/20  0920   * 189* 127*         Ketones absent from urine  (hyperglycemia): NA    Tolerating oral intake to maintain hydration: Yes    Return to near baseline physical activity: Yes    Discharge Planner Nurse   Safe discharge environment identified: Yes  Barriers to discharge: Yes, continue to monitor blood glucose levels       Entered by: Elisabeth Garcia 01/05/2020 1:27 PM     Please review provider order for any additional goals.   Nurse to notify provider when observation goals have been met and patient is ready for discharge.

## 2020-01-05 NOTE — PLAN OF CARE
PRIMARY DIAGNOSIS: HYPOGLYCEMIA    OUTPATIENT/OBSERVATION GOALS TO BE MET BEFORE DISCHARGE  BG greater than 100 and less than 250 on two consecutive readings: Yes  Recent Labs   Lab Test 01/05/20  0920 01/05/20  0817 01/05/20  0733   * 146* 158*         Ketones absent from urine  (hyperglycemia): NA    Tolerating oral intake to maintain hydration: Yes    Return to near baseline physical activity: Yes    Discharge Planner Nurse   Safe discharge environment identified: Yes  Barriers to discharge: Yes, blood glucose levels fluctuating too much        Entered by: Elisabeth Garcia 01/05/2020 10:10 AM     Please review provider order for any additional goals.   Nurse to notify provider when observation goals have been met and patient is ready for discharge.

## 2020-01-06 VITALS
OXYGEN SATURATION: 98 % | HEART RATE: 75 BPM | SYSTOLIC BLOOD PRESSURE: 169 MMHG | RESPIRATION RATE: 16 BRPM | DIASTOLIC BLOOD PRESSURE: 81 MMHG | TEMPERATURE: 95.6 F | WEIGHT: 162.2 LBS

## 2020-01-06 LAB
ANION GAP SERPL CALCULATED.3IONS-SCNC: 5 MMOL/L (ref 3–14)
BUN SERPL-MCNC: 49 MG/DL (ref 7–30)
CALCIUM SERPL-MCNC: 8 MG/DL (ref 8.5–10.1)
CHLORIDE SERPL-SCNC: 108 MMOL/L (ref 94–109)
CO2 SERPL-SCNC: 26 MMOL/L (ref 20–32)
CREAT SERPL-MCNC: 1.64 MG/DL (ref 0.66–1.25)
GFR SERPL CREATININE-BSD FRML MDRD: 39 ML/MIN/{1.73_M2}
GLUCOSE BLDC GLUCOMTR-MCNC: 151 MG/DL (ref 70–99)
GLUCOSE BLDC GLUCOMTR-MCNC: 237 MG/DL (ref 70–99)
GLUCOSE BLDC GLUCOMTR-MCNC: 260 MG/DL (ref 70–99)
GLUCOSE BLDC GLUCOMTR-MCNC: 305 MG/DL (ref 70–99)
GLUCOSE SERPL-MCNC: 165 MG/DL (ref 70–99)
POTASSIUM SERPL-SCNC: 3.8 MMOL/L (ref 3.4–5.3)
SODIUM SERPL-SCNC: 139 MMOL/L (ref 133–144)

## 2020-01-06 PROCEDURE — 80048 BASIC METABOLIC PNL TOTAL CA: CPT | Performed by: INTERNAL MEDICINE

## 2020-01-06 PROCEDURE — 00000146 ZZHCL STATISTIC GLUCOSE BY METER IP

## 2020-01-06 PROCEDURE — 25000132 ZZH RX MED GY IP 250 OP 250 PS 637: Performed by: INTERNAL MEDICINE

## 2020-01-06 PROCEDURE — 96372 THER/PROPH/DIAG INJ SC/IM: CPT

## 2020-01-06 PROCEDURE — 99207 ZZC CDG-CODE CATEGORY CHANGED: CPT | Performed by: INTERNAL MEDICINE

## 2020-01-06 PROCEDURE — 36415 COLL VENOUS BLD VENIPUNCTURE: CPT | Performed by: INTERNAL MEDICINE

## 2020-01-06 PROCEDURE — G0378 HOSPITAL OBSERVATION PER HR: HCPCS

## 2020-01-06 PROCEDURE — 99217 ZZC OBSERVATION CARE DISCHARGE: CPT | Performed by: INTERNAL MEDICINE

## 2020-01-06 RX ORDER — TRIAMTERENE/HYDROCHLOROTHIAZID 37.5-25 MG
1 TABLET ORAL EVERY MORNING
Status: DISCONTINUED | OUTPATIENT
Start: 2020-01-06 | End: 2020-01-06 | Stop reason: HOSPADM

## 2020-01-06 RX ORDER — AMLODIPINE BESYLATE 5 MG/1
5 TABLET ORAL EVERY EVENING
Status: DISCONTINUED | OUTPATIENT
Start: 2020-01-06 | End: 2020-01-06 | Stop reason: HOSPADM

## 2020-01-06 RX ADMIN — INSULIN DEGLUDEC INJECTION 15 UNITS: 100 INJECTION, SOLUTION SUBCUTANEOUS at 00:15

## 2020-01-06 RX ADMIN — TRAZODONE HYDROCHLORIDE 50 MG: 50 TABLET ORAL at 00:14

## 2020-01-06 RX ADMIN — TRIAMTERENE AND HYDROCHLOROTHIAZIDE 1 TABLET: 37.5; 25 TABLET ORAL at 10:30

## 2020-01-06 NOTE — PROVIDER NOTIFICATION
care coordinator discussing needs with patient and his daughter if needing to speak with the daughter at all    Dr Melvin garcia

## 2020-01-06 NOTE — PROGRESS NOTES
2145;Web paged; Pt , 5u of insulin given.    Web paged,1110;Pt requesting home med Trazodone for sleep. BS recheck 1hr after 5u of insulin 376.Thanks.

## 2020-01-06 NOTE — PROGRESS NOTES
Contacted nurse re: blood glucose.   this evening; improved to 375 after novolog dose was given.  Was admitted with hypoglycemia after suspected insulin dose mistake and this appears to have resolved.      Will resume Tresiba dose this evening at 15 units at bedtime (normally takes 20 units at bedtime at home) and monitor BS overnight.

## 2020-01-06 NOTE — PLAN OF CARE
PRIMARY DIAGNOSIS: HYPO/HYPERGLYCEMIA    OUTPATIENT/OBSERVATION GOALS TO BE MET BEFORE DISCHARGE  BG greater than 100 and less than 250 on two consecutive readings: No  Recent Labs   Lab Test 01/06/20  0840 01/06/20  0256 01/05/20  2252   * 260* 376*         Ketones absent from urine  (hyperglycemia): N/A    Tolerating oral intake to maintain hydration: Yes    Return to near baseline physical activity: Yes    Discharge Planner Nurse   Safe discharge environment identified: Yes  Barriers to discharge: May need home nursing for medication management       Entered by: Karma Cruz 01/06/2020 12:46 PM     Please review provider order for any additional goals.   Nurse to notify provider when observation goals have been met and patient is ready for discharge.

## 2020-01-06 NOTE — CONSULTS
Care Transition Initial Assessment - RN        Met with: Patient and daughterCandis  DATA   Active Problems:    Hypoglycemia       Cognitive Status: awake, alert and oriented.  Primary Care Clinic Name: [facility]  Primary Care MD Name: Dr. Basilio Alfaro  Contact information and PCP information verified: Yes  Lives With: alone      Quality of Family Relationships: involved, supportive  Description of Support System: Involved, Supportive   Who is your support system?: Children, Other (specify)(family)   Support Assessment: Adequate family and caregiver support   Insurance concerns: No Insurance issues identified  ASSESSMENT  Patient currently receives the following services:  none        Identified issues/concerns regarding health management: Patient admitted 1/5 with Hypoglycemia. CTS following for Diabetes education, no pcp listed on facesheet and  Discharge planning. Met with patient and his daughterCandis. Patient living at home alone. He ambulates independently and is independent with most ADL's. Family supportive and involved. Daughter reports patient has a large  family that assist him with cares. A family member is usually with him.   Family provides support with meals, blood sugar monitoring and medication set up. Daughter states patient has a PCP, Dr. Basilio Alfaro, with [facility].  Reviewed understanding diabetes handbook. Discussed sxs of hypo and hyperglycemia. Encouraged blood sugar monitoring. Daughter report patient has glucometer at home and takes his BS 3-4x/day. Patient has insulin and diabetes supplies at home. Discussed and encouraged possible home care RN support. Daughter declines Home Care RN at this time. Encouraged ongoing follow up with his PCP, connecting with a Endocrinologist and DNE. Daughter encouraged to attend follow up appointment with patient. A post hospitalization follow up was scheduled with his PCP, Dr. Alfaro, on Friday,  January 10th at 11:00am.     A handoff will be sent to his PCP clinic care coordination as patient can benefit from ongoing diabetes education and support with managing his health.     PLAN  Financial costs for the patient include none .  Patient given options and choices for discharge yes .  Patient/family is agreeable to the plan?  Yes: home with family support  Patient anticipates discharging to home with family .        Patient anticipates needs for home equipment: No  Transportation/person available to transport on day of discharge  is his daughter, Candis.   Plan/Disposition: Home   Appointments:  A post hospitalization follow up was scheduled with his PCP, Dr. Alfaro, at [facility], on Friday, January 10th at 11:00am.     Care  (CTS) will continue to follow as needed.    Tanvir Foster RN BSN   Inpatient Care Coordination   [facility]   [phone]

## 2020-01-06 NOTE — PLAN OF CARE
PRIMARY DIAGNOSIS: Hypoglycemia  OUTPATIENT/OBSERVATION GOALS TO BE MET BEFORE DISCHARGE:  1. ADLs back to baseline: YES    2. Activity and level of assistance: Independent    3. Pain status: Denies pain    4. Return to near baseline physical activity: YES     Discharge Planner Nurse   Safe discharge environment identified: YES  Barriers to discharge: {YES / NO:[id number]:YES       Entered by: Phillip PETERSON Obwogi 01/06/2020 6:35 AM     Please review provider order for any additional goals.   Nurse to notify provider when observation goals have been met and patient is ready for discharge.  Pt A&O,intermitent forgetiful,vss, up independently in the room,Tolerating mod carb diet,+ve bowel sounds , passing gas,denies pain. Blood sugar monitoring,pt has been hyperglycemic;400,376,260, coverage given md updated. Pt restarted home Tresiba.Saline lock.voiding , Daughter at bedside.plan to discharge home today.

## 2020-01-06 NOTE — DISCHARGE SUMMARY
[facility]  Hospitalist Discharge Summary       Date of Admission:  1/5/2020  Date of Discharge:  1/6/2020  2:44 PM  Discharging Provider: Zev Charles MD      Discharge Diagnoses   Acute metabolic encephalopathy secondary to hypoglycemia  Insulin-dependent diabetes    Follow-ups Needed After Discharge   Follow-up Appointments     Follow-up and recommended labs and tests       Follow up with primary care provider, Basilio Alfaro, within 7 days   for hospital follow up.              Discharge Disposition   Discharged to home  Condition at discharge: Stable    Hospital Course   Patient is an 80-year-old male with a history of insulin-dependent diabetes, hypertension, renal insufficiency who was admitted to the hospitalist service for acute encephalopathy secondary to hypoglycemia.  Patient was found unresponsive at home by his family and chest compressions were started per recommendations from 911.  However, his blood glucose was found to be 28.  Patient received glucagon and D10, followed by D50 in the emergency department.  CT head and chest x-ray were negative.  Gradually, the patient's mental status returned to baseline.    On discussion with family and with patient it appears that the patient confused his long and short acting insulins and took an extra dose of the long-acting.  This caused the hypoglycemia.  In the hospital, he was observed for 24 hours with no further hypoglycemic episodes.  He actually became somewhat hyperglycemic and his long-acting insulin was restarted.    Of note, the patient had some mild renal insufficiency with a creatinine at 1.6, the chronicity of which was not clear.  He was discharged to home on the same medications that he was taking prior to admission.  Home care was offered to help make sure patient would not accept his medications but declined by family as they felt that they had significant support at home.  Patient will follow-up with PCP later  this week.    Consultations This Hospital Stay   PHARMACY IP CONSULT  CARE COORDINATOR IP CONSULT    Code Status   Full Code    Time Spent on this Encounter   I, Zev Charles MD, personally saw the patient today and spent less than or equal to 30 minutes discharging this patient.       Zev Charles MD  [facility]  ______________________________________________________________________    Physical Exam   Vital Signs: Temp: 95.6  F (35.3  C) Temp src: Oral BP: (!) 169/81 Pulse: 75 Heart Rate: 69 Resp: 16 SpO2: 98 % O2 Device: None (Room air)    Weight: 162 lbs 3.2 oz    General: Looks well, no distress    HEENT: No scleral icterus.    Neck: Supple.    Pulmonary: Normal work of breathing. Clear to auscultation bilaterally.    Cardiovascular: Regular rate and rhythm without murmur or extra heart sounds.    Abdomen: Soft and non-tender.    Extremities: No peripheral edema.    Neurologic: Awake, alert, appropriate.    Skin: Warm and dry.    Psychiatric: Normal affect           Primary Care Physician   Basilio Alfaro    Discharge Orders      Reason for your hospital stay    You were admitted for confusion and sleepiness which was caused by low blood sugar which was caused by mistaking the insulin doses. Blood sugar returned to normal. You can stay on the same insulin, but need to make sure that you do not confuse the two insulins again.     Follow-up and recommended labs and tests     Follow up with primary care provider, Basilio Alfaro, within 7 days for hospital follow up.     Activity    Your activity upon discharge: activity as tolerated     Diet    Follow this diet upon discharge: Orders Placed This Encounter      Moderate Consistent CHO Diet       Significant Results and Procedures   Most Recent 3 CBC's:  Recent Labs   Lab Test 01/05/20  0435   WBC 6.6   HGB 13.1*   MCV 93   *     Most Recent 3 BMP's:  Recent Labs   Lab Test 01/06/20  0740 01/05/20  0435    134    POTASSIUM 3.8 3.6   CHLORIDE 108 101   CO2 26 28   BUN 49* 43*   CR 1.64* 1.46*   ANIONGAP 5 5   DOMINGO 8.0* 8.1*   * 266*     Most Recent 2 LFT's:  Recent Labs   Lab Test 01/05/20  0435   AST 45   ALT 56   ALKPHOS 104   BILITOTAL 0.5     Most Recent 3 INR's:No lab results found.,   Results for orders placed or performed during the hospital encounter of 01/05/20   XR Chest Port 1 View    Narrative    EXAM: XR CHEST PORTABLE 1 VIEW  LOCATION: [facility]  DATE/TIME: 01/05/2020, 4:40 AM    INDICATION: Unconscious.  COMPARISON: 09/19/2018.    FINDINGS: Upright portable chest. The heart size is normal. The thoracic aorta is calcified. There are mild bibasilar infiltrates. The lungs are otherwise clear. No pneumothorax.      Impression    IMPRESSION: Mild bibasilar probable atelectasis.     Head CT w/o contrast    Narrative    EXAM: CT HEAD W/O CONTRAST  LOCATION: Health system  DATE/TIME: 1/5/2020 5:12 AM    INDICATION: Confusion.  COMPARISON: 10/12/2018  TECHNIQUE: Routine without IV contrast. Multiplanar reformats. Dose reduction techniques were used.    FINDINGS:  INTRACRANIAL CONTENTS: No intracranial hemorrhage, extraaxial collection, or mass effect.  No CT evidence of acute infarct. Mild presumed chronic small vessel ischemic changes. Mild to moderate generalized volume loss. No hydrocephalus.     VISUALIZED ORBITS/SINUSES/MASTOIDS: No intraorbital abnormality. No paranasal sinus mucosal disease. No middle ear or mastoid effusion.    BONES/SOFT TISSUES: No acute abnormality.      Impression    IMPRESSION:  1.  No acute intracranial process.       Discharge Medications   Discharge Medication List as of 1/6/2020  2:36 PM      CONTINUE these medications which have NOT CHANGED    Details   amLODIPine (NORVASC) 5 MG tablet Take 5 mg by mouth every evening, Historical      Cyanocobalamin (B-12 PO) Take 1 capsule by mouth daily, Historical      Insulin Aspart (NOVOLOG SC) Inject Subcutaneous  3 times daily (with meals) Sliding scale: 350 mg/dL - 3 units, 450 mg/dL - 4 units, Historical      Insulin Degludec (TRESIBA SC) Inject 20 Units Subcutaneous At Bedtime, Historical      SIMVASTATIN PO Take 1 tablet by mouth At Bedtime, Historical      TRAZODONE HCL PO Take 0.5 tablets by mouth At Bedtime, Historical      triamterene-HCTZ (DYAZIDE) 37.5-25 MG capsule Take 1 capsule by mouth every morning, Historical      VITAMIN D PO Take 1 capsule by mouth daily, Historical           Allergies   No Known Allergies

## 2020-01-06 NOTE — DISCHARGE SUMMARY
Patient alert and oriented  Tolerating diet, blood sugars monitored  BP elevated, other VSS, on room air  Up independently  No new discharge medications  Discharge instructions discussed with patient, daughter present  All questions answered  Patient/family refused home care  Educated when to take which insulin, verbalized understanding and when next to take all medications  Patient left floor via wheelchair and took all belongings  Daughter transporting home  Primary follow up visit already scheduled, patient and family aware  OBSERVATION patient END time: 144

## 2020-01-07 NOTE — PROGRESS NOTES
Transition Communication Hand-off for Care Transitions to Next Level of Care Provider    Name: Eh Callahan  : 1940  MRN #: [id number]  Primary Care Provider: No primary care provider on file.  Primary Care MD Name: Dr. Basilio Alfaro  Primary Clinic: No primary provider on file.  Primary Care Clinic Name: [facility]  Reason for Hospitalization:  Delirium [R41.0]  Hypoglycemia [E16.2]  Admit Date/Time: 2020  4:25 AM  Discharge Date: 2020  Payor Source: Payor: UCARE / Plan: UC[employer] FOR [employer] [facility] [employer] / Product Type: HMO /     Readmission Assessment Measure (AMANDA) Risk Score/category: none           Reason for Communication Hand-off Referral: Other A1C 8.0    Discharge Plan: home       Concern for non-adherence with plan of care:   no  Discharge Needs Assessment:  Needs      Most Recent Value   # of Referrals Placed by CTS  Scheduled Follow-up appointments          Already enrolled in Tele-monitoring program and name of program:  none  Follow-up specialty is recommended: No    Follow-up plan:  No future appointments.    Any outstanding tests or procedures:              Key Recommendations:  Patient admitted  with Hypoglycemia. CTS following for Diabetes education, no pcp listed on facesheet and  Discharge planning. Met with patient and his daughter, Candis. Patient living at home alone. He ambulates independently and is independent with most ADL's. Family supportive and involved. Daughter reports patient has a large  family that assist him with cares. A family member is usually with him.   Family provides support with meals, blood sugar monitoring and medication set up. Daughter states patient has a PCP, Dr. Basilio Alfaro, with [facility].  Reviewed understanding diabetes handbook. Discussed sxs of hypo and hyperglycemia. Encouraged blood sugar monitoring. Daughter report patient has glucometer at home and takes his BS 3-4x/day. Patient has  insulin and diabetes supplies at home. Discussed and encouraged possible home care RN support. Daughter declines Home Care RN at this time. Encouraged ongoing follow up with his PCP, connecting with a Endocrinologist and DNE. Daughter encouraged to attend follow up appointment with patient. A post hospitalization follow up was scheduled with his PCP, Dr. Alfaro, on Friday, January 10th at 11:00am.      A handoff sent to his PCP clinic care coordination as patient can benefit from ongoing diabetes education and support with managing his health.   Tanvir Foster RN    AVS/Discharge Summary is the source of truth; this is a helpful guide for improved communication of patient story

## 2020-01-18 ENCOUNTER — APPOINTMENT (OUTPATIENT)
Dept: GENERAL RADIOLOGY | Facility: CLINIC | Age: 80
End: 2020-01-18
Attending: EMERGENCY MEDICINE
Payer: MEDICARE

## 2020-01-18 ENCOUNTER — HOSPITAL ENCOUNTER (OUTPATIENT)
Facility: CLINIC | Age: 80
Setting detail: OBSERVATION
Discharge: HOME | End: 2020-01-19
Attending: EMERGENCY MEDICINE | Admitting: INTERNAL MEDICINE
Payer: MEDICARE

## 2020-01-18 ENCOUNTER — APPOINTMENT (OUTPATIENT)
Dept: GENERAL RADIOLOGY | Facility: CLINIC | Age: 80
End: 2020-01-18
Attending: PHYSICIAN ASSISTANT
Payer: MEDICARE

## 2020-01-18 DIAGNOSIS — Z79.4 DIABETES MELLITUS DUE TO UNDERLYING CONDITION WITHOUT COMPLICATION, WITH LONG-TERM CURRENT USE OF INSULIN (H): Primary | ICD-10-CM

## 2020-01-18 DIAGNOSIS — Z79.4: ICD-10-CM

## 2020-01-18 DIAGNOSIS — E16.2 HYPOGLYCEMIA: ICD-10-CM

## 2020-01-18 DIAGNOSIS — E08.9 DIABETES MELLITUS DUE TO UNDERLYING CONDITION WITHOUT COMPLICATION, WITH LONG-TERM CURRENT USE OF INSULIN (H): Primary | ICD-10-CM

## 2020-01-18 DIAGNOSIS — Z90.49 HISTORY OF LIVER EXCISION: ICD-10-CM

## 2020-01-18 DIAGNOSIS — E13.641: ICD-10-CM

## 2020-01-18 LAB
ANION GAP SERPL CALCULATED.3IONS-SCNC: 3 MMOL/L (ref 3–14)
BASOPHILS # BLD AUTO: 0 10E9/L (ref 0–0.2)
BASOPHILS NFR BLD AUTO: 0.4 %
BUN SERPL-MCNC: 45 MG/DL (ref 7–30)
CALCIUM SERPL-MCNC: 8.8 MG/DL (ref 8.5–10.1)
CHLORIDE SERPL-SCNC: 103 MMOL/L (ref 94–109)
CO2 SERPL-SCNC: 29 MMOL/L (ref 20–32)
CREAT SERPL-MCNC: 1.72 MG/DL (ref 0.66–1.25)
DIFFERENTIAL METHOD BLD: ABNORMAL
EOSINOPHIL # BLD AUTO: 0.1 10E9/L (ref 0–0.7)
EOSINOPHIL NFR BLD AUTO: 1 %
ERYTHROCYTE [DISTWIDTH] IN BLOOD BY AUTOMATED COUNT: 11.9 % (ref 10–15)
GFR SERPL CREATININE-BSD FRML MDRD: 37 ML/MIN/{1.73_M2}
GLUCOSE BLDC GLUCOMTR-MCNC: 100 MG/DL (ref 70–99)
GLUCOSE BLDC GLUCOMTR-MCNC: 123 MG/DL (ref 70–99)
GLUCOSE BLDC GLUCOMTR-MCNC: 147 MG/DL (ref 70–99)
GLUCOSE BLDC GLUCOMTR-MCNC: 151 MG/DL (ref 70–99)
GLUCOSE BLDC GLUCOMTR-MCNC: 285 MG/DL (ref 70–99)
GLUCOSE BLDC GLUCOMTR-MCNC: 378 MG/DL (ref 70–99)
GLUCOSE BLDC GLUCOMTR-MCNC: 390 MG/DL (ref 70–99)
GLUCOSE BLDC GLUCOMTR-MCNC: 424 MG/DL (ref 70–99)
GLUCOSE BLDC GLUCOMTR-MCNC: 85 MG/DL (ref 70–99)
GLUCOSE SERPL-MCNC: 146 MG/DL (ref 70–99)
HCT VFR BLD AUTO: 41.4 % (ref 40–53)
HGB BLD-MCNC: 13.8 G/DL (ref 13.3–17.7)
IMM GRANULOCYTES # BLD: 0 10E9/L (ref 0–0.4)
IMM GRANULOCYTES NFR BLD: 0.3 %
LACTATE BLD-SCNC: 0.8 MMOL/L (ref 0.7–2)
LYMPHOCYTES # BLD AUTO: 0.7 10E9/L (ref 0.8–5.3)
LYMPHOCYTES NFR BLD AUTO: 10.3 %
MCH RBC QN AUTO: 30.8 PG (ref 26.5–33)
MCHC RBC AUTO-ENTMCNC: 33.3 G/DL (ref 31.5–36.5)
MCV RBC AUTO: 92 FL (ref 78–100)
MONOCYTES # BLD AUTO: 0.4 10E9/L (ref 0–1.3)
MONOCYTES NFR BLD AUTO: 6.4 %
NEUTROPHILS # BLD AUTO: 5.4 10E9/L (ref 1.6–8.3)
NEUTROPHILS NFR BLD AUTO: 81.6 %
NRBC # BLD AUTO: 0 10*3/UL
NRBC BLD AUTO-RTO: 0 /100
PLATELET # BLD AUTO: 148 10E9/L (ref 150–450)
POTASSIUM SERPL-SCNC: 3.8 MMOL/L (ref 3.4–5.3)
RBC # BLD AUTO: 4.48 10E12/L (ref 4.4–5.9)
SODIUM SERPL-SCNC: 135 MMOL/L (ref 133–144)
TROPONIN I SERPL-MCNC: <0.015 UG/L (ref 0–0.04)
TSH SERPL DL<=0.005 MIU/L-ACNC: 1.66 MU/L (ref 0.4–4)
WBC # BLD AUTO: 6.7 10E9/L (ref 4–11)

## 2020-01-18 PROCEDURE — 96361 HYDRATE IV INFUSION ADD-ON: CPT

## 2020-01-18 PROCEDURE — 36415 COLL VENOUS BLD VENIPUNCTURE: CPT | Performed by: EMERGENCY MEDICINE

## 2020-01-18 PROCEDURE — 99219 ZZC INITIAL OBSERVATION CARE,LEVL II: CPT | Performed by: PHYSICIAN ASSISTANT

## 2020-01-18 PROCEDURE — 83605 ASSAY OF LACTIC ACID: CPT | Performed by: EMERGENCY MEDICINE

## 2020-01-18 PROCEDURE — 74019 RADEX ABDOMEN 2 VIEWS: CPT

## 2020-01-18 PROCEDURE — 25000132 ZZH RX MED GY IP 250 OP 250 PS 637: Mod: GY | Performed by: INTERNAL MEDICINE

## 2020-01-18 PROCEDURE — G0378 HOSPITAL OBSERVATION PER HR: HCPCS

## 2020-01-18 PROCEDURE — 84443 ASSAY THYROID STIM HORMONE: CPT | Performed by: EMERGENCY MEDICINE

## 2020-01-18 PROCEDURE — 96372 THER/PROPH/DIAG INJ SC/IM: CPT | Mod: XS

## 2020-01-18 PROCEDURE — 96360 HYDRATION IV INFUSION INIT: CPT

## 2020-01-18 PROCEDURE — 25000132 ZZH RX MED GY IP 250 OP 250 PS 637: Mod: GY | Performed by: PHYSICIAN ASSISTANT

## 2020-01-18 PROCEDURE — 25000131 ZZH RX MED GY IP 250 OP 636 PS 637: Mod: GY | Performed by: PHYSICIAN ASSISTANT

## 2020-01-18 PROCEDURE — 85025 COMPLETE CBC W/AUTO DIFF WBC: CPT | Performed by: EMERGENCY MEDICINE

## 2020-01-18 PROCEDURE — 25800030 ZZH RX IP 258 OP 636: Performed by: EMERGENCY MEDICINE

## 2020-01-18 PROCEDURE — 93005 ELECTROCARDIOGRAM TRACING: CPT

## 2020-01-18 PROCEDURE — 84484 ASSAY OF TROPONIN QUANT: CPT | Performed by: EMERGENCY MEDICINE

## 2020-01-18 PROCEDURE — 80048 BASIC METABOLIC PNL TOTAL CA: CPT | Performed by: EMERGENCY MEDICINE

## 2020-01-18 PROCEDURE — 00000146 ZZHCL STATISTIC GLUCOSE BY METER IP

## 2020-01-18 PROCEDURE — 99285 EMERGENCY DEPT VISIT HI MDM: CPT | Mod: 25

## 2020-01-18 PROCEDURE — 71046 X-RAY EXAM CHEST 2 VIEWS: CPT

## 2020-01-18 RX ORDER — DEXTROSE MONOHYDRATE 25 G/50ML
25-50 INJECTION, SOLUTION INTRAVENOUS
Status: DISCONTINUED | OUTPATIENT
Start: 2020-01-18 | End: 2020-01-19 | Stop reason: HOSPADM

## 2020-01-18 RX ORDER — BISACODYL 10 MG
10 SUPPOSITORY, RECTAL RECTAL DAILY PRN
Status: DISCONTINUED | OUTPATIENT
Start: 2020-01-18 | End: 2020-01-19 | Stop reason: HOSPADM

## 2020-01-18 RX ORDER — NALOXONE HYDROCHLORIDE 0.4 MG/ML
.1-.4 INJECTION, SOLUTION INTRAMUSCULAR; INTRAVENOUS; SUBCUTANEOUS
Status: DISCONTINUED | OUTPATIENT
Start: 2020-01-18 | End: 2020-01-19 | Stop reason: HOSPADM

## 2020-01-18 RX ORDER — POLYETHYLENE GLYCOL 3350 17 G/17G
17 POWDER, FOR SOLUTION ORAL 2 TIMES DAILY
Status: DISCONTINUED | OUTPATIENT
Start: 2020-01-18 | End: 2020-01-19 | Stop reason: HOSPADM

## 2020-01-18 RX ORDER — SIMVASTATIN 20 MG
20 TABLET ORAL DAILY
Status: DISCONTINUED | OUTPATIENT
Start: 2020-01-18 | End: 2020-01-19 | Stop reason: HOSPADM

## 2020-01-18 RX ORDER — ACETAMINOPHEN 500 MG
1000 TABLET ORAL EVERY 8 HOURS PRN
Status: ON HOLD | COMMUNITY
End: 2022-01-01

## 2020-01-18 RX ORDER — TRIAMTERENE/HYDROCHLOROTHIAZID 37.5-25 MG
1 TABLET ORAL EVERY MORNING
Status: DISCONTINUED | OUTPATIENT
Start: 2020-01-18 | End: 2020-01-19 | Stop reason: HOSPADM

## 2020-01-18 RX ORDER — POLYETHYLENE GLYCOL 3350 17 G/17G
17 POWDER, FOR SOLUTION ORAL DAILY PRN
Status: DISCONTINUED | OUTPATIENT
Start: 2020-01-18 | End: 2020-01-18

## 2020-01-18 RX ORDER — ONDANSETRON 2 MG/ML
4 INJECTION INTRAMUSCULAR; INTRAVENOUS EVERY 6 HOURS PRN
Status: DISCONTINUED | OUTPATIENT
Start: 2020-01-18 | End: 2020-01-19 | Stop reason: HOSPADM

## 2020-01-18 RX ORDER — LIDOCAINE 40 MG/G
CREAM TOPICAL
Status: DISCONTINUED | OUTPATIENT
Start: 2020-01-18 | End: 2020-01-19 | Stop reason: HOSPADM

## 2020-01-18 RX ORDER — POLYETHYLENE GLYCOL 3350 17 G/17G
17 POWDER, FOR SOLUTION ORAL ONCE
Status: COMPLETED | OUTPATIENT
Start: 2020-01-18 | End: 2020-01-18

## 2020-01-18 RX ORDER — ACETAMINOPHEN 325 MG/1
650 TABLET ORAL EVERY 4 HOURS PRN
Status: DISCONTINUED | OUTPATIENT
Start: 2020-01-18 | End: 2020-01-19 | Stop reason: HOSPADM

## 2020-01-18 RX ORDER — AMLODIPINE BESYLATE 5 MG/1
5 TABLET ORAL EVERY EVENING
Status: DISCONTINUED | OUTPATIENT
Start: 2020-01-18 | End: 2020-01-19 | Stop reason: HOSPADM

## 2020-01-18 RX ORDER — NICOTINE POLACRILEX 4 MG
15-30 LOZENGE BUCCAL
Status: DISCONTINUED | OUTPATIENT
Start: 2020-01-18 | End: 2020-01-19 | Stop reason: HOSPADM

## 2020-01-18 RX ORDER — ONDANSETRON 4 MG/1
4 TABLET, ORALLY DISINTEGRATING ORAL EVERY 6 HOURS PRN
Status: DISCONTINUED | OUTPATIENT
Start: 2020-01-18 | End: 2020-01-19 | Stop reason: HOSPADM

## 2020-01-18 RX ADMIN — POLYETHYLENE GLYCOL 3350 17 G: 17 POWDER, FOR SOLUTION ORAL at 14:27

## 2020-01-18 RX ADMIN — AMLODIPINE BESYLATE 5 MG: 5 TABLET ORAL at 19:05

## 2020-01-18 RX ADMIN — DEXTROSE AND SODIUM CHLORIDE: 5; 900 INJECTION, SOLUTION INTRAVENOUS at 07:38

## 2020-01-18 RX ADMIN — SIMVASTATIN 20 MG: 20 TABLET, FILM COATED ORAL at 14:27

## 2020-01-18 RX ADMIN — INSULIN ASPART 4 UNITS: 100 INJECTION, SOLUTION INTRAVENOUS; SUBCUTANEOUS at 15:54

## 2020-01-18 RX ADMIN — INSULIN DETEMIR 10 UNITS: 100 INJECTION, SOLUTION SUBCUTANEOUS at 22:07

## 2020-01-18 RX ADMIN — TRIAMTERENE AND HYDROCHLOROTHIAZIDE 1 TABLET: 37.5; 25 TABLET ORAL at 14:27

## 2020-01-18 ASSESSMENT — ENCOUNTER SYMPTOMS
ABDOMINAL PAIN: 0
VOMITING: 0
SHORTNESS OF BREATH: 0
DIARRHEA: 0
FEVER: 0

## 2020-01-18 ASSESSMENT — MIFFLIN-ST. JEOR: SCORE: 1379.42

## 2020-01-18 NOTE — ED TRIAGE NOTES
Pt presents with EMS after family heard him thrashing in bed with altered mental status, BG for EMS on arrival was 33, 250cc of D10 was administered IV, and pt increased to BG of 128 within 5 minutes of arrival. Per EMS, there are family concerns about managing pts blood sugars at home, possible need for reinforced education. ABCs adequate.

## 2020-01-18 NOTE — ED PROVIDER NOTES
History     Chief Complaint:  Altered mental status     HPI  A phone  was used as an  for the following HPI. The patient speaks Citizen of [place].   Eh Callahan is a 80 year old male with a history of diabetes mellitus and recent hospitalization for hypoglycemia on 1/5 who presents with altered mental status. The patient ate normally last night and took insulin as prescribed. This morning, the patient was noticed to be altered per family thus EMS was called. At 0625, the patient had a blood sugar of 33. EMS gave D10 en route. Here, EMS reports blood sugar of 125 that was taken 5 minutes prior to arrival. EMS reports this being an ongoing issue for the patient and family. The patient and daughter report chest pain for the past 1 week. The patient denies any abdominal pain, shortness of breath, fever, vomiting, or diarrhea. Daughter states she help the patient with both short and long acting insulin. The patient took long acting insulin last night around midnight. She reports no changes in medications as of late.     Allergies:  Amlodipine; Lisinopril; and Metoprolol    Medications:    Alendronate    Amlodipine   Tegretol XR   Lasix   Gabapentin   Insulin Aspart   insulin degludec   Ativan   Losartan   Pantoprazole   Simvastatin   Trazodone  Dyazide     Past Medical History:    Atrial fibrillation   Chronic kidney disease   Diabetes mellitus   Hepatitis C  Hypertension   Post traumatic stress disorder   Anatomical narrow angle glaucoma    Depression   Degenerative joint disease   HCC  Generalized anxiety   Insomnia     Past Surgical History:    Appendectomy   Eye surgery   Hepatectomy partial     Family History:    Diabetes mellitus   Kidney disease      Social History:  Marital Status:     Smoker:   Former  Smokeless:   Never   Alcohol:   No   Drugs:   No   Arrives with:   Daughter     Review of Systems   Constitutional: Negative for fever.   Respiratory: Negative for shortness of breath.     Cardiovascular: Positive for chest pain.   Gastrointestinal: Negative for abdominal pain, diarrhea and vomiting.   All other systems reviewed and are negative.    Physical Exam     Patient Vitals for the past 24 hrs:   BP Heart Rate Resp SpO2   01/18/20 0711 (!) 164/64 81 14 97 %     Physical Exam  General: Alert, appears elderly, otherwise well-developed and well-nourished. Cooperative.     In mild distress  HEENT:  Head:  Atraumatic  Ears:  External ears are normal  Mouth/Throat:  Oropharynx is without erythema or exudate and mucous membranes are moist.   Eyes:   Conjunctivae normal and EOM are normal. No scleral icterus.  CV:  Normal rate, regular rhythm, normal heart sounds and radial pulses are 2+ and symmetric.  No murmur.  Resp:  Breath sounds are clear bilaterally    Non-labored, no retractions or accessory muscle use  GI:  Abdomen is soft, no distension, no tenderness. No rebound or guarding.  No CVA tenderness bilaterally  MS:  Normal range of motion. No edema.    Normal strength in all 4 extremities.     Back atraumatic.    No midline cervical, thoracic, or lumbar tenderness  Skin:  Warm and dry.  No rash or lesions noted.  Neuro: Alert. Normal strength.  GCS: 15  Psych:  Normal mood and affect.    Emergency Department Course     ECG:  Indication: cardiac r/o  Completed at 0731.  Read at 0737.   Normal sinus rhythm   Moderate voltage criteria for LVH, may be normal variant   Borderline ECG   Rate 69 bpm. DE interval 176. QRS duration 96. QT/QTc 438/469. P-R-T axes 72 41 59.    Imaging:  Radiographic findings were communicated with the patient, family and Admitting MD who voiced understanding of the findings.    XR Chest 2 Views  IMPRESSION: No acute airspace disease.  Report per radiology     Laboratory:  Laboratory findings were communicated with the patient, family and Admitting MD who voiced understanding of the findings.    CBC:  (L) o/w WNL. (WBC 6.7, HGB 13.8)    BMP: Glucose 146 (H), BUN  45 (H), Creatinine 1.72 (H), GFR Estimate 43 (L) o/w WNL     Troponin (Collected 0824): <0.015  TSH: 1.66    Lactic Acid: 0.8  Glucose by meter (Collected 0710): 85     Glucose by meter (Collected 0742): 100 (H)      Interventions:  0738 D5 infusion 125mL/hr IV     Emergency Department Course:    0702 I performed an exam of the patient as documented above.     EKG obtained in the ED, see results above.   IV was inserted and blood was drawn for laboratory testing, results above.  The patient was sent for a CXR while in the emergency department, results above.     0905 I spoke with Carina Bruce PA-C for Dr. Josue of the Hospitalist service regarding patient's presentation, findings, and plan of care.    0945    I rechecked the patient and discussed the results of their workup thus far.     Findings and plan explained to the Patient who consents to admission. Discussed the patient with Carina Bruce PA-C, for Dr. Josue who will admit the patient to an observation bed for further monitoring, evaluation, and treatment.    I personally reviewed the laboratory and imaging results with the Patient and answered all related questions prior to admission.     Impression & Plan      Medical Decision Making:  Patient is a 80-year-old male with a complex past medical history pertinent for diabetes with need for insulin who presents with hypoglycemia.  Patient has been seen in the emergency department and admitted twice in the last 6 months due to hypoglycemia events.  Unclear if these are all related to medication mismanagement with both long and short acting insulin therapies.  There has been a unclear understanding of whether the patient is a type I or type II diabetic historically.  On review of his chart back in April 2013 there was a note that states 'labs from his endocrinologist office suggested more of a type I diabetic picture with no C-peptide and positive antibodies.'  Certainly with being on both long and short  acting insulin therapies there is the potential for medication mismanagement.  He had required a D5 normal saline drip on arrival here as his glucose levels continue to mildly trend down during the first hour on arrival.  Patient appears back to his mental status baseline. No fevers, low concern for infectious etiology of hypoglycemia. He had also described some chest discomfort which is likely residual from the recent CPR he sustained during a prior hypoglycemia event earlier this month.  Reassuringly patient has no concerning ischemic changes on his EKG although he does have obvious J-point elevation which is seen on historical EKGs and his troponin is undetectable and low concern for ACS with 1 to 2 weeks of ongoing chest discomfort.  I believe most likely his pain is secondary to a musculoskeletal cause.  Patient will be monitored in observation for his hypoglycemia presentation here today.  I do feel that the patient and family may benefit from homecare nursing or social work to be involved as he is had multiple presentations due to hypoglycemia in the past.  Patient also awaits close outpatient follow-up with his endocrinologist which is several weeks away before this appointment occurs.  I spoke with Carina Bruce PA-C at on behalf of Dr. Josue who agreed to observation admission at this time.     Diagnosis:    ICD-10-CM    1. Hypoglycemia E16.2 TSH with free T4 reflex   2. Other specified diabetes mellitus with hypoglycemia and coma, with long-term current use of insulin (H) E13.641     Z79.4     Type 1 more likely based on 4/2013 notes, although type II has been documented in past as well.       Disposition:  Admitted to observation.    Scribe Disclosure: Stewart VANG, am serving as a scribe on 1/18/2020 at 7:02 AM to personally document services performed by Gucci Wan MD based on my observations and the provider's statements to me.     Scribe Disclosure:  ISarah, am serving as a scribe  at 7:45 AM on 1/18/2020 to document services personally performed by Gucci Wan MD based on my observations and the provider's statements to me.        Gucci Wan MD  01/18/20 5221

## 2020-01-18 NOTE — ED NOTES
Bed: ED13  Expected date: 1/18/20  Expected time: 6:54 AM  Means of arrival: Ambulance  Comments:  BV1 80 hypoglycemia

## 2020-01-18 NOTE — PLAN OF CARE
PRIMARY DIAGNOSIS: HYPOGLYCEMIA    OUTPATIENT/OBSERVATION GOALS TO BE MET BEFORE DISCHARGE  BG greater than 100 and less than 250 on two consecutive readings: Yes  Recent Labs   Lab Test 01/18/20  1157 01/18/20  1047 01/18/20  0838   * 151* 123*         Ketones absent from urine  (hyperglycemia): N/A    Tolerating oral intake to maintain hydration: Yes    Return to near baseline physical activity: No-- SBA     Discharge Planner Nurse   Safe discharge environment identified: Yes  Barriers to discharge: Yes       Entered by: Macrina Oleary 01/18/2020 12:53 PM    Pt alert and orientated. Family at bedside. Refused --- paperwork in chart. Up SBA. SL. Blood sugars every 1 x 3. MD will order ACHS ad 0200. Tolerating Reg diet. Plan: Monitor sugars. Supportive cares.     Please review provider order for any additional goals.   Nurse to notify provider when observation goals have been met and patient is ready for discharge.

## 2020-01-18 NOTE — PHARMACY-ADMISSION MEDICATION HISTORY
Admission medication history interview status for this patient is complete. See [facility] admission navigator for allergy information, prior to admission medications and immunization status.     Medication history interview source(s):Family and Caregiver - daughterCandis  Medication history resources (including written lists, pill bottles, clinic record): SureScripts and Care Everywhere  Primary pharmacy: \[facility]\"" Pharmacy - [place] [id number] [address] [phone]    Changes made to PTA medication list:  Added: none  Deleted: alendronate, aspirin, furosemide, gabapentin, lorazepam, losartan, multivitamin, senna-docusate  Changed: acetaminophen 1000mg TID --> 1000mg TID prn. Refresh eye drops 1 drop both eyes QID --> 1 drop both eyes BID. Pantoprazole 40mg daily --> 40mg daily prn.     Actions taken by pharmacist (provider contacted, etc):None     Additional medication history information: Patient has been struggling to get medications covered - daughter is interested in talking to  to help set up secondary prescription coverage.    Medication reconciliation/reorder completed by provider prior to medication history?  Yes     Do you take OTC medications (eg tylenol, ibuprofen, fish oil, eye/ear drops, etc)? Yes -  See list    For patients on insulin therapy: Yes  Lantus/levemir/NPH/toujeo/tresiba/Mix 70/30 dose:  Yes  Sliding scale Novolog: No  If Yes, do you have a baseline novolog pre-meal dose:  -  units with meals  Patients eat three meals a day: Yes  How many episodes of hypoglycemia do you have per week: sometimes 2 hypoglycemic per DAY  How many missed doses do you have per week: 0  How many times do you check your blood glucose per day:  4  Do you have a Continuous glucose monitor (CGM) : No (remind pt that not approved for hospital use)  Any Barriers to therapy - Be specific :  Yes: Insurance coverage is not ideal so cost is an issue.  (cost of medications, comfortable with giving injections (if  applicable), comfortable and confident with current diabetes regimen)      Prior to Admission medications    Medication Sig Last Dose Taking? Auth Provider   acetaminophen (TYLENOL) 500 MG tablet Take 1,000 mg by mouth every 8 hours as needed for mild pain Past Week at Unknown time Yes Unknown, Entered By History   amLODIPine (NORVASC) 5 MG tablet Take 5 mg by mouth every evening 1/17/2020 at Unknown time Yes Unknown, Entered By History   Carboxymeth-Glycerin-Polysorb (REFRESH OPTIVE ADVANCED) 0.5-1-0.5 % SOLN Place 1 drop into both eyes 2 times daily  1/17/2020 at PM Yes Reported, Patient   cholecalciferol (D 5000) 5000 UNITS CAPS Take 5,000 Units by mouth daily  1/17/2020 at AM Yes Reported, Patient   Cyanocobalamin (B-12 PO) Take 1 capsule by mouth daily 1/17/2020 at AM Yes Unknown, Entered By History   insulin aspart (NOVOLOG PEN) 100 UNIT/ML injection Inject 3 Units Subcutaneous 3 times daily (with meals)  1/17/2020 at 2200 Yes Reported, Patient   Insulin Degludec (TRESIBA SC) Inject 20 Units Subcutaneous At Bedtime 1/17/2020 at PM Yes Unknown, Entered By History   oxyCODONE IR (ROXICODONE) 5 MG tablet Take 5 mg by mouth every 6 hours as needed  Past Month at Unknown time Yes Reported, Patient   pantoprazole (PROTONIX) 40 MG enteric coated tablet Take 40 mg by mouth daily as needed  Past Month at Unknown time Yes Reported, Patient   polyethylene glycol (MIRALAX/GLYCOLAX) packet Take 17 g by mouth daily as needed for constipation 1/15/2020 Yes Brandan Ribeiro MD   simvastatin (ZOCOR) 20 MG tablet Take 1 tablet (20 mg) by mouth daily 1/17/2020 at PM Yes Cindy Maier MD   timolol (TIMOPTIC) 0.5 % ophthalmic solution PLACE 1 DROP INTO BOTH EYES ONCE DAILY. 1/17/2020 at PM Yes Reported, Patient   traZODone (DESYREL) 150 MG tablet Take 75 mg by mouth At Bedtime  1/17/2020 at PM Yes Unknown, Entered By History   triamterene-HCTZ (DYAZIDE) 37.5-25 MG capsule Take 1 capsule by mouth every morning  "1/17/2020 at AM Yes Unknown, Entered By History   blood glucose monitoring (GREGORIO CONTOUR) test strip 4 times daily    Reported, Patient   Blood Glucose Monitoring Suppl (Material Wrld GLUCOSE METER 2.0) W/DEVICE KIT as needed for blood glucose monitoring   Reported, Patient   Blood Pressure Monitoring (RA BLOOD PRESSURE CUFF MONITOR) MISC Take blood pressure once daily   Reported, Patient   Insulin Pen Needle (PEN NEEDLES 5/16\") 31G X 8 MM MISC 4 x daily   Reported, Patient           "

## 2020-01-18 NOTE — PLAN OF CARE
PRIMARY DIAGNOSIS: HYPO/HYPERGLYCEMIA    OUTPATIENT/OBSERVATION GOALS TO BE MET BEFORE DISCHARGE  BG greater than 100 and less than 250 on two consecutive readings: No  Recent Labs   Lab Test 01/18/20  1547 01/18/20  1425 01/18/20  1252   * 378* 285*         Ketones absent from urine  (hyperglycemia): N/A  Tolerating oral intake to maintain hydration: Yes    Return to near baseline physical activity: Yes    Discharge Planner Nurse   Safe discharge environment identified: Yes  Barriers to discharge: Yes       Entered by: Brenden Diaz 01/18/2020 5:32 PM     Please review provider order for any additional goals.   Nurse to notify provider when observation goals have been met and patient is ready for discharge.    RN - VSS. Denies pain. Pt stating no bowel movement yet - would reconsider suppository later. Starting sliding scale - BG for dinner is 424 - pt requesting less insulin that what is ordered. Receiving 4 units. Will monitor later. Planning to start new long acting insulin tonight.

## 2020-01-18 NOTE — PLAN OF CARE
ROOM # [id number]    Living Situation (if not independent, order SW consult): Lives with Family   Facility name:  : Daughter/Son at bedside     Activity level at baseline: Independent   Activity level on admit: Assist of 1       Patient registered to observation; given Patient Bill of Rights; given the opportunity to ask questions about observation status and their plan of care.  Patient has been oriented to the observation room, bathroom and call light is in place.    Discussed discharge goals and expectations with patient/family.

## 2020-01-18 NOTE — H&P
[facility] Outpatient / Observation Unit  History and Physical Exam     Eh Callahan MRN# [id number]   YOB: 1940 Age: 80 year old      Date of Admission:  1/18/2020    Primary care provider: No Ref-Primary, Physician          Assessment:   Eh Callahan is a 80 year old male with a PMH significant for PAF, CKD, DM, HTN and hx of Hepatitis C, who presents with hypoglycemia.  Work up in the ED reveals: VSS. BMP shows a stable Cr of 1.72, BUN of 45 and glucose was 146, otherwise unremarkable. CBC is unremarkable. Troponin is undetectable. TSH 1.66 and lactic acid is normal at 0.8. EKG SR. He was started on D5 NS IVFs at 125 mL/hr. He did receive D10 en route via EMS.  Patient will be registered to Observation for further evaluation and symptom management.     1. Hypoglycemia - hx of DM, has been documented as Type I and II in his chart. He notes he was diagnosed 18 yrs ago, likely Type II. He has had recurrent issues with hypoglycemia on Tresiba. Family reports he's been on this for ~2yrs, switched from Lantus due to insurance coverage. Hypoglycemic effects of Tresiba may be enhanced by his CKD, recommend stopping Tresiba and switch to alternative long acting insulin. Discussed options with Pharmacist, Levemir is covered by his insurance. Will start 10 units tonight and titrate as an outpatient. Hold sliding scale insulin here, check glucose q1hr x2, if stable, then 4 times daily before meals and before bed. Likely resume meal time insulin on discharge.  Addendum: glucose now >300, will add sliding scale insulin  2. Constipation - complains of diffuse abdominal pain, tender on exam. Obtained abd XR, shows moderate stool burden. Will schedule Miralax BID and PRN dulcolax suppository.   3. CKD - Cr 1.72 which is maybe slightly higher than baseline, 1.4-1.6. may be contributing to prolonged action of Tresiba.  4. HTN - resume home amlodipine and Maxzide with parameters         Plan:     1. [place] to Observation  2.  Hold Tresiba  3. Glucose checks every 1 hr x2, if stable, then 4 times daily and before bed.  4. Abdominal XR, if not obstruction, start bowel regimen  5. Regular diet as tolerated  6. Follow labs  7. DVT prophylaxis: pt at low risk, encourage ambulation.      Addendum: glucose now >300, will add sliding scale insulin              Chief Complaint:   Hypoglycemia          History of Present Illness:   Eh Callahan is a 80 year old male with a PMH significant for PAF, CKD, DM, HTN and hx of Hepatitis C, who presents with AMS and hypoglycemia. Pt took normal dose of Tresiba last night and ate a good dinner. This morning, he was noted to be altered by family. They checked his glucose and it was noted to be 33. EMS was called and he was given an amp of D10 and glucose improved to 125. He denies recent fever or illness. He denies chest pain, SOB, nausea, vomiting, diarrhea or dysuria. He does note constipation. He has had previous episodes of hypoglycemia, last hospitalization was due to a med error at home. The family notes he was started on Tresiba about 2 yrs ago and has had several episodes of hypoglycemia. Family notes he was on Lantus previously and tolerated it well but was switched due to insurance coverage. He notes diffuse abdominal pain and hx of constipation.               Past Medical History:     Past Medical History:   Diagnosis Date     Anatomical narrow angle glaucoma      Atrial fibrillation (H)      Chronic infection     history of hepatitis C     CKD (chronic kidney disease) stage 2, GFR 60-89 ml/min      Diabetes mellitus (H)      Hepatitis C without mention of hepatic coma      Hypertension      Palpitations      PTSD (post-traumatic stress disorder)                Past Surgical History:     Past Surgical History:   Procedure Laterality Date     APPENDECTOMY       EYE SURGERY       LAPAROSCOPIC HEPATECTOMY PARTIAL Left 11/22/2016    Procedure: LAPAROSCOPIC HEPATECTOMY PARTIAL;  Surgeon: Rick Mckeon  MD [place];  Location:  OR               Social History:     Social History     Socioeconomic History     Marital status:      Spouse name: Not on file     Number of children: Not on file     Years of education: Not on file     Highest education level: Not on file   Occupational History     Not on file   Social Needs     Financial resource strain: Not on file     Food insecurity:     Worry: Not on file     Inability: Not on file     Transportation needs:     Medical: Not on file     Non-medical: Not on file   Tobacco Use     Smoking status: Former Smoker     Smokeless tobacco: Never Used   Substance and Sexual Activity     Alcohol use: No     Alcohol/week: 0.0 standard drinks     Drug use: No     Sexual activity: Not on file   Lifestyle     Physical activity:     Days per week: Not on file     Minutes per session: Not on file     Stress: Not on file   Relationships     Social connections:     Talks on phone: Not on file     Gets together: Not on file     Attends [employer] service: Not on file     Active member of club or organization: Not on file     Attends meetings of clubs or organizations: Not on file     Relationship status: Not on file     Intimate partner violence:     Fear of current or ex partner: Not on file     Emotionally abused: Not on file     Physically abused: Not on file     Forced sexual activity: Not on file   Other Topics Concern     Parent/sibling w/ CABG, MI or angioplasty before 65F 55M? No   Social History Narrative    Immigrated in 2002 from [place].  Previously lived in [place] before moving here.  Daughter lives locally as well.                 Family History:     Family History   Problem Relation Age of Onset     Diabetes No family hx of         He is not aware of any diabetes in his family     Kidney Disease No family hx of               Allergies:      Allergies   Allergen Reactions     Amlodipine Swelling     Edema at 10 mg , fine at 5 mg     Lisinopril Cough     Metoprolol       Other reaction(s): Bradycardia  Metoprolol at 50mg bid -> HR 45 -50, unclear if sx (has fatigue)  May tolerate lower doses if needed               Medications:     Prior to Admission medications    Medication Sig Last Dose Taking? Auth Provider   acetaminophen (TYLENOL) 500 MG tablet Take 2 tablets (1,000 mg) by mouth 3 times daily   Rick Mckeon MD   alendronate (FOSAMAX) 70 MG tablet 70 mg every 7 days Take 1 tablet by mouth twice weekly.   Reported, Patient   amLODIPine (NORVASC) 5 MG tablet Take 5 mg by mouth every evening   Unknown, Entered By History   amLODIPine (NORVASC) 5 MG tablet Take 5 mg by mouth   Reported, Patient   ASPIRIN PO Take 81 mg by mouth   Reported, Patient   blood glucose monitoring (Retailo CONTOUR) test strip USE TO MEASURE YOUR BLOOD GLUCOSE 6 TIMES DAILY   Reported, Patient   Blood Glucose Monitoring Suppl (FIFTY50 GLUCOSE METER 2.0) W/DEVICE KIT as needed for blood glucose monitoring   Reported, Patient   Blood Pressure Monitoring (RA BLOOD PRESSURE CUFF MONITOR) MISC Take blood pressure once daily   Reported, Patient   carBAMazepine (TEGRETOL XR) 100 MG 12 hr tablet Take 1 tablet (100 mg) by mouth 2 times daily as needed  Patient not taking: Reported on 9/26/2018   Magdalena Santiago MD   Carboxymeth-Glycerin-Polysorb (REFRESH OPTIVE ADVANCED) 0.5-1-0.5 % SOLN PLACE 1 DROP INTO BOTH EYES 4 (FOUR) TIMES DAILY.   Reported, Patient   carboxymethylcellulose (REFRESH PLUS) 0.5 % SOLN Place 1 drop into both eyes 4 times daily    Reported, Patient   cholecalciferol (D 5000) 5000 UNITS CAPS Reported on 4/12/2017   Reported, Patient   Cyanocobalamin (B-12 PO) Take 1 capsule by mouth daily   Unknown, Entered By History   furosemide (LASIX) 20 MG tablet Take 1 tablet (20 mg) by mouth daily   Allan Justice MD   gabapentin (NEURONTIN) 300 MG capsule Take one cap three times daily   Lelo Patel APRN CNP   insulin aspart (NOVOLOG PEN) 100 UNIT/ML injection 3  "units before each meal.   Reported, Patient   Insulin Aspart (NOVOLOG SC) Inject Subcutaneous 3 times daily (with meals) Sliding scale: 350 mg/dL - 3 units, 450 mg/dL - 4 units   Unknown, Entered By History   Insulin Degludec (TRESIBA SC) Inject 20 Units Subcutaneous At Bedtime   Unknown, Entered By History   insulin degludec (TRESIBA) 100 UNIT/ML pen Inject 14 Units Subcutaneous   Reported, Patient   Insulin Pen Needle (PEN NEEDLES 5/16\") 31G X 8 MM MISC 4 x daily   Reported, Patient   [employer] LANCETS 21G MISC Uses 6 times daily   Reported, Patient   LORazepam (ATIVAN) 1 MG tablet Take 1 mg by mouth as needed   Reported, Patient   losartan (COZAAR) 100 MG tablet Take 1 tablet by mouth daily   Reported, Patient   Multiple vitamin TABS Take 1 tablet by mouth   Reported, Patient   Multiple Vitamins-Minerals (MULTIVITAMIN & MINERAL PO)    Reported, Patient   oxyCODONE IR (ROXICODONE) 5 MG tablet Take 5 mg by mouth   Reported, Patient   pantoprazole (PROTONIX) 40 MG enteric coated tablet Take 40 mg by mouth daily    Reported, Patient   polyethylene glycol (MIRALAX/GLYCOLAX) packet Take 17 g by mouth daily as needed for constipation   Brandan Ribeiro MD   senna-docusate (SENOKOT-S;PERICOLACE) 8.6-50 MG per tablet Take 1 tablet by mouth 2 times daily  Patient not taking: Reported on 3/8/2018   Rick Mckeon MD   simvastatin (ZOCOR) 20 MG tablet Take 1 tablet (20 mg) by mouth daily   Cindy Maier MD   SIMVASTATIN PO Take 1 tablet by mouth At Bedtime   Unknown, Entered By History   timolol (TIMOPTIC) 0.5 % ophthalmic solution PLACE 1 DROP INTO BOTH EYES ONCE DAILY.   Reported, Patient   traZODone (DESYREL) 50 MG tablet Take 50 mg by mouth   Reported, Patient   TRAZODONE HCL PO Take 0.5 tablets by mouth At Bedtime   Unknown, Entered By History   triamterene-HCTZ (DYAZIDE) 37.5-25 MG capsule Take 1 capsule by mouth every morning   Unknown, Entered By History   VITAMIN D PO Take 1 capsule by mouth daily  " " Unknown, Entered By History              Review of Systems:   A Comprehensive greater than 10 system review of systems was carried out.  Pertinent positives and negatives are noted above.  Otherwise negative for contributory information.     Constitutional, neuro, ENT, endocrine, pulmonary, cardiac, gastrointestinal, genitourinary, musculoskeletal, integument and psychiatric systems are negative, except as otherwise noted.         Physical Exam:   Blood pressure (!) 174/78, pulse 76, temperature 97.1  F (36.2  C), temperature source Temporal, resp. rate 16, height 1.702 m (5' 7\"), weight 71.1 kg (156 lb 11.2 oz), SpO2 98 %.    GENERAL:  Comfortable.  PSYCH: pleasant, oriented, No acute distress.  HEENT:  Atraumatic, normocephalic. Normal conjunctiva, normal hearing, and oropharynx is normal.  NECK:  Supple, no neck vein distention.  HEART:  Normal S1, S2 with no murmur, no pericardial rub, gallops or S3 or S4.  LUNGS:  Clear to auscultation, normal Respiratory effort. No wheezing, rales or ronchi.  GI:  Soft, normal bowel sounds. Diffuse tenderness, non distended.   EXTREMITIES:  No pedal edema, +2 pulses bilateral and equal.  SKIN:  Dry to touch, No rash, wound or ulcerations.  NEUROLOGIC:  CN 2-12 intact, BL 5/5 symmetric upper and lower extremity strength, sensation is intact with no focal deficits.              Data:     EKG demonstrates:  Sinus Rhythm.    Recent Labs   Lab 01/18/20  0824   WBC 6.7   HGB 13.8   HCT 41.4   MCV 92   *     Recent Labs   Lab 01/18/20  0824      POTASSIUM 3.8   CHLORIDE 103   CO2 29   ANIONGAP 3   *   BUN 45*   CR 1.72*   GFRESTIMATED 37*   GFRESTBLACK 43*   DOMINGO 8.8     Recent Labs   Lab 01/18/20  1252 01/18/20  1157 01/18/20  1047 01/18/20  0838 01/18/20  0824 01/18/20  0742   GLC  --   --   --   --  146*  --    * 147* 151* 123*  --  100*     Recent Labs   Lab 01/18/20  0824   LACT 0.8     Recent Labs   Lab 01/18/20  0824   TSH 1.66     Recent Labs   Lab " 01/18/20  0824   TROPI <0.015         Recent Results (from the past 48 hour(s))   XR Chest 2 Views    Narrative    CHEST TWO VIEWS    1/18/2020 8:15 AM     HISTORY: Chest pain for two weeks.    COMPARISON: Chest x-ray on 1/5/2020.      Impression    IMPRESSION: No acute airspace disease.    DYLAN PEACOCK MD   XR Abdomen 2 Views    Narrative    ABDOMEN TWO VIEWS    1/18/2020 1:19 PM     HISTORY: Abdominal pain, constipation.      Impression    IMPRESSION: There is a moderately large amount stool in the colon and  rectum. No dilated loops of bowel. No pneumatosis, free air or portal  venous gas. No abnormal calcifications or osseous pathology.       Carina Bruce PA-C

## 2020-01-18 NOTE — ED NOTES
[facility]  ED Nurse Handoff Report    Eh Callahan is a 80 year old male   ED Chief complaint: Hypoglycemia  . ED Diagnosis:   Final diagnoses:   Hypoglycemia   Type 2 diabetes mellitus with hypoglycemia and coma, with long-term current use of insulin (H)     Allergies:   Allergies   Allergen Reactions     Amlodipine Swelling     Edema at 10 mg , fine at 5 mg     Lisinopril Cough     Metoprolol      Other reaction(s): Bradycardia  Metoprolol at 50mg bid -> HR 45 -50, unclear if sx (has fatigue)  May tolerate lower doses if needed       Code Status: Full Code  Activity level - Baseline/Home:  Stand by Assist. Activity Level - Current:   Assist X 1. Lift room needed: No. Bariatric: No   Needed: Jonny.   Isolation: No. Infection: Not Applicable.      Vital Signs:   Vitals:    01/18/20 0745 01/18/20 0800 01/18/20 0843 01/18/20 0845   BP: (!) 146/78  139/87 (!) 146/85   Pulse: 69  69 67   Resp: 12 16  13   SpO2: 97% 96%  95%       Cardiac Rhythm:  ,      Pain level: 0-10 Pain Scale: 0  Patient confused: No. Patient Falls Risk: Yes.   Elimination Status: Urinal at bedside with Ax1.  Patient Report - Initial Complaint: Pt presents with EMS after family heard him thrashing in bed with altered mental status, BG for EMS on arrival was 33, 250cc of D10 was administered IV, and pt increased to BG of 128 within 5 minutes of arrival. Per EMS, there are family concerns about managing pts blood sugars at home, possible need for reinforced education. Focused Assessment:   07:19 Neurological Cognitive - Cognitive/Neuro/Behavioral WDL: -WDL except (low blood sugar at home this morning) Level Of Consciousness: alert  Orientation: oriented x 4  Follows Commands: yes  Best Language: 0 - No aphasia   Vasquez Coma Scale - Best Eye Response: 4-->(E4) spontaneous  Best Motor Response: 6-->(M6) obeys commands  Best Verbal Response: 5-->(V5) oriented  [place] Coma Scale Score: 15       Tests Performed: Labs, xray.  Abnormal Results:   Labs Ordered and Resulted from Time of ED Arrival Up to the Time of Departure from the ED   CBC WITH PLATELETS DIFFERENTIAL - Abnormal; Notable for the following components:       Result Value    Platelet Count 148 (*)     Absolute Lymphocytes 0.7 (*)     All other components within normal limits   BASIC METABOLIC PANEL - Abnormal; Notable for the following components:    Glucose 146 (*)     Urea Nitrogen 45 (*)     Creatinine 1.72 (*)     GFR Estimate 37 (*)     GFR Estimate If Black 43 (*)     All other components within normal limits   GLUCOSE BY METER - Abnormal; Notable for the following components:    Glucose 100 (*)     All other components within normal limits   GLUCOSE BY METER   TROPONIN I   TSH WITH FREE T4 REFLEX   LACTIC ACID WHOLE BLOOD   GLUCOSE MONITOR NURSING POCT     XR Chest 2 Views   Preliminary Result   IMPRESSION: No acute airspace disease.         Treatments provided: D5 NS gtt, frequent glucose checks.  Family Comments: Daughter at bedside, helps pt with cares and home and med administration  OBS brochure/video discussed/provided to patient:  Yes, showed to daugther.  ED Medications:   Medications   dextrose 5% and 0.9% NaCl infusion ( Intravenous New Bag 1/18/20 0738)     Drips infusing:  Yes  For the majority of the shift, the patient's behavior Green. Interventions performed were None.     Severe Sepsis OR Septic Shock Diagnosis Present: No      ED Nurse Name/Phone Number: Noah Lopez RN,   8:58 AM    RECEIVING UNIT ED HANDOFF REVIEW    Above ED Nurse Handoff Report was reviewed: Yes  Reviewed by: Macrina Oleary RN on January 18, 2020 at 10:01 AM

## 2020-01-19 VITALS
HEART RATE: 83 BPM | RESPIRATION RATE: 20 BRPM | DIASTOLIC BLOOD PRESSURE: 77 MMHG | SYSTOLIC BLOOD PRESSURE: 156 MMHG | BODY MASS INDEX: 24.6 KG/M2 | OXYGEN SATURATION: 100 % | HEIGHT: 67 IN | TEMPERATURE: 95.8 F | WEIGHT: 156.7 LBS

## 2020-01-19 LAB
GLUCOSE BLDC GLUCOMTR-MCNC: 168 MG/DL (ref 70–99)
INTERPRETATION ECG - MUSE: NORMAL

## 2020-01-19 PROCEDURE — 25000131 ZZH RX MED GY IP 250 OP 636 PS 637: Mod: GY | Performed by: PHYSICIAN ASSISTANT

## 2020-01-19 PROCEDURE — 96372 THER/PROPH/DIAG INJ SC/IM: CPT

## 2020-01-19 PROCEDURE — 00000146 ZZHCL STATISTIC GLUCOSE BY METER IP

## 2020-01-19 PROCEDURE — 99217 ZZC OBSERVATION CARE DISCHARGE: CPT | Performed by: PHYSICIAN ASSISTANT

## 2020-01-19 PROCEDURE — G0378 HOSPITAL OBSERVATION PER HR: HCPCS

## 2020-01-19 PROCEDURE — 25000132 ZZH RX MED GY IP 250 OP 250 PS 637: Mod: GY | Performed by: PHYSICIAN ASSISTANT

## 2020-01-19 RX ORDER — POLYETHYLENE GLYCOL 3350 17 G/17G
17 POWDER, FOR SOLUTION ORAL 2 TIMES DAILY PRN
Qty: 30 PACKET | Refills: 1 | Status: ON HOLD | COMMUNITY
Start: 2020-01-19 | End: 2021-08-05

## 2020-01-19 RX ORDER — BISACODYL 10 MG
10 SUPPOSITORY, RECTAL RECTAL DAILY PRN
Status: ON HOLD | COMMUNITY
Start: 2020-01-19 | End: 2021-08-05

## 2020-01-19 RX ADMIN — INSULIN ASPART 1 UNITS: 100 INJECTION, SOLUTION INTRAVENOUS; SUBCUTANEOUS at 08:38

## 2020-01-19 RX ADMIN — TRIAMTERENE AND HYDROCHLOROTHIAZIDE 1 TABLET: 37.5; 25 TABLET ORAL at 08:17

## 2020-01-19 RX ADMIN — INSULIN ASPART 3 UNITS: 100 INJECTION, SOLUTION INTRAVENOUS; SUBCUTANEOUS at 08:38

## 2020-01-19 RX ADMIN — POLYETHYLENE GLYCOL 3350 17 G: 17 POWDER, FOR SOLUTION ORAL at 08:17

## 2020-01-19 RX ADMIN — SIMVASTATIN 20 MG: 20 TABLET, FILM COATED ORAL at 08:17

## 2020-01-19 NOTE — PLAN OF CARE
PRIMARY DIAGNOSIS: HYPOGLYCEMIA    OUTPATIENT/OBSERVATION GOALS TO BE MET BEFORE DISCHARGE  BG greater than 100 and less than 250 on two consecutive readings: No  Recent Labs   Lab Test 01/18/20  2142 01/18/20  1547 01/18/20  1425   * 424* 378*         Ketones absent from urine  (hyperglycemia): NA    Tolerating oral intake to maintain hydration: Yes    Return to near baseline physical activity: Yes    Discharge Planner Nurse   Safe discharge environment identified: Yes  Barriers to discharge: Yes       Entered by: Roosevelt Mcdaniels 01/19/2020 3:35 AM     VSS, no complaints.  Tolerating PO.    Please review provider order for any additional goals.   Nurse to notify provider when observation goals have been met and patient is ready for discharge.

## 2020-01-19 NOTE — PLAN OF CARE
Patient's After Visit Summary was reviewed with patient and daughter  Patient verbalized understanding of After Visit Summary, recommended follow up and was given an opportunity to ask questions.   Discharge medications sent home with patient/family: YES but sent to test strips sent to [facility] due to medicare coverage  Discharged with daughter      OBSERVATION patient END time: 1250

## 2020-01-19 NOTE — PLAN OF CARE
PRIMARY DIAGNOSIS: HYPOGLYCEMIA    OUTPATIENT/OBSERVATION GOALS TO BE MET BEFORE DISCHARGE  BG greater than 100 and less than 250 on two consecutive readings: No  Recent Labs   Lab Test 01/19/20  0805 01/18/20  2142 01/18/20  1547   * 390* 424*         Ketones absent from urine  (hyperglycemia): N/A    Tolerating oral intake to maintain hydration: Yes    Return to near baseline physical activity: Yes    Discharge Planner Nurse   Safe discharge environment identified: Yes  Barriers to discharge: No       Entered by: Macrina Oleary 01/19/2020 10:36 AM    Pt alert and orientated. VSS. Tolerating PO meds and mod carb diet. Up Independent. Family at bedside. BS checks. Plan: Will discharge home.     Please review provider order for any additional goals.   Nurse to notify provider when observation goals have been met and patient is ready for discharge.

## 2020-01-19 NOTE — CONSULTS
CM consulted as pt has no PCP listed. Per chart review, pt was seen by CM on 1/6 and indicated that he sees Dr. Basilio Alfaro at [facility]. Chart updated w/ this information. Updated provider, no other discharge needs at this time. CM will not plan to see pt, please call if need arises. Provider reporting pt is in need of lancets and test trips, she will order through [employer].     Jade Harrison RN BSN   Inpatient Care Coordination  [facility]   Phone [phone]

## 2020-01-19 NOTE — PLAN OF CARE
PRIMARY DIAGNOSIS: HYPOGLYCEMIA    OUTPATIENT/OBSERVATION GOALS TO BE MET BEFORE DISCHARGE  BG greater than 100 and less than 250 on two consecutive readings: No  Recent Labs   Lab Test 01/18/20  2142 01/18/20  1547 01/18/20  1425   * 424* 378*         Ketones absent from urine  (hyperglycemia): NA    Tolerating oral intake to maintain hydration: Yes    Return to near baseline physical activity: Yes    Discharge Planner Nurse   Safe discharge environment identified: Yes  Barriers to discharge: Yes       Entered by: Roosevelt Mcdaniels 01/19/2020 12:32 AM     VSS.  Tolerating PO.      Please review provider order for any additional goals.   Nurse to notify provider when observation goals have been met and patient is ready for discharge.

## 2020-01-19 NOTE — PLAN OF CARE
PRIMARY DIAGNOSIS: HYPO/HYPERGLYCEMIA    OUTPATIENT/OBSERVATION GOALS TO BE MET BEFORE DISCHARGE  BG greater than 100 and less than 250 on two consecutive readings: No  Recent Labs   Lab Test 01/18/20  2142 01/18/20  1547 01/18/20  1425   * 424* 378*         Ketones absent from urine  (hyperglycemia): N/A    Tolerating oral intake to maintain hydration: Yes    Return to near baseline physical activity: Yes    Discharge Planner Nurse   Safe discharge environment identified: Yes  Barriers to discharge: No       Entered by: Brenden Diaz 01/18/2020 10:19 PM     Please review provider order for any additional goals.   Nurse to notify provider when observation goals have been met and patient is ready for discharge.    RN - BG remains slightly more elevated - Receiving appropriate sliding scale dose for bed time. Starting levemir this evening. Pt also eating late meal. Pt family at bedside. Likely discharging tomorrow.

## 2020-01-19 NOTE — DISCHARGE SUMMARY
[facility] Outpatient / Observation Unit  Discharge Summary        Eh Callahan MRN# [id number]   YOB: 1940 Age: 80 year old     Date of Admission:  1/18/2020  Date of Discharge:  1/19/2020  Admitting Physician:  Harry Josue MD  Discharge Physician: Carina Bruce PA-C  Discharging Service: Hospitalist      Primary Provider: System, Provider Not In  Primary Care Physician Phone Number: None         Primary Discharge Diagnoses:    Eh Callahan is a 80 year old male with a PMH significant for PAF, CKD, DM, HTN and hx of Hepatitis C, who was admitted on 1/18/2020 for concerns of hypoglycemia.      1. Hypoglycemia - hx of DM, has been documented as Type I and II in his chart. He notes he was diagnosed 18 yrs ago, likely Type II. He has had recurrent issues with hypoglycemia on Tresiba. Family reports he's been on this for ~2yrs, switched from Lantus due to insurance coverage. Hypoglycemic effects of Tresiba may be enhanced by his CKD. Will switch to Levemir at bedtime, glucose 168 in AM after. Continue home mealtime insulin, 3 units with each meal plus home sliding scale insulin. Follow up with PCP to titrate dose of Levemir as needed. Recommend establishing care with endocrinology  2. Constipation - adjusted bowel meds, senna and miralax BID with dulcolax suppository daily, hold for diarrhea.         Secondary Discharge Diagnoses:     Past Medical History:   Diagnosis Date     Anatomical narrow angle glaucoma      Atrial fibrillation (H)      Chronic infection     history of hepatitis C     CKD (chronic kidney disease) stage 2, GFR 60-89 ml/min      Diabetes mellitus (H)      Hepatitis C without mention of hepatic coma      Hypertension      Palpitations      PTSD (post-traumatic stress disorder)                 Code Status:      Full Code        Brief Hospital Summary:        Reason for your hospital stay      Hypoglycemia. Initially held all diabetic agents, glucose elevated.   Tresiba was stopped and  switched to Levemir. Continue meal time insulin at   home.             Please refer to initial admission history and physical for further details.   Briefly, Eh Callahan was admitted on 1/18/2020 for concerns of altered mental status and hypoglycemia.   Initial work up in the ED did not reveal evidence of significant acute infection. Glucose stable here. Imaging included clear CXR. Pt was registered to the Observation Unit for further evaluation.   Pt was provided supportive cares. Insulin was initially held. Pt became hyperglycemic. sliding scale insulin added. After discussion with pharmacy, Levemir was started as this was covered by patient's insurance. Abdominal XR was done to rule out obstruction, moderate stool noted, negative for obstruction. Bowel regimen started. Labs were reviewed and significant results addressed.  On the day of discharge, pt's glucose had improved and remained stable. Medications were reviewed and adjustments made as necessary. Pt is instructed to follow up as below.           Significant Lab During Hospitalization:      Recent Labs   Lab 01/18/20  0824   WBC 6.7   HGB 13.8   HCT 41.4   MCV 92   *     Recent Labs   Lab 01/18/20  0824      POTASSIUM 3.8   CHLORIDE 103   CO2 29   ANIONGAP 3   *   BUN 45*   CR 1.72*   GFRESTIMATED 37*   GFRESTBLACK 43*   DOMINGO 8.8     Recent Labs   Lab 01/19/20  0805 01/18/20  2142 01/18/20  1547 01/18/20  1425 01/18/20  1252  01/18/20  0824   GLC  --   --   --   --   --   --  146*   * 390* 424* 378* 285*   < >  --     < > = values in this interval not displayed.     Recent Labs   Lab 01/18/20  0824   LACT 0.8     Recent Labs   Lab 01/18/20  0824   TSH 1.66     Recent Labs   Lab 01/18/20  0824   TROPI <0.015                Significant Imaging During Hospitalization:      Recent Results (from the past 48 hour(s))   XR Chest 2 Views    Narrative    CHEST TWO VIEWS    1/18/2020 8:15 AM     HISTORY: Chest pain for two  weeks.    COMPARISON: Chest x-ray on 1/5/2020.      Impression    IMPRESSION: No acute airspace disease.    DYLAN PEACOCK MD   XR Abdomen 2 Views    Narrative    ABDOMEN TWO VIEWS    1/18/2020 1:19 PM     HISTORY: Abdominal pain, constipation.      Impression    IMPRESSION: There is a moderately large amount stool in the colon and  rectum. No dilated loops of bowel. No pneumatosis, free air or portal  venous gas. No abnormal calcifications or osseous pathology.    ISSAC FOSTER MD              Pending Results:        Unresulted Labs Ordered in the Past 30 Days of this Admission     No orders found for last 31 day(s).              Consultations This Hospital Stay:      No consultations were requested during this admission         Discharge Instructions and Follow-Up:      Follow-up Appointments     Follow-up and recommended labs and tests       Follow up with primary care provider, Provider Not In System, within 7   days for hospital follow- up.  The following labs/tests are recommended:   glucose. Titrate Levemir as needed.  Discussed bowel regimen. Recommend Miralax twice daily and Dulcolax daily   until having regular bowel movements. Hold if you develop diarrhea.           Pt instructed to follow up with PCP in 7 days   Follow-up Labs glucose. HgbA1c in 2 months.        Discharge Disposition:      Discharged to home         Discharge Medications:        Current Discharge Medication List      START taking these medications    Details   bisacodyl (DULCOLAX) 10 MG suppository Place 1 suppository (10 mg) rectally daily as needed for constipation      blood glucose monitoring (NO BRAND SPECIFIED) meter device kit Use to test blood sugar 4 times daily or as directed.  Qty: 1 kit, Refills: 0    Associated Diagnoses: Hypoglycemia; Diabetes mellitus due to underlying condition without complication, with long-term current use of insulin (H)      insulin detemir (LEVEMIR PEN) 100 UNIT/ML pen Inject 10 Units  Subcutaneous At Bedtime  Qty: 3 mL, Refills: 0    Associated Diagnoses: Diabetes mellitus due to underlying condition without complication, with long-term current use of insulin (H)         CONTINUE these medications which have CHANGED    Details   polyethylene glycol (MIRALAX/GLYCOLAX) packet Take 17 g by mouth 2 times daily  Qty: 30 packet, Refills: 1    Associated Diagnoses: History of liver excision         CONTINUE these medications which have NOT CHANGED    Details   acetaminophen (TYLENOL) 500 MG tablet Take 1,000 mg by mouth every 8 hours as needed for mild pain      amLODIPine (NORVASC) 5 MG tablet Take 5 mg by mouth every evening      Carboxymeth-Glycerin-Polysorb (REFRESH OPTIVE ADVANCED) 0.5-1-0.5 % SOLN Place 1 drop into both eyes 2 times daily     Associated Diagnoses: HCC (hepatocellular carcinoma) (H)      cholecalciferol (D 5000) 5000 UNITS CAPS Take 5,000 Units by mouth daily       Cyanocobalamin (B-12 PO) Take 1 capsule by mouth daily      insulin aspart (NOVOLOG PEN) 100 UNIT/ML injection Inject 3 Units Subcutaneous 3 times daily (with meals)     Associated Diagnoses: HCC (hepatocellular carcinoma) (H)      oxyCODONE IR (ROXICODONE) 5 MG tablet Take 5 mg by mouth every 6 hours as needed       pantoprazole (PROTONIX) 40 MG enteric coated tablet Take 40 mg by mouth daily as needed       simvastatin (ZOCOR) 20 MG tablet Take 1 tablet (20 mg) by mouth daily    Associated Diagnoses: Dyslipidaemia      timolol (TIMOPTIC) 0.5 % ophthalmic solution PLACE 1 DROP INTO BOTH EYES ONCE DAILY.    Associated Diagnoses: HCC (hepatocellular carcinoma) (H)      traZODone (DESYREL) 150 MG tablet Take 75 mg by mouth At Bedtime       triamterene-HCTZ (DYAZIDE) 37.5-25 MG capsule Take 1 capsule by mouth every morning      blood glucose monitoring (GREGORIO CONTOUR) test strip 4 times daily     Associated Diagnoses: HCC (hepatocellular carcinoma) (H)      Blood Glucose Monitoring Suppl (FIFTY50 GLUCOSE METER 2.0) W/DEVICE  "KIT as needed for blood glucose monitoring    Associated Diagnoses: HCC (hepatocellular carcinoma) (H)      Blood Pressure Monitoring (RA BLOOD PRESSURE CUFF MONITOR) MISC Take blood pressure once daily    Associated Diagnoses: HCC (hepatocellular carcinoma) (H)      Insulin Pen Needle (PEN NEEDLES 5/16\") 31G X 8 MM MISC 4 x daily    Associated Diagnoses: HCC (hepatocellular carcinoma) (H)         STOP taking these medications       Insulin Degludec (TRESIBA SC) Comments:   Reason for Stopping:                   Allergies:         Allergies   Allergen Reactions     Amlodipine Swelling     Edema at 10 mg , fine at 5 mg     Lisinopril Cough     Metoprolol      Other reaction(s): Bradycardia  Metoprolol at 50mg bid -> HR 45 -50, unclear if sx (has fatigue)  May tolerate lower doses if needed           Condition and Physical on Discharge:      Discharge condition: Stable   Vitals: Blood pressure (!) 178/86, pulse 84, temperature 95.1  F (35.1  C), temperature source Oral, resp. rate 19, height 1.702 m (5' 7\"), weight 71.1 kg (156 lb 11.2 oz), SpO2 99 %.  156 lbs 11.2 oz      GENERAL:  Comfortable.  PSYCH: pleasant, oriented, No acute distress.  HEART:  RRR  LUNGS:  Normal Respiratory effort.  EXTREMITIES:  No pedal edema, +2 pulses bilateral and equal.  SKIN:  Dry to touch, No rash, wound or ulcerations.  NEUROLOGIC:  Grossly intact    Carina Bruce PA-C  "

## 2020-02-03 ENCOUNTER — APPOINTMENT (OUTPATIENT)
Dept: GENERAL RADIOLOGY | Facility: CLINIC | Age: 80
End: 2020-02-03
Attending: EMERGENCY MEDICINE
Payer: MEDICARE

## 2020-02-03 ENCOUNTER — APPOINTMENT (OUTPATIENT)
Dept: MRI IMAGING | Facility: CLINIC | Age: 80
End: 2020-02-03
Attending: PHYSICIAN ASSISTANT
Payer: MEDICARE

## 2020-02-03 ENCOUNTER — APPOINTMENT (OUTPATIENT)
Dept: CT IMAGING | Facility: CLINIC | Age: 80
End: 2020-02-03
Attending: EMERGENCY MEDICINE
Payer: MEDICARE

## 2020-02-03 ENCOUNTER — HOSPITAL ENCOUNTER (OUTPATIENT)
Facility: CLINIC | Age: 80
Setting detail: OBSERVATION
Discharge: HOME | End: 2020-02-05
Attending: EMERGENCY MEDICINE | Admitting: INTERNAL MEDICINE
Payer: MEDICARE

## 2020-02-03 DIAGNOSIS — R42 DIZZINESS: Primary | ICD-10-CM

## 2020-02-03 DIAGNOSIS — R73.9 HYPERGLYCEMIA: ICD-10-CM

## 2020-02-03 DIAGNOSIS — M62.81 GENERALIZED MUSCLE WEAKNESS: ICD-10-CM

## 2020-02-03 DIAGNOSIS — R29.6 FALLS FREQUENTLY: ICD-10-CM

## 2020-02-03 LAB
ALBUMIN UR-MCNC: 300 MG/DL
AMPHETAMINES UR QL SCN: NEGATIVE
ANION GAP SERPL CALCULATED.3IONS-SCNC: 3 MMOL/L (ref 3–14)
APPEARANCE UR: CLEAR
BARBITURATES UR QL: NEGATIVE
BASOPHILS # BLD AUTO: 0 10E9/L (ref 0–0.2)
BASOPHILS NFR BLD AUTO: 0.3 %
BENZODIAZ UR QL: NEGATIVE
BILIRUB UR QL STRIP: NEGATIVE
BUN SERPL-MCNC: 54 MG/DL (ref 7–30)
CALCIUM SERPL-MCNC: 8.7 MG/DL (ref 8.5–10.1)
CANNABINOIDS UR QL SCN: NEGATIVE
CHLORIDE SERPL-SCNC: 103 MMOL/L (ref 94–109)
CO2 SERPL-SCNC: 29 MMOL/L (ref 20–32)
COCAINE UR QL: NEGATIVE
COLOR UR AUTO: ABNORMAL
CREAT SERPL-MCNC: 1.58 MG/DL (ref 0.66–1.25)
DIFFERENTIAL METHOD BLD: NORMAL
EOSINOPHIL # BLD AUTO: 0.3 10E9/L (ref 0–0.7)
EOSINOPHIL NFR BLD AUTO: 5.4 %
ERYTHROCYTE [DISTWIDTH] IN BLOOD BY AUTOMATED COUNT: 12 % (ref 10–15)
ETHANOL SERPL-MCNC: <0.01 G/DL
GFR SERPL CREATININE-BSD FRML MDRD: 41 ML/MIN/{1.73_M2}
GLUCOSE BLDC GLUCOMTR-MCNC: 122 MG/DL (ref 70–99)
GLUCOSE BLDC GLUCOMTR-MCNC: 159 MG/DL (ref 70–99)
GLUCOSE BLDC GLUCOMTR-MCNC: 306 MG/DL (ref 70–99)
GLUCOSE SERPL-MCNC: 330 MG/DL (ref 70–99)
GLUCOSE UR STRIP-MCNC: >1000 MG/DL
HCT VFR BLD AUTO: 42.8 % (ref 40–53)
HGB BLD-MCNC: 14.1 G/DL (ref 13.3–17.7)
HGB UR QL STRIP: ABNORMAL
HYALINE CASTS #/AREA URNS LPF: 1 /LPF (ref 0–2)
IMM GRANULOCYTES # BLD: 0 10E9/L (ref 0–0.4)
IMM GRANULOCYTES NFR BLD: 0.3 %
KETONES UR STRIP-MCNC: NEGATIVE MG/DL
LEUKOCYTE ESTERASE UR QL STRIP: NEGATIVE
LYMPHOCYTES # BLD AUTO: 0.9 10E9/L (ref 0.8–5.3)
LYMPHOCYTES NFR BLD AUTO: 15.9 %
MCH RBC QN AUTO: 30.5 PG (ref 26.5–33)
MCHC RBC AUTO-ENTMCNC: 32.9 G/DL (ref 31.5–36.5)
MCV RBC AUTO: 92 FL (ref 78–100)
MONOCYTES # BLD AUTO: 0.5 10E9/L (ref 0–1.3)
MONOCYTES NFR BLD AUTO: 8.2 %
MUCOUS THREADS #/AREA URNS LPF: PRESENT /LPF
NEUTROPHILS # BLD AUTO: 4 10E9/L (ref 1.6–8.3)
NEUTROPHILS NFR BLD AUTO: 69.9 %
NITRATE UR QL: NEGATIVE
NRBC # BLD AUTO: 0 10*3/UL
NRBC BLD AUTO-RTO: 0 /100
OPIATES UR QL SCN: NEGATIVE
PCP UR QL SCN: NEGATIVE
PH UR STRIP: 6.5 PH (ref 5–7)
PLATELET # BLD AUTO: 164 10E9/L (ref 150–450)
POTASSIUM SERPL-SCNC: 4.4 MMOL/L (ref 3.4–5.3)
RBC # BLD AUTO: 4.63 10E12/L (ref 4.4–5.9)
RBC #/AREA URNS AUTO: 2 /HPF (ref 0–2)
SODIUM SERPL-SCNC: 135 MMOL/L (ref 133–144)
SOURCE: ABNORMAL
SP GR UR STRIP: 1.02 (ref 1–1.03)
SQUAMOUS #/AREA URNS AUTO: <1 /HPF (ref 0–1)
TROPONIN I SERPL-MCNC: <0.015 UG/L (ref 0–0.04)
UROBILINOGEN UR STRIP-MCNC: NORMAL MG/DL (ref 0–2)
WBC # BLD AUTO: 5.7 10E9/L (ref 4–11)
WBC #/AREA URNS AUTO: 0 /HPF (ref 0–5)

## 2020-02-03 PROCEDURE — 96361 HYDRATE IV INFUSION ADD-ON: CPT

## 2020-02-03 PROCEDURE — G0378 HOSPITAL OBSERVATION PER HR: HCPCS

## 2020-02-03 PROCEDURE — 25000132 ZZH RX MED GY IP 250 OP 250 PS 637: Mod: GY | Performed by: PHYSICIAN ASSISTANT

## 2020-02-03 PROCEDURE — 70544 MR ANGIOGRAPHY HEAD W/O DYE: CPT

## 2020-02-03 PROCEDURE — 80320 DRUG SCREEN QUANTALCOHOLS: CPT | Performed by: EMERGENCY MEDICINE

## 2020-02-03 PROCEDURE — 25000131 ZZH RX MED GY IP 250 OP 636 PS 637: Mod: GY | Performed by: PHYSICIAN ASSISTANT

## 2020-02-03 PROCEDURE — 70553 MRI BRAIN STEM W/O & W/DYE: CPT

## 2020-02-03 PROCEDURE — 93005 ELECTROCARDIOGRAM TRACING: CPT

## 2020-02-03 PROCEDURE — 85025 COMPLETE CBC W/AUTO DIFF WBC: CPT | Performed by: EMERGENCY MEDICINE

## 2020-02-03 PROCEDURE — 80048 BASIC METABOLIC PNL TOTAL CA: CPT | Performed by: EMERGENCY MEDICINE

## 2020-02-03 PROCEDURE — 96372 THER/PROPH/DIAG INJ SC/IM: CPT

## 2020-02-03 PROCEDURE — 25800030 ZZH RX IP 258 OP 636: Performed by: EMERGENCY MEDICINE

## 2020-02-03 PROCEDURE — 73562 X-RAY EXAM OF KNEE 3: CPT | Mod: LT

## 2020-02-03 PROCEDURE — 84484 ASSAY OF TROPONIN QUANT: CPT | Performed by: EMERGENCY MEDICINE

## 2020-02-03 PROCEDURE — 70450 CT HEAD/BRAIN W/O DYE: CPT

## 2020-02-03 PROCEDURE — 25500064 ZZH RX 255 OP 636: Performed by: INTERNAL MEDICINE

## 2020-02-03 PROCEDURE — 81001 URINALYSIS AUTO W/SCOPE: CPT | Mod: XU | Performed by: EMERGENCY MEDICINE

## 2020-02-03 PROCEDURE — A9585 GADOBUTROL INJECTION: HCPCS | Performed by: INTERNAL MEDICINE

## 2020-02-03 PROCEDURE — 96360 HYDRATION IV INFUSION INIT: CPT

## 2020-02-03 PROCEDURE — 80307 DRUG TEST PRSMV CHEM ANLYZR: CPT | Performed by: PHYSICIAN ASSISTANT

## 2020-02-03 PROCEDURE — 99220 ZZC INITIAL OBSERVATION CARE,LEVL III: CPT | Performed by: PHYSICIAN ASSISTANT

## 2020-02-03 PROCEDURE — 00000146 ZZHCL STATISTIC GLUCOSE BY METER IP

## 2020-02-03 PROCEDURE — 70549 MR ANGIOGRAPH NECK W/O&W/DYE: CPT

## 2020-02-03 PROCEDURE — 99285 EMERGENCY DEPT VISIT HI MDM: CPT | Mod: 25

## 2020-02-03 RX ORDER — MECLIZINE HYDROCHLORIDE 25 MG/1
25 TABLET ORAL EVERY 6 HOURS PRN
Status: DISCONTINUED | OUTPATIENT
Start: 2020-02-03 | End: 2020-02-05 | Stop reason: HOSPADM

## 2020-02-03 RX ORDER — BISACODYL 10 MG
10 SUPPOSITORY, RECTAL RECTAL DAILY PRN
Status: DISCONTINUED | OUTPATIENT
Start: 2020-02-03 | End: 2020-02-05 | Stop reason: HOSPADM

## 2020-02-03 RX ORDER — DEXTROSE MONOHYDRATE 25 G/50ML
25-50 INJECTION, SOLUTION INTRAVENOUS
Status: DISCONTINUED | OUTPATIENT
Start: 2020-02-03 | End: 2020-02-05 | Stop reason: HOSPADM

## 2020-02-03 RX ORDER — SIMVASTATIN 20 MG
20 TABLET ORAL EVERY EVENING
Status: DISCONTINUED | OUTPATIENT
Start: 2020-02-03 | End: 2020-02-05 | Stop reason: HOSPADM

## 2020-02-03 RX ORDER — AMOXICILLIN 250 MG
1 CAPSULE ORAL 2 TIMES DAILY PRN
Status: DISCONTINUED | OUTPATIENT
Start: 2020-02-03 | End: 2020-02-05 | Stop reason: HOSPADM

## 2020-02-03 RX ORDER — ONDANSETRON 4 MG/1
4 TABLET, ORALLY DISINTEGRATING ORAL EVERY 6 HOURS PRN
Status: DISCONTINUED | OUTPATIENT
Start: 2020-02-03 | End: 2020-02-05 | Stop reason: HOSPADM

## 2020-02-03 RX ORDER — LIDOCAINE 40 MG/G
CREAM TOPICAL
Status: DISCONTINUED | OUTPATIENT
Start: 2020-02-03 | End: 2020-02-05 | Stop reason: HOSPADM

## 2020-02-03 RX ORDER — NALOXONE HYDROCHLORIDE 0.4 MG/ML
.1-.4 INJECTION, SOLUTION INTRAMUSCULAR; INTRAVENOUS; SUBCUTANEOUS
Status: DISCONTINUED | OUTPATIENT
Start: 2020-02-03 | End: 2020-02-05 | Stop reason: HOSPADM

## 2020-02-03 RX ORDER — AMOXICILLIN 250 MG
2 CAPSULE ORAL 2 TIMES DAILY PRN
Status: DISCONTINUED | OUTPATIENT
Start: 2020-02-03 | End: 2020-02-05 | Stop reason: HOSPADM

## 2020-02-03 RX ORDER — NICOTINE POLACRILEX 4 MG
15-30 LOZENGE BUCCAL
Status: DISCONTINUED | OUTPATIENT
Start: 2020-02-03 | End: 2020-02-05 | Stop reason: HOSPADM

## 2020-02-03 RX ORDER — TRIAMTERENE/HYDROCHLOROTHIAZID 37.5-25 MG
1 TABLET ORAL EVERY MORNING
Status: DISCONTINUED | OUTPATIENT
Start: 2020-02-03 | End: 2020-02-05 | Stop reason: HOSPADM

## 2020-02-03 RX ORDER — AMLODIPINE BESYLATE 5 MG/1
5 TABLET ORAL EVERY EVENING
Status: DISCONTINUED | OUTPATIENT
Start: 2020-02-03 | End: 2020-02-05 | Stop reason: HOSPADM

## 2020-02-03 RX ORDER — ONDANSETRON 2 MG/ML
4 INJECTION INTRAMUSCULAR; INTRAVENOUS EVERY 6 HOURS PRN
Status: DISCONTINUED | OUTPATIENT
Start: 2020-02-03 | End: 2020-02-05 | Stop reason: HOSPADM

## 2020-02-03 RX ORDER — POLYETHYLENE GLYCOL 3350 17 G/17G
17 POWDER, FOR SOLUTION ORAL DAILY PRN
Status: DISCONTINUED | OUTPATIENT
Start: 2020-02-03 | End: 2020-02-05 | Stop reason: HOSPADM

## 2020-02-03 RX ORDER — ACETAMINOPHEN 325 MG/1
650 TABLET ORAL EVERY 4 HOURS PRN
Status: DISCONTINUED | OUTPATIENT
Start: 2020-02-03 | End: 2020-02-05 | Stop reason: HOSPADM

## 2020-02-03 RX ORDER — HYDRALAZINE HYDROCHLORIDE 20 MG/ML
5 INJECTION INTRAMUSCULAR; INTRAVENOUS EVERY 4 HOURS PRN
Status: DISCONTINUED | OUTPATIENT
Start: 2020-02-03 | End: 2020-02-03

## 2020-02-03 RX ORDER — GADOBUTROL 604.72 MG/ML
10 INJECTION INTRAVENOUS ONCE
Status: COMPLETED | OUTPATIENT
Start: 2020-02-03 | End: 2020-02-03

## 2020-02-03 RX ADMIN — GADOBUTROL 10 ML: 604.72 INJECTION INTRAVENOUS at 21:29

## 2020-02-03 RX ADMIN — SODIUM CHLORIDE 1000 ML: 9 INJECTION, SOLUTION INTRAVENOUS at 14:15

## 2020-02-03 RX ADMIN — TRIAMTERENE AND HYDROCHLOROTHIAZIDE 1 TABLET: 37.5; 25 TABLET ORAL at 21:57

## 2020-02-03 RX ADMIN — SIMVASTATIN 20 MG: 20 TABLET, FILM COATED ORAL at 21:57

## 2020-02-03 RX ADMIN — AMLODIPINE BESYLATE 5 MG: 5 TABLET ORAL at 21:57

## 2020-02-03 RX ADMIN — INSULIN DETEMIR 20 UNITS: 100 INJECTION, SOLUTION SUBCUTANEOUS at 21:57

## 2020-02-03 RX ADMIN — ACETAMINOPHEN 650 MG: 325 TABLET, FILM COATED ORAL at 19:09

## 2020-02-03 ASSESSMENT — ENCOUNTER SYMPTOMS
FREQUENCY: 0
FEVER: 0
VOMITING: 0
DIZZINESS: 1
LIGHT-HEADEDNESS: 1
NECK PAIN: 0
ABDOMINAL PAIN: 0
SHORTNESS OF BREATH: 0
NAUSEA: 0
DYSURIA: 0
CHEST TIGHTNESS: 0
CHILLS: 0
COUGH: 0
BACK PAIN: 0
JOINT SWELLING: 0
PALPITATIONS: 0

## 2020-02-03 ASSESSMENT — MIFFLIN-ST. JEOR: SCORE: 1438.83
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
## 2020-02-03 NOTE — PLAN OF CARE
ROOM # 232    Living Situation (if not independent, order SW consult):  Facility name:  : [facility] [phone] [place] M[place] (lives with this daughter): [phone]    Activity level at baseline: Cane   Activity level on admit: 1 assist with walker and gait belt       Patient registered to observation; given Patient Bill of Rights; given the opportunity to ask questions about observation status and their plan of care.  Patient has been oriented to the observation room, bathroom and call light is in place.    Discussed discharge goals and expectations with patient/family.

## 2020-02-03 NOTE — H&P
[facility]  Observation Unit  H & P      Patient Name: Eh Callahan MRN# [id number]   Age: 80 year old YOB: 1940     Date of Admission:2/3/2020    Primary care provider: Greer Sanchez  Date of Service: 2/3/2020         Assessment and Plan:   Eh Callahan is a 80 year old male with a history of DM Type 2, HTN, Hepatitis C, Hepatocellular Carcinoma, CKD, PTSD, Depression, Anxiety, Insomnia, GERD, Paroxysmal Atrial Fibrillation, CVA who presents to the ED today with dizziness and a fall.    Dizziness  Frequent Falls - pt presents with episodes of dizziness occurring both at rest and with ambulation he describes as the room is spinning.  He feels unsteady on his feet and has had multiple falls the past three weeks.  No focal neurologic deficits on exam, no nystagmus.  Noted to be unsteady walking in the ED.  Lab work up remarkable for elevated blood sugar with no signs of DKA.  EKG NSR.  CT head negative for acute process.  Patient seems to be describing vertiginous symptoms with episodes of dizziness and room spinning sensation.  Will rule out a central cause with MRI.  Hx of an Embolic CVA in 2018.  If MRI negative, consider peripheral vertigo BPPV vs Vestibular Neuritis with reports of URI several weeks ago.  Also rule out orthostatic hypotension and will monitor for any signs of arrhythmia.  - MRI brain now.  If negative, will try Meclizine.  - serial neuro checks  - PT consult  - check orthostatics  - monitor on telemetry  - utox, etoh level pending    CKD - creatinine 1.58 at recent baseline    HTN - elevated on arrival 150-160s.  Continue pta Amlodipine and hydrochlorothiazide with prn Hydralazine    HLD - continue pta Simvastatin    GERD - continue pta Protonix    DM Type 2 - most recent hgb A1C 8.0 with BS on arrival 330.  No signs of DKA  - receiving 1 liter IVF now in the ED  - continue pta Levemir and start ISS protocol    Hx Hepatocellular Carcinoma - per chart review  [place] [facility] [place]    Hx Paroxysmal Atrial Fibrillation - episode in 2016.  No recurrence reported per patient.  Not currently on anticoagulation.    - Monitor on telemetry  - call his Cardiologist in am to clarify if he should be on anticoagulation; per chart review it appears this was recommended last in 10/2018.      Awaiting formal pharmacy med rec to confirm above medications.    CODE: Full  Diet/IVF: regular  GI ppx:  protonix  DVT ppx: scd    Janet Vieiraneptali HAYES PA-C  Physician Assistant   Hospitalist Service  Pager: [phone]           Chief Complaint:   Dizziness, Falls         HPI:   80 year old male with a history of DM Type 2, HTN, Hepatitis C, Hepatocellular Carcinoma, CKD, PTSD, Depression, Anxiety, Insomnia, GERD, Glaucoma, Paroxysmal Atrial Fibrillation who presents to the ED today with dizziness and a fall.    History obtained through a professional [place] interpretor.  Patient reports he lives with his family in a single family home.  He uses a cane at baseline.  He reports dizziness with the sensation the room is spinning and unsteady on his feet for the past three weeks.  He reports episodes of dizziness that occur both with ambulation and when lying down.  He denies any hearing loss, tinnitus.  He did have a URI several weeks ago which has since resolved.  He denies any numbness or tingling, difficulty swallowing or speaking.  Patient feels unsteady on his feet for the past 3 weeks and has had multiple falls.  Most recent fall was this morning while he was in the bathroom when he fell forward and hit his left knee and forehead on the ground.  He denies any LOC, chest pain, shortness of breath, abdominal pain, nausea, emesis, changes in vision, fevers.  He reports he has been taking his medications regularly and denies any recent changes.  He denies etoh, drug or tobacco use.  He reports he has never had symptoms like this in the past.  He denies pain at this time.  His symptoms have been  worse the past three days which prompted him to present to the ED.    ED work up revealed patient hypertensive, afebrile on room air.  Laboratory work up revealed blood sugar 330, creatinine 1.58, BUN 54 with otherwise normal BMP, CBC, Troponin.  EKG revealed NSR.  CT head w/out with no acute abnormality.  Left Knee Xray revealed  No apparent fracture. Degenerative arthrosis with moderate medial compartment joint space narrowing.  Patient received IVF and admitted for further management.         Past Medical History:     Past Medical History:   Diagnosis Date     Anatomical narrow angle glaucoma      Atrial fibrillation (H)      Chronic infection     history of hepatitis C     CKD (chronic kidney disease) stage 2, GFR 60-89 ml/min      Diabetes mellitus (H)      Hepatitis C without mention of hepatic coma      Hypertension      Palpitations      PTSD (post-traumatic stress disorder)           Past Surgical History:     Past Surgical History:   Procedure Laterality Date     APPENDECTOMY       EYE SURGERY       LAPAROSCOPIC HEPATECTOMY PARTIAL Left 11/22/2016    Procedure: LAPAROSCOPIC HEPATECTOMY PARTIAL;  Surgeon: Rick Mckeon MD;  Location:  OR          Social History:     Social History     Socioeconomic History     Marital status:      Spouse name: Not on file     Number of children: Not on file     Years of education: Not on file     Highest education level: Not on file   Occupational History     Not on file   Social Needs     Financial resource strain: Not on file     Food insecurity:     Worry: Not on file     Inability: Not on file     Transportation needs:     Medical: Not on file     Non-medical: Not on file   Tobacco Use     Smoking status: Former Smoker     Smokeless tobacco: Never Used   Substance and Sexual Activity     Alcohol use: No     Alcohol/week: 0.0 standard drinks     Drug use: No     Sexual activity: Not on file   Lifestyle     Physical activity:     Days per week: Not on file      Minutes per session: Not on file     Stress: Not on file   Relationships     Social connections:     Talks on phone: Not on file     Gets together: Not on file     Attends Quaker service: Not on file     Active member of club or organization: Not on file     Attends meetings of clubs or organizations: Not on file     Relationship status: Not on file     Intimate partner violence:     Fear of current or ex partner: Not on file     Emotionally abused: Not on file     Physically abused: Not on file     Forced sexual activity: Not on file   Other Topics Concern     Parent/sibling w/ CABG, MI or angioplasty before 65F 55M? No   Social History Narrative    Immigrated in 2002 from [facility].  Previously lived in [place] before moving here.  Daughter lives locally as well.            Family History:     Family History   Problem Relation Age of Onset     Diabetes No family hx of         He is not aware of any diabetes in his family     Kidney Disease No family hx of           Allergies:      Allergies   Allergen Reactions     Amlodipine Swelling     Edema at 10 mg , fine at 5 mg     Lisinopril Cough     Metoprolol      Other reaction(s): Bradycardia  Metoprolol at 50mg bid -> HR 45 -50, unclear if sx (has fatigue)  May tolerate lower doses if needed          Medications:     Prior to Admission medications    Medication Sig Last Dose Taking? Auth Provider   acetaminophen (TYLENOL) 500 MG tablet Take 1,000 mg by mouth every 8 hours as needed for mild pain   Unknown, Entered By History   amLODIPine (NORVASC) 5 MG tablet Take 5 mg by mouth every evening   Unknown, Entered By History   bisacodyl (DULCOLAX) 10 MG suppository Place 1 suppository (10 mg) rectally daily as needed for constipation   Carina Bruce PA-C   blood glucose monitoring (GREGORIO CONTOUR) test strip 4 times daily    Reported, Patient   blood glucose monitoring (NO BRAND SPECIFIED) meter device kit Use to test blood sugar 4 times daily or as directed.  "  Carina Bruce PA-C   Blood Glucose Monitoring Suppl (FIFTY50 GLUCOSE METER 2.0) W/DEVICE KIT as needed for blood glucose monitoring   Reported, Patient   Blood Pressure Monitoring (RA BLOOD PRESSURE CUFF MONITOR) MISC Take blood pressure once daily   Reported, Patient   Carboxymeth-Glycerin-Polysorb (REFRESH OPTIVE ADVANCED) 0.5-1-0.5 % SOLN Place 1 drop into both eyes 2 times daily    Reported, Patient   cholecalciferol (D 5000) 5000 UNITS CAPS Take 5,000 Units by mouth daily    Reported, Patient   Cyanocobalamin (B-12 PO) Take 1 capsule by mouth daily   Unknown, Entered By History   insulin aspart (NOVOLOG PEN) 100 UNIT/ML injection Inject 3 Units Subcutaneous 3 times daily (with meals)    Reported, Patient   insulin detemir (LEVEMIR PEN) 100 UNIT/ML pen Inject 10 Units Subcutaneous At Bedtime   Carina Bruce PA-C   Insulin Pen Needle (PEN NEEDLES 5/16\") 31G X 8 MM MISC 4 x daily   Reported, Patient   oxyCODONE IR (ROXICODONE) 5 MG tablet Take 5 mg by mouth every 6 hours as needed    Reported, Patient   pantoprazole (PROTONIX) 40 MG enteric coated tablet Take 40 mg by mouth daily as needed    Reported, Patient   polyethylene glycol (MIRALAX/GLYCOLAX) packet Take 17 g by mouth 2 times daily   Carina Bruce PA-C   simvastatin (ZOCOR) 20 MG tablet Take 1 tablet (20 mg) by mouth daily   Cindy Maier MD   timolol (TIMOPTIC) 0.5 % ophthalmic solution PLACE 1 DROP INTO BOTH EYES ONCE DAILY.   Reported, Patient   traZODone (DESYREL) 150 MG tablet Take 75 mg by mouth At Bedtime    Unknown, Entered By History   triamterene-HCTZ (DYAZIDE) 37.5-25 MG capsule Take 1 capsule by mouth every morning   Unknown, Entered By History          Review of Systems:   A complete ROS was performed and is negative other than what is stated in the HPI.       Physical Exam:   Blood pressure (!) 172/96, pulse 69, temperature 98.3  F (36.8  C), temperature source Temporal, resp. rate 12, SpO2 100 %. on room " air  General: Alert, interactive, NAD, lying in bed  HEENT: AT/NC, sclera anicteric, PERRL, EOMI, no nystagmus  Neck: Supple  Chest/Resp: clear to auscultation bilaterally, no crackles or wheezes  Heart/CV: regular rate and rhythm, no murmur  Abdomen/GI: Soft, nontender, nondistended. +BS.  No rebound or guarding.  Extremities/MSK: No LE edema.  Left knee with full ROM, mild tenderness to palpation.  Skin: Warm and dry  Neuro: Alert & oriented x 3, no focal deficits, moves all extremities equally         Labs:   ROUTINE ICU LABS (Last four results)  CMP  Recent Labs   Lab 02/03/20  0834      POTASSIUM 4.4   CHLORIDE 103   CO2 29   ANIONGAP 3   *   BUN 54*   CR 1.58*   GFRESTIMATED 41*   GFRESTBLACK 47*   DOMINGO 8.7     CBC  Recent Labs   Lab 02/03/20  0834   WBC 5.7   RBC 4.63   HGB 14.1   HCT 42.8   MCV 92   MCH 30.5   MCHC 32.9   RDW 12.0        INRNo lab results found in last 7 days.  Arterial Blood GasNo lab results found in last 7 days.       Imaging/Procedures:     Results for orders placed or performed during the hospital encounter of 02/03/20   CT Head w/o Contrast    Narrative    CT SCAN OF THE HEAD WITHOUT CONTRAST   2/3/2020 10:01 AM     HISTORY: Frontal head trauma. Atrial fibrillation. Unwitnessed fall.    TECHNIQUE:  Axial images of the head and coronal reformations without  IV contrast material. Radiation dose for this scan was reduced using  automated exposure control, adjustment of the mA and/or kV according  to patient size, or iterative reconstruction technique.    COMPARISON: 1/5/2020    FINDINGS:  There is generalized atrophy of the brain.  There is low  attenuation in the white matter of the cerebral hemispheres consistent  with sequelae of small vessel ischemic disease. There is no evidence  of intracranial hemorrhage, mass, acute infarct or anomaly.     The visualized portions of the sinuses and mastoids appear normal.  There is no evidence of trauma.      Impression     IMPRESSION:   1. No acute abnormality.  2.  Atrophy of the brain.  White matter changes consistent with  sequelae of small vessel ischemic disease.  3. No change.     DICKSON BANKS MD   XR Knee Left 3 Views    Narrative    XR KNEE LT 3 VW 2/3/2020 10:08 AM     HISTORY: fall, knee pain      Impression    IMPRESSION: No apparent fracture. Degenerative arthrosis with moderate  medial compartment joint space narrowing.    NIKO ROMAN MD

## 2020-02-03 NOTE — ED NOTES
[facility]  ED Nurse Handoff Report    Eh Callahan is a 80 year old male   ED Chief complaint: Dizziness; Fall; and Head Injury  . ED Diagnosis:   Final diagnoses:   Generalized muscle weakness   Falls frequently   Hyperglycemia     Allergies:   Allergies   Allergen Reactions     Amlodipine Swelling     Edema at 10 mg , fine at 5 mg     Lisinopril Cough     Metoprolol      Other reaction(s): Bradycardia  Metoprolol at 50mg bid -> HR 45 -50, unclear if sx (has fatigue)  May tolerate lower doses if needed       Code Status: Full Code  Activity level - Baseline/Home:  Independent. Activity Level - Current:   Assist X 1. Lift room needed: No. Bariatric: No   Needed: Yes   Isolation: No. Infection: Not Applicable.     Vital Signs:   Vitals:    02/03/20 1045 02/03/20 1100 02/03/20 1115 02/03/20 1130   BP: (!) 158/85 (!) 160/90 (!) 164/91 (!) 172/96   Pulse: 70 73 67 69   Resp: 16 18 21 12   Temp:       TempSrc:       SpO2: 100% 100% 100%        Cardiac Rhythm:  ,      Pain level:    Patient confused: No. Patient Falls Risk: Yes.   Elimination Status: Has voided   Patient Report - Initial Complaint: dizziness. Focused Assessment:   80 year old male with past medical history significant for hypertension, narrow angle glaucoma, diabetes mellitus on insulin therapy, and atrial fibrillation who presents via private transport for the evaluation of an unwittnessed fall and dizziness. Per the patient's daughter, the patient got up to use the restroom at 06:00. The patient reports that when he got into the bathroom he felt dizzy resulting in a fall. He notes that he struck his head on the wall and his left knee on the floor. The patient reports that he was able to get up following the fall but had left knee discomfort which he reports has remained persistent since onset. Furthermore, the patient admits to dizziness which he reports has been intermittent for the last 3-4 days. He describes his symptoms as  feeling unsteady on his feet.  At the time of the exam, the patient denied chest pain, SOB, fever, chills, abdominal pain, urinary symptoms, or LOC before, during, or after the fall. He denied any additional medical concerns at this time.   Tests Performed: labs imaging. Abnormal Results:   Labs Ordered and Resulted from Time of ED Arrival Up to the Time of Departure from the ED   BASIC METABOLIC PANEL - Abnormal; Notable for the following components:       Result Value    Glucose 330 (*)     Urea Nitrogen 54 (*)     Creatinine 1.58 (*)     GFR Estimate 41 (*)     GFR Estimate If Black 47 (*)     All other components within normal limits   CBC WITH PLATELETS DIFFERENTIAL   TROPONIN I   ROUTINE UA WITH MICROSCOPIC   PERIPHERAL IV CATHETER     XR Knee Left 3 Views   Final Result   IMPRESSION: No apparent fracture. Degenerative arthrosis with moderate   medial compartment joint space narrowing.      NIKO ROMAN MD      CT Head w/o Contrast   Final Result   IMPRESSION:    1. No acute abnormality.   2.  Atrophy of the brain.  White matter changes consistent with   sequelae of small vessel ischemic disease.   3. No change.       DICKSON BANKS MD         Treatments provided: see mar  Family Comments: none  OBS brochure/video discussed/provided to patient:  Yes  ED Medications:   Medications   0.9% sodium chloride BOLUS (has no administration in time range)     Drips infusing:  Yes  For the majority of the shift, the patient's behavior Green. Interventions performed were na.     Severe Sepsis OR Septic Shock Diagnosis Present: No      ED Nurse Name/Phone Number: Angelica Cook RN,   2:02 PM    RECEIVING UNIT ED HANDOFF REVIEW    Above ED Nurse Handoff Report was reviewed: Yes  Reviewed by: Melissa Blanchard RN on February 3, 2020 at 2:27 PM

## 2020-02-04 ENCOUNTER — APPOINTMENT (OUTPATIENT)
Dept: PHYSICAL THERAPY | Facility: CLINIC | Age: 80
End: 2020-02-04
Attending: PHYSICIAN ASSISTANT
Payer: MEDICARE

## 2020-02-04 LAB
GLUCOSE BLDC GLUCOMTR-MCNC: 139 MG/DL (ref 70–99)
GLUCOSE BLDC GLUCOMTR-MCNC: 290 MG/DL (ref 70–99)
GLUCOSE BLDC GLUCOMTR-MCNC: 299 MG/DL (ref 70–99)
GLUCOSE BLDC GLUCOMTR-MCNC: 307 MG/DL (ref 70–99)
GLUCOSE BLDC GLUCOMTR-MCNC: 382 MG/DL (ref 70–99)
GLUCOSE BLDC GLUCOMTR-MCNC: 414 MG/DL (ref 70–99)
INTERPRETATION ECG - MUSE: NORMAL

## 2020-02-04 PROCEDURE — 25000131 ZZH RX MED GY IP 250 OP 636 PS 637: Mod: GY | Performed by: INTERNAL MEDICINE

## 2020-02-04 PROCEDURE — 96372 THER/PROPH/DIAG INJ SC/IM: CPT

## 2020-02-04 PROCEDURE — 97161 PT EVAL LOW COMPLEX 20 MIN: CPT | Mod: GP

## 2020-02-04 PROCEDURE — 25000128 H RX IP 250 OP 636: Performed by: INTERNAL MEDICINE

## 2020-02-04 PROCEDURE — 99207 ZZC APP CREDIT; MD BILLING SHARED VISIT: CPT | Performed by: INTERNAL MEDICINE

## 2020-02-04 PROCEDURE — 00000146 ZZHCL STATISTIC GLUCOSE BY METER IP

## 2020-02-04 PROCEDURE — 25000131 ZZH RX MED GY IP 250 OP 636 PS 637: Mod: GY | Performed by: PHYSICIAN ASSISTANT

## 2020-02-04 PROCEDURE — G0378 HOSPITAL OBSERVATION PER HR: HCPCS

## 2020-02-04 PROCEDURE — 25000132 ZZH RX MED GY IP 250 OP 250 PS 637: Mod: GY | Performed by: PHYSICIAN ASSISTANT

## 2020-02-04 PROCEDURE — 96374 THER/PROPH/DIAG INJ IV PUSH: CPT

## 2020-02-04 RX ORDER — PREDNISONE 10 MG/1
10 TABLET ORAL DAILY
Status: DISCONTINUED | OUTPATIENT
Start: 2020-02-12 | End: 2020-02-05 | Stop reason: HOSPADM

## 2020-02-04 RX ORDER — DIMENHYDRINATE 50 MG/ML
50 INJECTION, SOLUTION INTRAMUSCULAR; INTRAVENOUS ONCE
Status: COMPLETED | OUTPATIENT
Start: 2020-02-04 | End: 2020-02-04

## 2020-02-04 RX ORDER — PREDNISONE 20 MG/1
40 TABLET ORAL DAILY
Status: DISCONTINUED | OUTPATIENT
Start: 2020-02-09 | End: 2020-02-05 | Stop reason: HOSPADM

## 2020-02-04 RX ORDER — PREDNISONE 20 MG/1
20 TABLET ORAL DAILY
Status: DISCONTINUED | OUTPATIENT
Start: 2020-02-11 | End: 2020-02-05 | Stop reason: HOSPADM

## 2020-02-04 RX ADMIN — MECLIZINE HYDROCHLORIDE 25 MG: 25 TABLET ORAL at 08:56

## 2020-02-04 RX ADMIN — SIMVASTATIN 20 MG: 20 TABLET, FILM COATED ORAL at 21:04

## 2020-02-04 RX ADMIN — PREDNISONE 60 MG: 50 TABLET ORAL at 11:33

## 2020-02-04 RX ADMIN — INSULIN DETEMIR 20 UNITS: 100 INJECTION, SOLUTION SUBCUTANEOUS at 22:53

## 2020-02-04 RX ADMIN — MECLIZINE HYDROCHLORIDE 25 MG: 25 TABLET ORAL at 15:21

## 2020-02-04 RX ADMIN — INSULIN ASPART 4 UNITS: 100 INJECTION, SOLUTION INTRAVENOUS; SUBCUTANEOUS at 12:23

## 2020-02-04 RX ADMIN — AMLODIPINE BESYLATE 5 MG: 5 TABLET ORAL at 21:04

## 2020-02-04 RX ADMIN — DIMENHYDRINATE 50 MG: 50 INJECTION, SOLUTION INTRAMUSCULAR; INTRAVENOUS at 12:27

## 2020-02-04 RX ADMIN — TRIAMTERENE AND HYDROCHLOROTHIAZIDE 1 TABLET: 37.5; 25 TABLET ORAL at 08:22

## 2020-02-04 RX ADMIN — INSULIN ASPART 7 UNITS: 100 INJECTION, SOLUTION INTRAVENOUS; SUBCUTANEOUS at 22:52

## 2020-02-04 RX ADMIN — INSULIN ASPART 4 UNITS: 100 INJECTION, SOLUTION INTRAVENOUS; SUBCUTANEOUS at 17:52

## 2020-02-04 NOTE — PROGRESS NOTES
"   02/04/20 1200   Quick Adds   Type of Visit Initial PT Evaluation       Present yes   Living Environment   Living Arrangements house   Self-Care   Usual Activity Tolerance moderate   Current Activity Tolerance fair   Equipment Currently Used at Home cane, straight;walker, rolling   Functional Level Prior   Ambulation 1-->assistive equipment  (cane)   Transferring 0-->independent   Fall history within last six months yes   Number of times patient has fallen within last six months   (just prior to admission)   Prior Functional Level Comment Patient reports modified independence with mobility at baseline with straight cane. Has a walker at home.    General Information   Onset of Illness/Injury or Date of Surgery - Date 02/03/20   Referring Physician Joseline DUBOIS   Patient/Family Goals Statement Return home   Pertinent History of Current Problem (include personal factors and/or comorbidities that impact the POC) 80 year old male with a history of DM Type 2, HTN, Hepatitis C, Hepatocellular Carcinoma, CKD, PTSD, Depression, Anxiety, Insomnia, GERD, Paroxysmal Atrial Fibrillation, CVA who presents to the ED today with dizziness and a fall. CT/MRI/orthostatics negative.    Precautions/Limitations fall precautions   Cognitive Status Examination   Orientation person;place   Level of Consciousness alert;confused   Follows Commands and Answers Questions able to follow single-step instructions   Cognitive Comment Patient changes his story throughout session re: symptoms, denies previous stroke and  reports pt often giving \"random\" information or not answering question that was asked.    Pain Assessment   Patient Currently in Pain No   Range of Motion (ROM)   ROM Comment Patient with guarded neck AROM bilaterally. Pt slow to move but able to demo full ROM. Decreased L UE AROM compared to R UE - able to reach behind head functionally but gross movement excursion is less.    Strength   Strength " "Comments Decreased  strength on the L UE compared to the R UE. Pt reports he feels no difference between the two.    Bed Mobility   Bed Mobility Comments Supine>sit with SBA.    Transfer Skills   Transfer Comments Sit>stand with CGA.    Gait   Gait Comments Ambulates with FWW, SBA, slow steady gait.    Balance   Balance Comments Requires bilateral UE support on FWW for safe dynamic mobility.    Coordination   Coordination other (see comments)   Coordination Comments Patient with variable finger-nose-finger performance bilaterally. Mild misses; denies double vision.    Cervicogenic Screen   Neck ROM   (Guarded bilaterally; WFLs however)   Oculomotor Exam   Smooth Pursuit Normal   Infrared Goggle Exam or Frenzel Lense Exam   Spontaneous Nystagmus Negative   Gaze Evoked Nystagmus Negative   Positional Testing Comments Patient reports dizziness increase with ALL position changes. Rolling bilat in bed. Supine<>sit. Sit<>stand and bilateral anjel-hallpike testing. No appreciable nystagmus noted. Of note pt has difficulty maintaining proper head test position even with tactile cues.    Clinical Impression   Criteria for Skilled Therapeutic Intervention yes, treatment indicated   PT Diagnosis Impaired gait   Influenced by the following impairments Impaired balance   Functional limitations due to impairments Decreased IND with mobility (transfers, gait)   Clinical Presentation Evolving/Changing   Clinical Presentation Rationale Unclear picture; unreliable historian.   Clinical Decision Making (Complexity) Low complexity   Therapy Frequency Daily   Predicted Duration of Therapy Intervention (days/wks) 3 days   Anticipated Discharge Disposition Home with Assist;Home with Outpatient Therapy  (FWW with all mobility)   Risk & Benefits of therapy have been explained Yes   Patient, Family & other staff in agreement with plan of care Yes   [facility] AM-PAC TM \"6 Clicks\"   2016, Trustees of [facility], under license " "to [employer].  All rights reserved.   6 Clicks Short Forms Basic Mobility Inpatient Short Form   [facility] AM-PAC  \"6 Clicks\" V.2 Basic Mobility Inpatient Short Form   1. Turning from your back to your side while in a flat bed without using bedrails? 4 - None   2. Moving from lying on your back to sitting on the side of a flat bed without using bedrails? 4 - None   3. Moving to and from a bed to a chair (including a wheelchair)? 4 - None   4. Standing up from a chair using your arms (e.g., wheelchair, or bedside chair)? 4 - None   5. To walk in hospital room? 3 - A Little   6. Climbing 3-5 steps with a railing? 3 - A Little   Basic Mobility Raw Score (Score out of 24.Lower scores equate to lower levels of function) 22   Total Evaluation Time   Total Evaluation Time (Minutes) 25     "

## 2020-02-04 NOTE — PLAN OF CARE
PRIMARY DIAGNOSIS: VERTIGO    OUTPATIENT/OBSERVATION GOALS TO BE MET BEFORE DISCHARGE  1. Orthostatic performed: Yes:          Lying Orthostatic BP: 158/82         Sitting Orthostatic BP: 168/81         Standing Orthostatic BP: 181/86     2. Completion of appropriate imaging: Yes - MRI in proces    3. Tolerating PO medications: Yes    4. Return to near baseline physical activity: Yes    5. Cleared for discharge by consultants (if involved): No - PT to see    Discharge Planner Nurse   Safe discharge environment identified: Yes  Barriers to discharge: Yes       Entered by: Rosemary Walker 02/03/2020       VSS ex BP elevated before PTA medications. Tolerating diet;  before dinner. Tylenol given for headache. MRI in process - checklist completed with use of  iPad. Patient continues to report room-spinning dizziness. States it does not worsen with movement or position changes. Orthostatics negative. Moving with Ax1 w/ cane and gait belt. PT to see.     Please review provider order for any additional goals.   Nurse to notify provider when observation goals have been met and patient is ready for discharge.

## 2020-02-04 NOTE — PLAN OF CARE
PRIMARY DIAGNOSIS: VERTIGO    OUTPATIENT/OBSERVATION GOALS TO BE MET BEFORE DISCHARGE  1. Orthostatic performed: No    2. Completion of appropriate imaging: No - MRI to be completed    3. Tolerating PO medications: Yes    4. Return to near baseline physical activity: Yes    5. Cleared for discharge by consultants (if involved): No - PT to see    Discharge Planner Nurse   Safe discharge environment identified: Yes  Barriers to discharge: Yes       Entered by: Rosemary Walker 02/03/2020     Please review provider order for any additional goals.   Nurse to notify provider when observation goals have been met and patient is ready for discharge.

## 2020-02-04 NOTE — PROGRESS NOTES
[facility]    Hospitalist Progress Note      Assessment & Plan   Eh Callahan is a 80 year old man who was admitted on 2/3/2020. PMH significant for DM Type 2, HTN, Hepatitis C, Hepatocellular Carcinoma, CKD, PTSD, Depression, Anxiety, Insomnia, GERD, Paroxysmal Atrial Fibrillation, and CVA who presents to the ED today with dizziness and a fall.     Suspected vestibular neuritis  Dizziness  Frequent Falls  Patient is notably a poor historian with frequently changing story, with and without .   Patient presented with episodes of dizziness occurring both at rest and with ambulation. Patient describes the room as spinning.  He feels unsteady on his feet and has had multiple falls the past three weeks.   No focal neurologic deficits on exam, no nystagmus.    Lab work up remarkable for elevated blood sugar with no signs of DKA.  EKG NSR.  CT head negative for acute process.   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 No acute findings on MRI/MRA.  Patient evaluated by PT today. Could not find any position with improvement and suspected possible vestibular neuritis. Patient did have URI several weeks ago.   Patient is not orthostatic.   No events on telemetry.   EtOH negative and UDS negative.  Starting patient on prednisone taper starting with 60 mg daily x 5 days (then 40, 30, 20, 10).   Gave patient a 1x dose of IV dramamine which did seem to have some benefit.   Continue PRN meclizine.   Plan to have patient work with therapy again tomorrow and hopefully symptoms will be improved well enough to discharge home.      Insulin-dependent diabetes mellitus, type 2  Hgb A1c 8.0% on 1/5/20.   PTA regimen includes levemir 20 unit(s) HS and novolog 3 unit(s) three times daily with meals.   Levemir 20 unit(s) HS was started with sliding scale insulin on admission.   Will also resume patient's prandial 3 unit(s) novolog TID.   Suspect blood sugars will be slightly more elevated in setting of prednisone and  may need to consider increasing Levemir for short period with close outpatient follow up as taper is completed.   Continue to follow AC/HS glucose checks and adjust regimen as needed.      History of paroxysmal atrial fibrillation  History of embolic CVA 2018  Patient with atrial fibrillation noted in 2016.    Patient has history of embolic CVA in 2018, though patient denies this. 10/2018 cardiology note comments that patient should be on anticoagulation, but patient also denies this.   [id number] score is 6 with 9.7% annual stroke risk.  He is such a poor historian, would be hesitant to start patient on anticoagulation without clear understanding by patient and appropriate follow up.   Monitoring on telemetry.  Patient may be a good candidate for evaluation of RADHA closure device given questionable compliance ability. Recommend outpatient follow up with cardiology vs referral to RADHA closure device clinic at discharge.     CKD 3  Creatinine 1.58 at recent baseline. Renally dose medications and avoid nephrotoxins.      Hypertension  Continue pta amlodipine and triamterene-hydrochlorothiazide with prn hydralazine     Hyperlipidemia  Continue pta Simvastatin.     GERD  Continue pta Protonix.     History of hepatocellular carcinoma   per chart review resected at [facility] in 2016.    DVT Prophylaxis: Pneumatic Compression Devices ordered  Code Status: Full Code  Expected discharge: Hopeful for discharge tomorrow if continues to improve and patient will have supervision.     Elissa Garsia MD FACP  Hospitalist Service  [facility]  Text Page (8am - 5pm)      Interval History   Patient underwent MRI/MRA without any acute findings. Working with PT today did not reveal significant improvement in any position. Questioned possibility of vestibular neuritis. Started patient on prednisone taper with 60 mg and also gave single dose of IV dramamine. Getting PRN meclizine, as well. Patient is reporting some  improvement today. Still with unsteadiness. Possible that patient will be able to discharge home if appropriate support at home.     -Data reviewed today: I reviewed all new labs and imaging results over the last 24 hours. I personally reviewed labs and imaging reports.     Physical Exam   Temp: 98.6  F (37  C) Temp src: Oral BP: (!) 148/84 Pulse: 74 Heart Rate: 112 Resp: 18 SpO2: 98 % O2 Device: None (Room air)    Vitals:    02/03/20 1506   Weight: 72.3 kg (159 lb 4.8 oz)     Vital Signs with Ranges  Temp:  [97.6  F (36.4  C)-98.7  F (37.1  C)] 98.6  F (37  C)  Pulse:  [74] 74  Heart Rate:  [] 112  Resp:  [16-18] 18  BP: (139-181)/(73-87) 148/84  SpO2:  [96 %-99 %] 98 %  I/O last 3 completed shifts:  In: 720 [P.O.:720]  Out: -     Constitutional: Pleasant gentleman resting in no acute distress. Patient alert and oriented, but does not answer all questions appropriately. Patient gives varying answers regarding his history and repeatedly contradicted himself.  present, but did not seem to give any clarity to conversation given patient's changing answers. Non-toxic.   HEENT: NCAT. Pupils equally round and reactive to light. EOMI. No nystagmus appreciated. Moist oral mucosa.  Respiratory: Clear to auscultation bilaterally. No crackles or wheezes.  Cardiovascular: Regular rate and rhythm at time of exam. No murmur.  GI: Soft, nontender, nondistended. Normoactive bowel sounds.   Musculoskeletal: No gross deformities. No peripheral edema.    Neurologic: Alert and oriented x3. No focal neurologic deficits.     Medications       amLODIPine  5 mg Oral QPM     insulin aspart  1-7 Units Subcutaneous TID AC     insulin aspart  1-5 Units Subcutaneous At Bedtime     insulin detemir  20 Units Subcutaneous At Bedtime     predniSONE  60 mg Oral Daily    Followed by     [START ON 2/9/2020] predniSONE  40 mg Oral Daily    Followed by     [START ON 2/10/2020] predniSONE  30 mg Oral Daily    Followed by     [START ON  2/11/2020] predniSONE  20 mg Oral Daily    Followed by     [START ON 2/12/2020] predniSONE  10 mg Oral Daily     simvastatin  20 mg Oral QPM     sodium chloride (PF)  3 mL Intracatheter Q8H     triamterene-HCTZ  1 tablet Oral QAM       Data   Recent Labs   Lab 02/03/20  0834   WBC 5.7   HGB 14.1   MCV 92         POTASSIUM 4.4   CHLORIDE 103   CO2 29   BUN 54*   CR 1.58*   ANIONGAP 3   DOMINGO 8.7   *   TROPI <0.015       Recent Results (from the past 24 hour(s))   MR Brain w/o & w Contrast    Narrative    EXAM: MRA BRAIN ([place]) WO CONTRAST, MRA NECK (CAROTIDS) WO AND W CONTRAST, MR BRAIN W/O AND W CONTRAST  LOCATION: [facility]  DATE/TIME: 2/3/2020 8:21 PM    INDICATION: Vertigo, frequent falls.  COMPARISON: Head CT 02/03/2020 at 0952 hours, 01/05/2020 head CT, brain MRI 10/12/2018.  CONTRAST: 10 mL Gadavist.  TECHNIQUE:   HEAD MRI: Routine multiplanar multisequence head MRI without and with intravenous contrast.  HEAD MRA: 3D time-of-flight head MRA without intravenous contrast.  NECK MRA: Neck MRA without and with IV contrast. Stenosis measurements made according to NASCET criteria unless otherwise specified.    FINDINGS:  HEAD MRI:  INTRACRANIAL CONTENTS: No acute or subacute infarct. No mass, acute hemorrhage, or extra-axial fluid collections. Scattered nonspecific T2/FLAIR hyperintensities within the cerebral white matter most consistent with mild/moderate chronic microvascular   ischemic change. Moderate generalized cerebral atrophy. No hydrocephalus. Normal position of the cerebellar tonsils. No pathologic contrast enhancement.    SELLA: No abnormality accounting for technique.    OSSEOUS STRUCTURES/SOFT TISSUES: Normal marrow signal. The major intracranial vascular flow voids are maintained.     ORBITS: Prior bilateral cataract surgery. Visualized portions of the orbits are otherwise unremarkable.     SINUSES/MASTOIDS: No paranasal sinus mucosal disease. No middle  ear or mastoid effusion.     HEAD MRA:   ANTERIOR CIRCULATION: Diminished flow related signal redemonstrated in the M1 and proximal M2 segments of the MCAs bilaterally right more than left most consistent with slab overlap artifact. Also, diminished visualization of the left superior division   mid to distal branches similar to the prior study, chronic. No acute stenosis/occlusion, aneurysm, or high flow vascular malformation. There is a near fetal origin of the left PCA with a tiny P1 segment. The A1 segment of the right RUSSELL is small compared   to the left.    POSTERIOR CIRCULATION: No stenosis/occlusion, aneurysm, or high flow vascular malformation. The left vertebral artery is markedly dominant and the right vertebral artery is absent, or has markedly diminished flow/stenosis, stable.     NECK MRA:   RIGHT CAROTID: No measurable stenosis or dissection. Moderate tortuosity in the mid to proximal cervical ICA.    LEFT CAROTID: No measurable stenosis or dissection. Near horizontal origin of the ICA with marked mid cervical ICA tortuosity noted.    VERTEBRAL ARTERIES: No focal stenosis or dissection. Right vertebral artery absent. Left vertebral artery unremarkable.    AORTIC ARCH: Common origin left common carotid and innominate artery. No significant stenosis at the origin of the great vessels.      Impression    IMPRESSION:  HEAD MRI:   1.  No acute intracranial process.  2.  Generalized brain atrophy and presumed microvascular ischemic changes as detailed above.    HEAD MRA:   1.  No aneurysms or vascular malformations.  2.  No evidence of new/acute intracranial significant vessel occlusion.    NECK MRA:  1.  No significant stenosis, no dissection.  2.  Left vertebral artery dominant, right vertebral artery absent.   MRA Neck (Carotids) wo & w Contrast    Narrative    EXAM: MRA BRAIN ([place]) WO CONTRAST, MRA NECK (CAROTIDS) WO AND W CONTRAST, MR BRAIN W/O AND W CONTRAST  LOCATION: [facility]  SERVICES  DATE/TIME: 2/3/2020 8:21 PM    INDICATION: Vertigo, frequent falls.  COMPARISON: Head CT 02/03/2020 at 0952 hours, 01/05/2020 head CT, brain MRI 10/12/2018.  CONTRAST: 10 mL Gadavist.  TECHNIQUE:   HEAD MRI: Routine multiplanar multisequence head MRI without and with intravenous contrast.  HEAD MRA: 3D time-of-flight head MRA without intravenous contrast.  NECK MRA: Neck MRA without and with IV contrast. Stenosis measurements made according to NASCET criteria unless otherwise specified.    FINDINGS:  HEAD MRI:  INTRACRANIAL CONTENTS: No acute or subacute infarct. No mass, acute hemorrhage, or extra-axial fluid collections. Scattered nonspecific T2/FLAIR hyperintensities within the cerebral white matter most consistent with mild/moderate chronic microvascular   ischemic change. Moderate generalized cerebral atrophy. No hydrocephalus. Normal position of the cerebellar tonsils. No pathologic contrast enhancement.    SELLA: No abnormality accounting for technique.    OSSEOUS STRUCTURES/SOFT TISSUES: Normal marrow signal. The major intracranial vascular flow voids are maintained.     ORBITS: Prior bilateral cataract surgery. Visualized portions of the orbits are otherwise unremarkable.     SINUSES/MASTOIDS: No paranasal sinus mucosal disease. No middle ear or mastoid effusion.     HEAD MRA:   ANTERIOR CIRCULATION: Diminished flow related signal redemonstrated in the M1 and proximal M2 segments of the MCAs bilaterally right more than left most consistent with slab overlap artifact. Also, diminished visualization of the left superior division   mid to distal branches similar to the prior study, chronic. No acute stenosis/occlusion, aneurysm, or high flow vascular malformation. There is a near fetal origin of the left PCA with a tiny P1 segment. The A1 segment of the right RUSSELL is small compared   to the left.    POSTERIOR CIRCULATION: No stenosis/occlusion, aneurysm, or high flow vascular malformation. The left  vertebral artery is markedly dominant and the right vertebral artery is absent, or has markedly diminished flow/stenosis, stable.     NECK MRA:   RIGHT CAROTID: No measurable stenosis or dissection. Moderate tortuosity in the mid to proximal cervical ICA.    LEFT CAROTID: No measurable stenosis or dissection. Near horizontal origin of the ICA with marked mid cervical ICA tortuosity noted.    VERTEBRAL ARTERIES: No focal stenosis or dissection. Right vertebral artery absent. Left vertebral artery unremarkable.    AORTIC ARCH: Common origin left common carotid and innominate artery. No significant stenosis at the origin of the great vessels.      Impression    IMPRESSION:  HEAD MRI:   1.  No acute intracranial process.  2.  Generalized brain atrophy and presumed microvascular ischemic changes as detailed above.    HEAD MRA:   1.  No aneurysms or vascular malformations.  2.  No evidence of new/acute intracranial significant vessel occlusion.    NECK MRA:  1.  No significant stenosis, no dissection.  2.  Left vertebral artery dominant, right vertebral artery absent.   MRA Brain ([place]) wo Contrast    Narrative    EXAM: MRA BRAIN ([place]) WO CONTRAST, MRA NECK (CAROTIDS) WO AND W CONTRAST, MR BRAIN W/O AND W CONTRAST  LOCATION: [facility]  DATE/TIME: 2/3/2020 8:21 PM    INDICATION: Vertigo, frequent falls.  COMPARISON: Head CT 02/03/2020 at 0952 hours, 01/05/2020 head CT, brain MRI 10/12/2018.  CONTRAST: 10 mL Gadavist.  TECHNIQUE:   HEAD MRI: Routine multiplanar multisequence head MRI without and with intravenous contrast.  HEAD MRA: 3D time-of-flight head MRA without intravenous contrast.  NECK MRA: Neck MRA without and with IV contrast. Stenosis measurements made according to NASCET criteria unless otherwise specified.    FINDINGS:  HEAD MRI:  INTRACRANIAL CONTENTS: No acute or subacute infarct. No mass, acute hemorrhage, or extra-axial fluid collections. Scattered nonspecific  T2/FLAIR hyperintensities within the cerebral white matter most consistent with mild/moderate chronic microvascular   ischemic change. Moderate generalized cerebral atrophy. No hydrocephalus. Normal position of the cerebellar tonsils. No pathologic contrast enhancement.    SELLA: No abnormality accounting for technique.    OSSEOUS STRUCTURES/SOFT TISSUES: Normal marrow signal. The major intracranial vascular flow voids are maintained.     ORBITS: Prior bilateral cataract surgery. Visualized portions of the orbits are otherwise unremarkable.     SINUSES/MASTOIDS: No paranasal sinus mucosal disease. No middle ear or mastoid effusion.     HEAD MRA:   ANTERIOR CIRCULATION: Diminished flow related signal redemonstrated in the M1 and proximal M2 segments of the MCAs bilaterally right more than left most consistent with slab overlap artifact. Also, diminished visualization of the left superior division   mid to distal branches similar to the prior study, chronic. No acute stenosis/occlusion, aneurysm, or high flow vascular malformation. There is a near fetal origin of the left PCA with a tiny P1 segment. The A1 segment of the right RUSSELL is small compared   to the left.    POSTERIOR CIRCULATION: No stenosis/occlusion, aneurysm, or high flow vascular malformation. The left vertebral artery is markedly dominant and the right vertebral artery is absent, or has markedly diminished flow/stenosis, stable.     NECK MRA:   RIGHT CAROTID: No measurable stenosis or dissection. Moderate tortuosity in the mid to proximal cervical ICA.    LEFT CAROTID: No measurable stenosis or dissection. Near horizontal origin of the ICA with marked mid cervical ICA tortuosity noted.    VERTEBRAL ARTERIES: No focal stenosis or dissection. Right vertebral artery absent. Left vertebral artery unremarkable.    AORTIC ARCH: Common origin left common carotid and innominate artery. No significant stenosis at the origin of the great vessels.      Impression     IMPRESSION:  HEAD MRI:   1.  No acute intracranial process.  2.  Generalized brain atrophy and presumed microvascular ischemic changes as detailed above.    HEAD MRA:   1.  No aneurysms or vascular malformations.  2.  No evidence of new/acute intracranial significant vessel occlusion.    NECK MRA:  1.  No significant stenosis, no dissection.  2.  Left vertebral artery dominant, right vertebral artery absent.

## 2020-02-04 NOTE — PLAN OF CARE
PRIMARY DIAGNOSIS: VERTIGO    OUTPATIENT/OBSERVATION GOALS TO BE MET BEFORE DISCHARGE  1. Orthostatic performed: Yes:          Lying Orthostatic BP: 158/82         Sitting Orthostatic BP: 168/81         Standing Orthostatic BP: 181/86     2. Completion of appropriate imaging: Yes    3. Tolerating PO medications: Yes    4. Return to near baseline physical activity: No    5. Cleared for discharge by consultants (if involved): N/A    Discharge Planner Nurse   Safe discharge environment identified: Yes  Barriers to discharge: Yes         Please review provider order for any additional goals.   Nurse to notify provider when observation goals have been met and patient is ready for discharge.    A&O x4, confused at times per dtr and forgetful, bed alarm on for safety and dtr at bedside, pt c/o dizziness/feelings of the room spinning, otherwise neuros intact. VSS. Ax1 w/cane when oob. LS clear, denies SOB/SAMS. Tele NSR. BS audible/active x4, tolerating PO, , denies N/V. Voiding. PT consulted. Discharge TBD. Will continue to monitor and provide supportive cares.

## 2020-02-04 NOTE — PROGRESS NOTES
"PRIMARY DIAGNOSIS: VERTIGO     OUTPATIENT/OBSERVATION GOALS TO BE MET BEFORE DISCHARGE  1. Orthostatic performed: Yes:          Lying Orthostatic BP: 138/77         Sitting Orthostatic BP: 155/94         Standing Orthostatic BP: 153/87      2. Completion of appropriate imaging: Yes     3. Tolerating PO medications: Yes     4. Return to near baseline physical activity: No     5. Cleared for discharge by consultants (if involved): No        Discharge Planner Nurse      Safe discharge environment identified: Yes  Barriers to discharge: Yes       Entered by: Melissa Blanchard 02/04/2020 1500     Please review provider order for any additional goals.   Nurse to notify provider when observation goals have been met and patient is ready for discharge.       Patient is alert and oriented, but at times can be very confused.  BP elevated, but will continue to monitor. Orthostatics completed. Patient states with any little movement with his head he has instant vertigo. Meclizine and Dramamine given (steriod pack started.). Patient tolerating diet and at 100%. SL. SBA with gait belt and cane when up. Tele in place and patient SR HR 83. Denies pain.  and 4 units Aspart given (MD updated on high blood sugars). PT to see.     BP (!) 151/87 (BP Location: Left arm)   Pulse 73   Temp 98.7  F (37.1  C) (Oral)   Resp 18   Ht 1.778 m (5' 10\")   Wt 72.3 kg (159 lb 4.8 oz)   SpO2 98%   BMI 22.86 kg/m      "

## 2020-02-04 NOTE — PLAN OF CARE
PRIMARY DIAGNOSIS: VERTIGO    OUTPATIENT/OBSERVATION GOALS TO BE MET BEFORE DISCHARGE  1. Orthostatic performed: Yes:          Lying Orthostatic BP: 138/77         Sitting Orthostatic BP: 155/94         Standing Orthostatic BP: 153/87     2. Completion of appropriate imaging: Yes    3. Tolerating PO medications: Yes    4. Return to near baseline physical activity: No    5. Cleared for discharge by consultants (if involved): No    Discharge Planner Nurse   Safe discharge environment identified: Yes  Barriers to discharge: Yes       Entered by: Marissa Natarajan 02/04/2020 5:58 PM     Please review provider order for any additional goals.   Nurse to notify provider when observation goals have been met and patient is ready for discharge.

## 2020-02-04 NOTE — PROGRESS NOTES
"PRIMARY DIAGNOSIS: VERTIGO    OUTPATIENT/OBSERVATION GOALS TO BE MET BEFORE DISCHARGE  1. Orthostatic performed: Yes:          Lying Orthostatic BP: 138/77         Sitting Orthostatic BP: 155/94         Standing Orthostatic BP: 153/87     2. Completion of appropriate imaging: Yes    3. Tolerating PO medications: Yes    4. Return to near baseline physical activity: No    5. Cleared for discharge by consultants (if involved): No    Discharge Planner Nurse   Safe discharge environment identified: Yes  Barriers to discharge: Yes       Entered by: Melissa Blanchard 02/04/2020 10:06 AM     Please review provider order for any additional goals.   Nurse to notify provider when observation goals have been met and patient is ready for discharge.      Patient is alert and oriented, but can be forgetful ( being utilized). VS WNL and documented on the FS. Orthostatics completed. Patient states with any little movement with his head he has instant vertigo. Meclizine given. Patient tolerating diet and at 100%. SL. 1 assist/SBA with gait belt and cane when up. Tele in place and patient SR HR 83. Denies pain. BS this morning was 139 and no insulin given. PT to see.     /73 (BP Location: Right arm)   Pulse 73   Temp 98  F (36.7  C) (Oral)   Resp 18   Ht 1.778 m (5' 10\")   Wt 72.3 kg (159 lb 4.8 oz)   SpO2 98%   BMI 22.86 kg/m      "

## 2020-02-04 NOTE — PLAN OF CARE
"PT: Orders received, evaluation completed and treatment initiated. 80 year old male with a history of DM Type 2, HTN, Hepatitis C, Hepatocellular Carcinoma, CKD, PTSD, Depression, Anxiety, Insomnia, GERD, Paroxysmal Atrial Fibrillation, CVA who presents to the ED today with dizziness and a fall. CT/MRI/orthostatics negative. Pt reports modified independence with a cane.     Discharge Planner PT   Patient plan for discharge: Not stated.   Current status: Patient changes his story throughout session re: symptoms, denies previous stroke and  reports pt often giving \"random\" information or not answering question that was asked. Patient with guarded neck AROM bilaterally. Pt slow to move but able to demo full ROM. Decreased L UE AROM compared to R UE - able to reach behind head functionally but gross movement excursion is less. Decreased L UE  strength. No nystagmus noted at rest, with visual tracking or at end ranges. Patient reports dizziness increase with ALL position changes. Rolling bilat in bed. Supine<>sit. Sit<>stand and bilateral anjel-hallpike testing. No appreciable nystagmus noted. Of note pt has difficulty maintaining proper head test position even with tactile cues. Also of difficulty pt initially reports constant dizziness and keeps eyes closed on examination; but then denies dizziness at rest end of session.   Barriers to return to prior living situation: Cognition/safety?, impaired balance  Recommendations for discharge: Home with OP vestibular follow up and FWW for all mobility if pt has adequate supervision.  Rationale for recommendations: At this time, signs and symptoms do not appear consistent with BPPV. Possible neuritis or head injury with falls? Biggest concern at this time appears to be patients cognition. Unclear if patient is at his cognitive baseline and if he has proper supports at home.        Entered by: Jose Carlos Escudero 02/04/2020 12:32 PM       "

## 2020-02-04 NOTE — PLAN OF CARE
OUTPATIENT/OBSERVATION GOALS TO BE MET BEFORE DISCHARGE  1. Orthostatic performed: Yes:          Lying Orthostatic BP: 158/82         Sitting Orthostatic BP: 168/81         Standing Orthostatic BP: 181/86     2. Completion of appropriate imaging: Yes    3. Tolerating PO medications: Yes    4. Return to near baseline physical activity: No    5. Cleared for discharge by consultants (if involved): N/A    Discharge Planner Nurse   Safe discharge environment identified: Yes  Barriers to discharge: Yes         Please review provider order for any additional goals.   Nurse to notify provider when observation goals have been met and patient is ready for discharge.    A&O x4, confused at times per dtr and forgetful, bed alarm on for safety and dtr at bedside, pt c/o dizziness/feelings of the room spinning, otherwise neuros intact. VSS. Ax1 w/cane when oob. LS clear, denies SOB/SAMS. Tele NSR. BS audible/active x4, tolerating PO, , denies N/V. Voiding. PT consulted. Discharge TBD. Will continue to monitor and provide supportive cares.

## 2020-02-04 NOTE — PHARMACY-ADMISSION MEDICATION HISTORY
Admission medication history interview status for this patient is complete. See [facility] admission navigator for allergy information, prior to admission medications and immunization status.     Medication history interview source(s):Family  Medication history resources (including written lists, pill bottles, clinic record):None    Changes made to PTA medication list:  Added: none  Deleted: none  Changed: refresh eye drops, levemir insulin, , miralax    Actions taken by pharmacist (provider contacted, etc):spoke with family over the phone     Additional medication history information:None    Medication reconciliation/reorder completed by provider prior to medication history? No        Prior to Admission medications    Medication Sig Last Dose Taking? Auth Provider   acetaminophen (TYLENOL) 500 MG tablet Take 1,000 mg by mouth every 8 hours as needed for mild pain  Yes Unknown, Entered By History   amLODIPine (NORVASC) 5 MG tablet Take 5 mg by mouth every evening 2/2/2020 at Unknown time Yes Unknown, Entered By History   bisacodyl (DULCOLAX) 10 MG suppository Place 1 suppository (10 mg) rectally daily as needed for constipation  Yes Carina Bruce PA-C   Carboxymeth-Glycerin-Polysorb (REFRESH OPTIVE ADVANCED) 0.5-1-0.5 % SOLN Place 1 drop into both eyes 2 times daily as needed   Yes Reported, Patient   cholecalciferol (D 5000) 5000 UNITS CAPS Take 5,000 Units by mouth daily   Yes Reported, Patient   Cyanocobalamin (B-12 PO) Take 1 capsule by mouth daily  Yes Unknown, Entered By History   insulin aspart (NOVOLOG PEN) 100 UNIT/ML injection Inject 3 Units Subcutaneous 3 times daily (with meals)   Yes Reported, Patient   insulin detemir (LEVEMIR PEN) 100 UNIT/ML pen Inject 20 Units Subcutaneous At Bedtime 2/2/2020 at Unknown time Yes Unknown, Entered By History   oxyCODONE IR (ROXICODONE) 5 MG tablet Take 5 mg by mouth every 6 hours as needed   Yes Reported, Patient   pantoprazole (PROTONIX) 40 MG enteric coated tablet  "Take 40 mg by mouth daily as needed   Yes Reported, Patient   polyethylene glycol (MIRALAX/GLYCOLAX) packet Take 17 g by mouth 2 times daily as needed   Yes Carina Bruce PA-C   simvastatin (ZOCOR) 20 MG tablet Take 1 tablet (20 mg) by mouth daily 2/2/2020 at Unknown time Yes Cindy Maier MD   timolol (TIMOPTIC) 0.5 % ophthalmic solution PLACE 1 DROP INTO BOTH EYES ONCE DAILY. 2/2/2020 at Unknown time Yes Reported, Patient   traZODone (DESYREL) 150 MG tablet Take 75 mg by mouth At Bedtime  2/2/2020 at Unknown time Yes Unknown, Entered By History   triamterene-HCTZ (DYAZIDE) 37.5-25 MG capsule Take 1 capsule by mouth every morning 2/2/2020 at Unknown time Yes Unknown, Entered By History   blood glucose monitoring (GREGORIO CONTOUR) test strip 4 times daily    Reported, Patient   blood glucose monitoring (NO BRAND SPECIFIED) meter device kit Use to test blood sugar 4 times daily or as directed.   Carina Bruce PA-C   Blood Glucose Monitoring Suppl (FIFTY50 GLUCOSE METER 2.0) W/DEVICE KIT as needed for blood glucose monitoring   Reported, Patient   Blood Pressure Monitoring (RA BLOOD PRESSURE CUFF MONITOR) MISC Take blood pressure once daily   Reported, Patient   Insulin Pen Needle (PEN NEEDLES 5/16\") 31G X 8 MM MISC 4 x daily   Reported, Patient         "

## 2020-02-04 NOTE — PHARMACY
Anticoagulation coverage check.  Patient has Medicare D through [employer] as well as [facility] [email or web address]    Xarelto/Eliquis/Pradaxa:  $0/mo.     Teotoven (warfarin): $0/mo      -JOELLE Barkley, Pharmacy Technician/Liaison, Discharge Pharmacy [phone]

## 2020-02-05 ENCOUNTER — APPOINTMENT (OUTPATIENT)
Dept: PHYSICAL THERAPY | Facility: CLINIC | Age: 80
End: 2020-02-05
Payer: MEDICARE

## 2020-02-05 VITALS
RESPIRATION RATE: 16 BRPM | SYSTOLIC BLOOD PRESSURE: 179 MMHG | HEIGHT: 70 IN | BODY MASS INDEX: 22.81 KG/M2 | WEIGHT: 159.3 LBS | OXYGEN SATURATION: 99 % | DIASTOLIC BLOOD PRESSURE: 108 MMHG | HEART RATE: 89 BPM | TEMPERATURE: 97.3 F

## 2020-02-05 LAB
GLUCOSE BLDC GLUCOMTR-MCNC: 288 MG/DL (ref 70–99)
GLUCOSE BLDC GLUCOMTR-MCNC: 396 MG/DL (ref 70–99)
GLUCOSE BLDC GLUCOMTR-MCNC: 434 MG/DL (ref 70–99)
GLUCOSE BLDC GLUCOMTR-MCNC: 437 MG/DL (ref 70–99)

## 2020-02-05 PROCEDURE — 00000146 ZZHCL STATISTIC GLUCOSE BY METER IP

## 2020-02-05 PROCEDURE — 99217 ZZC OBSERVATION CARE DISCHARGE: CPT | Performed by: PHYSICIAN ASSISTANT

## 2020-02-05 PROCEDURE — 97530 THERAPEUTIC ACTIVITIES: CPT | Mod: GP | Performed by: PHYSICAL THERAPIST

## 2020-02-05 PROCEDURE — 25000132 ZZH RX MED GY IP 250 OP 250 PS 637: Mod: GY | Performed by: PHYSICIAN ASSISTANT

## 2020-02-05 PROCEDURE — 25000131 ZZH RX MED GY IP 250 OP 636 PS 637: Mod: GY | Performed by: INTERNAL MEDICINE

## 2020-02-05 PROCEDURE — 96372 THER/PROPH/DIAG INJ SC/IM: CPT

## 2020-02-05 PROCEDURE — G0378 HOSPITAL OBSERVATION PER HR: HCPCS

## 2020-02-05 RX ORDER — MECLIZINE HYDROCHLORIDE 25 MG/1
25 TABLET ORAL EVERY 6 HOURS PRN
Qty: 20 TABLET | Refills: 0 | Status: ON HOLD | OUTPATIENT
Start: 2020-02-05 | End: 2021-08-05

## 2020-02-05 RX ORDER — PREDNISONE 10 MG/1
TABLET ORAL
Qty: 7 TABLET | Refills: 0 | Status: SHIPPED | OUTPATIENT
Start: 2020-02-10 | End: 2020-02-05

## 2020-02-05 RX ADMIN — MECLIZINE HYDROCHLORIDE 25 MG: 25 TABLET ORAL at 14:14

## 2020-02-05 RX ADMIN — MECLIZINE HYDROCHLORIDE 25 MG: 25 TABLET ORAL at 07:49

## 2020-02-05 RX ADMIN — PREDNISONE 60 MG: 50 TABLET ORAL at 07:49

## 2020-02-05 RX ADMIN — ACETAMINOPHEN 650 MG: 325 TABLET, FILM COATED ORAL at 06:25

## 2020-02-05 RX ADMIN — TRIAMTERENE AND HYDROCHLOROTHIAZIDE 1 TABLET: 37.5; 25 TABLET ORAL at 07:49

## 2020-02-05 NOTE — PLAN OF CARE
PRIMARY DIAGNOSIS: VERTIGO     OUTPATIENT/OBSERVATION GOALS TO BE MET BEFORE DISCHARGE  1. Orthostatic performed: Yes:          Lying Orthostatic BP: 138/77         Sitting Orthostatic BP: 155/94         Standing Orthostatic BP: 153/87      2. Completion of appropriate imaging: Yes     3. Tolerating PO medications: Yes     4. Return to near baseline physical activity: No     5. Cleared for discharge by consultants (if involved): No     Discharge Planner Nurse   Safe discharge environment identified: Yes  Barriers to discharge: Yes       Entered by: Marissa Natarajan 02/04/2020 5:58 PM  A&Ox4, Ax1, tolerating mod-cho diet, tele- SR, lungs- clear, bowels- active, voiding, , 414, 382.    Per provider give 3 additional units to correct pre-meal dinner . Due to insulin sliding scale change to high resistance.     Please review provider order for any additional goals.   Nurse to notify provider when observation goals have been met and patient is ready for discharge.

## 2020-02-05 NOTE — PLAN OF CARE
PRIMARY DIAGNOSIS: GENERALIZED WEAKNESS    OUTPATIENT/OBSERVATION GOALS TO BE MET BEFORE DISCHARGE  1. Orthostatic performed: Yes on previous shift:          Lying Orthostatic BP: 138/77         Sitting Orthostatic BP: 155/94         Standing Orthostatic BP: 153/87     2. Tolerating PO medications: Yes    3. Return to near baseline physical activity: No    4. Cleared for discharge by consultants (if involved): No    Temp: 97.8  F (36.6  C) Temp src: Oral BP: (!) 150/88 Pulse: 89 Heart Rate: 88 Resp: 16 SpO2: 96 % O2 Device: None (Room air)      Patient is alert and oriented x 4. Denies pain. Vital signs WNL; BP slightly elevated. BG has been elevated, possibly due to prednisone taper. Tolerating diet without any nausea or vomiting. Plan is to see PT and see if symptoms of dizziness improve and possibly discharge today 02/05/20.    Discharge Planner Nurse   Safe discharge environment identified: Yes  Barriers to discharge: Yes       Entered by: Janeen Regalado 02/05/2020 4:20 AM     Please review provider order for any additional goals.   Nurse to notify provider when observation goals have been met and patient is ready for discharge.

## 2020-02-05 NOTE — PLAN OF CARE
PRIMARY DIAGNOSIS: VERTIGO     OUTPATIENT/OBSERVATION GOALS TO BE MET BEFORE DISCHARGE  1. Orthostatic performed: Yes:          Lying Orthostatic BP: 138/77         Sitting Orthostatic BP: 155/94         Standing Orthostatic BP: 153/87      2. Completion of appropriate imaging: Yes     3. Tolerating PO medications: Yes     4. Return to near baseline physical activity: No     5. Cleared for discharge by consultants (if involved): No     Discharge Planner Nurse   Safe discharge environment identified: Yes  Barriers to discharge: Yes       Entered by: Marissa Natarajan 02/04/2020 5:58 PM    A&Ox4, Ax1, tolerating mod-cho diet, heart sounds- WNL, lungs- clear, bowels- active, voiding, reports dizziness.    Please review provider order for any additional goals.   Nurse to notify provider when observation goals have been met and patient is ready for discharge.

## 2020-02-05 NOTE — PLAN OF CARE
PT: PT re-attmpting vestibular exam as pt now reporting mild improvement and likely more accurate exam today.     Discharge Planner PT   Patient plan for discharge: Not stated.   Current status: Pt easily following all cues today, but is not able to accurately tell me today's date, location or who president is. Pt dtg's report he is a little more forgetful currently. Pt was cued through oculomotor exam, HINTS exam, saccades and positional testing. Pt and family educated on all components. No nystagmus throughout session. Mild increase in dizziness with R horizontal canal testing, but no nystagmus. All vestibular exam negative. Tolerating over 400 feet of ambulation with FWW and then a Cane, speed mildly improved with FWW. Order placed. Family was looking into getting from [place] etc. No LOB with ambulation even with head turns. Pt BP elevated, 172/102.   Barriers to return to prior living situation: Cognition/safety  Recommendations for discharge: Home with OP vestibular follow up and FWW for all mobility. Home with family supervision and cognitive eval from OT or neuropsych.   Rationale for recommendations: Biggest concern at this time appears to be patient's cognition. Pt would benefit from follow up with vestibular eval in OP clinic if BP and BS controlled and continues to have dizziness.        Entered by: Otilia Napier 02/05/2020 12:38 PM     Physical Therapy Discharge Summary    Reason for therapy discharge:    Discharged to home with outpatient therapy.    Progress towards therapy goal(s). See goals on Care Plan in [facility] electronic health record for goal details.  Goals met    Therapy recommendation(s):    Continued therapy is recommended.  Rationale/Recommendations: Pt is below baseline for balance and symptoms of dizziness. Would benefit from vestibular rehab for follow up.

## 2020-02-05 NOTE — PROGRESS NOTES
Updated ILEANA Eldridge, on blood sugar of 437. Will give the correction dose 10 units along with the 3 units. Will continue to monitor.

## 2020-02-05 NOTE — PLAN OF CARE
PRIMARY DIAGNOSIS: GENERALIZED WEAKNESS    OUTPATIENT/OBSERVATION GOALS TO BE MET BEFORE DISCHARGE  1. Orthostatic performed: Yes on previous shift:          Lying Orthostatic BP: 138/77         Sitting Orthostatic BP: 155/94         Standing Orthostatic BP: 153/87     2. Tolerating PO medications: Yes    3. Return to near baseline physical activity: No    4. Cleared for discharge by consultants (if involved): No    Temp: 97.8  F (36.6  C) Temp src: Oral BP: (!) 150/88 Pulse: 89 Heart Rate: 88 Resp: 16 SpO2: 96 % O2 Device: None (Room air)      Patient is alert and oriented x 4. Denies pain. Vital signs WNL; BP slightly elevated. BG has been elevated, possibly due to prednisone taper. Tolerating diet without any nausea or vomiting. Per tele tech: sinus rhythm HR between 70-80. Plan is to see PT and see if symptoms of dizziness improve and possibly discharge today 02/05/20.    Discharge Planner Nurse   Safe discharge environment identified: Yes  Barriers to discharge: Yes       Entered by: Janeen Regalado 02/05/2020 4:25 AM     Please review provider order for any additional goals.   Nurse to notify provider when observation goals have been met and patient is ready for discharge.

## 2020-02-05 NOTE — PLAN OF CARE
"Patient's After Visit Summary was reviewed with patient and/or family.   Patient verbalized understanding of After Visit Summary, recommended follow up and was given an opportunity to ask questions.   Discharge medications sent home with patient/family: Meclizine sent to patients pharmacy.    Discharged with transport tech and daughter with all belongings. Patient medically cleared to discharge. Jazmyn TEJEDA updated on BP of 179/108 and PA comfortable with that BP and to discharge patient. Updated patient to take BS when he gets home and to monitor blood pressure. Daughter will transport home. Patient denies dizziness.     BP (!) 179/108 (BP Location: Right arm)   Pulse 89   Temp 97.3  F (36.3  C) (Oral)   Resp 16   Ht 1.778 m (5' 10\")   Wt 72.3 kg (159 lb 4.8 oz)   SpO2 99%   BMI 22.86 kg/m          "

## 2020-02-05 NOTE — PROGRESS NOTES
"OUTPATIENT/OBSERVATION GOALS TO BE MET BEFORE DISCHARGE  1. Orthostatic performed: Yes:          Lying Orthostatic BP: 138/77         Sitting Orthostatic BP: 155/94         Standing Orthostatic BP: 153/87      2. Completion of appropriate imaging: Yes     3. Tolerating PO medications: Yes     4. Return to near baseline physical activity: No     5. Cleared for discharge by consultants (if involved): No        Discharge Planner Nurse      Safe discharge environment identified: Yes  Barriers to discharge: Yes       Entered by: Melissa Blanchard 02/05/2020 0800     Please review provider order for any additional goals.   Nurse to notify provider when observation goals have been met and patient is ready for discharge.       Patient is alert and oriented, but at times can be very forgetful (not a good historian).  BP elevated in 150's. Patient states vertigo has gotten better, but still has dizziness with movement. Meclizine given. Patient tolerating diet and at 100%. SL. SBA with gait belt and cane when up. Tele in place and patient SR HR 74. Denies pain.  this morning and 6 units Aspart given. Maria T TEJEDA, updated on high blood sugars and will just monitor at this time.  PT to see.     BP (!) 150/82 (BP Location: Left arm)   Pulse 89   Temp 97.7  F (36.5  C) (Oral)   Resp 16   Ht 1.778 m (5' 10\")   Wt 72.3 kg (159 lb 4.8 oz)   SpO2 99%   BMI 22.86 kg/m      "

## 2020-02-20 ENCOUNTER — HOSPITAL ENCOUNTER (OUTPATIENT)
Dept: PHYSICAL THERAPY | Facility: CLINIC | Age: 80
End: 2020-02-20
Payer: MEDICARE

## 2020-02-20 DIAGNOSIS — M54.50 RIGHT-SIDED LOW BACK PAIN WITHOUT SCIATICA, UNSPECIFIED CHRONICITY: ICD-10-CM

## 2020-02-20 DIAGNOSIS — R42 DIZZINESS: Primary | ICD-10-CM

## 2020-02-20 PROCEDURE — 97110 THERAPEUTIC EXERCISES: CPT | Mod: GP | Performed by: PHYSICAL THERAPIST

## 2020-02-20 PROCEDURE — 97161 PT EVAL LOW COMPLEX 20 MIN: CPT | Mod: GP | Performed by: PHYSICAL THERAPIST

## 2020-02-20 NOTE — DISCHARGE INSTRUCTIONS
If you have been sitting for a long period of time, do easy leg exercises before standing. This helps pump the blood out of the legs    1. Leg kicks      2. Toe/heel raises         Use of cane:  -try using it in your LEFT hand to help with the right knee and right low back     Low back pain:  -your joints need movement for nutrition and to stay well lubricated  -make sure you get up every 30-60 min to keep the blood flowing and joints moving    Exercises to do in bed:  --bring your knee to your chest, one at a time  -bring both knees to your chest   -with your knees bent and feet on the floor, rock your knees side to side gently

## 2020-02-20 NOTE — PROGRESS NOTES
[facility]        OUTPATIENT PHYSICAL THERAPY FUNCTIONAL EVALUATION  PLAN OF TREATMENT FOR OUTPATIENT REHABILITATION  (COMPLETE FOR INITIAL CLAIMS ONLY)  Patient's Last Name, First Name, M.I.  YOB: 1940  Eh Callahan        Provider's Name   [facility]   Medical Record No.  [id number]     Start of Care Date:  02/20/20   Onset Date:  02/03/20   Type:     _X__PT   ____OT  ____SLP Medical Diagnosis:     Dizziness    PT Diagnosis:  impaired mobility  Visits from SOC:  1                              __________________________________________________________________________________  Plan of Treatment/Functional Goals:  HEP reviewed and issued.            GOALS   No goals as evaluation only.                                                                                        Therapy Frequency:  other (see comments)   Predicted Duration of Therapy Intervention:  (eval only)    Janet Mcdowell, PT                                    I CERTIFY THE NEED FOR THESE SERVICES FURNISHED UNDER        THIS PLAN OF TREATMENT AND WHILE UNDER MY CARE     (Physician co-signature of this document indicates review and certification of the therapy plan).                Certification Date From:      Certification Date To:       Referring Provider:  Jazmyn Patel    Initial Assessment  See Epic Evaluation- Start of Care Date: 02/20/20

## 2020-02-20 NOTE — PROGRESS NOTES
02/20/20 0913   Quick Adds   Quick Adds Vestibular Eval   Type of Visit Initial OP PT Evaluation       Present Yes   Language [place]   General Information   Start of Care Date 02/20/20   Referring Physician Jazmyn Patel   Orders Evaluate and Treat as Indicated   Order Date 02/03/20   Medical Diagnosis DM2, HTN, CKD, PTSD, anxiety, depression, A-fib, CVA 2018, Hep C, hepatocellular CA, dizziness   Onset of illness/injury or Date of Surgery 02/03/20   Precautions/Limitations fall precautions   Diagnostic Tests MRI;CT Scan;X-ray  (all negative for acute changes; CT atrophy of brain)   CT Results Results   CT results atrophy of brain consistent with white matter changes sm vessel ischemic dz   Living environment House/townhome   Current Assistive Devices Front Wheeled Walker;Standard Cane   Patient/Family Goals Statement To get better.   General Information Comments Patient states that he is no longer having dizziness and that it resolved a couple of days ago. His chief complaint now is right sidded LBP. This has been present in the past, which he received PT for and did not improve his symptoms. He denies that dizziness or LBP are impacting his mobility. He feels like he is at his baseline for mobility and balance.    Fall Risk Screen   Fall screen completed by PT   Have you fallen 2 or more times in the past year? Yes   Have you fallen and had an injury in the past year? Yes   Timed Up and Go score (seconds) 23.62   Is patient a fall risk? Yes   Fall screen comments patient has not fallen since 2/3/20 with subsequent admission    Pain   Patient currently in pain Yes   Pain location right low back   Pain rating middle 4-5/10   Pain description Ache   Pain comments no radiation    Cognitive Status Examination   Orientation other (see comments)  (appears oriented to person, place and time)   Level of Consciousness alert   Posture   Posture Forward head position  (mild)   Palpation    Palpation mild pain right side lumbar spine   Range of Motion (ROM)   ROM Comment   (hip flex ROM WNL; SLR WNL bilaterally)   Bed Mobility   Bed Mobility Bed mobility skill: Sit to supine   Bed Mobility Comments increased time and effort   Transfer Skills   Transfer Transfer Skill: Sit/Stand   Transfer Comments slow; able to accomplish without UE support   Gait   Gait Gait Analysis   Gait Comments wide base of support, slow speed, long steps, without path deviation or LOB   Gait Analysis   Gait Pattern Used 3-point gait   Gait Deviations Noted   (able to change speed, head turns up/down, L/R w/o path devia)   Gait Special Tests   Gait Special Tests 25 FOOT TIMED WALK;OTHER   Gait Special Tests 25 Foot Timed Walk   Seconds 11   Comments use of cane    Balance   Balance other (describe)   Balance Quick Add Sit to stand balance  (30 sec chair stand: only 2 times d/t dizziness (lightheaded))   Balance Special Tests   Balance Special Tests Modified CTSIB Conditions   Balance Special Tests Modified CTSIB Conditions   Condition 1, seconds 30 Seconds  (no sway)   Condition 2, seconds 30 Seconds  (no sway)   Condition 4, seconds 15 Seconds  (increased sway)   Condition 5, seconds 3 Seconds  (increased sway, assist required)   Modified CTSIB Comments no increase in c/o dizziness with test    Planned Therapy Interventions   Planned Therapy Interventions stretching   Clinical Impression   PT Diagnosis impaired mobility    Influenced by the following impairments pain, joint stiffness   Functional limitations due to impairments sit to stand, walking speed   Clinical Presentation Stable/Uncomplicated   Clinical Presentation Rationale pt stable today's presentation; mild change in sxs of pain and dizziness   Clinical Decision Making (Complexity) Low complexity   Therapy Frequency other (see comments)   Predicted Duration of Therapy Intervention (days/wks)   (eval only)   Risk & Benefits of therapy have been explained Yes    Patient, Family & other staff in agreement with plan of care Yes   Clinical Impression Comments Patient has recent h/o dizziness that resulted in a fall. he reports that sxs of dizziness have mainly resolved and no longer impact his function. CC today is right sided LBP. Neither dizziness nor LBP is impacting his mobility at this time. His LBP was mildly improved with gentle LBP mobility/stretching. Dizziness impacted by sudden position changes. he will benefit from being consistently active throughout the day for joint health and mobility. He does not require ongoing skilled PT as he is at his baseline for mobility and function. He has tried PT in the past for LBP without success.    Education Assessment   Barriers to Learning Language   GOALS   PT Eval Goals   (no goals, eval only)   Total Evaluation Time   PT Eval, Low Complexity Minutes (44527) 83

## 2020-05-22 ENCOUNTER — APPOINTMENT (OUTPATIENT)
Dept: CT IMAGING | Facility: CLINIC | Age: 80
End: 2020-05-22
Attending: EMERGENCY MEDICINE
Payer: MEDICARE

## 2020-05-22 ENCOUNTER — APPOINTMENT (OUTPATIENT)
Dept: GENERAL RADIOLOGY | Facility: CLINIC | Age: 80
End: 2020-05-22
Attending: EMERGENCY MEDICINE
Payer: MEDICARE

## 2020-05-22 ENCOUNTER — HOSPITAL ENCOUNTER (OUTPATIENT)
Facility: CLINIC | Age: 80
Setting detail: OBSERVATION
Discharge: HOME | End: 2020-05-23
Attending: EMERGENCY MEDICINE | Admitting: INTERNAL MEDICINE
Payer: MEDICARE

## 2020-05-22 DIAGNOSIS — J69.0 ASPIRATION PNEUMONITIS (H): ICD-10-CM

## 2020-05-22 DIAGNOSIS — N40.1 BENIGN PROSTATIC HYPERPLASIA WITH URINARY RETENTION: ICD-10-CM

## 2020-05-22 DIAGNOSIS — M62.81 MUSCLE WEAKNESS (GENERALIZED): ICD-10-CM

## 2020-05-22 DIAGNOSIS — E87.1 HYPONATREMIA: ICD-10-CM

## 2020-05-22 DIAGNOSIS — R33.9 URINARY RETENTION: ICD-10-CM
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
## 2020-05-22 DIAGNOSIS — S70.11XA CONTUSION OF RIGHT HIP AND THIGH, INITIAL ENCOUNTER: ICD-10-CM

## 2020-05-22 DIAGNOSIS — R33.8 BENIGN PROSTATIC HYPERPLASIA WITH URINARY RETENTION: ICD-10-CM

## 2020-05-22 DIAGNOSIS — S70.01XA CONTUSION OF RIGHT HIP AND THIGH, INITIAL ENCOUNTER: ICD-10-CM

## 2020-05-22 PROBLEM — N17.9 ACUTE KIDNEY INJURY (H): Status: ACTIVE | Noted: 2020-05-22

## 2020-05-22 LAB
ALBUMIN SERPL-MCNC: 3.1 G/DL (ref 3.4–5)
ALBUMIN UR-MCNC: 70 MG/DL
ALP SERPL-CCNC: 85 U/L (ref 40–150)
ALT SERPL W P-5'-P-CCNC: 38 U/L (ref 0–70)
ANION GAP SERPL CALCULATED.3IONS-SCNC: 6 MMOL/L (ref 3–14)
APPEARANCE UR: CLEAR
AST SERPL W P-5'-P-CCNC: 33 U/L (ref 0–45)
BASOPHILS # BLD AUTO: 0 10E9/L (ref 0–0.2)
BASOPHILS NFR BLD AUTO: 0.2 %
BILIRUB DIRECT SERPL-MCNC: 0.2 MG/DL (ref 0–0.2)
BILIRUB SERPL-MCNC: 1.3 MG/DL (ref 0.2–1.3)
BILIRUB UR QL STRIP: NEGATIVE
BUN SERPL-MCNC: 48 MG/DL (ref 7–30)
CALCIUM SERPL-MCNC: 8.6 MG/DL (ref 8.5–10.1)
CHLORIDE SERPL-SCNC: 94 MMOL/L (ref 94–109)
CO2 SERPL-SCNC: 30 MMOL/L (ref 20–32)
COLOR UR AUTO: ABNORMAL
CREAT SERPL-MCNC: 1.89 MG/DL (ref 0.66–1.25)
DIFFERENTIAL METHOD BLD: ABNORMAL
EOSINOPHIL # BLD AUTO: 0 10E9/L (ref 0–0.7)
EOSINOPHIL NFR BLD AUTO: 0.1 %
ERYTHROCYTE [DISTWIDTH] IN BLOOD BY AUTOMATED COUNT: 12.2 % (ref 10–15)
GFR SERPL CREATININE-BSD FRML MDRD: 33 ML/MIN/{1.73_M2}
GLUCOSE BLDC GLUCOMTR-MCNC: 125 MG/DL (ref 70–99)
GLUCOSE BLDC GLUCOMTR-MCNC: 136 MG/DL (ref 70–99)
GLUCOSE BLDC GLUCOMTR-MCNC: 60 MG/DL (ref 70–99)
GLUCOSE SERPL-MCNC: 136 MG/DL (ref 70–99)
GLUCOSE UR STRIP-MCNC: NEGATIVE MG/DL
HCT VFR BLD AUTO: 39.3 % (ref 40–53)
HGB BLD-MCNC: 13.1 G/DL (ref 13.3–17.7)
HGB UR QL STRIP: NEGATIVE
IMM GRANULOCYTES # BLD: 0 10E9/L (ref 0–0.4)
IMM GRANULOCYTES NFR BLD: 0.3 %
INR PPP: 1.12 (ref 0.86–1.14)
KETONES UR STRIP-MCNC: NEGATIVE MG/DL
LEUKOCYTE ESTERASE UR QL STRIP: NEGATIVE
LYMPHOCYTES # BLD AUTO: 1 10E9/L (ref 0.8–5.3)
LYMPHOCYTES NFR BLD AUTO: 8.6 %
MCH RBC QN AUTO: 30.5 PG (ref 26.5–33)
MCHC RBC AUTO-ENTMCNC: 33.3 G/DL (ref 31.5–36.5)
MCV RBC AUTO: 92 FL (ref 78–100)
MONOCYTES # BLD AUTO: 0.6 10E9/L (ref 0–1.3)
MONOCYTES NFR BLD AUTO: 5.1 %
NEUTROPHILS # BLD AUTO: 10.1 10E9/L (ref 1.6–8.3)
NEUTROPHILS NFR BLD AUTO: 85.7 %
NITRATE UR QL: NEGATIVE
NRBC # BLD AUTO: 0 10*3/UL
NRBC BLD AUTO-RTO: 0 /100
PH UR STRIP: 7.5 PH (ref 5–7)
PLATELET # BLD AUTO: 166 10E9/L (ref 150–450)
POTASSIUM SERPL-SCNC: 3.6 MMOL/L (ref 3.4–5.3)
PROT SERPL-MCNC: 7 G/DL (ref 6.8–8.8)
RBC # BLD AUTO: 4.29 10E12/L (ref 4.4–5.9)
RBC #/AREA URNS AUTO: 1 /HPF (ref 0–2)
SARS-COV-2 RNA SPEC QL NAA+PROBE: NORMAL
SODIUM SERPL-SCNC: 130 MMOL/L (ref 133–144)
SOURCE: ABNORMAL
SP GR UR STRIP: 1.01 (ref 1–1.03)
SPECIMEN SOURCE: NORMAL
TROPONIN I SERPL-MCNC: <0.015 UG/L (ref 0–0.04)
UROBILINOGEN UR STRIP-MCNC: NORMAL MG/DL (ref 0–2)
WBC # BLD AUTO: 11.8 10E9/L (ref 4–11)
WBC #/AREA URNS AUTO: <1 /HPF (ref 0–5)

## 2020-05-22 PROCEDURE — 93005 ELECTROCARDIOGRAM TRACING: CPT

## 2020-05-22 PROCEDURE — 73522 X-RAY EXAM HIPS BI 3-4 VIEWS: CPT

## 2020-05-22 PROCEDURE — 25800030 ZZH RX IP 258 OP 636: Performed by: EMERGENCY MEDICINE

## 2020-05-22 PROCEDURE — 84484 ASSAY OF TROPONIN QUANT: CPT | Performed by: EMERGENCY MEDICINE

## 2020-05-22 PROCEDURE — 80048 BASIC METABOLIC PNL TOTAL CA: CPT | Performed by: EMERGENCY MEDICINE

## 2020-05-22 PROCEDURE — 25000128 H RX IP 250 OP 636: Performed by: EMERGENCY MEDICINE

## 2020-05-22 PROCEDURE — U0003 INFECTIOUS AGENT DETECTION BY NUCLEIC ACID (DNA OR RNA); SEVERE ACUTE RESPIRATORY SYNDROME CORONAVIRUS 2 (SARS-COV-2) (CORONAVIRUS DISEASE [COVID-19]), AMPLIFIED PROBE TECHNIQUE, MAKING USE OF HIGH THROUGHPUT TECHNOLOGIES AS DESCRIBED BY CMS-2020-01-R: HCPCS | Performed by: EMERGENCY MEDICINE

## 2020-05-22 PROCEDURE — 80076 HEPATIC FUNCTION PANEL: CPT | Performed by: EMERGENCY MEDICINE

## 2020-05-22 PROCEDURE — 81001 URINALYSIS AUTO W/SCOPE: CPT | Performed by: EMERGENCY MEDICINE

## 2020-05-22 PROCEDURE — 12000000 ZZH R&B MED SURG/OB

## 2020-05-22 PROCEDURE — 96365 THER/PROPH/DIAG IV INF INIT: CPT | Mod: 59

## 2020-05-22 PROCEDURE — 85610 PROTHROMBIN TIME: CPT | Performed by: EMERGENCY MEDICINE

## 2020-05-22 PROCEDURE — 70450 CT HEAD/BRAIN W/O DYE: CPT

## 2020-05-22 PROCEDURE — 99220 ZZC INITIAL OBSERVATION CARE,LEVL III: CPT | Performed by: INTERNAL MEDICINE

## 2020-05-22 PROCEDURE — 71045 X-RAY EXAM CHEST 1 VIEW: CPT

## 2020-05-22 PROCEDURE — 85025 COMPLETE CBC W/AUTO DIFF WBC: CPT | Performed by: EMERGENCY MEDICINE

## 2020-05-22 PROCEDURE — 51702 INSERT TEMP BLADDER CATH: CPT

## 2020-05-22 PROCEDURE — 00000146 ZZHCL STATISTIC GLUCOSE BY METER IP

## 2020-05-22 PROCEDURE — 51798 US URINE CAPACITY MEASURE: CPT

## 2020-05-22 PROCEDURE — 99285 EMERGENCY DEPT VISIT HI MDM: CPT | Mod: 25

## 2020-05-22 PROCEDURE — 73700 CT LOWER EXTREMITY W/O DYE: CPT | Mod: RT,CS

## 2020-05-22 PROCEDURE — 87040 BLOOD CULTURE FOR BACTERIA: CPT | Performed by: EMERGENCY MEDICINE

## 2020-05-22 RX ORDER — PROCHLORPERAZINE MALEATE 5 MG
5 TABLET ORAL EVERY 6 HOURS PRN
Status: DISCONTINUED | OUTPATIENT
Start: 2020-05-22 | End: 2020-05-23 | Stop reason: HOSPADM

## 2020-05-22 RX ORDER — LIDOCAINE HYDROCHLORIDE 20 MG/ML
20 JELLY TOPICAL
Status: DISCONTINUED | OUTPATIENT
Start: 2020-05-22 | End: 2020-05-22

## 2020-05-22 RX ORDER — ONDANSETRON 2 MG/ML
4 INJECTION INTRAMUSCULAR; INTRAVENOUS EVERY 6 HOURS PRN
Status: DISCONTINUED | OUTPATIENT
Start: 2020-05-22 | End: 2020-05-23 | Stop reason: HOSPADM

## 2020-05-22 RX ORDER — HYDROMORPHONE HYDROCHLORIDE 1 MG/ML
0.5 INJECTION, SOLUTION INTRAMUSCULAR; INTRAVENOUS; SUBCUTANEOUS
Status: DISCONTINUED | OUTPATIENT
Start: 2020-05-22 | End: 2020-05-22

## 2020-05-22 RX ORDER — BISACODYL 10 MG
10 SUPPOSITORY, RECTAL RECTAL DAILY PRN
Status: DISCONTINUED | OUTPATIENT
Start: 2020-05-22 | End: 2020-05-23 | Stop reason: HOSPADM

## 2020-05-22 RX ORDER — MECLIZINE HYDROCHLORIDE 25 MG/1
25 TABLET ORAL EVERY 6 HOURS PRN
Status: DISCONTINUED | OUTPATIENT
Start: 2020-05-22 | End: 2020-05-23 | Stop reason: HOSPADM

## 2020-05-22 RX ORDER — POLYETHYLENE GLYCOL 3350 17 G/17G
17 POWDER, FOR SOLUTION ORAL 2 TIMES DAILY PRN
Status: DISCONTINUED | OUTPATIENT
Start: 2020-05-22 | End: 2020-05-23 | Stop reason: HOSPADM

## 2020-05-22 RX ORDER — TIMOLOL MALEATE 5 MG/ML
1 SOLUTION/ DROPS OPHTHALMIC DAILY
Status: DISCONTINUED | OUTPATIENT
Start: 2020-05-23 | End: 2020-05-23

## 2020-05-22 RX ORDER — PANTOPRAZOLE SODIUM 40 MG/1
40 TABLET, DELAYED RELEASE ORAL DAILY
Status: DISCONTINUED | OUTPATIENT
Start: 2020-05-23 | End: 2020-05-23 | Stop reason: HOSPADM

## 2020-05-22 RX ORDER — AMLODIPINE BESYLATE 5 MG/1
5 TABLET ORAL EVERY EVENING
Status: DISCONTINUED | OUTPATIENT
Start: 2020-05-22 | End: 2020-05-23 | Stop reason: HOSPADM

## 2020-05-22 RX ORDER — TAMSULOSIN HYDROCHLORIDE 0.4 MG/1
0.4 CAPSULE ORAL EVERY EVENING
Status: DISCONTINUED | OUTPATIENT
Start: 2020-05-22 | End: 2020-05-23 | Stop reason: HOSPADM

## 2020-05-22 RX ORDER — AMOXICILLIN 250 MG
2 CAPSULE ORAL 2 TIMES DAILY PRN
Status: DISCONTINUED | OUTPATIENT
Start: 2020-05-22 | End: 2020-05-23 | Stop reason: HOSPADM

## 2020-05-22 RX ORDER — NALOXONE HYDROCHLORIDE 0.4 MG/ML
.1-.4 INJECTION, SOLUTION INTRAMUSCULAR; INTRAVENOUS; SUBCUTANEOUS
Status: DISCONTINUED | OUTPATIENT
Start: 2020-05-22 | End: 2020-05-23 | Stop reason: HOSPADM

## 2020-05-22 RX ORDER — DEXTROSE MONOHYDRATE 25 G/50ML
25-50 INJECTION, SOLUTION INTRAVENOUS
Status: DISCONTINUED | OUTPATIENT
Start: 2020-05-22 | End: 2020-05-23 | Stop reason: HOSPADM

## 2020-05-22 RX ORDER — SODIUM CHLORIDE 9 MG/ML
INJECTION, SOLUTION INTRAVENOUS CONTINUOUS
Status: ACTIVE | OUTPATIENT
Start: 2020-05-22 | End: 2020-05-23

## 2020-05-22 RX ORDER — ONDANSETRON 4 MG/1
4 TABLET, ORALLY DISINTEGRATING ORAL EVERY 6 HOURS PRN
Status: DISCONTINUED | OUTPATIENT
Start: 2020-05-22 | End: 2020-05-23 | Stop reason: HOSPADM

## 2020-05-22 RX ORDER — NICOTINE POLACRILEX 4 MG
15-30 LOZENGE BUCCAL
Status: DISCONTINUED | OUTPATIENT
Start: 2020-05-22 | End: 2020-05-23 | Stop reason: HOSPADM

## 2020-05-22 RX ORDER — OXYCODONE HYDROCHLORIDE 5 MG/1
5 TABLET ORAL EVERY 6 HOURS PRN
Status: DISCONTINUED | OUTPATIENT
Start: 2020-05-22 | End: 2020-05-23 | Stop reason: HOSPADM

## 2020-05-22 RX ORDER — AMOXICILLIN 250 MG
1 CAPSULE ORAL 2 TIMES DAILY PRN
Status: DISCONTINUED | OUTPATIENT
Start: 2020-05-22 | End: 2020-05-23 | Stop reason: HOSPADM

## 2020-05-22 RX ORDER — SIMVASTATIN 20 MG
20 TABLET ORAL DAILY
Status: DISCONTINUED | OUTPATIENT
Start: 2020-05-23 | End: 2020-05-23 | Stop reason: HOSPADM

## 2020-05-22 RX ORDER — LIDOCAINE 40 MG/G
CREAM TOPICAL
Status: DISCONTINUED | OUTPATIENT
Start: 2020-05-22 | End: 2020-05-23 | Stop reason: HOSPADM

## 2020-05-22 RX ORDER — PROCHLORPERAZINE 25 MG
12.5 SUPPOSITORY, RECTAL RECTAL EVERY 12 HOURS PRN
Status: DISCONTINUED | OUTPATIENT
Start: 2020-05-22 | End: 2020-05-23 | Stop reason: HOSPADM

## 2020-05-22 RX ORDER — AMPICILLIN AND SULBACTAM 2; 1 G/1; G/1
3 INJECTION, POWDER, FOR SOLUTION INTRAMUSCULAR; INTRAVENOUS EVERY 6 HOURS
Status: DISCONTINUED | OUTPATIENT
Start: 2020-05-23 | End: 2020-05-23 | Stop reason: HOSPADM

## 2020-05-22 RX ADMIN — SODIUM CHLORIDE 500 ML: 9 INJECTION, SOLUTION INTRAVENOUS at 18:48

## 2020-05-22 RX ADMIN — TAZOBACTAM SODIUM AND PIPERACILLIN SODIUM 4.5 G: 500; 4 INJECTION, SOLUTION INTRAVENOUS at 18:49

## 2020-05-22 NOTE — ED PROVIDER NOTES
History     Chief Complaint:  Fall       The history is provided by a relative (Daughter).     HPI:  Eh Callahan is a 80 year old male with history of hypertension and diabetes who presents for evaluation after a fall. Per patient's daughter, she checked his BG this morning at 4 am as usual. His BG was 296 so she administered 3 units of insulin. The daughter then went upstairs and left patient in the living room. She heard a noise and found him on the floor. Daughter does not know if patient hit his head, although he had no complaints of pain at that time. Around 5 am, patient began to have episodes of emesis and daughter thought he was choking so EMS was called. Patient did not have a cough. Upon EMS arrival, patient was asymptomatic and BG was normal, so they did not transport patient. He was able to eat lunch fine and BG was 196 about 2 hours ago. He had to use the bathroom but was unable to walk independently and complained of right hip pain. Patient was also unable to urinate at that time, so the daughter brought him in for further evaluation.    Allergies:  Amlodipine  Lisinopril  Metoprolol     Medications:    Norvasc  Dulcolax  Novolog pen  Insulin pen  Antivert  Oxycodone  Protonix  Miralax  Zocor  Trazodone  Dyazide    Past Medical History:    Glaucoma  A fib  Chronic infection  CKD  Diabetes  Hepatitis C  Hypertension  Palpitations  PTSD  Depression  Cognitive disorder  Anxiety  Hepatitis C  Hepatocellular carcinoma  DJD  Insomnia  Hypoglycemia     Past Surgical History:    Appendectomy  Eye surgery  Laparoscopic hepatectomy, partial     Family History:    History reviewed. No pertinent family history.     Social History:  Smoking status: former  Alcohol use: no  Drug use: no  PCP: [facility]    Marital Status:       Review of Systems  Please see HPI. All other systems reviewed and negative.    Physical Exam     Patient Vitals for the past 24 hrs:   BP Temp Temp src Pulse Heart Rate  Resp SpO2 Weight   05/22/20 2200 -- -- -- 93 80 16 96 % --   05/22/20 2130 (!) 165/88 -- -- 84 91 22 -- --   05/22/20 2100 (!) 154/86 -- -- 85 96 19 96 % --   05/22/20 2000 (!) 155/80 -- -- 83 85 26 91 % --   05/22/20 1934 (!) 141/78 -- -- 76 80 17 96 % --   05/22/20 1921 -- -- -- -- 79 15 97 % --   05/22/20 1920 134/81 -- -- 78 -- -- -- --   05/22/20 1830 (!) 148/76 -- -- 80 77 20 -- --   05/22/20 1800 (!) 143/79 -- -- 79 75 18 -- --   05/22/20 1730 (!) 148/82 -- -- 81 85 21 -- --   05/22/20 1700 132/77 -- -- 80 80 19 99 % --   05/22/20 1600 (!) 141/73 -- -- 86 -- -- 100 % --   05/22/20 1518 129/74 99  F (37.2  C) Oral -- 91 16 99 % 72.1 kg (159 lb)       Physical Exam  Constitutional:       Appearance: Normal appearance. He is well-developed.   HENT:      Head: Atraumatic.      Right Ear: Tympanic membrane and external ear normal.      Left Ear: Tympanic membrane and external ear normal.      Mouth/Throat:      Mouth: Mucous membranes are moist.      Pharynx: Oropharynx is clear. No oropharyngeal exudate or posterior oropharyngeal erythema.   Eyes:      General: No scleral icterus.     Extraocular Movements: Extraocular movements intact.      Conjunctiva/sclera: Conjunctivae normal.      Pupils: Pupils are equal, round, and reactive to light.   Neck:      Musculoskeletal: Normal range of motion and neck supple.   Cardiovascular:      Rate and Rhythm: Normal rate and regular rhythm.      Pulses: Normal pulses.      Heart sounds: Normal heart sounds. No murmur. No friction rub. No gallop.    Pulmonary:      Effort: Pulmonary effort is normal. No respiratory distress.      Breath sounds: Normal breath sounds. No wheezing or rales.   Abdominal:      General: Bowel sounds are normal. There is no distension.      Palpations: Abdomen is soft. There is no mass.      Tenderness: There is no abdominal tenderness.   Musculoskeletal: Normal range of motion.         General: Tenderness present.      Comments: Mild R hip TTP.  2+ pulses in BLE. No sensory deficits. Motor intact in BLE   Skin:     General: Skin is warm and dry.      Findings: No rash.   Neurological:      General: No focal deficit present.      Mental Status: He is alert.      Cranial Nerves: No cranial nerve deficit.      Comments: A&o to self and place             Emergency Department Course     ECG (16:16:14):  Rate 83 bpm. KS interval 160. QRS duration 88. QT/QTc 376/441. P-R-T axes 68 47 88. Normal sinus rhythm. Possible left atrial enlargement. Left ventricular hypertrophy. Nonspecific T wave abnormality. Interpreted at 1620 by Pat Gross MD.    Imaging:  Radiology findings were communicated with the patient who voiced understanding of the findings.    XR Pelvis/Hip Right  No fracture identified.  Per radiology.    XR Chest  New patchy infiltrate at the left base compared to  previous. Chest x-ray follow-up in 4-6 weeks recommended to confirm  resolution. Right lung clear. No pneumothorax. No gross displaced rib  fracture.  Per radiology.    CT Head w/o contrast  No acute pathology, no bleed, mass, or areas of acute  Infarction.  Per radiology.    CT Hip Right w/o contrast  1.  Both hips negative for fracture or CT evidence of avascular necrosis.  2.  Bony pelvis otherwise negative for fracture.  3.  Degenerative change both hip joints.  4.  Liu catheter in the urinary bladder.  Per radiology.    Laboratory:  Laboratory findings were communicated with the patient who voiced understanding of the findings.    CBC: WBC 11.8 (H), HGB 13.1 (L),   BMP: Na 130 (L), Glucose 136 (H), Bun 48 (H), GFR 38 (L), Creatinine 1.89 (H),  o/w WNL  Troponin (Collected 1606): <0.015  Glucose by meter 1618: 125 (H)  Glucose by meter 2109: 60 (L)  INR: 1.12  Hepatic panel: albumin 3.1 (L), o/w WNL  Urinalysis: pH 7.5 (H), Albumin 70 (A) o/w WNL  Blood culture: pending  Symptomatic COVID-19 Virus by PCR: pending    Interventions:   1848 0.95 NS, 500 ml, IV  1849 Zosyn, 4.5 g,  IV    Emergency Department Course:  The patient arrived in the emergency department via EMS.     1537  Past medical records, nursing notes, and vitals reviewed.    1555  I performed an exam of the patient and obtained history, as documented above.    1616 EKG was taken in the ED.     1554 IV was inserted and blood was drawn for laboratory testing, results above.    1621 The patient was sent for a head CT while in the emergency department, results above.     1627 The patient was sent for pelvic & chest X-rays while in the emergency department, results above.     1907 The patient was sent for a hip CT while in the emergency department, results above. Patient able to ambulate with walker slowly in the room. Discussed admission with daughter Candis, who states she's the POA/medical decision maker.     2005 Findings and plan explained to the Patient who consents to admission. Discussed the patient with KARO Patel for Dr. Peraza, who will admit the patient for further monitoring, evaluation, and treatment.    Impression & Plan     Medical Decision Making:  Eh Callahan is a 80 year old male who presents to the emergency department with family today for evaluation of multiple concerns including right hip pain and a fall. History obtained by daughter Candis who is POA and medical decision maker. Patient was unable to give much history. Workup included concern for urinary retention, which had large amount of urine on bladder scan. Hip pain likely due to contusion due to lack of fracture found on X-ray and CT. Patient likely has aspiration pneumonia due to choking episode this morning, so he will be covered with antibiotics for that. So far, patient is stable and cooperative. Due to multiple medical findings, patient is probably better served with admission and observation. Discussed this with daughter who agrees. Patient is admitted to Dr. Peraza.    Diagnosis:    ICD-10-CM    1. Urinary retention  R33.9 Blood culture      Glucose by meter     Glucose by meter     Glucose by meter     Glucose by meter   2. Aspiration pneumonitis (H)  J69.0    3. Contusion of right hip and thigh, initial encounter  S70.01XA     S70.11XA    4. Hyponatremia  E87.1       Disposition:  Admitted to Dr. Peraza.    Scribe Disclosure:  I, Kati Avila, am serving as a scribe at 3:55 PM on 5/22/2020 to document services personally performed by Pat Gross MD based on my observations and the provider's statements to me.      Kati Avila   5/22/2020   [facility] EMERGENCY DEPARTMENT     Pat Gross MD  05/22/20 2865

## 2020-05-22 NOTE — ED TRIAGE NOTES
Pt presents via EMS for evaluation of right hip pain after falling this morning. Pt was found on the carpeted floor by family around 0500. EMS was called at that time, but did not bring pt in. His afternoon, pt was up to use the bathroom and started to c/o right hip pain. Pt with shuffled gait for EMS, ambulatory at baseline. No obvious deformity or shortening of the leg. .

## 2020-05-23 VITALS
HEART RATE: 93 BPM | WEIGHT: 149 LBS | DIASTOLIC BLOOD PRESSURE: 77 MMHG | TEMPERATURE: 98.6 F | OXYGEN SATURATION: 98 % | BODY MASS INDEX: 21.38 KG/M2 | RESPIRATION RATE: 20 BRPM | SYSTOLIC BLOOD PRESSURE: 151 MMHG

## 2020-05-23 LAB
ANION GAP SERPL CALCULATED.3IONS-SCNC: 8 MMOL/L (ref 3–14)
BUN SERPL-MCNC: 37 MG/DL (ref 7–30)
CALCIUM SERPL-MCNC: 8.2 MG/DL (ref 8.5–10.1)
CHLORIDE SERPL-SCNC: 101 MMOL/L (ref 94–109)
CO2 SERPL-SCNC: 26 MMOL/L (ref 20–32)
CREAT SERPL-MCNC: 1.72 MG/DL (ref 0.66–1.25)
ERYTHROCYTE [DISTWIDTH] IN BLOOD BY AUTOMATED COUNT: 12.2 % (ref 10–15)
GFR SERPL CREATININE-BSD FRML MDRD: 37 ML/MIN/{1.73_M2}
GLUCOSE BLDC GLUCOMTR-MCNC: 245 MG/DL (ref 70–99)
GLUCOSE BLDC GLUCOMTR-MCNC: 290 MG/DL (ref 70–99)
GLUCOSE BLDC GLUCOMTR-MCNC: 71 MG/DL (ref 70–99)
GLUCOSE SERPL-MCNC: 70 MG/DL (ref 70–99)
HCT VFR BLD AUTO: 38.7 % (ref 40–53)
HGB BLD-MCNC: 12.9 G/DL (ref 13.3–17.7)
MCH RBC QN AUTO: 30.4 PG (ref 26.5–33)
MCHC RBC AUTO-ENTMCNC: 33.3 G/DL (ref 31.5–36.5)
MCV RBC AUTO: 91 FL (ref 78–100)
PLATELET # BLD AUTO: 148 10E9/L (ref 150–450)
POTASSIUM SERPL-SCNC: 3.2 MMOL/L (ref 3.4–5.3)
RBC # BLD AUTO: 4.25 10E12/L (ref 4.4–5.9)
SARS-COV-2 PCR COMMENT: NORMAL
SARS-COV-2 RNA SPEC QL NAA+PROBE: NEGATIVE
SODIUM SERPL-SCNC: 135 MMOL/L (ref 133–144)
SPECIMEN SOURCE: NORMAL
WBC # BLD AUTO: 10.2 10E9/L (ref 4–11)

## 2020-05-23 PROCEDURE — 25800030 ZZH RX IP 258 OP 636: Performed by: INTERNAL MEDICINE

## 2020-05-23 PROCEDURE — 25000132 ZZH RX MED GY IP 250 OP 250 PS 637: Mod: GY | Performed by: INTERNAL MEDICINE

## 2020-05-23 PROCEDURE — 25000128 H RX IP 250 OP 636: Performed by: INTERNAL MEDICINE

## 2020-05-23 PROCEDURE — 00000146 ZZHCL STATISTIC GLUCOSE BY METER IP

## 2020-05-23 PROCEDURE — 25000131 ZZH RX MED GY IP 250 OP 636 PS 637: Mod: GY | Performed by: INTERNAL MEDICINE

## 2020-05-23 PROCEDURE — 96372 THER/PROPH/DIAG INJ SC/IM: CPT

## 2020-05-23 PROCEDURE — 85027 COMPLETE CBC AUTOMATED: CPT | Performed by: INTERNAL MEDICINE

## 2020-05-23 PROCEDURE — 99217 ZZC OBSERVATION CARE DISCHARGE: CPT | Performed by: INTERNAL MEDICINE

## 2020-05-23 PROCEDURE — 80048 BASIC METABOLIC PNL TOTAL CA: CPT | Performed by: INTERNAL MEDICINE

## 2020-05-23 PROCEDURE — G0378 HOSPITAL OBSERVATION PER HR: HCPCS

## 2020-05-23 PROCEDURE — 96366 THER/PROPH/DIAG IV INF ADDON: CPT

## 2020-05-23 PROCEDURE — 36415 COLL VENOUS BLD VENIPUNCTURE: CPT | Performed by: INTERNAL MEDICINE

## 2020-05-23 RX ORDER — SENNOSIDES 8.6 MG
8.6 TABLET ORAL DAILY PRN
Status: ON HOLD | COMMUNITY
End: 2021-08-05

## 2020-05-23 RX ORDER — TAMSULOSIN HYDROCHLORIDE 0.4 MG/1
0.4 CAPSULE ORAL EVERY EVENING
Qty: 30 CAPSULE | Refills: 11 | Status: ON HOLD | OUTPATIENT
Start: 2020-05-23 | End: 2020-08-01 | Stop reason: SINTOL

## 2020-05-23 RX ORDER — ELECTROLYTES/DEXTROSE
1 SOLUTION, ORAL ORAL DAILY
COMMUNITY
Start: 2020-04-03

## 2020-05-23 RX ORDER — AMOXICILLIN AND CLAVULANATE POTASSIUM 500; 125 MG/1; MG/1
1 TABLET, FILM COATED ORAL 2 TIMES DAILY
Qty: 88 TABLET | Refills: 0 | Status: ON HOLD | OUTPATIENT
Start: 2020-05-23 | End: 2020-08-01

## 2020-05-23 RX ADMIN — SODIUM CHLORIDE, PRESERVATIVE FREE: 5 INJECTION INTRAVENOUS at 00:08

## 2020-05-23 RX ADMIN — PANTOPRAZOLE SODIUM 40 MG: 40 TABLET, DELAYED RELEASE ORAL at 10:25

## 2020-05-23 RX ADMIN — SIMVASTATIN 20 MG: 20 TABLET, FILM COATED ORAL at 10:25

## 2020-05-23 RX ADMIN — TAMSULOSIN HYDROCHLORIDE 0.4 MG: 0.4 CAPSULE ORAL at 00:08

## 2020-05-23 RX ADMIN — AMPICILLIN SODIUM AND SULBACTAM SODIUM 3 G: 2; 1 INJECTION, POWDER, FOR SOLUTION INTRAMUSCULAR; INTRAVENOUS at 00:08

## 2020-05-23 RX ADMIN — AMPICILLIN SODIUM AND SULBACTAM SODIUM 3 G: 2; 1 INJECTION, POWDER, FOR SOLUTION INTRAMUSCULAR; INTRAVENOUS at 05:47

## 2020-05-23 RX ADMIN — INSULIN ASPART 3 UNITS: 100 INJECTION, SOLUTION INTRAVENOUS; SUBCUTANEOUS at 10:24

## 2020-05-23 ASSESSMENT — ACTIVITIES OF DAILY LIVING (ADL): ADLS_ACUITY_SCORE: 21

## 2020-05-23 NOTE — PLAN OF CARE
PRIMARY DIAGNOSIS: PNEUMONIA & FALL    OUTPATIENT/OBSERVATION GOALS TO BE MET BEFORE DISCHARGE:  1. Dyspnea improved and O2 sats >88% on RA or back to baseline O2 levels: Yes   SpO2: 98 %, O2 Device: None (Room air)    2. Tolerating oral abx or appropriate plans made outpatient infusion: Yes    3. Vitals signs normal or return to baseline: Yes, BP elevated     4. Short term supplemental O2 needed with activity at home: No    5. Tolerate oral intake to maintain hydration: Yes    6. Return to near baseline physical activity: Yes    Discharge Planner Nurse   Safe discharge environment identified: Yes  Barriers to discharge: No       Entered by: Garett Costa 05/23/2020 8:45 AM    BP (!) 151/77 (BP Location: Left arm)   Pulse 93   Temp 98.6  F (37  C) (Oral)   Resp 20   Wt 67.6 kg (149 lb)   SpO2 98%   BMI 21.38 kg/m    Pt is A&Ox self, LS coarse crackles in bases. Has urinary catheter for retention. Patent. Skin CDI. PT is COVID negative. Pt to tx to OBS unit and possibly discharge today back to prior living arrangement,   Please review provider order for any additional goals.   Nurse to notify provider when observation goals have been met and patient is ready for discharge.

## 2020-05-23 NOTE — PHARMACY-ADMISSION MEDICATION HISTORY
Admission medication history interview status for this patient is complete. See [facility] admission navigator for allergy information, prior to admission medications and immunization status.     Medication history interview done via telephone during Covid-19 pandemic, indicate source(s): Family and Caregiver - son Jose Callahan [phone]  Medication history resources (including written lists, pill bottles, clinic record): SureScripts, Care Everywhere, home med list per son    Changes made to PTA medication list:  Added: Tresiba, multivitamin, senna  Deleted: Timolol, Levemir-only uses if Tresiba is NOT covered by insurance  Changed: none    Actions taken by pharmacist (provider contacted, etc): Called patient's father to verify med list     Additional medication history information:None    Medication reconciliation/reorder completed by provider prior to medication history?  Y   (Y/N)     For patients on insulin therapy: Y  (Y/N)  Sliding scale Novolog: No  Do you have a baseline novolog pre-meal dose:  3  units with meals   Do you eat three meals a day:  Yes  How many times do you check your blood glucose per day:  4  How many episodes of hypoglycemia do you have per week: 2-3  Do you have a Continuous glucose monitor (CGM) : No (remind pt that not approved for hospital use)  Any specific barriers to therapy? Yes - since switching insurance from [facility] to Medicare + are, patient struggles with having prescriptions covered by both insurances and sometimes has to pay out of pocket for Rx.  (cost, comfortable with injections, confident with current diabetes regimen?)      Prior to Admission medications    Medication Sig Last Dose Taking? Auth Provider   acetaminophen (TYLENOL) 500 MG tablet Take 1,000 mg by mouth every 8 hours as needed for mild pain Past Week at Unknown time Yes Unknown, Entered By History   amLODIPine (NORVASC) 5 MG tablet Take 5 mg by mouth every evening 5/21/2020 Yes Unknown, Entered By History    bisacodyl (DULCOLAX) 10 MG suppository Place 1 suppository (10 mg) rectally daily as needed for constipation Past Month at Unknown time Yes Carina Bruce PA-C   Carboxymeth-Glycerin-Polysorb (REFRESH OPTIVE ADVANCED) 0.5-1-0.5 % SOLN Place 1 drop into both eyes 2 times daily as needed  Past Week at Unknown time Yes Reported, Patient   cholecalciferol (D 5000) 5000 UNITS CAPS Take 5,000 Units by mouth daily  5/22/2020 at Unknown time Yes Reported, Patient   Cyanocobalamin (B-12 PO) Take 1 capsule by mouth daily Past Week at Unknown time Yes Unknown, Entered By History   insulin aspart (NOVOLOG PEN) 100 UNIT/ML injection Inject 3 Units Subcutaneous 3 times daily (with meals)  5/22/2020 at Unknown time Yes Reported, Patient   insulin degludec (TRESIBA FLEXTOUCH) 100 UNIT/ML pen Inject 22 Units Subcutaneous At Bedtime 5/21/2020 Yes Unknown, Entered By History   meclizine (ANTIVERT) 25 MG tablet Take 1 tablet (25 mg) by mouth every 6 hours as needed for dizziness Past Week at Unknown time Yes Jazmyn Patel PA   Multiple Vitamins-Minerals (MULTIVITAMIN ADULT) TABS Take 1 tablet by mouth daily 5/22/2020 at Unknown time Yes Unknown, Entered By History   oxyCODONE IR (ROXICODONE) 5 MG tablet Take 5 mg by mouth every 6 hours as needed  Past Month at Unknown time Yes Reported, Patient   pantoprazole (PROTONIX) 40 MG enteric coated tablet Take 40 mg by mouth daily as needed  Past Week at Unknown time Yes Reported, Patient   polyethylene glycol (MIRALAX/GLYCOLAX) packet Take 17 g by mouth 2 times daily as needed  Past Week at Unknown time Yes Carina Bruce PA-C   sennosides (SENOKOT) 8.6 MG tablet Take 8.6 mg by mouth daily as needed Past Week at Unknown time Yes Unknown, Entered By History   simvastatin (ZOCOR) 20 MG tablet Take 1 tablet (20 mg) by mouth daily 5/21/2020 Yes Cindy Maier MD   traZODone (DESYREL) 150 MG tablet Take 75 mg by mouth At Bedtime  5/21/2020 Yes Unknown, Entered By History    triamterene-HCTZ (DYAZIDE) 37.5-25 MG capsule Take 1 capsule by mouth every morning 5/22/2020 at Unknown time Yes Unknown, Entered By History   blood glucose monitoring (GREGORIO CONTOUR) test strip 4 times daily    Reported, Patient   blood glucose monitoring (NO BRAND SPECIFIED) meter device kit Use to test blood sugar 4 times daily or as directed.   Carina Bruce PA-C   Blood Glucose Monitoring Suppl (FIFTY50 GLUCOSE METER 2.0) W/DEVICE KIT as needed for blood glucose monitoring   Reported, Patient   Blood Pressure Monitoring (RA BLOOD PRESSURE CUFF MONITOR) MISC Take blood pressure once daily   Reported, Patient

## 2020-05-23 NOTE — PLAN OF CARE
End of Shift Summary  For vital signs and complete assessments, please see documentation flowsheets.     Pertinent assessments: Alert to self only, very forgetful but cooperative. Crackles in bases, denies pain, fischer catheter in for urinary retention in ER.  Major Shift Events COVID19 negative, VSS. Patients bro Akhtar would like updates (emergency contact in chart).  Treatment Plan: IV unasyn, PT consulted    Discharge Readiness: Medically active  Expected Discharge Date: 5/23- patients family would like to take him home Saturday  Discharge Disposition: Home with Self care-with family  Barriers/Criteria for discharge: Fischer in place, PT consulted for falls

## 2020-05-23 NOTE — PLAN OF CARE
End of Shift Summary  For vital signs and complete assessments, please see documentation flowsheets.     Pertinent assessments: Alert to self only, very forgetful but cooperative. Crackles in bases, denies pain, fischer catheter in for urinary retention.  Major Shift Events COVID19 negative overnight, VSS. Patients bro Akhtar would like updates (emergency contact in chart).  Treatment Plan: IV unasyn.    Discharge Readiness: Medically active  Expected Discharge Date: Potentially today  Discharge Disposition: Home with Self care-with family  Barriers/Criteria for discharge: None  Pt to discharge home with Fischer catheter, PO flomax and Augmentin.

## 2020-05-23 NOTE — ED NOTES
[facility]  ED Nurse Handoff Report    Eh Callahan is a 80 year old male   ED Chief complaint: Fall  . ED Diagnosis:   Final diagnoses:   Urinary retention   Aspiration pneumonitis (H)   Contusion of right hip and thigh, initial encounter   Hyponatremia     Allergies:   Allergies   Allergen Reactions     Amlodipine Swelling     Edema at 10 mg , fine at 5 mg     Lisinopril Cough     Metoprolol      Other reaction(s): Bradycardia  Metoprolol at 50mg bid -> HR 45 -50, unclear if sx (has fatigue)  May tolerate lower doses if needed       Code Status: Full Code  Activity level - Baseline/Home:  Stand by Assist. Activity Level - Current:   Assist X 1. Lift room needed: No. Bariatric: No   Needed: Yes, [place]ib[place]ian speaking, but understands some English  Isolation: Yes. Infection: COVID test pending.     Vital Signs:   Vitals:    05/22/20 1920 05/22/20 1921 05/22/20 1934 05/22/20 2000   BP: 134/81  (!) 141/78 (!) 155/80   Pulse: 78  76 83   Resp:  15 17 26   Temp:       TempSrc:       SpO2:  97% 96% 91%   Weight:           Cardiac Rhythm:  ,   Cardiac  Cardiac Rhythm: Normal sinus rhythm  Pain level:    Patient confused: Yes. Patient Falls Risk: Yes.   Elimination Status: Urethral catheter (fischer) in place; refer to orders to discontinue as per protocol    Patient Report - Initial Complaint: Hip s/p fall. Focused Assessment: Respiratory- Frequent cough with coarse crackles in posterior LLL. Cardiac- Tele showing SR. Neuro- A&O x 2-3, confused. - Incontinent upon arrival. Bladder scan done showing 517 ml of retained urine. Attempted urinal with no results. Fischer catheter placed. Musculoskeletal- Fall this am. Found by family on the carpeted floor. Evaluated by EMS at that time, but stayed at home. Up to bathroom later in the day with c/o right hip pain and decreased mobility. Shuffled gait per EMS. Ambulated in ED with walker, slow, but steady gait.   Tests Performed: Labs, Xray, Ct. Abnormal Results:    Labs Ordered and Resulted from Time of ED Arrival Up to the Time of Departure from the ED   CBC WITH PLATELETS DIFFERENTIAL - Abnormal; Notable for the following components:       Result Value    WBC 11.8 (*)     RBC Count 4.29 (*)     Hemoglobin 13.1 (*)     Hematocrit 39.3 (*)     Absolute Neutrophil 10.1 (*)     All other components within normal limits   BASIC METABOLIC PANEL - Abnormal; Notable for the following components:    Sodium 130 (*)     Glucose 136 (*)     Urea Nitrogen 48 (*)     Creatinine 1.89 (*)     GFR Estimate 33 (*)     GFR Estimate If Black 38 (*)     All other components within normal limits   ROUTINE UA WITH MICROSCOPIC - Abnormal; Notable for the following components:    pH Urine 7.5 (*)     Protein Albumin Urine 70 (*)     All other components within normal limits   GLUCOSE BY METER - Abnormal; Notable for the following components:    Glucose 125 (*)     All other components within normal limits   HEPATIC PANEL - Abnormal; Notable for the following components:    Albumin 3.1 (*)     All other components within normal limits   TROPONIN I   INR   GLUCOSE MONITOR NURSING POCT   COVID-19 VIRUS (CORONAVIRUS) BY PCR   PERIPHERAL IV CATHETER   CARDIAC CONTINUOUS MONITORING   IP ACUTE INDWELLING URINARY CATHETER (CONCEPCION)   BLOOD CULTURE   BLOOD CULTURE     CT Hip Right w/o Contrast   Final Result   IMPRESSION:   1.  Both hips negative for fracture or CT evidence of avascular necrosis.   2.  Bony pelvis otherwise negative for fracture.   3.  Degenerative change both hip joints.   4.  Concepcion catheter in the urinary bladder.            XR Chest 1 View   Final Result   IMPRESSION: New patchy infiltrate at the left base compared to   previous. Chest x-ray follow-up in 4-6 weeks recommended to confirm   resolution. Right lung clear. No pneumothorax. No gross displaced rib   fracture.      JAZ LÓPEZ MD      XR Pelvis w 1 View Each Hip   Final Result   IMPRESSION: No fracture identified.      ALISON  MD DEVANG      CT Head w/o Contrast   Final Result   IMPRESSION: No acute pathology, no bleed, mass, or areas of acute   infarction.         TRINIDAD GUSMAN MD        .   Treatments provided: 500 ml bolus NS, 4.5 gram zosyn  Family Comments: N/A  OBS brochure/video discussed/provided to patient:  N/A  ED Medications:   Medications   HYDROmorphone (PF) (DILAUDID) injection 0.5 mg (has no administration in time range)   0.9% sodium chloride BOLUS (0 mLs Intravenous Stopped 5/22/20 1954)   piperacillin-tazobactam (ZOSYN) intermittent infusion 4.5 g (0 g Intravenous Stopped 5/22/20 1954)     Drips infusing:  No  For the majority of the shift, the patient's behavior Green. Interventions performed were N/A.    Sepsis treatment initiated: No       ED Nurse Name/Phone Number: Ana Cooley RN,   8:10 PM    RECEIVING UNIT ED HANDOFF REVIEW    Above ED Nurse Handoff Report was reviewed: Yes  Reviewed by: Rosemary Daniels RN on May 22, 2020 at 9:57 PM

## 2020-05-23 NOTE — CONSULTS
CTS identifies pt as high risk due to elevated AMANDA as pt has been admitted 4 times in the past 6 months. Pt is currently admitted to observation for fall and PNA. Called and spoke with son Sindy and dtr Candis, pt lives at home with them. Pt's children feel they are able to meet pt's current needs but agree that he would benefit from home PT for strengthening.     Pt/family was offered but declined the Medicare Compare list for Home Care.  Discussed associated Medicare star ratings to assist with choice for referrals/discharge planning Yes    Education was given to pt/family that star ratings are updated/maintained by Medicare and can be reviewed by visiting www.medicare.gov Yes    They elect [facility], referral sent and AVS updated. They confirm that pt still sees Dr. Basilio Alfaro at [facility]. Family will arrange hospital follow-up appointment, reminder placed on AVS as well.     Family has clothing to bring for pt prior to discharge, bedside RN will coordinate timing with family.     No handoff needed as no gap in care identified.      Jade Harrison RN BSN   Inpatient Care Coordination  [facility]   [phone]

## 2020-05-23 NOTE — DISCHARGE INSTRUCTIONS
Your home care referral was sent to [facility]  If you haven't heard from them within the next 24-48 hours,  Please call them at [phone]    Please call Dr. Alfaro's office to schedule a hospital follow-up appointment within 7-10 days of discharge. Please bring your hospital discharge instructions and any new medications with you to your appointment.

## 2020-05-23 NOTE — DISCHARGE SUMMARY
Discharge Summary    Eh Callahan MRN# [id number]   YOB: 1940 Age: 80 year old     Date of Admission:  5/22/2020  Date of Discharge:  5/23/2020  Admitting Physician:  Naresh Peraza MD  Discharge Physician:  Naresh Peraza MD  Discharging Service:  Hospitalist     Home clinic: [facility]          Discharge Diagnosis:   1.  Fall with right hip pain due to contusion.     2.  Left lower lung aspiration pneumonia.      3.  Ruled out for COVID-19.       4.  Urine retention, suspected to be due to benign prostatic hyperplasia. Liu in place on discharge.  Flomax started.      5.  Acute kidney injury, improved.             Discharge Disposition:   Discharged to home           Allergies:   Allergies   Allergen Reactions     Amlodipine Swelling     Edema at 10 mg , fine at 5 mg     Lisinopril Cough     Metoprolol      Other reaction(s): Bradycardia  Metoprolol at 50mg bid -> HR 45 -50, unclear if sx (has fatigue)  May tolerate lower doses if needed                Condition on Discharge:   Discharge condition: Stable   Discharge vitals: Blood pressure (!) 151/77, pulse 93, temperature 98.6  F (37  C), temperature source Oral, resp. rate 20, weight 67.6 kg (149 lb), SpO2 98 %.   Code status on discharge: Full Code   Physical exam on day of discharge:   GENERAL:  Comfortable. Cooperative.  PSYCH: pleasant, oriented, No acute distress.  EYES: PERRLA, Normal conjunctiva.  HEART:  Regular rate and rhythm. No JVD. Pulses normal. No edema.  LUNGS:  Clear to auscultation, normal Respiratory effort.  ABDOMEN:  Soft, no hepatosplenomegaly, normal bowel sounds.  EXTREMETIES: No clubbing, cyanosis or ischemia  SKIN:  Dry to touch, No rash.         History of Present Illness and Hospital Course:     See detailed admission note for full details.  Eh Callahan is an 80-year-old male with history of cognitive disorder, posttraumatic stress disorder, hypertension, hepatitis C, hepatocellular carcinoma, type 2  diabetes, chronic kidney disease stage II, atrial fibrillation, normal angle glaucoma, depression, anxiety, appendectomy, and partial hepatectomy.  He was brought to the emergency department by paramedics for evaluation after a fall.  He had a fall at approximately 4 AM on the morning of presentation.  At about 5 AM he vomited.  His family checked blood sugar which was not low.  Paramedics were called and evaluated him and thought he looked okay so did not take him to the emergency department.  Later in the day after lunch he was unable to walk because of hip pain.  Paramedics were called and brought him to the emergency department for evaluation.  Emergency department evaluation showed slightly elevated temperature of 99 degrees but otherwise unremarkable vital signs.  Of note, oxygen saturations were 99% on room air.  Laboratory evaluation showed sodium of 130, BUN of 48, creatinine of 1.89, white blood cell count of 11.8, unremarkable liver function test, unremarkable urine analysis, undetectable troponin, and INR 1.12.  CT of head was unremarkable.  Chest x-ray showed left lower lung patchy infiltrate. CT of pelvis showed no acute injury.  CT of hips showed no signs of avascular necrosis or fracture.  Eh was unable to ambulate.  Testing was sent for COVID-19.  Eh was found to be retaining urine in the emergency department.  Bladder scan showed 500 mL.Liu catheter was placed in the ED.  Eh was admitted to the hospital to follow-up COVID-19 testing and for further treatment of right hip pain, left lower lung pneumonia (aspiration expected), acute kidney injury, and urine retention (Liu catheter was placed in the emergency department). Hip pain and mobility improved. Testing for COVID-19 came back negative. Acute kidney injury improved with IVF hydration. Unasyn was given for aspiration pneumonia. Flomax was started for BPH. Eh is doing better today. He is ambulating with some help as is his baseline.  He will discharge home with Liu in place and on Flomax. He will follow up with Urology for voiding trial. He will complete a 5 day course of antibiotic with Augmentin.               Procedures / Imaging:   CT head  XR pelvis and hips  XR chest  CT of right hip           Consultations:   No consultations were requested during this admission             Pending Results:   None           Discharge Instructions and Follow-Up:   Discharge diet: Diabetic (1800 ADA)   Discharge activity: Activity as tolerated   Discharge follow-up: Follow up with primary care provider in 1-2 weeks  Follow up with Urology in 1 week   Outpatient therapy: Home PT    Other instructions: Liu catheter in place on discharge             Discharge Medications:   Current Discharge Medication List      START taking these medications    Details   amoxicillin-clavulanate (AUGMENTIN) 500-125 MG tablet Take 1 tablet by mouth 2 times daily for 4 days  Qty: 88 tablet, Refills: 0    Associated Diagnoses: Aspiration pneumonitis (H)      tamsulosin (FLOMAX) 0.4 MG capsule Take 1 capsule (0.4 mg) by mouth every evening  Qty: 30 capsule, Refills: 11    Associated Diagnoses: Benign prostatic hyperplasia with urinary retention         CONTINUE these medications which have NOT CHANGED    Details   acetaminophen (TYLENOL) 500 MG tablet Take 1,000 mg by mouth every 8 hours as needed for mild pain      amLODIPine (NORVASC) 5 MG tablet Take 5 mg by mouth every evening      bisacodyl (DULCOLAX) 10 MG suppository Place 1 suppository (10 mg) rectally daily as needed for constipation      Carboxymeth-Glycerin-Polysorb (REFRESH OPTIVE ADVANCED) 0.5-1-0.5 % SOLN Place 1 drop into both eyes 2 times daily as needed     Associated Diagnoses: HCC (hepatocellular carcinoma) (H)      cholecalciferol (D 5000) 5000 UNITS CAPS Take 5,000 Units by mouth daily       Cyanocobalamin (B-12 PO) Take 1 capsule by mouth daily      insulin aspart (NOVOLOG PEN) 100 UNIT/ML injection  Inject 3 Units Subcutaneous 3 times daily (with meals)     Associated Diagnoses: HCC (hepatocellular carcinoma) (H)      insulin degludec (TRESIBA FLEXTOUCH) 100 UNIT/ML pen Inject 22 Units Subcutaneous At Bedtime      meclizine (ANTIVERT) 25 MG tablet Take 1 tablet (25 mg) by mouth every 6 hours as needed for dizziness  Qty: 20 tablet, Refills: 0    Associated Diagnoses: Dizziness      Multiple Vitamins-Minerals (MULTIVITAMIN ADULT) TABS Take 1 tablet by mouth daily      oxyCODONE IR (ROXICODONE) 5 MG tablet Take 5 mg by mouth every 6 hours as needed       pantoprazole (PROTONIX) 40 MG enteric coated tablet Take 40 mg by mouth daily as needed       polyethylene glycol (MIRALAX/GLYCOLAX) packet Take 17 g by mouth 2 times daily as needed   Qty: 30 packet, Refills: 1    Associated Diagnoses: History of liver excision      sennosides (SENOKOT) 8.6 MG tablet Take 8.6 mg by mouth daily as needed      simvastatin (ZOCOR) 20 MG tablet Take 1 tablet (20 mg) by mouth daily    Associated Diagnoses: Dyslipidaemia      traZODone (DESYREL) 150 MG tablet Take 75 mg by mouth At Bedtime       triamterene-HCTZ (DYAZIDE) 37.5-25 MG capsule Take 1 capsule by mouth every morning      blood glucose monitoring (GREGORIO CONTOUR) test strip 4 times daily     Associated Diagnoses: HCC (hepatocellular carcinoma) (H)      blood glucose monitoring (NO BRAND SPECIFIED) meter device kit Use to test blood sugar 4 times daily or as directed.  Qty: 1 kit, Refills: 0    Associated Diagnoses: Hypoglycemia; Diabetes mellitus due to underlying condition without complication, with long-term current use of insulin (H)      Blood Glucose Monitoring Suppl (FIFTY50 GLUCOSE METER 2.0) W/DEVICE KIT as needed for blood glucose monitoring    Associated Diagnoses: HCC (hepatocellular carcinoma) (H)      Blood Pressure Monitoring (RA BLOOD PRESSURE CUFF MONITOR) MISC Take blood pressure once daily    Associated Diagnoses: HCC (hepatocellular carcinoma) (H)          STOP taking these medications       insulin detemir (LEVEMIR PEN) 100 UNIT/ML pen Comments:   Reason for Stopping:         timolol (TIMOPTIC) 0.5 % ophthalmic solution Comments:   Reason for Stopping:                  Total time spent in face to face contact with the patient and coordinating discharge was:  25 Minutes

## 2020-05-23 NOTE — PROGRESS NOTES
Patients son called (emergency contact on facesheet) and stated that he would like to take his dad home. Dr. Peraza notified, and he will call patients son.     Update: Patient will be staying.

## 2020-05-23 NOTE — ED NOTES
Ambulated pt per RN request. Pt able to stand independently. He ambulated slowly though steadily with a walker. Denied any ongoing symptoms.

## 2020-05-23 NOTE — H&P
[facility]  History and Physical   Hospitalist Service    Naresh Preaza MD    Eh Callahan MRN# [id number]   YOB: 1940 Age: 80 year old      Date of Admission:  5/22/2020           Assessment and Plan:   Eh Callahan is an 80-year-old male with history of cognitive disorder, posttraumatic stress disorder, hypertension, hepatitis C, hepatocellular carcinoma, type 2 diabetes, chronic kidney disease stage II, atrial fibrillation, normal angle glaucoma, depression, anxiety, appendectomy, and partial hepatectomy.  He was brought to the emergency department by paramedics for evaluation after a fall.  He had a fall approximately 4 AM this morning.  At about 5 AM he vomited.  His family checked blood sugar and he seemed okay.  Paramedics were called and evaluated him and thought he looked okay so did not take him to the emergency department for help.  Later in the day after lunch he was unable to walk because of hip pain.  Paramedics were called and brought him to the emergency department for evaluation.  Emergency department evaluation showed slightly elevated temperature 99 degrees but otherwise unremarkable vital signs.  Of note, oxygen saturations were 99% on room air.  Laboratory evaluation showed sodium of 130, BUN of 48, creatinine of 1.89, white blood cell count of 11.8, unremarkable liver function test, unremarkable urine analysis, undetectable troponin, and INR 1.12.  CT of head was unremarkable.  Chest x-ray showed left lower lung patchy infiltrate.  Straight pelvis showed no acute injury.  CT of hips showed no signs of avascular necrosis or fracture.  Patient was unable to ambulate.  Testing was sent for COVID-19.  Patient was found to be retaining urine in the emergency department.  Bladder scan showed 500 mL.  I was asked to admit him to follow-up COVID-19 testing and for further treatment of right hip pain, left lower lung pneumonia (aspiration expected), acute kidney injury,  and urine retention (Liu catheter was placed in the emergency department).    Problem list:    1.  Fall with right hip pain.  There is no obvious fracture.  Pain seems to be improving.  Analgesic medication will be ordered.  Physical therapy evaluation will be requested.    2.  Left lower lung infiltrate suspicious for aspiration pneumonia.  I will treat with Unasyn in the hospital and Augmentin on discharge.  Will recommend follow-up chest x-ray in 4 weeks.    3.  Rule out COVID-19.  Contact and droplet precautions ordered.  If testing is negative isolation can be discontinued.    4.  Urine retention.  Flomax started.  Will probably leave Liu catheter in on discharge with outpatient urology follow-up.    5.  Acute kidney injury.  This is likely due to volume depletion from vomiting and decreased oral intake.  Hold prior to admission Dyazide.  Hydrate with normal saline overnight.  Repeat basic metabolic panel in the morning.    Full code  Ambulate for DVT prophylaxis  Disposition: Admit to observation status as family is pretty insistent that they will be taking him home tomorrow.           Code Status:   Full Code         Primary Care Physician:   [facility]         Chief Complaint:   Fall with right hip pain    History is obtained from, Dr. Gross, and the medical record         History of Present Illness:   Eh Clalahan is an 80-year-old male with history of cognitive disorder, posttraumatic stress disorder, hypertension, hepatitis C, hepatocellular carcinoma, type 2 diabetes, chronic kidney disease stage II, atrial fibrillation, normal angle glaucoma, depression, anxiety, appendectomy, and partial hepatectomy.  He was brought to the emergency department by paramedics for evaluation after a fall.  He had a fall approximately 4 AM this morning.  At about 5 AM he vomited.  His family checked blood sugar and he seemed okay.  Paramedics were called and evaluated him and thought he looked okay so did  not take him to the emergency department for help.  Later in the day after lunch he was unable to walk because of hip pain.  Paramedics were called and brought him to the emergency department for evaluation.  Emergency department evaluation showed slightly elevated temperature 99 degrees but otherwise unremarkable vital signs.  Of note, oxygen saturations were 99% on room air.  Laboratory evaluation showed sodium of 130, BUN of 48, creatinine of 1.89, white blood cell count of 11.8, unremarkable liver function test, unremarkable urine analysis, undetectable troponin, and INR 1.12.  CT of head was unremarkable.  Chest x-ray showed left lower lung patchy infiltrate.  Straight pelvis showed no acute injury.  CT of hips showed no signs of avascular necrosis or fracture.  Patient was unable to ambulate.  Testing was sent for COVID-19.  Patient was found to be retaining urine in the emergency department.  Bladder scan showed 500 mL.  I was asked to admit him to follow-up COVID-19 testing and for further treatment of right hip pain, left lower lung pneumonia (aspiration expected), acute kidney injury, and urine retention (Liu catheter was placed in the emergency department).           Past Medical History:     Patient Active Problem List   Diagnosis     Hypertension     Diabetes mellitus (H)     CKD (chronic kidney disease) stage 2, GFR 60-89 ml/min     Anatomical narrow angle glaucoma     PTSD (post-traumatic stress disorder)     Memory deficits     Cognitive disorder     Major depression     Generalized anxiety disorder     History of hepatitis C     Atrial fibrillation (H)     Liver mass     HCC (hepatocellular carcinoma) (H)     ACP (advance care planning)     Albuminuria     Knee pain     BMI (body mass index), pediatric, 5% to less than 85% for age     DJD (degenerative joint disease)     Hyperlipidemia LDL goal <100     Insomnia     Mental disorder     Primary insomnia     Hypoglycemia     Dizziness     Urinary  retention     Acute kidney injury (H)     Aspiration pneumonia (H)      Past Medical History:   Diagnosis Date     Anatomical narrow angle glaucoma      Atrial fibrillation (H)      Chronic infection     history of hepatitis C     CKD (chronic kidney disease) stage 2, GFR 60-89 ml/min      Diabetes mellitus (H)      Hepatitis C without mention of hepatic coma      Hypertension      Palpitations      PTSD (post-traumatic stress disorder)              Past Surgical History:     Past Surgical History:   Procedure Laterality Date     APPENDECTOMY       EYE SURGERY       LAPAROSCOPIC HEPATECTOMY PARTIAL Left 11/22/2016    Procedure: LAPAROSCOPIC HEPATECTOMY PARTIAL;  Surgeon: Rick Mckeon MD;  Location: [facility] OR            Home Medications:     Prior to Admission medications    Medication Sig Last Dose Taking? Auth Provider   acetaminophen (TYLENOL) 500 MG tablet Take 1,000 mg by mouth every 8 hours as needed for mild pain   Unknown, Entered By History   amLODIPine (NORVASC) 5 MG tablet Take 5 mg by mouth every evening   Unknown, Entered By History   bisacodyl (DULCOLAX) 10 MG suppository Place 1 suppository (10 mg) rectally daily as needed for constipation   Carina Bruce PA-C   blood glucose monitoring (Eferio CONTOUR) test strip 4 times daily    Reported, Patient   blood glucose monitoring (NO BRAND SPECIFIED) meter device kit Use to test blood sugar 4 times daily or as directed.   Carina Bruce PA-C   Blood Glucose Monitoring Suppl (FIFTY50 GLUCOSE METER 2.0) W/DEVICE KIT as needed for blood glucose monitoring   Reported, Patient   Blood Pressure Monitoring (RA BLOOD PRESSURE CUFF MONITOR) MISC Take blood pressure once daily   Reported, Patient   Carboxymeth-Glycerin-Polysorb (REFRESH OPTIVE ADVANCED) 0.5-1-0.5 % SOLN Place 1 drop into both eyes 2 times daily as needed    Reported, Patient   cholecalciferol (D 5000) 5000 UNITS CAPS Take 5,000 Units by mouth daily    Reported, Patient   Cyanocobalamin  "(B-12 PO) Take 1 capsule by mouth daily   Unknown, Entered By History   insulin aspart (NOVOLOG PEN) 100 UNIT/ML injection Inject 3 Units Subcutaneous 3 times daily (with meals)    Reported, Patient   insulin detemir (LEVEMIR PEN) 100 UNIT/ML pen Inject 20 Units Subcutaneous At Bedtime   Unknown, Entered By History   Insulin Pen Needle (PEN NEEDLES 5/16\") 31G X 8 MM MISC 4 x daily   Reported, Patient   meclizine (ANTIVERT) 25 MG tablet Take 1 tablet (25 mg) by mouth every 6 hours as needed for dizziness   Jazmyn Patel PA   oxyCODONE IR (ROXICODONE) 5 MG tablet Take 5 mg by mouth every 6 hours as needed    Reported, Patient   pantoprazole (PROTONIX) 40 MG enteric coated tablet Take 40 mg by mouth daily as needed    Reported, Patient   polyethylene glycol (MIRALAX/GLYCOLAX) packet Take 17 g by mouth 2 times daily as needed    Carina Bruce PA-C   simvastatin (ZOCOR) 20 MG tablet Take 1 tablet (20 mg) by mouth daily   Cindy Maier MD   timolol (TIMOPTIC) 0.5 % ophthalmic solution PLACE 1 DROP INTO BOTH EYES ONCE DAILY.   Reported, Patient   traZODone (DESYREL) 150 MG tablet Take 75 mg by mouth At Bedtime    Unknown, Entered By History   triamterene-HCTZ (DYAZIDE) 37.5-25 MG capsule Take 1 capsule by mouth every morning   Unknown, Entered By History            Allergies:     Allergies   Allergen Reactions     Amlodipine Swelling     Edema at 10 mg , fine at 5 mg     Lisinopril Cough     Metoprolol      Other reaction(s): Bradycardia  Metoprolol at 50mg bid -> HR 45 -50, unclear if sx (has fatigue)  May tolerate lower doses if needed            Social History:     Social History     Tobacco Use     Smoking status: Former Smoker     Smokeless tobacco: Never Used   Substance Use Topics     Alcohol use: No     Alcohol/week: 0.0 standard drinks             Family History:     Family History   Problem Relation Age of Onset     Diabetes No family hx of         He is not aware of any diabetes in his family "     Kidney Disease No family hx of               Review of Systems:   The 10 point Review of Systems is negative other than as noted in the HPI.           Physical Exam:   Blood pressure (!) 153/70, pulse 93, temperature 98.4  F (36.9  C), temperature source Oral, resp. rate 18, weight 67.6 kg (149 lb), SpO2 95 %.  149 lbs 0 oz      GENERAL: Pleasant and cooperative. No acute distress.  EYES: Pupils equal and round. No scleral erythema or icterus.  ENT: External ears are normal without deformity. Posterior oropharynx is without erythem, swelling, or exudate.  NECK: Supple. No masses or swelling. No tenderness. Thyroid is normal without mass or tenderness.  CHEST: Clear to auscultation. Normal breath sounds. No retractions.   CV: Regular rate and rhythm. No JVD. Pulses normal.  ABDOMEN: Bowel sounds present. No tenderness. No masses or hernia.  EXTREMETIES: No clubbing, cyanosis, or ischemia. Some right hip pain with weight bearing  SKIN: Warm and dry to touch. No wounds or rashes.  NEUROLOGIC: Strength and sensation are normal. Deep tendon reflexes are normal. Cranial nerves are normal.             Data:   All new lab and imaging data was reviewed.     Results for orders placed or performed during the hospital encounter of 05/22/20 (from the past 24 hour(s))   CBC with platelets differential   Result Value Ref Range    WBC 11.8 (H) 4.0 - 11.0 10e9/L    RBC Count 4.29 (L) 4.4 - 5.9 10e12/L    Hemoglobin 13.1 (L) 13.3 - 17.7 g/dL    Hematocrit 39.3 (L) 40.0 - 53.0 %    MCV 92 78 - 100 fl    MCH 30.5 26.5 - 33.0 pg    MCHC 33.3 31.5 - 36.5 g/dL    RDW 12.2 10.0 - 15.0 %    Platelet Count 166 150 - 450 10e9/L    Diff Method Automated Method     % Neutrophils 85.7 %    % Lymphocytes 8.6 %    % Monocytes 5.1 %    % Eosinophils 0.1 %    % Basophils 0.2 %    % Immature Granulocytes 0.3 %    Nucleated RBCs 0 0 /100    Absolute Neutrophil 10.1 (H) 1.6 - 8.3 10e9/L    Absolute Lymphocytes 1.0 0.8 - 5.3 10e9/L    Absolute  Monocytes 0.6 0.0 - 1.3 10e9/L    Absolute Eosinophils 0.0 0.0 - 0.7 10e9/L    Absolute Basophils 0.0 0.0 - 0.2 10e9/L    Abs Immature Granulocytes 0.0 0 - 0.4 10e9/L    Absolute Nucleated RBC 0.0    Basic metabolic panel   Result Value Ref Range    Sodium 130 (L) 133 - 144 mmol/L    Potassium 3.6 3.4 - 5.3 mmol/L    Chloride 94 94 - 109 mmol/L    Carbon Dioxide 30 20 - 32 mmol/L    Anion Gap 6 3 - 14 mmol/L    Glucose 136 (H) 70 - 99 mg/dL    Urea Nitrogen 48 (H) 7 - 30 mg/dL    Creatinine 1.89 (H) 0.66 - 1.25 mg/dL    GFR Estimate 33 (L) >60 mL/min/[1.73_m2]    GFR Estimate If Black 38 (L) >60 mL/min/[1.73_m2]    Calcium 8.6 8.5 - 10.1 mg/dL   Troponin I   Result Value Ref Range    Troponin I ES <0.015 0.000 - 0.045 ug/L   INR   Result Value Ref Range    INR 1.12 0.86 - 1.14   Hepatic panel   Result Value Ref Range    Bilirubin Direct 0.2 0.0 - 0.2 mg/dL    Bilirubin Total 1.3 0.2 - 1.3 mg/dL    Albumin 3.1 (L) 3.4 - 5.0 g/dL    Protein Total 7.0 6.8 - 8.8 g/dL    Alkaline Phosphatase 85 40 - 150 U/L    ALT 38 0 - 70 U/L    AST 33 0 - 45 U/L   Glucose by meter   Result Value Ref Range    Glucose 125 (H) 70 - 99 mg/dL   EKG 12-lead, tracing only   Result Value Ref Range    Interpretation ECG Click View Image link to view waveform and result    CT Head w/o Contrast    Narrative    CT SCAN OF THE HEAD WITHOUT CONTRAST   5/22/2020 4:28 PM     HISTORY: Altered level of consciousness (LOC), confusion, unexplained    TECHNIQUE:  Axial images of the head and coronal reformations without  IV contrast material. Radiation dose for this scan was reduced using  automated exposure control, adjustment of the mA and/or kV according  to patient size, or iterative reconstruction technique.    COMPARISON: MR scan dated 2/3/2020    FINDINGS:     Intracranial contents: There is diffuse parenchymal volume loss.   White matter changes are present in the cerebral hemispheres that are  consistent with small vessel ischemic disease in this  age patient.  There is no evidence of intracranial hemorrhage, mass, acute infarct  or anomaly.    Visualized orbits/sinuses/mastoids:  The visualized portions of the  sinuses and mastoids appear normal.    Osseous structures/soft tissues:  There is no evidence of trauma.      Impression    IMPRESSION: No acute pathology, no bleed, mass, or areas of acute  infarction.      TRINIDAD GUSMAN MD   XR Pelvis w 1 View Each Hip    Narrative    PELVIS AND HIP BILATERAL ONE VIEW   5/22/2020 4:38 PM     HISTORY: Fall, hip pain.    FINDINGS: Lower lumbar spine degenerative change. There is bony  convexity at the lateral femoral head-neck junction regions  bilaterally.  This can predispose to femoroacetabular impingement, and  clinical correlation is recommended. Otherwise unremarkable.      Impression    IMPRESSION: No fracture identified.    ALISON SIDDIQI MD   XR Chest 1 View    Narrative    CHEST ONE VIEW  5/22/2020 4:39 PM     HISTORY: Fall.    COMPARISON: 1/18/2020.      Impression    IMPRESSION: New patchy infiltrate at the left base compared to  previous. Chest x-ray follow-up in 4-6 weeks recommended to confirm  resolution. Right lung clear. No pneumothorax. No gross displaced rib  fracture.    JAZ LÓPEZ MD   UA with Microscopic   Result Value Ref Range    Color Urine Straw     Appearance Urine Clear     Glucose Urine Negative NEG^Negative mg/dL    Bilirubin Urine Negative NEG^Negative    Ketones Urine Negative NEG^Negative mg/dL    Specific Gravity Urine 1.009 1.003 - 1.035    Blood Urine Negative NEG^Negative    pH Urine 7.5 (H) 5.0 - 7.0 pH    Protein Albumin Urine 70 (A) NEG^Negative mg/dL    Urobilinogen mg/dL Normal 0.0 - 2.0 mg/dL    Nitrite Urine Negative NEG^Negative    Leukocyte Esterase Urine Negative NEG^Negative    Source Catheterized Urine     WBC Urine <1 0 - 5 /HPF    RBC Urine 1 0 - 2 /HPF   Blood culture    Specimen: Blood    Right Arm   Result Value Ref Range    Specimen Description Blood Right Arm      Culture Micro No growth after 1 hour    Symptomatic COVID-19 Virus (Coronavirus) by PCR    Specimen: Nasopharyngeal   Result Value Ref Range    COVID-19 Virus PCR to [facility] - Source Nasopharyngeal     COVID-19 Virus PCR to [facility] - Result       Test received-See reflex to IDDL test SARS CoV2 (COVID-19) Virus RT-PCR   SARS-CoV-2 COVID-19 Virus (Coronavirus) RT-PCR Nasopharyngeal    Specimen: Nasopharyngeal   Result Value Ref Range    SARS-CoV-2 Virus Specimen Source Nasopharyngeal     SARS-CoV-2 PCR Result NEGATIVE     SARS-CoV-2 PCR Comment       This automated, real-time RT-PCR assay by [employer] on the [employer] has   been given Emergency Use Authorization (EUA) for the in vitro qualitative detection of RNA   from the SARS-CoV2 virus in nasopharyngeal swabs in viral transport medium from patients   with signs and symptoms of infection who are suspected of COVID-19. The performance is   unknown in asymptomatic patients.     CT Hip Right w/o Contrast    Narrative    EXAM: CT HIP RIGHT W/O CONTRAST  LOCATION: [facility]  DATE/TIME: 5/22/2020 7:07 PM    INDICATION: Pain, possible occult fracture.  COMPARISON: X-ray evaluation 05/22/2020.  TECHNIQUE: Unenhanced.    FINDINGS:    RIGHT HIP: The right hip is negative for fracture or CT evidence of avascular necrosis. No significant effusion. Mild degenerative change at the right hip joint.    LEFT HIP: The left hip is negative for fracture or CT evidence of avascular necrosis. Similar mild degenerative change.    BONES: The bony pelvis is otherwise negative for fracture. Degenerative change at both SI joints. Degenerative change within the visualized portion of the lower lumbar spine.    SOFT TISSUES: Asymmetric fatty infiltration and atrophy of the right iliopsoas muscle.    INTRA-PELVIC CONTENTS: Liu catheter within the urinary bladder.      Impression    IMPRESSION:  1.  Both hips negative for fracture or CT evidence of avascular  necrosis.  2.  Bony pelvis otherwise negative for fracture.  3.  Degenerative change both hip joints.  4.  Liu catheter in the urinary bladder.       Glucose by meter   Result Value Ref Range    Glucose 60 (L) 70 - 99 mg/dL   Glucose by meter   Result Value Ref Range    Glucose 136 (H) 70 - 99 mg/dL   Glucose by meter   Result Value Ref Range    Glucose 290 (H) 70 - 99 mg/dL

## 2020-05-24 LAB — INTERPRETATION ECG - MUSE: NORMAL

## 2020-05-28 ENCOUNTER — OFFICE VISIT (OUTPATIENT)
Dept: UROLOGY | Facility: CLINIC | Age: 80
End: 2020-05-28
Payer: MEDICARE

## 2020-05-28 VITALS — BODY MASS INDEX: 23.39 KG/M2 | TEMPERATURE: 90.3 F | HEIGHT: 67 IN | WEIGHT: 149 LBS

## 2020-05-28 DIAGNOSIS — R33.9 URINARY RETENTION: Primary | ICD-10-CM

## 2020-05-28 LAB
BACTERIA SPEC CULT: NO GROWTH
BACTERIA SPEC CULT: NO GROWTH
Lab: NORMAL
SPECIMEN SOURCE: NORMAL
SPECIMEN SOURCE: NORMAL

## 2020-05-28 PROCEDURE — 99202 OFFICE O/P NEW SF 15 MIN: CPT | Performed by: UROLOGY

## 2020-05-28 RX ORDER — ALFUZOSIN HYDROCHLORIDE 10 MG/1
10 TABLET, EXTENDED RELEASE ORAL DAILY
Qty: 30 TABLET | Refills: 11 | Status: SHIPPED | OUTPATIENT
Start: 2020-05-28 | End: 2021-06-14

## 2020-05-28 ASSESSMENT — PAIN SCALES - GENERAL: PAINLEVEL: MODERATE PAIN (5)

## 2020-05-28 ASSESSMENT — MIFFLIN-ST. JEOR: SCORE: 1344.49

## 2020-05-28 NOTE — LETTER
5/28/2020       RE: Eh Callahan  [address]  [facility] [address]     Dear Colleague,    Thank you for referring your patient, Eh Callahan, to the [facility] [place] at [facility]. Please see a copy of my visit note below.    The patient is an 80-year-old [place] gentleman who has an indwelling Liu.  He was in the hospital and was found to have a 500 cc postvoid residual and had a Liu catheter placed.  He has been on Flomax since his discharge and takes this every evening at bedtime.  He is troubled by lightheadedness since he is started Flomax.  Other past medical history: Anxiety, hypertension, 20-year history of diabetes, glaucoma, PTSD, memory deficit, cognitive disorder, depression, hepatitis C, atrial fibrillation, hepatocellular carcinoma- had surgery but stopped going back to the oncologist, albuminuria, knee pain, degenerative joint disease, hyperlipidemia, insomnia, mental disorder, periodic hypoglycemia, aspiration pneumonia, former smoker.  Eye surgery, appendectomy.  No family history of prostate cancer.  Patient has never had a digital rectal exam  Medications: Tylenol, Norvasc, just finished Augmentin, Dulcolax, refresh solution, vitamin D, vitamin B12, insulin, meclizine, multivitamin, MiraLAX, Protonix, Senokot, simvastatin, tamsulosin, Dyazide  Allergies: Amlodipine, lisinopril, metoprolol  Exam: Alert and oriented, normal respirations, neuro grossly intact.  Normal vital signs.   present.  Normal sphincter tone, no rectal mass or impaction, benign and symmetric prostate, cannot reach seminal vesicles  Assessment: History of urinary retention on Flomax.  Flomax is making him dizzy so I will switch him to Uroxatrol 10 mg daily with meal.  Liu catheter removed.  See me back in 4 weeks for urinalysis, postvoid residual and possible cystoscopy.  Patient understands if he continues to have retention that  intermittent self-catheterization, suprapubic tube, urethral Liu or surgery are options.  Patient could also have a neurogenic bladder due to diabetes.  Patient needs yearly digital rectal exam    Again, thank you for allowing me to participate in the care of your patient.      Sincerely,    Garett Daniels MD

## 2020-05-28 NOTE — PROGRESS NOTES
The patient is an 80-year-old Taj[place] gentleman who has an indwelling Liu.  He was in the hospital and was found to have a 500 cc postvoid residual and had a Liu catheter placed.  He has been on Flomax since his discharge and takes this every evening at bedtime.  He is troubled by lightheadedness since he is started Flomax.  Other past medical history: Anxiety, hypertension, 20-year history of diabetes, glaucoma, PTSD, memory deficit, cognitive disorder, depression, hepatitis C, atrial fibrillation, hepatocellular carcinoma- had surgery but stopped going back to the oncologist, albuminuria, knee pain, degenerative joint disease, hyperlipidemia, insomnia, mental disorder, periodic hypoglycemia, aspiration pneumonia, former smoker.  Eye surgery, appendectomy.  No family history of prostate cancer.  Patient has never had a digital rectal exam  Medications: Tylenol, Norvasc, just finished Augmentin, Dulcolax, refresh solution, vitamin D, vitamin B12, insulin, meclizine, multivitamin, MiraLAX, Protonix, Senokot, simvastatin, tamsulosin, Dyazide  Allergies: Amlodipine, lisinopril, metoprolol  Exam: Alert and oriented, normal respirations, neuro grossly intact.  Normal vital signs.   present.  Normal sphincter tone, no rectal mass or impaction, benign and symmetric prostate, cannot reach seminal vesicles  Assessment: History of urinary retention on Flomax.  Flomax is making him dizzy so I will switch him to Uroxatrol 10 mg daily with meal.  Liu catheter removed.  See me back in 4 weeks for urinalysis, postvoid residual and possible cystoscopy.  Patient understands if he continues to have retention that intermittent self-catheterization, suprapubic tube, urethral Ilu or surgery are options.  Patient could also have a neurogenic bladder due to diabetes.  Patient needs yearly digital rectal exam

## 2020-07-31 ENCOUNTER — HOSPITAL ENCOUNTER (INPATIENT)
Facility: CLINIC | Age: 80
LOS: 1 days | Discharge: HOME | DRG: 177 | End: 2020-08-02
Attending: NURSE PRACTITIONER | Admitting: INTERNAL MEDICINE
Payer: MEDICARE

## 2020-07-31 ENCOUNTER — APPOINTMENT (OUTPATIENT)
Dept: CT IMAGING | Facility: CLINIC | Age: 80
DRG: 177 | End: 2020-07-31
Attending: NURSE PRACTITIONER
Payer: MEDICARE

## 2020-07-31 DIAGNOSIS — R16.0 LIVER MASS: ICD-10-CM

## 2020-07-31 DIAGNOSIS — E87.1 HYPONATREMIA: ICD-10-CM

## 2020-07-31 DIAGNOSIS — I24.9 ACS (ACUTE CORONARY SYNDROME) (H): ICD-10-CM

## 2020-07-31 DIAGNOSIS — I21.4 NSTEMI (NON-ST ELEVATED MYOCARDIAL INFARCTION) (H): ICD-10-CM

## 2020-07-31 DIAGNOSIS — I10 ESSENTIAL HYPERTENSION: ICD-10-CM

## 2020-07-31 DIAGNOSIS — R73.9 HYPERGLYCEMIA: ICD-10-CM

## 2020-07-31 DIAGNOSIS — R79.89 ELEVATED TROPONIN: Primary | ICD-10-CM

## 2020-07-31 DIAGNOSIS — R10.13 ABDOMINAL PAIN, EPIGASTRIC: ICD-10-CM

## 2020-07-31 LAB
ALBUMIN SERPL-MCNC: 3.2 G/DL (ref 3.4–5)
ALP SERPL-CCNC: 84 U/L (ref 40–150)
ALT SERPL W P-5'-P-CCNC: 33 U/L (ref 0–70)
ANION GAP SERPL CALCULATED.3IONS-SCNC: 8 MMOL/L (ref 3–14)
AST SERPL W P-5'-P-CCNC: 27 U/L (ref 0–45)
BILIRUB SERPL-MCNC: 0.8 MG/DL (ref 0.2–1.3)
BUN SERPL-MCNC: 53 MG/DL (ref 7–30)
CALCIUM SERPL-MCNC: 8.4 MG/DL (ref 8.5–10.1)
CHLORIDE SERPL-SCNC: 93 MMOL/L (ref 94–109)
CO2 SERPL-SCNC: 27 MMOL/L (ref 20–32)
CREAT SERPL-MCNC: 1.78 MG/DL (ref 0.66–1.25)
ERYTHROCYTE [DISTWIDTH] IN BLOOD BY AUTOMATED COUNT: 12.2 % (ref 10–15)
GFR SERPL CREATININE-BSD FRML MDRD: 35 ML/MIN/{1.73_M2}
GLUCOSE SERPL-MCNC: 467 MG/DL (ref 70–99)
HCT VFR BLD AUTO: 36.6 % (ref 40–53)
HGB BLD-MCNC: 12.2 G/DL (ref 13.3–17.7)
MCH RBC QN AUTO: 30.4 PG (ref 26.5–33)
MCHC RBC AUTO-ENTMCNC: 33.3 G/DL (ref 31.5–36.5)
MCV RBC AUTO: 91 FL (ref 78–100)
PLATELET # BLD AUTO: 147 10E9/L (ref 150–450)
POTASSIUM SERPL-SCNC: 4 MMOL/L (ref 3.4–5.3)
PROT SERPL-MCNC: 6.9 G/DL (ref 6.8–8.8)
RBC # BLD AUTO: 4.01 10E12/L (ref 4.4–5.9)
SODIUM SERPL-SCNC: 128 MMOL/L (ref 133–144)
TROPONIN I SERPL-MCNC: 0.12 UG/L (ref 0–0.04)
WBC # BLD AUTO: 5.8 10E9/L (ref 4–11)

## 2020-07-31 PROCEDURE — 74176 CT ABD & PELVIS W/O CONTRAST: CPT

## 2020-07-31 PROCEDURE — 80053 COMPREHEN METABOLIC PANEL: CPT | Performed by: NURSE PRACTITIONER

## 2020-07-31 PROCEDURE — 93005 ELECTROCARDIOGRAM TRACING: CPT

## 2020-07-31 PROCEDURE — 85027 COMPLETE CBC AUTOMATED: CPT | Performed by: NURSE PRACTITIONER

## 2020-07-31 PROCEDURE — 99285 EMERGENCY DEPT VISIT HI MDM: CPT | Mod: 25

## 2020-07-31 PROCEDURE — 25000128 H RX IP 250 OP 636: Performed by: NURSE PRACTITIONER

## 2020-07-31 PROCEDURE — 84484 ASSAY OF TROPONIN QUANT: CPT | Performed by: NURSE PRACTITIONER

## 2020-07-31 PROCEDURE — 25000132 ZZH RX MED GY IP 250 OP 250 PS 637: Mod: GY | Performed by: NURSE PRACTITIONER

## 2020-07-31 PROCEDURE — 96375 TX/PRO/DX INJ NEW DRUG ADDON: CPT

## 2020-07-31 PROCEDURE — 83036 HEMOGLOBIN GLYCOSYLATED A1C: CPT | Performed by: NURSE PRACTITIONER

## 2020-07-31 PROCEDURE — 80061 LIPID PANEL: CPT | Performed by: NURSE PRACTITIONER

## 2020-07-31 PROCEDURE — 25000125 ZZHC RX 250: Performed by: NURSE PRACTITIONER

## 2020-07-31 RX ORDER — ONDANSETRON 2 MG/ML
4 INJECTION INTRAMUSCULAR; INTRAVENOUS ONCE
Status: COMPLETED | OUTPATIENT
Start: 2020-07-31 | End: 2020-07-31

## 2020-07-31 RX ORDER — HEPARIN SODIUM 10000 [USP'U]/100ML
750 INJECTION, SOLUTION INTRAVENOUS CONTINUOUS
Status: DISCONTINUED | OUTPATIENT
Start: 2020-07-31 | End: 2020-08-02

## 2020-07-31 RX ADMIN — ONDANSETRON 4 MG: 2 INJECTION INTRAMUSCULAR; INTRAVENOUS at 22:35

## 2020-07-31 RX ADMIN — LIDOCAINE HYDROCHLORIDE 30 ML: 20 SOLUTION ORAL; TOPICAL at 22:35

## 2020-07-31 ASSESSMENT — ENCOUNTER SYMPTOMS
NAUSEA: 1
PALPITATIONS: 1

## 2020-07-31 NOTE — LETTER
Transition Communication Hand-off for Care Transitions to Next Level of Care Provider    Name: Eh Callahan  : 1940  MRN #: [id number]    Key Recommendations:  CTS identifies patient as high risk due to elevated AMANDA score. Currently admitted for NSTEMI, this is his 3rd admission in 6 months. Per chart review, pt resides at home with family. Baseline mobility is stand by assist. He remains a stand by assist.      His PMH that can effect his AMANDA score includes narrow angle glaucoma, Afib, CKD, chronic infection, DM, Hepatitis C, HTN & PTSD. His PTA medications that can effect his AMANDA score includes Novolog, Trazadone & Tresiba.    Recommendations at discharge ***    Jinny Pierson RN, BSN, CPHN, CM     AVS/Discharge Summary is the source of truth; this is a helpful guide for improved communication of patient story

## 2020-07-31 NOTE — LETTER
Transition Communication Hand-off for Care Transitions to Next Level of Care Provider    Name: Eh Callahan  : 1940  MRN #: [id number]  Primary Care Provider: Basilio Alfaro     Primary Clinic: [facility] [address]  [facility] [address]     Reason for Hospitalization:  Abdominal pain, epigastric [R10.13]  Hyponatremia [E87.1]  Hyperglycemia [R73.9]  Liver mass [R16.0]  NSTEMI (non-ST elevated myocardial infarction) (H) [I21.4]  Admit Date/Time: 2020  9:57 PM  Discharge Date: 2020  Payor Source: Payor: MEDICARE / Plan: MEDICARE / Product Type: Medicare /     Readmission Assessment Measure (AMANDA) Risk Score/category: Elevated         Reason for Communication Hand-off Referral: Other NSTEMI, COVID (+)    Discharge Plan: Home     Concern for non-adherence with plan of care: Yes.     Follow-up specialty is recommended: Yes. Follow up with Cardiology as recommended.       Follow-up plan:  No future appointments.    Any outstanding tests or procedures:  No. Recommend repeat BMP & CBC in 1 week.       Referrals     Future Labs/Procedures    Discharge Order: F/U with Cardiac  ADRIANA     Comments:    Telephone          Key Recommendations:  CTS identifies patient as high risk due to elevated AMANDA score. Currently admitted for NSTEMI, this is his 3rd admission in 6 months. Per chart review, pt resides at home with family. Baseline mobility is stand by assist. He remains a stand by assist.      His PMH that can effect his AMANDA score includes narrow angle glaucoma, Afib, CKD, chronic infection, DM, Hepatitis C, HTN & PTSD. His PTA medications that can effect his AMANDA score includes Novolog, Trazadone & Tresiba.    Recommendations at discharge: Blood glucose checks 4 times/day, blood pressure & pulse daily. Quarantine per CDC and [place] Department of Health guidelines.      Jinny Pierson RN, BSN, CPHN, CM     AVS/Discharge Summary is the source of truth; this is a helpful guide for improved communication  of patient story

## 2020-08-01 ENCOUNTER — APPOINTMENT (OUTPATIENT)
Dept: CARDIOLOGY | Facility: CLINIC | Age: 80
DRG: 177 | End: 2020-08-01
Attending: INTERNAL MEDICINE
Payer: MEDICARE

## 2020-08-01 ENCOUNTER — APPOINTMENT (OUTPATIENT)
Dept: GENERAL RADIOLOGY | Facility: CLINIC | Age: 80
DRG: 177 | End: 2020-08-01
Attending: NURSE PRACTITIONER
Payer: MEDICARE

## 2020-08-01 PROBLEM — I24.9 ACS (ACUTE CORONARY SYNDROME) (H): Status: ACTIVE | Noted: 2020-08-01

## 2020-08-01 LAB
ANION GAP SERPL CALCULATED.3IONS-SCNC: 8 MMOL/L (ref 3–14)
BUN SERPL-MCNC: 48 MG/DL (ref 7–30)
CALCIUM SERPL-MCNC: 9.3 MG/DL (ref 8.5–10.1)
CHLORIDE SERPL-SCNC: 99 MMOL/L (ref 94–109)
CHOLEST SERPL-MCNC: 163 MG/DL
CO2 SERPL-SCNC: 27 MMOL/L (ref 20–32)
CREAT SERPL-MCNC: 1.74 MG/DL (ref 0.66–1.25)
ERYTHROCYTE [DISTWIDTH] IN BLOOD BY AUTOMATED COUNT: 12.1 % (ref 10–15)
GFR SERPL CREATININE-BSD FRML MDRD: 36 ML/MIN/{1.73_M2}
GLUCOSE BLDC GLUCOMTR-MCNC: 100 MG/DL (ref 70–99)
GLUCOSE BLDC GLUCOMTR-MCNC: 126 MG/DL (ref 70–99)
GLUCOSE BLDC GLUCOMTR-MCNC: 148 MG/DL (ref 70–99)
GLUCOSE BLDC GLUCOMTR-MCNC: 156 MG/DL (ref 70–99)
GLUCOSE BLDC GLUCOMTR-MCNC: 283 MG/DL (ref 70–99)
GLUCOSE BLDC GLUCOMTR-MCNC: 308 MG/DL (ref 70–99)
GLUCOSE BLDC GLUCOMTR-MCNC: 460 MG/DL (ref 70–99)
GLUCOSE BLDC GLUCOMTR-MCNC: 491 MG/DL (ref 70–99)
GLUCOSE SERPL-MCNC: 128 MG/DL (ref 70–99)
HBA1C MFR BLD: 10.3 % (ref 0–5.6)
HCT VFR BLD AUTO: 42.1 % (ref 40–53)
HDLC SERPL-MCNC: 58 MG/DL
HGB BLD-MCNC: 13.8 G/DL (ref 13.3–17.7)
LDLC SERPL CALC-MCNC: 90 MG/DL
LMWH PPP CHRO-ACNC: 0.21 IU/ML
MCH RBC QN AUTO: 30.2 PG (ref 26.5–33)
MCHC RBC AUTO-ENTMCNC: 32.8 G/DL (ref 31.5–36.5)
MCV RBC AUTO: 92 FL (ref 78–100)
NONHDLC SERPL-MCNC: 105 MG/DL
PLATELET # BLD AUTO: 180 10E9/L (ref 150–450)
POTASSIUM SERPL-SCNC: 3.6 MMOL/L (ref 3.4–5.3)
RBC # BLD AUTO: 4.57 10E12/L (ref 4.4–5.9)
SARS-COV-2 RNA SPEC QL NAA+PROBE: ABNORMAL
SODIUM SERPL-SCNC: 134 MMOL/L (ref 133–144)
SPECIMEN SOURCE: ABNORMAL
TRIGL SERPL-MCNC: 74 MG/DL
TROPONIN I SERPL-MCNC: 0.08 UG/L (ref 0–0.04)
TROPONIN I SERPL-MCNC: 0.87 UG/L (ref 0–0.04)
TROPONIN I SERPL-MCNC: 1.54 UG/L (ref 0–0.04)
WBC # BLD AUTO: 6.6 10E9/L (ref 4–11)

## 2020-08-01 PROCEDURE — 25000132 ZZH RX MED GY IP 250 OP 250 PS 637: Mod: GY | Performed by: INTERNAL MEDICINE

## 2020-08-01 PROCEDURE — 96365 THER/PROPH/DIAG IV INF INIT: CPT

## 2020-08-01 PROCEDURE — 25000131 ZZH RX MED GY IP 250 OP 636 PS 637: Mod: GY | Performed by: INTERNAL MEDICINE

## 2020-08-01 PROCEDURE — 25000132 ZZH RX MED GY IP 250 OP 250 PS 637: Mod: GY | Performed by: NURSE PRACTITIONER

## 2020-08-01 PROCEDURE — 99222 1ST HOSP IP/OBS MODERATE 55: CPT | Mod: 25 | Performed by: INTERNAL MEDICINE

## 2020-08-01 PROCEDURE — 96376 TX/PRO/DX INJ SAME DRUG ADON: CPT

## 2020-08-01 PROCEDURE — U0003 INFECTIOUS AGENT DETECTION BY NUCLEIC ACID (DNA OR RNA); SEVERE ACUTE RESPIRATORY SYNDROME CORONAVIRUS 2 (SARS-COV-2) (CORONAVIRUS DISEASE [COVID-19]), AMPLIFIED PROBE TECHNIQUE, MAKING USE OF HIGH THROUGHPUT TECHNOLOGIES AS DESCRIBED BY CMS-2020-01-R: HCPCS | Performed by: NURSE PRACTITIONER

## 2020-08-01 PROCEDURE — 96366 THER/PROPH/DIAG IV INF ADDON: CPT

## 2020-08-01 PROCEDURE — 25000128 H RX IP 250 OP 636: Performed by: NURSE PRACTITIONER

## 2020-08-01 PROCEDURE — 93306 TTE W/DOPPLER COMPLETE: CPT | Mod: 26 | Performed by: INTERNAL MEDICINE

## 2020-08-01 PROCEDURE — 99223 1ST HOSP IP/OBS HIGH 75: CPT | Mod: AI | Performed by: INTERNAL MEDICINE

## 2020-08-01 PROCEDURE — 85520 HEPARIN ASSAY: CPT | Performed by: INTERNAL MEDICINE

## 2020-08-01 PROCEDURE — 36415 COLL VENOUS BLD VENIPUNCTURE: CPT | Performed by: INTERNAL MEDICINE

## 2020-08-01 PROCEDURE — 12000000 ZZH R&B MED SURG/OB

## 2020-08-01 PROCEDURE — 85027 COMPLETE CBC AUTOMATED: CPT | Performed by: INTERNAL MEDICINE

## 2020-08-01 PROCEDURE — 84484 ASSAY OF TROPONIN QUANT: CPT | Performed by: INTERNAL MEDICINE

## 2020-08-01 PROCEDURE — 25000131 ZZH RX MED GY IP 250 OP 636 PS 637: Mod: GY | Performed by: EMERGENCY MEDICINE

## 2020-08-01 PROCEDURE — 25000128 H RX IP 250 OP 636: Performed by: INTERNAL MEDICINE

## 2020-08-01 PROCEDURE — 80048 BASIC METABOLIC PNL TOTAL CA: CPT | Performed by: INTERNAL MEDICINE

## 2020-08-01 PROCEDURE — 71046 X-RAY EXAM CHEST 2 VIEWS: CPT

## 2020-08-01 PROCEDURE — 93306 TTE W/DOPPLER COMPLETE: CPT

## 2020-08-01 PROCEDURE — C9803 HOPD COVID-19 SPEC COLLECT: HCPCS

## 2020-08-01 PROCEDURE — 96372 THER/PROPH/DIAG INJ SC/IM: CPT

## 2020-08-01 PROCEDURE — 00000146 ZZHCL STATISTIC GLUCOSE BY METER IP

## 2020-08-01 RX ORDER — ATORVASTATIN CALCIUM 40 MG/1
40 TABLET, FILM COATED ORAL EVERY EVENING
Status: DISCONTINUED | OUTPATIENT
Start: 2020-08-01 | End: 2020-08-02 | Stop reason: HOSPADM

## 2020-08-01 RX ORDER — BISACODYL 5 MG
5 TABLET, DELAYED RELEASE (ENTERIC COATED) ORAL DAILY PRN
Status: DISCONTINUED | OUTPATIENT
Start: 2020-08-01 | End: 2020-08-02 | Stop reason: HOSPADM

## 2020-08-01 RX ORDER — ASPIRIN 325 MG
325 TABLET ORAL ONCE
Status: COMPLETED | OUTPATIENT
Start: 2020-08-01 | End: 2020-08-01

## 2020-08-01 RX ORDER — ASPIRIN 81 MG/1
81 TABLET ORAL DAILY
Status: DISCONTINUED | OUTPATIENT
Start: 2020-08-02 | End: 2020-08-02 | Stop reason: HOSPADM

## 2020-08-01 RX ORDER — SENNOSIDES 8.6 MG
1-2 TABLET ORAL 2 TIMES DAILY PRN
Status: DISCONTINUED | OUTPATIENT
Start: 2020-08-01 | End: 2020-08-02 | Stop reason: HOSPADM

## 2020-08-01 RX ORDER — ASPIRIN 325 MG
325 TABLET ORAL DAILY
Status: DISCONTINUED | OUTPATIENT
Start: 2020-08-01 | End: 2020-08-01

## 2020-08-01 RX ORDER — ISOSORBIDE DINITRATE 20 MG/1
20 TABLET ORAL 3 TIMES DAILY
Status: DISCONTINUED | OUTPATIENT
Start: 2020-08-01 | End: 2020-08-02 | Stop reason: HOSPADM

## 2020-08-01 RX ORDER — ATORVASTATIN CALCIUM 40 MG/1
80 TABLET, FILM COATED ORAL EVERY EVENING
Status: DISCONTINUED | OUTPATIENT
Start: 2020-08-01 | End: 2020-08-01

## 2020-08-01 RX ORDER — ALUMINA, MAGNESIA, AND SIMETHICONE 2400; 2400; 240 MG/30ML; MG/30ML; MG/30ML
30 SUSPENSION ORAL EVERY 4 HOURS PRN
Status: DISCONTINUED | OUTPATIENT
Start: 2020-08-01 | End: 2020-08-02 | Stop reason: HOSPADM

## 2020-08-01 RX ORDER — ISOSORBIDE DINITRATE 20 MG/1
20 TABLET ORAL 2 TIMES DAILY
Status: DISCONTINUED | OUTPATIENT
Start: 2020-08-01 | End: 2020-08-01

## 2020-08-01 RX ORDER — POLYETHYLENE GLYCOL 3350 17 G/17G
17 POWDER, FOR SOLUTION ORAL 2 TIMES DAILY PRN
Status: DISCONTINUED | OUTPATIENT
Start: 2020-08-01 | End: 2020-08-02 | Stop reason: HOSPADM

## 2020-08-01 RX ORDER — DEXTROSE MONOHYDRATE 25 G/50ML
25-50 INJECTION, SOLUTION INTRAVENOUS
Status: DISCONTINUED | OUTPATIENT
Start: 2020-08-01 | End: 2020-08-02 | Stop reason: HOSPADM

## 2020-08-01 RX ORDER — ACETAMINOPHEN 325 MG/1
650 TABLET ORAL EVERY 4 HOURS PRN
Status: DISCONTINUED | OUTPATIENT
Start: 2020-08-01 | End: 2020-08-02 | Stop reason: HOSPADM

## 2020-08-01 RX ORDER — ACETAMINOPHEN 650 MG/1
650 SUPPOSITORY RECTAL EVERY 4 HOURS PRN
Status: DISCONTINUED | OUTPATIENT
Start: 2020-08-01 | End: 2020-08-02 | Stop reason: HOSPADM

## 2020-08-01 RX ORDER — ONDANSETRON 2 MG/ML
4 INJECTION INTRAMUSCULAR; INTRAVENOUS EVERY 6 HOURS PRN
Status: DISCONTINUED | OUTPATIENT
Start: 2020-08-01 | End: 2020-08-02 | Stop reason: HOSPADM

## 2020-08-01 RX ORDER — ASPIRIN 81 MG/1
324 TABLET, CHEWABLE ORAL ONCE
Status: DISCONTINUED | OUTPATIENT
Start: 2020-08-01 | End: 2020-08-02 | Stop reason: HOSPADM

## 2020-08-01 RX ORDER — NICOTINE POLACRILEX 4 MG
15-30 LOZENGE BUCCAL
Status: DISCONTINUED | OUTPATIENT
Start: 2020-08-01 | End: 2020-08-02 | Stop reason: HOSPADM

## 2020-08-01 RX ORDER — LIDOCAINE 40 MG/G
CREAM TOPICAL
Status: DISCONTINUED | OUTPATIENT
Start: 2020-08-01 | End: 2020-08-02 | Stop reason: HOSPADM

## 2020-08-01 RX ORDER — HYDRALAZINE HYDROCHLORIDE 25 MG/1
25 TABLET, FILM COATED ORAL 3 TIMES DAILY
Status: DISCONTINUED | OUTPATIENT
Start: 2020-08-01 | End: 2020-08-02 | Stop reason: HOSPADM

## 2020-08-01 RX ORDER — NALOXONE HYDROCHLORIDE 0.4 MG/ML
.1-.4 INJECTION, SOLUTION INTRAMUSCULAR; INTRAVENOUS; SUBCUTANEOUS
Status: DISCONTINUED | OUTPATIENT
Start: 2020-08-01 | End: 2020-08-02 | Stop reason: HOSPADM

## 2020-08-01 RX ORDER — ONDANSETRON 4 MG/1
4 TABLET, FILM COATED ORAL EVERY 6 HOURS PRN
Status: DISCONTINUED | OUTPATIENT
Start: 2020-08-01 | End: 2020-08-02 | Stop reason: HOSPADM

## 2020-08-01 RX ADMIN — Medication 12.5 MG: at 11:26

## 2020-08-01 RX ADMIN — ATORVASTATIN CALCIUM 40 MG: 40 TABLET, FILM COATED ORAL at 19:51

## 2020-08-01 RX ADMIN — ONDANSETRON 4 MG: 2 INJECTION INTRAMUSCULAR; INTRAVENOUS at 17:42

## 2020-08-01 RX ADMIN — HEPARIN SODIUM 750 UNITS/HR: 10000 INJECTION, SOLUTION INTRAVENOUS at 00:35

## 2020-08-01 RX ADMIN — ISOSORBIDE DINITRATE 20 MG: 20 TABLET ORAL at 21:59

## 2020-08-01 RX ADMIN — INSULIN ASPART 6 UNITS: 100 INJECTION, SOLUTION INTRAVENOUS; SUBCUTANEOUS at 02:35

## 2020-08-01 RX ADMIN — HYDRALAZINE HYDROCHLORIDE 25 MG: 25 TABLET, FILM COATED ORAL at 21:59

## 2020-08-01 RX ADMIN — Medication 3800 UNITS: at 00:35

## 2020-08-01 RX ADMIN — Medication 12.5 MG: at 08:59

## 2020-08-01 RX ADMIN — POLYETHYLENE GLYCOL 3350 17 G: 17 POWDER, FOR SOLUTION ORAL at 17:57

## 2020-08-01 RX ADMIN — ASPIRIN 325 MG ORAL TABLET 325 MG: 325 PILL ORAL at 08:34

## 2020-08-01 RX ADMIN — ATORVASTATIN CALCIUM 80 MG: 40 TABLET, FILM COATED ORAL at 05:40

## 2020-08-01 RX ADMIN — INSULIN DETEMIR 20 UNITS: 100 INJECTION, SOLUTION SUBCUTANEOUS at 22:00

## 2020-08-01 RX ADMIN — INSULIN ASPART 1 UNITS: 100 INJECTION, SOLUTION INTRAVENOUS; SUBCUTANEOUS at 08:52

## 2020-08-01 RX ADMIN — INSULIN ASPART 3 UNITS: 100 INJECTION, SOLUTION INTRAVENOUS; SUBCUTANEOUS at 15:25

## 2020-08-01 RX ADMIN — Medication 12.5 MG: at 19:51

## 2020-08-01 RX ADMIN — ASPIRIN 325 MG ORAL TABLET 325 MG: 325 PILL ORAL at 00:35

## 2020-08-01 RX ADMIN — ISOSORBIDE DINITRATE 20 MG: 20 TABLET ORAL at 11:26

## 2020-08-01 RX ADMIN — INSULIN DETEMIR 20 UNITS: 100 INJECTION, SOLUTION SUBCUTANEOUS at 05:41

## 2020-08-01 ASSESSMENT — ACTIVITIES OF DAILY LIVING (ADL)
ADLS_ACUITY_SCORE: 25
ADLS_ACUITY_SCORE: 25
ADLS_ACUITY_SCORE: 23
ADLS_ACUITY_SCORE: 25
ADLS_ACUITY_SCORE: 24

## 2020-08-01 ASSESSMENT — ENCOUNTER SYMPTOMS
HEADACHES: 0
LIGHT-HEADEDNESS: 0
SHORTNESS OF BREATH: 0
FEVER: 0
ABDOMINAL PAIN: 1
VOMITING: 1
CHILLS: 0
COUGH: 0

## 2020-08-01 ASSESSMENT — MIFFLIN-ST. JEOR: SCORE: 1303.21

## 2020-08-01 NOTE — CONSULTS
Consult Date:  08/01/2020      CARDIOLOGY CONSULTATION      CONSULT INDICATION:  NSTEMI.      HISTORY OF PRESENT ILLNESS:      I had the opportunity to see patient, Eh Callahan, today in hospital for a Cardiology consultation.  This visit was done with a Somalian .      As you know, Mr. Eh Callahan is an 80-year-old male with a past medical history significant for hypertension, hyperlipidemia, hepatitis C, diabetes, chronic kidney disease, hepatocellular carcinoma, paroxysmal atrial fibrillation (CHADS-2 VASc 4) previously on rivaroxaban; however, not on any anticoagulation presently), who presents for further evaluation and management of chest pain.      The patient reports that he was in his usual state of health until approximately 2-3 weeks ago, when he started developing a vague chest pressure.  This chest pressure is described as a light pressure with occasional radiation to his jaw and left shoulder, does not seem to be exertional in nature.  Has been waxing and waning in severity over the past 2 weeks, no clear exacerbating or alleviating factors.  He also notes minor palpitations, denies any associated diaphoresis, dyspnea, recent change in exertional capacity, symptoms of orthopnea/PND, or abnormal lower extremity swelling.  Regarding his prior cardiac history, I note that he was seen by my colleague, Dr. Tamez with Cardiology EP on 10/20/2016 regarding his diagnosis of atrial fibrillation.  Due to his elevated risk for stroke, CHADS-2 VASc 4, he was started on rivaroxaban.  It is unclear when this was discontinued; however, he was not on this medication on admission.      In the Emergency Department, he was found to be hypertensive with blood pressure 160/83 mmHg, heart rate in the 90s to low 100 beats per minute range, satting 100% on room air.  Initial ECG shows normal sinus rhythm, normal axis, normal intervals, QTc 447 milliseconds, no acute ischemic changes, nonspecific T-wave change in  lead aVL.  Labs are notable for sodium 128, potassium 4, creatinine 1.78 (GFR 41), hemoglobin A1c 10, LDL 90.  Hemoglobin 12.2, platelets 147.  Initial troponin was 0.117, which has since increased to 0.873.      A TTE was ordered and is pending.  He did have a CT abdomen and pelvis which showed 2 indeterminate liver masses.  Of note, he does have a history of hepatocellular carcinoma.  There is also mention of cholelithiasis.  Chest x-ray was negative.      He was admitted to the Internal Medicine Service for further evaluation and management and has been started on guideline-directed medical therapy for NSTEMI, including heparin GTT.   Currently, he notes minimal chest discomfort, states that it has been present for several hours, rated at a 1/10 severity.  Otherwise, no complaints or concerns.      ASSESSMENT AND PLAN AND RECOMMENDATIONS:     1.  NSTEMI.  Overall, clinical presentation is concerning for NSTEMI, chest pain is atypical for angina; however, the patient is elderly and has diabetes.  Has numerous risk factors for coronary artery disease.  Also considered is type 2 myocardial infarction secondary to hypertension, in the setting of chronic kidney disease.   2.  Paroxysmal atrial fibrillation, CHADS 2 VASc 4, not on anticoagulation (previously was on rivaroxaban, unclear when this was discontinued).   3.  Poorly controlled diabetes, hemoglobin A1c on admission 10.3%.   4.  Hypertension.   5.  Hyperlipidemia.   6.  Chronic kidney disease.   7.  Hepatocellular carcinoma (2 liver masses noted on CT abdomen and pelvis 07/31/2020).      -- Discussed with the patient options for further evaluation and management including conservative medical management with risk stratification through noninvasive stress testing and close followup, or alternatively coronary angiography and possible PCI.  Reviewed the risks and benefits of each option at length, including the risk of kidney injury associated with coronary  angiography and possible PCI.  After an in-depth discussion, patient would like to hold off on decision making at this time, pending further discussion with his family, which I think is reasonable.   -- Discussed with the patient that regardless of his decision, he should seek medical attention if he experiences any future episodes of chest pain/discomfort or any other concerning symptoms.   -- Start hydralazine 12.5 mg t.i.d.   -- Start Isordil 20 mg t.i.d.   -- Continue aspirin 81 mg daily.   -- Atorvastatin 40 mg daily.   -- Continue metoprolol tartrate 12.5 mg b.i.d.   -- Continue heparin GTT.   -- TTE pending.   -- Cardiology will continue to follow.      Thank you for allowing our team to participate in the care of patientChikis.  Please do not hesitate to page or call with any questions or concerns.     Asim Landis MD, [facility]  Cardiology  Pager:  [phone]  Text Page   2020     D: 2020   T: 2020   MT: RADU      Name:     CHIKIS NEWBERRY   MRN:      [id number]        Account:       [id number]   :      1940           Consult Date:  2020      Document: [id number]

## 2020-08-01 NOTE — CONSULTS
Cardiology Consult Note-- PLEASE SEE ASSOCIATED DICTATION    Eh Callahan MRN#: [id number]   YOB: 1940 Age: 80 year old     Date of Admission: 7/31/2020  Consult indication: NSTEMI         HPI and Assessment and Recommendations/Plan:      Please refer to the associated dictation: #[id number]    - utilized [facility]  through iPad  - overall clinical presentation concerning for NSTEMI, mildly elevated troponin 0.873 in the setting of HTN, /83 mmHg in ED, has CKD, ECG without acute ischemic changes   - Discussed options for further evaluation and management including conservative medical management with non-invasive stress testing and close follow up , or coronary angiography. Reviewed the risks and benefits of each option at length  - Pt would like to discuss this further with his family  - Discussed with Pt that they should seek medical attention if they experience any future episodes of chest pain/discomfort, worsening exertional capacity, dyspnea, or other concerning symptoms, and they voiced understanding  - start hydralazine 12.5mg TID and isordil 20mg TID   - ASA 81mg daily, atorvastatin 40mg daily  - continue metoprolol tartrate 12.5mg BID  - continue heparin GTT  - TTE pending  - trend troponin  - cardiology will follow     Thank you for allowing our team to participate in the care of Eh Callahan.  Please do not hesitate to page me with any questions or concerns.     Asim Landis MD, [facility]  Cardiology  Pager:  [phone]  Text Page   August 1, 2020         Past Medical History:   I have reviewed this patient's past medical history    Past Medical History:   Diagnosis Date     Anatomical narrow angle glaucoma      Atrial fibrillation (H)      Chronic infection     history of hepatitis C     CKD (chronic kidney disease) stage 2, GFR 60-89 ml/min      Diabetes mellitus (H)      Hepatitis C without mention of hepatic coma      Hypertension      PTSD (post-traumatic stress  disorder)                Past Surgical History:   I have reviewed this patient's past surgical history   Past Surgical History:   Procedure Laterality Date     APPENDECTOMY       EYE SURGERY       LAPAROSCOPIC HEPATECTOMY PARTIAL Left 11/22/2016    Procedure: LAPAROSCOPIC HEPATECTOMY PARTIAL;  Surgeon: Rick Mckeon MD;  Location:  OR             Social History:   I have reviewed this patient's social history  Social History     Tobacco Use     Smoking status: Former Smoker     Smokeless tobacco: Never Used   Substance Use Topics     Alcohol use: No     Alcohol/week: 0.0 standard drinks             Family History:   I have reviewed this patient's family history  Family History   Problem Relation Age of Onset     Diabetes No family hx of         He is not aware of any diabetes in his family     Kidney Disease No family hx of              Allergies:   I have reviewed this patient's allergy history  Allergies   Allergen Reactions     Amlodipine Swelling     Edema at 10 mg , fine at 5 mg     Lisinopril Cough     Metoprolol      Other reaction(s): Bradycardia  Metoprolol at 50mg bid -> HR 45 -50, unclear if sx (has fatigue)  May tolerate lower doses if needed             Medications reviewed:   Prior to admission medications:  Prior to Admission medications    Medication Sig Start Date End Date Taking? Authorizing Provider   acetaminophen (TYLENOL) 500 MG tablet Take 1,000 mg by mouth every 8 hours as needed for mild pain   Yes Unknown, Entered By History   alfuzosin ER (UROXATRAL) 10 MG 24 hr tablet Take 1 tablet (10 mg) by mouth daily With meal 5/28/20  Yes Garett Daniels MD   amLODIPine (NORVASC) 5 MG tablet Take 5 mg by mouth every evening   Yes Unknown, Entered By History   bisacodyl (DULCOLAX) 10 MG suppository Place 1 suppository (10 mg) rectally daily as needed for constipation 1/19/20  Yes Carina Bruce PA-C   Carboxymeth-Glycerin-Polysorb (REFRESH OPTIVE ADVANCED) 0.5-1-0.5 % SOLN Place 1 drop  into both eyes 2 times daily as needed  4/6/17  Yes Reported, Patient   cholecalciferol (D 5000) 5000 UNITS CAPS Take 5,000 Units by mouth daily  3/2/16  Yes Reported, Patient   Cyanocobalamin (B-12 PO) Take 1 capsule by mouth daily   Yes Unknown, Entered By History   insulin aspart (NOVOLOG PEN) 100 UNIT/ML injection Inject 3 Units Subcutaneous 3 times daily (with meals)  7/12/17  Yes Reported, Patient   Multiple Vitamins-Minerals (MULTIVITAMIN ADULT) TABS Take 1 tablet by mouth daily 4/3/20  Yes Unknown, Entered By History   pantoprazole (PROTONIX) 40 MG enteric coated tablet Take 40 mg by mouth daily as needed  1/11/16  Yes Reported, Patient   polyethylene glycol (MIRALAX/GLYCOLAX) packet Take 17 g by mouth 2 times daily as needed  1/19/20  Yes Carina Bruce PA-C   sennosides (SENOKOT) 8.6 MG tablet Take 8.6 mg by mouth daily as needed   Yes Unknown, Entered By History   simvastatin (ZOCOR) 20 MG tablet Take 1 tablet (20 mg) by mouth daily 4/16/14  Yes Cindy Maier MD   traZODone (DESYREL) 150 MG tablet Take 75 mg by mouth At Bedtime    Yes Unknown, Entered By History   triamterene-HCTZ (DYAZIDE) 37.5-25 MG capsule Take 1 capsule by mouth every morning   Yes Unknown, Entered By History   blood glucose monitoring ([employer] CONTOUR) test strip 4 times daily  3/9/17   Reported, Patient   blood glucose monitoring (NO BRAND SPECIFIED) meter device kit Use to test blood sugar 4 times daily or as directed. 1/19/20   Carina Bruce PA-C   Blood Glucose Monitoring Suppl (FIFTY50 GLUCOSE METER 2.0) W/DEVICE KIT as needed for blood glucose monitoring 7/10/17   Reported, Patient   Blood Pressure Monitoring (RA BLOOD PRESSURE CUFF MONITOR) MISC Take blood pressure once daily 2/20/17   Reported, Patient   insulin degludec (TRESIBA FLEXTOUCH) 100 UNIT/ML pen Inject 22 Units Subcutaneous At Bedtime    Unknown, Entered By History   meclizine (ANTIVERT) 25 MG tablet Take 1 tablet (25 mg) by mouth every 6 hours as  needed for dizziness 2/5/20   Jazmyn Patel PA   tamsulosin (FLOMAX) 0.4 MG capsule Take 1 capsule (0.4 mg) by mouth every evening 5/23/20   Naresh Peraza MD      Current medications:  Current Facility-Administered Medications Ordered in Epic   Medication Dose Route Frequency Last Rate Last Dose     acetaminophen (TYLENOL) Suppository 650 mg  650 mg Rectal Q4H PRN         acetaminophen (TYLENOL) tablet 650 mg  650 mg Oral Q4H PRN         alum & mag hydroxide-simethicone (MAALOX  ES) suspension 30 mL  30 mL Oral Q4H PRN         aspirin (ASA) chewable tablet 324 mg  324 mg Oral Once         [START ON 8/2/2020] aspirin EC tablet 81 mg  81 mg Oral Daily         atorvastatin (LIPITOR) tablet 40 mg  40 mg Oral QPM         Continuing statin from home medication list OR statin order already placed during this visit   Does not apply DOES NOT GO TO MAR         glucose gel 15-30 g  15-30 g Oral Q15 Min PRN        Or     dextrose 50 % injection 25-50 mL  25-50 mL Intravenous Q15 Min PRN        Or     glucagon injection 1 mg  1 mg Subcutaneous Q15 Min PRN         heparin 25,000 units in 0.45% NaCl 250 mL ANTICOAGULANT  infusion  750 Units/hr Intravenous Continuous 7.5 mL/hr at 08/01/20 0730 750 Units/hr at 08/01/20 0730     HOLD: nitroGLYcerin IF   Does not apply HOLD         hydrALAZINE (APRESOLINE) half-tab 12.5 mg  12.5 mg Oral TID         insulin aspart (NovoLOG) injection (RAPID ACTING)  1-7 Units Subcutaneous TID AC   1 Units at 08/01/20 0852     insulin aspart (NovoLOG) injection (RAPID ACTING)  1-5 Units Subcutaneous At Bedtime         insulin detemir (LEVEMIR PEN) injection 20 Units  20 Units Subcutaneous At Bedtime   20 Units at 08/01/20 0541     isosorbide dinitrate (ISORDIL) tablet 20 mg  20 mg Oral TID         lidocaine (LMX4) cream   Topical Q1H PRN         lidocaine 1 % 0.1-1 mL  0.1-1 mL Other Q1H PRN         metoprolol tartrate (LOPRESSOR) half-tab 12.5 mg  12.5 mg Oral BID   12.5 mg at 08/01/20 0859      naloxone (NARCAN) injection 0.1-0.4 mg  0.1-0.4 mg Intravenous Q2 Min PRN         Patient is already receiving anticoagulation with heparin, enoxaparin (LOVENOX), warfarin (COUMADIN)  or other anticoagulant medication   Does not apply Continuous PRN         Reason ACE/ARB/ARNI order not selected   Other DOES NOT GO TO MAR         sodium chloride (PF) 0.9% PF flush 3 mL  3 mL Intracatheter q1 min prn         sodium chloride (PF) 0.9% PF flush 3 mL  3 mL Intracatheter Q8H         No current [facility]-ordered outpatient medications on file.             Review of Systems:   A complete review of systems was performed and was negative except as mentioned in the HPI.          Physical Exam:   Vital signs were personally reviewed:  Temperatures:  Current - Temp: 97.5  F (36.4  C); Max - Temp  Av.8  F (36.6  C)  Min: 97.5  F (36.4  C)  Max: 98.4  F (36.9  C)  Respiration range: Resp  Av.1  Min: 9  Max: 21  Pulse range: Pulse  Av.7  Min: 82  Max: 112  Blood pressure range: Systolic (24hrs), Av , Min:139 , Max:185   ; Diastolic (24hrs), Av, Min:55, Max:95    Pulse oximetry range: SpO2  Av.6 %  Min: 98 %  Max: 100 %    Intake/Output Summary (Last 24 hours) at 2020 1021  Last data filed at 2020 0615  Gross per 24 hour   Intake 240 ml   Output --   Net 240 ml     139 lbs 14.4 oz  Body mass index is 21.91 kg/m .   Body surface area is 1.73 meters squared.    Constitutional: appears stated age, in no apparent distress, chronically ill appearing  Eyes: sclera anicteric, no lesions on eyelids or lashes  ENT: normocephalic, without obvious abnormality, atraumatic, external ears without lesions   Pulmonary: clear to auscultation bilaterally, no wheezes, no rales, no increased work of breathing  Cardiovascular: JVP normal, regular rate, regular rhythm, 1/6 BLANE at the RUSB  Gastrointestinal: abdominal exam benign, non-tender, no rigidity, no guarding  Neurologic: awake, alert, face symmetrical, moves  all extremities  Skin: no jaundice  Psychiatric: affect is normal, answers questions appropriately, oriented to self and place         Laboratory tests:   Laboratory tests personally reviewed:   CMP  Recent Labs   Lab 08/01/20  0611 07/31/20  2234    128*   POTASSIUM 3.6 4.0   CHLORIDE 99 93*   CO2 27 27   ANIONGAP 8 8   * 467*   BUN 48* 53*   CR 1.74* 1.78*   GFRESTIMATED 36* 35*   GFRESTBLACK 42* 41*   DOMINGO 9.3 8.4*   PROTTOTAL  --  6.9   ALBUMIN  --  3.2*   BILITOTAL  --  0.8   ALKPHOS  --  84   AST  --  27   ALT  --  33     CBC  Recent Labs   Lab 08/01/20  0611 07/31/20  2234   WBC 6.6 5.8   RBC 4.57 4.01*   HGB 13.8 12.2*   HCT 42.1 36.6*   MCV 92 91   MCH 30.2 30.4   MCHC 32.8 33.3   RDW 12.1 12.2    147*     INRNo lab results found in last 7 days.  Lab Results   Component Value Date    TROPI 0.873 (HH) 08/01/2020    TROPI 0.084 (H) 08/01/2020    TROPI 0.117 (H) 07/31/2020    TROPONIN 0.00 10/20/2016     Recent Labs   Lab Test 07/31/20  2234   CHOL 163   HDL 58   LDL 90   TRIG 74     Lab Results   Component Value Date    A1C 10.3 07/31/2020    A1C 8.0 01/05/2020    A1C 8.4 11/15/2016     TSH   Date Value Ref Range Status   01/18/2020 1.66 0.40 - 4.00 mU/L Final            Imaging and Additional Data:   Additional data personally reviewed:    Recent Results (from the past 24 hour(s))   CT Abdomen Pelvis w/o Contrast    Narrative    EXAM: CT ABDOMEN PELVIS W/O CONTRAST  LOCATION: [facility]  DATE/TIME: 8/1/2020 12:11 AM    INDICATION: Abd pain, acute, generalized  COMPARISON: None.  TECHNIQUE: CT scan of the abdomen and pelvis was performed without IV contrast. Multiplanar reformats were obtained. Dose reduction techniques were used.  CONTRAST: None.    FINDINGS:   LOWER CHEST: Normal.    HEPATOBILIARY: Postoperative changes along left lobe of liver. There are 2 masses in the liver measuring up to 2.6 cm. Tiny stones in the gallbladder.    PANCREAS: Normal.    SPLEEN:  Calcification along the periphery of the spleen. The spleen is normal in size.    ADRENAL GLANDS: Normal.    KIDNEYS/BLADDER: Cortical scarring in the right kidney. No hydronephrosis in either side.    BOWEL: There is no bowel obstruction or inflammation.    LYMPH NODES: Normal.    VASCULATURE: Atherosclerotic calcification of the aorta and its branches. No aneurysm.    PELVIC ORGANS: Prostate gland is enlarged.    MUSCULOSKELETAL: Degenerative disease in the spine.      Impression    IMPRESSION:   1.  There are 2 indeterminate liver masses measuring up to 2.6 cm.  2.  Cholelithiasis.  3.  No bowel obstruction or inflammation.     Chest XR,  PA & LAT    Narrative    EXAM: XR CHEST 2 VW  LOCATION: [facility]  DATE/TIME: 8/1/2020 12:17 AM    INDICATION: Chest pain.  COMPARISON: None.      Impression    IMPRESSION: Normal heart size and pulmonary vascularity. Lungs are clear. No significant bony abnormalities. Chest is otherwise negative. No acute findings.

## 2020-08-01 NOTE — CONSULTS
CTS identifies patient as high risk due to elevated AMANDA score. Currently admitted for NSTEMI, this is his 3rd admission in 6 months. Per chart review, pt resides at home with family. Baseline mobility is stand by assist. He remains a stand by assist.      His PMH that can effect his AMANDA score includes narrow angle glaucoma, Afib, CKD, chronic infection, DM, Hepatitis C, HTN & PTSD. His PTA medications that can effect his AMANDA score includes Novolog, Trazadone & Tresiba.     Will follow along with LEVY for DC planning. Will give hand off to clinic RN CTS at time of discharge.       Jinny Pierson RN, BSN, CPHN, CTS  Inpatient Care Coordinator  [facility]  [phone]

## 2020-08-01 NOTE — PLAN OF CARE
"Vss ex SBP in the 140-150s (cardiology added hydralazine and isordil), no co pain/cp/sob, pt did state that he at times feels like his heart is \"beating hard\".  Last trop 1.545, heparin continues at 750/hr, echo with EF 55-60% with regurgitation noted, cardiology will continue to follow. , 100.  Tele SR with short bursts of PSVT noted per tele tech. Alarms on for safety as pt can be impulsive and not call for assistance at times, up with SBA+cane, LS clear with weak occasional cough.  Continue poc and monitoring.     Problem: Adult Inpatient Plan of Care  Goal: Plan of Care Review  Outcome: No Change  Goal: Patient-Specific Goal (Individualization)  Outcome: No Change  Goal: Absence of Hospital-Acquired Illness or Injury  Outcome: No Change  Goal: Optimal Comfort and Wellbeing  Outcome: No Change  Goal: Readiness for Transition of Care  Outcome: No Change  Goal: Rounds/Family Conference  Outcome: No Change     Problem: Adjustment to Illness (Acute Coronary Syndrome)  Goal: Optimal Adaptation to Illness  Outcome: No Change     Problem: Hemodynamic Instability (Acute Coronary Syndrome)  Goal: Effective Cardiac Pump Function  Outcome: No Change     Problem: Tissue Perfusion (Acute Coronary Syndrome)  Goal: Adequate Tissue Perfusion  Outcome: No Change     "

## 2020-08-01 NOTE — PROGRESS NOTES
Provider paged: Just received bedtime levemir pen from pharmacy, please clarify if dose should be given at this time, or if the next bedtime dose should be adjusted.  Thanks    Addendum:   Provider adjusted levemir time stat order to be given at 0530.

## 2020-08-01 NOTE — PROVIDER NOTIFICATION
Provider paged: re-check blood sugar 460 after one time novolog in ED.  Still awaiting levimir to be sent.  Do we need further intervention?

## 2020-08-01 NOTE — PHARMACY-ADMISSION MEDICATION HISTORY
Admission medication history interview status for this patient is complete. See [facility] admission navigator for allergy information, prior to admission medications and immunization status.     Medication history interview done via telephone during Covid-19 pandemic, indicate source(s): Family - daughter (Candis)  Medication history resources (including written lists, pill bottles, clinic record):epic list, surescripts    Changes made to PTA medication list:  Added: none  Deleted: tamsulosin (stopped due to dizziness)  Changed: none    Actions taken by pharmacist (provider contacted, etc):called daughter     Additional medication history information:afluzosin started in place of tamsulosin by Urology (see note in chart review 5/28/20)    Medication reconciliation/reorder completed by provider prior to medication history?  N     For patients on insulin therapy: Y  Sliding scale Novolog: N  Do you have a baseline novolog pre-meal dose:  3 units with meals or __ units/carb unit with meals  Do you eat three meals a day:  Y  How many times do you check your blood glucose per day:  4  How many episodes of hypoglycemia do you have per week: None  Do you have a Continuous glucose monitor (CGM) : N (remind pt that not approved for hospital use)  Any specific barriers to therapy? N  (cost, comfortable with injections, confident with current diabetes regimen?)      Prior to Admission medications    Medication Sig Last Dose Taking? Auth Provider   acetaminophen (TYLENOL) 500 MG tablet Take 1,000 mg by mouth every 8 hours as needed for mild pain 7/30/2020 Yes Unknown, Entered By History   alfuzosin ER (UROXATRAL) 10 MG 24 hr tablet Take 1 tablet (10 mg) by mouth daily With meal 7/31/2020 at Unknown time Yes Garett Daniels MD   amLODIPine (NORVASC) 5 MG tablet Take 5 mg by mouth every evening 7/2/2020 Yes Unknown, Entered By History   bisacodyl (DULCOLAX) 10 MG suppository Place 1 suppository (10 mg) rectally daily as needed for  constipation Past Week at 2 weeks ago Yes Carina Bruce PA-C   Carboxymeth-Glycerin-Polysorb (REFRESH OPTIVE ADVANCED) 0.5-1-0.5 % SOLN Place 1 drop into both eyes 2 times daily as needed  7/31/2020 at Unknown time Yes Reported, Patient   cholecalciferol (D 5000) 5000 UNITS CAPS Take 5,000 Units by mouth daily  7/31/2020 at Unknown time Yes Reported, Patient   Cyanocobalamin (B-12 PO) Take 1 capsule by mouth daily 7/31/2020 at Unknown time Yes Unknown, Entered By History   insulin aspart (NOVOLOG PEN) 100 UNIT/ML injection Inject 3 Units Subcutaneous 3 times daily (with meals)  7/31/2020 at Unknown time Yes Reported, Patient   Multiple Vitamins-Minerals (MULTIVITAMIN ADULT) TABS Take 1 tablet by mouth daily 7/31/2020 at Unknown time Yes Unknown, Entered By History   pantoprazole (PROTONIX) 40 MG enteric coated tablet Take 40 mg by mouth daily as needed  7/30/2020 Yes Reported, Patient   polyethylene glycol (MIRALAX/GLYCOLAX) packet Take 17 g by mouth 2 times daily as needed  7/30/2020 Yes Carina Bruce PA-C   sennosides (SENOKOT) 8.6 MG tablet Take 8.6 mg by mouth daily as needed Past Week at Unknown time Yes Unknown, Entered By History   simvastatin (ZOCOR) 20 MG tablet Take 1 tablet (20 mg) by mouth daily 7/30/2020 Yes Cindy Maier MD   traZODone (DESYREL) 150 MG tablet Take 75 mg by mouth At Bedtime  7/30/2020 Yes Unknown, Entered By History   triamterene-HCTZ (DYAZIDE) 37.5-25 MG capsule Take 1 capsule by mouth every morning 7/30/2020 Yes Unknown, Entered By History   blood glucose monitoring (GREGORIO CONTOUR) test strip 4 times daily    Reported, Patient   blood glucose monitoring (NO BRAND SPECIFIED) meter device kit Use to test blood sugar 4 times daily or as directed.   Carina Bruce PA-C   Blood Glucose Monitoring Suppl (FIFTY50 GLUCOSE METER 2.0) W/DEVICE KIT as needed for blood glucose monitoring   Reported, Patient   Blood Pressure Monitoring (RA BLOOD PRESSURE CUFF MONITOR) MISC  Take blood pressure once daily   Reported, Patient   insulin degludec (TRESIBA FLEXTOUCH) 100 UNIT/ML pen Inject 22 Units Subcutaneous At Bedtime 7/30/2020  Unknown, Entered By History   meclizine (ANTIVERT) 25 MG tablet Take 1 tablet (25 mg) by mouth every 6 hours as needed for dizziness Unknown at Unknown time  Jazmyn Patel PA     Medication history interview completed by Blu Alvares, Pharmacy Intern. Cosigned by Benjamin Meza RPh

## 2020-08-01 NOTE — ED NOTES
[facility]  ED Nurse Handoff Report    Eh Callahan is a 80 year old male   ED Chief complaint: Chest Pain  . ED Diagnosis:   Final diagnoses:   NSTEMI (non-ST elevated myocardial infarction) (H)     Allergies:   Allergies   Allergen Reactions     Amlodipine Swelling     Edema at 10 mg , fine at 5 mg     Lisinopril Cough     Metoprolol      Other reaction(s): Bradycardia  Metoprolol at 50mg bid -> HR 45 -50, unclear if sx (has fatigue)  May tolerate lower doses if needed       Code Status: Full Code  Activity level - Baseline/Home:  Stand by Assist. Activity Level - Current:   Stand by Assist. Lift room needed: No. Bariatric: No   Needed: Yes - [place]  Isolation: No. Infection: Not Applicable.     Vital Signs:   Vitals:    07/31/20 2202   BP: (!) 160/83   Pulse: 94   Resp: 20   Temp: 98.4  F (36.9  C)   TempSrc: Oral   SpO2: 100%   Weight: 63.5 kg (140 lb)       Cardiac Rhythm:  ,      Pain level: 0-10 Pain Scale: 6  Patient confused: No. Patient Falls Risk: Yes.   Elimination Status: Has voided   Patient Report - Initial Complaint: Nausea, vomiting. Focused Assessment: N/V. Decreased appetite. Epigastric pain. Generalized weakness   Tests Performed:   Labs Ordered and Resulted from Time of ED Arrival Up to the Time of Departure from the ED   CBC WITH PLATELETS - Abnormal; Notable for the following components:       Result Value    RBC Count 4.01 (*)     Hemoglobin 12.2 (*)     Hematocrit 36.6 (*)     Platelet Count 147 (*)     All other components within normal limits   COMPREHENSIVE METABOLIC PANEL - Abnormal; Notable for the following components:    Sodium 128 (*)     Chloride 93 (*)     Glucose 467 (*)     Urea Nitrogen 53 (*)     Creatinine 1.78 (*)     GFR Estimate 35 (*)     GFR Estimate If Black 41 (*)     Calcium 8.4 (*)     Albumin 3.2 (*)     All other components within normal limits   TROPONIN I - Abnormal; Notable for the following components:    Troponin I ES 0.117 (*)     All  other components within normal limits   LIPID REFLEX TO DIRECT LDL PANEL   HEMOGLOBIN A1C   COVID-19 VIRUS (CORONAVIRUS) BY PCR   GLUCOSE MONITOR NURSING POCT   GLUCOSE MONITOR NURSING POCT   GLUCOSE MONITOR NURSING POCT   GLUCOSE MONITOR NURSING POCT   GLUCOSE MONITOR NURSING POCT   PLATELETS MONITORED PER HEPARIN TREATMENT PROTOCOL (FOR MEANINGFUL USE   NO VTE PROPHYLAXIS   MAY SALINE LOCK IV   FREE WATER   MEASURE WEIGHT   NOTIFY PHYSICIAN   NOTIFY PHYSICIAN   DAILY WEIGHTS   INTAKE AND OUTPUT   OBTAIN MEDICAL RECORDS   DOCUMENT   PATIENT EDUCATION   TELEMETRY MONITORING CARDIOLOGY ADULT   VITAL SIGNS AND PAIN ASSESSMENT   PULSE OXIMETRY NURSING   PERIPHERAL IV CATHETER   ACTIVITY   NOTIFY PROVIDER   MEASURE WEIGHT   NOTIFY PHYSICIAN   NOTIFY PHYSICIAN   IP CARBOHYDRATE COUNTING BY NURSING   PATIENT EDUCATION   ASSESS FOR HYPOGLYCEMIA SYMPTOMS   NOTIFY PHYSICIAN     CT Abdomen Pelvis w/o Contrast    (Results Pending)   Chest XR,  PA & LAT    (Results Pending)   Echocardiogram Complete    (Results Pending)     . Abnormal Results: See above.   Treatments provided: Aspirin, heparin, diagnostics, zofran. GI cocktail  Family Comments: Daughter at bedside  OBS brochure/video discussed/provided to patient:  N/A  ED Medications:   Medications   heparin 25,000 units in 0.45% NaCl 250 mL ANTICOAGULANT  infusion (has no administration in time range)   heparin ANTICOAGULANT  Loading Dose bolus dose from infusion pump 3,800 Units (has no administration in time range)   aspirin (ASA) tablet 325 mg (has no administration in time range)   lidocaine 1 % 0.1-1 mL (has no administration in time range)   lidocaine (LMX4) cream (has no administration in time range)   sodium chloride (PF) 0.9% PF flush 3 mL (has no administration in time range)   sodium chloride (PF) 0.9% PF flush 3 mL (has no administration in time range)   aspirin (ASA) chewable tablet 324 mg (324 mg Oral Not Given 8/1/20 0017)   aspirin (ASA) tablet 325 mg (has no  administration in time range)   HOLD: nitroGLYcerin IF (has no administration in time range)   alum & mag hydroxide-simethicone (MAALOX  ES) suspension 30 mL (has no administration in time range)   acetaminophen (TYLENOL) tablet 650 mg (has no administration in time range)   acetaminophen (TYLENOL) Suppository 650 mg (has no administration in time range)   naloxone (NARCAN) injection 0.1-0.4 mg (has no administration in time range)   Patient is already receiving anticoagulation with heparin, enoxaparin (LOVENOX), warfarin (COUMADIN)  or other anticoagulant medication (has no administration in time range)   Reason ACE/ARB/ARNI order not selected (has no administration in time range)   metoprolol tartrate (LOPRESSOR) half-tab 12.5 mg (has no administration in time range)   Continuing statin from home medication list OR statin order already placed during this visit (has no administration in time range)   atorvastatin (LIPITOR) tablet 80 mg (has no administration in time range)   glucose gel 15-30 g (has no administration in time range)     Or   dextrose 50 % injection 25-50 mL (has no administration in time range)     Or   glucagon injection 1 mg (has no administration in time range)   insulin aspart (NovoLOG) injection (RAPID ACTING) (has no administration in time range)   insulin aspart (NovoLOG) injection (RAPID ACTING) (has no administration in time range)   insulin detemir (LEVEMIR PEN) injection 20 Units (has no administration in time range)   lidocaine (XYLOCAINE) 2 % 15 mL, alum & mag hydroxide-simethicone (MAALOX  ES) 15 mL GI Cocktail (30 mLs Oral Given 7/31/20 2235)   ondansetron (ZOFRAN) injection 4 mg (4 mg Intravenous Given 7/31/20 2235)     Drips infusing:  Yes  For the majority of the shift, the patient's behavior Green. Interventions performed were N/A.    Sepsis treatment initiated: No       ED Nurse Name/Phone Number: Felipe Villar RN,   12:25 AM    RECEIVING UNIT ED HANDOFF REVIEW    Above ED  Nurse Handoff Report was reviewed: Yes  Reviewed by: Rosemary Figueroa RN on August 1, 2020 at 2:50 AM

## 2020-08-01 NOTE — PROVIDER NOTIFICATION
"Paged dr krishna: metoprolol ordered but listed as allergy, still ok to give? Allergy review states pt \"may tolerate lower doses if needed\"...thanks. will hold for now.    Spoke with md, ok to give metoprolol.  Also mention to md (per night  RN request) that the pt was given his HS levemir at 0530 this am, in case it needs to be addressed at all.  MD states the he will watch BG today and see how they are.  "

## 2020-08-01 NOTE — H&P
[facility]    History and Physical  Hospitalist       Date of Admission:  7/31/2020    Assessment & Plan   Eh Callahan is a 80 year old male who presents with 2-week history of chest pain.    Patient has a past medical history significant for type 2 diabetes on insulin, hypertension, dyslipidemia, BPH, hepatitis C, hepatocellular carcinoma status post surgery, chronic kidney disease, documentation of A. fib in the chart, glaucoma.    He presented to the emergency room with chest pain.  He is having difficulty giving me the details of his chest pain.  The pain started approximately 2 weeks ago.  He thinks that it is worsening, especially worse over the last 2 days.  Radiating to his left arm.  It is associated with palpitations.  He was experiencing palpitations in the ER as well.  When I saw him, at rest he denied any pain.  But he was experiencing palpitations with heart rate between 100-110.    He denies any shortness of breath.  He has experienced some nausea but no vomiting.  Denies any abdominal pain.  No dizziness or falls.    He was unable to tell me if his pain gets better or worse with activity.  Denies any symptoms of orthopnea or PND.  He has noticed lower extremity edema for the last few months.    In the ER, patient noted to have a troponin of 0.117.  EKG did not reveal any significant EKG abnormality.  No ST elevation.  His glucose was elevated.  He is being admitted to internal medicine service for non-ST elevation MI.    Problem List:    Acute coronary syndrome.  -Treat as non-ST elevation MI.  - Heparin infusion.  Aspirin.  High intensity statin.  - Start on beta-blocker, low-dose metoprolol 12.5 mg twice daily.  In the chart there is a documentation of metoprolol allergy causing bradycardia with metoprolol dose at 50 mg twice daily.  For that reason try a low-dose metoprolol 12.5 twice daily.  - Cardiology consult.  Echocardiogram.  - Check lipid panel and hemoglobin  A1c.    Diabetes  - Keep the patient on Levemir and sliding scale insulin.  Check hemoglobin A1c.    Hypertension  - Beta-blocker as above.  Resume home blood pressure medicines as tolerated.    Chronic kidney disease  - Creatinine is overall stable.  Monitor.    Hyponatremia  - Blood glucose is significantly elevated at 467.  Hyponatremia is likely pseudohyponatremia due to hyperglycemia.    Asymptomatic COVID-19 test sent by ER provider.    CT abdomen and chest x-ray ordered by the ER provider, pending at the time of dictation.    Plan discussed with patient and patient's daughter.    DVT Prophylaxis: IV heparin.  Code Status: Full Code    Armand Baltazar MD    Primary Care Physician   Basilio Alfaro    Chief Complaint   Chest pain.      History of Present Illness   Eh Callahan is a 80 year old male presented with chest pain.      Past Medical History    I have reviewed this patient's medical history and updated it with pertinent information if needed.   Past Medical History:   Diagnosis Date     Anatomical narrow angle glaucoma      Atrial fibrillation (H)      Chronic infection     history of hepatitis C     CKD (chronic kidney disease) stage 2, GFR 60-89 ml/min      Diabetes mellitus (H)      Hepatitis C without mention of hepatic coma      Hypertension      PTSD (post-traumatic stress disorder)        Past Surgical History   I have reviewed this patient's surgical history and updated it with pertinent information if needed.  Past Surgical History:   Procedure Laterality Date     APPENDECTOMY       EYE SURGERY       LAPAROSCOPIC HEPATECTOMY PARTIAL Left 11/22/2016    Procedure: LAPAROSCOPIC HEPATECTOMY PARTIAL;  Surgeon: Rick Mckeon MD;  Location: [facility] OR       Prior to Admission Medications   Prior to Admission Medications   Prescriptions Last Dose Informant Patient Reported? Taking?   Blood Glucose Monitoring Suppl (FIFTY50 GLUCOSE METER 2.0) W/DEVICE KIT   Yes No   Sig: as needed for blood glucose  monitoring   Blood Pressure Monitoring ( BLOOD PRESSURE CUFF MONITOR) MISC   Yes No   Sig: Take blood pressure once daily   Carboxymeth-Glycerin-Polysorb (REFRESH OPTIVE ADVANCED) 0.5-1-0.5 % SOLN   Yes No   Sig: Place 1 drop into both eyes 2 times daily as needed    Cyanocobalamin (B-12 PO)   Yes No   Sig: Take 1 capsule by mouth daily   Multiple Vitamins-Minerals (MULTIVITAMIN ADULT) TABS   Yes No   Sig: Take 1 tablet by mouth daily   acetaminophen (TYLENOL) 500 MG tablet   Yes No   Sig: Take 1,000 mg by mouth every 8 hours as needed for mild pain   alfuzosin ER (UROXATRAL) 10 MG 24 hr tablet   No No   Sig: Take 1 tablet (10 mg) by mouth daily With meal   amLODIPine (NORVASC) 5 MG tablet   Yes No   Sig: Take 5 mg by mouth every evening   amoxicillin-clavulanate (AUGMENTIN) 500-125 MG tablet   No No   Sig: Take 1 tablet by mouth 2 times daily for 4 days   Patient not taking: Reported on 2020   bisacodyl (DULCOLAX) 10 MG suppository   Yes No   Sig: Place 1 suppository (10 mg) rectally daily as needed for constipation   blood glucose monitoring (Symvato CONTOUR) test strip   Yes No   Si times daily    blood glucose monitoring (NO BRAND SPECIFIED) meter device kit   No No   Sig: Use to test blood sugar 4 times daily or as directed.   cholecalciferol (D 5000) 5000 UNITS CAPS   Yes No   Sig: Take 5,000 Units by mouth daily    insulin aspart (NOVOLOG PEN) 100 UNIT/ML injection   Yes No   Sig: Inject 3 Units Subcutaneous 3 times daily (with meals)    insulin degludec (TRESIBA FLEXTOUCH) 100 UNIT/ML pen   Yes No   Sig: Inject 22 Units Subcutaneous At Bedtime   meclizine (ANTIVERT) 25 MG tablet   No No   Sig: Take 1 tablet (25 mg) by mouth every 6 hours as needed for dizziness   oxyCODONE IR (ROXICODONE) 5 MG tablet   Yes No   Sig: Take 5 mg by mouth every 6 hours as needed    pantoprazole (PROTONIX) 40 MG enteric coated tablet   Yes No   Sig: Take 40 mg by mouth daily as needed    polyethylene glycol  (MIRALAX/GLYCOLAX) packet   Yes No   Sig: Take 17 g by mouth 2 times daily as needed    sennosides (SENOKOT) 8.6 MG tablet   Yes No   Sig: Take 8.6 mg by mouth daily as needed   simvastatin (ZOCOR) 20 MG tablet   No No   Sig: Take 1 tablet (20 mg) by mouth daily   tamsulosin (FLOMAX) 0.4 MG capsule   No No   Sig: Take 1 capsule (0.4 mg) by mouth every evening   traZODone (DESYREL) 150 MG tablet   Yes No   Sig: Take 75 mg by mouth At Bedtime    triamterene-HCTZ (DYAZIDE) 37.5-25 MG capsule   Yes No   Sig: Take 1 capsule by mouth every morning      Facility-Administered Medications: None     Allergies   Allergies   Allergen Reactions     Amlodipine Swelling     Edema at 10 mg , fine at 5 mg     Lisinopril Cough     Metoprolol      Other reaction(s): Bradycardia  Metoprolol at 50mg bid -> HR 45 -50, unclear if sx (has fatigue)  May tolerate lower doses if needed       Social History   I have reviewed this patient's social history and updated it with pertinent information if needed. Eh Callahan  reports that he has quit smoking. He has never used smokeless tobacco. He reports that he does not drink alcohol or use drugs.    Family History   I have reviewed this patient's family history and updated it with pertinent information if needed.   Family History   Problem Relation Age of Onset     Diabetes No family hx of         He is not aware of any diabetes in his family     Kidney Disease No family hx of        Review of Systems   The 10 point Review of Systems is negative other than noted in the HPI or here.     Physical Exam   Temp: 98.4  F (36.9  C) Temp src: Oral BP: (!) 160/83 Pulse: 94 Heart Rate: 94 Resp: 20 SpO2: 100 % O2 Device: None (Room air)    Vital Signs with Ranges  Temp:  [98.4  F (36.9  C)] 98.4  F (36.9  C)  Pulse:  [94] 94  Heart Rate:  [94] 94  Resp:  [20] 20  BP: (160)/(83) 160/83  SpO2:  [100 %] 100 %  140 lbs 0 oz    Constitutional: Awake, alert, cooperative, restless due to palpitations.  Eyes:  Conjunctiva and pupils examined and normal.  HEENT: Moist mucous membranes, normal dentition.  Respiratory: Clear to auscultation bilaterally, no crackles or wheezing.  Cardiovascular: Regular rate and rhythm, normal S1 and S2, and no murmur noted.  Lower extremity pitting edema present.  GI: Soft, non-distended, non-tender, normal bowel sounds.  Skin: No rashes, no cyanosis, edema.  Musculoskeletal: No joint swelling, erythema or tenderness.  Neurologic: Cranial nerves 2-12 intact, normal strength and sensation.  Psychiatric: Alert, oriented to person, place and time, slightly restless.    Data     Recent Labs   Lab 07/31/20  2234   WBC 5.8   HGB 12.2*   MCV 91   *   *   POTASSIUM 4.0   CHLORIDE 93*   CO2 27   BUN 53*   CR 1.78*   ANIONGAP 8   DOMINGO 8.4*   *   ALBUMIN 3.2*   PROTTOTAL 6.9   BILITOTAL 0.8   ALKPHOS 84   ALT 33   AST 27   TROPI 0.117*       No results found for this or any previous visit (from the past 24 hour(s)).

## 2020-08-01 NOTE — ED PROVIDER NOTES
History     Chief Complaint:  Chest Pain       The history is provided by the patient and a relative.      Eh Callahan is a 80 year old male with a history of diabetes, hypertension and CKD who presents with a relative for evaluation of chest pain starting two week ago. The patient has had palpitations as well. He has since developed nausea and does not want to sit down. He presents with a relative concerned for this chest pain.     Allergies:  Amlodipine  Lisinopril  Metoprolol     Medications:    Norvasc  Dulcolax  Novolog pen  Insulin pen  Antivert  Oxycodone  Protonix  Miralax  Zocor  Trazodone  Dyazide    Past Medical History:    Glaucoma  A fib  Chronic infection  CKD  Diabetes  Hepatitis C  Hypertension  PTSD  Depression  Cognitive disorder  Anxiety  Hepatitis C  Hepatocellular carcinoma  DJD  Insomnia  Hypoglycemia     Past Surgical History:    Appendectomy  Eye surgery  Laparoscopic hepatectomy partial, left     Family History:    History reviewed. No pertinent family history.    Social History:  The patient presents to the ED with a relative.  Smoking Status: Former Smoker  Smokeless Tobacco: Never Used  Alcohol Use: No  Drug Use: No  PCP: Basilio Alfaro     Review of Systems   Constitutional: Negative for chills and fever.   Respiratory: Negative for cough and shortness of breath.    Cardiovascular: Positive for chest pain and palpitations.   Gastrointestinal: Positive for abdominal pain, nausea and vomiting.   Neurological: Negative for light-headedness and headaches.   All other systems reviewed and are negative.      Physical Exam     Patient Vitals for the past 24 hrs:   BP Temp Temp src Pulse Heart Rate Resp SpO2 Weight   08/01/20 0030 (!) 161/89 -- -- 101 -- -- -- --   08/01/20 0000 -- -- -- 107 106 15 100 % --   07/31/20 2330 (!) 159/86 -- -- 104 106 9 99 % --   07/31/20 2300 (!) 157/85 -- -- 92 95 17 100 % --   07/31/20 2202 (!) 160/83 98.4  F (36.9  C) Oral 94 94 20 100 % 63.5 kg (140 lb)    07/31/20 2200 (!) 160/83 -- -- 94 -- -- 100 % --       Physical Exam   General: Alert, Mild  discomfort, well kept  Eyes: PERRL, conjunctivae pink no scleral icterus or conjunctival injection  ENT:   Moist mucus membranes, posterior oropharynx clear without erythema or exudates, No lymphadenopathy, Normal voice  Resp:  Lungs clear to auscultation bilaterally, no crackles/rubs/wheezes. Good air movement  CV:  Normal rate and rhythm, no murmurs/rubs/gallops  GI:  Abdomen soft and non-distended.  Normoactive BS.  Mild epigastric tenderness. No guarding or rebound, No masses  Skin:  Warm, dry.  No rashes or petechiae. Old well healed surgical incision to right upper quadrant   Musculoskeletal: No peripheral edema or calf tenderness, Normal gross ROM   Neuro: Alert and oriented to person/place/time, normal sensation  Psychiatric: Normal affect, cooperative, good eye contact    Emergency Department Course     ECG:  Indication: Chest Pain  Time: 2154  Vent. Rate 92 bpm. NC interval 144. QRS duration 88. QT/QTc 362/447. P-R-T axis 71 62 87. Normal sinus rhythm. Nonspecific ST abnormality. Abnormal ECG. Read time: 2215 by Dr. Fu      Imaging:  Radiology findings were communicated with the patient, family and admitting MD who voiced understanding of the findings.    CT Abdomen/Pelvis with IV contrast:   1.  There are 2 indeterminate liver masses measuring up to 2.6 cm.  2.  Cholelithiasis.  3.  No bowel obstruction or inflammation.  As per radiology.    XR Chest 2 views:   Normal heart size and pulmonary vascularity. Lungs are clear. No significant bony abnormalities. Chest is otherwise negative. No acute findings.  As per radiology.    Laboratory:  Laboratory findings were communicated with the patient, family and admitting MD who voiced understanding of the findings.    CBC: WBC: 5.8, HGB: 12.2 (low), PLT: 147 (low)    CMP: Glucose 467 (high), Sodium 128 (low), Chloride 93 (low), Urea 53 (high), Creatinine 1.78 (high),  GFR 41 (low), Calcium 8.4 (low), Albumin 3.2 o/w WNL     2234 Troponin: 0.117 (high)      Asymptomatic COVID-19 Virus (Coronavirus) PCR: Pending    Interventions:  2235 GI Cocktail 30mL PO  2235 Zofran 4mg IV  0035 Aspirin 325mg PO  0035 Heparin 3,800 Units IV  0035 Heparin 750 Units/hr IV    Emergency Department Course:  Past medical records, nursing notes, and vitals reviewed.    2209 I performed an exam of the patient as documented above.     EKG obtained in the ED, see results above.     IV was inserted and blood was drawn for laboratory testing, results above.    2241 I rechecked the patient who is currently vomiting. I opted to do a CT abdomen at this time. Patient consents.     The patient was sent for an abdominal CT and chest x-ray while in the emergency department, results above.     2334 I rechecked the patient and discussed the results of his workup thus far. I recommended admission at this time and the patient and his family consent.     2351 I consulted with Dr. Baltazar, hospitalist, regarding the patient's history and presentation here in the emergency department who accepted the patient for admission. He requested a chest x-ray and that the patient receive aspirin.     Findings and plan explained to the patient and family  who consents to admission. Discussed the patient with Dr. Baltazar, who will admit the patient to an observation bed for further monitoring, evaluation, and treatment.    I personally reviewed the laboratory and imaging results with the patient and his family and answered all related questions prior to admission.     Impression & Plan     Covid-19  Eh Callahan was evaluated during a global COVID-19 pandemic, which necessitated consideration that the patient might be at risk for infection with the SARS-CoV-2 virus that causes COVID-19.   Applicable protocols for evaluation were followed during the patient's care. COVID-19 was considered as part of the patient's evaluation. The plan for  testing is: a test was obtained during this visit.    Medical Decision Making:  Eh Callahan is a 80 year old male presents with chest pain concerning for ACS with elevated trop, and no EKG changes consistent with STEMI, therefore likely representing NSTEMI. They have been given ASA, a heparin bolus and started on a heparin gtt.  There is no clinical, laboratory, or radiographic evidence of pulmonary embolism, AAA, aortic dissection, pneumonia, pleural effusion, pneumothorax, pericarditis, or myocarditis. The patient has a history of a partial left lobe hepatectomy due to a carcinoma and due to this pain and language barrier a CT was ordered of his abdomen which shows two indeterminate liver masses measuring up to 2.6cm. The patient and his family were informed of this. The patient agrees to be admitted and all questions were answered.        Diagnosis:    ICD-10-CM    1. NSTEMI (non-ST elevated myocardial infarction) (H)  I21.4 Lipid panel reflex to direct LDL     Lipid panel reflex to direct LDL     Hemoglobin A1c     Hemoglobin A1c     CANCELED: CBC with platelets     CANCELED: Lipid panel reflex to direct LDL     CANCELED: CBC with platelets     CANCELED: Hemoglobin A1c   2. Liver mass  R16.0    3. Hyperglycemia  R73.9    4. Hyponatremia  E87.1    5. Abdominal pain, epigastric  R10.13        Disposition:  Admitted to Dr. Baltazar.    Scribe Disclosure:  I, Bj Byrd, am serving as a scribe at 10:09 PM on 7/31/2020 to document services personally performed by Tom Whatley NP based on my observations and the provider's statements to me.      Tom Whatley, TANNER CNP  08/01/20 0058

## 2020-08-01 NOTE — ED TRIAGE NOTES
Pt arrives to the ED due to intermittent left sided chest pain that has been present for past 2 weeks. Pt states associated SOB and some nausea. Denies dizziness. Denies any cardiac h/o. Pt states episodes of chest pain will last 20-30 min. Pain worse with eating.

## 2020-08-01 NOTE — PROGRESS NOTES
X-cover    Called for blood sugar 6, rc'd 6 unit(s) 1 hour ago for same, will give another 6 units aspart and recheck iin 2 hours  Home levimir dosing coming up from pharmacy as well

## 2020-08-01 NOTE — PROGRESS NOTES
Provider paged: Patient due to come to [facility], blood sugar 491 and hasn't been treated. Should insulin coverage be addressed before transfer?

## 2020-08-01 NOTE — PROGRESS NOTES
Call placed to ED nurse asking to address elevated blood sugar.  Asking to please re-check blood glucose level and cover per orders initiated at 0015.  Patient blood glucose at 2234 was 467.

## 2020-08-01 NOTE — PROGRESS NOTES
Patient seen and examined, admitted this morning with suspected acute coronary syndrome/non-ST elevation MI, H&P reviewed, labs reviewed.  Discussed with patient at length the plan of care.  Agreed with the plan as outlined in H&P.  Patient is on heparin, echo is done, cardiology consult is pending for further recommendation.  We will continue to follow the patient.

## 2020-08-01 NOTE — PROGRESS NOTES
Patient alert and oriented, forgetful.  Speaking fluent English however prefers /or family present with provider interactions, Congol[place] speaking first language. Denies pain.  VSS.  Heparin infusing.  Blood sugars 460 and 126.  Levimir per orders, 1x dose novolog given per orders.     Plan:   Cardiology consult  Heparin infusing  Echo today  Tele: SR HR 94  Will continue to monitor.

## 2020-08-02 VITALS
SYSTOLIC BLOOD PRESSURE: 158 MMHG | TEMPERATURE: 97.4 F | DIASTOLIC BLOOD PRESSURE: 88 MMHG | RESPIRATION RATE: 16 BRPM | HEART RATE: 91 BPM | OXYGEN SATURATION: 99 % | HEIGHT: 67 IN | WEIGHT: 142.8 LBS | BODY MASS INDEX: 22.41 KG/M2

## 2020-08-02 LAB
ANION GAP SERPL CALCULATED.3IONS-SCNC: 5 MMOL/L (ref 3–14)
BASOPHILS # BLD AUTO: 0 10E9/L (ref 0–0.2)
BASOPHILS NFR BLD AUTO: 0.4 %
BUN SERPL-MCNC: 49 MG/DL (ref 7–30)
CALCIUM SERPL-MCNC: 8.2 MG/DL (ref 8.5–10.1)
CHLORIDE SERPL-SCNC: 100 MMOL/L (ref 94–109)
CO2 SERPL-SCNC: 29 MMOL/L (ref 20–32)
CREAT SERPL-MCNC: 1.85 MG/DL (ref 0.66–1.25)
DIFFERENTIAL METHOD BLD: ABNORMAL
EOSINOPHIL # BLD AUTO: 0.1 10E9/L (ref 0–0.7)
EOSINOPHIL NFR BLD AUTO: 1.7 %
ERYTHROCYTE [DISTWIDTH] IN BLOOD BY AUTOMATED COUNT: 12.5 % (ref 10–15)
GFR SERPL CREATININE-BSD FRML MDRD: 34 ML/MIN/{1.73_M2}
GLUCOSE BLDC GLUCOMTR-MCNC: 189 MG/DL (ref 70–99)
GLUCOSE BLDC GLUCOMTR-MCNC: 284 MG/DL (ref 70–99)
GLUCOSE BLDC GLUCOMTR-MCNC: 61 MG/DL (ref 70–99)
GLUCOSE SERPL-MCNC: 164 MG/DL (ref 70–99)
HCT VFR BLD AUTO: 37 % (ref 40–53)
HGB BLD-MCNC: 12.2 G/DL (ref 13.3–17.7)
IMM GRANULOCYTES # BLD: 0 10E9/L (ref 0–0.4)
IMM GRANULOCYTES NFR BLD: 0.6 %
LMWH PPP CHRO-ACNC: 0.76 IU/ML
LMWH PPP CHRO-ACNC: 0.86 IU/ML
LYMPHOCYTES # BLD AUTO: 1.3 10E9/L (ref 0.8–5.3)
LYMPHOCYTES NFR BLD AUTO: 23.9 %
MCH RBC QN AUTO: 30.4 PG (ref 26.5–33)
MCHC RBC AUTO-ENTMCNC: 33 G/DL (ref 31.5–36.5)
MCV RBC AUTO: 92 FL (ref 78–100)
MONOCYTES # BLD AUTO: 0.6 10E9/L (ref 0–1.3)
MONOCYTES NFR BLD AUTO: 10.4 %
NEUTROPHILS # BLD AUTO: 3.4 10E9/L (ref 1.6–8.3)
NEUTROPHILS NFR BLD AUTO: 63 %
NRBC # BLD AUTO: 0 10*3/UL
NRBC BLD AUTO-RTO: 0 /100
PLATELET # BLD AUTO: 159 10E9/L (ref 150–450)
POTASSIUM SERPL-SCNC: 3.5 MMOL/L (ref 3.4–5.3)
RBC # BLD AUTO: 4.01 10E12/L (ref 4.4–5.9)
SODIUM SERPL-SCNC: 134 MMOL/L (ref 133–144)
TROPONIN I SERPL-MCNC: 0.64 UG/L (ref 0–0.04)
WBC # BLD AUTO: 5.3 10E9/L (ref 4–11)

## 2020-08-02 PROCEDURE — 25000132 ZZH RX MED GY IP 250 OP 250 PS 637: Mod: GY | Performed by: INTERNAL MEDICINE

## 2020-08-02 PROCEDURE — 36415 COLL VENOUS BLD VENIPUNCTURE: CPT | Performed by: INTERNAL MEDICINE

## 2020-08-02 PROCEDURE — 85025 COMPLETE CBC W/AUTO DIFF WBC: CPT | Performed by: INTERNAL MEDICINE

## 2020-08-02 PROCEDURE — 99239 HOSP IP/OBS DSCHRG MGMT >30: CPT | Performed by: INTERNAL MEDICINE

## 2020-08-02 PROCEDURE — 84484 ASSAY OF TROPONIN QUANT: CPT | Performed by: INTERNAL MEDICINE

## 2020-08-02 PROCEDURE — 80048 BASIC METABOLIC PNL TOTAL CA: CPT | Performed by: INTERNAL MEDICINE

## 2020-08-02 PROCEDURE — 85520 HEPARIN ASSAY: CPT | Performed by: INTERNAL MEDICINE

## 2020-08-02 PROCEDURE — 99231 SBSQ HOSP IP/OBS SF/LOW 25: CPT | Performed by: INTERNAL MEDICINE

## 2020-08-02 PROCEDURE — 00000146 ZZHCL STATISTIC GLUCOSE BY METER IP

## 2020-08-02 PROCEDURE — 25000128 H RX IP 250 OP 636: Performed by: NURSE PRACTITIONER

## 2020-08-02 RX ORDER — HYDRALAZINE HYDROCHLORIDE 25 MG/1
25 TABLET, FILM COATED ORAL 3 TIMES DAILY
Qty: 90 TABLET | Refills: 1 | Status: ON HOLD | OUTPATIENT
Start: 2020-08-02 | End: 2021-08-05

## 2020-08-02 RX ORDER — ISOSORBIDE DINITRATE 20 MG/1
20 TABLET ORAL 3 TIMES DAILY
Qty: 90 TABLET | Refills: 1 | Status: ON HOLD | OUTPATIENT
Start: 2020-08-02 | End: 2021-08-05

## 2020-08-02 RX ORDER — METOPROLOL TARTRATE 25 MG/1
12.5 TABLET, FILM COATED ORAL 2 TIMES DAILY
Qty: 30 TABLET | Refills: 1 | Status: ON HOLD | OUTPATIENT
Start: 2020-08-02 | End: 2022-01-01

## 2020-08-02 RX ORDER — ATORVASTATIN CALCIUM 40 MG/1
40 TABLET, FILM COATED ORAL EVERY EVENING
Qty: 30 TABLET | Refills: 1 | Status: SHIPPED | OUTPATIENT
Start: 2020-08-02

## 2020-08-02 RX ORDER — HEPARIN SODIUM 10000 [USP'U]/100ML
500 INJECTION, SOLUTION INTRAVENOUS CONTINUOUS
Status: DISCONTINUED | OUTPATIENT
Start: 2020-08-02 | End: 2020-08-02

## 2020-08-02 RX ORDER — HEPARIN SODIUM 10000 [USP'U]/100ML
500 INJECTION, SOLUTION INTRAVENOUS CONTINUOUS
Status: DISCONTINUED | OUTPATIENT
Start: 2020-08-02 | End: 2020-08-02 | Stop reason: HOSPADM

## 2020-08-02 RX ADMIN — HYDRALAZINE HYDROCHLORIDE 25 MG: 25 TABLET, FILM COATED ORAL at 08:53

## 2020-08-02 RX ADMIN — ISOSORBIDE DINITRATE 20 MG: 20 TABLET ORAL at 08:53

## 2020-08-02 RX ADMIN — HEPARIN SODIUM 750 UNITS/HR: 10000 INJECTION, SOLUTION INTRAVENOUS at 06:49

## 2020-08-02 RX ADMIN — Medication 12.5 MG: at 08:52

## 2020-08-02 RX ADMIN — ASPIRIN 81 MG: 81 TABLET, COATED ORAL at 11:47

## 2020-08-02 RX ADMIN — ACETAMINOPHEN 650 MG: 325 TABLET, FILM COATED ORAL at 04:16

## 2020-08-02 ASSESSMENT — ACTIVITIES OF DAILY LIVING (ADL)
ADLS_ACUITY_SCORE: 25

## 2020-08-02 ASSESSMENT — MIFFLIN-ST. JEOR: SCORE: 1316.37

## 2020-08-02 NOTE — PROGRESS NOTES
Patient alert and oriented confused to situation at times, forgetful.  Impulsive at times not utilizing call light setting off bed alarms.  Speaking fluent English however prefers /or family present with provider interactions, [place] speaking first language. PRN tylenol for headache with relief.  VSS.  Heparin infusing.  Blood sugars 284.   Patient found to be positive for covid-19 immediately placed on precautions/isolation.  Resulted at approximately 2230 on 8/01.  Patient notified of positive result.  Patient observed to have an infrequent dry cough.  Will continue to monitor.     Plan:   Cardiology following  Heparin infusing  Tele: SR   Will continue to monitor.

## 2020-08-02 NOTE — PLAN OF CARE
Seen by cardiology and then primary MD. Discharging to home with daughter. COVID special precautions maintained and included in discharge education with daughter. Reviewed new meds with her as well. Glucose check with breakfast tray arrival was 61 and MD notified. Insulins held/ Recheck after breakfast 189.

## 2020-08-02 NOTE — PROVIDER NOTIFICATION
08/01/20 2322   Significant Event   Significant Event Critical result/value  ((+) COVID)   Micro lab called to inform of above result. Dr. Wilson on unit at this time & notified of results.  Trish Madison, BSN, RN  Medical/Telemetry - 3

## 2020-08-02 NOTE — PROGRESS NOTES
Transition Communication Hand-off for Care Transitions to Next Level of Care Provider    Name: Eh Callahan  : 1940  MRN #: [id number]  Primary Care Provider: Basilio Alfaro     Primary Clinic: [facility] [address]  [facility] [address]     Reason for Hospitalization:  Abdominal pain, epigastric [R10.13]  Hyponatremia [E87.1]  Hyperglycemia [R73.9]  Liver mass [R16.0]  NSTEMI (non-ST elevated myocardial infarction) (H) [I21.4]  Admit Date/Time: 2020  9:57 PM  Discharge Date: 2020  Payor Source: Payor: MEDICARE / Plan: MEDICARE / Product Type: Medicare /     Readmission Assessment Measure (AMANDA) Risk Score/category: Elevated         Reason for Communication Hand-off Referral: Other NSTEMI, COVID (+)    Discharge Plan: Home     Concern for non-adherence with plan of care: Yes.     Follow-up specialty is recommended: Yes. Follow up with Cardiology as recommended.       Follow-up plan:  No future appointments.    Any outstanding tests or procedures:  No. Recommend repeat BMP & CBC in 1 week.       Referrals     Future Labs/Procedures    Discharge Order: F/U with Cardiac  ADRIANA     Comments:    Telephone          Key Recommendations:  CTS identifies patient as high risk due to elevated AMANDA score. Currently admitted for NSTEMI, this is his 3rd admission in 6 months. Per chart review, pt resides at home with family. Baseline mobility is stand by assist. He remains a stand by assist.      His PMH that can effect his AMANDA score includes narrow angle glaucoma, Afib, CKD, chronic infection, DM, Hepatitis C, HTN & PTSD. His PTA medications that can effect his AMANDA score includes Novolog, Trazadone & Tresiba.    Recommendations at discharge: Blood glucose checks 4 times/day, blood pressure & pulse daily. Quarantine per CDC and [place] Department of Health guidelines.      Jinny Pierson RN, BSN, CPHN, CM     AVS/Discharge Summary is the source of truth; this is a helpful guide for improved communication  of patient story

## 2020-08-02 NOTE — PROGRESS NOTES
Inpatient Cardiology Consultation Progress Note:    Eh Callahan MRN#: [id number]   YOB: 1940 Age: 80 year old     Date of Admission: 7/31/2020  Consult indication: elevated troponin         Assessment and Plan:     # Chest pain, etiology unclear but likely related to COVID-19 infection. Atypical for angina. Troponin mildly elevated, peak 1.5. On initial consultation discussed options of further eval/management including conservative management with close follow up or coronary angiography. Due to risk of renal damage with his baseline CKD, he wanted to discuss this with his family.     COVID-19 came back positive overnight, and this seems to be the more likely cause of his chest discomfort and cough. Discussed options with him again today, though he seemed concerned about the COVID-19 and was easily distracted, he voiced understanding of needing to come back/seek medical care if he has any concerning chest pain/discomfort or any other concerning symptoms. Discussed this with his daughter, Rosalina, as well as his son, Jagdish.     Discussed rationale for holding off on coronary angiography at this time, due to the risk of renal damage, and higher suspicion that the chest discomfort is related to the COVID-19 infection, and reassuring TTE findings, as well as down-trending troponin. If further testing were to be warranted, I would recommend coronary angiography rather than stress testing, since the results of stress testing in general are less accurate which could result in a delay in his care, as well as increasing exposure of his COVID-19.    Discussed the risks/benefits of this approach, that while we suspect his chest pain is due to COVID-19, we cannot exclude myocardial ischemia/infarction, however it seems like the risks of coronary angiography outweigh the benefits, though ultimately it is his and their decision.     His daughter would like to bring her father (Pt) home today, and is agreeable  to have him follow up in clinic for reassessment. Son is also in agreement, plans to discuss with his other siblings.     Advised them both that the Pt should seek medical care/come back if he has any more chest pain or other concerns, and they voiced understanding.     # Elevated troponin, peak 1.5, likely Type 2 MI due to COVID-19 infection  #  Paroxysmal atrial fibrillation, CHADS 2 VASc 4, not on anticoagulation (previously was on rivaroxaban, unclear when this was discontinued).  #  Poorly controlled diabetes, hemoglobin A1c on admission 10.3%.   #  Hypertension.   #  Hyperlipidemia.   #  Chronic kidney disease.   #  Hepatocellular carcinoma (2 liver masses noted on CT abdomen and pelvis 07/31/2020). Recommend follow up with PCP.      - continue current regimen of hydralazine and isordil (started during hospitalization), aspirin, atorvastatin, metoprolol  - will need to readdress long-term anticoagulation at follow up, Pt would like to hold off on this decision at this time  - cardiology follow up ordered  - recommendations to seek medical care for chest pain/discomfort, worsening dyspnea, or any other concerning symptoms discussed  - cardiology will sign off, but please page our team if we can be of any further assistance.     Thank you for allowing our team to participate in the care of Eh Callahan.  Please do not hesitate to page me with any questions or concerns.     Asim Landis MD, [facility]  Cardiology  Pager:  [phone]  Text Page   August 2, 2020         Interval Events:     - COVID-19 positive, has a dry cough  - easily distracted, but conversant and alert  - discussed clinical course with Pt, daughter, and son  - minimal chest pain overnight         Past Medical History:   I have reviewed this patient's past medical history  Past Medical History:   Diagnosis Date     Anatomical narrow angle glaucoma      Atrial fibrillation (H)      Chronic infection     history of hepatitis C     CKD (chronic kidney  disease) stage 2, GFR 60-89 ml/min      Diabetes mellitus (H)      Hepatitis C without mention of hepatic coma      Hypertension      PTSD (post-traumatic stress disorder)              Medications reviewed:   Prior to admission medications:  Prior to Admission medications    Medication Sig Start Date End Date Taking? Authorizing Provider   acetaminophen (TYLENOL) 500 MG tablet Take 1,000 mg by mouth every 8 hours as needed for mild pain   Yes Unknown, Entered By History   alfuzosin ER (UROXATRAL) 10 MG 24 hr tablet Take 1 tablet (10 mg) by mouth daily With meal 5/28/20  Yes Garett Daniels MD   amLODIPine (NORVASC) 5 MG tablet Take 5 mg by mouth every evening   Yes Unknown, Entered By History   bisacodyl (DULCOLAX) 10 MG suppository Place 1 suppository (10 mg) rectally daily as needed for constipation 1/19/20  Yes Carina Bruce PA-C   Carboxymeth-Glycerin-Polysorb (REFRESH OPTIVE ADVANCED) 0.5-1-0.5 % SOLN Place 1 drop into both eyes 2 times daily as needed  4/6/17  Yes Reported, Patient   cholecalciferol (D 5000) 5000 UNITS CAPS Take 5,000 Units by mouth daily  3/2/16  Yes Reported, Patient   Cyanocobalamin (B-12 PO) Take 1 capsule by mouth daily   Yes Unknown, Entered By History   insulin aspart (NOVOLOG PEN) 100 UNIT/ML injection Inject 3 Units Subcutaneous 3 times daily (with meals)  7/12/17  Yes Reported, Patient   Multiple Vitamins-Minerals (MULTIVITAMIN ADULT) TABS Take 1 tablet by mouth daily 4/3/20  Yes Unknown, Entered By History   pantoprazole (PROTONIX) 40 MG enteric coated tablet Take 40 mg by mouth daily as needed  1/11/16  Yes Reported, Patient   polyethylene glycol (MIRALAX/GLYCOLAX) packet Take 17 g by mouth 2 times daily as needed  1/19/20  Yes Carina Bruce PA-C   sennosides (SENOKOT) 8.6 MG tablet Take 8.6 mg by mouth daily as needed   Yes Unknown, Entered By History   simvastatin (ZOCOR) 20 MG tablet Take 1 tablet (20 mg) by mouth daily 4/16/14  Yes Cindy Maier MD    traZODone (DESYREL) 150 MG tablet Take 75 mg by mouth At Bedtime    Yes Unknown, Entered By History   triamterene-HCTZ (DYAZIDE) 37.5-25 MG capsule Take 1 capsule by mouth every morning   Yes Unknown, Entered By History   blood glucose monitoring (GREGORIO CONTOUR) test strip 4 times daily  3/9/17   Reported, Patient   blood glucose monitoring (NO BRAND SPECIFIED) meter device kit Use to test blood sugar 4 times daily or as directed. 1/19/20   Carina Bruce PA-C   Blood Glucose Monitoring Suppl (FIFTY50 GLUCOSE METER 2.0) W/DEVICE KIT as needed for blood glucose monitoring 7/10/17   Reported, Patient   Blood Pressure Monitoring (RA BLOOD PRESSURE CUFF MONITOR) MISC Take blood pressure once daily 2/20/17   Reported, Patient   insulin degludec (TRESIBA FLEXTOUCH) 100 UNIT/ML pen Inject 22 Units Subcutaneous At Bedtime    Unknown, Entered By History   meclizine (ANTIVERT) 25 MG tablet Take 1 tablet (25 mg) by mouth every 6 hours as needed for dizziness 2/5/20   Jazmyn Patel PA        Current medications:  Current Facility-Administered Medications Ordered in Epic   Medication Dose Route Frequency Last Rate Last Dose     acetaminophen (TYLENOL) Suppository 650 mg  650 mg Rectal Q4H PRN         acetaminophen (TYLENOL) tablet 650 mg  650 mg Oral Q4H PRN   650 mg at 08/02/20 0416     alum & mag hydroxide-simethicone (MAALOX  ES) suspension 30 mL  30 mL Oral Q4H PRN         aspirin (ASA) chewable tablet 324 mg  324 mg Oral Once         aspirin EC tablet 81 mg  81 mg Oral Daily         atorvastatin (LIPITOR) tablet 40 mg  40 mg Oral QPM   40 mg at 08/01/20 1951     bisacodyl (DULCOLAX) EC tablet 5 mg  5 mg Oral Daily PRN         Continuing statin from home medication list OR statin order already placed during this visit   Does not apply DOES NOT GO TO MAR         glucose gel 15-30 g  15-30 g Oral Q15 Min PRN        Or     dextrose 50 % injection 25-50 mL  25-50 mL Intravenous Q15 Min PRN        Or     glucagon  injection 1 mg  1 mg Subcutaneous Q15 Min PRN         heparin 25,000 units in 0.45% NaCl 250 mL ANTICOAGULANT  infusion  500 Units/hr Intravenous Continuous         HOLD: nitroGLYcerin IF   Does not apply HOLD         hydrALAZINE (APRESOLINE) tablet 25 mg  25 mg Oral TID   25 mg at 08/02/20 0853     insulin aspart (NovoLOG) injection (RAPID ACTING)  1-7 Units Subcutaneous TID AC   3 Units at 08/01/20 1525     insulin aspart (NovoLOG) injection (RAPID ACTING)  1-5 Units Subcutaneous At Bedtime         insulin aspart (NovoLOG) injection (RAPID ACTING)  6 Units Subcutaneous TID w/meals   6 Units at 08/01/20 1746     insulin detemir (LEVEMIR PEN) injection 20 Units  20 Units Subcutaneous At Bedtime         isosorbide dinitrate (ISORDIL) tablet 20 mg  20 mg Oral TID   20 mg at 08/02/20 0853     lidocaine (LMX4) cream   Topical Q1H PRN         lidocaine 1 % 0.1-1 mL  0.1-1 mL Other Q1H PRN         metoprolol tartrate (LOPRESSOR) half-tab 12.5 mg  12.5 mg Oral BID   12.5 mg at 08/02/20 0852     naloxone (NARCAN) injection 0.1-0.4 mg  0.1-0.4 mg Intravenous Q2 Min PRN         ondansetron (ZOFRAN) injection 4 mg  4 mg Intravenous Q6H PRN   4 mg at 08/01/20 1742     ondansetron (ZOFRAN) tablet 4 mg  4 mg Oral Q6H PRN         Patient is already receiving anticoagulation with heparin, enoxaparin (LOVENOX), warfarin (COUMADIN)  or other anticoagulant medication   Does not apply Continuous PRN         polyethylene glycol (MIRALAX) Packet 17 g  17 g Oral BID PRN   17 g at 08/01/20 1757     Reason ACE/ARB/ARNI order not selected   Other DOES NOT GO TO MAR         sennosides (SENOKOT) tablet 1-2 tablet  1-2 tablet Oral BID PRN         sodium chloride (PF) 0.9% PF flush 3 mL  3 mL Intracatheter q1 min prn         sodium chloride (PF) 0.9% PF flush 3 mL  3 mL Intracatheter Q8H         No current Epic-ordered outpatient medications on file.             Physical Exam:   Vital signs were reviewed:  Temperatures:  Current - Temp: 97.4  F  (36.3  C); Max - Temp  Av.1  F (36.2  C)  Min: 96  F (35.6  C)  Max: 98.5  F (36.9  C)  Respiration range: Resp  Av.7  Min: 16  Max: 18  Pulse range: Pulse  Av  Min: 91  Max: 91  Blood pressure range: Systolic (24hrs), Av , Min:119 , Max:158   ; Diastolic (24hrs), Av, Min:68, Max:88    Pulse oximetry range: SpO2  Av.8 %  Min: 99 %  Max: 100 %    Intake/Output Summary (Last 24 hours) at 2020 0957  Last data filed at 2020 1323  Gross per 24 hour   Intake 451.4 ml   Output --   Net 451.4 ml     142 lbs 12.8 oz  Body mass index is 22.37 kg/m .   Body surface area is 1.75 meters squared.    Constitutional: appears stated age, in no apparent distress  Eyes: sclera anicteric  Pulmonary: mild wheezing bilaterally   Cardiovascular: 1/6 BLANE at the RUSB, RRR, no lower extremity edema  Gastrointestinal: abdominal exam benign, non-tender, no rigidity, no guarding         Selected laboratory tests:   Laboratory test results personally reviewed:   CMP  Recent Labs   Lab 20  0620  0620  2234    134 128*   POTASSIUM 3.5 3.6 4.0   CHLORIDE 100 99 93*   CO2 29 27 27   ANIONGAP 5 8 8   * 128* 467*   BUN 49* 48* 53*   CR 1.85* 1.74* 1.78*   GFRESTIMATED 34* 36* 35*   GFRESTBLACK 39* 42* 41*   DOMINGO 8.2* 9.3 8.4*   PROTTOTAL  --   --  6.9   ALBUMIN  --   --  3.2*   BILITOTAL  --   --  0.8   ALKPHOS  --   --  84   AST  --   --  27   ALT  --   --  33     CBC  Recent Labs   Lab 20  0620  0611 20  2234   WBC 5.3 6.6 5.8   RBC 4.01* 4.57 4.01*   HGB 12.2* 13.8 12.2*   HCT 37.0* 42.1 36.6*   MCV 92 92 91   MCH 30.4 30.2 30.4   MCHC 33.0 32.8 33.3   RDW 12.5 12.1 12.2    180 147*     INRNo lab results found in last 7 days.  Lab Results   Component Value Date    TROPI 0.639 () 2020    TROPI 1.545 () 2020    TROPI 0.873 () 2020    TROPONIN 0.00 10/20/2016     Recent Labs   Lab Test 20  2234   CHOL 163   HDL 58   LDL 90    TRIG 74     Lab Results   Component Value Date    A1C 10.3 2020    A1C 8.0 2020    A1C 8.4 11/15/2016     TSH   Date Value Ref Range Status   2020 1.66 0.40 - 4.00 mU/L Final            Selected Imaging and Additional Data:   Additional data personally reviewed:  Recent Results (from the past 4320 hour(s))   Echocardiogram Complete    Narrative    [id number]  [id number]  [id number]  [id number]^MARILYN^CAM           [facility]  Echocardiography Laboratory  [address] [place] [address]        Name: CHIKIS NEWBERRY  MRN: [id number]  : 1940  Study Date: 2020 07:52 AM  Age: 80 yrs  Gender: Male  Patient Location: [facility]  Reason For Study: Chest Pain, Chest Tightness, Chest Pressure  Ordering Physician: CAM SANDERS  Performed By: Dayna Suarez     BSA: 1.7 m2  Height: 67 in  Weight: 140 lb  HR: 77  BP: 160/83 mmHg  _____________________________________________________________________________  __        Procedure  Complete Portable Echo Adult.  _____________________________________________________________________________  __        Interpretation Summary     There is mild concentric left ventricular hypertrophy.  The visual ejection fraction is estimated at 55-60%.  The right ventricle is normal in structure, function and size.  There is mild (1+) mitral regurgitation.  There is mild (1+) tricuspid regurgitation.  There is mild (1+) aortic regurgitation.  _____________________________________________________________________________  __        Left Ventricle  The left ventricle is normal in size. There is mild concentric left  ventricular hypertrophy. Left ventricular systolic function is normal. The  visual ejection fraction is estimated at 55-60%. Grade I or early diastolic  dysfunction. No regional wall motion abnormalities noted.     Right Ventricle  The right ventricle is normal in structure, function and size.     Atria  Normal left atrial size. Right atrial size is  normal. There is no color  Doppler evidence of an atrial shunt.     Mitral Valve  The mitral valve is normal in structure and function. There is mild (1+)  mitral regurgitation.        Tricuspid Valve  The tricuspid valve is normal in structure and function. There is mild (1+)  tricuspid regurgitation. The right ventricular systolic pressure is  approximated at 29.4 mmHg plus the right atrial pressure.     Aortic Valve  There is mild trileaflet aortic sclerosis. There is mild (1+) aortic  regurgitation.     Pulmonic Valve  The pulmonic valve is not well seen, but is grossly normal. There is no  pulmonic valvular regurgitation.     Vessels  Normal size aorta. Normal size ascending aorta.     Pericardium  There is no pericardial effusion.        Rhythm  Sinus rhythm was noted.  _____________________________________________________________________________  __  MMode/2D Measurements & Calculations     IVSd: 1.2 cm  LVIDd: 3.3 cm  LVIDs: 2.5 cm  LVPWd: 1.2 cm  FS: 24.3 %  LV mass(C)d: 125.9 grams  LV mass(C)dI: 72.5 grams/m2  Ao root diam: 2.6 cm  LA dimension: 2.9 cm  asc Aorta Diam: 2.8 cm  LA/Ao: 1.1  LVOT diam: 1.7 cm  LVOT area: 2.3 cm2  LA Volume (BP): 33.9 ml  LA Volume Index (BP): 19.5 ml/m2  RWT: 0.76           Doppler Measurements & Calculations  MV E max dagoberto: 79.0 cm/sec  MV A max dagoberto: 114.5 cm/sec  MV E/A: 0.69  MV max P.3 mmHg  MV mean P.5 mmHg  MV V2 VTI: 20.5 cm  MVA(VTI): 2.9 cm2  MV dec time: 0.23 sec  LV V1 max P.5 mmHg  LV V1 max: 136.9 cm/sec  LV V1 VTI: 25.9 cm  SV(LVOT): 59.7 ml  SI(LVOT): 34.3 ml/m2  PA acc time: 0.11 sec  TR max dagoberto: 271.2 cm/sec  TR max P.4 mmHg  E/E' av.2  Lateral E/e': 10.2  Medial E/e': 14.2              _____________________________________________________________________________  __        Report approved by: Iain Salamanca 2020 12:13 PM

## 2020-08-02 NOTE — DISCHARGE SUMMARY
[facility]  Discharge Summary  Name: Eh Callahan    MRN: [id number]  YOB: 1940    Age: 80 year old  Date of Discharge:  8/2/2020 11:52 AM  Date of Admission: 7/31/2020  Primary Care Provider: Basilio Alfaro  Discharge Physician:  Norman Leahy M.D  Discharging Service:  Hospitalist      Discharge Diagnosis:  COVID-19 infection.  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 controlled, insulin requiring  Hypertension  Hyperlipidemia  Chronic kidney disease IV  Hepatocellular carcinoma s/p surgery     Other Diagnosis:  none     Discharge Disposition:  Discharged to home     Allergies:  Allergies   Allergen Reactions     Amlodipine Swelling     Edema at 10 mg , fine at 5 mg     Lisinopril Cough     Metoprolol      Other reaction(s): Bradycardia  Metoprolol at 50mg bid -> HR 45 -50, unclear if sx (has fatigue)  May tolerate lower doses if needed        Discharge Medications:   Discharge Medication List as of 8/2/2020 11:30 AM      START taking these medications    Details   aspirin (ASA) 81 MG EC tablet Take 1 tablet (81 mg) by mouth daily, Disp-100 tablet,R-0, E-Prescribe      atorvastatin (LIPITOR) 40 MG tablet Take 1 tablet (40 mg) by mouth every evening, Disp-30 tablet,R-1, E-Prescribe      hydrALAZINE (APRESOLINE) 25 MG tablet Take 1 tablet (25 mg) by mouth 3 times daily, Disp-90 tablet,R-1, E-Prescribe      isosorbide dinitrate (ISORDIL) 20 MG tablet Take 1 tablet (20 mg) by mouth 3 times daily, Disp-90 tablet,R-1, E-Prescribe      metoprolol tartrate (LOPRESSOR) 25 MG tablet Take 0.5 tablets (12.5 mg) by mouth 2 times daily, Disp-30 tablet,R-1, E-Prescribe         CONTINUE these medications which have NOT CHANGED    Details   acetaminophen (TYLENOL) 500 MG tablet Take 1,000 mg by mouth every 8 hours as needed for mild pain, Historical      alfuzosin ER (UROXATRAL) 10 MG 24 hr tablet Take 1 tablet (10 mg) by mouth daily With meal, Disp-30 tablet,R-11, E-Prescribe      bisacodyl  (DULCOLAX) 10 MG suppository Place 1 suppository (10 mg) rectally daily as needed for constipation, Historical      blood glucose monitoring (GREGORIO CONTOUR) test strip 4 times daily , Historical      blood glucose monitoring (NO BRAND SPECIFIED) meter device kit Use to test blood sugar 4 times daily or as directed.Disp-1 kit, [id number]      Blood Glucose Monitoring Suppl (FIFTY50 GLUCOSE METER 2.0) W/DEVICE KIT as needed for blood glucose monitoring, Historical      Blood Pressure Monitoring ( BLOOD PRESSURE CUFF MONITOR) MISC Take blood pressure once daily, Historical      Carboxymeth-Glycerin-Polysorb (REFRESH OPTIVE ADVANCED) 0.5-1-0.5 % SOLN Place 1 drop into both eyes 2 times daily as needed , Historical      cholecalciferol (D 5000) 5000 UNITS CAPS Take 5,000 Units by mouth daily , Historical      Cyanocobalamin (B-12 PO) Take 1 capsule by mouth daily, Historical      insulin aspart (NOVOLOG PEN) 100 UNIT/ML injection Inject 3 Units Subcutaneous 3 times daily (with meals) , Historical      insulin degludec (TRESIBA FLEXTOUCH) 100 UNIT/ML pen Inject 22 Units Subcutaneous At Bedtime, Historical      meclizine (ANTIVERT) 25 MG tablet Take 1 tablet (25 mg) by mouth every 6 hours as needed for dizziness, Disp-20 tablet, R-0, E-Prescribe      Multiple Vitamins-Minerals (MULTIVITAMIN ADULT) TABS Take 1 tablet by mouth daily, Historical      pantoprazole (PROTONIX) 40 MG enteric coated tablet Take 40 mg by mouth daily as needed , Historical      polyethylene glycol (MIRALAX/GLYCOLAX) packet Take 17 g by mouth 2 times daily as needed , Disp-30 packet, R-1, Historical      sennosides (SENOKOT) 8.6 MG tablet Take 8.6 mg by mouth daily as needed, Historical      simvastatin (ZOCOR) 20 MG tablet Take 1 tablet (20 mg) by mouth daily, No Print Out      traZODone (DESYREL) 150 MG tablet Take 75 mg by mouth At Bedtime , Historical         STOP taking these medications       amLODIPine (NORVASC) 5 MG tablet Comments:  "  Reason for Stopping:         triamterene-HCTZ (DYAZIDE) 37.5-25 MG capsule Comments:   Reason for Stopping:                Condition on Discharge:  Discharge condition: Stable   Discharge vitals: Blood pressure (!) 158/88, pulse 91, temperature 97.4  F (36.3  C), temperature source Oral, resp. rate 16, height 1.702 m (5' 7\"), weight 64.8 kg (142 lb 12.8 oz), SpO2 99 %.   Code status on discharge: Full Code     History of Illness:  See detailed admission note for full details.    Significant Physical Exam Findings:  Constitutional: Awake, alert, cooperative, restless due to palpitations.  Eyes: Conjunctiva and pupils examined and normal.  HEENT: Moist mucous membranes, normal dentition.  Respiratory: Clear to auscultation bilaterally, no crackles or wheezing.  Cardiovascular: Regular rate and rhythm, normal S1 and S2, and no murmur noted.  Lower extremity pitting edema present.  GI: Soft, non-distended, non-tender, normal bowel sounds.  Skin: No rashes, no cyanosis, edema.  Musculoskeletal: No joint swelling, erythema or tenderness.  Neurologic: Cranial nerves 2-12 intact, normal strength and sensation.  Psychiatric: Alert, oriented to person, place and time, slightly restless.       Procedures other than Imaging:  None     Imaging:  Results for orders placed or performed during the hospital encounter of 07/31/20   CT Abdomen Pelvis w/o Contrast    Narrative    EXAM: CT ABDOMEN PELVIS W/O CONTRAST  LOCATION: [facility]  DATE/TIME: 8/1/2020 12:11 AM    INDICATION: Abd pain, acute, generalized  COMPARISON: None.  TECHNIQUE: CT scan of the abdomen and pelvis was performed without IV contrast. Multiplanar reformats were obtained. Dose reduction techniques were used.  CONTRAST: None.    FINDINGS:   LOWER CHEST: Normal.    HEPATOBILIARY: Postoperative changes along left lobe of liver. There are 2 masses in the liver measuring up to 2.6 cm. Tiny stones in the gallbladder.    PANCREAS: Normal.    SPLEEN: " Calcification along the periphery of the spleen. The spleen is normal in size.    ADRENAL GLANDS: Normal.    KIDNEYS/BLADDER: Cortical scarring in the right kidney. No hydronephrosis in either side.    BOWEL: There is no bowel obstruction or inflammation.    LYMPH NODES: Normal.    VASCULATURE: Atherosclerotic calcification of the aorta and its branches. No aneurysm.    PELVIC ORGANS: Prostate gland is enlarged.    MUSCULOSKELETAL: Degenerative disease in the spine.      Impression    IMPRESSION:   1.  There are 2 indeterminate liver masses measuring up to 2.6 cm.  2.  Cholelithiasis.  3.  No bowel obstruction or inflammation.     Chest XR,  PA & LAT    Narrative    EXAM: XR CHEST 2 VW  LOCATION: [facility]  DATE/TIME: 2020 12:17 AM    INDICATION: Chest pain.  COMPARISON: None.      Impression    IMPRESSION: Normal heart size and pulmonary vascularity. Lungs are clear. No significant bony abnormalities. Chest is otherwise negative. No acute findings.   Echocardiogram Complete    Narrative    [id number]  [id number]  [id number]  [id number]^MARILYN^CAM           [facility]  Echocardiography Laboratory  [address] [place] [address]        Name: CHIKIS NEWBERRY  MRN: [id number]  : 1940  Study Date: 2020 07:52 AM  Age: 80 yrs  Gender: Male  Patient Location: [facility]  Reason For Study: Chest Pain, Chest Tightness, Chest Pressure  Ordering Physician: CAM SANDERS  Performed By: Dayna Suarez     BSA: 1.7 m2  Height: 67 in  Weight: 140 lb  HR: 77  BP: 160/83 mmHg  _____________________________________________________________________________  __        Procedure  Complete Portable Echo Adult.  _____________________________________________________________________________  __        Interpretation Summary     There is mild concentric left ventricular hypertrophy.  The visual ejection fraction is estimated at 55-60%.  The right ventricle is normal in structure, function and  size.  There is mild (1+) mitral regurgitation.  There is mild (1+) tricuspid regurgitation.  There is mild (1+) aortic regurgitation.  _____________________________________________________________________________  __        Left Ventricle  The left ventricle is normal in size. There is mild concentric left  ventricular hypertrophy. Left ventricular systolic function is normal. The  visual ejection fraction is estimated at 55-60%. Grade I or early diastolic  dysfunction. No regional wall motion abnormalities noted.     Right Ventricle  The right ventricle is normal in structure, function and size.     Atria  Normal left atrial size. Right atrial size is normal. There is no color  Doppler evidence of an atrial shunt.     Mitral Valve  The mitral valve is normal in structure and function. There is mild (1+)  mitral regurgitation.        Tricuspid Valve  The tricuspid valve is normal in structure and function. There is mild (1+)  tricuspid regurgitation. The right ventricular systolic pressure is  approximated at 29.4 mmHg plus the right atrial pressure.     Aortic Valve  There is mild trileaflet aortic sclerosis. There is mild (1+) aortic  regurgitation.     Pulmonic Valve  The pulmonic valve is not well seen, but is grossly normal. There is no  pulmonic valvular regurgitation.     Vessels  Normal size aorta. Normal size ascending aorta.     Pericardium  There is no pericardial effusion.        Rhythm  Sinus rhythm was noted.  _____________________________________________________________________________  __  MMode/2D Measurements & Calculations     IVSd: 1.2 cm  LVIDd: 3.3 cm  LVIDs: 2.5 cm  LVPWd: 1.2 cm  FS: 24.3 %  LV mass(C)d: 125.9 grams  LV mass(C)dI: 72.5 grams/m2  Ao root diam: 2.6 cm  LA dimension: 2.9 cm  asc Aorta Diam: 2.8 cm  LA/Ao: 1.1  LVOT diam: 1.7 cm  LVOT area: 2.3 cm2  LA Volume (BP): 33.9 ml  LA Volume Index (BP): 19.5 ml/m2  RWT: 0.76           Doppler Measurements & Calculations  MV E max dagoberto:  79.0 cm/sec  MV A max dagoberto: 114.5 cm/sec  MV E/A: 0.69  MV max P.3 mmHg  MV mean P.5 mmHg  MV V2 VTI: 20.5 cm  MVA(VTI): 2.9 cm2  MV dec time: 0.23 sec  LV V1 max P.5 mmHg  LV V1 max: 136.9 cm/sec  LV V1 VTI: 25.9 cm  SV(LVOT): 59.7 ml  SI(LVOT): 34.3 ml/m2  PA acc time: 0.11 sec  TR max dagoberto: 271.2 cm/sec  TR max P.4 mmHg  E/E' av.2  Lateral E/e': 10.2  Medial E/e': 14.2              _____________________________________________________________________________  __        Report approved by: Iain Salamanca 2020 12:13 PM             Consultations:  Consultation during this admission received from cardiology.     Recent Lab Results:  Recent Labs   Lab 20  0620  2234   WBC 5.3 6.6 5.8   HGB 12.2* 13.8 12.2*   HCT 37.0* 42.1 36.6*   MCV 92 92 91    180 147*     Recent Labs   Lab 20  0620  2234    134 128*   POTASSIUM 3.5 3.6 4.0   CHLORIDE 100 99 93*   CO2 29 27 27   ANIONGAP 5 8 8   * 128* 467*   BUN 49* 48* 53*   CR 1.85* 1.74* 1.78*   GFRESTIMATED 34* 36* 35*   GFRESTBLACK 39* 42* 41*   DOMINGO 8.2* 9.3 8.4*   PROTTOTAL  --   --  6.9   ALBUMIN  --   --  3.2*   BILITOTAL  --   --  0.8   ALKPHOS  --   --  84   AST  --   --  27   ALT  --   --  33     Recent Labs   Lab 20  0956 20  0843 20  0629 20  0255 20  2118 20  1706  20  0611  20  2234   GLC  --   --  164*  --   --   --   --  128*  --  467*   * 61*  --  284* 148* 308*   < >  --    < >  --     < > = values in this interval not displayed.     No results for input(s): TSH in the last 168 hours.  Recent Labs   Lab 20  0629 20  1025 20  0611   TROPI 0.639* 1.545* 0.873*          Pending Results:    Unresulted Labs Ordered in the Past 30 Days of this Admission     No orders found from 2020 to 2020.              Discharge Order: F/U with Cardiac  ADRIANA      Reason for your hospital stay     ACS/type II MI, Asymptomatic COVID19+     Follow-up and recommended labs and tests     Follow up with primary care provider, Basilio Alfaro, within 7 days for hospital follow- up.  The following labs/tests are recommended: CBC, BMP 1 week.    Follow up cardiology as instructed.  Call MD and go to ED if SOB, hypoxic, change in mental status or protracted coughing.  Quarantine per CDC and [place] department of Health guideline.     Activity    Your activity upon discharge: activity as tolerated     Monitor and record    blood glucose 4 times a day, before meals and at bedtime  blood pressure daily  pulse daily     When to contact your care team    Call your primary doctor if you have any of the following: temperature greater than 101,  increased shortness of breath or increased swelling.     Full Code     Diet    Follow this diet upon discharge: Orders Placed This Encounter      Moderate Consistent CHO Diet     Hospital Course:  Eh Callahan is an 80 year old male with PMH significant for DM II on insulin, hypertension, dyslipidemia, BPH, hepatitis C, hepatocellular carcinoma status post surgery, chronic kidney disease, documentation of A. Fib, not on anticoagulation presented to the emergency room with 2 weeks history of chest pain.    Patient felt discomfort in his chest did not give any details, but has been ongoing for 2 weeks but got worse 2 days prior to admission and radiating to his left arm.  Also associated palpitation.  After presentation to the emergency room symptoms improved, troponin was elevated, EKG was nonischemic.  Cardiology was consulted, symptomatic COVID was tested .  Result came back positive.  Patient stated that his family member was positive for COVID-19 recently. He denied any shortness of breath, nausea or vomiting.  He had no dizziness or fall.  He was found to have elevated troponin, started on heparin drip, cardiology was consulted.  Long discussion made with patient as well as with  family members regarding further work-up.  At this point patient and family wanted medical management.  Discussed with cardiologist who agreed with the discharge plan.  Echocardiogram showed EF of 55 to 60% with early diastolic dysfunction there was no mention of wall motion abnormalities which is again reassuring.  Cardiology referral to follow-up as an outpatient, at this point patient's and daughter wanted to discuss with patient's primary care provider and go from there.  Discussed with patient and daughter at length their question concerns addressed.     Total time spent in face to face contact with the patient and coordinating discharge was:  >30 Minutes.

## 2020-08-03 LAB — INTERPRETATION ECG - MUSE: NORMAL

## 2020-12-30 NOTE — ED TRIAGE NOTES
Patient lis here with his daughter who he lives with. She reports he has been having dizzy episodes for the past 3-4 days and fell this morning. He reports he hit hs head, frontal area, no laceration or contusion seen in triage. He also reports left knee pain, he was able to walk since injury per his daughter.   
175.26

## 2021-01-01 DIAGNOSIS — R33.9 URINARY RETENTION: ICD-10-CM

## 2021-01-01 RX ORDER — ALFUZOSIN HYDROCHLORIDE 10 MG/1
TABLET, EXTENDED RELEASE ORAL
Qty: 90 TABLET | Refills: 0 | OUTPATIENT
Start: 2021-01-01

## 2021-04-29 ENCOUNTER — APPOINTMENT (OUTPATIENT)
Dept: CT IMAGING | Facility: CLINIC | Age: 81
End: 2021-04-29
Attending: PHYSICIAN ASSISTANT
Payer: MEDICARE

## 2021-04-29 ENCOUNTER — APPOINTMENT (OUTPATIENT)
Dept: CARDIOLOGY | Facility: CLINIC | Age: 81
End: 2021-04-29
Attending: INTERNAL MEDICINE
Payer: MEDICARE

## 2021-04-29 ENCOUNTER — HOSPITAL ENCOUNTER (EMERGENCY)
Facility: CLINIC | Age: 81
Discharge: HOME | End: 2021-04-29
Attending: PHYSICIAN ASSISTANT | Admitting: PHYSICIAN ASSISTANT
Payer: MEDICARE

## 2021-04-29 VITALS
DIASTOLIC BLOOD PRESSURE: 89 MMHG | SYSTOLIC BLOOD PRESSURE: 189 MMHG | TEMPERATURE: 97.8 F | OXYGEN SATURATION: 99 % | HEART RATE: 73 BPM | RESPIRATION RATE: 18 BRPM
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
## 2021-04-29 DIAGNOSIS — R55 SYNCOPE: ICD-10-CM

## 2021-04-29 DIAGNOSIS — M54.9 UPPER BACK PAIN: ICD-10-CM

## 2021-04-29 LAB
ANION GAP SERPL CALCULATED.3IONS-SCNC: 4 MMOL/L (ref 3–14)
BASOPHILS # BLD AUTO: 0 10E9/L (ref 0–0.2)
BASOPHILS NFR BLD AUTO: 0.2 %
BUN SERPL-MCNC: 59 MG/DL (ref 7–30)
CALCIUM SERPL-MCNC: 8.2 MG/DL (ref 8.5–10.1)
CHLORIDE SERPL-SCNC: 100 MMOL/L (ref 94–109)
CO2 SERPL-SCNC: 29 MMOL/L (ref 20–32)
CREAT SERPL-MCNC: 1.76 MG/DL (ref 0.66–1.25)
DIFFERENTIAL METHOD BLD: ABNORMAL
EOSINOPHIL # BLD AUTO: 0.2 10E9/L (ref 0–0.7)
EOSINOPHIL NFR BLD AUTO: 1.8 %
ERYTHROCYTE [DISTWIDTH] IN BLOOD BY AUTOMATED COUNT: 12.2 % (ref 10–15)
GFR SERPL CREATININE-BSD FRML MDRD: 35 ML/MIN/{1.73_M2}
GLUCOSE BLDC GLUCOMTR-MCNC: 272 MG/DL (ref 70–99)
GLUCOSE SERPL-MCNC: 190 MG/DL (ref 70–99)
HCT VFR BLD AUTO: 36.7 % (ref 40–53)
HGB BLD-MCNC: 12.3 G/DL (ref 13.3–17.7)
IMM GRANULOCYTES # BLD: 0.1 10E9/L (ref 0–0.4)
IMM GRANULOCYTES NFR BLD: 0.7 %
INTERPRETATION ECG - MUSE: NORMAL
INTERPRETATION ECG - MUSE: NORMAL
LYMPHOCYTES # BLD AUTO: 1.5 10E9/L (ref 0.8–5.3)
LYMPHOCYTES NFR BLD AUTO: 17.6 %
MCH RBC QN AUTO: 31.1 PG (ref 26.5–33)
MCHC RBC AUTO-ENTMCNC: 33.5 G/DL (ref 31.5–36.5)
MCV RBC AUTO: 93 FL (ref 78–100)
MONOCYTES # BLD AUTO: 0.5 10E9/L (ref 0–1.3)
MONOCYTES NFR BLD AUTO: 5.6 %
NEUTROPHILS # BLD AUTO: 6.2 10E9/L (ref 1.6–8.3)
NEUTROPHILS NFR BLD AUTO: 74.1 %
NRBC # BLD AUTO: 0 10*3/UL
NRBC BLD AUTO-RTO: 0 /100
PLATELET # BLD AUTO: 112 10E9/L (ref 150–450)
POTASSIUM SERPL-SCNC: 4.1 MMOL/L (ref 3.4–5.3)
RBC # BLD AUTO: 3.95 10E12/L (ref 4.4–5.9)
SODIUM SERPL-SCNC: 133 MMOL/L (ref 133–144)
TROPONIN I BLD-MCNC: 0.01 UG/L (ref 0–0.08)
TROPONIN I SERPL-MCNC: <0.015 UG/L (ref 0–0.04)
WBC # BLD AUTO: 8.4 10E9/L (ref 4–11)

## 2021-04-29 PROCEDURE — 85025 COMPLETE CBC W/AUTO DIFF WBC: CPT | Performed by: PHYSICIAN ASSISTANT

## 2021-04-29 PROCEDURE — 80048 BASIC METABOLIC PNL TOTAL CA: CPT | Performed by: PHYSICIAN ASSISTANT

## 2021-04-29 PROCEDURE — 93321 DOPPLER ECHO F-UP/LMTD STD: CPT

## 2021-04-29 PROCEDURE — 255N000002 HC RX 255 OP 636: Performed by: PHYSICIAN ASSISTANT

## 2021-04-29 PROCEDURE — 93325 DOPPLER ECHO COLOR FLOW MAPG: CPT | Mod: 26 | Performed by: INTERNAL MEDICINE

## 2021-04-29 PROCEDURE — 74177 CT ABD & PELVIS W/CONTRAST: CPT | Mod: MG

## 2021-04-29 PROCEDURE — 99214 OFFICE O/P EST MOD 30 MIN: CPT | Mod: 25 | Performed by: INTERNAL MEDICINE

## 2021-04-29 PROCEDURE — 70450 CT HEAD/BRAIN W/O DYE: CPT | Mod: MG

## 2021-04-29 PROCEDURE — 250N000009 HC RX 250: Performed by: PHYSICIAN ASSISTANT

## 2021-04-29 PROCEDURE — 93321 DOPPLER ECHO F-UP/LMTD STD: CPT | Mod: 26 | Performed by: INTERNAL MEDICINE

## 2021-04-29 PROCEDURE — 250N000011 HC RX IP 250 OP 636: Performed by: PHYSICIAN ASSISTANT

## 2021-04-29 PROCEDURE — 93308 TTE F-UP OR LMTD: CPT | Mod: 26 | Performed by: INTERNAL MEDICINE

## 2021-04-29 PROCEDURE — 84484 ASSAY OF TROPONIN QUANT: CPT | Performed by: PHYSICIAN ASSISTANT

## 2021-04-29 PROCEDURE — 84484 ASSAY OF TROPONIN QUANT: CPT

## 2021-04-29 PROCEDURE — 93005 ELECTROCARDIOGRAM TRACING: CPT | Mod: 76

## 2021-04-29 PROCEDURE — 99285 EMERGENCY DEPT VISIT HI MDM: CPT | Mod: 25

## 2021-04-29 PROCEDURE — 93005 ELECTROCARDIOGRAM TRACING: CPT

## 2021-04-29 PROCEDURE — 250N000013 HC RX MED GY IP 250 OP 250 PS 637: Performed by: PHYSICIAN ASSISTANT

## 2021-04-29 PROCEDURE — 999N001017 HC STATISTIC GLUCOSE BY METER IP

## 2021-04-29 RX ORDER — ACETAMINOPHEN 325 MG/1
650 TABLET ORAL ONCE
Status: COMPLETED | OUTPATIENT
Start: 2021-04-29 | End: 2021-04-29

## 2021-04-29 RX ORDER — IOPAMIDOL 755 MG/ML
500 INJECTION, SOLUTION INTRAVASCULAR ONCE
Status: COMPLETED | OUTPATIENT
Start: 2021-04-29 | End: 2021-04-29

## 2021-04-29 RX ADMIN — IOPAMIDOL 80 ML: 755 INJECTION, SOLUTION INTRAVENOUS at 16:00

## 2021-04-29 RX ADMIN — ACETAMINOPHEN 650 MG: 325 TABLET, FILM COATED ORAL at 15:08

## 2021-04-29 RX ADMIN — HUMAN ALBUMIN MICROSPHERES AND PERFLUTREN 3 ML: 10; .22 INJECTION, SOLUTION INTRAVENOUS at 14:26

## 2021-04-29 RX ADMIN — SODIUM CHLORIDE 60 ML: 9 INJECTION, SOLUTION INTRAVENOUS at 16:00

## 2021-04-29 ASSESSMENT — ENCOUNTER SYMPTOMS
BACK PAIN: 1
HEADACHES: 1

## 2021-04-29 NOTE — CONSULTS
Consult Date: 04/29/2021    CARDIOLOGY CONSULTATION    CONSULTATION INDICATION:  Abnormal ECG, syncope.    HISTORY OF PRESENT ILLNESS:      I had the opportunity to see the patient, Eh Callahan, today at [facility] for a Cardiology consultation.  As you know, he is an 81-year-old male with a past medical history significant for hypertension, hyperlipidemia, hepatitis C, poorly controlled diabetes, chronic kidney disease, hepatocellular carcinoma, paroxysmal atrial fibrillation (CHADS2-VASC 4, not on anticoagulation), and cognitive disorder, who was brought to the Emergency Department earlier today after experiencing a fall.    The patient is primarily [place]-speaking; however, he does speak English to a certain degree.  His daughter is present.  I offered a licensed ; however, the patient declined and would like his daughter to provide  services.    The patient was in his usual state of health until earlier this morning, when he took his morning blood pressure medications and following this, his family members heard a thud in the other room and found him on the floor.  He never lost consciousness; however, his mentation seemed altered, and he had some mild tremors for about 20-30 seconds.  Due to the concerning nature of his symptoms, EMS were activated.  The daughter reports that the patient usually takes his morning medications with food and milk.  However, this morning, for some reason did not eat before taking his blood pressure medications.  At baseline he only consumes about 1 full glass of milk a day, for total fluid intake.    Per chart review, the patient was found to be mildly hypotensive with a systolic blood pressure of 90 mmHg.  In the Emergency Department, initial blood pressure was 120/62 mmHg, heart rate 59 beats per minute.  ECG demonstrates normal sinus rhythm at around 60 beats per minute with findings consistent with left ventricular hypertrophy and  repolarization changes.  QTC was 427 milliseconds.  ECG is largely stable from prior studies.    The patient denies any recent episodes of chest pain, chest pressure, abnormal shortness of breath or any abnormal lower extremity swelling.  His main complaint is back pain, as well as headache after falling on his back.      Initial labs in the Emergency Department are notable for a potassium of 4.1, creatinine 1.76.  Troponin normal.  Hemoglobin 12.3, platelets 112.  GFR 41, similar to prior.    I had actually met Mr. Callahan for a Cardiology consultation when he was hospitalized 08/2020.  At that time, he had been experiencing some atypical chest discomfort and was found to have a mildly elevated troponin.   Decision was made to start antihypertensive therapy, as well as plans for close followup.  Noninvasive stress testing, coronary angiography and possible PCI were discussed at length and offered to the patient.  The patient elected to forego further cardiac diagnostic testing.  As part of his evaluation, he was tested for COVID-19, was found to be positive, and so his symptoms were thought to be more consistent with COVID-19 illness.  Unfortunately, it does not seem that he has followed up with Cardiology as was recommended at that time.    ASSESSMENT AND PLAN/RECOMMENDATIONS:      1.  Presyncope, altered mental status.  Clinical history is suggestive of symptomatic orthostatic hypotension.  Overall, clinical presentation does not seem consistent with any acute coronary syndrome or decompensated heart failure.  An ECG was done in the Emergency Department, which demonstrates findings similar to prior ECGs.  The patient denies any chest pain, chest pressure, abnormal shortness of breath or any other symptoms that are concerning for angina.  Troponin was normal.   Heart rate in the Emergency Department was 60 beats per minute.  2.  Paroxysmal atrial fibrillation, CHADS2-VASC 4, not on anticoagulation.  This was  discussed previously.  However, the patient declined anticoagulation.  3.  Poorly controlled diabetes.  4.  Hypertension.  5.  Hyperlipidemia.  6.  Chronic kidney disease.  7.  Cognitive impairment.    -- Discussed options for further evaluation and management.  -- Limited TTE.  -- Recommend continuing home cardiac regimen.  Encouraged increased PO fluid intake.    -- Heart rates were borderline low, which may be exacerbating symptomatic hypotension in the setting of chronic dehydration from poor fluid intake, was on metoprolol tartrate 12.5 mg b.i.d., can decrease this in terms of total daily dose by switching to metoprolol succinate 12.5 mg daily.  -- Otherwise, would recommend continuing current cardiac regimen, though I note that he is listed to be on both atorvastatin and simvastatin, we would recommend discontinuing the simvastatin.  -- Recommend Cardiology followup, though this was recommended 2020, and the patient has not followed up with Cardiology, we remain available as needed.  -- If TTE is normal, Cardiology will sign off.  Please do not hesitate to page or call with any questions or concerns.    Asim Landis MD, [facility]  Cardiology  Text Page   2021      D: 2021   T: 2021   MT: ROHITH    Name:     CHIKIS NEWBERRY  MRN:      [id number]        Account:      [id number]   :      1940           Consult Date: 2021     Document: [id number]

## 2021-04-29 NOTE — ED TRIAGE NOTES
Pt arrives via EMS for a fall this am which is baseline for home to fall c/o head and back pain. Family reports he was at the table family attempted to feed him they reported to EMS he has full body shaking lasting 10-15 secs he awoke they attempted to give him more to eat and then they reports another event of shaking BP was soft when medics arrives 90 systolic. A/ox 4, VSS

## 2021-04-29 NOTE — ED NOTES
Bed: ED28  Expected date: 4/29/21  Expected time: 10:41 AM  Means of arrival: Ambulance  Comments:  BV 81 M fall

## 2021-04-29 NOTE — ED PROVIDER NOTES
History   Chief Complaint  Fall    HPI  Eh Callahan is a 81 year old male with a history of type I diabetes on insulin, atrial fibrillation, hypertension, CKD stage 2, and a cognitive disorder who presents for evaluation following a fall. Per EMS report, the patient is coming from home. He was upstairs in his room when family members heard a thud and found him down on the floor. There was no loss of consciousness however family is unsure if he hit his head. They then brought the patient downstairs to eat something. Following this, he fell to the floor and had a 15 second episode of shaking. There was no loss of consciousness, bowel/bladder incontinence, or postictal period. The patient then had another 10 second shaking episode, prompting a call to EMS. On medics arrival he was mildly hypotensive and had a blood sugar reading in the 190's. He was vitally stable en route. Here, the patient is complaining of low back pain and a mild headache. The remainder of the history is somewhat limited due to the patient being a poor historian.     Review of Systems   Reason unable to perform ROS: Poor Historian.   Musculoskeletal: Positive for back pain.   Neurological: Positive for headaches.     Allergies  Amlodipine  Lisinopril  Metoprolol    Medications  ASA  Lipitor  Apresoline  Insulin aspart  Insulin degludec  Isordil  Antivert  Lopressor  Protonix  Zocor  Desyrel    Past Medical History  Atrial fibrillation  CKD stage 2  Type I diabetes  Hepatitis C  Hypertension  PTSD  Cognitive disorder  Anxiety  Hepatocellular carcinoma  Aspiration pneumonia  ACS    Past Surgical History  Appendectomy  Hepatectomy partial    Social History  Presents to the ED alone, via EMS.     Physical Exam     Patient Vitals for the past 24 hrs:   BP Temp Pulse Resp SpO2   04/29/21 1700 -- -- 82 -- --   04/29/21 1645 -- -- 80 -- --   04/29/21 1637 -- 97.8  F (36.6  C) -- 18 --   04/29/21 1635 -- -- 75 -- 99 %   04/29/21 1630 -- -- 75 -- 99 %    04/29/21 1615 (!) 186/87 -- 84 -- 100 %   04/29/21 1530 -- -- 80 -- --   04/29/21 1515 -- -- 76 -- --   04/29/21 1505 -- -- 80 -- --   04/29/21 1500 -- -- 78 -- --   04/29/21 1430 -- -- 74 -- --   04/29/21 1415 -- -- 75 -- --   04/29/21 1400 -- -- 74 -- --   04/29/21 1345 (!) 175/88 -- 75 -- 100 %   04/29/21 1330 (!) 162/80 -- 67 -- 100 %   04/29/21 1315 (!) 151/75 -- 64 -- 92 %   04/29/21 1230 (!) 144/66 -- 62 -- 97 %   04/29/21 1215 (!) 157/86 -- 64 -- 99 %   04/29/21 1200 123/65 -- 63 -- 98 %   04/29/21 1145 119/54 -- 60 -- 98 %   04/29/21 1130 122/66 -- 58 -- 96 %   04/29/21 1115 120/62 -- 59 -- 98 %   04/29/21 1100 -- -- -- -- 95 %     Physical Exam  General: No acute distress. Lying supine on bed.   Head:  Scalp is atraumatic.  Eyes:  The pupils are equal, round, and reactive to light. Normal conjunctiva.   ENT:                                      Ears:  The external ears are normal.   Nose:  The external nose is normal.  Throat:  The oropharynx is normal. Mucus membranes are moist.                 Neck:  Normal range of motion.   CV:  Normal rate. No murmur. 2+ radial pulses  Resp:  Breath sounds are clear bilaterally. Non-labored, no retractions or accessory muscle use.  GI:  Abdomen is soft, no distension, no tenderness.   MS:  Normal range of motion. No acute deformities. No midline cervical, thoracic, lumbar tenderness. Small scrap to the right lateral thoracic back. No associated significant tenderness. No rib tenderness.   Skin:  Warm and dry. No rash.   Neuro:  Alert. Strength and sensation grossly intact.   Psych:  Awake. Alert.  Appropriate interactions.     Emergency Department Course   ECG:   ECG taken at 1056, ECG read at 1056 by Dr. Chavez.  Sinus bradycardia. Left ventriclar hypertrophy with repolarization abnormality. ST elevation, consider early repolarization, pericarditis, or injury. Abnormal ECG. ST elevation V2-V4.  Compared to prior, dated 7/31/20.   Rate 59 bpm. ID interval 148 ms.  QRS duration 92 ms. QT/QTc 432/427 ms. P-R-T axes 50 61 140.    ECG taken at 1116, ECG read at 1116 by Dr. Chavez.  Sinus bradycardia. Left ventricular hypertrophy with repolarization abnormality. ST elevation, consider early repolarization, pericarditis, or injury. Abnormal ECG.   Rate 58 bpm. KS interval 162 ms. QRS duration 94 ms. QT/QTc 446/437 ms. P-R-T axes 56 64 129.    Imaging:  CT Head without contrast:   1. No fracture and no acute intracranial hemorrhage.   2. Mild small vessel ischemic disease.   3. No acute intracranial findings. As per radiology.    Echocardiogram Limited:  Left ventricular systolic function is normal.   The visual ejection fraction is estimated at 60-65%.   The left ventricle is normal in size.   There is mild to moderate concentric left ventricular hypertrophy.   The right ventricle is normal in structure, function and size.   There is mild (1+) mitral regurgitation.   There is mild (1+) aortic regurgitation.   Sinus rhythm was noted.     CT Chest/Abdomen/Pelvis w/ IV contrast:   1. No acute aortic abnormality demonstrated.   2. No significant change in liver lesion since the comparison study. As per radiology.     Laboratory:  CBC: WBC: 8.4, HGB: 12.3 (L), PLT: 112 (L)    BMP: Glucose 190 (H), BUN 59 (H), Creatinine 1.76 (H), GFR estimate 35 (L), Calcium 8.2 (L), o/w WNL    1117 Troponin POCT: 0.01  1100 Troponin I: <0.015    Glucose by meter: 272 (H)    Emergency Department Course:  Reviewed:  I reviewed nursing notes, vitals and past medical history    Assessments:  1045 I physically examined the patient as documented above.    1149 Dr. Landis evaluated the patient.     1233 I rechecked the patient.      1414 Recheck. He is complaining of back and abdominal pain at this time. Plan for CT.     1640 I rechecked the patient and updated him on the CT results.     Consults:   1127 Dr. Chavez paged Cardiology after reading the EKG's.     1132 I consulted with Dr. Landis, on call for  cardiology, regarding the patient's history and presentation here in the emergency department.    1141 Dr. Landis came down to the ER.    Interventions:  1508 Tylenol 650 mg PO    Disposition:  The patient was discharged to home.     Impression & Plan     Medical Decision Making:  Eh Callahan is a 81 year old male with medical history including type 1 diabetes mellitus on insulin, atrial fibrillation, hypertension, stage II chronic kidney disease, and cognitive disorder presents from his home after a unwitnessed fall.  Per family, it is somewhat common for the patient to get lightheaded if he takes his medication on a empty stomach.  Given unwitnessed fall, head CT completed that was negative for intracranial hemorrhage or other acute findings.  EKG revealed ST elevation in V2-V4 raising concern for acute coronary syndrome.  I consulted cardiology who reviewed past EKGs and EKG today looks similar to past.  He recommended echocardiogram which was overall reassuring.  Please see cardiology note for further details.  Troponin negative and no signs to suggest acute coronary syndrome.     Patient was observed in the emergency department for quite some time and had no further episodes of syncope.  He was able to ambulate at his baseline.  On recheck, son noted patient was complaining of back pain along with abdominal pain.  He had no midline cervical, thoracic, or lumbar tenderness to suggest need for imaging.  Given the vague history from patient and new diffuse abdominal tenderness, CT aortic survey completed that was also reassuring.  Patient was given Tylenol and felt improved.  Suspect acute on chronic back pain.  Family feels comfortable with taking the patient home at this time.  Blood work reassuring.  There is no electrolyte abnormalities.  Creatinine at baseline.  Patient son agree with plan for discharge home and return precautions discussed.    Diagnosis:    ICD-10-CM    1. Fall, initial encounter  W19.XXXA    2.  Syncope  R55    3. Upper back pain  M54.9      Scribe Disclosure:  I, Brenden Hernandez, am serving as a scribe on 4/29/2021 at 11:21 AM to personally document services performed by Ximena Trevino PA-C based on my observations and the provider's statements to me.        Ximena Trevino PA-C  04/30/21 1035

## 2021-04-29 NOTE — DISCHARGE INSTRUCTIONS
Make sure patient eats before taking morning medication.  You may take 500-650 mg of Tylenol every 6 hours.  Do not exceed 3000 mg of acetaminophen (Tylenol) daily.    *Return to the emergency department for any new or concerning symptoms develop.  *Follow-up closely with primary care provider.

## 2021-04-29 NOTE — CONSULTS
Cardiology Consult Note-- PLEASE SEE ASSOCIATED DICTATION    Eh Callahan MRN#: [id number]   YOB: 1940 Age: 81 year old     Date of Admission: 4/29/2021  Consult indication: syncope, abnormal TTE         HPI and Assessment and Recommendations/Plan:      Please refer to the associated dictation: #[id number]     - no chest pain/discomfort/anginal symptoms  - ECG 4/29/2021 largely stable from prior  - troponin negative  - suspect chronic dehydration, took his AM medications without eating or drinking anything which he normally does with his medications  - encouraged increased PO fluid intake  - limited TTE  - recommend decreasing hydralazine 25mg TID to 12.5mg TID, isordil 20mg TID to 10mg TID, change metoprolol tartrate 12.5mg BID to metoprolol succinate 12.5mg daily  - cardiology follow up per Pt preference (was ordered back during 8/2020 hospitalization but lost to follow up)  - cardiology will sign off if no significant change on TTE from prior, but please page our team if we can be of any further assistance.     Thank you for allowing our team to participate in the care of Eh Callahan.  Please do not hesitate to page me with any questions or concerns.     Asim Landis MD, [facility]  Cardiology  Text Page   April 29, 2021         Past Medical History:   I have reviewed this patient's past medical history  The ASCVD Risk score (Keyona TAMAYO Jr., et al., 2013) failed to calculate for the following reasons:    The 2013 ASCVD risk score is only valid for ages 40 to 79    The patient has a prior MI or stroke diagnosis  Past Medical History:   Diagnosis Date     Anatomical narrow angle glaucoma      Atrial fibrillation (H)      Chronic infection     history of hepatitis C     CKD (chronic kidney disease) stage 2, GFR 60-89 ml/min      Diabetes mellitus (H)      Hepatitis C without mention of hepatic coma      Hypertension      PTSD (post-traumatic stress disorder)                Past Surgical History:    I have reviewed this patient's past surgical history   Past Surgical History:   Procedure Laterality Date     APPENDECTOMY       EYE SURGERY       LAPAROSCOPIC HEPATECTOMY PARTIAL Left 11/22/2016    Procedure: LAPAROSCOPIC HEPATECTOMY PARTIAL;  Surgeon: Rick Mckeon MD;  Location:  OR             Social History:   I have reviewed this patient's social history  Social History     Tobacco Use     Smoking status: Former Smoker     Smokeless tobacco: Never Used   Substance Use Topics     Alcohol use: No     Alcohol/week: 0.0 standard drinks             Family History:   I have reviewed this patient's family history  Family History   Problem Relation Age of Onset     Diabetes No family hx of         He is not aware of any diabetes in his family     Kidney Disease No family hx of              Allergies:   I have reviewed this patient's allergy history  Allergies   Allergen Reactions     Amlodipine Swelling     Edema at 10 mg , fine at 5 mg     Lisinopril Cough     Metoprolol      Other reaction(s): Bradycardia  Metoprolol at 50mg bid -> HR 45 -50, unclear if sx (has fatigue)  May tolerate lower doses if needed             Medications reviewed:   Prior to admission medications:  Prior to Admission medications    Medication Sig Start Date End Date Taking? Authorizing Provider   acetaminophen (TYLENOL) 500 MG tablet Take 1,000 mg by mouth every 8 hours as needed for mild pain    Unknown, Entered By History   alfuzosin ER (UROXATRAL) 10 MG 24 hr tablet Take 1 tablet (10 mg) by mouth daily With meal 5/28/20   Garett Daniels MD   aspirin (ASA) 81 MG EC tablet Take 1 tablet (81 mg) by mouth daily 8/2/20   Norman Leahy MD   atorvastatin (LIPITOR) 40 MG tablet Take 1 tablet (40 mg) by mouth every evening 8/2/20   Norman Leahy MD   bisacodyl (DULCOLAX) 10 MG suppository Place 1 suppository (10 mg) rectally daily as needed for constipation 1/19/20   Carina Bruce PA-C   blood glucose  monitoring (GREGORIO CONTOUR) test strip 4 times daily  3/9/17   Reported, Patient   blood glucose monitoring (NO BRAND SPECIFIED) meter device kit Use to test blood sugar 4 times daily or as directed. 1/19/20   Carina Bruce PA-C   Blood Glucose Monitoring Suppl (FIFTY50 GLUCOSE METER 2.0) W/DEVICE KIT as needed for blood glucose monitoring 7/10/17   Reported, Patient   Blood Pressure Monitoring (RA BLOOD PRESSURE CUFF MONITOR) MISC Take blood pressure once daily 2/20/17   Reported, Patient   Carboxymeth-Glycerin-Polysorb (REFRESH OPTIVE ADVANCED) 0.5-1-0.5 % SOLN Place 1 drop into both eyes 2 times daily as needed  4/6/17   Reported, Patient   cholecalciferol (D 5000) 5000 UNITS CAPS Take 5,000 Units by mouth daily  3/2/16   Reported, Patient   Cyanocobalamin (B-12 PO) Take 1 capsule by mouth daily    Unknown, Entered By History   hydrALAZINE (APRESOLINE) 25 MG tablet Take 1 tablet (25 mg) by mouth 3 times daily 8/2/20   Norman Leahy MD   insulin aspart (NOVOLOG PEN) 100 UNIT/ML injection Inject 3 Units Subcutaneous 3 times daily (with meals)  7/12/17   Reported, Patient   insulin degludec (TRESIBA FLEXTOUCH) 100 UNIT/ML pen Inject 22 Units Subcutaneous At Bedtime    Unknown, Entered By History   isosorbide dinitrate (ISORDIL) 20 MG tablet Take 1 tablet (20 mg) by mouth 3 times daily 8/2/20   Norman Leahy MD   meclizine (ANTIVERT) 25 MG tablet Take 1 tablet (25 mg) by mouth every 6 hours as needed for dizziness 2/5/20   Jazmyn Patel PA   metoprolol tartrate (LOPRESSOR) 25 MG tablet Take 0.5 tablets (12.5 mg) by mouth 2 times daily 8/2/20 9/1/20  Norman Leahy MD   Multiple Vitamins-Minerals (MULTIVITAMIN ADULT) TABS Take 1 tablet by mouth daily 4/3/20   Unknown, Entered By History   pantoprazole (PROTONIX) 40 MG enteric coated tablet Take 40 mg by mouth daily as needed  1/11/16   Reported, Patient   polyethylene glycol (MIRALAX/GLYCOLAX) packet Take 17 g by mouth 2 times  daily as needed  1/19/20   Carina Bruce PA-C   sennosides (SENOKOT) 8.6 MG tablet Take 8.6 mg by mouth daily as needed    Unknown, Entered By History   simvastatin (ZOCOR) 20 MG tablet Take 1 tablet (20 mg) by mouth daily 4/16/14   Cindy Maier MD   traZODone (DESYREL) 150 MG tablet Take 75 mg by mouth At Bedtime     Unknown, Entered By History      Current medications:  No current Epic-ordered facility-administered medications on file.      Current Outpatient Medications Ordered in Epic   Medication     acetaminophen (TYLENOL) 500 MG tablet     alfuzosin ER (UROXATRAL) 10 MG 24 hr tablet     aspirin (ASA) 81 MG EC tablet     atorvastatin (LIPITOR) 40 MG tablet     bisacodyl (DULCOLAX) 10 MG suppository     blood glucose monitoring ([place] CONTOUR) test strip     blood glucose monitoring (NO BRAND SPECIFIED) meter device kit     Blood Glucose Monitoring Suppl (FIFTY50 GLUCOSE METER 2.0) W/DEVICE KIT     Blood Pressure Monitoring (RA BLOOD PRESSURE CUFF MONITOR) MISC     Carboxymeth-Glycerin-Polysorb (REFRESH OPTIVE ADVANCED) 0.5-1-0.5 % SOLN     cholecalciferol (D 5000) 5000 UNITS CAPS     Cyanocobalamin (B-12 PO)     hydrALAZINE (APRESOLINE) 25 MG tablet     insulin aspart (NOVOLOG PEN) 100 UNIT/ML injection     insulin degludec (TRESIBA FLEXTOUCH) 100 UNIT/ML pen     isosorbide dinitrate (ISORDIL) 20 MG tablet     meclizine (ANTIVERT) 25 MG tablet     metoprolol tartrate (LOPRESSOR) 25 MG tablet     Multiple Vitamins-Minerals (MULTIVITAMIN ADULT) TABS     pantoprazole (PROTONIX) 40 MG enteric coated tablet     polyethylene glycol (MIRALAX/GLYCOLAX) packet     sennosides (SENOKOT) 8.6 MG tablet     simvastatin (ZOCOR) 20 MG tablet     traZODone (DESYREL) 150 MG tablet             Review of Systems:   A complete review of systems was performed and was negative except as mentioned in the HPI.          Physical Exam:   Vital signs were personally reviewed:  Temperatures:  Current -  ; Max - No data  recorded  Respiration range: No data recorded  Pulse range: Pulse  Av.5  Min: 58  Max: 59  Blood pressure range: Systolic (24hrs), Av , Min:120 , Max:120   ; Diastolic (24hrs), Av, Min:62, Max:62      Constitutional: appears stated age, in no apparent distress, appears to be well nourished  ENT: normocephalic, without obvious abnormality, atraumatic  Pulmonary: clear to auscultation bilaterally  Cardiovascular: JVP normal, regular rate, regular rhythm, 1/6 BLANE at the RUSB, no lower extremity edema  Gastrointestinal: abdominal exam benign  Neurologic: awake, alert, face symmetrical, moves all extremities  Skin: no jaundice  Psychiatric: affect is normal, answers questions appropriately, oriented to self and place         Laboratory tests:   Laboratory tests personally reviewed:   CMP  Recent Labs   Lab 21  1100      POTASSIUM 4.1   CHLORIDE 100   CO2 29   ANIONGAP 4   *   BUN 59*   CR 1.76*   GFRESTIMATED 35*   GFRESTBLACK 41*   DOMINGO 8.2*     CBC  Recent Labs   Lab 21  1100   WBC 8.4   RBC 3.95*   HGB 12.3*   HCT 36.7*   MCV 93   MCH 31.1   MCHC 33.5   RDW 12.2   *     INRNo lab results found in last 7 days.  Lab Results   Component Value Date    TROPI <0.015 2021    TROPI 0.639 () 2020    TROPI 1.545 () 2020    TROPONIN 0.01 2021    TROPONIN 0.00 10/20/2016     Recent Labs   Lab Test 20  2234   CHOL 163   HDL 58   LDL 90   TRIG 74     Lab Results   Component Value Date    A1C 10.3 2020    A1C 8.0 2020    A1C 8.4 11/15/2016     TSH   Date Value Ref Range Status   2020 1.66 0.40 - 4.00 mU/L Final       TTE 2020  Interpretation Summary     There is mild concentric left ventricular hypertrophy.  The visual ejection fraction is estimated at 55-60%.  The right ventricle is normal in structure, function and size.  There is mild (1+) mitral regurgitation.  There is mild (1+) tricuspid regurgitation.  There is mild (1+) aortic  regurgitation.

## 2021-06-12 DIAGNOSIS — R33.9 URINARY RETENTION: ICD-10-CM

## 2021-06-14 RX ORDER — ALFUZOSIN HYDROCHLORIDE 10 MG/1
10 TABLET, EXTENDED RELEASE ORAL DAILY
Qty: 90 TABLET | Refills: 1 | Status: SHIPPED | OUTPATIENT
Start: 2021-06-14 | End: 2022-01-01

## 2021-07-06 NOTE — PROVIDER NOTIFICATION
Paged dr krishna: Troponin 0.873m cards consulted and heparin drip, no pain.   [Follow Up] : a follow up visit [Patient] : patient [Father] : father [FreeTextEntry1] : scoliosis

## 2021-08-04 ENCOUNTER — HOSPITAL ENCOUNTER (OUTPATIENT)
Facility: CLINIC | Age: 81
Setting detail: OBSERVATION
Discharge: HOME | End: 2021-08-05
Attending: EMERGENCY MEDICINE | Admitting: INTERNAL MEDICINE
Payer: MEDICARE

## 2021-08-04 DIAGNOSIS — C22.0 HCC (HEPATOCELLULAR CARCINOMA) (H): ICD-10-CM

## 2021-08-04 DIAGNOSIS — N17.9 ACUTE KIDNEY INJURY (H): ICD-10-CM

## 2021-08-04 DIAGNOSIS — R73.9 HYPERGLYCEMIA: ICD-10-CM

## 2021-08-04 LAB
ANION GAP SERPL CALCULATED.3IONS-SCNC: 4 MMOL/L (ref 3–14)
BASOPHILS # BLD AUTO: 0 10E3/UL (ref 0–0.2)
BASOPHILS NFR BLD AUTO: 0 %
BUN SERPL-MCNC: 66 MG/DL (ref 7–30)
CALCIUM SERPL-MCNC: 8.6 MG/DL (ref 8.5–10.1)
CHLORIDE BLD-SCNC: 99 MMOL/L (ref 94–109)
CO2 SERPL-SCNC: 28 MMOL/L (ref 20–32)
CREAT SERPL-MCNC: 2.05 MG/DL (ref 0.66–1.25)
EOSINOPHIL # BLD AUTO: 0.2 10E3/UL (ref 0–0.7)
EOSINOPHIL NFR BLD AUTO: 4 %
ERYTHROCYTE [DISTWIDTH] IN BLOOD BY AUTOMATED COUNT: 11.7 % (ref 10–15)
GFR SERPL CREATININE-BSD FRML MDRD: 30 ML/MIN/1.73M2
GLUCOSE BLD-MCNC: 423 MG/DL (ref 70–99)
GLUCOSE BLDC GLUCOMTR-MCNC: 423 MG/DL (ref 70–99)
HCT VFR BLD AUTO: 38.3 % (ref 40–53)
HGB BLD-MCNC: 13 G/DL (ref 13.3–17.7)
IMM GRANULOCYTES # BLD: 0.1 10E3/UL
IMM GRANULOCYTES NFR BLD: 1 %
KETONES BLD-SCNC: 0.1 MMOL/L (ref 0–0.6)
LYMPHOCYTES # BLD AUTO: 1.3 10E3/UL (ref 0.8–5.3)
LYMPHOCYTES NFR BLD AUTO: 24 %
MCH RBC QN AUTO: 31.3 PG (ref 26.5–33)
MCHC RBC AUTO-ENTMCNC: 33.9 G/DL (ref 31.5–36.5)
MCV RBC AUTO: 92 FL (ref 78–100)
MONOCYTES # BLD AUTO: 0.5 10E3/UL (ref 0–1.3)
MONOCYTES NFR BLD AUTO: 9 %
NEUTROPHILS # BLD AUTO: 3.6 10E3/UL (ref 1.6–8.3)
NEUTROPHILS NFR BLD AUTO: 62 %
NRBC # BLD AUTO: 0 10E3/UL
NRBC BLD AUTO-RTO: 0 /100
PH BLDV: 7.29 [PH] (ref 7.32–7.43)
PLATELET # BLD AUTO: 116 10E3/UL (ref 150–450)
POTASSIUM BLD-SCNC: 4.7 MMOL/L (ref 3.4–5.3)
RBC # BLD AUTO: 4.15 10E6/UL (ref 4.4–5.9)
SODIUM SERPL-SCNC: 131 MMOL/L (ref 133–144)
WBC # BLD AUTO: 5.7 10E3/UL (ref 4–11)

## 2021-08-04 PROCEDURE — 85025 COMPLETE CBC W/AUTO DIFF WBC: CPT | Performed by: EMERGENCY MEDICINE

## 2021-08-04 PROCEDURE — 258N000003 HC RX IP 258 OP 636: Performed by: EMERGENCY MEDICINE

## 2021-08-04 PROCEDURE — 82800 BLOOD PH: CPT | Performed by: EMERGENCY MEDICINE

## 2021-08-04 PROCEDURE — 96360 HYDRATION IV INFUSION INIT: CPT

## 2021-08-04 PROCEDURE — 87635 SARS-COV-2 COVID-19 AMP PRB: CPT | Performed by: EMERGENCY MEDICINE

## 2021-08-04 PROCEDURE — 99285 EMERGENCY DEPT VISIT HI MDM: CPT | Mod: 25

## 2021-08-04 PROCEDURE — 80048 BASIC METABOLIC PNL TOTAL CA: CPT | Performed by: EMERGENCY MEDICINE

## 2021-08-04 PROCEDURE — C9803 HOPD COVID-19 SPEC COLLECT: HCPCS

## 2021-08-04 PROCEDURE — 82010 KETONE BODYS QUAN: CPT | Performed by: EMERGENCY MEDICINE

## 2021-08-04 PROCEDURE — 36415 COLL VENOUS BLD VENIPUNCTURE: CPT | Performed by: EMERGENCY MEDICINE

## 2021-08-04 PROCEDURE — 84484 ASSAY OF TROPONIN QUANT: CPT | Performed by: EMERGENCY MEDICINE

## 2021-08-04 PROCEDURE — 83735 ASSAY OF MAGNESIUM: CPT | Performed by: INTERNAL MEDICINE

## 2021-08-04 PROCEDURE — 96361 HYDRATE IV INFUSION ADD-ON: CPT

## 2021-08-04 RX ADMIN — SODIUM CHLORIDE 1000 ML: 9 INJECTION, SOLUTION INTRAVENOUS at 22:55

## 2021-08-04 RX ADMIN — SODIUM CHLORIDE 1000 ML: 9 INJECTION, SOLUTION INTRAVENOUS at 21:50

## 2021-08-05 VITALS
DIASTOLIC BLOOD PRESSURE: 75 MMHG | OXYGEN SATURATION: 99 % | WEIGHT: 140.21 LBS | BODY MASS INDEX: 21.96 KG/M2 | TEMPERATURE: 98.4 F | HEART RATE: 73 BPM | SYSTOLIC BLOOD PRESSURE: 151 MMHG | RESPIRATION RATE: 18 BRPM

## 2021-08-05 PROBLEM — R73.9 HYPERGLYCEMIA: Status: ACTIVE | Noted: 2021-08-05

## 2021-08-05 PROBLEM — Z79.4 TYPE 2 DIABETES MELLITUS WITH HYPERGLYCEMIA, WITH LONG-TERM CURRENT USE OF INSULIN (H): Status: ACTIVE | Noted: 2021-08-05

## 2021-08-05 PROBLEM — E11.65 TYPE 2 DIABETES MELLITUS WITH HYPERGLYCEMIA, WITH LONG-TERM CURRENT USE OF INSULIN (H): Status: ACTIVE | Noted: 2021-08-05

## 2021-08-05 LAB
ANION GAP SERPL CALCULATED.3IONS-SCNC: 5 MMOL/L (ref 3–14)
BUN SERPL-MCNC: 54 MG/DL (ref 7–30)
CALCIUM SERPL-MCNC: 8 MG/DL (ref 8.5–10.1)
CHLORIDE BLD-SCNC: 105 MMOL/L (ref 94–109)
CO2 SERPL-SCNC: 25 MMOL/L (ref 20–32)
CREAT SERPL-MCNC: 1.77 MG/DL (ref 0.66–1.25)
GFR SERPL CREATININE-BSD FRML MDRD: 35 ML/MIN/1.73M2
GLUCOSE BLD-MCNC: 434 MG/DL (ref 70–99)
GLUCOSE BLDC GLUCOMTR-MCNC: 173 MG/DL (ref 70–99)
GLUCOSE BLDC GLUCOMTR-MCNC: 387 MG/DL (ref 70–99)
GLUCOSE BLDC GLUCOMTR-MCNC: 419 MG/DL (ref 70–99)
HBA1C MFR BLD: 11.4 % (ref 0–5.6)
MAGNESIUM SERPL-MCNC: 2.2 MG/DL (ref 1.6–2.3)
POTASSIUM BLD-SCNC: 4.6 MMOL/L (ref 3.4–5.3)
SARS-COV-2 RNA RESP QL NAA+PROBE: NEGATIVE
SODIUM SERPL-SCNC: 135 MMOL/L (ref 133–144)
TROPONIN I SERPL-MCNC: <0.015 UG/L (ref 0–0.04)
TSH SERPL DL<=0.005 MIU/L-ACNC: 0.75 MU/L (ref 0.4–4)

## 2021-08-05 PROCEDURE — 258N000003 HC RX IP 258 OP 636: Performed by: INTERNAL MEDICINE

## 2021-08-05 PROCEDURE — 99207 PR APP CREDIT; MD BILLING SHARED VISIT: CPT | Performed by: INTERNAL MEDICINE

## 2021-08-05 PROCEDURE — 99236 HOSP IP/OBS SAME DATE HI 85: CPT | Performed by: INTERNAL MEDICINE

## 2021-08-05 PROCEDURE — G0378 HOSPITAL OBSERVATION PER HR: HCPCS

## 2021-08-05 PROCEDURE — 80048 BASIC METABOLIC PNL TOTAL CA: CPT | Performed by: INTERNAL MEDICINE

## 2021-08-05 PROCEDURE — 36415 COLL VENOUS BLD VENIPUNCTURE: CPT | Performed by: EMERGENCY MEDICINE

## 2021-08-05 PROCEDURE — 99207 PR APP CREDIT; MD BILLING SHARED VISIT: CPT | Performed by: HOSPITALIST

## 2021-08-05 PROCEDURE — 84443 ASSAY THYROID STIM HORMONE: CPT | Performed by: INTERNAL MEDICINE

## 2021-08-05 PROCEDURE — 83036 HEMOGLOBIN GLYCOSYLATED A1C: CPT | Performed by: EMERGENCY MEDICINE

## 2021-08-05 PROCEDURE — 36415 COLL VENOUS BLD VENIPUNCTURE: CPT | Performed by: INTERNAL MEDICINE

## 2021-08-05 PROCEDURE — 250N000012 HC RX MED GY IP 250 OP 636 PS 637: Performed by: INTERNAL MEDICINE

## 2021-08-05 PROCEDURE — 96372 THER/PROPH/DIAG INJ SC/IM: CPT | Performed by: INTERNAL MEDICINE

## 2021-08-05 RX ORDER — ISOSORBIDE DINITRATE 20 MG/1
20 TABLET ORAL 2 TIMES DAILY
Status: ON HOLD | COMMUNITY
End: 2022-01-01

## 2021-08-05 RX ORDER — POLYETHYLENE GLYCOL 3350 17 G/17G
17 POWDER, FOR SOLUTION ORAL 2 TIMES DAILY PRN
Status: DISCONTINUED | OUTPATIENT
Start: 2021-08-05 | End: 2021-08-05

## 2021-08-05 RX ORDER — ACETAMINOPHEN 325 MG/1
650 TABLET ORAL EVERY 6 HOURS PRN
Status: DISCONTINUED | OUTPATIENT
Start: 2021-08-05 | End: 2021-08-05 | Stop reason: HOSPADM

## 2021-08-05 RX ORDER — PROCHLORPERAZINE 25 MG
12.5 SUPPOSITORY, RECTAL RECTAL EVERY 12 HOURS PRN
Status: DISCONTINUED | OUTPATIENT
Start: 2021-08-05 | End: 2021-08-05 | Stop reason: HOSPADM

## 2021-08-05 RX ORDER — ACETAMINOPHEN 650 MG/1
650 SUPPOSITORY RECTAL EVERY 6 HOURS PRN
Status: DISCONTINUED | OUTPATIENT
Start: 2021-08-05 | End: 2021-08-05 | Stop reason: HOSPADM

## 2021-08-05 RX ORDER — ONDANSETRON 4 MG/1
4 TABLET, ORALLY DISINTEGRATING ORAL EVERY 6 HOURS PRN
Status: DISCONTINUED | OUTPATIENT
Start: 2021-08-05 | End: 2021-08-05 | Stop reason: HOSPADM

## 2021-08-05 RX ORDER — NICOTINE POLACRILEX 4 MG
15-30 LOZENGE BUCCAL
Status: DISCONTINUED | OUTPATIENT
Start: 2021-08-05 | End: 2021-08-05 | Stop reason: HOSPADM

## 2021-08-05 RX ORDER — PANTOPRAZOLE SODIUM 40 MG/1
40 TABLET, DELAYED RELEASE ORAL DAILY PRN
Status: DISCONTINUED | OUTPATIENT
Start: 2021-08-05 | End: 2021-08-05 | Stop reason: HOSPADM

## 2021-08-05 RX ORDER — VITAMIN B COMPLEX
25 TABLET ORAL DAILY
COMMUNITY

## 2021-08-05 RX ORDER — SENNOSIDES 8.6 MG
8.6 TABLET ORAL DAILY PRN
Status: DISCONTINUED | OUTPATIENT
Start: 2021-08-05 | End: 2021-08-05

## 2021-08-05 RX ORDER — ASPIRIN 81 MG/1
81 TABLET ORAL DAILY
Status: DISCONTINUED | OUTPATIENT
Start: 2021-08-05 | End: 2021-08-05 | Stop reason: HOSPADM

## 2021-08-05 RX ORDER — PROCHLORPERAZINE MALEATE 5 MG
5 TABLET ORAL EVERY 6 HOURS PRN
Status: DISCONTINUED | OUTPATIENT
Start: 2021-08-05 | End: 2021-08-05 | Stop reason: HOSPADM

## 2021-08-05 RX ORDER — ONDANSETRON 2 MG/ML
4 INJECTION INTRAMUSCULAR; INTRAVENOUS EVERY 6 HOURS PRN
Status: DISCONTINUED | OUTPATIENT
Start: 2021-08-05 | End: 2021-08-05 | Stop reason: HOSPADM

## 2021-08-05 RX ORDER — SODIUM CHLORIDE 9 MG/ML
INJECTION, SOLUTION INTRAVENOUS CONTINUOUS
Status: DISCONTINUED | OUTPATIENT
Start: 2021-08-05 | End: 2021-08-05 | Stop reason: HOSPADM

## 2021-08-05 RX ORDER — DEXTROSE MONOHYDRATE 25 G/50ML
25-50 INJECTION, SOLUTION INTRAVENOUS
Status: DISCONTINUED | OUTPATIENT
Start: 2021-08-05 | End: 2021-08-05 | Stop reason: HOSPADM

## 2021-08-05 RX ADMIN — INSULIN ASPART 5 UNITS: 100 INJECTION, SOLUTION INTRAVENOUS; SUBCUTANEOUS at 07:05

## 2021-08-05 RX ADMIN — SODIUM CHLORIDE: 9 INJECTION, SOLUTION INTRAVENOUS at 06:05

## 2021-08-05 RX ADMIN — INSULIN GLARGINE 32 UNITS: 100 INJECTION, SOLUTION SUBCUTANEOUS at 06:05

## 2021-08-05 ASSESSMENT — ENCOUNTER SYMPTOMS
NAUSEA: 1
VOMITING: 0
COUGH: 0
FEVER: 0
DIARRHEA: 1

## 2021-08-05 NOTE — ED NOTES
Incontinent of urine x1. Pt soiled sheets. Tech and writer in to change linens and pt's brief. Pt's clothing is removed and placed in a belongings bag, given to wife. Barehugger is placed back on pt. Call light within reach. Will continue to monitor.

## 2021-08-05 NOTE — PROGRESS NOTES
Neuro:  A&O  Cardiac:  WNL TELE:SINUS RHYTHM 70S  Lungs:  WNL  GI:  WNL  :  INCONTINENT  Pain: NONE  IV:  NS @ 100mL/HR  Meds:    Labs/tests:   Diet: CONSISTENT CARB 60GR/MEAL  Activity: AX1  Misc:     Temp: 98.4  F (36.9  C) Temp src: Oral BP: (!) 161/84 Pulse: 74   Resp: 20 SpO2: 99 % O2 Device: None (Room air)       Pt is A&O, answers appropriately to questions, slow to answer. Pt is able to walk, however slow to move. Pt is incontinent of urine and has on brief.  Pt daughter Candis is interpreting and in room w/ pt. Pt recv'd 32u Lantus and 5u of Novolog. .  Will continue to monitor and provide support.

## 2021-08-05 NOTE — PHARMACY-ADMISSION MEDICATION HISTORY
Admission medication history interview status for this patient is complete. See [facility] admission navigator for allergy information, prior to admission medications and immunization status.     Medication history interview done, indicate source(s): Family and Caregiver - daughterCandis  Medication history resources (including written lists, pill bottles, clinic record): SureScripts and Care Everywhere  Pharmacy: [facility] or [facility]. [facility] for discharge    Changes made to PTA medication list:  Added: Levemir  Changed: novolog 3 units TID --> 3-6 units TID. Vitamin D3 5000 units daily --> 1000 units daily. Isosorbide mononitrate 20mg TID --> 20mg BID  Reported as Not Taking: none  Removed: bisacodyl supp, Tresiba, vitamin B12, miralax, senna, simvastatin    Actions taken by pharmacist (provider contacted, etc): Spoke with patient's daughter about home med list     Additional medication history information: Candis requested help with insurance for a CGM instead of finger pricking. Many insurances will cover a CGM if the patient tests 4+ times per day, which [facility] does.     Medication reconciliation/reorder completed by provider prior to medication history?  N   (Y/N)     For patients on insulin therapy:   Do you use sliding scale insulin based on blood sugars? Yes, but no exact scale.   What is your pre-meal insulin coverage?  3-6 units  Do you typically eat three meals a day? Yes, and bedtime snack of milk and a slice of chicken  How many times do you check your blood glucose per day? 4+  How many episodes of hypoglycemia do you typically have per month? <1  Do you have a Continuous Glucose Monitor (CGM)?  No    Prior to Admission medications    Medication Sig Last Dose Taking? Auth Provider   alfuzosin ER (UROXATRAL) 10 MG 24 hr tablet Take 1 tablet (10 mg) by mouth daily WILL NEED APPT FOR FUTURE REFILLS 8/4/2021 at am Yes Garett Daniels MD   aspirin (ASA) 81 MG EC tablet Take 1 tablet (81 mg) by  mouth daily 8/4/2021 at am Yes Norman Leahy MD   atorvastatin (LIPITOR) 40 MG tablet Take 1 tablet (40 mg) by mouth every evening 8/4/2021 at pm Yes Norman Leahy MD   Carboxymeth-Glycerin-Polysorb (REFRESH OPTIVE ADVANCED) 0.5-1-0.5 % SOLN Place 1 drop into both eyes 2 times daily as needed  Past Week at Unknown time Yes Reported, Patient   cholecalciferol (D 5000) 5000 UNITS CAPS Take 5,000 Units by mouth daily  8/4/2021 at am Yes Reported, Patient   insulin aspart (NOVOLOG PEN) 100 UNIT/ML injection Inject 3-6 Units Subcutaneous 3 times daily (with meals)  8/4/2021 at pm Yes Reported, Patient   insulin detemir (LEVEMIR PEN) 100 UNIT/ML pen Inject 26 Units Subcutaneous At Bedtime 8/3/2021 at pm Yes Unknown, Entered By History   isosorbide dinitrate (ISORDIL) 20 MG tablet Take 20 mg by mouth 2 times daily 8/4/2021 at pm Yes Unknown, Entered By History   metoprolol tartrate (LOPRESSOR) 25 MG tablet Take 0.5 tablets (12.5 mg) by mouth 2 times daily 8/4/2021 at pm Yes Norman Leahy MD   Multiple Vitamins-Minerals (MULTIVITAMIN ADULT) TABS Take 1 tablet by mouth daily 8/4/2021 at Unknown time Yes Unknown, Entered By History   pantoprazole (PROTONIX) 40 MG enteric coated tablet Take 40 mg by mouth daily as needed  Past Month at Unknown time Yes Reported, Patient   traZODone (DESYREL) 150 MG tablet Take 75 mg by mouth At Bedtime  8/3/2021 at pm Yes Unknown, Entered By History   acetaminophen (TYLENOL) 500 MG tablet Take 1,000 mg by mouth every 8 hours as needed for mild pain More than a month at Unknown time  Unknown, Entered By History

## 2021-08-05 NOTE — ED NOTES
Provided with observation sheet with conversation. Unsure of the level of understanding of patient

## 2021-08-05 NOTE — ED PROVIDER NOTES
History     Chief Complaint:  Hyperglycemia      HPI   Eh Callahan is a 81 year old male with a history of type 2 diabetes mellitus who presents with hyperglycemia and associated complaints. Pt's daughter notes he has been having hyperglycemia for the past 4 weeks. His blood sugar levels have been ranging from 300 to 600. The patient normally controls his blood sugar by taking medications with his meals. He also has been having episodes of diarrhea and nausea, but his daughter notes that he has not had an episode today. She thinks that he have an infection, but is unsure what it is. He is not vaccinated for Covid-19. There is no association of fever, cough, or vomiting.     Review of Systems   Constitutional: Negative for fever.   Respiratory: Negative for cough.    Gastrointestinal: Positive for diarrhea and nausea. Negative for vomiting.   All other systems reviewed and are negative.        Allergies:  Amlodipine  Lisinopril  Metoprolol    Medications:    Alfuzosin   Atorvastatin   Dulcolax   Hydralazine   Insulin Aspart   Insulin Degludec   Isordil   Antivert   Lopressor   Protonix  Senokot   Zocor   Desyrel     Past Medical History:    Anatomical narrow angle glaucoma   Atrial fibrillation    Chronic infection   CKD (chronic kidney disease) stage 2  Type 2 diabetes mellitus    Hepatitis C without mention of hepatic coma   Hypertension   PTSD   Urinary retention   Hypoglycemia   Cognitive disorder   Insomnia   DJD  Memory deficits   Anxiety disorder   Cataracts   PTSD  Albuminuria   HLD    Past Surgical History:    Appendectomy   Cataract extraction   Eye surgery   Hepatectomy   Liver resection   Right knee arthroplasty     Family History:    Patient denies pertinent family history.      Social History:  Patient was accompanied to the ED with his daughter.    Physical Exam     Patient Vitals for the past 24 hrs:   BP Temp Temp src Pulse Resp SpO2   08/04/21 2300 (!) 138/124 -- -- 73 -- 94 %   08/04/21 2245 --  -- -- -- -- 97 %   08/04/21 2230 -- -- -- -- -- 96 %   08/04/21 2215 135/65 -- -- -- -- 96 %   08/04/21 2200 (!) 144/77 -- -- 65 -- 97 %   08/04/21 2145 129/70 -- -- -- -- --   08/04/21 2110 110/61 97.2  F (36.2  C) Oral 77 18 99 %       Physical Exam  Nursing note and vitals reviewed.  HENT:   Mouth/Throat: Moist mucous membranes.   Eyes: EOMI, nonicteric sclera  Cardiovascular: Normal rate, regular rhythm, no murmurs, rubs, or gallops  Pulmonary/Chest: Effort normal and breath sounds normal. No respiratory distress. No wheezes. No rales.   Abdominal: Soft. Nontender, nondistended, no guarding or rigidity.   Musculoskeletal: Normal range of motion.   Neurological: Alert. Moves all extremities spontaneously.   Skin: Skin is warm and dry. No rash noted.       Emergency Department Course   Laboratory:  PH venous : 7.29 (L)    BMP: 131 (L), Urea Nitrogen - 66 (H), GFR Estimate - 30 (L) o/w WNL (Creatinine 2.05 (H))    Ketone Beta-Hydroxybutyrate Quantitative: 0.1     CBC: WBC 5.7, HGB 13 (L),  (l)    Glucose by meter (Collected 2113): 423 (H)    Asymptomatic COVID-19 Virus (Coronavirus) by PCR Nasopharyngeal swab: Negative     Hgb A1c: 11.4 (H)    Troponin (Collected 2149): <0.015    Emergency Department Course:    Reviewed:  I reviewed nursing notes, vitals, past history and care everywhere    Assessments:  2305 I obtained history and examined the patient as noted above.    I rechecked the patient and explained findings.     Interventions:  2150 NS 1L IV  2255 NS 1L IV    Disposition:  The patient was admitted to the hospital under the care of Dr. Peraza.    Impression & Plan    Medical Decision Making:  Patient presents with hyperglycemia as well as associated weakness, fatigue, diarrhea.  Patient is a known type II diabetic and reports he has been taking his medication as prescribed.  His daughter helps administer his insulin and despite using maximum dosage of pt's sliding scale, notes that blood sugar has  not been below 350 in several weeks.  Labs are notable for mild acidosis.  No ketosis.  Therefore by definition, no ketoacidosis.  2 L IV fluid given to start correcting dehydration and hyperglycemia.  Patient has evidence of mild acute kidney injury. Doubt any infection. Will admit given TRANG and weakness. Pt/daughter are in agreement with plan. Dr. Peraza accepts pt for admission.       Covid-19  Eh Callahan was evaluated during a global COVID-19 pandemic, which necessitated consideration that the patient might be at risk for infection with the SARS-CoV-2 virus that causes COVID-19.   Applicable protocols for evaluation were followed during the patient's care.   COVID-19 was considered as part of the patient's evaluation. The plan for testing is:  a test was obtained during this visit.    Diagnosis:    ICD-10-CM    1. Hyperglycemia  R73.9    2. Acute kidney injury (H)  N17.9        Scribe Disclosure:  I, Arpan Infante, am serving as a scribe at 11:02 PM on 8/4/2021 to document services personally performed by Delvis Casper MD based on my observations and the provider's statements to me.      Delvis Casper MD  08/05/21 0800

## 2021-08-05 NOTE — ED NOTES
[facility]  ED Nurse Handoff Report    Eh Callahan is a 81 year old male   ED Chief complaint: Hyperglycemia  . ED Diagnosis:   Final diagnoses:   Hyperglycemia   Acute kidney injury (H)     Allergies:   Allergies   Allergen Reactions     Amlodipine Swelling     Edema at 10 mg , fine at 5 mg     Lisinopril Cough     Metoprolol      Other reaction(s): Bradycardia  Metoprolol at 50mg bid -> HR 45 -50, unclear if sx (has fatigue)  May tolerate lower doses if needed       Code Status: Full Code  Activity level - Baseline/Home:  Independent. Activity Level - Current:   Assist X 1. Lift room needed: No. Bariatric: No   Needed: No   Isolation: No. Infection: Not Applicable.     Vital Signs:   Vitals:    08/04/21 2215 08/04/21 2230 08/04/21 2245 08/04/21 2300   BP: 135/65   (!) 138/124   Pulse:    73   Resp:       Temp:       TempSrc:       SpO2: 96% 96% 97% 94%       Cardiac Rhythm:  ,      Pain level:    Patient confused: No. Patient Falls Risk: Yes.   Elimination Status: Has voided   Patient Report - Initial Complaint: Pt to ER with c/o increased blood sugars. Focused Assessment: Pt with 4 weeks of n/v d  Pt concerned with elevated blood sugars  Tests Performed: Labs. Abnormal Results: BUN 66 Creat 2.05, Blood glucose 423 PH 7.27.   Treatments provided: IVFs   Family Comments: Family at bedside  OBS brochure/video discussed/provided to patient:  Yes  ED Medications:   Medications   0.9% sodium chloride BOLUS (0 mLs Intravenous Stopped 8/4/21 2255)   0.9% sodium chloride BOLUS (1,000 mLs Intravenous New Bag 8/4/21 2255)     Drips infusing:  No  For the majority of the shift, the patient's behavior Green. Interventions performed wereN/A.    Sepsis treatment initiated: No     Patient tested for COVID 19 prior to admission: YES    ED Nurse Name/Phone Number: Pauline Patel RN,   11:39 PM    RECEIVING UNIT ED HANDOFF REVIEW    Above ED Nurse Handoff Report was reviewed: Yes  Reviewed by: Veronica  POLLO Pappas on August 5, 2021 at 4:36 AM

## 2021-08-05 NOTE — PLAN OF CARE
Patient's After Visit Summary was reviewed with patient and/or daughter.   Patient verbalized understanding of After Visit Summary, recommended follow up and was given an opportunity to ask questions.   Discharge medications sent home with patient/family: No new medications started.    Discharged with daughter with all belongings. Patient medically cleared to discharge. Daughter will transport home.   BP (!) 151/75 (BP Location: Right arm)   Pulse 73   Temp 98.4  F (36.9  C) (Oral)   Resp 18   Wt 63.6 kg (140 lb 3.4 oz)   SpO2 99%   BMI 21.96 kg/m    OBSERVATION patient END time: 1445

## 2021-08-05 NOTE — PROGRESS NOTES
ROOM # 225    Living Situation (if not independent, order SW consult): lives at home with kids  Facility name: n/a  : Candis (daughter [phone]    Activity level at baseline: Ax1  Activity level on admit: Ax1      Patient registered to observation; given Patient Bill of Rights; given the opportunity to ask questions about observation status and their plan of care.  Patient has been oriented to the observation room, bathroom and call light is in place.    Discussed discharge goals and expectations with patient/family.

## 2021-08-05 NOTE — DISCHARGE SUMMARY
[facility]  Hospitalist Discharge Summary      Date of Admission:  8/4/2021  Date of Discharge:  8/5/2021  Discharging Provider: Stephen Natarajan MD      Discharge Diagnoses   hyperglycemia    Follow-ups Needed After Discharge   Follow-up Appointments     Follow-up and recommended labs and tests       Follow up with primary care provider, Basilio Alfaro, within 7 days   for hospital follow- up.  The following labs/tests are recommended:   follow-up on blood sugars and adjust insulin if necessary.             Unresulted Labs Ordered in the Past 30 Days of this Admission     No orders found for last 31 day(s).      These results will be followed up by NA    Discharge Disposition   Discharged to home  Condition at discharge: Stable      Hospital Course   Eh Callahan is an 81-year-old Mauritian speaking male with history of insulin-dependent type 2 diabetes, chronic kidney disease stage II, hypercholesterolemia, hypertension, atrial fibrillation, hepatitis C, narrow angle glaucoma, posttraumatic stress disorder, appendectomy, partial hepatectomy, and eye surgery.  He has not had COVID-19 vaccine. His daughter was present and interpreted for my interview.  He presented to the emergency department last night for evaluation of 4 weeks of high blood sugars in the 300-600 range.  He had had some associated nausea and diarrhea but no vomiting, fevers, chills, cough, dysuria, or shortness of breath.  He had not sought advice from primary care.  He does get insulin regularly.  He stores it in the refrigerator.  Emergency department evaluation showed stable vital signs.  Laboratory evaluation showed blood sugar 423, sodium 131, BUN 66, creatinine 2.05 (baseline 1.6), hemoglobin 13, and platelets 116.  COVID-19 testing was negative.  He was given normal saline.  I was asked to admit Eh to the hospital under observation status with hyperglycemia, nausea, and diarrhea.    Patient was admitted to the  observation unit overnight.  He was given 32 units of Lantus this morning.  His sugars are now under 200.  His premeal insulin was increased to 5 units.  I discussed the plan with the patient's daughter.  She states that his sugars have been high for a month.  There has been no changes in his diet.  No new symptoms of anything.  She gets refills of the insulin every 3 months and it is kept in the refrigerator.  She confirms that he did not get his evening Lantus yesterday.  He did get morning Lantus today.  And now his blood sugars are controlled.  At this point I have recommended that she just switch his long acting insulin to be given in the morning.  She states that his bedtime blood sugar is always very high.  Perhaps switching his long-acting insulin to the morning will help improve these as well.  I will not add any bedtime insulin.  He will follow-up with his primary care provider for any further changes to his medications.  We will discharge with long-acting insulin in the morning at 30 units and 5 units for premeal insulin.  Patient denies any symptoms right now.  No chest pain, no shortness of breath, no nausea vomiting or diarrhea.  No urinary symptoms.  He feels at his baseline.  Daughter feels he is at his baseline and he will discharge home.    Consultations This Hospital Stay   None    Code Status   Full Code    Time Spent on this Encounter   I, Stephen Natarajan MD, personally saw the patient today and spent greater than 30 minutes discharging this patient.       Stephen Natarajan MD  [facility] OBSERVATION DEPT  [address] [place] [address]  Phone: [phone]  ______________________________________________________________________    Physical Exam   Vital Signs: Temp: 98.4  F (36.9  C) Temp src: Oral BP: (!) 151/75 Pulse: 73   Resp: 18 SpO2: 99 % O2 Device: None (Room air)    Weight: 140 lbs 3.4 oz  Constitutional: awake, alert, cooperative, no apparent distress, and  appears stated age  Eyes: Lids and lashes normal, pupils equal, round and reactive to light, extra ocular muscles intact, sclera clear, conjunctiva normal  ENT: Normocephalic, without obvious abnormality, atraumatic, sinuses nontender on palpation, external ears without lesions, oral pharynx with moist mucous membranes, tonsils without erythema or exudates, gums normal and good dentition.  Respiratory: No increased work of breathing, good air exchange, clear to auscultation bilaterally, no crackles or wheezing  Cardiovascular: Normal apical impulse, regular rate and rhythm, normal S1 and S2, no S3 or S4, and no murmur noted  GI: No scars, normal bowel sounds, soft, non-distended, non-tender, no masses palpated, no hepatosplenomegally       Primary Care Physician   Basilio Alfaro    Discharge Orders      Reason for your hospital stay    hyperglycemia     Follow-up and recommended labs and tests     Follow up with primary care provider, Basilio Alfaro, within 7 days for hospital follow- up.  The following labs/tests are recommended: follow-up on blood sugars and adjust insulin if necessary.     Activity    Your activity upon discharge: activity as tolerated     Discharge Instructions    Long acting insulin has been increased to 30unit and will be given in the morning instead of at bedtime. Continue to check his blood sugars 4 times per day and review these with his primary care provider. His doctor could consider adding bedtime insulin coverage if appropriate.     Diet    Follow this diet upon discharge: Diabetic (1800 ADA)       Significant Results and Procedures   Most Recent 3 CBC's:Recent Labs   Lab Test 08/04/21 2149 04/29/21  1100 08/02/20  0629   WBC 5.7 8.4 5.3   HGB 13.0* 12.3* 12.2*   MCV 92 93 92   * 112* 159     Most Recent 3 BMP's:Recent Labs   Lab Test 08/05/21  1155 08/05/21  0704 08/05/21  0629 08/04/21 2149 08/04/21  2113 04/29/21  1100   NA  --   --  135 131*  --  133   POTASSIUM  --    --  4.6 4.7  --  4.1   CHLORIDE  --   --  105 99  --  100   CO2  --   --  25 28  --  29   BUN  --   --  54* 66*  --  59*   CR  --   --  1.77* 2.05*  --  1.76*   ANIONGAP  --   --  5 4  --  4   DOMINGO  --   --  8.0* 8.6  --  8.2*   * 419* 434* 423*   < > 190*    < > = values in this interval not displayed.     Most Recent 2 LFT's:Recent Labs   Lab Test 07/31/20  2234 05/22/20  1606   AST 27 33   ALT 33 38   ALKPHOS 84 85   BILITOTAL 0.8 1.3   ,   Results for orders placed or performed during the hospital encounter of 04/29/21   Head CT w/o contrast    Narrative    CT SCAN OF THE HEAD WITHOUT CONTRAST   4/29/2021 1:01 PM     HISTORY: Unwitnessed fall.    TECHNIQUE:  Axial images of the head and coronal reformations without  IV contrast material. Radiation dose for this scan was reduced using  automated exposure control, adjustment of the mA and/or kV according  to patient size, or iterative reconstruction technique.    COMPARISON: Head CT 5/22/2020.    FINDINGS:     Intracranial contents: There is moderate cerebral volume loss. Patchy  low-attenuation of white matter is nonspecific, likely due to mild  small vessel ischemic disease. There is no evidence of intracranial  hemorrhage, mass, acute infarct or anomaly.    Visualized orbits/sinuses/mastoids:  The visualized portions of the  sinuses and mastoids appear normal.    Osseous structures/soft tissues:  There is no evidence of trauma.      Impression    IMPRESSION:   1. No fracture and no acute intracranial hemorrhage.  2. Mild small vessel ischemic disease.  3. No acute intracranial findings.    CARLA MOYA MD   CT Aortic Survey w Contrast    Narrative    CT AORTIC SURVEY WITH CONTRAST 4/29/2021 4:13 PM     HISTORY: Chest pain or back pain, aortic dissection suspected.  Abdominal pain, aortic dissection suspected.    COMPARISON: January 2, 2017 chest CT, abdomen and pelvis CT from  August 1, 2020.    TECHNIQUE: Volumetric helical acquisition of CT images from  the lung  apices through the symphysis pubis after the administration of 80mL  Isovue-370 intravenous contrast. Radiation dose for this scan was  reduced using automated exposure control, adjustment of the mA and/or  kV according to patient size, or iterative reconstruction technique.  Sagittal and coronal reconstructions performed.    FINDINGS:     Aorta: Normal caliber aorta without dissection. Mild atherosclerotic  change.    CHEST: No infiltrates or effusions. Minimal biapical fibrosis. No  adenopathy.    Abdomen and pelvis: Low-dense lesion near the gallbladder and  interhemispheric fissure of the liver is stable. Low-dense lesion in  the lateral right lobe is stable. No definite new liver lesions.  Partial hepatectomy change. Some cortical scarring seen in the right  kidney, kidneys otherwise unremarkable. Adrenal glands, pancreas, and  spleen demonstrate no worrisome focal lesion. Tiny probable gallstone  in the gallbladder, no cholecystitis.    There are no dilated loops of small intestine or large bowel to  suggest ileus or obstruction. No free air or free fluid. No frankly  destructive bony lesions.      Impression    IMPRESSION:  1. No acute aortic abnormality demonstrated.  2. No significant change in liver lesion since the comparison study.    JAZ LÓPEZ MD   Echocardiogram Limited    Narrative    [id number]  [id number]  [id number]  [id number]^PASHA^REMIGIO^GENNY     [facility]  Echocardiography Laboratory  [address] [place] [address]     Name: CHIKIS NEWBERRY  MRN: [id number]  : 1940  Study Date: 2021 01:54 PM  Age: 81 yrs  Gender: Male  Patient Location: [facility]  Reason For Study: Syncope  Ordering Physician: REMIGIO PAK  Performed By: Yeimy Gabriel     BSA: 1.7 m2  Height: 67 in  Weight: 142 lb  HR: 73  BP: 175/88 mmHg  ______________________________________________________________________________  Procedure  Limited Portable Echo Adult. Optison (NDC #[id number]) given  intravenously.  ______________________________________________________________________________  Interpretation Summary     Left ventricular systolic function is normal.  The visual ejection fraction is estimated at 60-65%.  The left ventricle is normal in size.  There is mild to moderate concentric left ventricular hypertrophy.  The right ventricle is normal in structure, function and size.  There is mild (1+) mitral regurgitation.  There is mild (1+) aortic regurgitation.  Sinus rhythm was noted.     A cardiac structual cause of syncope was not identified. There has been no  significant change since 8/1/2020  ______________________________________________________________________________  Left Ventricle  The left ventricle is normal in size. There is mild to moderate concentric  left ventricular hypertrophy. Left ventricular systolic function is normal.  The visual ejection fraction is estimated at 60-65%. Grade I or early  diastolic dysfunction. No regional wall motion abnormalities noted. There is  no thrombus seen in the left ventricle.     Right Ventricle  The right ventricle is normal in structure, function and size. There is no  mass or thrombus in the right ventricle.     Atria  Normal left atrial size. Right atrial size is normal. There is no atrial shunt  seen. The left atrial appendage is not well visualized.     Mitral Valve  The mitral valve leaflets appear normal. There is no evidence of stenosis,  fluttering, or prolapse. There is mild (1+) mitral regurgitation. There is no  mitral valve stenosis.     Tricuspid Valve  Normal tricuspid valve. No tricuspid regurgitation. Right ventricular systolic  pressure could not be approximated due to inadequate tricuspid regurgitation.  There is no tricuspid stenosis.     Aortic Valve  There is mild trileaflet aortic sclerosis. There is mild (1+) aortic  regurgitation. No aortic stenosis is present.     Pulmonic Valve  The pulmonic valve is not well visualized.      Vessels  The aortic root is normal size. Normal size ascending aorta. Inferior vena  cava not well visualized for estimation of right atrial pressure. The  pulmonary artery is normal size.     Pericardium  The pericardium appears normal. There is no pleural effusion.     Rhythm  Sinus rhythm was noted.  ______________________________________________________________________________  MMode/2D Measurements & Calculations     IVSd: 0.98 cm  LVIDd: 3.6 cm  LVIDs: 1.9 cm  LVPWd: 1.0 cm  FS: 48.2 %  LV mass(C)d: 108.9 grams  LV mass(C)dI: 62.3 grams/m2  LA dimension: 3.1 cm  LVOT diam: 1.7 cm  LVOT area: 2.3 cm2  RWT: 0.57     Doppler Measurements & Calculations  MV E max dagoberto: 82.9 cm/sec  MV A max dagoberto: 108.0 cm/sec  MV E/A: 0.77  MV dec time: 0.18 sec  AI P1/2t: 428.8 msec  LV V1 max P.4 mmHg  LV V1 max: 126.0 cm/sec  LV V1 VTI: 29.6 cm  SV(LVOT): 67.2 ml  SI(LVOT): 38.4 ml/m2  E/E' av.1  Lateral E/e': 16.6     Medial E/e': 17.7     ______________________________________________________________________________  Report approved by: Dr. Wood Pringle 2021 02:41 PM               Discharge Medications   Current Discharge Medication List      CONTINUE these medications which have CHANGED    Details   insulin aspart (NOVOLOG PEN) 100 UNIT/ML pen Inject 5 Units Subcutaneous 3 times daily (with meals)    Associated Diagnoses: HCC (hepatocellular carcinoma) (H)      insulin detemir (LEVEMIR PEN) 100 UNIT/ML pen Inject 30 Units Subcutaneous every morning         CONTINUE these medications which have NOT CHANGED    Details   alfuzosin ER (UROXATRAL) 10 MG 24 hr tablet Take 1 tablet (10 mg) by mouth daily WILL NEED APPT FOR FUTURE REFILLS  Qty: 90 tablet, Refills: 1    Associated Diagnoses: Urinary retention      aspirin (ASA) 81 MG EC tablet Take 1 tablet (81 mg) by mouth daily  Qty: 100 tablet, Refills: 0    Associated Diagnoses: ACS (acute coronary syndrome) (H)      atorvastatin (LIPITOR) 40 MG tablet Take 1  tablet (40 mg) by mouth every evening  Qty: 30 tablet, Refills: 1    Associated Diagnoses: ACS (acute coronary syndrome) (H)      Carboxymeth-Glycerin-Polysorb (REFRESH OPTIVE ADVANCED) 0.5-1-0.5 % SOLN Place 1 drop into both eyes 2 times daily as needed     Associated Diagnoses: HCC (hepatocellular carcinoma) (H)      isosorbide dinitrate (ISORDIL) 20 MG tablet Take 20 mg by mouth 2 times daily      metoprolol tartrate (LOPRESSOR) 25 MG tablet Take 0.5 tablets (12.5 mg) by mouth 2 times daily  Qty: 30 tablet, Refills: 1    Associated Diagnoses: ACS (acute coronary syndrome) (H); Essential hypertension      Multiple Vitamins-Minerals (MULTIVITAMIN ADULT) TABS Take 1 tablet by mouth daily      pantoprazole (PROTONIX) 40 MG enteric coated tablet Take 40 mg by mouth daily as needed       traZODone (DESYREL) 150 MG tablet Take 75 mg by mouth At Bedtime       Vitamin D3 (CHOLECALCIFEROL) 25 mcg (1000 units) tablet Take 25 mcg by mouth daily      acetaminophen (TYLENOL) 500 MG tablet Take 1,000 mg by mouth every 8 hours as needed for mild pain           Allergies   Allergies   Allergen Reactions     Amlodipine Swelling     Edema at 10 mg , fine at 5 mg     Lisinopril Cough     Metoprolol      Other reaction(s): Bradycardia  Metoprolol at 50mg bid -> HR 45 -50, unclear if sx (has fatigue)  May tolerate lower doses if needed

## 2021-08-05 NOTE — PROGRESS NOTES
Patient is alert and oriented. VS WNL and documented on the FS. Lung sounds clear. Active bowel sounds. Patient voiding. Denies pain. Denies any numbness or tingling. IV fluids infusing at 100 ml/hr. Patient on a consistent carb diet and patient at 75% of his breakfast. BS was 419 this morning with correction given (MD updated). Creatinine was 2.05 and this morning at 1.77. Patient is a 1 assist with a cane when up. Daughter interprets for patient.       BP (!) 147/70 (BP Location: Right arm)   Pulse 69   Temp 98.5  F (36.9  C) (Oral)   Resp 18   Wt 63.6 kg (140 lb 3.4 oz)   SpO2 100%   BMI 21.96 kg/m

## 2021-08-05 NOTE — ED TRIAGE NOTES
Here for concern of elevated blood sugar ranging from 360-600 associated with nausea and diarrhea ongoing for about 4 weeks. History of diabetes. Blood sugar 423 in triageABCs intact.

## 2021-08-05 NOTE — H&P
[facility]  History and Physical   Hospitalist Service    Naresh Peraza MD    Eh Callahan MRN# [id number]   YOB: 1940 Age: 81 year old      Date of Admission:  8/4/2021           Assessment and Plan:   Summary:  Eh Callahan is an 81-year-old [place] speaking male with history of insulin-dependent type 2 diabetes, chronic kidney disease stage II, hypercholesterolemia, hypertension, atrial fibrillation, hepatitis C, narrow angle glaucoma, posttraumatic stress disorder, appendectomy, partial hepatectomy, and eye surgery.  He has not had COVID-19 vaccine. His daughter was present and interpreted for my interview.  He presented to the emergency department last night for evaluation of 4 weeks of high blood sugars in the 300-600 range.  He had had some associated nausea and diarrhea but no vomiting, fevers, chills, cough, dysuria, or shortness of breath.  He had not sought advice from primary care.  He does get insulin regularly.  He stores it in the refrigerator.  Emergency department evaluation showed stable vital signs.  Laboratory evaluation showed blood sugar 423, sodium 131, BUN 66, creatinine 2.05 (baseline 1.6), hemoglobin 13, and platelets 116.  COVID-19 testing was negative.  He was given normal saline.  I was asked to admit Eh to the hospital under observation status with hyperglycemia, nausea, and diarrhea.    Problem list:    Insulin-dependent type 2 diabetes, poorly controlled  Hyperglycemia  -I suspect he needs insulin titration  -Admit to observation  -Continue hydration with normal saline  -Increase long-acting insulin from 26 units at night to 32 units at night.  Of note, he normally takes Tresiba.  I will use Lantus while in the hospital.  -Increase prandial NovoLog insulin from 3 units with meals to 5 units with meals  -I have ordered NovoLog sliding scale, medium resistance  -Check basic metabolic panel in the morning  -I have ordered potassium and magnesium  replacement protocols    Acute kidney injury on chronic kidney disease stage II  Dehydration due to hyperglycemia and glycosuria diuresis  -Hydrate with normal saline at 100 mL/h  -Repeat basic metabolic panel in the morning    Diarrhea  -Seems improved; if recurs and is significant consider checking enteric panel    Stable medical conditions:  Atrial fibrillation  Hepatitis C  Dyslipidemia  Hypertension  Posttraumatic stress disorder  -Assess prior to admission medications once reconciled    CODE STATUS: Full code  DVT prophylaxis: Ambulate  Disposition: Admit to observation               Code Status:   Full Code         Primary Care Physician:   Basilio Alfaro [phone]         Chief Complaint:   High blood sugar, nausea, and diarrhea    History is obtained from patient, his daughter, Dr. Casper, and the medical record         History of Present Illness:   Eh Callahan is an 81-year-old Citizen of the [place] speaking male with history of insulin-dependent type 2 diabetes, chronic kidney disease stage II, hypercholesterolemia, hypertension, atrial fibrillation, hepatitis C, narrow angle glaucoma, posttraumatic stress disorder, appendectomy, partial hepatectomy, and eye surgery.  He has not had COVID-19 vaccine. His daughter was present and interpreted for my interview.  He presented to the emergency department last night for evaluation of 4 weeks of high blood sugars in the 300-600 range.  He had had some associated nausea and diarrhea but no vomiting, fevers, chills, cough, dysuria, or shortness of breath.  He had not sought advice from primary care.  He does get insulin regularly.  He stores it in the refrigerator.  Emergency department evaluation showed stable vital signs.  Laboratory evaluation showed blood sugar 423, sodium 131, BUN 66, creatinine 2.05 (baseline 1.6), hemoglobin 13, and platelets 116.  COVID-19 testing was negative.  He was given normal saline.  I was asked to admit Eh to the hospital under  observation status with hyperglycemia, nausea, and diarrhea.           Past Medical History:     Patient Active Problem List   Diagnosis     Hypertension     Diabetes mellitus (H)     CKD (chronic kidney disease) stage 2, GFR 60-89 ml/min     Anatomical narrow angle glaucoma     PTSD (post-traumatic stress disorder)     Memory deficits     Cognitive disorder     Major depression     Generalized anxiety disorder     History of hepatitis C     Atrial fibrillation (H)     Liver mass     HCC (hepatocellular carcinoma) (H)     ACP (advance care planning)     Albuminuria     Knee pain     BMI (body mass index), pediatric, 5% to less than 85% for age     DJD (degenerative joint disease)     Hyperlipidemia LDL goal <100     Insomnia     Mental disorder     Primary insomnia     Hypoglycemia     Dizziness     Urinary retention     Acute kidney injury (H)     Aspiration pneumonia (H)     ACS (acute coronary syndrome) (H)     Hyperglycemia     Type 2 diabetes mellitus with hyperglycemia, with long-term current use of insulin (H)      Past Medical History:   Diagnosis Date     Anatomical narrow angle glaucoma      Atrial fibrillation (H)      Chronic infection     history of hepatitis C     CKD (chronic kidney disease) stage 2, GFR 60-89 ml/min      Diabetes mellitus (H)      Hepatitis C without mention of hepatic coma      Hypertension      PTSD (post-traumatic stress disorder)              Past Surgical History:     Past Surgical History:   Procedure Laterality Date     APPENDECTOMY       EYE SURGERY       LAPAROSCOPIC HEPATECTOMY PARTIAL Left 11/22/2016    Procedure: LAPAROSCOPIC HEPATECTOMY PARTIAL;  Surgeon: Rick Mckeon MD;  Location: [facility] OR            Home Medications:     Prior to Admission medications    Medication Sig Last Dose Taking? Auth Provider   acetaminophen (TYLENOL) 500 MG tablet Take 1,000 mg by mouth every 8 hours as needed for mild pain   Unknown, Entered By History   alfuzosin ER (UROXATRAL) 10 MG 24  hr tablet Take 1 tablet (10 mg) by mouth daily WILL NEED APPT FOR FUTURE REFILLS   Garett Daniels MD   aspirin (ASA) 81 MG EC tablet Take 1 tablet (81 mg) by mouth daily   Norman Leahy MD   atorvastatin (LIPITOR) 40 MG tablet Take 1 tablet (40 mg) by mouth every evening   Norman Leahy MD   bisacodyl (DULCOLAX) 10 MG suppository Place 1 suppository (10 mg) rectally daily as needed for constipation   Carina Bruce PA-C   blood glucose monitoring (GREGORIO CONTOUR) test strip 4 times daily    Reported, Patient   blood glucose monitoring (NO BRAND SPECIFIED) meter device kit Use to test blood sugar 4 times daily or as directed.   Carina Bruce PA-C   Blood Glucose Monitoring Suppl (FIFTY50 GLUCOSE METER 2.0) W/DEVICE KIT as needed for blood glucose monitoring   Reported, Patient   Blood Pressure Monitoring (RA BLOOD PRESSURE CUFF MONITOR) MISC Take blood pressure once daily   Reported, Patient   Carboxymeth-Glycerin-Polysorb (REFRESH OPTIVE ADVANCED) 0.5-1-0.5 % SOLN Place 1 drop into both eyes 2 times daily as needed    Reported, Patient   cholecalciferol (D 5000) 5000 UNITS CAPS Take 5,000 Units by mouth daily    Reported, Patient   Cyanocobalamin (B-12 PO) Take 1 capsule by mouth daily   Unknown, Entered By History   hydrALAZINE (APRESOLINE) 25 MG tablet Take 1 tablet (25 mg) by mouth 3 times daily   Norman Leahy MD   insulin aspart (NOVOLOG PEN) 100 UNIT/ML injection Inject 3 Units Subcutaneous 3 times daily (with meals)    Reported, Patient   insulin degludec (TRESIBA FLEXTOUCH) 100 UNIT/ML pen Inject 22 Units Subcutaneous At Bedtime   Unknown, Entered By History   isosorbide dinitrate (ISORDIL) 20 MG tablet Take 1 tablet (20 mg) by mouth 3 times daily   Norman Leahy MD   meclizine (ANTIVERT) 25 MG tablet Take 1 tablet (25 mg) by mouth every 6 hours as needed for dizziness   Jazmyn Patel PA   metoprolol tartrate (LOPRESSOR) 25 MG tablet Take 0.5  tablets (12.5 mg) by mouth 2 times daily   Norman Leahy MD   Multiple Vitamins-Minerals (MULTIVITAMIN ADULT) TABS Take 1 tablet by mouth daily   Unknown, Entered By History   pantoprazole (PROTONIX) 40 MG enteric coated tablet Take 40 mg by mouth daily as needed    Reported, Patient   polyethylene glycol (MIRALAX/GLYCOLAX) packet Take 17 g by mouth 2 times daily as needed    Carina Brcue PA-C   sennosides (SENOKOT) 8.6 MG tablet Take 8.6 mg by mouth daily as needed   Unknown, Entered By History   simvastatin (ZOCOR) 20 MG tablet Take 1 tablet (20 mg) by mouth daily   Cindy Maier MD   traZODone (DESYREL) 150 MG tablet Take 75 mg by mouth At Bedtime    Unknown, Entered By History            Allergies:     Allergies   Allergen Reactions     Amlodipine Swelling     Edema at 10 mg , fine at 5 mg     Lisinopril Cough     Metoprolol      Other reaction(s): Bradycardia  Metoprolol at 50mg bid -> HR 45 -50, unclear if sx (has fatigue)  May tolerate lower doses if needed            Social History:     Social History     Tobacco Use     Smoking status: Former Smoker     Smokeless tobacco: Never Used   Substance Use Topics     Alcohol use: No     Alcohol/week: 0.0 standard drinks             Family History:     Family History   Problem Relation Age of Onset     Diabetes No family hx of         He is not aware of any diabetes in his family     Kidney Disease No family hx of               Review of Systems:   The 10 point Review of Systems is negative other than as noted in the HPI.           Physical Exam:   Blood pressure (!) 138/124, pulse 73, temperature 97.2  F (36.2  C), temperature source Oral, resp. rate 18, SpO2 94 %.  0 lbs 0 oz      GENERAL: Pleasant and cooperative. No acute distress.  EYES: Pupils equal and round. No scleral erythema or icterus.  ENT: External ears are normal without deformity. Posterior oropharynx is without erythem, swelling, or exudate.  NECK: Supple. No masses or  swelling. No tenderness. Thyroid is normal without mass or tenderness.  CHEST: Clear to auscultation. Normal breath sounds. No retractions.   CV: Regular rate and rhythm. No JVD. Pulses normal.  ABDOMEN: Bowel sounds present. No tenderness. No masses or hernia.  EXTREMETIES: No clubbing, cyanosis, or ischemia.  SKIN: Warm and dry to touch. No wounds or rashes.  NEUROLOGIC: Strength and sensation are normal. Deep tendon reflexes are normal. Cranial nerves are normal.             Data:   All new lab and imaging data was reviewed.     Results for orders placed or performed during the hospital encounter of 08/04/21 (from the past 24 hour(s))   Glucose by meter   Result Value Ref Range    GLUCOSE BY METER POCT 423 (H) 70 - 99 mg/dL   CBC with platelets differential    Narrative    The following orders were created for panel order CBC with platelets differential.  Procedure                               Abnormality         Status                     ---------                               -----------         ------                     CBC with platelets and d...[[id number]]  Abnormal            Final result                 Please view results for these tests on the individual orders.   Basic metabolic panel   Result Value Ref Range    Sodium 131 (L) 133 - 144 mmol/L    Potassium 4.7 3.4 - 5.3 mmol/L    Chloride 99 94 - 109 mmol/L    Carbon Dioxide (CO2) 28 20 - 32 mmol/L    Anion Gap 4 3 - 14 mmol/L    Urea Nitrogen 66 (H) 7 - 30 mg/dL    Creatinine 2.05 (H) 0.66 - 1.25 mg/dL    Calcium 8.6 8.5 - 10.1 mg/dL    Glucose 423 (H) 70 - 99 mg/dL    GFR Estimate 30 (L) >60 mL/min/1.73m2   Ketone Beta-Hydroxybutyrate Quantitative   Result Value Ref Range    Ketone (Beta-Hydroxybutyrate) Quantitative 0.1 0.0 - 0.6 mmol/L   CBC with platelets and differential   Result Value Ref Range    WBC Count 5.7 4.0 - 11.0 10e3/uL    RBC Count 4.15 (L) 4.40 - 5.90 10e6/uL    Hemoglobin 13.0 (L) 13.3 - 17.7 g/dL    Hematocrit 38.3 (L) 40.0 -  53.0 %    MCV 92 78 - 100 fL    MCH 31.3 26.5 - 33.0 pg    MCHC 33.9 31.5 - 36.5 g/dL    RDW 11.7 10.0 - 15.0 %    Platelet Count 116 (L) 150 - 450 10e3/uL    % Neutrophils 62 %    % Lymphocytes 24 %    % Monocytes 9 %    % Eosinophils 4 %    % Basophils 0 %    % Immature Granulocytes 1 %    NRBCs per 100 WBC 0 <1 /100    Absolute Neutrophils 3.6 1.6 - 8.3 10e3/uL    Absolute Lymphocytes 1.3 0.8 - 5.3 10e3/uL    Absolute Monocytes 0.5 0.0 - 1.3 10e3/uL    Absolute Eosinophils 0.2 0.0 - 0.7 10e3/uL    Absolute Basophils 0.0 0.0 - 0.2 10e3/uL    Absolute Immature Granulocytes 0.1 (H) <=0.0 10e3/uL    Absolute NRBCs 0.0 10e3/uL   pH venous   Result Value Ref Range    pH Venous 7.29 (L) 7.32 - 7.43   Asymptomatic COVID-19 Virus (Coronavirus) by PCR Nasopharyngeal    Specimen: Nasopharyngeal; Swab    Narrative    The following orders were created for panel order Asymptomatic COVID-19 Virus (Coronavirus) by PCR Nasopharyngeal.  Procedure                               Abnormality         Status                     ---------                               -----------         ------                     SARS-COV2 (COVID-19) Vir...[[id number]]  Normal              Final result                 Please view results for these tests on the individual orders.   SARS-COV2 (COVID-19) Virus RT-PCR    Specimen: Nasopharyngeal; Swab   Result Value Ref Range    SARS CoV2 PCR Negative Negative    Narrative    Testing was performed using the maria elena  SARS-CoV-2 & Influenza A/B Assay on the maria elena  [place]  System.  This test should be ordered for the detection of SARS-COV-2 in individuals who meet SARS-CoV-2 clinical and/or epidemiological criteria. Test performance is unknown in asymptomatic patients.  This test is for in vitro diagnostic use under the FDA EUA for laboratories certified under CLIA to perform moderate and/or high complexity testing. This test has not been FDA cleared or approved.  A negative test does not rule out the presence of  PCR inhibitors in the specimen or target RNA in concentration below the limit of detection for the assay. The possibility of a false negative should be considered if the patient's recent exposure or clinical presentation suggests COVID-19.  [facility] are certified under the Clinical Laboratory Improvement Amendments of 1988 (CLIA-88) as qualified to perform moderate and/or high complexity laboratory testing.

## 2021-08-23 ENCOUNTER — APPOINTMENT (OUTPATIENT)
Dept: CT IMAGING | Facility: CLINIC | Age: 81
End: 2021-08-23
Attending: EMERGENCY MEDICINE
Payer: MEDICARE

## 2021-08-23 ENCOUNTER — HOSPITAL ENCOUNTER (EMERGENCY)
Facility: CLINIC | Age: 81
Discharge: HOME | End: 2021-08-23
Attending: EMERGENCY MEDICINE | Admitting: EMERGENCY MEDICINE
Payer: MEDICARE

## 2021-08-23 VITALS
TEMPERATURE: 98.2 F | OXYGEN SATURATION: 98 % | DIASTOLIC BLOOD PRESSURE: 67 MMHG | HEART RATE: 76 BPM | RESPIRATION RATE: 11 BRPM | SYSTOLIC BLOOD PRESSURE: 117 MMHG

## 2021-08-23 DIAGNOSIS — R91.8 PULMONARY NODULES: ICD-10-CM

## 2021-08-23 DIAGNOSIS — R55 SYNCOPE AND COLLAPSE: ICD-10-CM

## 2021-08-23 DIAGNOSIS — R53.1 WEAKNESS: ICD-10-CM

## 2021-08-23 DIAGNOSIS — R10.9 RIGHT SIDED ABDOMINAL PAIN: ICD-10-CM

## 2021-08-23 DIAGNOSIS — K76.9 HEPATIC LESION: ICD-10-CM

## 2021-08-23 DIAGNOSIS — E86.0 DEHYDRATION: ICD-10-CM

## 2021-08-23 LAB
ALBUMIN SERPL-MCNC: 3.3 G/DL (ref 3.4–5)
ALBUMIN UR-MCNC: 100 MG/DL
ALP SERPL-CCNC: 94 U/L (ref 40–150)
ALT SERPL W P-5'-P-CCNC: 78 U/L (ref 0–70)
ANION GAP SERPL CALCULATED.3IONS-SCNC: 5 MMOL/L (ref 3–14)
APPEARANCE UR: CLEAR
AST SERPL W P-5'-P-CCNC: 62 U/L (ref 0–45)
ATRIAL RATE - MUSE: 67 BPM
BASOPHILS # BLD AUTO: 0 10E3/UL (ref 0–0.2)
BASOPHILS NFR BLD AUTO: 1 %
BILIRUB SERPL-MCNC: 1.1 MG/DL (ref 0.2–1.3)
BILIRUB UR QL STRIP: NEGATIVE
BUN SERPL-MCNC: 61 MG/DL (ref 7–30)
CALCIUM SERPL-MCNC: 8.9 MG/DL (ref 8.5–10.1)
CHLORIDE BLD-SCNC: 109 MMOL/L (ref 94–109)
CO2 SERPL-SCNC: 27 MMOL/L (ref 20–32)
COLOR UR AUTO: ABNORMAL
CREAT BLD-MCNC: 2 MG/DL (ref 0.7–1.3)
CREAT SERPL-MCNC: 1.93 MG/DL (ref 0.66–1.25)
DIASTOLIC BLOOD PRESSURE - MUSE: NORMAL MMHG
EOSINOPHIL # BLD AUTO: 0.2 10E3/UL (ref 0–0.7)
EOSINOPHIL NFR BLD AUTO: 3 %
ERYTHROCYTE [DISTWIDTH] IN BLOOD BY AUTOMATED COUNT: 11.9 % (ref 10–15)
GFR SERPL CREATININE-BSD FRML MDRD: 30 ML/MIN/1.73M2
GFR SERPL CREATININE-BSD FRML MDRD: 32 ML/MIN/1.73M2
GLUCOSE BLD-MCNC: 228 MG/DL (ref 70–99)
GLUCOSE BLDC GLUCOMTR-MCNC: 233 MG/DL (ref 70–99)
GLUCOSE UR STRIP-MCNC: >=1000 MG/DL
HCO3 BLDV-SCNC: 30 MMOL/L (ref 21–28)
HCT VFR BLD AUTO: 42.7 % (ref 40–53)
HGB BLD-MCNC: 13.8 G/DL (ref 13.3–17.7)
HGB UR QL STRIP: ABNORMAL
HOLD SPECIMEN: NORMAL
IMM GRANULOCYTES # BLD: 0 10E3/UL
IMM GRANULOCYTES NFR BLD: 0 %
INTERPRETATION ECG - MUSE: NORMAL
KETONES UR STRIP-MCNC: NEGATIVE MG/DL
LACTATE BLD-SCNC: 1.9 MMOL/L
LEUKOCYTE ESTERASE UR QL STRIP: NEGATIVE
LYMPHOCYTES # BLD AUTO: 1.7 10E3/UL (ref 0.8–5.3)
LYMPHOCYTES NFR BLD AUTO: 29 %
MAGNESIUM SERPL-MCNC: 2.3 MG/DL (ref 1.6–2.3)
MCH RBC QN AUTO: 30.6 PG (ref 26.5–33)
MCHC RBC AUTO-ENTMCNC: 32.3 G/DL (ref 31.5–36.5)
MCV RBC AUTO: 95 FL (ref 78–100)
MONOCYTES # BLD AUTO: 0.4 10E3/UL (ref 0–1.3)
MONOCYTES NFR BLD AUTO: 7 %
MUCOUS THREADS #/AREA URNS LPF: PRESENT /LPF
NEUTROPHILS # BLD AUTO: 3.6 10E3/UL (ref 1.6–8.3)
NEUTROPHILS NFR BLD AUTO: 60 %
NITRATE UR QL: NEGATIVE
NRBC # BLD AUTO: 0 10E3/UL
NRBC BLD AUTO-RTO: 0 /100
P AXIS - MUSE: 47 DEGREES
PCO2 BLDV: 52 MM HG (ref 40–50)
PH BLDV: 7.38 [PH] (ref 7.32–7.43)
PH UR STRIP: 6.5 [PH] (ref 5–7)
PLATELET # BLD AUTO: 122 10E3/UL (ref 150–450)
PO2 BLDV: 37 MM HG (ref 25–47)
POTASSIUM BLD-SCNC: 4.5 MMOL/L (ref 3.4–5.3)
PR INTERVAL - MUSE: 138 MS
PROCALCITONIN SERPL-MCNC: 0.05 NG/ML
PROT SERPL-MCNC: 7.3 G/DL (ref 6.8–8.8)
QRS DURATION - MUSE: 80 MS
QT - MUSE: 408 MS
QTC - MUSE: 431 MS
R AXIS - MUSE: 51 DEGREES
RBC # BLD AUTO: 4.51 10E6/UL (ref 4.4–5.9)
RBC URINE: 6 /HPF
SAO2 % BLDV: 69 % (ref 94–100)
SARS-COV-2 RNA RESP QL NAA+PROBE: NEGATIVE
SODIUM SERPL-SCNC: 141 MMOL/L (ref 133–144)
SP GR UR STRIP: 1.01 (ref 1–1.03)
SQUAMOUS EPITHELIAL: <1 /HPF
SYSTOLIC BLOOD PRESSURE - MUSE: NORMAL MMHG
T AXIS - MUSE: 138 DEGREES
TROPONIN I SERPL-MCNC: <0.015 UG/L (ref 0–0.04)
UROBILINOGEN UR STRIP-MCNC: NORMAL MG/DL
VENTRICULAR RATE- MUSE: 67 BPM
WBC # BLD AUTO: 5.9 10E3/UL (ref 4–11)
WBC URINE: <1 /HPF

## 2021-08-23 PROCEDURE — 70450 CT HEAD/BRAIN W/O DYE: CPT | Mod: ME

## 2021-08-23 PROCEDURE — 36415 COLL VENOUS BLD VENIPUNCTURE: CPT | Performed by: EMERGENCY MEDICINE

## 2021-08-23 PROCEDURE — 87635 SARS-COV-2 COVID-19 AMP PRB: CPT | Performed by: EMERGENCY MEDICINE

## 2021-08-23 PROCEDURE — 96361 HYDRATE IV INFUSION ADD-ON: CPT

## 2021-08-23 PROCEDURE — C9803 HOPD COVID-19 SPEC COLLECT: HCPCS

## 2021-08-23 PROCEDURE — 84145 PROCALCITONIN (PCT): CPT | Performed by: EMERGENCY MEDICINE

## 2021-08-23 PROCEDURE — 250N000009 HC RX 250: Performed by: EMERGENCY MEDICINE

## 2021-08-23 PROCEDURE — 250N000011 HC RX IP 250 OP 636: Performed by: EMERGENCY MEDICINE

## 2021-08-23 PROCEDURE — 87040 BLOOD CULTURE FOR BACTERIA: CPT | Performed by: EMERGENCY MEDICINE

## 2021-08-23 PROCEDURE — 83735 ASSAY OF MAGNESIUM: CPT | Performed by: EMERGENCY MEDICINE

## 2021-08-23 PROCEDURE — 85025 COMPLETE CBC W/AUTO DIFF WBC: CPT | Performed by: EMERGENCY MEDICINE

## 2021-08-23 PROCEDURE — 82803 BLOOD GASES ANY COMBINATION: CPT

## 2021-08-23 PROCEDURE — 93005 ELECTROCARDIOGRAM TRACING: CPT

## 2021-08-23 PROCEDURE — 99285 EMERGENCY DEPT VISIT HI MDM: CPT | Mod: 25

## 2021-08-23 PROCEDURE — 74177 CT ABD & PELVIS W/CONTRAST: CPT | Mod: MG

## 2021-08-23 PROCEDURE — 87086 URINE CULTURE/COLONY COUNT: CPT | Performed by: EMERGENCY MEDICINE

## 2021-08-23 PROCEDURE — 258N000003 HC RX IP 258 OP 636: Performed by: EMERGENCY MEDICINE

## 2021-08-23 PROCEDURE — 81001 URINALYSIS AUTO W/SCOPE: CPT | Performed by: EMERGENCY MEDICINE

## 2021-08-23 PROCEDURE — 80053 COMPREHEN METABOLIC PANEL: CPT | Performed by: EMERGENCY MEDICINE

## 2021-08-23 PROCEDURE — 96374 THER/PROPH/DIAG INJ IV PUSH: CPT | Mod: 59

## 2021-08-23 PROCEDURE — 84484 ASSAY OF TROPONIN QUANT: CPT | Performed by: EMERGENCY MEDICINE

## 2021-08-23 PROCEDURE — 82565 ASSAY OF CREATININE: CPT | Mod: 91

## 2021-08-23 RX ORDER — IOPAMIDOL 755 MG/ML
500 INJECTION, SOLUTION INTRAVASCULAR ONCE
Status: COMPLETED | OUTPATIENT
Start: 2021-08-23 | End: 2021-08-23

## 2021-08-23 RX ORDER — ONDANSETRON 2 MG/ML
4 INJECTION INTRAMUSCULAR; INTRAVENOUS ONCE
Status: COMPLETED | OUTPATIENT
Start: 2021-08-23 | End: 2021-08-23

## 2021-08-23 RX ORDER — LIDOCAINE HYDROCHLORIDE 20 MG/ML
6 JELLY TOPICAL ONCE
Status: COMPLETED | OUTPATIENT
Start: 2021-08-23 | End: 2021-08-23

## 2021-08-23 RX ADMIN — IOPAMIDOL 54 ML: 755 INJECTION, SOLUTION INTRAVENOUS at 11:46

## 2021-08-23 RX ADMIN — LIDOCAINE HYDROCHLORIDE 6 ML: 20 JELLY TOPICAL at 14:37

## 2021-08-23 RX ADMIN — ONDANSETRON 4 MG: 2 INJECTION INTRAMUSCULAR; INTRAVENOUS at 11:11

## 2021-08-23 RX ADMIN — SODIUM CHLORIDE 75 ML: 9 INJECTION, SOLUTION INTRAVENOUS at 11:47

## 2021-08-23 RX ADMIN — SODIUM CHLORIDE 1000 ML: 9 INJECTION, SOLUTION INTRAVENOUS at 11:11

## 2021-08-23 NOTE — ED NOTES
Bed: ED20  Expected date: 8/23/21  Expected time: 10:30 AM  Means of arrival: Ambulance  Comments:  BV3

## 2021-08-23 NOTE — ED TRIAGE NOTES
"PT arrives via EMS, EMS reports that pt come where PTA pt was was going to the bathroom and had a \"near syncopal episode\" according to EMS. Wife was able to lower pt to the floor, pt did not lose consciousness ort hit head according to EMS. PT VSS and ABC's intact upon arrival, pt has no complaints. EMS reports attempting to give pt aspirin but pt kept holding meds in hand   "

## 2021-08-23 NOTE — ED PROVIDER NOTES
History   Chief Complaint:  Syncope     The history is provided by the patient and the EMS personnel. A  was used.      Eh Callahan is a 81 year old male with history of atrial fibrillation, type 2 diabetes mellitus, and hypertension who presents with syncope. The patient lost consciousness at home on his way to the bathroom roughly 15 minutes prior to EMS arrival. EMS report a blood glucose of 258 and deny any head injury, or any complaints of pain anywhere. In the ED, the patient reports feeling nausea, and vomited during examination. He also mentions having shortness of breath and right sided abdominal pain both starting 2-3 days. The patient feels he had a subjective fever at home and pain with urination.       Review of Systems   Unable to perform ROS: Acuity of condition     Allergies:  Amlodipine  Lisinopril  Metoprolol    Medications:  alfuzosin ER   aspirin (ASA) 81 MG   atorvastatin   insulin aspart  insulin detemir   isosorbide dinitrate   metoprolol tartrate   pantoprazole   traZODone     Past Medical History:    Atrial fibrillation     CKD  Diabetes mellitus (Type 2)  Hepatitis C without mention of hepatic coma   Hypertension  PTSD   Major depression  Generalized anxiety disorder.   Insomnia    Past Surgical History:    APPENDECTOMY   EYE SURGERY   LAPAROSCOPIC HEPATECTOMY PARTIAL     Social History:  Smoking status: Never  Alcohol use: No  Drug use: No  PCP: Basilio Alfaro  Presents to the ED with alone via EMS    Physical Exam     Patient Vitals for the past 24 hrs:   BP Temp Temp src Pulse Resp SpO2   08/23/21 1545 -- -- -- 76 11 98 %   08/23/21 1530 -- -- -- 65 10 98 %   08/23/21 1515 -- -- -- 63 13 97 %   08/23/21 1500 -- -- -- 63 9 99 %   08/23/21 1445 -- -- -- 63 17 98 %   08/23/21 1430 -- -- -- 69 12 96 %   08/23/21 1400 -- -- -- 70 11 98 %   08/23/21 1330 -- -- -- 65 13 --   08/23/21 1315 -- -- -- 64 14 98 %   08/23/21 1300 -- -- -- 66 19 96 %   08/23/21 1215 117/67  -- -- 70 -- 90 %   08/23/21 1115 120/71 -- -- 60 12 98 %   08/23/21 1100 119/69 -- -- 62 -- 92 %   08/23/21 1050 118/66 -- -- -- -- 97 %   08/23/21 1045 118/66 98.2  F (36.8  C) Oral 64 16 97 %       Physical Exam  General: Confused, appears elderly and frail. Cooperative. Challenging to obtain history thru .    HEENT:  Head:  Atraumatic  Ears:  External ears are normal  Mouth/Throat:  Oropharynx is without erythema or exudate and mucous membranes are dry.   Eyes:   Conjunctivae normal and EOM are normal. No scleral icterus.    Pupils are equal, round, and reactive to light.   CV:  Normal rate, regular rhythm, normal heart sounds and radial pulses are 2+ and symmetric.  No murmur.  Resp:  Breath sounds are clear bilaterally    Non-labored, no retractions or accessory muscle use  GI:  Abdomen is soft, no distension, RLQ tenderness. No rebound or guarding.  No CVA tenderness bilaterally  MS:  Normal range of motion. No edema.    Back atraumatic.    No midline cervical, thoracic, or lumbar tenderness  Skin:  Warm and dry.  No rash or lesions noted.  Neuro: Confused.  Globally weak.  GCS: 14  Psych:  Unable to assess due to language barrier and confusion.     Emergency Department Course   ECG:  ECG taken at 1046, ECG read at 1050  Normal sinus rhythm  Minimal vltage criteria for LVH, may be normal variant.   ST & T wave abnormality, consider lateral ischemia  Abnorml ECG  No significant changes compared to prior EKG dated 04/29/2021  Rate 67 bpm. VA interval 138 ms. QRS duration 80 ms. QT/QTc 408/431 ms. P-R-T axes 47 51 138.    Imaging:  CT Chest (PE) Abdomen Pelvis w Contrast    1.  No evidence for pulmonary embolism or thoracic aortic dissection.  2.  There are a few new indeterminate pulmonary nodules bilaterally,  and metastatic disease cannot be excluded.  3.  There is a new low-density lesion in the posterior segment of the  right hepatic lobe laterally. Metastatic disease again cannot be  excluded.  Two previously noted indeterminate low density liver lesions  are otherwise unchanged.  4.  Moderate amount of stool in the rectum.    Reading as per radiology.     CT Head w/o Contrast  1.  No evidence of acute intracranial hemorrhage or mass effect.  2.  Mild nonspecific white matter changes.  3.  Mild brain parenchymal volume loss.    Reading as per radiology.     Laboratory:  CBC: WBC 5.9, HGB 13.8, PLT 1222 (L)   CMP: Glucose 228 (H), Urea nitrogen 61 (H), Glucose 228 (H), AST 62 (H), ALT 78 (H), Albumin 3.3 (L), GFR 32 (L), o/w WNL (Creatinine: 1.93 (H))    UA: Glucose: > = 1000, Blood: Small, Protein Albumin: 100, RBC: 6 (H), Mucous: Present, o/w Negative  Troponin (Collected 1132): <0.015  Magnesium (1131): 2.3  Glucose by meter (1423): 233 (H)  iStat Gases (Lactate) venous, POCT: LACTW 1.9,  Bicarb 30 (H), PCO2 52 (H), PO2 69 (L)   Creatinine POCT (1103): Creatinine POCT 2.0 (H), GFR 30 (L)  Asymptomatic COVID19 Virus PCR by nasopharyngeal swab: Negative     Procedures    Emergency Department Course:  Reviewed:  I reviewed the patient's nursing notes, vitals, past medical records, Care Everywhere.     Assessments:  1042 I performed an assessment and examination of the patient as noted above.      Interventions:  1111 Zofran, 4 mg, IV  1111  0.9% sodium chloride bolus, 1L, IV  1437 Lidocaine, 6 ml, urethral      Disposition:  The patient was discharged to home.       Impression & Plan   Medical Decision Making:  The patient is a 81-year-old male with a complex past medical history who presents with syncope, weakness, right-sided abdominal pain, shortness of breath, and chest pain.  Patient had a large work-up performed due to syncopal event as well as a pan-positive review of systems.  Reassuringly, EKG showed no concerning ischemic changes and troponin was undetectable, lower concern for ACS.  No significant electrolyte abnormalities were detected.  Kidney function appears to be patient's baseline.  Blood  work otherwise unremarkable.  Urinalysis shows no sign of infection.  CT imaging shows no evidence of intracranial hemorrhage.  Additionally, CT imaging of the chest abdomen pelvis shows no evidence of pulmonary embolism or thoracic aortic dissection.  Additionally there were incidental findings of a low-density lesion in the posterior segment of the right hepatic lobe laterally which should be followed up closely in the outpatient setting.  There are pulmonary nodules as well which should be followed in the outpatient setting as well.  In discussion with the family at bedside they stated this appears to be the patient's baseline status.  He is globally weak at baseline and does minimal physical activity at home.  At this time I see no medical indication to admit to the hospital.  Family felt comfortable with discharge home at this time as I see no signs of infection or sepsis.  Plan for close outpatient follow-up with his primary care provider in the next 1 week.  After return precautions understood and all questions answered, discharged home.    Covid-19  Eh Callahan was evaluated during a global COVID-19 pandemic, which necessitated consideration that the patient might be at risk for infection with the SARS-CoV-2 virus that causes COVID-19.   Applicable protocols for evaluation were followed during the patient's care.   COVID-19 was considered as part of the patient's evaluation. The plan for testing is:  a test was obtained during this visit.    Diagnosis:    ICD-10-CM    1. Weakness  R53.1    2. Dehydration  E86.0    3. Right sided abdominal pain  R10.9    4. Syncope and collapse  R55    5. Pulmonary nodules  R91.8    6. Hepatic lesion  K76.9      Scribe Disclosure:  Grady VANG, (trainee) am serving as a scribe at 10:48 AM on 8/23/2021 to document services personally performed by Gucci Wan MD based on my observations and the provider's statements to me.       Gucci Wan MD  08/23/21 1091

## 2021-08-25 LAB — BACTERIA UR CULT: NO GROWTH

## 2021-08-28 LAB
BACTERIA BLD CULT: NO GROWTH
BACTERIA BLD CULT: NO GROWTH

## 2022-01-01 ENCOUNTER — APPOINTMENT (OUTPATIENT)
Dept: GENERAL RADIOLOGY | Facility: CLINIC | Age: 82
DRG: 193 | End: 2022-01-01
Attending: STUDENT IN AN ORGANIZED HEALTH CARE EDUCATION/TRAINING PROGRAM
Payer: MEDICARE

## 2022-01-01 ENCOUNTER — APPOINTMENT (OUTPATIENT)
Dept: PHYSICAL THERAPY | Facility: CLINIC | Age: 82
DRG: 871 | End: 2022-01-01
Payer: MEDICARE

## 2022-01-01 ENCOUNTER — APPOINTMENT (OUTPATIENT)
Dept: CT IMAGING | Facility: CLINIC | Age: 82
DRG: 871 | End: 2022-01-01
Attending: EMERGENCY MEDICINE
Payer: MEDICARE

## 2022-01-01 ENCOUNTER — APPOINTMENT (OUTPATIENT)
Dept: SPEECH THERAPY | Facility: CLINIC | Age: 82
DRG: 871 | End: 2022-01-01
Payer: MEDICARE

## 2022-01-01 ENCOUNTER — PATIENT OUTREACH (OUTPATIENT)
Dept: CARE COORDINATION | Facility: CLINIC | Age: 82
End: 2022-01-01

## 2022-01-01 ENCOUNTER — APPOINTMENT (OUTPATIENT)
Dept: CT IMAGING | Facility: CLINIC | Age: 82
DRG: 871 | End: 2022-01-01
Attending: STUDENT IN AN ORGANIZED HEALTH CARE EDUCATION/TRAINING PROGRAM
Payer: MEDICARE

## 2022-01-01 ENCOUNTER — APPOINTMENT (OUTPATIENT)
Dept: SPEECH THERAPY | Facility: CLINIC | Age: 82
DRG: 871 | End: 2022-01-01
Attending: HOSPITALIST
Payer: MEDICARE

## 2022-01-01 ENCOUNTER — TRANSFERRED RECORDS (OUTPATIENT)
Dept: SPEECH THERAPY | Facility: CLINIC | Age: 82
End: 2022-01-01

## 2022-01-01 ENCOUNTER — APPOINTMENT (OUTPATIENT)
Dept: CARDIOLOGY | Facility: CLINIC | Age: 82
DRG: 193 | End: 2022-01-01
Attending: STUDENT IN AN ORGANIZED HEALTH CARE EDUCATION/TRAINING PROGRAM
Payer: MEDICARE

## 2022-01-01 ENCOUNTER — APPOINTMENT (OUTPATIENT)
Dept: ULTRASOUND IMAGING | Facility: CLINIC | Age: 82
DRG: 193 | End: 2022-01-01
Attending: STUDENT IN AN ORGANIZED HEALTH CARE EDUCATION/TRAINING PROGRAM
Payer: MEDICARE

## 2022-01-01 ENCOUNTER — APPOINTMENT (OUTPATIENT)
Dept: SPEECH THERAPY | Facility: CLINIC | Age: 82
DRG: 193 | End: 2022-01-01
Payer: MEDICARE

## 2022-01-01 ENCOUNTER — APPOINTMENT (OUTPATIENT)
Dept: CT IMAGING | Facility: CLINIC | Age: 82
DRG: 193 | End: 2022-01-01
Attending: EMERGENCY MEDICINE
Payer: MEDICARE

## 2022-01-01 ENCOUNTER — APPOINTMENT (OUTPATIENT)
Dept: GENERAL RADIOLOGY | Facility: CLINIC | Age: 82
DRG: 193 | End: 2022-01-01
Attending: EMERGENCY MEDICINE
Payer: MEDICARE

## 2022-01-01 ENCOUNTER — APPOINTMENT (OUTPATIENT)
Dept: GENERAL RADIOLOGY | Facility: CLINIC | Age: 82
DRG: 193 | End: 2022-01-01
Attending: INTERNAL MEDICINE
Payer: MEDICARE

## 2022-01-01 ENCOUNTER — HOSPITAL ENCOUNTER (INPATIENT)
Facility: CLINIC | Age: 82
LOS: 13 days | DRG: 193 | End: 2022-11-23
Attending: EMERGENCY MEDICINE | Admitting: STUDENT IN AN ORGANIZED HEALTH CARE EDUCATION/TRAINING PROGRAM
Payer: MEDICARE

## 2022-01-01 ENCOUNTER — HOSPITAL ENCOUNTER (INPATIENT)
Facility: CLINIC | Age: 82
LOS: 10 days | Discharge: HOME HEALTH | DRG: 871 | End: 2022-10-22
Attending: EMERGENCY MEDICINE | Admitting: INTERNAL MEDICINE
Payer: MEDICARE

## 2022-01-01 ENCOUNTER — APPOINTMENT (OUTPATIENT)
Dept: SPEECH THERAPY | Facility: CLINIC | Age: 82
DRG: 193 | End: 2022-01-01
Attending: STUDENT IN AN ORGANIZED HEALTH CARE EDUCATION/TRAINING PROGRAM
Payer: MEDICARE

## 2022-01-01 ENCOUNTER — APPOINTMENT (OUTPATIENT)
Dept: GENERAL RADIOLOGY | Facility: CLINIC | Age: 82
DRG: 871 | End: 2022-01-01
Attending: EMERGENCY MEDICINE
Payer: MEDICARE

## 2022-01-01 ENCOUNTER — APPOINTMENT (OUTPATIENT)
Dept: CARDIOLOGY | Facility: CLINIC | Age: 82
DRG: 871 | End: 2022-01-01
Attending: INTERNAL MEDICINE
Payer: MEDICARE

## 2022-01-01 ENCOUNTER — APPOINTMENT (OUTPATIENT)
Dept: PHYSICAL THERAPY | Facility: CLINIC | Age: 82
DRG: 871 | End: 2022-01-01
Attending: INTERNAL MEDICINE
Payer: MEDICARE

## 2022-01-01 VITALS
HEART RATE: 115 BPM | SYSTOLIC BLOOD PRESSURE: 140 MMHG | OXYGEN SATURATION: 97 % | RESPIRATION RATE: 27 BRPM | BODY MASS INDEX: 23.69 KG/M2 | WEIGHT: 151.24 LBS | TEMPERATURE: 97.6 F | DIASTOLIC BLOOD PRESSURE: 58 MMHG

## 2022-01-01 VITALS
OXYGEN SATURATION: 92 % | BODY MASS INDEX: 22.88 KG/M2 | HEART RATE: 77 BPM | WEIGHT: 145.8 LBS | SYSTOLIC BLOOD PRESSURE: 139 MMHG | TEMPERATURE: 97.5 F | DIASTOLIC BLOOD PRESSURE: 54 MMHG | RESPIRATION RATE: 16 BRPM | HEIGHT: 67 IN

## 2022-01-01 DIAGNOSIS — F03.918 SENILE DEMENTIA, WITH BEHAVIORAL DISTURBANCE (H): Primary | ICD-10-CM

## 2022-01-01 DIAGNOSIS — R79.89 ELEVATED TROPONIN: ICD-10-CM

## 2022-01-01 DIAGNOSIS — R62.7 FAILURE TO THRIVE IN ADULT: ICD-10-CM

## 2022-01-01 DIAGNOSIS — I10 PRIMARY HYPERTENSION: ICD-10-CM

## 2022-01-01 DIAGNOSIS — Z09 HOSPITAL DISCHARGE FOLLOW-UP: Primary | ICD-10-CM

## 2022-01-01 DIAGNOSIS — E11.65 TYPE 2 DIABETES MELLITUS WITH HYPERGLYCEMIA, WITH LONG-TERM CURRENT USE OF INSULIN (H): ICD-10-CM

## 2022-01-01 DIAGNOSIS — A41.9 SEPSIS WITHOUT ACUTE ORGAN DYSFUNCTION, DUE TO UNSPECIFIED ORGANISM (H): ICD-10-CM

## 2022-01-01 DIAGNOSIS — N18.9 CHRONIC RENAL FAILURE, UNSPECIFIED CKD STAGE: ICD-10-CM

## 2022-01-01 DIAGNOSIS — J18.9 PNEUMONIA DUE TO INFECTIOUS ORGANISM, UNSPECIFIED LATERALITY, UNSPECIFIED PART OF LUNG: ICD-10-CM

## 2022-01-01 DIAGNOSIS — E86.0 DEHYDRATION: ICD-10-CM

## 2022-01-01 DIAGNOSIS — N18.2 CKD (CHRONIC KIDNEY DISEASE) STAGE 2, GFR 60-89 ML/MIN: ICD-10-CM

## 2022-01-01 DIAGNOSIS — F03.90 DEMENTIA WITHOUT BEHAVIORAL DISTURBANCE, PSYCHOTIC DISTURBANCE, MOOD DISTURBANCE, OR ANXIETY, UNSPECIFIED DEMENTIA SEVERITY, UNSPECIFIED DEMENTIA TYPE (H): ICD-10-CM

## 2022-01-01 DIAGNOSIS — E10.69 TYPE 1 DIABETES MELLITUS WITH OTHER SPECIFIED COMPLICATION (H): ICD-10-CM

## 2022-01-01 DIAGNOSIS — Z09 HOSPITAL DISCHARGE FOLLOW-UP: ICD-10-CM

## 2022-01-01 DIAGNOSIS — N30.00 ACUTE CYSTITIS WITHOUT HEMATURIA: ICD-10-CM

## 2022-01-01 DIAGNOSIS — I10 HYPERTENSION, UNSPECIFIED TYPE: ICD-10-CM

## 2022-01-01 DIAGNOSIS — Z79.4 TYPE 2 DIABETES MELLITUS WITH HYPERGLYCEMIA, WITH LONG-TERM CURRENT USE OF INSULIN (H): ICD-10-CM

## 2022-01-01 LAB
ALBUMIN SERPL BCG-MCNC: 2.5 G/DL (ref 3.5–5.2)
ALBUMIN SERPL BCG-MCNC: 2.8 G/DL (ref 3.5–5.2)
ALBUMIN SERPL BCG-MCNC: 2.8 G/DL (ref 3.5–5.2)
ALBUMIN SERPL BCG-MCNC: 3.2 G/DL (ref 3.5–5.2)
ALBUMIN SERPL BCG-MCNC: 3.2 G/DL (ref 3.5–5.2)
ALBUMIN SERPL BCG-MCNC: 3.5 G/DL (ref 3.5–5.2)
ALBUMIN UR-MCNC: 300 MG/DL
ALP SERPL-CCNC: 128 U/L (ref 40–129)
ALP SERPL-CCNC: 143 U/L (ref 40–129)
ALP SERPL-CCNC: 150 U/L (ref 40–129)
ALP SERPL-CCNC: 175 U/L (ref 40–129)
ALP SERPL-CCNC: 78 U/L (ref 40–129)
ALP SERPL-CCNC: 99 U/L (ref 40–129)
ALT SERPL W P-5'-P-CCNC: 27 U/L (ref 10–50)
ALT SERPL W P-5'-P-CCNC: 29 U/L (ref 10–50)
ALT SERPL W P-5'-P-CCNC: 32 U/L (ref 10–50)
ALT SERPL W P-5'-P-CCNC: 42 U/L (ref 10–50)
AMMONIA PLAS-SCNC: 11 UMOL/L (ref 16–60)
ANION GAP SERPL CALCULATED.3IONS-SCNC: 10 MMOL/L (ref 7–15)
ANION GAP SERPL CALCULATED.3IONS-SCNC: 11 MMOL/L (ref 7–15)
ANION GAP SERPL CALCULATED.3IONS-SCNC: 12 MMOL/L (ref 7–15)
ANION GAP SERPL CALCULATED.3IONS-SCNC: 12 MMOL/L (ref 7–15)
ANION GAP SERPL CALCULATED.3IONS-SCNC: 13 MMOL/L (ref 7–15)
ANION GAP SERPL CALCULATED.3IONS-SCNC: 14 MMOL/L (ref 7–15)
ANION GAP SERPL CALCULATED.3IONS-SCNC: 15 MMOL/L (ref 7–15)
ANION GAP SERPL CALCULATED.3IONS-SCNC: 17 MMOL/L (ref 7–15)
ANION GAP SERPL CALCULATED.3IONS-SCNC: 19 MMOL/L (ref 7–15)
ANION GAP SERPL CALCULATED.3IONS-SCNC: 22 MMOL/L (ref 7–15)
ANION GAP SERPL CALCULATED.3IONS-SCNC: 22 MMOL/L (ref 7–15)
ANION GAP SERPL CALCULATED.3IONS-SCNC: 8 MMOL/L (ref 7–15)
ANION GAP SERPL CALCULATED.3IONS-SCNC: 9 MMOL/L (ref 7–15)
ANION GAP SERPL CALCULATED.3IONS-SCNC: 9 MMOL/L (ref 7–15)
APPEARANCE FLD: CLEAR
APPEARANCE UR: CLEAR
AST SERPL W P-5'-P-CCNC: 25 U/L (ref 10–50)
AST SERPL W P-5'-P-CCNC: 28 U/L (ref 10–50)
AST SERPL W P-5'-P-CCNC: 29 U/L (ref 10–50)
AST SERPL W P-5'-P-CCNC: 30 U/L (ref 10–50)
AST SERPL W P-5'-P-CCNC: 31 U/L (ref 10–50)
AST SERPL W P-5'-P-CCNC: 34 U/L (ref 10–50)
ATRIAL RATE - MUSE: 105 BPM
ATRIAL RATE - MUSE: 72 BPM
ATRIAL RATE - MUSE: 92 BPM
ATRIAL RATE - MUSE: 93 BPM
ATRIAL RATE - MUSE: 93 BPM
ATRIAL RATE - MUSE: 95 BPM
BACTERIA #/AREA URNS HPF: ABNORMAL /HPF
BACTERIA BLD CULT: NO GROWTH
BACTERIA PLR CULT: NO GROWTH
BACTERIA UR CULT: ABNORMAL
BACTERIA UR CULT: ABNORMAL
BASE EXCESS BLDV CALC-SCNC: -3.3 MMOL/L (ref -7.7–1.9)
BASE EXCESS BLDV CALC-SCNC: -6.8 MMOL/L (ref -7.7–1.9)
BASOPHILS # BLD AUTO: 0 10E3/UL (ref 0–0.2)
BASOPHILS NFR BLD AUTO: 0 %
BASOPHILS NFR BLD AUTO: 1 %
BILIRUB DIRECT SERPL-MCNC: <0.2 MG/DL (ref 0–0.3)
BILIRUB DIRECT SERPL-MCNC: <0.2 MG/DL (ref 0–0.3)
BILIRUB SERPL-MCNC: 0.4 MG/DL
BILIRUB SERPL-MCNC: 0.5 MG/DL
BILIRUB SERPL-MCNC: 0.6 MG/DL
BILIRUB SERPL-MCNC: 0.9 MG/DL
BILIRUB SERPL-MCNC: 1 MG/DL
BILIRUB SERPL-MCNC: 1.5 MG/DL
BILIRUB UR QL STRIP: NEGATIVE
BUN SERPL-MCNC: 31 MG/DL (ref 8–23)
BUN SERPL-MCNC: 32.4 MG/DL (ref 8–23)
BUN SERPL-MCNC: 34.1 MG/DL (ref 8–23)
BUN SERPL-MCNC: 34.4 MG/DL (ref 8–23)
BUN SERPL-MCNC: 34.6 MG/DL (ref 8–23)
BUN SERPL-MCNC: 39.5 MG/DL (ref 8–23)
BUN SERPL-MCNC: 42.3 MG/DL (ref 8–23)
BUN SERPL-MCNC: 42.7 MG/DL (ref 8–23)
BUN SERPL-MCNC: 44.5 MG/DL (ref 8–23)
BUN SERPL-MCNC: 44.8 MG/DL (ref 8–23)
BUN SERPL-MCNC: 45.1 MG/DL (ref 8–23)
BUN SERPL-MCNC: 45.2 MG/DL (ref 8–23)
BUN SERPL-MCNC: 45.4 MG/DL (ref 8–23)
BUN SERPL-MCNC: 45.5 MG/DL (ref 8–23)
BUN SERPL-MCNC: 47 MG/DL (ref 8–23)
BUN SERPL-MCNC: 47.2 MG/DL (ref 8–23)
BUN SERPL-MCNC: 48.3 MG/DL (ref 8–23)
BUN SERPL-MCNC: 48.9 MG/DL (ref 8–23)
BUN SERPL-MCNC: 49.2 MG/DL (ref 8–23)
BUN SERPL-MCNC: 49.5 MG/DL (ref 8–23)
BUN SERPL-MCNC: 50 MG/DL (ref 8–23)
BUN SERPL-MCNC: 51.9 MG/DL (ref 8–23)
BUN SERPL-MCNC: 53.2 MG/DL (ref 8–23)
BUN SERPL-MCNC: 53.9 MG/DL (ref 8–23)
BUN SERPL-MCNC: 54.1 MG/DL (ref 8–23)
BUN SERPL-MCNC: 57.6 MG/DL (ref 8–23)
BUN SERPL-MCNC: 65.2 MG/DL (ref 8–23)
BUN SERPL-MCNC: 76.1 MG/DL (ref 8–23)
BURR CELLS BLD QL SMEAR: ABNORMAL
C PNEUM DNA SPEC QL NAA+PROBE: NOT DETECTED
CALCIUM SERPL-MCNC: 7.5 MG/DL (ref 8.8–10.2)
CALCIUM SERPL-MCNC: 7.8 MG/DL (ref 8.8–10.2)
CALCIUM SERPL-MCNC: 7.9 MG/DL (ref 8.8–10.2)
CALCIUM SERPL-MCNC: 8 MG/DL (ref 8.8–10.2)
CALCIUM SERPL-MCNC: 8 MG/DL (ref 8.8–10.2)
CALCIUM SERPL-MCNC: 8.1 MG/DL (ref 8.8–10.2)
CALCIUM SERPL-MCNC: 8.2 MG/DL (ref 8.8–10.2)
CALCIUM SERPL-MCNC: 8.3 MG/DL (ref 8.8–10.2)
CALCIUM SERPL-MCNC: 8.4 MG/DL (ref 8.8–10.2)
CALCIUM SERPL-MCNC: 8.4 MG/DL (ref 8.8–10.2)
CALCIUM SERPL-MCNC: 8.5 MG/DL (ref 8.8–10.2)
CALCIUM SERPL-MCNC: 8.6 MG/DL (ref 8.8–10.2)
CALCIUM SERPL-MCNC: 8.6 MG/DL (ref 8.8–10.2)
CALCIUM SERPL-MCNC: 8.7 MG/DL (ref 8.8–10.2)
CALCIUM SERPL-MCNC: 8.9 MG/DL (ref 8.8–10.2)
CALCIUM SERPL-MCNC: 9.2 MG/DL (ref 8.8–10.2)
CELL COUNT BODY FLUID SOURCE: NORMAL
CHLORIDE SERPL-SCNC: 102 MMOL/L (ref 98–107)
CHLORIDE SERPL-SCNC: 103 MMOL/L (ref 98–107)
CHLORIDE SERPL-SCNC: 103 MMOL/L (ref 98–107)
CHLORIDE SERPL-SCNC: 104 MMOL/L (ref 98–107)
CHLORIDE SERPL-SCNC: 105 MMOL/L (ref 98–107)
CHLORIDE SERPL-SCNC: 106 MMOL/L (ref 98–107)
CHLORIDE SERPL-SCNC: 107 MMOL/L (ref 98–107)
CHLORIDE SERPL-SCNC: 108 MMOL/L (ref 98–107)
CHLORIDE SERPL-SCNC: 108 MMOL/L (ref 98–107)
CHLORIDE SERPL-SCNC: 110 MMOL/L (ref 98–107)
CHLORIDE SERPL-SCNC: 111 MMOL/L (ref 98–107)
CHLORIDE SERPL-SCNC: 112 MMOL/L (ref 98–107)
CHLORIDE SERPL-SCNC: 113 MMOL/L (ref 98–107)
CHLORIDE SERPL-SCNC: 99 MMOL/L (ref 98–107)
COLOR FLD: YELLOW
COLOR UR AUTO: ABNORMAL
CREAT SERPL-MCNC: 2.22 MG/DL (ref 0.67–1.17)
CREAT SERPL-MCNC: 2.24 MG/DL (ref 0.67–1.17)
CREAT SERPL-MCNC: 2.38 MG/DL (ref 0.67–1.17)
CREAT SERPL-MCNC: 2.4 MG/DL (ref 0.67–1.17)
CREAT SERPL-MCNC: 2.43 MG/DL (ref 0.67–1.17)
CREAT SERPL-MCNC: 2.44 MG/DL (ref 0.67–1.17)
CREAT SERPL-MCNC: 2.53 MG/DL (ref 0.67–1.17)
CREAT SERPL-MCNC: 2.54 MG/DL (ref 0.67–1.17)
CREAT SERPL-MCNC: 2.55 MG/DL (ref 0.67–1.17)
CREAT SERPL-MCNC: 2.61 MG/DL (ref 0.67–1.17)
CREAT SERPL-MCNC: 2.66 MG/DL (ref 0.67–1.17)
CREAT SERPL-MCNC: 2.67 MG/DL (ref 0.67–1.17)
CREAT SERPL-MCNC: 2.69 MG/DL (ref 0.67–1.17)
CREAT SERPL-MCNC: 2.74 MG/DL (ref 0.67–1.17)
CREAT SERPL-MCNC: 2.75 MG/DL (ref 0.67–1.17)
CREAT SERPL-MCNC: 2.82 MG/DL (ref 0.67–1.17)
CREAT SERPL-MCNC: 2.85 MG/DL (ref 0.67–1.17)
CREAT SERPL-MCNC: 2.89 MG/DL (ref 0.67–1.17)
CREAT SERPL-MCNC: 2.92 MG/DL (ref 0.67–1.17)
CREAT SERPL-MCNC: 2.94 MG/DL (ref 0.67–1.17)
CREAT SERPL-MCNC: 2.95 MG/DL (ref 0.67–1.17)
CREAT SERPL-MCNC: 3.05 MG/DL (ref 0.67–1.17)
CREAT SERPL-MCNC: 3.11 MG/DL (ref 0.67–1.17)
CREAT SERPL-MCNC: 3.32 MG/DL (ref 0.67–1.17)
CREAT SERPL-MCNC: 3.32 MG/DL (ref 0.67–1.17)
CREAT SERPL-MCNC: 3.37 MG/DL (ref 0.67–1.17)
CREAT SERPL-MCNC: 3.41 MG/DL (ref 0.67–1.17)
CREAT SERPL-MCNC: 3.53 MG/DL (ref 0.67–1.17)
CREAT SERPL-MCNC: 4.29 MG/DL (ref 0.67–1.17)
CREAT UR-MCNC: 181.3 MG/DL
CRP SERPL HS-MCNC: 9.96 MG/L
CRP SERPL-MCNC: 14.9 MG/L
CRP SERPL-MCNC: 15.15 MG/L
DEPRECATED HCO3 PLAS-SCNC: 12 MMOL/L (ref 22–29)
DEPRECATED HCO3 PLAS-SCNC: 14 MMOL/L (ref 22–29)
DEPRECATED HCO3 PLAS-SCNC: 15 MMOL/L (ref 22–29)
DEPRECATED HCO3 PLAS-SCNC: 15 MMOL/L (ref 22–29)
DEPRECATED HCO3 PLAS-SCNC: 17 MMOL/L (ref 22–29)
DEPRECATED HCO3 PLAS-SCNC: 18 MMOL/L (ref 22–29)
DEPRECATED HCO3 PLAS-SCNC: 18 MMOL/L (ref 22–29)
DEPRECATED HCO3 PLAS-SCNC: 19 MMOL/L (ref 22–29)
DEPRECATED HCO3 PLAS-SCNC: 20 MMOL/L (ref 22–29)
DEPRECATED HCO3 PLAS-SCNC: 21 MMOL/L (ref 22–29)
DEPRECATED HCO3 PLAS-SCNC: 22 MMOL/L (ref 22–29)
DEPRECATED HCO3 PLAS-SCNC: 23 MMOL/L (ref 22–29)
DEPRECATED HCO3 PLAS-SCNC: 24 MMOL/L (ref 22–29)
DEPRECATED HCO3 PLAS-SCNC: 24 MMOL/L (ref 22–29)
DEPRECATED HCO3 PLAS-SCNC: 26 MMOL/L (ref 22–29)
DEPRECATED HCO3 PLAS-SCNC: 26 MMOL/L (ref 22–29)
DIASTOLIC BLOOD PRESSURE - MUSE: NORMAL MMHG
EOSINOPHIL # BLD AUTO: 0.1 10E3/UL (ref 0–0.7)
EOSINOPHIL # BLD AUTO: 0.4 10E3/UL (ref 0–0.7)
EOSINOPHIL NFR BLD AUTO: 0 %
EOSINOPHIL NFR BLD AUTO: 1 %
EOSINOPHIL NFR BLD AUTO: 1 %
EOSINOPHIL NFR BLD AUTO: 4 %
EOSINOPHIL NFR BLD AUTO: 5 %
EOSINOPHIL NFR BLD AUTO: 6 %
EOSINOPHIL NFR BLD AUTO: 7 %
ERYTHROCYTE [DISTWIDTH] IN BLOOD BY AUTOMATED COUNT: 11.8 % (ref 10–15)
ERYTHROCYTE [DISTWIDTH] IN BLOOD BY AUTOMATED COUNT: 11.9 % (ref 10–15)
ERYTHROCYTE [DISTWIDTH] IN BLOOD BY AUTOMATED COUNT: 12 % (ref 10–15)
ERYTHROCYTE [DISTWIDTH] IN BLOOD BY AUTOMATED COUNT: 12.2 % (ref 10–15)
ERYTHROCYTE [DISTWIDTH] IN BLOOD BY AUTOMATED COUNT: 12.3 % (ref 10–15)
ERYTHROCYTE [DISTWIDTH] IN BLOOD BY AUTOMATED COUNT: 13.1 % (ref 10–15)
ERYTHROCYTE [DISTWIDTH] IN BLOOD BY AUTOMATED COUNT: 13.2 % (ref 10–15)
ERYTHROCYTE [DISTWIDTH] IN BLOOD BY AUTOMATED COUNT: 13.2 % (ref 10–15)
ERYTHROCYTE [DISTWIDTH] IN BLOOD BY AUTOMATED COUNT: 13.5 % (ref 10–15)
ERYTHROCYTE [DISTWIDTH] IN BLOOD BY AUTOMATED COUNT: 13.5 % (ref 10–15)
ERYTHROCYTE [DISTWIDTH] IN BLOOD BY AUTOMATED COUNT: 13.9 % (ref 10–15)
ERYTHROCYTE [DISTWIDTH] IN BLOOD BY AUTOMATED COUNT: 14.1 % (ref 10–15)
ERYTHROCYTE [DISTWIDTH] IN BLOOD BY AUTOMATED COUNT: 14.3 % (ref 10–15)
ERYTHROCYTE [DISTWIDTH] IN BLOOD BY AUTOMATED COUNT: 14.3 % (ref 10–15)
ERYTHROCYTE [DISTWIDTH] IN BLOOD BY AUTOMATED COUNT: 14.6 % (ref 10–15)
ERYTHROCYTE [DISTWIDTH] IN BLOOD BY AUTOMATED COUNT: 14.8 % (ref 10–15)
ERYTHROCYTE [DISTWIDTH] IN BLOOD BY AUTOMATED COUNT: 15.4 % (ref 10–15)
ERYTHROCYTE [DISTWIDTH] IN BLOOD BY AUTOMATED COUNT: 15.4 % (ref 10–15)
ERYTHROCYTE [DISTWIDTH] IN BLOOD BY AUTOMATED COUNT: 15.6 % (ref 10–15)
ERYTHROCYTE [SEDIMENTATION RATE] IN BLOOD BY WESTERGREN METHOD: 28 MM/HR (ref 0–20)
ERYTHROCYTE [SEDIMENTATION RATE] IN BLOOD BY WESTERGREN METHOD: 30 MM/HR (ref 0–20)
FLUAV H1 2009 PAND RNA SPEC QL NAA+PROBE: NOT DETECTED
FLUAV H1 RNA SPEC QL NAA+PROBE: NOT DETECTED
FLUAV H3 RNA SPEC QL NAA+PROBE: NOT DETECTED
FLUAV RNA SPEC QL NAA+PROBE: NEGATIVE
FLUAV RNA SPEC QL NAA+PROBE: NOT DETECTED
FLUBV RNA RESP QL NAA+PROBE: NEGATIVE
FLUBV RNA SPEC QL NAA+PROBE: NOT DETECTED
FOLATE SERPL-MCNC: 2 NG/ML (ref 4.6–34.8)
FRACT EXCRET NA UR+SERPL-RTO: NORMAL %
GFR SERPL CREATININE-BSD FRML MDRD: 13 ML/MIN/1.73M2
GFR SERPL CREATININE-BSD FRML MDRD: 17 ML/MIN/1.73M2
GFR SERPL CREATININE-BSD FRML MDRD: 18 ML/MIN/1.73M2
GFR SERPL CREATININE-BSD FRML MDRD: 18 ML/MIN/1.73M2
GFR SERPL CREATININE-BSD FRML MDRD: 19 ML/MIN/1.73M2
GFR SERPL CREATININE-BSD FRML MDRD: 20 ML/MIN/1.73M2
GFR SERPL CREATININE-BSD FRML MDRD: 21 ML/MIN/1.73M2
GFR SERPL CREATININE-BSD FRML MDRD: 22 ML/MIN/1.73M2
GFR SERPL CREATININE-BSD FRML MDRD: 23 ML/MIN/1.73M2
GFR SERPL CREATININE-BSD FRML MDRD: 24 ML/MIN/1.73M2
GFR SERPL CREATININE-BSD FRML MDRD: 24 ML/MIN/1.73M2
GFR SERPL CREATININE-BSD FRML MDRD: 25 ML/MIN/1.73M2
GFR SERPL CREATININE-BSD FRML MDRD: 25 ML/MIN/1.73M2
GFR SERPL CREATININE-BSD FRML MDRD: 26 ML/MIN/1.73M2
GFR SERPL CREATININE-BSD FRML MDRD: 27 ML/MIN/1.73M2
GFR SERPL CREATININE-BSD FRML MDRD: 29 ML/MIN/1.73M2
GFR SERPL CREATININE-BSD FRML MDRD: 29 ML/MIN/1.73M2
GLUCOSE BLDC GLUCOMTR-MCNC: 105 MG/DL (ref 70–99)
GLUCOSE BLDC GLUCOMTR-MCNC: 109 MG/DL (ref 70–99)
GLUCOSE BLDC GLUCOMTR-MCNC: 111 MG/DL (ref 70–99)
GLUCOSE BLDC GLUCOMTR-MCNC: 118 MG/DL (ref 70–99)
GLUCOSE BLDC GLUCOMTR-MCNC: 126 MG/DL (ref 70–99)
GLUCOSE BLDC GLUCOMTR-MCNC: 129 MG/DL (ref 70–99)
GLUCOSE BLDC GLUCOMTR-MCNC: 130 MG/DL (ref 70–99)
GLUCOSE BLDC GLUCOMTR-MCNC: 136 MG/DL (ref 70–99)
GLUCOSE BLDC GLUCOMTR-MCNC: 139 MG/DL (ref 70–99)
GLUCOSE BLDC GLUCOMTR-MCNC: 140 MG/DL (ref 70–99)
GLUCOSE BLDC GLUCOMTR-MCNC: 143 MG/DL (ref 70–99)
GLUCOSE BLDC GLUCOMTR-MCNC: 145 MG/DL (ref 70–99)
GLUCOSE BLDC GLUCOMTR-MCNC: 146 MG/DL (ref 70–99)
GLUCOSE BLDC GLUCOMTR-MCNC: 148 MG/DL (ref 70–99)
GLUCOSE BLDC GLUCOMTR-MCNC: 149 MG/DL (ref 70–99)
GLUCOSE BLDC GLUCOMTR-MCNC: 150 MG/DL (ref 70–99)
GLUCOSE BLDC GLUCOMTR-MCNC: 150 MG/DL (ref 70–99)
GLUCOSE BLDC GLUCOMTR-MCNC: 153 MG/DL (ref 70–99)
GLUCOSE BLDC GLUCOMTR-MCNC: 155 MG/DL (ref 70–99)
GLUCOSE BLDC GLUCOMTR-MCNC: 156 MG/DL (ref 70–99)
GLUCOSE BLDC GLUCOMTR-MCNC: 161 MG/DL (ref 70–99)
GLUCOSE BLDC GLUCOMTR-MCNC: 162 MG/DL (ref 70–99)
GLUCOSE BLDC GLUCOMTR-MCNC: 162 MG/DL (ref 70–99)
GLUCOSE BLDC GLUCOMTR-MCNC: 167 MG/DL (ref 70–99)
GLUCOSE BLDC GLUCOMTR-MCNC: 167 MG/DL (ref 70–99)
GLUCOSE BLDC GLUCOMTR-MCNC: 183 MG/DL (ref 70–99)
GLUCOSE BLDC GLUCOMTR-MCNC: 184 MG/DL (ref 70–99)
GLUCOSE BLDC GLUCOMTR-MCNC: 185 MG/DL (ref 70–99)
GLUCOSE BLDC GLUCOMTR-MCNC: 186 MG/DL (ref 70–99)
GLUCOSE BLDC GLUCOMTR-MCNC: 187 MG/DL (ref 70–99)
GLUCOSE BLDC GLUCOMTR-MCNC: 188 MG/DL (ref 70–99)
GLUCOSE BLDC GLUCOMTR-MCNC: 191 MG/DL (ref 70–99)
GLUCOSE BLDC GLUCOMTR-MCNC: 194 MG/DL (ref 70–99)
GLUCOSE BLDC GLUCOMTR-MCNC: 195 MG/DL (ref 70–99)
GLUCOSE BLDC GLUCOMTR-MCNC: 198 MG/DL (ref 70–99)
GLUCOSE BLDC GLUCOMTR-MCNC: 205 MG/DL (ref 70–99)
GLUCOSE BLDC GLUCOMTR-MCNC: 206 MG/DL (ref 70–99)
GLUCOSE BLDC GLUCOMTR-MCNC: 210 MG/DL (ref 70–99)
GLUCOSE BLDC GLUCOMTR-MCNC: 214 MG/DL (ref 70–99)
GLUCOSE BLDC GLUCOMTR-MCNC: 214 MG/DL (ref 70–99)
GLUCOSE BLDC GLUCOMTR-MCNC: 222 MG/DL (ref 70–99)
GLUCOSE BLDC GLUCOMTR-MCNC: 222 MG/DL (ref 70–99)
GLUCOSE BLDC GLUCOMTR-MCNC: 223 MG/DL (ref 70–99)
GLUCOSE BLDC GLUCOMTR-MCNC: 223 MG/DL (ref 70–99)
GLUCOSE BLDC GLUCOMTR-MCNC: 225 MG/DL (ref 70–99)
GLUCOSE BLDC GLUCOMTR-MCNC: 229 MG/DL (ref 70–99)
GLUCOSE BLDC GLUCOMTR-MCNC: 232 MG/DL (ref 70–99)
GLUCOSE BLDC GLUCOMTR-MCNC: 233 MG/DL (ref 70–99)
GLUCOSE BLDC GLUCOMTR-MCNC: 234 MG/DL (ref 70–99)
GLUCOSE BLDC GLUCOMTR-MCNC: 235 MG/DL (ref 70–99)
GLUCOSE BLDC GLUCOMTR-MCNC: 235 MG/DL (ref 70–99)
GLUCOSE BLDC GLUCOMTR-MCNC: 239 MG/DL (ref 70–99)
GLUCOSE BLDC GLUCOMTR-MCNC: 239 MG/DL (ref 70–99)
GLUCOSE BLDC GLUCOMTR-MCNC: 242 MG/DL (ref 70–99)
GLUCOSE BLDC GLUCOMTR-MCNC: 248 MG/DL (ref 70–99)
GLUCOSE BLDC GLUCOMTR-MCNC: 249 MG/DL (ref 70–99)
GLUCOSE BLDC GLUCOMTR-MCNC: 249 MG/DL (ref 70–99)
GLUCOSE BLDC GLUCOMTR-MCNC: 250 MG/DL (ref 70–99)
GLUCOSE BLDC GLUCOMTR-MCNC: 250 MG/DL (ref 70–99)
GLUCOSE BLDC GLUCOMTR-MCNC: 251 MG/DL (ref 70–99)
GLUCOSE BLDC GLUCOMTR-MCNC: 252 MG/DL (ref 70–99)
GLUCOSE BLDC GLUCOMTR-MCNC: 253 MG/DL (ref 70–99)
GLUCOSE BLDC GLUCOMTR-MCNC: 255 MG/DL (ref 70–99)
GLUCOSE BLDC GLUCOMTR-MCNC: 257 MG/DL (ref 70–99)
GLUCOSE BLDC GLUCOMTR-MCNC: 258 MG/DL (ref 70–99)
GLUCOSE BLDC GLUCOMTR-MCNC: 259 MG/DL (ref 70–99)
GLUCOSE BLDC GLUCOMTR-MCNC: 260 MG/DL (ref 70–99)
GLUCOSE BLDC GLUCOMTR-MCNC: 263 MG/DL (ref 70–99)
GLUCOSE BLDC GLUCOMTR-MCNC: 270 MG/DL (ref 70–99)
GLUCOSE BLDC GLUCOMTR-MCNC: 271 MG/DL (ref 70–99)
GLUCOSE BLDC GLUCOMTR-MCNC: 271 MG/DL (ref 70–99)
GLUCOSE BLDC GLUCOMTR-MCNC: 272 MG/DL (ref 70–99)
GLUCOSE BLDC GLUCOMTR-MCNC: 279 MG/DL (ref 70–99)
GLUCOSE BLDC GLUCOMTR-MCNC: 283 MG/DL (ref 70–99)
GLUCOSE BLDC GLUCOMTR-MCNC: 283 MG/DL (ref 70–99)
GLUCOSE BLDC GLUCOMTR-MCNC: 287 MG/DL (ref 70–99)
GLUCOSE BLDC GLUCOMTR-MCNC: 288 MG/DL (ref 70–99)
GLUCOSE BLDC GLUCOMTR-MCNC: 289 MG/DL (ref 70–99)
GLUCOSE BLDC GLUCOMTR-MCNC: 291 MG/DL (ref 70–99)
GLUCOSE BLDC GLUCOMTR-MCNC: 292 MG/DL (ref 70–99)
GLUCOSE BLDC GLUCOMTR-MCNC: 295 MG/DL (ref 70–99)
GLUCOSE BLDC GLUCOMTR-MCNC: 295 MG/DL (ref 70–99)
GLUCOSE BLDC GLUCOMTR-MCNC: 297 MG/DL (ref 70–99)
GLUCOSE BLDC GLUCOMTR-MCNC: 300 MG/DL (ref 70–99)
GLUCOSE BLDC GLUCOMTR-MCNC: 304 MG/DL (ref 70–99)
GLUCOSE BLDC GLUCOMTR-MCNC: 306 MG/DL (ref 70–99)
GLUCOSE BLDC GLUCOMTR-MCNC: 307 MG/DL (ref 70–99)
GLUCOSE BLDC GLUCOMTR-MCNC: 310 MG/DL (ref 70–99)
GLUCOSE BLDC GLUCOMTR-MCNC: 311 MG/DL (ref 70–99)
GLUCOSE BLDC GLUCOMTR-MCNC: 311 MG/DL (ref 70–99)
GLUCOSE BLDC GLUCOMTR-MCNC: 317 MG/DL (ref 70–99)
GLUCOSE BLDC GLUCOMTR-MCNC: 318 MG/DL (ref 70–99)
GLUCOSE BLDC GLUCOMTR-MCNC: 318 MG/DL (ref 70–99)
GLUCOSE BLDC GLUCOMTR-MCNC: 320 MG/DL (ref 70–99)
GLUCOSE BLDC GLUCOMTR-MCNC: 320 MG/DL (ref 70–99)
GLUCOSE BLDC GLUCOMTR-MCNC: 321 MG/DL (ref 70–99)
GLUCOSE BLDC GLUCOMTR-MCNC: 323 MG/DL (ref 70–99)
GLUCOSE BLDC GLUCOMTR-MCNC: 330 MG/DL (ref 70–99)
GLUCOSE BLDC GLUCOMTR-MCNC: 333 MG/DL (ref 70–99)
GLUCOSE BLDC GLUCOMTR-MCNC: 334 MG/DL (ref 70–99)
GLUCOSE BLDC GLUCOMTR-MCNC: 334 MG/DL (ref 70–99)
GLUCOSE BLDC GLUCOMTR-MCNC: 335 MG/DL (ref 70–99)
GLUCOSE BLDC GLUCOMTR-MCNC: 344 MG/DL (ref 70–99)
GLUCOSE BLDC GLUCOMTR-MCNC: 349 MG/DL (ref 70–99)
GLUCOSE BLDC GLUCOMTR-MCNC: 349 MG/DL (ref 70–99)
GLUCOSE BLDC GLUCOMTR-MCNC: 357 MG/DL (ref 70–99)
GLUCOSE BLDC GLUCOMTR-MCNC: 358 MG/DL (ref 70–99)
GLUCOSE BLDC GLUCOMTR-MCNC: 370 MG/DL (ref 70–99)
GLUCOSE BLDC GLUCOMTR-MCNC: 375 MG/DL (ref 70–99)
GLUCOSE BLDC GLUCOMTR-MCNC: 417 MG/DL (ref 70–99)
GLUCOSE BLDC GLUCOMTR-MCNC: 423 MG/DL (ref 70–99)
GLUCOSE BLDC GLUCOMTR-MCNC: 49 MG/DL (ref 70–99)
GLUCOSE BLDC GLUCOMTR-MCNC: 57 MG/DL (ref 70–99)
GLUCOSE BLDC GLUCOMTR-MCNC: 60 MG/DL (ref 70–99)
GLUCOSE BLDC GLUCOMTR-MCNC: 62 MG/DL (ref 70–99)
GLUCOSE BLDC GLUCOMTR-MCNC: 69 MG/DL (ref 70–99)
GLUCOSE BLDC GLUCOMTR-MCNC: 88 MG/DL (ref 70–99)
GLUCOSE BLDC GLUCOMTR-MCNC: 93 MG/DL (ref 70–99)
GLUCOSE BODY FLUID SOURCE: NORMAL
GLUCOSE FLD-MCNC: 325 MG/DL
GLUCOSE SERPL-MCNC: 125 MG/DL (ref 70–99)
GLUCOSE SERPL-MCNC: 151 MG/DL (ref 70–99)
GLUCOSE SERPL-MCNC: 160 MG/DL (ref 70–99)
GLUCOSE SERPL-MCNC: 173 MG/DL (ref 70–99)
GLUCOSE SERPL-MCNC: 181 MG/DL (ref 70–99)
GLUCOSE SERPL-MCNC: 184 MG/DL (ref 70–99)
GLUCOSE SERPL-MCNC: 187 MG/DL (ref 70–99)
GLUCOSE SERPL-MCNC: 190 MG/DL (ref 70–99)
GLUCOSE SERPL-MCNC: 191 MG/DL (ref 70–99)
GLUCOSE SERPL-MCNC: 230 MG/DL (ref 70–99)
GLUCOSE SERPL-MCNC: 238 MG/DL (ref 70–99)
GLUCOSE SERPL-MCNC: 240 MG/DL (ref 70–99)
GLUCOSE SERPL-MCNC: 250 MG/DL (ref 70–99)
GLUCOSE SERPL-MCNC: 274 MG/DL (ref 70–99)
GLUCOSE SERPL-MCNC: 288 MG/DL (ref 70–99)
GLUCOSE SERPL-MCNC: 299 MG/DL (ref 70–99)
GLUCOSE SERPL-MCNC: 305 MG/DL (ref 70–99)
GLUCOSE SERPL-MCNC: 311 MG/DL (ref 70–99)
GLUCOSE SERPL-MCNC: 318 MG/DL (ref 70–99)
GLUCOSE SERPL-MCNC: 320 MG/DL (ref 70–99)
GLUCOSE SERPL-MCNC: 328 MG/DL (ref 70–99)
GLUCOSE SERPL-MCNC: 334 MG/DL (ref 70–99)
GLUCOSE SERPL-MCNC: 351 MG/DL (ref 70–99)
GLUCOSE SERPL-MCNC: 351 MG/DL (ref 70–99)
GLUCOSE SERPL-MCNC: 363 MG/DL (ref 70–99)
GLUCOSE SERPL-MCNC: 466 MG/DL (ref 70–99)
GLUCOSE SERPL-MCNC: 62 MG/DL (ref 70–99)
GLUCOSE SERPL-MCNC: 63 MG/DL (ref 70–99)
GLUCOSE SERPL-MCNC: 99 MG/DL (ref 70–99)
GLUCOSE UR STRIP-MCNC: 200 MG/DL
GRAM STAIN RESULT: NORMAL
GRAM STAIN RESULT: NORMAL
HADV DNA SPEC QL NAA+PROBE: NOT DETECTED
HBA1C MFR BLD: 9 %
HCO3 BLDV-SCNC: 18 MMOL/L (ref 21–28)
HCO3 BLDV-SCNC: 21 MMOL/L (ref 21–28)
HCO3 BLDV-SCNC: 27 MMOL/L (ref 21–28)
HCOV PNL SPEC NAA+PROBE: NOT DETECTED
HCT VFR BLD AUTO: 35.6 % (ref 40–53)
HCT VFR BLD AUTO: 35.8 % (ref 40–53)
HCT VFR BLD AUTO: 36.2 % (ref 40–53)
HCT VFR BLD AUTO: 36.9 % (ref 40–53)
HCT VFR BLD AUTO: 37 % (ref 40–53)
HCT VFR BLD AUTO: 37.3 % (ref 40–53)
HCT VFR BLD AUTO: 37.6 % (ref 40–53)
HCT VFR BLD AUTO: 37.7 % (ref 40–53)
HCT VFR BLD AUTO: 37.9 % (ref 40–53)
HCT VFR BLD AUTO: 37.9 % (ref 40–53)
HCT VFR BLD AUTO: 38.1 % (ref 40–53)
HCT VFR BLD AUTO: 38.9 % (ref 40–53)
HCT VFR BLD AUTO: 39.2 % (ref 40–53)
HCT VFR BLD AUTO: 39.3 % (ref 40–53)
HCT VFR BLD AUTO: 39.8 % (ref 40–53)
HCT VFR BLD AUTO: 40.4 % (ref 40–53)
HCT VFR BLD AUTO: 41.4 % (ref 40–53)
HCT VFR BLD AUTO: 42.8 % (ref 40–53)
HCT VFR BLD AUTO: 42.8 % (ref 40–53)
HGB BLD-MCNC: 11.3 G/DL (ref 13.3–17.7)
HGB BLD-MCNC: 11.4 G/DL (ref 13.3–17.7)
HGB BLD-MCNC: 11.4 G/DL (ref 13.3–17.7)
HGB BLD-MCNC: 11.6 G/DL (ref 13.3–17.7)
HGB BLD-MCNC: 11.6 G/DL (ref 13.3–17.7)
HGB BLD-MCNC: 11.7 G/DL (ref 13.3–17.7)
HGB BLD-MCNC: 11.9 G/DL (ref 13.3–17.7)
HGB BLD-MCNC: 12 G/DL (ref 13.3–17.7)
HGB BLD-MCNC: 12.1 G/DL (ref 13.3–17.7)
HGB BLD-MCNC: 12.3 G/DL (ref 13.3–17.7)
HGB BLD-MCNC: 12.5 G/DL (ref 13.3–17.7)
HGB BLD-MCNC: 12.5 G/DL (ref 13.3–17.7)
HGB BLD-MCNC: 12.6 G/DL (ref 13.3–17.7)
HGB BLD-MCNC: 12.6 G/DL (ref 13.3–17.7)
HGB BLD-MCNC: 12.7 G/DL (ref 13.3–17.7)
HGB BLD-MCNC: 12.8 G/DL (ref 13.3–17.7)
HGB BLD-MCNC: 13.1 G/DL (ref 13.3–17.7)
HGB BLD-MCNC: 14 G/DL (ref 13.3–17.7)
HGB BLD-MCNC: 14 G/DL (ref 13.3–17.7)
HGB UR QL STRIP: ABNORMAL
HIV 1+2 AB+HIV1 P24 AG SERPL QL IA: NONREACTIVE
HMPV RNA SPEC QL NAA+PROBE: NOT DETECTED
HOLD SPECIMEN: NORMAL
HPIV1 RNA SPEC QL NAA+PROBE: NOT DETECTED
HPIV2 RNA SPEC QL NAA+PROBE: NOT DETECTED
HPIV3 RNA SPEC QL NAA+PROBE: NOT DETECTED
HPIV4 RNA SPEC QL NAA+PROBE: NOT DETECTED
HYALINE CASTS: 17 /LPF
IMM GRANULOCYTES # BLD: 0 10E3/UL
IMM GRANULOCYTES # BLD: 0.1 10E3/UL
IMM GRANULOCYTES # BLD: 0.1 10E3/UL
IMM GRANULOCYTES NFR BLD: 0 %
IMM GRANULOCYTES NFR BLD: 1 %
INTERPRETATION ECG - MUSE: NORMAL
KETONES BLD-SCNC: 3.3 MMOL/L (ref 0–0.6)
KETONES BLD-SCNC: 5.6 MMOL/L (ref 0–0.6)
KETONES UR STRIP-MCNC: NEGATIVE MG/DL
LACTATE BLD-SCNC: 1.7 MMOL/L
LACTATE SERPL-SCNC: 0.8 MMOL/L (ref 0.7–2)
LACTATE SERPL-SCNC: 0.8 MMOL/L (ref 0.7–2)
LACTATE SERPL-SCNC: 0.9 MMOL/L (ref 0.7–2)
LACTATE SERPL-SCNC: 1 MMOL/L (ref 0.7–2)
LACTATE SERPL-SCNC: 1 MMOL/L (ref 0.7–2)
LACTATE SERPL-SCNC: 1.1 MMOL/L (ref 0.7–2)
LACTATE SERPL-SCNC: 1.2 MMOL/L (ref 0.7–2)
LACTATE SERPL-SCNC: 1.3 MMOL/L (ref 0.7–2)
LACTATE SERPL-SCNC: 1.4 MMOL/L (ref 0.7–2)
LACTATE SERPL-SCNC: 1.4 MMOL/L (ref 0.7–2)
LACTATE SERPL-SCNC: 1.5 MMOL/L (ref 0.7–2)
LACTATE SERPL-SCNC: 1.6 MMOL/L (ref 0.7–2)
LD BODY BODY FLUID SOURCE: NORMAL
LDH FLD L TO P-CCNC: 60 U/L
LDH SERPL L TO P-CCNC: 217 U/L (ref 0–250)
LEUKOCYTE ESTERASE UR QL STRIP: ABNORMAL
LVEF ECHO: NORMAL
LVEF ECHO: NORMAL
LYMPHOCYTES # BLD AUTO: 0.7 10E3/UL (ref 0.8–5.3)
LYMPHOCYTES # BLD AUTO: 0.8 10E3/UL (ref 0.8–5.3)
LYMPHOCYTES # BLD AUTO: 1 10E3/UL (ref 0.8–5.3)
LYMPHOCYTES # BLD AUTO: 1.1 10E3/UL (ref 0.8–5.3)
LYMPHOCYTES # BLD AUTO: 1.6 10E3/UL (ref 0.8–5.3)
LYMPHOCYTES NFR BLD AUTO: 14 %
LYMPHOCYTES NFR BLD AUTO: 14 %
LYMPHOCYTES NFR BLD AUTO: 15 %
LYMPHOCYTES NFR BLD AUTO: 24 %
LYMPHOCYTES NFR BLD AUTO: 7 %
LYMPHOCYTES NFR BLD AUTO: 8 %
LYMPHOCYTES NFR BLD AUTO: 9 %
LYMPHOCYTES NFR FLD MANUAL: 61 %
M PNEUMO DNA SPEC QL NAA+PROBE: NOT DETECTED
MAGNESIUM SERPL-MCNC: 1.4 MG/DL (ref 1.7–2.3)
MAGNESIUM SERPL-MCNC: 1.5 MG/DL (ref 1.7–2.3)
MAGNESIUM SERPL-MCNC: 1.7 MG/DL (ref 1.7–2.3)
MAGNESIUM SERPL-MCNC: 1.8 MG/DL (ref 1.7–2.3)
MAGNESIUM SERPL-MCNC: 1.8 MG/DL (ref 1.7–2.3)
MAGNESIUM SERPL-MCNC: 1.9 MG/DL (ref 1.7–2.3)
MAGNESIUM SERPL-MCNC: 1.9 MG/DL (ref 1.7–2.3)
MAGNESIUM SERPL-MCNC: 2.1 MG/DL (ref 1.7–2.3)
MAGNESIUM SERPL-MCNC: 2.1 MG/DL (ref 1.7–2.3)
MAGNESIUM SERPL-MCNC: 2.2 MG/DL (ref 1.7–2.3)
MCH RBC QN AUTO: 30.1 PG (ref 26.5–33)
MCH RBC QN AUTO: 30.4 PG (ref 26.5–33)
MCH RBC QN AUTO: 30.5 PG (ref 26.5–33)
MCH RBC QN AUTO: 30.6 PG (ref 26.5–33)
MCH RBC QN AUTO: 30.6 PG (ref 26.5–33)
MCH RBC QN AUTO: 30.7 PG (ref 26.5–33)
MCH RBC QN AUTO: 30.7 PG (ref 26.5–33)
MCH RBC QN AUTO: 30.8 PG (ref 26.5–33)
MCH RBC QN AUTO: 30.8 PG (ref 26.5–33)
MCH RBC QN AUTO: 30.9 PG (ref 26.5–33)
MCH RBC QN AUTO: 31.1 PG (ref 26.5–33)
MCH RBC QN AUTO: 31.2 PG (ref 26.5–33)
MCH RBC QN AUTO: 31.2 PG (ref 26.5–33)
MCH RBC QN AUTO: 31.3 PG (ref 26.5–33)
MCH RBC QN AUTO: 31.4 PG (ref 26.5–33)
MCHC RBC AUTO-ENTMCNC: 30.4 G/DL (ref 31.5–36.5)
MCHC RBC AUTO-ENTMCNC: 30.6 G/DL (ref 31.5–36.5)
MCHC RBC AUTO-ENTMCNC: 30.8 G/DL (ref 31.5–36.5)
MCHC RBC AUTO-ENTMCNC: 30.8 G/DL (ref 31.5–36.5)
MCHC RBC AUTO-ENTMCNC: 30.9 G/DL (ref 31.5–36.5)
MCHC RBC AUTO-ENTMCNC: 31.1 G/DL (ref 31.5–36.5)
MCHC RBC AUTO-ENTMCNC: 31.4 G/DL (ref 31.5–36.5)
MCHC RBC AUTO-ENTMCNC: 31.7 G/DL (ref 31.5–36.5)
MCHC RBC AUTO-ENTMCNC: 31.7 G/DL (ref 31.5–36.5)
MCHC RBC AUTO-ENTMCNC: 31.9 G/DL (ref 31.5–36.5)
MCHC RBC AUTO-ENTMCNC: 32.4 G/DL (ref 31.5–36.5)
MCHC RBC AUTO-ENTMCNC: 32.6 G/DL (ref 31.5–36.5)
MCHC RBC AUTO-ENTMCNC: 32.7 G/DL (ref 31.5–36.5)
MCHC RBC AUTO-ENTMCNC: 32.7 G/DL (ref 31.5–36.5)
MCHC RBC AUTO-ENTMCNC: 32.8 G/DL (ref 31.5–36.5)
MCHC RBC AUTO-ENTMCNC: 32.9 G/DL (ref 31.5–36.5)
MCHC RBC AUTO-ENTMCNC: 33.1 G/DL (ref 31.5–36.5)
MCHC RBC AUTO-ENTMCNC: 33.2 G/DL (ref 31.5–36.5)
MCHC RBC AUTO-ENTMCNC: 33.3 G/DL (ref 31.5–36.5)
MCV RBC AUTO: 100 FL (ref 78–100)
MCV RBC AUTO: 101 FL (ref 78–100)
MCV RBC AUTO: 102 FL (ref 78–100)
MCV RBC AUTO: 102 FL (ref 78–100)
MCV RBC AUTO: 92 FL (ref 78–100)
MCV RBC AUTO: 92 FL (ref 78–100)
MCV RBC AUTO: 93 FL (ref 78–100)
MCV RBC AUTO: 94 FL (ref 78–100)
MCV RBC AUTO: 95 FL (ref 78–100)
MCV RBC AUTO: 95 FL (ref 78–100)
MCV RBC AUTO: 97 FL (ref 78–100)
MCV RBC AUTO: 98 FL (ref 78–100)
MCV RBC AUTO: 98 FL (ref 78–100)
MCV RBC AUTO: 99 FL (ref 78–100)
MONOCYTES # BLD AUTO: 0.5 10E3/UL (ref 0–1.3)
MONOCYTES # BLD AUTO: 0.6 10E3/UL (ref 0–1.3)
MONOCYTES # BLD AUTO: 0.7 10E3/UL (ref 0–1.3)
MONOCYTES NFR BLD AUTO: 10 %
MONOCYTES NFR BLD AUTO: 4 %
MONOCYTES NFR BLD AUTO: 6 %
MONOCYTES NFR BLD AUTO: 6 %
MONOCYTES NFR BLD AUTO: 7 %
MONOCYTES NFR BLD AUTO: 8 %
MONOCYTES NFR BLD AUTO: 9 %
MONOS+MACROS NFR FLD MANUAL: 2 %
MUCOUS THREADS #/AREA URNS LPF: PRESENT /LPF
NEUTROPHILS # BLD AUTO: 10.6 10E3/UL (ref 1.6–8.3)
NEUTROPHILS # BLD AUTO: 3.9 10E3/UL (ref 1.6–8.3)
NEUTROPHILS # BLD AUTO: 5.4 10E3/UL (ref 1.6–8.3)
NEUTROPHILS # BLD AUTO: 5.6 10E3/UL (ref 1.6–8.3)
NEUTROPHILS # BLD AUTO: 5.6 10E3/UL (ref 1.6–8.3)
NEUTROPHILS # BLD AUTO: 7.8 10E3/UL (ref 1.6–8.3)
NEUTROPHILS # BLD AUTO: 8.2 10E3/UL (ref 1.6–8.3)
NEUTROPHILS NFR BLD AUTO: 57 %
NEUTROPHILS NFR BLD AUTO: 71 %
NEUTROPHILS NFR BLD AUTO: 73 %
NEUTROPHILS NFR BLD AUTO: 75 %
NEUTROPHILS NFR BLD AUTO: 81 %
NEUTROPHILS NFR BLD AUTO: 86 %
NEUTROPHILS NFR BLD AUTO: 88 %
NEUTS BAND NFR FLD MANUAL: 37 %
NITRATE UR QL: POSITIVE
NRBC # BLD AUTO: 0 10E3/UL
NRBC BLD AUTO-RTO: 0 /100
NT-PROBNP SERPL-MCNC: 6952 PG/ML (ref 0–1800)
O2/TOTAL GAS SETTING VFR VENT: 3 %
O2/TOTAL GAS SETTING VFR VENT: 5 %
P AXIS - MUSE: 43 DEGREES
P AXIS - MUSE: 48 DEGREES
P AXIS - MUSE: 56 DEGREES
P AXIS - MUSE: 60 DEGREES
P AXIS - MUSE: 61 DEGREES
P AXIS - MUSE: 61 DEGREES
PCO2 BLDV: 31 MM HG (ref 40–50)
PCO2 BLDV: 35 MM HG (ref 40–50)
PCO2 BLDV: 54 MM HG (ref 40–50)
PH BLDV: 7.31 [PH] (ref 7.32–7.43)
PH BLDV: 7.36 [PH] (ref 7.32–7.43)
PH BLDV: 7.39 [PH] (ref 7.32–7.43)
PH UR STRIP: 6 [PH] (ref 5–7)
PHOSPHATE SERPL-MCNC: 2.8 MG/DL (ref 2.5–4.5)
PHOSPHATE SERPL-MCNC: 3.1 MG/DL (ref 2.5–4.5)
PHOSPHATE SERPL-MCNC: 3.2 MG/DL (ref 2.5–4.5)
PHOSPHATE SERPL-MCNC: 3.3 MG/DL (ref 2.5–4.5)
PHOSPHATE SERPL-MCNC: 4 MG/DL (ref 2.5–4.5)
PHOSPHATE SERPL-MCNC: 4.1 MG/DL (ref 2.5–4.5)
PHOSPHATE SERPL-MCNC: 4.4 MG/DL (ref 2.5–4.5)
PHOSPHATE SERPL-MCNC: 4.5 MG/DL (ref 2.5–4.5)
PLAT MORPH BLD: ABNORMAL
PLATELET # BLD AUTO: 110 10E3/UL (ref 150–450)
PLATELET # BLD AUTO: 124 10E3/UL (ref 150–450)
PLATELET # BLD AUTO: 126 10E3/UL (ref 150–450)
PLATELET # BLD AUTO: 128 10E3/UL (ref 150–450)
PLATELET # BLD AUTO: 128 10E3/UL (ref 150–450)
PLATELET # BLD AUTO: 133 10E3/UL (ref 150–450)
PLATELET # BLD AUTO: 138 10E3/UL (ref 150–450)
PLATELET # BLD AUTO: 138 10E3/UL (ref 150–450)
PLATELET # BLD AUTO: 142 10E3/UL (ref 150–450)
PLATELET # BLD AUTO: 143 10E3/UL (ref 150–450)
PLATELET # BLD AUTO: 150 10E3/UL (ref 150–450)
PLATELET # BLD AUTO: 151 10E3/UL (ref 150–450)
PLATELET # BLD AUTO: 151 10E3/UL (ref 150–450)
PLATELET # BLD AUTO: 166 10E3/UL (ref 150–450)
PLATELET # BLD AUTO: 184 10E3/UL (ref 150–450)
PLATELET # BLD AUTO: 205 10E3/UL (ref 150–450)
PLATELET # BLD AUTO: 210 10E3/UL (ref 150–450)
PLATELET # BLD AUTO: 212 10E3/UL (ref 150–450)
PLATELET # BLD AUTO: 93 10E3/UL (ref 150–450)
PO2 BLDV: 141 MM HG (ref 25–47)
PO2 BLDV: 26 MM HG (ref 25–47)
PO2 BLDV: <15 MM HG (ref 25–47)
POTASSIUM SERPL-SCNC: 3.6 MMOL/L (ref 3.4–5.3)
POTASSIUM SERPL-SCNC: 3.7 MMOL/L (ref 3.4–5.3)
POTASSIUM SERPL-SCNC: 3.8 MMOL/L (ref 3.4–5.3)
POTASSIUM SERPL-SCNC: 3.9 MMOL/L (ref 3.4–5.3)
POTASSIUM SERPL-SCNC: 4 MMOL/L (ref 3.4–5.3)
POTASSIUM SERPL-SCNC: 4.2 MMOL/L (ref 3.4–5.3)
POTASSIUM SERPL-SCNC: 4.4 MMOL/L (ref 3.4–5.3)
POTASSIUM SERPL-SCNC: 4.4 MMOL/L (ref 3.4–5.3)
POTASSIUM SERPL-SCNC: 4.5 MMOL/L (ref 3.4–5.3)
POTASSIUM SERPL-SCNC: 4.6 MMOL/L (ref 3.4–5.3)
POTASSIUM SERPL-SCNC: 4.6 MMOL/L (ref 3.4–5.3)
POTASSIUM SERPL-SCNC: 4.7 MMOL/L (ref 3.4–5.3)
POTASSIUM SERPL-SCNC: 4.8 MMOL/L (ref 3.4–5.3)
POTASSIUM SERPL-SCNC: 4.8 MMOL/L (ref 3.4–5.3)
POTASSIUM SERPL-SCNC: 4.9 MMOL/L (ref 3.4–5.3)
POTASSIUM SERPL-SCNC: 5 MMOL/L (ref 3.4–5.3)
POTASSIUM SERPL-SCNC: 5.4 MMOL/L (ref 3.4–5.3)
PR INTERVAL - MUSE: 116 MS
PR INTERVAL - MUSE: 122 MS
PR INTERVAL - MUSE: 132 MS
PR INTERVAL - MUSE: 144 MS
PR INTERVAL - MUSE: 150 MS
PR INTERVAL - MUSE: 150 MS
PROT FLD-MCNC: 1.4 G/DL
PROT SERPL-MCNC: 6.1 G/DL (ref 6.4–8.3)
PROT SERPL-MCNC: 6.1 G/DL (ref 6.4–8.3)
PROT SERPL-MCNC: 6.2 G/DL (ref 6.4–8.3)
PROT SERPL-MCNC: 6.3 G/DL (ref 6.4–8.3)
PROT SERPL-MCNC: 6.6 G/DL (ref 6.4–8.3)
PROT SERPL-MCNC: 6.7 G/DL (ref 6.4–8.3)
PROT SERPL-MCNC: 7 G/DL (ref 6.4–8.3)
PROTEIN BODY FLUID SOURCE: NORMAL
QRS DURATION - MUSE: 66 MS
QRS DURATION - MUSE: 68 MS
QRS DURATION - MUSE: 74 MS
QRS DURATION - MUSE: 74 MS
QRS DURATION - MUSE: 80 MS
QRS DURATION - MUSE: 82 MS
QT - MUSE: 344 MS
QT - MUSE: 348 MS
QT - MUSE: 360 MS
QT - MUSE: 368 MS
QT - MUSE: 374 MS
QT - MUSE: 398 MS
QTC - MUSE: 427 MS
QTC - MUSE: 435 MS
QTC - MUSE: 445 MS
QTC - MUSE: 457 MS
QTC - MUSE: 459 MS
QTC - MUSE: 469 MS
R AXIS - MUSE: 42 DEGREES
R AXIS - MUSE: 47 DEGREES
R AXIS - MUSE: 48 DEGREES
R AXIS - MUSE: 48 DEGREES
R AXIS - MUSE: 49 DEGREES
R AXIS - MUSE: 50 DEGREES
RBC # BLD AUTO: 3.63 10E6/UL (ref 4.4–5.9)
RBC # BLD AUTO: 3.63 10E6/UL (ref 4.4–5.9)
RBC # BLD AUTO: 3.75 10E6/UL (ref 4.4–5.9)
RBC # BLD AUTO: 3.75 10E6/UL (ref 4.4–5.9)
RBC # BLD AUTO: 3.79 10E6/UL (ref 4.4–5.9)
RBC # BLD AUTO: 3.81 10E6/UL (ref 4.4–5.9)
RBC # BLD AUTO: 3.81 10E6/UL (ref 4.4–5.9)
RBC # BLD AUTO: 3.9 10E6/UL (ref 4.4–5.9)
RBC # BLD AUTO: 3.91 10E6/UL (ref 4.4–5.9)
RBC # BLD AUTO: 3.96 10E6/UL (ref 4.4–5.9)
RBC # BLD AUTO: 4.01 10E6/UL (ref 4.4–5.9)
RBC # BLD AUTO: 4.02 10E6/UL (ref 4.4–5.9)
RBC # BLD AUTO: 4.02 10E6/UL (ref 4.4–5.9)
RBC # BLD AUTO: 4.13 10E6/UL (ref 4.4–5.9)
RBC # BLD AUTO: 4.18 10E6/UL (ref 4.4–5.9)
RBC # BLD AUTO: 4.22 10E6/UL (ref 4.4–5.9)
RBC # BLD AUTO: 4.24 10E6/UL (ref 4.4–5.9)
RBC # BLD AUTO: 4.54 10E6/UL (ref 4.4–5.9)
RBC # BLD AUTO: 4.56 10E6/UL (ref 4.4–5.9)
RBC MORPH BLD: ABNORMAL
RBC URINE: 3 /HPF
RSV RNA SPEC NAA+PROBE: NEGATIVE
RSV RNA SPEC QL NAA+PROBE: NOT DETECTED
RSV RNA SPEC QL NAA+PROBE: NOT DETECTED
RV+EV RNA SPEC QL NAA+PROBE: NOT DETECTED
SARS-COV-2 RNA RESP QL NAA+PROBE: NEGATIVE
SODIUM SERPL-SCNC: 133 MMOL/L (ref 136–145)
SODIUM SERPL-SCNC: 135 MMOL/L (ref 136–145)
SODIUM SERPL-SCNC: 137 MMOL/L (ref 136–145)
SODIUM SERPL-SCNC: 138 MMOL/L (ref 136–145)
SODIUM SERPL-SCNC: 139 MMOL/L (ref 136–145)
SODIUM SERPL-SCNC: 140 MMOL/L (ref 136–145)
SODIUM SERPL-SCNC: 140 MMOL/L (ref 136–145)
SODIUM SERPL-SCNC: 141 MMOL/L (ref 136–145)
SODIUM SERPL-SCNC: 142 MMOL/L (ref 136–145)
SODIUM SERPL-SCNC: 142 MMOL/L (ref 136–145)
SODIUM SERPL-SCNC: 143 MMOL/L (ref 136–145)
SODIUM SERPL-SCNC: 144 MMOL/L (ref 136–145)
SODIUM SERPL-SCNC: 145 MMOL/L (ref 136–145)
SODIUM SERPL-SCNC: 146 MMOL/L (ref 136–145)
SODIUM SERPL-SCNC: 146 MMOL/L (ref 136–145)
SODIUM SERPL-SCNC: 147 MMOL/L (ref 136–145)
SODIUM UR-SCNC: <20 MMOL/L
SP GR UR STRIP: 1.02 (ref 1–1.03)
SQUAMOUS EPITHELIAL: <1 /HPF
SYSTOLIC BLOOD PRESSURE - MUSE: NORMAL MMHG
T AXIS - MUSE: 118 DEGREES
T AXIS - MUSE: 123 DEGREES
T AXIS - MUSE: 139 DEGREES
T AXIS - MUSE: 150 DEGREES
T AXIS - MUSE: 159 DEGREES
T AXIS - MUSE: 164 DEGREES
T PALLIDUM AB SER QL: NONREACTIVE
TROPONIN T SERPL HS-MCNC: 39 NG/L
TROPONIN T SERPL HS-MCNC: 42 NG/L
TROPONIN T SERPL HS-MCNC: 46 NG/L
TROPONIN T SERPL HS-MCNC: 48 NG/L
TROPONIN T SERPL HS-MCNC: 48 NG/L
TROPONIN T SERPL HS-MCNC: 49 NG/L
TROPONIN T SERPL HS-MCNC: 50 NG/L
TROPONIN T SERPL HS-MCNC: 51 NG/L
TROPONIN T SERPL HS-MCNC: 53 NG/L
TROPONIN T SERPL HS-MCNC: 54 NG/L
TROPONIN T SERPL HS-MCNC: 60 NG/L
TROPONIN T SERPL HS-MCNC: 61 NG/L
TROPONIN T SERPL HS-MCNC: 62 NG/L
TROPONIN T SERPL HS-MCNC: 68 NG/L
UROBILINOGEN UR STRIP-MCNC: NORMAL MG/DL
VENTRICULAR RATE- MUSE: 105 BPM
VENTRICULAR RATE- MUSE: 72 BPM
VENTRICULAR RATE- MUSE: 92 BPM
VENTRICULAR RATE- MUSE: 93 BPM
VENTRICULAR RATE- MUSE: 93 BPM
VENTRICULAR RATE- MUSE: 95 BPM
VIT B12 SERPL-MCNC: 1531 PG/ML (ref 232–1245)
WBC # BLD AUTO: 10.2 10E3/UL (ref 4–11)
WBC # BLD AUTO: 10.6 10E3/UL (ref 4–11)
WBC # BLD AUTO: 10.7 10E3/UL (ref 4–11)
WBC # BLD AUTO: 12.2 10E3/UL (ref 4–11)
WBC # BLD AUTO: 6.3 10E3/UL (ref 4–11)
WBC # BLD AUTO: 6.5 10E3/UL (ref 4–11)
WBC # BLD AUTO: 6.6 10E3/UL (ref 4–11)
WBC # BLD AUTO: 6.6 10E3/UL (ref 4–11)
WBC # BLD AUTO: 7.1 10E3/UL (ref 4–11)
WBC # BLD AUTO: 7.3 10E3/UL (ref 4–11)
WBC # BLD AUTO: 7.4 10E3/UL (ref 4–11)
WBC # BLD AUTO: 7.6 10E3/UL (ref 4–11)
WBC # BLD AUTO: 7.7 10E3/UL (ref 4–11)
WBC # BLD AUTO: 8.1 10E3/UL (ref 4–11)
WBC # BLD AUTO: 8.5 10E3/UL (ref 4–11)
WBC # BLD AUTO: 9.4 10E3/UL (ref 4–11)
WBC # BLD AUTO: 9.6 10E3/UL (ref 4–11)
WBC # BLD AUTO: 9.6 10E3/UL (ref 4–11)
WBC # BLD AUTO: 9.8 10E3/UL (ref 4–11)
WBC # FLD AUTO: 87 /UL
WBC URINE: 48 /HPF

## 2022-01-01 PROCEDURE — 80048 BASIC METABOLIC PNL TOTAL CA: CPT | Performed by: STUDENT IN AN ORGANIZED HEALTH CARE EDUCATION/TRAINING PROGRAM

## 2022-01-01 PROCEDURE — 99232 SBSQ HOSP IP/OBS MODERATE 35: CPT | Performed by: INTERNAL MEDICINE

## 2022-01-01 PROCEDURE — 87040 BLOOD CULTURE FOR BACTERIA: CPT | Performed by: EMERGENCY MEDICINE

## 2022-01-01 PROCEDURE — 82140 ASSAY OF AMMONIA: CPT | Performed by: EMERGENCY MEDICINE

## 2022-01-01 PROCEDURE — 250N000009 HC RX 250: Performed by: NURSE PRACTITIONER

## 2022-01-01 PROCEDURE — 99233 SBSQ HOSP IP/OBS HIGH 50: CPT | Performed by: INTERNAL MEDICINE

## 2022-01-01 PROCEDURE — 250N000013 HC RX MED GY IP 250 OP 250 PS 637: Performed by: INTERNAL MEDICINE

## 2022-01-01 PROCEDURE — 71045 X-RAY EXAM CHEST 1 VIEW: CPT

## 2022-01-01 PROCEDURE — 83605 ASSAY OF LACTIC ACID: CPT | Performed by: STUDENT IN AN ORGANIZED HEALTH CARE EDUCATION/TRAINING PROGRAM

## 2022-01-01 PROCEDURE — 85025 COMPLETE CBC W/AUTO DIFF WBC: CPT | Performed by: INTERNAL MEDICINE

## 2022-01-01 PROCEDURE — 83605 ASSAY OF LACTIC ACID: CPT | Performed by: INTERNAL MEDICINE

## 2022-01-01 PROCEDURE — 83036 HEMOGLOBIN GLYCOSYLATED A1C: CPT | Performed by: INTERNAL MEDICINE

## 2022-01-01 PROCEDURE — 83605 ASSAY OF LACTIC ACID: CPT | Performed by: EMERGENCY MEDICINE

## 2022-01-01 PROCEDURE — 250N000011 HC RX IP 250 OP 636: Performed by: STUDENT IN AN ORGANIZED HEALTH CARE EDUCATION/TRAINING PROGRAM

## 2022-01-01 PROCEDURE — G1010 CDSM STANSON: HCPCS

## 2022-01-01 PROCEDURE — 999N000157 HC STATISTIC RCP TIME EA 10 MIN

## 2022-01-01 PROCEDURE — 258N000003 HC RX IP 258 OP 636: Performed by: EMERGENCY MEDICINE

## 2022-01-01 PROCEDURE — 92526 ORAL FUNCTION THERAPY: CPT | Mod: GN

## 2022-01-01 PROCEDURE — 200N000001 HC R&B ICU

## 2022-01-01 PROCEDURE — 93308 TTE F-UP OR LMTD: CPT | Mod: 26 | Performed by: INTERNAL MEDICINE

## 2022-01-01 PROCEDURE — 82803 BLOOD GASES ANY COMBINATION: CPT | Performed by: INTERNAL MEDICINE

## 2022-01-01 PROCEDURE — 120N000001 HC R&B MED SURG/OB

## 2022-01-01 PROCEDURE — 83735 ASSAY OF MAGNESIUM: CPT | Performed by: STUDENT IN AN ORGANIZED HEALTH CARE EDUCATION/TRAINING PROGRAM

## 2022-01-01 PROCEDURE — 80053 COMPREHEN METABOLIC PANEL: CPT | Performed by: STUDENT IN AN ORGANIZED HEALTH CARE EDUCATION/TRAINING PROGRAM

## 2022-01-01 PROCEDURE — 250N000011 HC RX IP 250 OP 636: Performed by: INTERNAL MEDICINE

## 2022-01-01 PROCEDURE — 250N000009 HC RX 250: Performed by: INTERNAL MEDICINE

## 2022-01-01 PROCEDURE — 80053 COMPREHEN METABOLIC PANEL: CPT | Performed by: EMERGENCY MEDICINE

## 2022-01-01 PROCEDURE — 250N000013 HC RX MED GY IP 250 OP 250 PS 637: Performed by: STUDENT IN AN ORGANIZED HEALTH CARE EDUCATION/TRAINING PROGRAM

## 2022-01-01 PROCEDURE — 80048 BASIC METABOLIC PNL TOTAL CA: CPT | Performed by: INTERNAL MEDICINE

## 2022-01-01 PROCEDURE — 99285 EMERGENCY DEPT VISIT HI MDM: CPT | Mod: 25

## 2022-01-01 PROCEDURE — 85652 RBC SED RATE AUTOMATED: CPT | Performed by: STUDENT IN AN ORGANIZED HEALTH CARE EDUCATION/TRAINING PROGRAM

## 2022-01-01 PROCEDURE — 84100 ASSAY OF PHOSPHORUS: CPT | Performed by: INTERNAL MEDICINE

## 2022-01-01 PROCEDURE — 94640 AIRWAY INHALATION TREATMENT: CPT | Mod: 76

## 2022-01-01 PROCEDURE — 92610 EVALUATE SWALLOWING FUNCTION: CPT | Mod: GN

## 2022-01-01 PROCEDURE — 36415 COLL VENOUS BLD VENIPUNCTURE: CPT | Performed by: INTERNAL MEDICINE

## 2022-01-01 PROCEDURE — 86141 C-REACTIVE PROTEIN HS: CPT | Performed by: STUDENT IN AN ORGANIZED HEALTH CARE EDUCATION/TRAINING PROGRAM

## 2022-01-01 PROCEDURE — 83735 ASSAY OF MAGNESIUM: CPT | Performed by: INTERNAL MEDICINE

## 2022-01-01 PROCEDURE — 99222 1ST HOSP IP/OBS MODERATE 55: CPT | Performed by: NURSE PRACTITIONER

## 2022-01-01 PROCEDURE — 999N000009 HC STATISTIC AIRWAY CARE

## 2022-01-01 PROCEDURE — 86140 C-REACTIVE PROTEIN: CPT | Performed by: STUDENT IN AN ORGANIZED HEALTH CARE EDUCATION/TRAINING PROGRAM

## 2022-01-01 PROCEDURE — 250N000009 HC RX 250: Performed by: STUDENT IN AN ORGANIZED HEALTH CARE EDUCATION/TRAINING PROGRAM

## 2022-01-01 PROCEDURE — 999N000147 HC STATISTIC PT IP EVAL DEFER

## 2022-01-01 PROCEDURE — 99222 1ST HOSP IP/OBS MODERATE 55: CPT | Mod: 25 | Performed by: INTERNAL MEDICINE

## 2022-01-01 PROCEDURE — 272N000706 US THORACENTESIS PORTABLE

## 2022-01-01 PROCEDURE — 250N000012 HC RX MED GY IP 250 OP 636 PS 637: Performed by: STUDENT IN AN ORGANIZED HEALTH CARE EDUCATION/TRAINING PROGRAM

## 2022-01-01 PROCEDURE — 99232 SBSQ HOSP IP/OBS MODERATE 35: CPT | Performed by: NURSE PRACTITIONER

## 2022-01-01 PROCEDURE — 87086 URINE CULTURE/COLONY COUNT: CPT | Performed by: EMERGENCY MEDICINE

## 2022-01-01 PROCEDURE — 36415 COLL VENOUS BLD VENIPUNCTURE: CPT | Performed by: EMERGENCY MEDICINE

## 2022-01-01 PROCEDURE — 86780 TREPONEMA PALLIDUM: CPT | Performed by: STUDENT IN AN ORGANIZED HEALTH CARE EDUCATION/TRAINING PROGRAM

## 2022-01-01 PROCEDURE — 99233 SBSQ HOSP IP/OBS HIGH 50: CPT | Performed by: STUDENT IN AN ORGANIZED HEALTH CARE EDUCATION/TRAINING PROGRAM

## 2022-01-01 PROCEDURE — 258N000003 HC RX IP 258 OP 636: Performed by: STUDENT IN AN ORGANIZED HEALTH CARE EDUCATION/TRAINING PROGRAM

## 2022-01-01 PROCEDURE — 250N000011 HC RX IP 250 OP 636: Performed by: NURSE PRACTITIONER

## 2022-01-01 PROCEDURE — 83615 LACTATE (LD) (LDH) ENZYME: CPT | Performed by: STUDENT IN AN ORGANIZED HEALTH CARE EDUCATION/TRAINING PROGRAM

## 2022-01-01 PROCEDURE — 84484 ASSAY OF TROPONIN QUANT: CPT | Performed by: INTERNAL MEDICINE

## 2022-01-01 PROCEDURE — 84132 ASSAY OF SERUM POTASSIUM: CPT | Performed by: INTERNAL MEDICINE

## 2022-01-01 PROCEDURE — 87637 SARSCOV2&INF A&B&RSV AMP PRB: CPT | Performed by: STUDENT IN AN ORGANIZED HEALTH CARE EDUCATION/TRAINING PROGRAM

## 2022-01-01 PROCEDURE — 258N000002 HC RX IP 258 OP 250: Performed by: INTERNAL MEDICINE

## 2022-01-01 PROCEDURE — 85027 COMPLETE CBC AUTOMATED: CPT | Performed by: INTERNAL MEDICINE

## 2022-01-01 PROCEDURE — 85004 AUTOMATED DIFF WBC COUNT: CPT | Performed by: INTERNAL MEDICINE

## 2022-01-01 PROCEDURE — 93306 TTE W/DOPPLER COMPLETE: CPT | Mod: 26 | Performed by: INTERNAL MEDICINE

## 2022-01-01 PROCEDURE — 82565 ASSAY OF CREATININE: CPT | Performed by: INTERNAL MEDICINE

## 2022-01-01 PROCEDURE — 99233 SBSQ HOSP IP/OBS HIGH 50: CPT | Performed by: NURSE PRACTITIONER

## 2022-01-01 PROCEDURE — 89051 BODY FLUID CELL COUNT: CPT | Performed by: STUDENT IN AN ORGANIZED HEALTH CARE EDUCATION/TRAINING PROGRAM

## 2022-01-01 PROCEDURE — 84300 ASSAY OF URINE SODIUM: CPT | Performed by: INTERNAL MEDICINE

## 2022-01-01 PROCEDURE — 36415 COLL VENOUS BLD VENIPUNCTURE: CPT | Performed by: HOSPITALIST

## 2022-01-01 PROCEDURE — 99207 PR NO BILLABLE SERVICE THIS VISIT: CPT | Performed by: INTERNAL MEDICINE

## 2022-01-01 PROCEDURE — 82310 ASSAY OF CALCIUM: CPT | Performed by: INTERNAL MEDICINE

## 2022-01-01 PROCEDURE — 250N000011 HC RX IP 250 OP 636: Performed by: HOSPITALIST

## 2022-01-01 PROCEDURE — 93010 ELECTROCARDIOGRAM REPORT: CPT | Performed by: INTERNAL MEDICINE

## 2022-01-01 PROCEDURE — 36416 COLLJ CAPILLARY BLOOD SPEC: CPT | Performed by: INTERNAL MEDICINE

## 2022-01-01 PROCEDURE — 36415 COLL VENOUS BLD VENIPUNCTURE: CPT | Performed by: STUDENT IN AN ORGANIZED HEALTH CARE EDUCATION/TRAINING PROGRAM

## 2022-01-01 PROCEDURE — 85014 HEMATOCRIT: CPT | Performed by: STUDENT IN AN ORGANIZED HEALTH CARE EDUCATION/TRAINING PROGRAM

## 2022-01-01 PROCEDURE — 96365 THER/PROPH/DIAG IV INF INIT: CPT

## 2022-01-01 PROCEDURE — 85004 AUTOMATED DIFF WBC COUNT: CPT | Performed by: EMERGENCY MEDICINE

## 2022-01-01 PROCEDURE — 999N000065 XR ABDOMEN PORT 1 VIEW

## 2022-01-01 PROCEDURE — 258N000003 HC RX IP 258 OP 636: Performed by: INTERNAL MEDICINE

## 2022-01-01 PROCEDURE — 250N000012 HC RX MED GY IP 250 OP 636 PS 637: Performed by: INTERNAL MEDICINE

## 2022-01-01 PROCEDURE — 99231 SBSQ HOSP IP/OBS SF/LOW 25: CPT | Performed by: NURSE PRACTITIONER

## 2022-01-01 PROCEDURE — 97161 PT EVAL LOW COMPLEX 20 MIN: CPT | Mod: GP | Performed by: PHYSICAL THERAPIST

## 2022-01-01 PROCEDURE — 272N000078 HC NUTRITION PRODUCT INTERMEDIATE LITER

## 2022-01-01 PROCEDURE — 96361 HYDRATE IV INFUSION ADD-ON: CPT

## 2022-01-01 PROCEDURE — 87070 CULTURE OTHR SPECIMN AEROBIC: CPT | Performed by: STUDENT IN AN ORGANIZED HEALTH CARE EDUCATION/TRAINING PROGRAM

## 2022-01-01 PROCEDURE — 94640 AIRWAY INHALATION TREATMENT: CPT

## 2022-01-01 PROCEDURE — 250N000013 HC RX MED GY IP 250 OP 250 PS 637: Performed by: EMERGENCY MEDICINE

## 2022-01-01 PROCEDURE — 255N000002 HC RX 255 OP 636: Performed by: STUDENT IN AN ORGANIZED HEALTH CARE EDUCATION/TRAINING PROGRAM

## 2022-01-01 PROCEDURE — 85014 HEMATOCRIT: CPT | Performed by: HOSPITALIST

## 2022-01-01 PROCEDURE — 250N000011 HC RX IP 250 OP 636: Performed by: EMERGENCY MEDICINE

## 2022-01-01 PROCEDURE — 258N000003 HC RX IP 258 OP 636: Performed by: NURSE PRACTITIONER

## 2022-01-01 PROCEDURE — 83605 ASSAY OF LACTIC ACID: CPT

## 2022-01-01 PROCEDURE — 85014 HEMATOCRIT: CPT | Performed by: INTERNAL MEDICINE

## 2022-01-01 PROCEDURE — 71046 X-RAY EXAM CHEST 2 VIEWS: CPT

## 2022-01-01 PROCEDURE — 93005 ELECTROCARDIOGRAM TRACING: CPT

## 2022-01-01 PROCEDURE — 80053 COMPREHEN METABOLIC PANEL: CPT | Performed by: INTERNAL MEDICINE

## 2022-01-01 PROCEDURE — 999N000178 HC STATISTIC SUCTION SPUTUM

## 2022-01-01 PROCEDURE — 99221 1ST HOSP IP/OBS SF/LOW 40: CPT | Performed by: INTERNAL MEDICINE

## 2022-01-01 PROCEDURE — 82248 BILIRUBIN DIRECT: CPT | Performed by: STUDENT IN AN ORGANIZED HEALTH CARE EDUCATION/TRAINING PROGRAM

## 2022-01-01 PROCEDURE — 87040 BLOOD CULTURE FOR BACTERIA: CPT | Performed by: STUDENT IN AN ORGANIZED HEALTH CARE EDUCATION/TRAINING PROGRAM

## 2022-01-01 PROCEDURE — 84295 ASSAY OF SERUM SODIUM: CPT | Performed by: INTERNAL MEDICINE

## 2022-01-01 PROCEDURE — C8924 2D TTE W OR W/O FOL W/CON,FU: HCPCS

## 2022-01-01 PROCEDURE — 94644 CONT INHLJ TX 1ST HOUR: CPT

## 2022-01-01 PROCEDURE — 99223 1ST HOSP IP/OBS HIGH 75: CPT | Mod: AI | Performed by: INTERNAL MEDICINE

## 2022-01-01 PROCEDURE — 84484 ASSAY OF TROPONIN QUANT: CPT | Performed by: STUDENT IN AN ORGANIZED HEALTH CARE EDUCATION/TRAINING PROGRAM

## 2022-01-01 PROCEDURE — C9803 HOPD COVID-19 SPEC COLLECT: HCPCS

## 2022-01-01 PROCEDURE — 99238 HOSP IP/OBS DSCHRG MGMT 30/<: CPT | Performed by: STUDENT IN AN ORGANIZED HEALTH CARE EDUCATION/TRAINING PROGRAM

## 2022-01-01 PROCEDURE — 84100 ASSAY OF PHOSPHORUS: CPT | Performed by: STUDENT IN AN ORGANIZED HEALTH CARE EDUCATION/TRAINING PROGRAM

## 2022-01-01 PROCEDURE — 99239 HOSP IP/OBS DSCHRG MGMT >30: CPT | Performed by: INTERNAL MEDICINE

## 2022-01-01 PROCEDURE — 92610 EVALUATE SWALLOWING FUNCTION: CPT | Mod: GN | Performed by: SPEECH-LANGUAGE PATHOLOGIST

## 2022-01-01 PROCEDURE — 82607 VITAMIN B-12: CPT | Performed by: STUDENT IN AN ORGANIZED HEALTH CARE EDUCATION/TRAINING PROGRAM

## 2022-01-01 PROCEDURE — 999N000111 HC STATISTIC OT IP EVAL DEFER: Performed by: REHABILITATION PRACTITIONER

## 2022-01-01 PROCEDURE — 97530 THERAPEUTIC ACTIVITIES: CPT | Mod: GP | Performed by: PHYSICAL THERAPIST

## 2022-01-01 PROCEDURE — 99223 1ST HOSP IP/OBS HIGH 75: CPT | Performed by: NURSE PRACTITIONER

## 2022-01-01 PROCEDURE — 93325 DOPPLER ECHO COLOR FLOW MAPG: CPT | Mod: 26 | Performed by: INTERNAL MEDICINE

## 2022-01-01 PROCEDURE — 85025 COMPLETE CBC W/AUTO DIFF WBC: CPT | Performed by: EMERGENCY MEDICINE

## 2022-01-01 PROCEDURE — 84484 ASSAY OF TROPONIN QUANT: CPT | Performed by: EMERGENCY MEDICINE

## 2022-01-01 PROCEDURE — 250N000009 HC RX 250: Performed by: RADIOLOGY

## 2022-01-01 PROCEDURE — 250N000009 HC RX 250: Performed by: HOSPITALIST

## 2022-01-01 PROCEDURE — 82945 GLUCOSE OTHER FLUID: CPT | Performed by: STUDENT IN AN ORGANIZED HEALTH CARE EDUCATION/TRAINING PROGRAM

## 2022-01-01 PROCEDURE — 87205 SMEAR GRAM STAIN: CPT | Performed by: STUDENT IN AN ORGANIZED HEALTH CARE EDUCATION/TRAINING PROGRAM

## 2022-01-01 PROCEDURE — 82803 BLOOD GASES ANY COMBINATION: CPT

## 2022-01-01 PROCEDURE — 258N000001 HC RX 258: Performed by: INTERNAL MEDICINE

## 2022-01-01 PROCEDURE — 258N000001 HC RX 258: Performed by: STUDENT IN AN ORGANIZED HEALTH CARE EDUCATION/TRAINING PROGRAM

## 2022-01-01 PROCEDURE — 84157 ASSAY OF PROTEIN OTHER: CPT | Performed by: STUDENT IN AN ORGANIZED HEALTH CARE EDUCATION/TRAINING PROGRAM

## 2022-01-01 PROCEDURE — 97530 THERAPEUTIC ACTIVITIES: CPT | Mod: GP

## 2022-01-01 PROCEDURE — 87637 SARSCOV2&INF A&B&RSV AMP PRB: CPT | Performed by: EMERGENCY MEDICINE

## 2022-01-01 PROCEDURE — 81003 URINALYSIS AUTO W/O SCOPE: CPT | Performed by: EMERGENCY MEDICINE

## 2022-01-01 PROCEDURE — 83880 ASSAY OF NATRIURETIC PEPTIDE: CPT | Performed by: HOSPITALIST

## 2022-01-01 PROCEDURE — 250N000013 HC RX MED GY IP 250 OP 250 PS 637

## 2022-01-01 PROCEDURE — 83605 ASSAY OF LACTIC ACID: CPT | Performed by: HOSPITALIST

## 2022-01-01 PROCEDURE — 99223 1ST HOSP IP/OBS HIGH 75: CPT | Mod: AI | Performed by: STUDENT IN AN ORGANIZED HEALTH CARE EDUCATION/TRAINING PROGRAM

## 2022-01-01 PROCEDURE — 85018 HEMOGLOBIN: CPT | Performed by: HOSPITALIST

## 2022-01-01 PROCEDURE — 82310 ASSAY OF CALCIUM: CPT | Performed by: HOSPITALIST

## 2022-01-01 PROCEDURE — 99232 SBSQ HOSP IP/OBS MODERATE 35: CPT | Performed by: HOSPITALIST

## 2022-01-01 PROCEDURE — 87486 CHLMYD PNEUM DNA AMP PROBE: CPT | Performed by: STUDENT IN AN ORGANIZED HEALTH CARE EDUCATION/TRAINING PROGRAM

## 2022-01-01 PROCEDURE — 999N000156 HC STATISTIC RCP CONSULT EA 30 MIN

## 2022-01-01 PROCEDURE — 84484 ASSAY OF TROPONIN QUANT: CPT | Performed by: HOSPITALIST

## 2022-01-01 PROCEDURE — 82746 ASSAY OF FOLIC ACID SERUM: CPT | Performed by: STUDENT IN AN ORGANIZED HEALTH CARE EDUCATION/TRAINING PROGRAM

## 2022-01-01 PROCEDURE — 85027 COMPLETE CBC AUTOMATED: CPT | Performed by: STUDENT IN AN ORGANIZED HEALTH CARE EDUCATION/TRAINING PROGRAM

## 2022-01-01 PROCEDURE — 93321 DOPPLER ECHO F-UP/LMTD STD: CPT | Mod: 26 | Performed by: INTERNAL MEDICINE

## 2022-01-01 PROCEDURE — 82435 ASSAY OF BLOOD CHLORIDE: CPT | Performed by: HOSPITALIST

## 2022-01-01 PROCEDURE — 85018 HEMOGLOBIN: CPT | Performed by: STUDENT IN AN ORGANIZED HEALTH CARE EDUCATION/TRAINING PROGRAM

## 2022-01-01 PROCEDURE — 99291 CRITICAL CARE FIRST HOUR: CPT | Performed by: INTERNAL MEDICINE

## 2022-01-01 PROCEDURE — 84155 ASSAY OF PROTEIN SERUM: CPT | Performed by: STUDENT IN AN ORGANIZED HEALTH CARE EDUCATION/TRAINING PROGRAM

## 2022-01-01 PROCEDURE — 87389 HIV-1 AG W/HIV-1&-2 AB AG IA: CPT | Performed by: STUDENT IN AN ORGANIZED HEALTH CARE EDUCATION/TRAINING PROGRAM

## 2022-01-01 PROCEDURE — 250N000011 HC RX IP 250 OP 636: Performed by: ANESTHESIOLOGY

## 2022-01-01 PROCEDURE — 82010 KETONE BODYS QUAN: CPT | Performed by: INTERNAL MEDICINE

## 2022-01-01 PROCEDURE — 93306 TTE W/DOPPLER COMPLETE: CPT

## 2022-01-01 PROCEDURE — 99207 PR NO BILLABLE SERVICE THIS VISIT: CPT | Performed by: STUDENT IN AN ORGANIZED HEALTH CARE EDUCATION/TRAINING PROGRAM

## 2022-01-01 PROCEDURE — 36416 COLLJ CAPILLARY BLOOD SPEC: CPT | Performed by: HOSPITALIST

## 2022-01-01 PROCEDURE — 0W993ZZ DRAINAGE OF RIGHT PLEURAL CAVITY, PERCUTANEOUS APPROACH: ICD-10-PCS | Performed by: RADIOLOGY

## 2022-01-01 PROCEDURE — 87088 URINE BACTERIA CULTURE: CPT | Performed by: EMERGENCY MEDICINE

## 2022-01-01 RX ORDER — ISOSORBIDE DINITRATE 20 MG/1
20 TABLET ORAL
Status: DISCONTINUED | OUTPATIENT
Start: 2022-01-01 | End: 2022-01-01

## 2022-01-01 RX ORDER — AMOXICILLIN 250 MG
1 CAPSULE ORAL 2 TIMES DAILY PRN
Status: DISCONTINUED | OUTPATIENT
Start: 2022-01-01 | End: 2022-01-01

## 2022-01-01 RX ORDER — GLYCOPYRROLATE 0.2 MG/ML
0.1 INJECTION, SOLUTION INTRAMUSCULAR; INTRAVENOUS EVERY 4 HOURS PRN
Status: DISCONTINUED | OUTPATIENT
Start: 2022-01-01 | End: 2022-01-01 | Stop reason: HOSPADM

## 2022-01-01 RX ORDER — FAMOTIDINE 40 MG/5ML
10 POWDER, FOR SUSPENSION ORAL DAILY
Status: DISCONTINUED | OUTPATIENT
Start: 2022-01-01 | End: 2022-01-01

## 2022-01-01 RX ORDER — SODIUM BICARBONATE 650 MG/1
650 TABLET ORAL 2 TIMES DAILY
Status: DISCONTINUED | OUTPATIENT
Start: 2022-01-01 | End: 2022-01-01

## 2022-01-01 RX ORDER — HALOPERIDOL 1 MG/1
2 TABLET ORAL EVERY 6 HOURS PRN
Status: DISCONTINUED | OUTPATIENT
Start: 2022-01-01 | End: 2022-01-01

## 2022-01-01 RX ORDER — HYDRALAZINE HYDROCHLORIDE 20 MG/ML
10 INJECTION INTRAMUSCULAR; INTRAVENOUS EVERY 4 HOURS PRN
Status: DISCONTINUED | OUTPATIENT
Start: 2022-01-01 | End: 2022-01-01

## 2022-01-01 RX ORDER — ONDANSETRON 4 MG/1
4 TABLET, ORALLY DISINTEGRATING ORAL EVERY 6 HOURS PRN
Status: DISCONTINUED | OUTPATIENT
Start: 2022-01-01 | End: 2022-01-01 | Stop reason: HOSPADM

## 2022-01-01 RX ORDER — HYDRALAZINE HYDROCHLORIDE 50 MG/1
50 TABLET, FILM COATED ORAL EVERY 8 HOURS SCHEDULED
Status: DISCONTINUED | OUTPATIENT
Start: 2022-01-01 | End: 2022-01-01

## 2022-01-01 RX ORDER — NITROGLYCERIN 0.4 MG/1
TABLET SUBLINGUAL
Status: DISPENSED
Start: 2022-01-01 | End: 2022-01-01

## 2022-01-01 RX ORDER — HYDROMORPHONE HCL IN WATER/PF 6 MG/30 ML
0.2 PATIENT CONTROLLED ANALGESIA SYRINGE INTRAVENOUS EVERY 4 HOURS PRN
Status: DISCONTINUED | OUTPATIENT
Start: 2022-01-01 | End: 2022-01-01

## 2022-01-01 RX ORDER — HYDRALAZINE HYDROCHLORIDE 20 MG/ML
10 INJECTION INTRAMUSCULAR; INTRAVENOUS EVERY 4 HOURS PRN
Status: DISCONTINUED | OUTPATIENT
Start: 2022-01-01 | End: 2022-01-01 | Stop reason: HOSPADM

## 2022-01-01 RX ORDER — HYDROMORPHONE HYDROCHLORIDE 1 MG/ML
0.5 INJECTION, SOLUTION INTRAMUSCULAR; INTRAVENOUS; SUBCUTANEOUS ONCE
Status: COMPLETED | OUTPATIENT
Start: 2022-01-01 | End: 2022-01-01

## 2022-01-01 RX ORDER — METOPROLOL TARTRATE 50 MG
50 TABLET ORAL 2 TIMES DAILY
Qty: 60 TABLET | Refills: 0 | Status: SHIPPED | OUTPATIENT
Start: 2022-01-01

## 2022-01-01 RX ORDER — HYDRALAZINE HYDROCHLORIDE 20 MG/ML
10 INJECTION INTRAMUSCULAR; INTRAVENOUS ONCE
Status: COMPLETED | OUTPATIENT
Start: 2022-01-01 | End: 2022-01-01

## 2022-01-01 RX ORDER — AMOXICILLIN 250 MG
1 CAPSULE ORAL 2 TIMES DAILY PRN
Status: DISCONTINUED | OUTPATIENT
Start: 2022-01-01 | End: 2022-01-01 | Stop reason: HOSPADM

## 2022-01-01 RX ORDER — DEXTROSE MONOHYDRATE 25 G/50ML
25-50 INJECTION, SOLUTION INTRAVENOUS
Status: DISCONTINUED | OUTPATIENT
Start: 2022-01-01 | End: 2022-01-01

## 2022-01-01 RX ORDER — ONDANSETRON 2 MG/ML
4 INJECTION INTRAMUSCULAR; INTRAVENOUS EVERY 6 HOURS PRN
Status: DISCONTINUED | OUTPATIENT
Start: 2022-01-01 | End: 2022-01-01 | Stop reason: HOSPADM

## 2022-01-01 RX ORDER — NALOXONE HYDROCHLORIDE 0.4 MG/ML
0.4 INJECTION, SOLUTION INTRAMUSCULAR; INTRAVENOUS; SUBCUTANEOUS
Status: DISCONTINUED | OUTPATIENT
Start: 2022-01-01 | End: 2022-01-01 | Stop reason: HOSPADM

## 2022-01-01 RX ORDER — ACETAMINOPHEN 650 MG/1
650 SUPPOSITORY RECTAL EVERY 6 HOURS PRN
Status: DISCONTINUED | OUTPATIENT
Start: 2022-01-01 | End: 2022-01-01

## 2022-01-01 RX ORDER — SODIUM BICARBONATE 650 MG/1
1300 TABLET ORAL 2 TIMES DAILY
Status: DISCONTINUED | OUTPATIENT
Start: 2022-01-01 | End: 2022-01-01 | Stop reason: HOSPADM

## 2022-01-01 RX ORDER — PREDNISONE 20 MG/1
20 TABLET ORAL DAILY
Status: DISCONTINUED | OUTPATIENT
Start: 2022-01-01 | End: 2022-01-01

## 2022-01-01 RX ORDER — SODIUM CHLORIDE 450 MG/100ML
INJECTION, SOLUTION INTRAVENOUS CONTINUOUS
Status: DISCONTINUED | OUTPATIENT
Start: 2022-01-01 | End: 2022-01-01

## 2022-01-01 RX ORDER — METOPROLOL TARTRATE 1 MG/ML
5 INJECTION, SOLUTION INTRAVENOUS EVERY 4 HOURS PRN
Status: DISCONTINUED | OUTPATIENT
Start: 2022-01-01 | End: 2022-01-01 | Stop reason: HOSPADM

## 2022-01-01 RX ORDER — RISPERIDONE 0.25 MG/1
0.25 TABLET ORAL DAILY
Status: DISCONTINUED | OUTPATIENT
Start: 2022-01-01 | End: 2022-01-01

## 2022-01-01 RX ORDER — IPRATROPIUM BROMIDE AND ALBUTEROL SULFATE 2.5; .5 MG/3ML; MG/3ML
3 SOLUTION RESPIRATORY (INHALATION) EVERY 4 HOURS PRN
Status: DISCONTINUED | OUTPATIENT
Start: 2022-01-01 | End: 2022-01-01 | Stop reason: HOSPADM

## 2022-01-01 RX ORDER — DEXTROSE MONOHYDRATE 25 G/50ML
25-50 INJECTION, SOLUTION INTRAVENOUS
Status: DISCONTINUED | OUTPATIENT
Start: 2022-01-01 | End: 2022-01-01 | Stop reason: HOSPADM

## 2022-01-01 RX ORDER — GLYCOPYRROLATE 0.2 MG/ML
0.1 INJECTION, SOLUTION INTRAMUSCULAR; INTRAVENOUS ONCE
Status: COMPLETED | OUTPATIENT
Start: 2022-01-01 | End: 2022-01-01

## 2022-01-01 RX ORDER — LABETALOL HYDROCHLORIDE 5 MG/ML
20 INJECTION, SOLUTION INTRAVENOUS ONCE
Status: COMPLETED | OUTPATIENT
Start: 2022-01-01 | End: 2022-01-01

## 2022-01-01 RX ORDER — SODIUM CHLORIDE 9 MG/ML
INJECTION, SOLUTION INTRAVENOUS CONTINUOUS
Status: ACTIVE | OUTPATIENT
Start: 2022-01-01 | End: 2022-01-01

## 2022-01-01 RX ORDER — AMOXICILLIN 250 MG
2 CAPSULE ORAL 2 TIMES DAILY PRN
Status: DISCONTINUED | OUTPATIENT
Start: 2022-01-01 | End: 2022-01-01

## 2022-01-01 RX ORDER — ALFUZOSIN HYDROCHLORIDE 10 MG/1
10 TABLET, EXTENDED RELEASE ORAL DAILY
Status: DISCONTINUED | OUTPATIENT
Start: 2022-01-01 | End: 2022-01-01

## 2022-01-01 RX ORDER — METOPROLOL TARTRATE 1 MG/ML
2.5 INJECTION, SOLUTION INTRAVENOUS EVERY 4 HOURS PRN
Status: DISCONTINUED | OUTPATIENT
Start: 2022-01-01 | End: 2022-01-01 | Stop reason: HOSPADM

## 2022-01-01 RX ORDER — SODIUM CHLORIDE 9 MG/ML
INJECTION, SOLUTION INTRAVENOUS CONTINUOUS
Status: DISCONTINUED | OUTPATIENT
Start: 2022-01-01 | End: 2022-01-01 | Stop reason: HOSPADM

## 2022-01-01 RX ORDER — NICOTINE POLACRILEX 4 MG
15-30 LOZENGE BUCCAL
Status: DISCONTINUED | OUTPATIENT
Start: 2022-01-01 | End: 2022-01-01 | Stop reason: HOSPADM

## 2022-01-01 RX ORDER — QUETIAPINE FUMARATE 25 MG/1
25 TABLET, FILM COATED ORAL EVERY 8 HOURS PRN
Status: DISCONTINUED | OUTPATIENT
Start: 2022-01-01 | End: 2022-01-01 | Stop reason: HOSPADM

## 2022-01-01 RX ORDER — ATROPINE SULFATE 10 MG/ML
2 SOLUTION/ DROPS OPHTHALMIC EVERY 4 HOURS PRN
Status: DISCONTINUED | OUTPATIENT
Start: 2022-01-01 | End: 2022-01-01

## 2022-01-01 RX ORDER — LABETALOL HYDROCHLORIDE 5 MG/ML
20 INJECTION, SOLUTION INTRAVENOUS EVERY 4 HOURS PRN
Status: DISCONTINUED | OUTPATIENT
Start: 2022-01-01 | End: 2022-01-01

## 2022-01-01 RX ORDER — CALCIUM GLUCONATE 94 MG/ML
1 INJECTION, SOLUTION INTRAVENOUS ONCE
Status: COMPLETED | OUTPATIENT
Start: 2022-01-01 | End: 2022-01-01

## 2022-01-01 RX ORDER — HYDROMORPHONE HYDROCHLORIDE 1 MG/ML
0.3 INJECTION, SOLUTION INTRAMUSCULAR; INTRAVENOUS; SUBCUTANEOUS
Status: DISCONTINUED | OUTPATIENT
Start: 2022-01-01 | End: 2022-01-01

## 2022-01-01 RX ORDER — HYDRALAZINE HYDROCHLORIDE 50 MG/1
50 TABLET, FILM COATED ORAL 3 TIMES DAILY
Status: DISCONTINUED | OUTPATIENT
Start: 2022-01-01 | End: 2022-01-01 | Stop reason: HOSPADM

## 2022-01-01 RX ORDER — OLANZAPINE 5 MG/1
5 TABLET, ORALLY DISINTEGRATING ORAL EVERY 6 HOURS PRN
Status: DISCONTINUED | OUTPATIENT
Start: 2022-01-01 | End: 2022-01-01 | Stop reason: HOSPADM

## 2022-01-01 RX ORDER — NITROGLYCERIN 0.4 MG/1
0.4 TABLET SUBLINGUAL EVERY 5 MIN PRN
Status: COMPLETED | OUTPATIENT
Start: 2022-01-01 | End: 2022-01-01

## 2022-01-01 RX ORDER — ISOSORBIDE DINITRATE 20 MG/1
20 TABLET ORAL
Qty: 90 TABLET | Refills: 0 | Status: SHIPPED | OUTPATIENT
Start: 2022-01-01

## 2022-01-01 RX ORDER — DEXTROSE, SODIUM CHLORIDE, SODIUM LACTATE, POTASSIUM CHLORIDE, AND CALCIUM CHLORIDE 5; .6; .31; .03; .02 G/100ML; G/100ML; G/100ML; G/100ML; G/100ML
INJECTION, SOLUTION INTRAVENOUS CONTINUOUS
Status: DISCONTINUED | OUTPATIENT
Start: 2022-01-01 | End: 2022-01-01

## 2022-01-01 RX ORDER — HALOPERIDOL 5 MG/ML
2 INJECTION INTRAMUSCULAR EVERY 6 HOURS PRN
Status: DISCONTINUED | OUTPATIENT
Start: 2022-01-01 | End: 2022-01-01

## 2022-01-01 RX ORDER — ACETAMINOPHEN 325 MG/1
650 TABLET ORAL EVERY 6 HOURS PRN
Status: DISCONTINUED | OUTPATIENT
Start: 2022-01-01 | End: 2022-01-01

## 2022-01-01 RX ORDER — PANTOPRAZOLE SODIUM 40 MG/1
40 TABLET, DELAYED RELEASE ORAL DAILY PRN
Status: DISCONTINUED | OUTPATIENT
Start: 2022-01-01 | End: 2022-01-01

## 2022-01-01 RX ORDER — CEFTRIAXONE 2 G/1
2 INJECTION, POWDER, FOR SOLUTION INTRAMUSCULAR; INTRAVENOUS EVERY 24 HOURS
Status: COMPLETED | OUTPATIENT
Start: 2022-01-01 | End: 2022-01-01

## 2022-01-01 RX ORDER — ATORVASTATIN CALCIUM 40 MG/1
40 TABLET, FILM COATED ORAL EVERY EVENING
Status: DISCONTINUED | OUTPATIENT
Start: 2022-01-01 | End: 2022-01-01

## 2022-01-01 RX ORDER — PROCHLORPERAZINE MALEATE 5 MG
5 TABLET ORAL EVERY 6 HOURS PRN
Status: DISCONTINUED | OUTPATIENT
Start: 2022-01-01 | End: 2022-01-01 | Stop reason: HOSPADM

## 2022-01-01 RX ORDER — ASPIRIN 81 MG/1
81 TABLET, CHEWABLE ORAL DAILY
Status: DISCONTINUED | OUTPATIENT
Start: 2022-01-01 | End: 2022-01-01

## 2022-01-01 RX ORDER — LABETALOL HYDROCHLORIDE 5 MG/ML
10 INJECTION, SOLUTION INTRAVENOUS
Status: DISCONTINUED | OUTPATIENT
Start: 2022-01-01 | End: 2022-01-01 | Stop reason: HOSPADM

## 2022-01-01 RX ORDER — VANCOMYCIN HYDROCHLORIDE 1 G/200ML
1000 INJECTION, SOLUTION INTRAVENOUS EVERY 12 HOURS
Status: DISCONTINUED | OUTPATIENT
Start: 2022-01-01 | End: 2022-01-01

## 2022-01-01 RX ORDER — SODIUM CHLORIDE 9 MG/ML
INJECTION, SOLUTION INTRAVENOUS CONTINUOUS
Status: DISCONTINUED | OUTPATIENT
Start: 2022-01-01 | End: 2022-01-01

## 2022-01-01 RX ORDER — ATORVASTATIN CALCIUM 40 MG/1
40 TABLET, FILM COATED ORAL EVERY EVENING
Status: DISCONTINUED | OUTPATIENT
Start: 2022-01-01 | End: 2022-01-01 | Stop reason: HOSPADM

## 2022-01-01 RX ORDER — FAMOTIDINE 10 MG
10 TABLET ORAL DAILY
Status: DISCONTINUED | OUTPATIENT
Start: 2022-01-01 | End: 2022-01-01 | Stop reason: RX

## 2022-01-01 RX ORDER — VITAMIN B COMPLEX
25 TABLET ORAL DAILY
Status: DISCONTINUED | OUTPATIENT
Start: 2022-01-01 | End: 2022-01-01

## 2022-01-01 RX ORDER — DEXTROSE MONOHYDRATE 100 MG/ML
INJECTION, SOLUTION INTRAVENOUS CONTINUOUS PRN
Status: DISCONTINUED | OUTPATIENT
Start: 2022-01-01 | End: 2022-01-01 | Stop reason: HOSPADM

## 2022-01-01 RX ORDER — HYDROMORPHONE HCL IN WATER/PF 6 MG/30 ML
0.2 PATIENT CONTROLLED ANALGESIA SYRINGE INTRAVENOUS
Status: DISCONTINUED | OUTPATIENT
Start: 2022-01-01 | End: 2022-01-01

## 2022-01-01 RX ORDER — HYDRALAZINE HYDROCHLORIDE 20 MG/ML
10 INJECTION INTRAMUSCULAR; INTRAVENOUS EVERY 6 HOURS PRN
Status: DISCONTINUED | OUTPATIENT
Start: 2022-01-01 | End: 2022-01-01 | Stop reason: HOSPADM

## 2022-01-01 RX ORDER — BISACODYL 10 MG
10 SUPPOSITORY, RECTAL RECTAL DAILY PRN
Status: DISCONTINUED | OUTPATIENT
Start: 2022-01-01 | End: 2022-01-01 | Stop reason: HOSPADM

## 2022-01-01 RX ORDER — NITROGLYCERIN 20 MG/100ML
INJECTION INTRAVENOUS
Status: DISPENSED
Start: 2022-01-01 | End: 2022-01-01

## 2022-01-01 RX ORDER — QUETIAPINE FUMARATE 25 MG/1
25 TABLET, FILM COATED ORAL EVERY 8 HOURS PRN
Status: DISCONTINUED | OUTPATIENT
Start: 2022-01-01 | End: 2022-01-01

## 2022-01-01 RX ORDER — NITROGLYCERIN 0.4 MG/1
0.4 TABLET SUBLINGUAL EVERY 5 MIN PRN
Status: DISCONTINUED | OUTPATIENT
Start: 2022-01-01 | End: 2022-01-01 | Stop reason: HOSPADM

## 2022-01-01 RX ORDER — LATANOPROST 50 UG/ML
1 SOLUTION/ DROPS OPHTHALMIC AT BEDTIME
COMMUNITY

## 2022-01-01 RX ORDER — SODIUM BICARBONATE 650 MG/1
1300 TABLET ORAL 2 TIMES DAILY
Status: DISCONTINUED | OUTPATIENT
Start: 2022-01-01 | End: 2022-01-01

## 2022-01-01 RX ORDER — PANTOPRAZOLE SODIUM 40 MG/1
40 TABLET, DELAYED RELEASE ORAL
Status: DISCONTINUED | OUTPATIENT
Start: 2022-01-01 | End: 2022-01-01 | Stop reason: HOSPADM

## 2022-01-01 RX ORDER — NICOTINE POLACRILEX 4 MG
15-30 LOZENGE BUCCAL
Status: DISCONTINUED | OUTPATIENT
Start: 2022-01-01 | End: 2022-01-01

## 2022-01-01 RX ORDER — IPRATROPIUM BROMIDE AND ALBUTEROL SULFATE 2.5; .5 MG/3ML; MG/3ML
3 SOLUTION RESPIRATORY (INHALATION) EVERY 4 HOURS PRN
Status: DISCONTINUED | OUTPATIENT
Start: 2022-01-01 | End: 2022-01-01

## 2022-01-01 RX ORDER — AMLODIPINE BESYLATE 5 MG/1
5 TABLET ORAL DAILY
Status: DISCONTINUED | OUTPATIENT
Start: 2022-01-01 | End: 2022-01-01 | Stop reason: HOSPADM

## 2022-01-01 RX ORDER — LORAZEPAM 2 MG/ML
0.5 INJECTION INTRAMUSCULAR
Status: DISCONTINUED | OUTPATIENT
Start: 2022-01-01 | End: 2022-01-01 | Stop reason: HOSPADM

## 2022-01-01 RX ORDER — FUROSEMIDE 20 MG
20 TABLET ORAL DAILY
Status: DISCONTINUED | OUTPATIENT
Start: 2022-01-01 | End: 2022-01-01

## 2022-01-01 RX ORDER — NALOXONE HYDROCHLORIDE 0.4 MG/ML
0.2 INJECTION, SOLUTION INTRAMUSCULAR; INTRAVENOUS; SUBCUTANEOUS
Status: DISCONTINUED | OUTPATIENT
Start: 2022-01-01 | End: 2022-01-01 | Stop reason: HOSPADM

## 2022-01-01 RX ORDER — HYDRALAZINE HYDROCHLORIDE 20 MG/ML
5 INJECTION INTRAMUSCULAR; INTRAVENOUS EVERY 6 HOURS PRN
Status: DISCONTINUED | OUTPATIENT
Start: 2022-01-01 | End: 2022-01-01 | Stop reason: DRUGHIGH

## 2022-01-01 RX ORDER — FAMOTIDINE 10 MG
10 TABLET ORAL 2 TIMES DAILY
Status: DISCONTINUED | OUTPATIENT
Start: 2022-01-01 | End: 2022-01-01

## 2022-01-01 RX ORDER — IPRATROPIUM BROMIDE AND ALBUTEROL SULFATE 2.5; .5 MG/3ML; MG/3ML
3 SOLUTION RESPIRATORY (INHALATION) EVERY 4 HOURS
Status: DISCONTINUED | OUTPATIENT
Start: 2022-01-01 | End: 2022-01-01

## 2022-01-01 RX ORDER — HALOPERIDOL 5 MG/ML
5 INJECTION INTRAMUSCULAR ONCE
Status: COMPLETED | OUTPATIENT
Start: 2022-01-01 | End: 2022-01-01

## 2022-01-01 RX ORDER — AMOXICILLIN 250 MG
2 CAPSULE ORAL 2 TIMES DAILY PRN
Status: DISCONTINUED | OUTPATIENT
Start: 2022-01-01 | End: 2022-01-01 | Stop reason: HOSPADM

## 2022-01-01 RX ORDER — CEFTRIAXONE 1 G/1
1 INJECTION, POWDER, FOR SOLUTION INTRAMUSCULAR; INTRAVENOUS EVERY 24 HOURS
Status: DISCONTINUED | OUTPATIENT
Start: 2022-01-01 | End: 2022-01-01

## 2022-01-01 RX ORDER — MULTIVITAMIN,THERAPEUTIC
1 TABLET ORAL DAILY
Status: DISCONTINUED | OUTPATIENT
Start: 2022-01-01 | End: 2022-01-01

## 2022-01-01 RX ORDER — AZITHROMYCIN 500 MG/5ML
500 INJECTION, POWDER, LYOPHILIZED, FOR SOLUTION INTRAVENOUS EVERY 24 HOURS
Status: DISCONTINUED | OUTPATIENT
Start: 2022-01-01 | End: 2022-01-01

## 2022-01-01 RX ORDER — ACETAMINOPHEN 500 MG
1000 TABLET ORAL EVERY 8 HOURS PRN
Status: DISCONTINUED | OUTPATIENT
Start: 2022-01-01 | End: 2022-01-01 | Stop reason: HOSPADM

## 2022-01-01 RX ORDER — HALOPERIDOL 5 MG/ML
2 INJECTION INTRAMUSCULAR EVERY 6 HOURS PRN
Status: DISCONTINUED | OUTPATIENT
Start: 2022-01-01 | End: 2022-01-01 | Stop reason: HOSPADM

## 2022-01-01 RX ORDER — ISOSORBIDE DINITRATE 20 MG/1
20 TABLET ORAL
Status: DISCONTINUED | OUTPATIENT
Start: 2022-01-01 | End: 2022-01-01 | Stop reason: HOSPADM

## 2022-01-01 RX ORDER — AZITHROMYCIN 500 MG/5ML
500 INJECTION, POWDER, LYOPHILIZED, FOR SOLUTION INTRAVENOUS ONCE
Status: DISCONTINUED | OUTPATIENT
Start: 2022-01-01 | End: 2022-01-01

## 2022-01-01 RX ORDER — ACETAMINOPHEN 650 MG/1
650 SUPPOSITORY RECTAL EVERY 6 HOURS PRN
Status: DISCONTINUED | OUTPATIENT
Start: 2022-01-01 | End: 2022-01-01 | Stop reason: HOSPADM

## 2022-01-01 RX ORDER — HYDRALAZINE HYDROCHLORIDE 50 MG/1
50 TABLET, FILM COATED ORAL 3 TIMES DAILY
Status: DISCONTINUED | OUTPATIENT
Start: 2022-01-01 | End: 2022-01-01

## 2022-01-01 RX ORDER — HEPARIN SODIUM 5000 [USP'U]/.5ML
5000 INJECTION, SOLUTION INTRAVENOUS; SUBCUTANEOUS EVERY 12 HOURS
Status: DISCONTINUED | OUTPATIENT
Start: 2022-01-01 | End: 2022-01-01 | Stop reason: HOSPADM

## 2022-01-01 RX ORDER — ISOSORBIDE DINITRATE 5 MG/1
20 TABLET ORAL 2 TIMES DAILY
Status: DISCONTINUED | OUTPATIENT
Start: 2022-01-01 | End: 2022-01-01

## 2022-01-01 RX ORDER — ACETAMINOPHEN 325 MG/10.15ML
650 LIQUID ORAL EVERY 4 HOURS PRN
Status: DISCONTINUED | OUTPATIENT
Start: 2022-01-01 | End: 2022-01-01

## 2022-01-01 RX ORDER — HALOPERIDOL 0.5 MG/1
2 TABLET ORAL EVERY 6 HOURS PRN
Status: DISCONTINUED | OUTPATIENT
Start: 2022-01-01 | End: 2022-01-01

## 2022-01-01 RX ORDER — METOPROLOL TARTRATE 50 MG
50 TABLET ORAL 2 TIMES DAILY
Status: DISCONTINUED | OUTPATIENT
Start: 2022-01-01 | End: 2022-01-01 | Stop reason: HOSPADM

## 2022-01-01 RX ORDER — OLANZAPINE 10 MG/2ML
5 INJECTION, POWDER, FOR SOLUTION INTRAMUSCULAR ONCE
Status: COMPLETED | OUTPATIENT
Start: 2022-01-01 | End: 2022-01-01

## 2022-01-01 RX ORDER — SODIUM CHLORIDE, SODIUM LACTATE, POTASSIUM CHLORIDE, CALCIUM CHLORIDE 600; 310; 30; 20 MG/100ML; MG/100ML; MG/100ML; MG/100ML
INJECTION, SOLUTION INTRAVENOUS CONTINUOUS
Status: DISCONTINUED | OUTPATIENT
Start: 2022-01-01 | End: 2022-01-01

## 2022-01-01 RX ORDER — MULTIVITAMIN,THERAPEUTIC
1 TABLET ORAL DAILY
Status: DISCONTINUED | OUTPATIENT
Start: 2022-01-01 | End: 2022-01-01 | Stop reason: HOSPADM

## 2022-01-01 RX ORDER — HYDRALAZINE HYDROCHLORIDE 25 MG/1
25 TABLET, FILM COATED ORAL EVERY 8 HOURS SCHEDULED
Status: DISCONTINUED | OUTPATIENT
Start: 2022-01-01 | End: 2022-01-01

## 2022-01-01 RX ORDER — PROCHLORPERAZINE 25 MG
12.5 SUPPOSITORY, RECTAL RECTAL EVERY 12 HOURS PRN
Status: DISCONTINUED | OUTPATIENT
Start: 2022-01-01 | End: 2022-01-01 | Stop reason: HOSPADM

## 2022-01-01 RX ORDER — VANCOMYCIN HYDROCHLORIDE 500 MG/10ML
500 INJECTION, POWDER, LYOPHILIZED, FOR SOLUTION INTRAVENOUS EVERY 24 HOURS
Status: DISCONTINUED | OUTPATIENT
Start: 2022-01-01 | End: 2022-01-01

## 2022-01-01 RX ORDER — ASPIRIN 81 MG/1
81 TABLET ORAL DAILY
Status: DISCONTINUED | OUTPATIENT
Start: 2022-01-01 | End: 2022-01-01

## 2022-01-01 RX ORDER — HYDROMORPHONE HYDROCHLORIDE 1 MG/ML
0.3 INJECTION, SOLUTION INTRAMUSCULAR; INTRAVENOUS; SUBCUTANEOUS
Status: DISCONTINUED | OUTPATIENT
Start: 2022-01-01 | End: 2022-01-01 | Stop reason: HOSPADM

## 2022-01-01 RX ORDER — ACETYLCYSTEINE 100 MG/ML
4 SOLUTION ORAL; RESPIRATORY (INHALATION) EVERY 4 HOURS
Status: DISCONTINUED | OUTPATIENT
Start: 2022-01-01 | End: 2022-01-01

## 2022-01-01 RX ORDER — METOPROLOL TARTRATE 50 MG
50 TABLET ORAL 2 TIMES DAILY
Status: DISCONTINUED | OUTPATIENT
Start: 2022-01-01 | End: 2022-01-01

## 2022-01-01 RX ORDER — ASPIRIN 325 MG
325 TABLET ORAL DAILY
Status: DISCONTINUED | OUTPATIENT
Start: 2022-01-01 | End: 2022-01-01 | Stop reason: HOSPADM

## 2022-01-01 RX ORDER — METOPROLOL TARTRATE 1 MG/ML
5 INJECTION, SOLUTION INTRAVENOUS EVERY 6 HOURS
Status: DISCONTINUED | OUTPATIENT
Start: 2022-01-01 | End: 2022-01-01

## 2022-01-01 RX ORDER — VITAMIN B COMPLEX
25 TABLET ORAL DAILY
Status: DISCONTINUED | OUTPATIENT
Start: 2022-01-01 | End: 2022-01-01 | Stop reason: HOSPADM

## 2022-01-01 RX ORDER — NITROGLYCERIN 0.4 MG/1
TABLET SUBLINGUAL
Status: COMPLETED
Start: 2022-01-01 | End: 2022-01-01

## 2022-01-01 RX ORDER — SCOLOPAMINE TRANSDERMAL SYSTEM 1 MG/1
1 PATCH, EXTENDED RELEASE TRANSDERMAL
Status: DISCONTINUED | OUTPATIENT
Start: 2022-01-01 | End: 2022-01-01 | Stop reason: HOSPADM

## 2022-01-01 RX ORDER — METOPROLOL TARTRATE 25 MG/1
25 TABLET, FILM COATED ORAL 2 TIMES DAILY
Status: DISCONTINUED | OUTPATIENT
Start: 2022-01-01 | End: 2022-01-01

## 2022-01-01 RX ORDER — ACETAMINOPHEN 325 MG/1
650 TABLET ORAL EVERY 6 HOURS PRN
Status: DISCONTINUED | OUTPATIENT
Start: 2022-01-01 | End: 2022-01-01 | Stop reason: RX

## 2022-01-01 RX ORDER — LATANOPROST 50 UG/ML
1 SOLUTION/ DROPS OPHTHALMIC AT BEDTIME
Status: DISCONTINUED | OUTPATIENT
Start: 2022-01-01 | End: 2022-01-01 | Stop reason: HOSPADM

## 2022-01-01 RX ORDER — LABETALOL HYDROCHLORIDE 5 MG/ML
20 INJECTION, SOLUTION INTRAVENOUS EVERY 4 HOURS PRN
Status: DISCONTINUED | OUTPATIENT
Start: 2022-01-01 | End: 2022-01-01 | Stop reason: HOSPADM

## 2022-01-01 RX ORDER — OLANZAPINE 10 MG/2ML
7.5 INJECTION, POWDER, FOR SOLUTION INTRAMUSCULAR ONCE
Status: DISCONTINUED | OUTPATIENT
Start: 2022-01-01 | End: 2022-01-01

## 2022-01-01 RX ORDER — GUAIFENESIN 600 MG/1
15 TABLET, EXTENDED RELEASE ORAL DAILY
Status: DISCONTINUED | OUTPATIENT
Start: 2022-01-01 | End: 2022-01-01

## 2022-01-01 RX ORDER — SODIUM BICARBONATE 650 MG/1
1300 TABLET ORAL 2 TIMES DAILY
Qty: 60 TABLET | Refills: 0 | Status: SHIPPED | OUTPATIENT
Start: 2022-01-01

## 2022-01-01 RX ORDER — HYDROMORPHONE HYDROCHLORIDE 2 MG/1
2 TABLET ORAL EVERY 4 HOURS PRN
Status: DISCONTINUED | OUTPATIENT
Start: 2022-01-01 | End: 2022-01-01

## 2022-01-01 RX ORDER — DOCUSATE SODIUM 100 MG/1
100 CAPSULE, LIQUID FILLED ORAL 2 TIMES DAILY
Status: DISCONTINUED | OUTPATIENT
Start: 2022-01-01 | End: 2022-01-01

## 2022-01-01 RX ORDER — LIDOCAINE 40 MG/G
CREAM TOPICAL
Status: DISCONTINUED | OUTPATIENT
Start: 2022-01-01 | End: 2022-01-01 | Stop reason: HOSPADM

## 2022-01-01 RX ORDER — AMLODIPINE BESYLATE 5 MG/1
5 TABLET ORAL DAILY
Qty: 30 TABLET | Refills: 0 | Status: SHIPPED | OUTPATIENT
Start: 2022-01-01

## 2022-01-01 RX ORDER — HYDRALAZINE HYDROCHLORIDE 50 MG/1
50 TABLET, FILM COATED ORAL 3 TIMES DAILY
Qty: 90 TABLET | Refills: 0 | Status: SHIPPED | OUTPATIENT
Start: 2022-01-01

## 2022-01-01 RX ORDER — DEXTROSE MONOHYDRATE 25 G/50ML
25 INJECTION, SOLUTION INTRAVENOUS ONCE
Status: COMPLETED | OUTPATIENT
Start: 2022-01-01 | End: 2022-01-01

## 2022-01-01 RX ORDER — ACETAMINOPHEN 650 MG/1
650 SUPPOSITORY RECTAL ONCE
Status: COMPLETED | OUTPATIENT
Start: 2022-01-01 | End: 2022-01-01

## 2022-01-01 RX ORDER — ATROPINE SULFATE 10 MG/ML
2 SOLUTION/ DROPS OPHTHALMIC
Status: DISCONTINUED | OUTPATIENT
Start: 2022-01-01 | End: 2022-01-01 | Stop reason: HOSPADM

## 2022-01-01 RX ORDER — CEFTRIAXONE 1 G/1
1 INJECTION, POWDER, FOR SOLUTION INTRAMUSCULAR; INTRAVENOUS ONCE
Status: DISCONTINUED | OUTPATIENT
Start: 2022-01-01 | End: 2022-01-01 | Stop reason: CLARIF

## 2022-01-01 RX ORDER — FUROSEMIDE 40 MG
40 TABLET ORAL DAILY
Status: DISCONTINUED | OUTPATIENT
Start: 2022-01-01 | End: 2022-01-01

## 2022-01-01 RX ADMIN — SODIUM CHLORIDE 500 ML: 9 INJECTION, SOLUTION INTRAVENOUS at 20:59

## 2022-01-01 RX ADMIN — IPRATROPIUM BROMIDE AND ALBUTEROL SULFATE 3 ML: 2.5; .5 SOLUTION RESPIRATORY (INHALATION) at 13:02

## 2022-01-01 RX ADMIN — SODIUM BICARBONATE 650 MG TABLET 1300 MG: at 14:57

## 2022-01-01 RX ADMIN — Medication 15 ML: at 08:44

## 2022-01-01 RX ADMIN — HYDRALAZINE HYDROCHLORIDE 25 MG: 25 TABLET, FILM COATED ORAL at 12:19

## 2022-01-01 RX ADMIN — VANCOMYCIN HYDROCHLORIDE 500 MG: 500 INJECTION, POWDER, LYOPHILIZED, FOR SOLUTION INTRAVENOUS at 18:46

## 2022-01-01 RX ADMIN — PANTOPRAZOLE SODIUM 40 MG: 40 TABLET, DELAYED RELEASE ORAL at 08:47

## 2022-01-01 RX ADMIN — ATORVASTATIN CALCIUM 40 MG: 40 TABLET, FILM COATED ORAL at 19:35

## 2022-01-01 RX ADMIN — IPRATROPIUM BROMIDE AND ALBUTEROL SULFATE 3 ML: 2.5; .5 SOLUTION RESPIRATORY (INHALATION) at 08:49

## 2022-01-01 RX ADMIN — ACETAMINOPHEN 1000 MG: 500 TABLET ORAL at 23:43

## 2022-01-01 RX ADMIN — METOPROLOL TARTRATE 25 MG: 25 TABLET, FILM COATED ORAL at 20:21

## 2022-01-01 RX ADMIN — SODIUM CHLORIDE 500 ML: 9 INJECTION, SOLUTION INTRAVENOUS at 17:58

## 2022-01-01 RX ADMIN — THERA TABS 1 TABLET: TAB at 08:19

## 2022-01-01 RX ADMIN — FAMOTIDINE 20 MG: 10 INJECTION INTRAVENOUS at 21:22

## 2022-01-01 RX ADMIN — HEPARIN SODIUM 5000 UNITS: 10000 INJECTION, SOLUTION INTRAVENOUS; SUBCUTANEOUS at 22:00

## 2022-01-01 RX ADMIN — GUAIFENESIN 10 ML: 200 SOLUTION ORAL at 13:34

## 2022-01-01 RX ADMIN — ACETAMINOPHEN 1000 MG: 500 TABLET ORAL at 02:30

## 2022-01-01 RX ADMIN — Medication 5 MG: at 20:28

## 2022-01-01 RX ADMIN — METOPROLOL TARTRATE 50 MG: 50 TABLET, FILM COATED ORAL at 20:19

## 2022-01-01 RX ADMIN — HALOPERIDOL LACTATE 5 MG: 5 INJECTION, SOLUTION INTRAMUSCULAR at 19:04

## 2022-01-01 RX ADMIN — ISOSORBIDE DINITRATE 20 MG: 5 TABLET ORAL at 20:21

## 2022-01-01 RX ADMIN — QUETIAPINE FUMARATE 25 MG: 25 TABLET ORAL at 16:49

## 2022-01-01 RX ADMIN — HYDROMORPHONE HYDROCHLORIDE 0.3 MG: 1 INJECTION, SOLUTION INTRAMUSCULAR; INTRAVENOUS; SUBCUTANEOUS at 05:56

## 2022-01-01 RX ADMIN — ISOSORBIDE DINITRATE 20 MG: 20 TABLET ORAL at 08:46

## 2022-01-01 RX ADMIN — OLANZAPINE 5 MG: 10 INJECTION, POWDER, FOR SOLUTION INTRAMUSCULAR at 17:49

## 2022-01-01 RX ADMIN — HYDRALAZINE HYDROCHLORIDE 50 MG: 50 TABLET, FILM COATED ORAL at 15:53

## 2022-01-01 RX ADMIN — Medication 15 ML: at 16:16

## 2022-01-01 RX ADMIN — GLYCOPYRROLATE 0.1 MG: 0.2 INJECTION, SOLUTION INTRAMUSCULAR; INTRAVENOUS at 12:05

## 2022-01-01 RX ADMIN — TRAZODONE HYDROCHLORIDE 75 MG: 50 TABLET ORAL at 22:04

## 2022-01-01 RX ADMIN — PREDNISONE 20 MG: 20 TABLET ORAL at 09:02

## 2022-01-01 RX ADMIN — HEPARIN SODIUM 5000 UNITS: 10000 INJECTION, SOLUTION INTRAVENOUS; SUBCUTANEOUS at 21:08

## 2022-01-01 RX ADMIN — Medication 5 MG: at 18:23

## 2022-01-01 RX ADMIN — ACETYLCYSTEINE 4 ML: 100 SOLUTION ORAL; RESPIRATORY (INHALATION) at 20:27

## 2022-01-01 RX ADMIN — ISOSORBIDE DINITRATE 20 MG: 20 TABLET ORAL at 17:31

## 2022-01-01 RX ADMIN — FAMOTIDINE 20 MG: 10 INJECTION INTRAVENOUS at 11:05

## 2022-01-01 RX ADMIN — IPRATROPIUM BROMIDE AND ALBUTEROL SULFATE 3 ML: 2.5; .5 SOLUTION RESPIRATORY (INHALATION) at 15:48

## 2022-01-01 RX ADMIN — SODIUM CHLORIDE, POTASSIUM CHLORIDE, SODIUM LACTATE AND CALCIUM CHLORIDE: 600; 310; 30; 20 INJECTION, SOLUTION INTRAVENOUS at 10:13

## 2022-01-01 RX ADMIN — METOPROLOL TARTRATE 5 MG: 5 INJECTION INTRAVENOUS at 19:46

## 2022-01-01 RX ADMIN — INSULIN GLARGINE 10 UNITS: 100 INJECTION, SOLUTION SUBCUTANEOUS at 06:57

## 2022-01-01 RX ADMIN — ATROPINE SULFATE 2 DROP: 10 SOLUTION/ DROPS OPHTHALMIC at 21:27

## 2022-01-01 RX ADMIN — GLYCOPYRROLATE 0.1 MG: 0.2 INJECTION, SOLUTION INTRAMUSCULAR; INTRAVENOUS at 00:31

## 2022-01-01 RX ADMIN — IPRATROPIUM BROMIDE AND ALBUTEROL SULFATE 3 ML: 2.5; .5 SOLUTION RESPIRATORY (INHALATION) at 20:27

## 2022-01-01 RX ADMIN — IPRATROPIUM BROMIDE AND ALBUTEROL SULFATE 3 ML: 2.5; .5 SOLUTION RESPIRATORY (INHALATION) at 16:53

## 2022-01-01 RX ADMIN — HEPARIN SODIUM 5000 UNITS: 10000 INJECTION, SOLUTION INTRAVENOUS; SUBCUTANEOUS at 08:58

## 2022-01-01 RX ADMIN — INSULIN ASPART 2 UNITS: 100 INJECTION, SOLUTION INTRAVENOUS; SUBCUTANEOUS at 11:58

## 2022-01-01 RX ADMIN — INSULIN ASPART 2 UNITS: 100 INJECTION, SOLUTION INTRAVENOUS; SUBCUTANEOUS at 20:17

## 2022-01-01 RX ADMIN — ACETYLCYSTEINE 4 ML: 100 SOLUTION ORAL; RESPIRATORY (INHALATION) at 23:53

## 2022-01-01 RX ADMIN — IPRATROPIUM BROMIDE AND ALBUTEROL SULFATE 3 ML: 2.5; .5 SOLUTION RESPIRATORY (INHALATION) at 12:50

## 2022-01-01 RX ADMIN — HYDROMORPHONE HYDROCHLORIDE 2 MG: 2 TABLET ORAL at 02:09

## 2022-01-01 RX ADMIN — HYDRALAZINE HYDROCHLORIDE 50 MG: 50 TABLET, FILM COATED ORAL at 08:44

## 2022-01-01 RX ADMIN — HYDRALAZINE HYDROCHLORIDE 50 MG: 50 TABLET, FILM COATED ORAL at 20:15

## 2022-01-01 RX ADMIN — ATROPINE SULFATE 2 DROP: 10 SOLUTION/ DROPS OPHTHALMIC at 08:49

## 2022-01-01 RX ADMIN — METOPROLOL TARTRATE 50 MG: 50 TABLET, FILM COATED ORAL at 09:01

## 2022-01-01 RX ADMIN — HYDROMORPHONE HYDROCHLORIDE 0.3 MG: 1 INJECTION, SOLUTION INTRAMUSCULAR; INTRAVENOUS; SUBCUTANEOUS at 00:31

## 2022-01-01 RX ADMIN — INSULIN ASPART 2 UNITS: 100 INJECTION, SOLUTION INTRAVENOUS; SUBCUTANEOUS at 16:38

## 2022-01-01 RX ADMIN — SODIUM CHLORIDE: 9 INJECTION, SOLUTION INTRAVENOUS at 01:00

## 2022-01-01 RX ADMIN — AZITHROMYCIN MONOHYDRATE 500 MG: 500 INJECTION, POWDER, LYOPHILIZED, FOR SOLUTION INTRAVENOUS at 00:13

## 2022-01-01 RX ADMIN — HYDRALAZINE HYDROCHLORIDE 50 MG: 50 TABLET, FILM COATED ORAL at 20:16

## 2022-01-01 RX ADMIN — PREDNISONE 20 MG: 20 TABLET ORAL at 08:59

## 2022-01-01 RX ADMIN — SODIUM CHLORIDE: 9 INJECTION, SOLUTION INTRAVENOUS at 16:51

## 2022-01-01 RX ADMIN — SODIUM BICARBONATE 650 MG TABLET 1300 MG: at 19:46

## 2022-01-01 RX ADMIN — CEFTRIAXONE 2 G: 2 INJECTION, POWDER, FOR SOLUTION INTRAMUSCULAR; INTRAVENOUS at 18:49

## 2022-01-01 RX ADMIN — METOPROLOL TARTRATE 50 MG: 50 TABLET, FILM COATED ORAL at 21:47

## 2022-01-01 RX ADMIN — ACETAMINOPHEN 650 MG: 650 SUPPOSITORY RECTAL at 17:47

## 2022-01-01 RX ADMIN — THERA TABS 1 TABLET: TAB at 09:58

## 2022-01-01 RX ADMIN — ATORVASTATIN CALCIUM 40 MG: 40 TABLET, FILM COATED ORAL at 21:09

## 2022-01-01 RX ADMIN — ACETYLCYSTEINE 4 ML: 100 SOLUTION ORAL; RESPIRATORY (INHALATION) at 00:01

## 2022-01-01 RX ADMIN — TRAZODONE HYDROCHLORIDE 75 MG: 50 TABLET ORAL at 19:48

## 2022-01-01 RX ADMIN — METOPROLOL TARTRATE 5 MG: 5 INJECTION INTRAVENOUS at 17:04

## 2022-01-01 RX ADMIN — HYDRALAZINE HYDROCHLORIDE 10 MG: 20 INJECTION INTRAMUSCULAR; INTRAVENOUS at 05:04

## 2022-01-01 RX ADMIN — HYDROMORPHONE HYDROCHLORIDE 0.3 MG: 1 INJECTION, SOLUTION INTRAMUSCULAR; INTRAVENOUS; SUBCUTANEOUS at 02:42

## 2022-01-01 RX ADMIN — INSULIN ASPART 2 UNITS: 100 INJECTION, SOLUTION INTRAVENOUS; SUBCUTANEOUS at 04:40

## 2022-01-01 RX ADMIN — SODIUM BICARBONATE 650 MG TABLET 1300 MG: at 08:42

## 2022-01-01 RX ADMIN — HEPARIN SODIUM 5000 UNITS: 10000 INJECTION, SOLUTION INTRAVENOUS; SUBCUTANEOUS at 20:20

## 2022-01-01 RX ADMIN — INSULIN ASPART 2 UNITS: 100 INJECTION, SOLUTION INTRAVENOUS; SUBCUTANEOUS at 00:50

## 2022-01-01 RX ADMIN — TRAZODONE HYDROCHLORIDE 75 MG: 50 TABLET ORAL at 20:37

## 2022-01-01 RX ADMIN — SODIUM BICARBONATE 650 MG TABLET 1300 MG: at 08:45

## 2022-01-01 RX ADMIN — ISOSORBIDE DINITRATE 20 MG: 20 TABLET ORAL at 12:49

## 2022-01-01 RX ADMIN — HYDROMORPHONE HYDROCHLORIDE 0.3 MG: 1 INJECTION, SOLUTION INTRAMUSCULAR; INTRAVENOUS; SUBCUTANEOUS at 16:29

## 2022-01-01 RX ADMIN — IPRATROPIUM BROMIDE AND ALBUTEROL SULFATE 3 ML: 2.5; .5 SOLUTION RESPIRATORY (INHALATION) at 11:54

## 2022-01-01 RX ADMIN — METOPROLOL TARTRATE 25 MG: 25 TABLET, FILM COATED ORAL at 08:27

## 2022-01-01 RX ADMIN — ISOSORBIDE DINITRATE 20 MG: 20 TABLET ORAL at 13:26

## 2022-01-01 RX ADMIN — Medication 25 MCG: at 08:44

## 2022-01-01 RX ADMIN — INSULIN ASPART 2 UNITS: 100 INJECTION, SOLUTION INTRAVENOUS; SUBCUTANEOUS at 13:03

## 2022-01-01 RX ADMIN — LATANOPROST 1 DROP: 50 SOLUTION/ DROPS OPHTHALMIC at 21:26

## 2022-01-01 RX ADMIN — ISOSORBIDE DINITRATE 20 MG: 5 TABLET ORAL at 08:28

## 2022-01-01 RX ADMIN — SODIUM CHLORIDE 500 ML: 9 INJECTION, SOLUTION INTRAVENOUS at 22:21

## 2022-01-01 RX ADMIN — TRAZODONE HYDROCHLORIDE 75 MG: 50 TABLET ORAL at 20:16

## 2022-01-01 RX ADMIN — HYDRALAZINE HYDROCHLORIDE 50 MG: 50 TABLET, FILM COATED ORAL at 13:25

## 2022-01-01 RX ADMIN — ACETAMINOPHEN 650 MG: 325 SOLUTION ORAL at 04:12

## 2022-01-01 RX ADMIN — TRAZODONE HYDROCHLORIDE 75 MG: 50 TABLET ORAL at 20:21

## 2022-01-01 RX ADMIN — Medication 25 MCG: at 08:07

## 2022-01-01 RX ADMIN — HYDROMORPHONE HYDROCHLORIDE 0.3 MG: 1 INJECTION, SOLUTION INTRAMUSCULAR; INTRAVENOUS; SUBCUTANEOUS at 12:10

## 2022-01-01 RX ADMIN — HUMAN ALBUMIN MICROSPHERES AND PERFLUTREN 2 ML: 10; .22 INJECTION, SOLUTION INTRAVENOUS at 08:34

## 2022-01-01 RX ADMIN — HYDRALAZINE HYDROCHLORIDE 50 MG: 50 TABLET, FILM COATED ORAL at 08:18

## 2022-01-01 RX ADMIN — METOPROLOL TARTRATE 50 MG: 50 TABLET, FILM COATED ORAL at 09:40

## 2022-01-01 RX ADMIN — METOPROLOL TARTRATE 5 MG: 5 INJECTION INTRAVENOUS at 12:17

## 2022-01-01 RX ADMIN — ATORVASTATIN CALCIUM 40 MG: 40 TABLET, FILM COATED ORAL at 20:37

## 2022-01-01 RX ADMIN — THERA TABS 1 TABLET: TAB at 09:40

## 2022-01-01 RX ADMIN — CEFEPIME HYDROCHLORIDE 2 G: 2 INJECTION, POWDER, FOR SOLUTION INTRAVENOUS at 17:00

## 2022-01-01 RX ADMIN — IPRATROPIUM BROMIDE AND ALBUTEROL SULFATE 3 ML: 2.5; .5 SOLUTION RESPIRATORY (INHALATION) at 19:40

## 2022-01-01 RX ADMIN — ISOSORBIDE DINITRATE 20 MG: 20 TABLET ORAL at 12:57

## 2022-01-01 RX ADMIN — FAMOTIDINE 10 MG: 10 TABLET ORAL at 14:57

## 2022-01-01 RX ADMIN — ACETYLCYSTEINE 4 ML: 100 SOLUTION ORAL; RESPIRATORY (INHALATION) at 19:45

## 2022-01-01 RX ADMIN — THERA TABS 1 TABLET: TAB at 09:11

## 2022-01-01 RX ADMIN — IPRATROPIUM BROMIDE AND ALBUTEROL SULFATE 3 ML: 2.5; .5 SOLUTION RESPIRATORY (INHALATION) at 19:45

## 2022-01-01 RX ADMIN — INSULIN ASPART 2 UNITS: 100 INJECTION, SOLUTION INTRAVENOUS; SUBCUTANEOUS at 16:29

## 2022-01-01 RX ADMIN — ACETAMINOPHEN 650 MG: 325 TABLET, FILM COATED ORAL at 01:42

## 2022-01-01 RX ADMIN — SODIUM BICARBONATE 650 MG TABLET 650 MG: at 20:37

## 2022-01-01 RX ADMIN — GLYCOPYRROLATE 0.1 MG: 0.2 INJECTION, SOLUTION INTRAMUSCULAR; INTRAVENOUS at 11:07

## 2022-01-01 RX ADMIN — CEFEPIME HYDROCHLORIDE 2 G: 2 INJECTION, POWDER, FOR SOLUTION INTRAVENOUS at 23:04

## 2022-01-01 RX ADMIN — HYDRALAZINE HYDROCHLORIDE 10 MG: 20 INJECTION INTRAMUSCULAR; INTRAVENOUS at 04:17

## 2022-01-01 RX ADMIN — ASPIRIN 81 MG: 81 TABLET, COATED ORAL at 08:06

## 2022-01-01 RX ADMIN — METOPROLOL TARTRATE 5 MG: 5 INJECTION INTRAVENOUS at 17:13

## 2022-01-01 RX ADMIN — HYDRALAZINE HYDROCHLORIDE 10 MG: 20 INJECTION INTRAMUSCULAR; INTRAVENOUS at 08:44

## 2022-01-01 RX ADMIN — ATORVASTATIN CALCIUM 40 MG: 40 TABLET, FILM COATED ORAL at 20:16

## 2022-01-01 RX ADMIN — Medication 25 MCG: at 09:11

## 2022-01-01 RX ADMIN — CEFTRIAXONE 2 G: 1 INJECTION, POWDER, FOR SOLUTION INTRAMUSCULAR; INTRAVENOUS at 16:49

## 2022-01-01 RX ADMIN — SODIUM BICARBONATE 650 MG TABLET 1300 MG: at 20:15

## 2022-01-01 RX ADMIN — METOPROLOL TARTRATE 50 MG: 50 TABLET, FILM COATED ORAL at 09:20

## 2022-01-01 RX ADMIN — HYDROMORPHONE HYDROCHLORIDE 2 MG: 2 TABLET ORAL at 06:06

## 2022-01-01 RX ADMIN — ISOSORBIDE DINITRATE 20 MG: 20 TABLET ORAL at 08:06

## 2022-01-01 RX ADMIN — SCOPALAMINE 1 PATCH: 1 PATCH, EXTENDED RELEASE TRANSDERMAL at 21:41

## 2022-01-01 RX ADMIN — IPRATROPIUM BROMIDE AND ALBUTEROL SULFATE 3 ML: 2.5; .5 SOLUTION RESPIRATORY (INHALATION) at 23:52

## 2022-01-01 RX ADMIN — PANTOPRAZOLE SODIUM 40 MG: 40 TABLET, DELAYED RELEASE ORAL at 08:19

## 2022-01-01 RX ADMIN — ISOSORBIDE DINITRATE 20 MG: 20 TABLET ORAL at 09:02

## 2022-01-01 RX ADMIN — FAMOTIDINE 10 MG: 40 POWDER, FOR SUSPENSION ORAL at 08:49

## 2022-01-01 RX ADMIN — HYDROMORPHONE HYDROCHLORIDE 0.5 MG: 1 INJECTION, SOLUTION INTRAMUSCULAR; INTRAVENOUS; SUBCUTANEOUS at 09:43

## 2022-01-01 RX ADMIN — GUAIFENESIN 10 ML: 200 SOLUTION ORAL at 19:40

## 2022-01-01 RX ADMIN — HYDROMORPHONE HYDROCHLORIDE 0.3 MG: 1 INJECTION, SOLUTION INTRAMUSCULAR; INTRAVENOUS; SUBCUTANEOUS at 12:05

## 2022-01-01 RX ADMIN — HEPARIN SODIUM 5000 UNITS: 10000 INJECTION, SOLUTION INTRAVENOUS; SUBCUTANEOUS at 19:34

## 2022-01-01 RX ADMIN — HYDRALAZINE HYDROCHLORIDE 5 MG: 20 INJECTION, SOLUTION INTRAMUSCULAR; INTRAVENOUS at 01:48

## 2022-01-01 RX ADMIN — DEXTROSE 30 G: 15 GEL ORAL at 08:59

## 2022-01-01 RX ADMIN — ISOSORBIDE DINITRATE 20 MG: 20 TABLET ORAL at 17:14

## 2022-01-01 RX ADMIN — RISPERIDONE 0.25 MG: 0.25 TABLET ORAL at 14:55

## 2022-01-01 RX ADMIN — METOPROLOL TARTRATE 25 MG: 25 TABLET, FILM COATED ORAL at 09:11

## 2022-01-01 RX ADMIN — ASPIRIN 81 MG: 81 TABLET, COATED ORAL at 08:45

## 2022-01-01 RX ADMIN — HYDRALAZINE HYDROCHLORIDE 50 MG: 50 TABLET, FILM COATED ORAL at 10:08

## 2022-01-01 RX ADMIN — GUAIFENESIN 10 ML: 200 SOLUTION ORAL at 19:01

## 2022-01-01 RX ADMIN — ACETYLCYSTEINE 4 ML: 100 SOLUTION ORAL; RESPIRATORY (INHALATION) at 15:48

## 2022-01-01 RX ADMIN — SODIUM BICARBONATE 650 MG TABLET 1300 MG: at 09:39

## 2022-01-01 RX ADMIN — HEPARIN SODIUM 5000 UNITS: 10000 INJECTION, SOLUTION INTRAVENOUS; SUBCUTANEOUS at 09:21

## 2022-01-01 RX ADMIN — SODIUM CHLORIDE: 4.5 INJECTION, SOLUTION INTRAVENOUS at 10:35

## 2022-01-01 RX ADMIN — SODIUM ZIRCONIUM CYCLOSILICATE 10 G: 10 POWDER, FOR SUSPENSION ORAL at 05:16

## 2022-01-01 RX ADMIN — IPRATROPIUM BROMIDE AND ALBUTEROL SULFATE 3 ML: 2.5; .5 SOLUTION RESPIRATORY (INHALATION) at 20:19

## 2022-01-01 RX ADMIN — HYDRALAZINE HYDROCHLORIDE 50 MG: 50 TABLET, FILM COATED ORAL at 13:34

## 2022-01-01 RX ADMIN — ACETAMINOPHEN 1000 MG: 500 TABLET ORAL at 17:44

## 2022-01-01 RX ADMIN — HYDRALAZINE HYDROCHLORIDE 10 MG: 20 INJECTION INTRAMUSCULAR; INTRAVENOUS at 18:56

## 2022-01-01 RX ADMIN — HYDROMORPHONE HYDROCHLORIDE 0.3 MG: 1 INJECTION, SOLUTION INTRAMUSCULAR; INTRAVENOUS; SUBCUTANEOUS at 18:12

## 2022-01-01 RX ADMIN — CEFEPIME HYDROCHLORIDE 2 G: 2 INJECTION, POWDER, FOR SOLUTION INTRAVENOUS at 18:17

## 2022-01-01 RX ADMIN — CEFEPIME HYDROCHLORIDE 2 G: 2 INJECTION, POWDER, FOR SOLUTION INTRAVENOUS at 17:14

## 2022-01-01 RX ADMIN — HYDRALAZINE HYDROCHLORIDE 10 MG: 20 INJECTION INTRAMUSCULAR; INTRAVENOUS at 03:39

## 2022-01-01 RX ADMIN — ACETYLCYSTEINE 4 ML: 100 SOLUTION ORAL; RESPIRATORY (INHALATION) at 03:29

## 2022-01-01 RX ADMIN — METOPROLOL TARTRATE 5 MG: 5 INJECTION INTRAVENOUS at 04:10

## 2022-01-01 RX ADMIN — SODIUM CHLORIDE 500 ML: 9 INJECTION, SOLUTION INTRAVENOUS at 13:45

## 2022-01-01 RX ADMIN — SODIUM CHLORIDE: 9 INJECTION, SOLUTION INTRAVENOUS at 15:36

## 2022-01-01 RX ADMIN — ISOSORBIDE DINITRATE 20 MG: 20 TABLET ORAL at 09:16

## 2022-01-01 RX ADMIN — SODIUM BICARBONATE 650 MG TABLET 1300 MG: at 09:01

## 2022-01-01 RX ADMIN — HYDRALAZINE HYDROCHLORIDE 10 MG: 20 INJECTION, SOLUTION INTRAMUSCULAR; INTRAVENOUS at 06:06

## 2022-01-01 RX ADMIN — ACETYLCYSTEINE 4 ML: 100 SOLUTION ORAL; RESPIRATORY (INHALATION) at 16:53

## 2022-01-01 RX ADMIN — HYDRALAZINE HYDROCHLORIDE 50 MG: 50 TABLET, FILM COATED ORAL at 14:04

## 2022-01-01 RX ADMIN — HYDRALAZINE HYDROCHLORIDE 50 MG: 50 TABLET, FILM COATED ORAL at 09:01

## 2022-01-01 RX ADMIN — NITROGLYCERIN 0.4 MG: 0.4 TABLET SUBLINGUAL at 02:54

## 2022-01-01 RX ADMIN — HYDROMORPHONE HYDROCHLORIDE 0.3 MG: 1 INJECTION, SOLUTION INTRAMUSCULAR; INTRAVENOUS; SUBCUTANEOUS at 02:01

## 2022-01-01 RX ADMIN — ATORVASTATIN CALCIUM 40 MG: 40 TABLET, FILM COATED ORAL at 20:29

## 2022-01-01 RX ADMIN — PANTOPRAZOLE SODIUM 40 MG: 40 TABLET, DELAYED RELEASE ORAL at 09:17

## 2022-01-01 RX ADMIN — ACETYLCYSTEINE 4 ML: 100 SOLUTION ORAL; RESPIRATORY (INHALATION) at 08:04

## 2022-01-01 RX ADMIN — SODIUM CHLORIDE: 9 INJECTION, SOLUTION INTRAVENOUS at 16:07

## 2022-01-01 RX ADMIN — Medication 25 MCG: at 08:49

## 2022-01-01 RX ADMIN — METOPROLOL TARTRATE 50 MG: 50 TABLET, FILM COATED ORAL at 08:47

## 2022-01-01 RX ADMIN — METOPROLOL TARTRATE 5 MG: 5 INJECTION INTRAVENOUS at 12:34

## 2022-01-01 RX ADMIN — IPRATROPIUM BROMIDE AND ALBUTEROL SULFATE 3 ML: 2.5; .5 SOLUTION RESPIRATORY (INHALATION) at 16:41

## 2022-01-01 RX ADMIN — METOPROLOL TARTRATE 50 MG: 50 TABLET, FILM COATED ORAL at 20:37

## 2022-01-01 RX ADMIN — TRAZODONE HYDROCHLORIDE 75 MG: 50 TABLET ORAL at 21:52

## 2022-01-01 RX ADMIN — FAMOTIDINE 10 MG: 10 TABLET ORAL at 08:44

## 2022-01-01 RX ADMIN — HEPARIN SODIUM 5000 UNITS: 10000 INJECTION, SOLUTION INTRAVENOUS; SUBCUTANEOUS at 08:20

## 2022-01-01 RX ADMIN — NITROGLYCERIN 0.4 MG: 0.4 TABLET SUBLINGUAL at 02:48

## 2022-01-01 RX ADMIN — THERA TABS 1 TABLET: TAB at 08:08

## 2022-01-01 RX ADMIN — METOPROLOL TARTRATE 5 MG: 5 INJECTION INTRAVENOUS at 03:20

## 2022-01-01 RX ADMIN — SODIUM CHLORIDE: 9 INJECTION, SOLUTION INTRAVENOUS at 14:52

## 2022-01-01 RX ADMIN — CEFTRIAXONE 2 G: 2 INJECTION, POWDER, FOR SOLUTION INTRAMUSCULAR; INTRAVENOUS at 17:21

## 2022-01-01 RX ADMIN — AZITHROMYCIN MONOHYDRATE 500 MG: 500 INJECTION, POWDER, LYOPHILIZED, FOR SOLUTION INTRAVENOUS at 00:52

## 2022-01-01 RX ADMIN — METOPROLOL TARTRATE 5 MG: 5 INJECTION INTRAVENOUS at 13:19

## 2022-01-01 RX ADMIN — HYDRALAZINE HYDROCHLORIDE 50 MG: 50 TABLET, FILM COATED ORAL at 09:40

## 2022-01-01 RX ADMIN — CEFTRIAXONE 1 G: 1 INJECTION, POWDER, FOR SOLUTION INTRAMUSCULAR; INTRAVENOUS at 02:17

## 2022-01-01 RX ADMIN — ISOSORBIDE DINITRATE 20 MG: 20 TABLET ORAL at 08:47

## 2022-01-01 RX ADMIN — Medication 25 MCG: at 08:40

## 2022-01-01 RX ADMIN — METOPROLOL TARTRATE 25 MG: 25 TABLET, FILM COATED ORAL at 19:35

## 2022-01-01 RX ADMIN — Medication 5 MG: at 18:27

## 2022-01-01 RX ADMIN — ISOSORBIDE DINITRATE 20 MG: 5 TABLET ORAL at 20:17

## 2022-01-01 RX ADMIN — LATANOPROST 1 DROP: 50 SOLUTION/ DROPS OPHTHALMIC at 22:21

## 2022-01-01 RX ADMIN — IPRATROPIUM BROMIDE AND ALBUTEROL SULFATE 3 ML: 2.5; .5 SOLUTION RESPIRATORY (INHALATION) at 23:33

## 2022-01-01 RX ADMIN — HEPARIN SODIUM 5000 UNITS: 10000 INJECTION, SOLUTION INTRAVENOUS; SUBCUTANEOUS at 08:08

## 2022-01-01 RX ADMIN — ISOSORBIDE DINITRATE 20 MG: 20 TABLET ORAL at 18:22

## 2022-01-01 RX ADMIN — HYDRALAZINE HYDROCHLORIDE 10 MG: 20 INJECTION INTRAMUSCULAR; INTRAVENOUS at 11:24

## 2022-01-01 RX ADMIN — FUROSEMIDE 40 MG: 40 TABLET ORAL at 17:31

## 2022-01-01 RX ADMIN — METOPROLOL TARTRATE 25 MG: 25 TABLET, FILM COATED ORAL at 19:48

## 2022-01-01 RX ADMIN — HYDRALAZINE HYDROCHLORIDE 10 MG: 20 INJECTION INTRAMUSCULAR; INTRAVENOUS at 22:04

## 2022-01-01 RX ADMIN — HYDRALAZINE HYDROCHLORIDE 50 MG: 50 TABLET, FILM COATED ORAL at 13:27

## 2022-01-01 RX ADMIN — HYDRALAZINE HYDROCHLORIDE 10 MG: 20 INJECTION INTRAMUSCULAR; INTRAVENOUS at 16:14

## 2022-01-01 RX ADMIN — ACETYLCYSTEINE 4 ML: 100 SOLUTION ORAL; RESPIRATORY (INHALATION) at 16:40

## 2022-01-01 RX ADMIN — METOPROLOL TARTRATE 50 MG: 50 TABLET, FILM COATED ORAL at 21:08

## 2022-01-01 RX ADMIN — VANCOMYCIN HYDROCHLORIDE 1500 MG: 10 INJECTION, POWDER, LYOPHILIZED, FOR SOLUTION INTRAVENOUS at 19:57

## 2022-01-01 RX ADMIN — NITROGLYCERIN 0.4 MG: 0.4 TABLET SUBLINGUAL at 19:54

## 2022-01-01 RX ADMIN — FUROSEMIDE 20 MG: 20 TABLET ORAL at 17:25

## 2022-01-01 RX ADMIN — METOPROLOL TARTRATE 25 MG: 25 TABLET, FILM COATED ORAL at 10:00

## 2022-01-01 RX ADMIN — ATROPINE SULFATE 2 DROP: 10 SOLUTION/ DROPS OPHTHALMIC at 08:32

## 2022-01-01 RX ADMIN — HYDRALAZINE HYDROCHLORIDE 50 MG: 50 TABLET, FILM COATED ORAL at 12:57

## 2022-01-01 RX ADMIN — IPRATROPIUM BROMIDE AND ALBUTEROL SULFATE 3 ML: 2.5; .5 SOLUTION RESPIRATORY (INHALATION) at 07:53

## 2022-01-01 RX ADMIN — ACETYLCYSTEINE 4 ML: 100 SOLUTION ORAL; RESPIRATORY (INHALATION) at 04:50

## 2022-01-01 RX ADMIN — Medication 5 MG: at 20:31

## 2022-01-01 RX ADMIN — ASPIRIN 325 MG ORAL TABLET 325 MG: 325 PILL ORAL at 09:59

## 2022-01-01 RX ADMIN — HYDROMORPHONE HYDROCHLORIDE 0.2 MG: 0.2 INJECTION, SOLUTION INTRAMUSCULAR; INTRAVENOUS; SUBCUTANEOUS at 11:22

## 2022-01-01 RX ADMIN — ASPIRIN 325 MG ORAL TABLET 325 MG: 325 PILL ORAL at 12:19

## 2022-01-01 RX ADMIN — HYDRALAZINE HYDROCHLORIDE 50 MG: 50 TABLET, FILM COATED ORAL at 21:50

## 2022-01-01 RX ADMIN — ISOSORBIDE DINITRATE 20 MG: 5 TABLET ORAL at 09:56

## 2022-01-01 RX ADMIN — IPRATROPIUM BROMIDE AND ALBUTEROL SULFATE 3 ML: 2.5; .5 SOLUTION RESPIRATORY (INHALATION) at 12:35

## 2022-01-01 RX ADMIN — METOPROLOL TARTRATE 5 MG: 5 INJECTION INTRAVENOUS at 06:13

## 2022-01-01 RX ADMIN — ACETAMINOPHEN 650 MG: 325 TABLET, FILM COATED ORAL at 18:11

## 2022-01-01 RX ADMIN — SODIUM CHLORIDE: 9 INJECTION, SOLUTION INTRAVENOUS at 10:17

## 2022-01-01 RX ADMIN — CEFEPIME HYDROCHLORIDE 2 G: 2 INJECTION, POWDER, FOR SOLUTION INTRAVENOUS at 18:12

## 2022-01-01 RX ADMIN — INSULIN ASPART 2 UNITS: 100 INJECTION, SOLUTION INTRAVENOUS; SUBCUTANEOUS at 04:17

## 2022-01-01 RX ADMIN — HYDRALAZINE HYDROCHLORIDE 25 MG: 25 TABLET, FILM COATED ORAL at 06:28

## 2022-01-01 RX ADMIN — SODIUM CHLORIDE: 9 INJECTION, SOLUTION INTRAVENOUS at 03:58

## 2022-01-01 RX ADMIN — ACETYLCYSTEINE 4 ML: 100 SOLUTION ORAL; RESPIRATORY (INHALATION) at 07:53

## 2022-01-01 RX ADMIN — BISACODYL 10 MG: 10 SUPPOSITORY RECTAL at 10:32

## 2022-01-01 RX ADMIN — Medication 5 MG: at 16:17

## 2022-01-01 RX ADMIN — TRAZODONE HYDROCHLORIDE 75 MG: 50 TABLET ORAL at 21:05

## 2022-01-01 RX ADMIN — CEFTRIAXONE 2 G: 2 INJECTION, POWDER, FOR SOLUTION INTRAMUSCULAR; INTRAVENOUS at 18:37

## 2022-01-01 RX ADMIN — ASPIRIN 81 MG CHEWABLE TABLET 81 MG: 81 TABLET CHEWABLE at 08:42

## 2022-01-01 RX ADMIN — Medication 25 MCG: at 09:20

## 2022-01-01 RX ADMIN — ACETAMINOPHEN 650 MG: 325 TABLET, FILM COATED ORAL at 09:02

## 2022-01-01 RX ADMIN — IPRATROPIUM BROMIDE AND ALBUTEROL SULFATE 3 ML: 2.5; .5 SOLUTION RESPIRATORY (INHALATION) at 03:37

## 2022-01-01 RX ADMIN — Medication 25 MCG: at 08:47

## 2022-01-01 RX ADMIN — FAMOTIDINE 10 MG: 10 TABLET ORAL at 08:59

## 2022-01-01 RX ADMIN — CEFEPIME HYDROCHLORIDE 2 G: 2 INJECTION, POWDER, FOR SOLUTION INTRAVENOUS at 18:25

## 2022-01-01 RX ADMIN — ATROPINE SULFATE 2 DROP: 10 SOLUTION/ DROPS OPHTHALMIC at 02:30

## 2022-01-01 RX ADMIN — ATORVASTATIN CALCIUM 40 MG: 40 TABLET, FILM COATED ORAL at 19:46

## 2022-01-01 RX ADMIN — ATORVASTATIN CALCIUM 40 MG: 40 TABLET, FILM COATED ORAL at 20:15

## 2022-01-01 RX ADMIN — ATORVASTATIN CALCIUM 40 MG: 40 TABLET, FILM COATED ORAL at 00:15

## 2022-01-01 RX ADMIN — AMLODIPINE BESYLATE 5 MG: 5 TABLET ORAL at 08:18

## 2022-01-01 RX ADMIN — SODIUM CHLORIDE, SODIUM LACTATE, POTASSIUM CHLORIDE, CALCIUM CHLORIDE AND DEXTROSE MONOHYDRATE: 5; 600; 310; 30; 20 INJECTION, SOLUTION INTRAVENOUS at 09:48

## 2022-01-01 RX ADMIN — HYDRALAZINE HYDROCHLORIDE 10 MG: 20 INJECTION INTRAMUSCULAR; INTRAVENOUS at 16:24

## 2022-01-01 RX ADMIN — ACETAMINOPHEN 650 MG: 325 TABLET ORAL at 16:17

## 2022-01-01 RX ADMIN — LATANOPROST 1 DROP: 50 SOLUTION/ DROPS OPHTHALMIC at 22:37

## 2022-01-01 RX ADMIN — HYDROMORPHONE HYDROCHLORIDE 0.3 MG: 1 INJECTION, SOLUTION INTRAMUSCULAR; INTRAVENOUS; SUBCUTANEOUS at 08:49

## 2022-01-01 RX ADMIN — METOPROLOL TARTRATE 50 MG: 50 TABLET, FILM COATED ORAL at 20:16

## 2022-01-01 RX ADMIN — CEFEPIME HYDROCHLORIDE 2 G: 2 INJECTION, POWDER, FOR SOLUTION INTRAVENOUS at 18:22

## 2022-01-01 RX ADMIN — ISOSORBIDE DINITRATE 20 MG: 20 TABLET ORAL at 18:17

## 2022-01-01 RX ADMIN — RISPERIDONE 0.25 MG: 0.25 TABLET ORAL at 08:42

## 2022-01-01 RX ADMIN — METOPROLOL TARTRATE 25 MG: 25 TABLET, FILM COATED ORAL at 08:07

## 2022-01-01 RX ADMIN — SODIUM BICARBONATE 650 MG TABLET 1300 MG: at 08:33

## 2022-01-01 RX ADMIN — RISPERIDONE 0.25 MG: 0.25 TABLET ORAL at 18:24

## 2022-01-01 RX ADMIN — Medication 25 MCG: at 14:56

## 2022-01-01 RX ADMIN — SODIUM BICARBONATE 650 MG TABLET 1300 MG: at 08:49

## 2022-01-01 RX ADMIN — ISOSORBIDE DINITRATE 20 MG: 20 TABLET ORAL at 14:57

## 2022-01-01 RX ADMIN — INSULIN GLARGINE 10 UNITS: 100 INJECTION, SOLUTION SUBCUTANEOUS at 14:24

## 2022-01-01 RX ADMIN — HYDRALAZINE HYDROCHLORIDE 10 MG: 20 INJECTION INTRAMUSCULAR; INTRAVENOUS at 17:20

## 2022-01-01 RX ADMIN — ASPIRIN 81 MG: 81 TABLET, COATED ORAL at 09:40

## 2022-01-01 RX ADMIN — ACETYLCYSTEINE 4 ML: 100 SOLUTION ORAL; RESPIRATORY (INHALATION) at 20:19

## 2022-01-01 RX ADMIN — LABETALOL HYDROCHLORIDE 20 MG: 5 INJECTION, SOLUTION INTRAVENOUS at 02:30

## 2022-01-01 RX ADMIN — ISOSORBIDE DINITRATE 20 MG: 20 TABLET ORAL at 18:11

## 2022-01-01 RX ADMIN — LATANOPROST 1 DROP: 50 SOLUTION/ DROPS OPHTHALMIC at 22:13

## 2022-01-01 RX ADMIN — SODIUM BICARBONATE 650 MG TABLET 1300 MG: at 20:20

## 2022-01-01 RX ADMIN — ASPIRIN 325 MG ORAL TABLET 325 MG: 325 PILL ORAL at 08:27

## 2022-01-01 RX ADMIN — AMLODIPINE BESYLATE 5 MG: 5 TABLET ORAL at 08:27

## 2022-01-01 RX ADMIN — METOPROLOL TARTRATE 50 MG: 50 TABLET, FILM COATED ORAL at 20:31

## 2022-01-01 RX ADMIN — NITROGLYCERIN 0.4 MG: 0.4 TABLET SUBLINGUAL at 20:11

## 2022-01-01 RX ADMIN — ATROPINE SULFATE 2 DROP: 10 SOLUTION/ DROPS OPHTHALMIC at 19:51

## 2022-01-01 RX ADMIN — THERA TABS 1 TABLET: TAB at 08:44

## 2022-01-01 RX ADMIN — IPRATROPIUM BROMIDE AND ALBUTEROL SULFATE 3 ML: 2.5; .5 SOLUTION RESPIRATORY (INHALATION) at 23:58

## 2022-01-01 RX ADMIN — FAMOTIDINE 10 MG: 10 TABLET ORAL at 08:48

## 2022-01-01 RX ADMIN — ISOSORBIDE DINITRATE 20 MG: 5 TABLET ORAL at 20:15

## 2022-01-01 RX ADMIN — Medication 25 MCG: at 09:16

## 2022-01-01 RX ADMIN — INSULIN ASPART 1 UNITS: 100 INJECTION, SOLUTION INTRAVENOUS; SUBCUTANEOUS at 00:31

## 2022-01-01 RX ADMIN — ACETAMINOPHEN 650 MG: 325 SOLUTION ORAL at 17:00

## 2022-01-01 RX ADMIN — SODIUM CHLORIDE: 9 INJECTION, SOLUTION INTRAVENOUS at 20:13

## 2022-01-01 RX ADMIN — HYDRALAZINE HYDROCHLORIDE 50 MG: 50 TABLET, FILM COATED ORAL at 23:10

## 2022-01-01 RX ADMIN — Medication 25 MCG: at 09:40

## 2022-01-01 RX ADMIN — ACETYLCYSTEINE 4 ML: 100 SOLUTION ORAL; RESPIRATORY (INHALATION) at 12:50

## 2022-01-01 RX ADMIN — Medication 15 ML: at 08:50

## 2022-01-01 RX ADMIN — GLYCOPYRROLATE 0.1 MG: 0.2 INJECTION, SOLUTION INTRAMUSCULAR; INTRAVENOUS at 08:49

## 2022-01-01 RX ADMIN — FAMOTIDINE 10 MG: 40 POWDER, FOR SUSPENSION ORAL at 08:40

## 2022-01-01 RX ADMIN — TRAZODONE HYDROCHLORIDE 75 MG: 50 TABLET ORAL at 00:15

## 2022-01-01 RX ADMIN — ACETAMINOPHEN 650 MG: 325 TABLET, FILM COATED ORAL at 02:38

## 2022-01-01 RX ADMIN — METOPROLOL TARTRATE 25 MG: 25 TABLET, FILM COATED ORAL at 20:14

## 2022-01-01 RX ADMIN — ATORVASTATIN CALCIUM 40 MG: 40 TABLET, FILM COATED ORAL at 19:48

## 2022-01-01 RX ADMIN — AMLODIPINE BESYLATE 5 MG: 5 TABLET ORAL at 09:20

## 2022-01-01 RX ADMIN — HEPARIN SODIUM 5000 UNITS: 10000 INJECTION, SOLUTION INTRAVENOUS; SUBCUTANEOUS at 19:30

## 2022-01-01 RX ADMIN — HYDRALAZINE HYDROCHLORIDE 50 MG: 50 TABLET, FILM COATED ORAL at 14:47

## 2022-01-01 RX ADMIN — INSULIN ASPART 2 UNITS: 100 INJECTION, SOLUTION INTRAVENOUS; SUBCUTANEOUS at 20:30

## 2022-01-01 RX ADMIN — SODIUM CHLORIDE: 9 INJECTION, SOLUTION INTRAVENOUS at 10:33

## 2022-01-01 RX ADMIN — THERA TABS 1 TABLET: TAB at 09:20

## 2022-01-01 RX ADMIN — SODIUM CHLORIDE: 9 INJECTION, SOLUTION INTRAVENOUS at 13:17

## 2022-01-01 RX ADMIN — ACETAMINOPHEN 650 MG: 650 SUPPOSITORY RECTAL at 16:38

## 2022-01-01 RX ADMIN — ASPIRIN 81 MG CHEWABLE TABLET 81 MG: 81 TABLET CHEWABLE at 08:33

## 2022-01-01 RX ADMIN — ASPIRIN 81 MG CHEWABLE TABLET 81 MG: 81 TABLET CHEWABLE at 16:17

## 2022-01-01 RX ADMIN — IPRATROPIUM BROMIDE AND ALBUTEROL SULFATE 3 ML: 2.5; .5 SOLUTION RESPIRATORY (INHALATION) at 08:04

## 2022-01-01 RX ADMIN — ACETYLCYSTEINE 4 ML: 100 SOLUTION ORAL; RESPIRATORY (INHALATION) at 08:49

## 2022-01-01 RX ADMIN — ACETAMINOPHEN 1000 MG: 500 TABLET ORAL at 21:48

## 2022-01-01 RX ADMIN — CEFEPIME HYDROCHLORIDE 2 G: 2 INJECTION, POWDER, FOR SOLUTION INTRAVENOUS at 18:05

## 2022-01-01 RX ADMIN — ACETAMINOPHEN 1000 MG: 500 TABLET ORAL at 18:58

## 2022-01-01 RX ADMIN — HEPARIN SODIUM 5000 UNITS: 10000 INJECTION, SOLUTION INTRAVENOUS; SUBCUTANEOUS at 09:58

## 2022-01-01 RX ADMIN — NICARDIPINE HYDROCHLORIDE 2.5 MG/HR: 0.2 INJECTION INTRAVENOUS at 22:58

## 2022-01-01 RX ADMIN — METOPROLOL TARTRATE 5 MG: 5 INJECTION INTRAVENOUS at 00:13

## 2022-01-01 RX ADMIN — SODIUM BICARBONATE 650 MG TABLET 1300 MG: at 09:16

## 2022-01-01 RX ADMIN — LABETALOL HYDROCHLORIDE 20 MG: 5 INJECTION, SOLUTION INTRAVENOUS at 23:49

## 2022-01-01 RX ADMIN — ATORVASTATIN CALCIUM 40 MG: 40 TABLET, FILM COATED ORAL at 21:43

## 2022-01-01 RX ADMIN — ISOSORBIDE DINITRATE 20 MG: 20 TABLET ORAL at 08:19

## 2022-01-01 RX ADMIN — TRAZODONE HYDROCHLORIDE 75 MG: 50 TABLET ORAL at 21:57

## 2022-01-01 RX ADMIN — ISOSORBIDE DINITRATE 20 MG: 5 TABLET ORAL at 08:07

## 2022-01-01 RX ADMIN — INSULIN GLARGINE 10 UNITS: 100 INJECTION, SOLUTION SUBCUTANEOUS at 10:53

## 2022-01-01 RX ADMIN — ACETAMINOPHEN 650 MG: 325 TABLET ORAL at 02:09

## 2022-01-01 RX ADMIN — ISOSORBIDE DINITRATE 20 MG: 20 TABLET ORAL at 13:27

## 2022-01-01 RX ADMIN — IPRATROPIUM BROMIDE AND ALBUTEROL SULFATE 3 ML: 2.5; .5 SOLUTION RESPIRATORY (INHALATION) at 15:54

## 2022-01-01 RX ADMIN — HYDRALAZINE HYDROCHLORIDE 50 MG: 50 TABLET, FILM COATED ORAL at 22:30

## 2022-01-01 RX ADMIN — PREDNISONE 20 MG: 20 TABLET ORAL at 18:11

## 2022-01-01 RX ADMIN — ISOSORBIDE DINITRATE 20 MG: 20 TABLET ORAL at 13:04

## 2022-01-01 RX ADMIN — HYDRALAZINE HYDROCHLORIDE 50 MG: 50 TABLET, FILM COATED ORAL at 20:31

## 2022-01-01 RX ADMIN — DOCUSATE SODIUM 100 MG: 100 CAPSULE ORAL at 19:48

## 2022-01-01 RX ADMIN — HYDRALAZINE HYDROCHLORIDE 25 MG: 25 TABLET, FILM COATED ORAL at 21:55

## 2022-01-01 RX ADMIN — METOPROLOL TARTRATE 5 MG: 5 INJECTION INTRAVENOUS at 08:10

## 2022-01-01 RX ADMIN — AMLODIPINE BESYLATE 5 MG: 5 TABLET ORAL at 08:47

## 2022-01-01 RX ADMIN — METOPROLOL TARTRATE 25 MG: 25 TABLET, FILM COATED ORAL at 20:22

## 2022-01-01 RX ADMIN — ISOSORBIDE DINITRATE 20 MG: 20 TABLET ORAL at 18:12

## 2022-01-01 RX ADMIN — PANTOPRAZOLE SODIUM 40 MG: 40 TABLET, DELAYED RELEASE ORAL at 09:20

## 2022-01-01 RX ADMIN — HEPARIN SODIUM 5000 UNITS: 10000 INJECTION, SOLUTION INTRAVENOUS; SUBCUTANEOUS at 09:15

## 2022-01-01 RX ADMIN — LABETALOL HYDROCHLORIDE 10 MG: 5 INJECTION, SOLUTION INTRAVENOUS at 03:40

## 2022-01-01 RX ADMIN — ACETAMINOPHEN 650 MG: 650 SUPPOSITORY RECTAL at 11:24

## 2022-01-01 RX ADMIN — SODIUM BICARBONATE 650 MG TABLET 1300 MG: at 21:47

## 2022-01-01 RX ADMIN — ISOSORBIDE DINITRATE 20 MG: 20 TABLET ORAL at 18:27

## 2022-01-01 RX ADMIN — ATORVASTATIN CALCIUM 40 MG: 40 TABLET, FILM COATED ORAL at 20:31

## 2022-01-01 RX ADMIN — IPRATROPIUM BROMIDE AND ALBUTEROL SULFATE 3 ML: 2.5; .5 SOLUTION RESPIRATORY (INHALATION) at 04:50

## 2022-01-01 RX ADMIN — HYDRALAZINE HYDROCHLORIDE 50 MG: 50 TABLET, FILM COATED ORAL at 09:16

## 2022-01-01 RX ADMIN — VANCOMYCIN HYDROCHLORIDE 500 MG: 500 INJECTION, POWDER, LYOPHILIZED, FOR SOLUTION INTRAVENOUS at 21:30

## 2022-01-01 RX ADMIN — THERA TABS 1 TABLET: TAB at 09:17

## 2022-01-01 RX ADMIN — IPRATROPIUM BROMIDE AND ALBUTEROL SULFATE 3 ML: 2.5; .5 SOLUTION RESPIRATORY (INHALATION) at 15:45

## 2022-01-01 RX ADMIN — HYDRALAZINE HYDROCHLORIDE 50 MG: 50 TABLET, FILM COATED ORAL at 09:20

## 2022-01-01 RX ADMIN — HYDRALAZINE HYDROCHLORIDE 10 MG: 20 INJECTION INTRAMUSCULAR; INTRAVENOUS at 08:32

## 2022-01-01 RX ADMIN — SCOPALAMINE 1 PATCH: 1 PATCH, EXTENDED RELEASE TRANSDERMAL at 20:19

## 2022-01-01 RX ADMIN — METOPROLOL TARTRATE 5 MG: 5 INJECTION INTRAVENOUS at 10:31

## 2022-01-01 RX ADMIN — METOPROLOL TARTRATE 50 MG: 50 TABLET, FILM COATED ORAL at 08:19

## 2022-01-01 RX ADMIN — ATORVASTATIN CALCIUM 40 MG: 40 TABLET, FILM COATED ORAL at 20:14

## 2022-01-01 RX ADMIN — THERA TABS 1 TABLET: TAB at 08:47

## 2022-01-01 RX ADMIN — HALOPERIDOL LACTATE 2 MG: 5 INJECTION, SOLUTION INTRAMUSCULAR at 15:57

## 2022-01-01 RX ADMIN — ISOSORBIDE DINITRATE 20 MG: 20 TABLET ORAL at 18:58

## 2022-01-01 RX ADMIN — HALOPERIDOL 2 MG: 0.5 TABLET ORAL at 02:28

## 2022-01-01 RX ADMIN — HEPARIN SODIUM 5000 UNITS: 10000 INJECTION, SOLUTION INTRAVENOUS; SUBCUTANEOUS at 20:45

## 2022-01-01 RX ADMIN — ISOSORBIDE DINITRATE 20 MG: 20 TABLET ORAL at 18:24

## 2022-01-01 RX ADMIN — ISOSORBIDE DINITRATE 20 MG: 20 TABLET ORAL at 08:44

## 2022-01-01 RX ADMIN — METOPROLOL TARTRATE 25 MG: 25 TABLET, FILM COATED ORAL at 20:16

## 2022-01-01 RX ADMIN — HYDRALAZINE HYDROCHLORIDE 50 MG: 50 TABLET, FILM COATED ORAL at 21:08

## 2022-01-01 RX ADMIN — NITROGLYCERIN 0.4 MG: 0.4 TABLET SUBLINGUAL at 18:58

## 2022-01-01 RX ADMIN — ATORVASTATIN CALCIUM 40 MG: 40 TABLET, FILM COATED ORAL at 20:21

## 2022-01-01 RX ADMIN — LORAZEPAM 0.5 MG: 2 INJECTION INTRAMUSCULAR; INTRAVENOUS at 10:32

## 2022-01-01 RX ADMIN — HYDROMORPHONE HYDROCHLORIDE 0.3 MG: 1 INJECTION, SOLUTION INTRAMUSCULAR; INTRAVENOUS; SUBCUTANEOUS at 22:53

## 2022-01-01 RX ADMIN — LATANOPROST 1 DROP: 50 SOLUTION/ DROPS OPHTHALMIC at 21:16

## 2022-01-01 RX ADMIN — SODIUM CHLORIDE: 4.5 INJECTION, SOLUTION INTRAVENOUS at 22:41

## 2022-01-01 RX ADMIN — ACETAMINOPHEN 650 MG: 325 TABLET, FILM COATED ORAL at 13:26

## 2022-01-01 RX ADMIN — SODIUM BICARBONATE 650 MG TABLET 1300 MG: at 20:28

## 2022-01-01 RX ADMIN — PANTOPRAZOLE SODIUM 40 MG: 40 TABLET, DELAYED RELEASE ORAL at 08:08

## 2022-01-01 RX ADMIN — TRAZODONE HYDROCHLORIDE 75 MG: 50 TABLET ORAL at 20:22

## 2022-01-01 RX ADMIN — TRAZODONE HYDROCHLORIDE 75 MG: 50 TABLET ORAL at 21:40

## 2022-01-01 RX ADMIN — ACETAMINOPHEN 1000 MG: 500 TABLET ORAL at 10:35

## 2022-01-01 RX ADMIN — AMLODIPINE BESYLATE 5 MG: 5 TABLET ORAL at 09:58

## 2022-01-01 RX ADMIN — ISOSORBIDE DINITRATE 20 MG: 5 TABLET ORAL at 09:11

## 2022-01-01 RX ADMIN — ONDANSETRON 4 MG: 2 INJECTION INTRAMUSCULAR; INTRAVENOUS at 18:26

## 2022-01-01 RX ADMIN — RISPERIDONE 0.25 MG: 0.25 TABLET ORAL at 08:59

## 2022-01-01 RX ADMIN — Medication 25 MCG: at 09:56

## 2022-01-01 RX ADMIN — SODIUM CHLORIDE: 9 INJECTION, SOLUTION INTRAVENOUS at 18:10

## 2022-01-01 RX ADMIN — METOPROLOL TARTRATE 50 MG: 50 TABLET, FILM COATED ORAL at 09:16

## 2022-01-01 RX ADMIN — TRAZODONE HYDROCHLORIDE 75 MG: 50 TABLET ORAL at 20:15

## 2022-01-01 RX ADMIN — HYDRALAZINE HYDROCHLORIDE 50 MG: 50 TABLET, FILM COATED ORAL at 20:20

## 2022-01-01 RX ADMIN — Medication 25 MCG: at 08:06

## 2022-01-01 RX ADMIN — LATANOPROST 1 DROP: 50 SOLUTION/ DROPS OPHTHALMIC at 21:57

## 2022-01-01 RX ADMIN — METOPROLOL TARTRATE 5 MG: 5 INJECTION INTRAVENOUS at 23:44

## 2022-01-01 RX ADMIN — NITROGLYCERIN 0.4 MG: 0.4 TABLET SUBLINGUAL at 10:18

## 2022-01-01 RX ADMIN — ALUMINUM HYDROXIDE, MAGNESIUM HYDROXIDE, AND SIMETHICONE 30 ML: 200; 200; 20 SUSPENSION ORAL at 03:03

## 2022-01-01 RX ADMIN — HYDRALAZINE HYDROCHLORIDE 50 MG: 50 TABLET, FILM COATED ORAL at 21:21

## 2022-01-01 RX ADMIN — AMLODIPINE BESYLATE 5 MG: 5 TABLET ORAL at 12:19

## 2022-01-01 RX ADMIN — HYDRALAZINE HYDROCHLORIDE 10 MG: 20 INJECTION INTRAMUSCULAR; INTRAVENOUS at 16:33

## 2022-01-01 RX ADMIN — HYDRALAZINE HYDROCHLORIDE 50 MG: 50 TABLET, FILM COATED ORAL at 14:08

## 2022-01-01 RX ADMIN — ASPIRIN 81 MG: 81 TABLET, COATED ORAL at 09:02

## 2022-01-01 RX ADMIN — LATANOPROST 1 DROP: 50 SOLUTION/ DROPS OPHTHALMIC at 21:32

## 2022-01-01 RX ADMIN — THERA TABS 1 TABLET: TAB at 09:01

## 2022-01-01 RX ADMIN — ISOSORBIDE DINITRATE 20 MG: 20 TABLET ORAL at 09:40

## 2022-01-01 RX ADMIN — CEFTRIAXONE 2 G: 2 INJECTION, POWDER, FOR SOLUTION INTRAMUSCULAR; INTRAVENOUS at 17:44

## 2022-01-01 RX ADMIN — ACETAMINOPHEN 650 MG: 325 TABLET, FILM COATED ORAL at 09:39

## 2022-01-01 RX ADMIN — ACETYLCYSTEINE 4 ML: 100 SOLUTION ORAL; RESPIRATORY (INHALATION) at 12:35

## 2022-01-01 RX ADMIN — CEFTRIAXONE 2 G: 2 INJECTION, POWDER, FOR SOLUTION INTRAMUSCULAR; INTRAVENOUS at 17:49

## 2022-01-01 RX ADMIN — HYDRALAZINE HYDROCHLORIDE 50 MG: 50 TABLET, FILM COATED ORAL at 15:05

## 2022-01-01 RX ADMIN — HYDROMORPHONE HYDROCHLORIDE 0.3 MG: 1 INJECTION, SOLUTION INTRAMUSCULAR; INTRAVENOUS; SUBCUTANEOUS at 20:19

## 2022-01-01 RX ADMIN — Medication 25 MCG: at 08:48

## 2022-01-01 RX ADMIN — INSULIN DETEMIR 10 UNITS: 100 INJECTION, SOLUTION SUBCUTANEOUS at 21:44

## 2022-01-01 RX ADMIN — ISOSORBIDE DINITRATE 20 MG: 5 TABLET ORAL at 19:35

## 2022-01-01 RX ADMIN — HEPARIN SODIUM 5000 UNITS: 10000 INJECTION, SOLUTION INTRAVENOUS; SUBCUTANEOUS at 20:22

## 2022-01-01 RX ADMIN — HYDRALAZINE HYDROCHLORIDE 50 MG: 50 TABLET, FILM COATED ORAL at 05:10

## 2022-01-01 RX ADMIN — SODIUM BICARBONATE 650 MG TABLET 1300 MG: at 08:05

## 2022-01-01 RX ADMIN — NITROGLYCERIN 0.4 MG: 0.4 TABLET SUBLINGUAL at 13:53

## 2022-01-01 RX ADMIN — HYDROMORPHONE HYDROCHLORIDE 0.2 MG: 0.2 INJECTION, SOLUTION INTRAMUSCULAR; INTRAVENOUS; SUBCUTANEOUS at 13:09

## 2022-01-01 RX ADMIN — CEFEPIME HYDROCHLORIDE 2 G: 2 INJECTION, POWDER, FOR SOLUTION INTRAVENOUS at 17:05

## 2022-01-01 RX ADMIN — ISOSORBIDE DINITRATE 20 MG: 20 TABLET ORAL at 09:20

## 2022-01-01 RX ADMIN — ISOSORBIDE DINITRATE 20 MG: 20 TABLET ORAL at 12:25

## 2022-01-01 RX ADMIN — LATANOPROST 1 DROP: 50 SOLUTION/ DROPS OPHTHALMIC at 21:21

## 2022-01-01 RX ADMIN — ONDANSETRON 4 MG: 2 INJECTION INTRAMUSCULAR; INTRAVENOUS at 10:17

## 2022-01-01 RX ADMIN — HYDRALAZINE HYDROCHLORIDE 10 MG: 20 INJECTION INTRAMUSCULAR; INTRAVENOUS at 16:03

## 2022-01-01 RX ADMIN — SODIUM BICARBONATE 650 MG TABLET 1300 MG: at 21:40

## 2022-01-01 RX ADMIN — ATORVASTATIN CALCIUM 40 MG: 40 TABLET, FILM COATED ORAL at 20:28

## 2022-01-01 RX ADMIN — CEFTRIAXONE 2 G: 2 INJECTION, POWDER, FOR SOLUTION INTRAMUSCULAR; INTRAVENOUS at 16:53

## 2022-01-01 RX ADMIN — LIDOCAINE HYDROCHLORIDE 10 ML: 10 INJECTION, SOLUTION EPIDURAL; INFILTRATION; INTRACAUDAL; PERINEURAL at 09:28

## 2022-01-01 RX ADMIN — ACETAMINOPHEN 650 MG: 325 SOLUTION ORAL at 08:51

## 2022-01-01 RX ADMIN — LATANOPROST 1 DROP: 50 SOLUTION/ DROPS OPHTHALMIC at 21:35

## 2022-01-01 RX ADMIN — TRAZODONE HYDROCHLORIDE 75 MG: 50 TABLET ORAL at 21:54

## 2022-01-01 RX ADMIN — RISPERIDONE 0.25 MG: 0.25 TABLET ORAL at 08:33

## 2022-01-01 RX ADMIN — THERA TABS 1 TABLET: TAB at 08:06

## 2022-01-01 RX ADMIN — ISOSORBIDE DINITRATE 20 MG: 5 TABLET ORAL at 20:14

## 2022-01-01 RX ADMIN — CEFTRIAXONE 1 G: 1 INJECTION, POWDER, FOR SOLUTION INTRAMUSCULAR; INTRAVENOUS at 00:10

## 2022-01-01 RX ADMIN — DEXTROSE MONOHYDRATE 50 ML: 25 INJECTION, SOLUTION INTRAVENOUS at 18:14

## 2022-01-01 RX ADMIN — ISOSORBIDE DINITRATE 20 MG: 20 TABLET ORAL at 14:59

## 2022-01-01 RX ADMIN — DEXTROSE MONOHYDRATE 25 ML: 25 INJECTION, SOLUTION INTRAVENOUS at 09:42

## 2022-01-01 RX ADMIN — SODIUM BICARBONATE 650 MG TABLET 1300 MG: at 00:15

## 2022-01-01 RX ADMIN — HYDRALAZINE HYDROCHLORIDE 50 MG: 50 TABLET, FILM COATED ORAL at 05:49

## 2022-01-01 RX ADMIN — HYDRALAZINE HYDROCHLORIDE 50 MG: 50 TABLET, FILM COATED ORAL at 08:05

## 2022-01-01 RX ADMIN — INSULIN ASPART 2 UNITS: 100 INJECTION, SOLUTION INTRAVENOUS; SUBCUTANEOUS at 00:43

## 2022-01-01 RX ADMIN — ASPIRIN 81 MG CHEWABLE TABLET 81 MG: 81 TABLET CHEWABLE at 08:44

## 2022-01-01 RX ADMIN — ATORVASTATIN CALCIUM 40 MG: 40 TABLET, FILM COATED ORAL at 20:22

## 2022-01-01 RX ADMIN — AMLODIPINE BESYLATE 5 MG: 5 TABLET ORAL at 09:16

## 2022-01-01 RX ADMIN — HYDRALAZINE HYDROCHLORIDE 50 MG: 50 TABLET, FILM COATED ORAL at 08:47

## 2022-01-01 RX ADMIN — SODIUM BICARBONATE 650 MG TABLET 1300 MG: at 09:19

## 2022-01-01 RX ADMIN — HYDROMORPHONE HYDROCHLORIDE 0.3 MG: 1 INJECTION, SOLUTION INTRAMUSCULAR; INTRAVENOUS; SUBCUTANEOUS at 19:47

## 2022-01-01 RX ADMIN — INSULIN ASPART 2 UNITS: 100 INJECTION, SOLUTION INTRAVENOUS; SUBCUTANEOUS at 08:49

## 2022-01-01 RX ADMIN — HYDROMORPHONE HYDROCHLORIDE 0.3 MG: 1 INJECTION, SOLUTION INTRAMUSCULAR; INTRAVENOUS; SUBCUTANEOUS at 08:33

## 2022-01-01 RX ADMIN — TRAZODONE HYDROCHLORIDE 75 MG: 50 TABLET ORAL at 21:27

## 2022-01-01 RX ADMIN — RISPERIDONE 0.25 MG: 0.25 TABLET ORAL at 08:49

## 2022-01-01 RX ADMIN — HYDRALAZINE HYDROCHLORIDE 10 MG: 20 INJECTION INTRAMUSCULAR; INTRAVENOUS at 01:40

## 2022-01-01 RX ADMIN — ISOSORBIDE DINITRATE 20 MG: 20 TABLET ORAL at 14:08

## 2022-01-01 RX ADMIN — Medication 25 MCG: at 08:19

## 2022-01-01 RX ADMIN — Medication 15 ML: at 08:33

## 2022-01-01 RX ADMIN — IPRATROPIUM BROMIDE AND ALBUTEROL SULFATE 3 ML: 2.5; .5 SOLUTION RESPIRATORY (INHALATION) at 00:00

## 2022-01-01 RX ADMIN — CEFEPIME HYDROCHLORIDE 2 G: 2 INJECTION, POWDER, FOR SOLUTION INTRAVENOUS at 18:18

## 2022-01-01 RX ADMIN — ACETAMINOPHEN 1000 MG: 500 TABLET ORAL at 08:08

## 2022-01-01 RX ADMIN — NITROGLYCERIN 0.4 MG: 0.4 TABLET SUBLINGUAL at 20:58

## 2022-01-01 RX ADMIN — ISOSORBIDE DINITRATE 20 MG: 5 TABLET ORAL at 19:47

## 2022-01-01 RX ADMIN — GUAIFENESIN 10 ML: 200 SOLUTION ORAL at 12:18

## 2022-01-01 RX ADMIN — METOPROLOL TARTRATE 5 MG: 5 INJECTION INTRAVENOUS at 05:10

## 2022-01-01 RX ADMIN — SODIUM CHLORIDE: 9 INJECTION, SOLUTION INTRAVENOUS at 12:49

## 2022-01-01 RX ADMIN — Medication 25 MCG: at 08:29

## 2022-01-01 RX ADMIN — HYDRALAZINE HYDROCHLORIDE 50 MG: 50 TABLET, FILM COATED ORAL at 14:56

## 2022-01-01 RX ADMIN — HYDRALAZINE HYDROCHLORIDE 10 MG: 20 INJECTION INTRAMUSCULAR; INTRAVENOUS at 21:14

## 2022-01-01 RX ADMIN — METOPROLOL TARTRATE 5 MG: 5 INJECTION INTRAVENOUS at 18:50

## 2022-01-01 RX ADMIN — HYDRALAZINE HYDROCHLORIDE 10 MG: 20 INJECTION, SOLUTION INTRAMUSCULAR; INTRAVENOUS at 19:20

## 2022-01-01 RX ADMIN — THERA TABS 1 TABLET: TAB at 08:27

## 2022-01-01 RX ADMIN — LABETALOL HYDROCHLORIDE 20 MG: 5 INJECTION, SOLUTION INTRAVENOUS at 18:15

## 2022-01-01 RX ADMIN — TRAZODONE HYDROCHLORIDE 75 MG: 50 TABLET ORAL at 23:29

## 2022-01-01 RX ADMIN — GUAIFENESIN 10 ML: 200 SOLUTION ORAL at 20:21

## 2022-01-01 RX ADMIN — AMLODIPINE BESYLATE 5 MG: 5 TABLET ORAL at 08:07

## 2022-01-01 RX ADMIN — PROCHLORPERAZINE EDISYLATE 5 MG: 5 INJECTION INTRAMUSCULAR; INTRAVENOUS at 15:59

## 2022-01-01 RX ADMIN — RISPERIDONE 0.25 MG: 0.25 TABLET ORAL at 08:47

## 2022-01-01 RX ADMIN — INSULIN HUMAN 7 UNITS: 100 INJECTION, SUSPENSION SUBCUTANEOUS at 15:35

## 2022-01-01 RX ADMIN — ACETYLCYSTEINE 4 ML: 100 SOLUTION ORAL; RESPIRATORY (INHALATION) at 23:37

## 2022-01-01 RX ADMIN — SODIUM CHLORIDE: 9 INJECTION, SOLUTION INTRAVENOUS at 00:09

## 2022-01-01 RX ADMIN — HYDRALAZINE HYDROCHLORIDE 50 MG: 50 TABLET, FILM COATED ORAL at 13:26

## 2022-01-01 RX ADMIN — ACETAMINOPHEN 1000 MG: 500 TABLET ORAL at 05:48

## 2022-01-01 RX ADMIN — IPRATROPIUM BROMIDE AND ALBUTEROL SULFATE 3 ML: 2.5; .5 SOLUTION RESPIRATORY (INHALATION) at 03:29

## 2022-01-01 RX ADMIN — IPRATROPIUM BROMIDE AND ALBUTEROL SULFATE 3 ML: 2.5; .5 SOLUTION RESPIRATORY (INHALATION) at 07:45

## 2022-01-01 RX ADMIN — Medication 5 MG: at 20:29

## 2022-01-01 RX ADMIN — INSULIN GLARGINE 10 UNITS: 100 INJECTION, SOLUTION SUBCUTANEOUS at 10:01

## 2022-01-01 RX ADMIN — SODIUM BICARBONATE 650 MG TABLET 1300 MG: at 21:05

## 2022-01-01 RX ADMIN — HEPARIN SODIUM 5000 UNITS: 10000 INJECTION, SOLUTION INTRAVENOUS; SUBCUTANEOUS at 08:47

## 2022-01-01 RX ADMIN — IPRATROPIUM BROMIDE AND ALBUTEROL SULFATE 3 ML: 2.5; .5 SOLUTION RESPIRATORY (INHALATION) at 09:06

## 2022-01-01 RX ADMIN — LATANOPROST 1 DROP: 50 SOLUTION/ DROPS OPHTHALMIC at 20:28

## 2022-01-01 RX ADMIN — SODIUM BICARBONATE 650 MG TABLET 1300 MG: at 20:29

## 2022-01-01 RX ADMIN — METOPROLOL TARTRATE 5 MG: 5 INJECTION INTRAVENOUS at 18:42

## 2022-01-01 RX ADMIN — SODIUM CHLORIDE: 4.5 INJECTION, SOLUTION INTRAVENOUS at 12:55

## 2022-01-01 RX ADMIN — CALCIUM GLUCONATE 1 G: 98 INJECTION, SOLUTION INTRAVENOUS at 19:58

## 2022-01-01 RX ADMIN — Medication 25 MCG: at 09:02

## 2022-01-01 RX ADMIN — HYDRALAZINE HYDROCHLORIDE 50 MG: 50 TABLET, FILM COATED ORAL at 21:55

## 2022-01-01 RX ADMIN — FAMOTIDINE 10 MG: 10 TABLET ORAL at 20:16

## 2022-01-01 RX ADMIN — SODIUM BICARBONATE 650 MG TABLET 650 MG: at 08:19

## 2022-01-01 RX ADMIN — SODIUM CHLORIDE: 9 INJECTION, SOLUTION INTRAVENOUS at 04:17

## 2022-01-01 RX ADMIN — ONDANSETRON 4 MG: 2 INJECTION INTRAMUSCULAR; INTRAVENOUS at 09:16

## 2022-01-01 RX ADMIN — METOPROLOL TARTRATE 50 MG: 50 TABLET, FILM COATED ORAL at 08:06

## 2022-01-01 RX ADMIN — ACETYLCYSTEINE 4 ML: 100 SOLUTION ORAL; RESPIRATORY (INHALATION) at 09:06

## 2022-01-01 RX ADMIN — METOPROLOL TARTRATE 5 MG: 5 INJECTION INTRAVENOUS at 00:18

## 2022-01-01 RX ADMIN — HYDRALAZINE HYDROCHLORIDE 50 MG: 50 TABLET, FILM COATED ORAL at 20:37

## 2022-01-01 RX ADMIN — ATORVASTATIN CALCIUM 40 MG: 40 TABLET, FILM COATED ORAL at 20:20

## 2022-01-01 RX ADMIN — HEPARIN SODIUM 5000 UNITS: 10000 INJECTION, SOLUTION INTRAVENOUS; SUBCUTANEOUS at 14:24

## 2022-01-01 RX ADMIN — QUETIAPINE FUMARATE 25 MG: 25 TABLET ORAL at 16:17

## 2022-01-01 RX ADMIN — CEFEPIME HYDROCHLORIDE 2 G: 2 INJECTION, POWDER, FOR SOLUTION INTRAVENOUS at 17:33

## 2022-01-01 ASSESSMENT — ACTIVITIES OF DAILY LIVING (ADL)
ADLS_ACUITY_SCORE: 57
ADLS_ACUITY_SCORE: 63
ADLS_ACUITY_SCORE: 59
ADLS_ACUITY_SCORE: 61
ADLS_ACUITY_SCORE: 63
ADLS_ACUITY_SCORE: 61
ADLS_ACUITY_SCORE: 63
ADLS_ACUITY_SCORE: 57
ADLS_ACUITY_SCORE: 59
ADLS_ACUITY_SCORE: 63
ADLS_ACUITY_SCORE: 63
ADLS_ACUITY_SCORE: 59
ADLS_ACUITY_SCORE: 55
ADLS_ACUITY_SCORE: 62
ADLS_ACUITY_SCORE: 63
ADLS_ACUITY_SCORE: 63
ADLS_ACUITY_SCORE: 61
ADLS_ACUITY_SCORE: 61
ADLS_ACUITY_SCORE: 63
ADLS_ACUITY_SCORE: 59
ADLS_ACUITY_SCORE: 63
ADLS_ACUITY_SCORE: 61
ADLS_ACUITY_SCORE: 61
ADLS_ACUITY_SCORE: 63
CHANGE_IN_FUNCTIONAL_STATUS_SINCE_ONSET_OF_CURRENT_ILLNESS/INJURY: YES
ADLS_ACUITY_SCORE: 55
ADLS_ACUITY_SCORE: 61
ADLS_ACUITY_SCORE: 57
ADLS_ACUITY_SCORE: 59
ADLS_ACUITY_SCORE: 57
ADLS_ACUITY_SCORE: 59
ADLS_ACUITY_SCORE: 61
ADLS_ACUITY_SCORE: 61
ADLS_ACUITY_SCORE: 65
ADLS_ACUITY_SCORE: 61
ADLS_ACUITY_SCORE: 65
ADLS_ACUITY_SCORE: 59
ADLS_ACUITY_SCORE: 57
ADLS_ACUITY_SCORE: 57
ADLS_ACUITY_SCORE: 61
ADLS_ACUITY_SCORE: 63
ADLS_ACUITY_SCORE: 59
ADLS_ACUITY_SCORE: 61
TOILETING_ASSISTANCE: TOILETING DIFFICULTY, DEPENDENT
ADLS_ACUITY_SCORE: 59
ADLS_ACUITY_SCORE: 63
EATING/SWALLOWING: SWALLOWING SOLID FOOD
ADLS_ACUITY_SCORE: 65
ADLS_ACUITY_SCORE: 63
ADLS_ACUITY_SCORE: 62
ADLS_ACUITY_SCORE: 63
ADLS_ACUITY_SCORE: 63
ADLS_ACUITY_SCORE: 59
ADLS_ACUITY_SCORE: 67
ADLS_ACUITY_SCORE: 63
ADLS_ACUITY_SCORE: 63
ADLS_ACUITY_SCORE: 57
ADLS_ACUITY_SCORE: 59
ADLS_ACUITY_SCORE: 63
ADLS_ACUITY_SCORE: 63
ADLS_ACUITY_SCORE: 59
ADLS_ACUITY_SCORE: 63
ADLS_ACUITY_SCORE: 59
ADLS_ACUITY_SCORE: 59
ADLS_ACUITY_SCORE: 63
ADLS_ACUITY_SCORE: 35
ADLS_ACUITY_SCORE: 61
ADLS_ACUITY_SCORE: 59
ADLS_ACUITY_SCORE: 63
ADLS_ACUITY_SCORE: 61
ADLS_ACUITY_SCORE: 63
ADLS_ACUITY_SCORE: 63
ADLS_ACUITY_SCORE: 55
ADLS_ACUITY_SCORE: 65
ADLS_ACUITY_SCORE: 61
ADLS_ACUITY_SCORE: 63
ADLS_ACUITY_SCORE: 57
ADLS_ACUITY_SCORE: 63
ADLS_ACUITY_SCORE: 63
TOILETING_ISSUES: YES
ADLS_ACUITY_SCORE: 59
ADLS_ACUITY_SCORE: 59
ADLS_ACUITY_SCORE: 61
ADLS_ACUITY_SCORE: 59
ADLS_ACUITY_SCORE: 63
ADLS_ACUITY_SCORE: 62
ADLS_ACUITY_SCORE: 63
ADLS_ACUITY_SCORE: 59
ADLS_ACUITY_SCORE: 65
ADLS_ACUITY_SCORE: 61
ADLS_ACUITY_SCORE: 61
ADLS_ACUITY_SCORE: 59
ADLS_ACUITY_SCORE: 59
ADLS_ACUITY_SCORE: 63
ADLS_ACUITY_SCORE: 55
ADLS_ACUITY_SCORE: 63
ADLS_ACUITY_SCORE: 63
ADLS_ACUITY_SCORE: 65
ADLS_ACUITY_SCORE: 63
ADLS_ACUITY_SCORE: 59
DIFFICULTY_EATING/SWALLOWING: YES
ADLS_ACUITY_SCORE: 63
DOING_ERRANDS_INDEPENDENTLY_DIFFICULTY: YES
ADLS_ACUITY_SCORE: 63
WALKING_OR_CLIMBING_STAIRS_DIFFICULTY: YES
ADLS_ACUITY_SCORE: 63
ADLS_ACUITY_SCORE: 63
ADLS_ACUITY_SCORE: 65
ADLS_ACUITY_SCORE: 59
ADLS_ACUITY_SCORE: 65
ADLS_ACUITY_SCORE: 61
ADLS_ACUITY_SCORE: 65
ADLS_ACUITY_SCORE: 59
ADLS_ACUITY_SCORE: 58
ADLS_ACUITY_SCORE: 61
ADLS_ACUITY_SCORE: 59
ADLS_ACUITY_SCORE: 63
ADLS_ACUITY_SCORE: 63
ADLS_ACUITY_SCORE: 59
ADLS_ACUITY_SCORE: 61
ADLS_ACUITY_SCORE: 57
ADLS_ACUITY_SCORE: 59
ADLS_ACUITY_SCORE: 61
WALKING_OR_CLIMBING_STAIRS: TRANSFERRING DIFFICULTY, DEPENDENT
ADLS_ACUITY_SCORE: 63
ADLS_ACUITY_SCORE: 63
ADLS_ACUITY_SCORE: 59
ADLS_ACUITY_SCORE: 55
ADLS_ACUITY_SCORE: 65
ADLS_ACUITY_SCORE: 57
FALL_HISTORY_WITHIN_LAST_SIX_MONTHS: NO
ADLS_ACUITY_SCORE: 55
ADLS_ACUITY_SCORE: 63
ADLS_ACUITY_SCORE: 65
ADLS_ACUITY_SCORE: 59
ADLS_ACUITY_SCORE: 58
ADLS_ACUITY_SCORE: 63
ADLS_ACUITY_SCORE: 63
ADLS_ACUITY_SCORE: 59
ADLS_ACUITY_SCORE: 57
ADLS_ACUITY_SCORE: 63
ADLS_ACUITY_SCORE: 63
ADLS_ACUITY_SCORE: 59
ADLS_ACUITY_SCORE: 61
ADLS_ACUITY_SCORE: 61
ADLS_ACUITY_SCORE: 63
ADLS_ACUITY_SCORE: 61
ADLS_ACUITY_SCORE: 61
ADLS_ACUITY_SCORE: 59
ADLS_ACUITY_SCORE: 59
ADLS_ACUITY_SCORE: 62
ADLS_ACUITY_SCORE: 35
ADLS_ACUITY_SCORE: 59
ADLS_ACUITY_SCORE: 63
ADLS_ACUITY_SCORE: 63
ADLS_ACUITY_SCORE: 35
ADLS_ACUITY_SCORE: 59
ADLS_ACUITY_SCORE: 63
ADLS_ACUITY_SCORE: 59
ADLS_ACUITY_SCORE: 59
ADLS_ACUITY_SCORE: 63
ADLS_ACUITY_SCORE: 63
ADLS_ACUITY_SCORE: 62
ADLS_ACUITY_SCORE: 61
ADLS_ACUITY_SCORE: 61
ADLS_ACUITY_SCORE: 59
ADLS_ACUITY_SCORE: 59
ADLS_ACUITY_SCORE: 63
ADLS_ACUITY_SCORE: 59
ADLS_ACUITY_SCORE: 59
ADLS_ACUITY_SCORE: 35
ADLS_ACUITY_SCORE: 63
ADLS_ACUITY_SCORE: 62
DRESSING/BATHING_DIFFICULTY: YES
ADLS_ACUITY_SCORE: 59
ADLS_ACUITY_SCORE: 63
ADLS_ACUITY_SCORE: 59
ADLS_ACUITY_SCORE: 63
ADLS_ACUITY_SCORE: 61
DRESSING/BATHING: BATHING DIFFICULTY, DEPENDENT
ADLS_ACUITY_SCORE: 59
ADLS_ACUITY_SCORE: 62
ADLS_ACUITY_SCORE: 55
ADLS_ACUITY_SCORE: 59
ADLS_ACUITY_SCORE: 65
ADLS_ACUITY_SCORE: 59
ADLS_ACUITY_SCORE: 61
ADLS_ACUITY_SCORE: 59
ADLS_ACUITY_SCORE: 63
ADLS_ACUITY_SCORE: 62
ADLS_ACUITY_SCORE: 57
ADLS_ACUITY_SCORE: 63
ADLS_ACUITY_SCORE: 61
ADLS_ACUITY_SCORE: 63
ADLS_ACUITY_SCORE: 67
ADLS_ACUITY_SCORE: 63
ADLS_ACUITY_SCORE: 61
ADLS_ACUITY_SCORE: 35
ADLS_ACUITY_SCORE: 65
ADLS_ACUITY_SCORE: 63
ADLS_ACUITY_SCORE: 59
ADLS_ACUITY_SCORE: 63
WEAR_GLASSES_OR_BLIND: NO
ADLS_ACUITY_SCORE: 62
ADLS_ACUITY_SCORE: 61
ADLS_ACUITY_SCORE: 59
ADLS_ACUITY_SCORE: 61
ADLS_ACUITY_SCORE: 63
ADLS_ACUITY_SCORE: 58
ADLS_ACUITY_SCORE: 63
ADLS_ACUITY_SCORE: 57
ADLS_ACUITY_SCORE: 63
ADLS_ACUITY_SCORE: 55
ADLS_ACUITY_SCORE: 59
ADLS_ACUITY_SCORE: 57
EATING: 2-->COMPLETELY DEPENDENT (NOT DEVELOPMENTALLY APPROPRIATE)
ADLS_ACUITY_SCORE: 65
CONCENTRATING,_REMEMBERING_OR_MAKING_DECISIONS_DIFFICULTY: YES
ADLS_ACUITY_SCORE: 63
ADLS_ACUITY_SCORE: 59
ADLS_ACUITY_SCORE: 63
ADLS_ACUITY_SCORE: 59
ADLS_ACUITY_SCORE: 63
ADLS_ACUITY_SCORE: 59
ADLS_ACUITY_SCORE: 57
ADLS_ACUITY_SCORE: 58
ADLS_ACUITY_SCORE: 45
ADLS_ACUITY_SCORE: 63
ADLS_ACUITY_SCORE: 59
ADLS_ACUITY_SCORE: 63
ADLS_ACUITY_SCORE: 59
ADLS_ACUITY_SCORE: 59
ADLS_ACUITY_SCORE: 61
ADLS_ACUITY_SCORE: 65
ADLS_ACUITY_SCORE: 61
ADLS_ACUITY_SCORE: 57
ADLS_ACUITY_SCORE: 59
ADLS_ACUITY_SCORE: 63
ADLS_ACUITY_SCORE: 59
ADLS_ACUITY_SCORE: 63
ADLS_ACUITY_SCORE: 63
ADLS_ACUITY_SCORE: 59
ADLS_ACUITY_SCORE: 61
ADLS_ACUITY_SCORE: 62
ADLS_ACUITY_SCORE: 59
ADLS_ACUITY_SCORE: 62
ADLS_ACUITY_SCORE: 61
ADLS_ACUITY_SCORE: 63
ADLS_ACUITY_SCORE: 59
ADLS_ACUITY_SCORE: 63
ADLS_ACUITY_SCORE: 59
ADLS_ACUITY_SCORE: 59
ADLS_ACUITY_SCORE: 63
ADLS_ACUITY_SCORE: 63
ADLS_ACUITY_SCORE: 67
ADLS_ACUITY_SCORE: 63
ADLS_ACUITY_SCORE: 61
ADLS_ACUITY_SCORE: 55
ADLS_ACUITY_SCORE: 59

## 2022-01-01 ASSESSMENT — ENCOUNTER SYMPTOMS
HEADACHES: 0
VOMITING: 0
COUGH: 0

## 2022-09-16 NOTE — DISCHARGE INSTRUCTIONS
Your hospital follow up appointment has been scheduled for you with Dr. Basilio Alfaro at formerly [facility] in [place] for Friday, January 10th at 11:00am. Please bring your hospital discharge instructions, a photo ID, and insurance information with you to your appointment. Please call the clinic at [phone] if you need to reschedule.     
22

## 2022-10-12 PROBLEM — N30.00 ACUTE CYSTITIS WITHOUT HEMATURIA: Status: ACTIVE | Noted: 2022-01-01

## 2022-10-12 PROBLEM — A41.9 SEPSIS WITHOUT ACUTE ORGAN DYSFUNCTION, DUE TO UNSPECIFIED ORGANISM (H): Status: ACTIVE | Noted: 2022-01-01

## 2022-10-12 PROBLEM — A41.9 SEPSIS (H): Status: ACTIVE | Noted: 2022-01-01

## 2022-10-12 NOTE — ED PROVIDER NOTES
History   Chief Complaint:  Altered Mental Status       The history is provided by the patient. The history is limited by the condition of the patient. A  was used ([place]).      Eh Callahan is a 82 year old male with history of a-fib, CKD, DM II, and hypertension who presents with altered mental status.  He denies cough, vomiting, or headache.    Due to his dementia the patient is not able to provide much reliable history.  His daughter arrived.  Over the last 2 or 3 days the patient has become progressively weak.  He has not typically continent of his urine and his daughter helps him with his cares at home with changing adult diapers.  Today the patient was weak enough that he had trouble sitting up in the chair and so EMS was summoned and the patient was brought into the ED.  No vomiting.  His daughter says that he has not really been interested in food but has still been taking his medications to her knowledge.    No cough or congestion.  No known ill contacts.  No lateralizing weakness.    Review of Systems   Respiratory: Negative for cough.    Cardiovascular: Positive for chest pain.   Gastrointestinal: Negative for vomiting.   Neurological: Negative for headaches.   All other systems reviewed and are negative.    Allergies:  Amlodipine  Lisinopril  Metoprolol     Medications:  alfuzosin ER   aspirin (ASA) 81 MG   atorvastatin   insulin aspart  insulin detemir   isosorbide dinitrate   metoprolol tartrate   pantoprazole   traZODone      Past Medical History:    Atrial fibrillation                         CKD  Diabetes mellitus (Type 2)  Hepatitis C without mention of hepatic coma   Hypertension  PTSD   Major depression  Generalized anxiety disorder.   Insomnia     Past Surgical History:    APPENDECTOMY        EYE SURGERY            LAPAROSCOPIC HEPATECTOMY PARTIAL              Social History:  The patient presents to the ED alone.  PCP: Basilio Alfaro     Physical Exam     Patient  "Vitals for the past 24 hrs:   BP Temp Temp src Pulse Resp SpO2 Height Weight   10/12/22 1711 (!) 176/101 -- -- -- -- -- -- --   10/12/22 1600 (!) 213/98 -- -- 103 22 96 % 1.702 m (5' 7\") --   10/12/22 1527 (!) 212/112 -- -- 92 15 96 % -- --   10/12/22 1518 -- -- -- -- -- -- -- 67 kg (147 lb 11.3 oz)   10/12/22 1513 (!) 217/112 (!) 101.2  F (38.4  C) Oral 96 24 97 % -- --       Physical Exam  Constitutional:       General: He is in acute distress.      Appearance: He is not diaphoretic.   HENT:      Head: Atraumatic.      Mouth/Throat:      Mouth: Oropharynx is clear and moist.   Eyes:      General: No scleral icterus.     Pupils: Pupils are equal, round, and reactive to light.   Cardiovascular:      Rate and Rhythm: Regular rhythm. Tachycardia present.      Pulses: Intact distal pulses.      Heart sounds: Normal heart sounds.   Pulmonary:      Effort: No respiratory distress.      Breath sounds: Normal breath sounds.   Abdominal:      General: Bowel sounds are normal.      Palpations: Abdomen is soft.      Tenderness: There is no abdominal tenderness.   Genitourinary:     Penis: Normal.       Testes: Normal.   Musculoskeletal:         General: No swelling, tenderness or edema.      Cervical back: Normal range of motion. No rigidity.   Skin:     General: Skin is warm.      Capillary Refill: Capillary refill takes less than 2 seconds.      Findings: No rash.   Neurological:      Mental Status: He is alert.      Comments: Disoriented consistent with his dementia   Psychiatric:      Comments: Cooperative       Emergency Department Course   ECG  ECG taken at 1515, ECG read at 1522  Normal sinus rhythm  LVH with repolarization abnormality  Abnormal ECG   No significant changs as compared to prior, dated 8/23/21.  Rate 93 bpm. MO interval 150 ms. QRS duration 66 ms. QT/QTc 344/427 ms. P-R-T axes 61 50 159.     Imaging:  CT Head w/o Contrast   Final Result   IMPRESSION:   1.  No CT evidence for acute intracranial process, " allowing for motion.   2.  Brain atrophy and presumed chronic microvascular ischemic changes as above.      XR Chest Port 1 View   Preliminary Result   IMPRESSION: No acute disease.        Report per radiology    Laboratory:  Labs Ordered and Resulted from Time of ED Arrival to Time of ED Departure   COMPREHENSIVE METABOLIC PANEL - Abnormal       Result Value    Sodium 135 (*)     Potassium 4.5      Chloride 99      Carbon Dioxide (CO2) 26      Anion Gap 10      Urea Nitrogen 48.3 (*)     Creatinine 2.40 (*)     Calcium 9.2      Glucose 173 (*)     Alkaline Phosphatase 99      AST 30      ALT 32      Protein Total 7.0      Albumin 3.5      Bilirubin Total 1.0      GFR Estimate 26 (*)    TROPONIN T, HIGH SENSITIVITY - Abnormal    Troponin T, High Sensitivity 39 (*)    ROUTINE UA WITH MICROSCOPIC REFLEX TO CULTURE - Abnormal    Color Urine Light Yellow      Appearance Urine Clear      Glucose Urine 200 (*)     Bilirubin Urine Negative      Ketones Urine Negative      Specific Gravity Urine 1.018      Blood Urine Moderate (*)     pH Urine 6.0      Protein Albumin Urine 300 (*)     Urobilinogen Urine Normal      Nitrite Urine Positive (*)     Leukocyte Esterase Urine Small (*)     Bacteria Urine Many (*)     Mucus Urine Present (*)     RBC Urine 3 (*)     WBC Urine 48 (*)     Squamous Epithelials Urine <1      Hyaline Casts Urine 17 (*)    AMMONIA - Abnormal    Ammonia 11 (*)    CBC WITH PLATELETS AND DIFFERENTIAL - Abnormal    WBC Count 9.6      RBC Count 4.54      Hemoglobin 14.0      Hematocrit 42.8      MCV 94      MCH 30.8      MCHC 32.7      RDW 11.8      Platelet Count 128 (*)     % Neutrophils 86      % Lymphocytes 7      % Monocytes 6      % Eosinophils 1      % Basophils 0      % Immature Granulocytes 0      NRBCs per 100 WBC 0      Absolute Neutrophils 8.2      Absolute Lymphocytes 0.7 (*)     Absolute Monocytes 0.5      Absolute Eosinophils 0.1      Absolute Basophils 0.0      Absolute Immature Granulocytes  0.0      Absolute NRBCs 0.0     INFLUENZA A/B & SARS-COV2 PCR MULTIPLEX - Normal    Influenza A PCR Negative      Influenza B PCR Negative      RSV PCR Negative      SARS CoV2 PCR Negative     LACTIC ACID WHOLE BLOOD   URINE CULTURE   BLOOD CULTURE   BLOOD CULTURE   URINE CULTURE      Emergency Department Course:    Reviewed:  I reviewed nursing notes, vitals, past medical history and Care Everywhere    Assessments:  1515 I obtained history and examined the patient as noted above.    I rechecked the patient and explained findings.     Consults:  1729 I spoke with Dr. Martini from the hospitalist service regarding the patient's presentation, findings here in the ED, and plan of care.     Interventions:  1536 NS IV  1700 Cefepime 2 g IV  1747 Tylenol 650 mg MA    Disposition:  The patient was admitted to the hospital under the care of Dr. Martini.     Impression & Plan     Medical Decision Making:  This patient is an 82-year-old man who presents to the ED with confusion and generalized weakness.  He is febrile here and appears to have a urinary tract infection.  He is tachycardic.  He actually said be hypertensive although this has improved with rest.  Given his potential for sepsis I will hold off on acutely lowering his blood pressure.  Head CT negative.    Diagnosis:    ICD-10-CM    1. Sepsis without acute organ dysfunction, due to unspecified organism (H)  A41.9       2. Acute cystitis without hematuria  N30.00           Scribe Disclosure:  I, Chilango Rodriguez, am serving as a scribe at 3:21 PM on 10/12/2022 to document services personally performed by Morgan Jim MD based on my observations and the provider's statements to me.          Morgan Jim MD  10/12/22 9938

## 2022-10-12 NOTE — LETTER
[facility]  [address]  [facility] [address]  [phone]    Re: Eh Callahan  [address]  [facility] [address]  [phone] (home)     : 1940      To Whom It May Concern:      Eh Callahan was hospitalized from 10/12/2022 until 10/22/2022 due to medical illness.  Please excuse his loved one who was vital in the care of the patient.      Sincerely,        Lebron Crawford, DO

## 2022-10-12 NOTE — LETTER
[facility]  [address]  [facility] [address]  [phone]    2022    Re: Eh Callahan  [address]  [facility] [address]  [phone] (home)     : 1940      To Whom It May Concern:      Eh Callahan was hospitalized from 10/12/2022 until 10/22/2022 due to medical illness.  He may return to work on 10/29/22 with full duty.        Sincerely,        Lebron Crawford DO

## 2022-10-12 NOTE — PHARMACY-ADMISSION MEDICATION HISTORY
Admission medication history interview status for this patient is complete. See [facility] admission navigator for allergy information, prior to admission medications and immunization status.     Medication history interview done, indicate source(s): Candis (daughter) @ [phone]  Medication history resources (including written lists, pill bottles, clinic record):None      Changes made to PTA medication list:  Added: xalatan opth  Changed: novolog insulin  Reported as Not Taking: none  Removed: uroxatral, protonix    Actions taken by pharmacist (provider contacted, etc):None     Additional medication history information:None    Medication reconciliation/reorder completed by provider prior to medication history?  y   (Y/N)         Prior to Admission medications    Medication Sig Last Dose Taking? Auth Provider Long Term End Date   acetaminophen (TYLENOL) 500 MG tablet Take 1,000 mg by mouth every 8 hours as needed for mild pain  Yes Unknown, Entered By History     aspirin (ASA) 81 MG EC tablet Take 1 tablet (81 mg) by mouth daily 10/12/2022 at am Yes Norman Leahy MD     atorvastatin (LIPITOR) 40 MG tablet Take 1 tablet (40 mg) by mouth every evening 10/11/2022 Yes Norman Leahy MD Yes    Carboxymeth-Glycerin-Polysorb 0.5-1-0.5 % SOLN Place 1 drop into both eyes 2 times daily as needed   Yes Reported, Patient     insulin aspart (NOVOLOG PEN) 100 UNIT/ML pen Inject 4 Units Subcutaneous 3 times daily (with meals) 10/12/2022 at am Yes Stephen Natarajan MD Yes    insulin detemir (LEVEMIR PEN) 100 UNIT/ML pen Inject 30 Units Subcutaneous At Bedtime 10/11/2022 Yes Stephen Natarajan MD Yes    isosorbide dinitrate (ISORDIL) 20 MG tablet Take 20 mg by mouth 2 times daily 10/12/2022 at am Yes Unknown, Entered By History No    latanoprost (XALATAN) 0.005 % ophthalmic solution Place 1 drop into both eyes At Bedtime Past Month Yes Unknown, Entered By History Yes    metoprolol tartrate (LOPRESSOR) 25 MG tablet  Take 0.5 tablets (12.5 mg) by mouth 2 times daily 10/12/2022 at am Yes Norman Leahy MD Yes 10/12/22   Multiple Vitamins-Minerals (MULTIVITAMIN ADULT) TABS Take 1 tablet by mouth daily 10/12/2022 at am Yes Unknown, Entered By History     traZODone (DESYREL) 150 MG tablet Take 75 mg by mouth At Bedtime  10/11/2022 Yes Unknown, Entered By History Yes    Vitamin D3 (CHOLECALCIFEROL) 25 mcg (1000 units) tablet Take 25 mcg by mouth daily 10/12/2022 at am Yes Unknown, Entered By History

## 2022-10-12 NOTE — ED NOTES
Quick cath performed on Pt. For UA. Pt. Tolerated well. Assisted with patient triage (ambulance). Applied monitoring devices (EKG, BP, and pulse ox) onto Patient.

## 2022-10-12 NOTE — H&P
[facility]    History and Physical - Hospitalist Service       Date of Admission:  10/12/2022    Assessment & Plan        Eh Callahan is a 82 year old [place] gentleman with known history of chronic kidney disease, insulin requiring diabetes mellitus, hypertension, atrial fibrillation not on chronic anticoagulation, hepatitis C, depression who lives at home with family and has a presents in the emergency room earlier today due to increasing generalized, decreased oral intake, and alteration in mental state.    Problem list:    #1 alteration in mental state, generalized weakness, found with tachycardia, hypotension, fever spikes  #2 high suspicion for underlying sepsis secondary to urinary tract infection   #3 infectious encephalopathy    -Admit as inpatient.  At risk for clinical deterioration.  -Anticipating this individual will be needing at least 2 inpatient hospitalization days  -IV antibiotics to continue.  Received cefepime in the emergency room  -Chart review showed no prior resistant microorganism per history  -Switch to ceftriaxone 2 g IV daily  -Monitor cultures, adjust antibiotics accordingly.  Follow infectious markers  -As needed antipyretics    #4 severe uncontrolled hypertension  -Home regimen reviewed on metoprolol 12.5 mg twice daily and Isordil  -I increase his metoprolol to 25 mg twice daily and continued his Isordil  -Also noted notes at allergy list regarding prior bradycardia with metoprolol while on 50 mg dosing  -We will continue to monitor  -May need as needed hydralazine if with severe persistent uncontrolled hypertension    #5 detectable troponin  -EKG showing no ischemic findings  -We will continue to trend  -Cardiac telemetry monitoring.  Requested echocardiogram  -Low suspicion for ACS, likely a type II demand ischemia process given severe uncontrolled hypertension and suspected sepsis    #6 insulin requiring diabetes mellitus  -May resume home basal insulin and  prandial short acting insulin.  Continue to monitor to see if how much he can tolerate for oral intake  -Insulin sliding scale also ordered  - will only provide 10 units of his usual 30 units of levemir as his glucose levels at 115 and also not sure about his oral intake    #7 TRANG on top of CKD  -Gentle hydration with isotonic solution  -Monitor metabolic panel  -Anticipating improvement once blood pressure levels, sepsis improves    #8 history of atrial fibrillation  -EKG showing NSR  -On metoprolol  -Not on chronic anticoagulants    #9 history of hepatitis C    #10 physical deconditioning  -We will request PT, OT input and social service for discharge planning disposition           Diet:  Moderate CHO  DVT Prophylaxis: Pneumatic Compression Devices  Liu Catheter: Not present  Central Lines: None  Cardiac Monitoring: None  Code Status:  Full    Clinically Significant Risk Factors Present on Admission                # Thrombocytopenia: Plts = 128 10e3/uL (Ref range: 150 - 450 10e3/uL) on admission, will monitor for bleeding           Disposition Plan    2-3 days     The patient's care was discussed with the Bedside Nurse, Patient and Patient's Family.    Alejandro Martini MD, MD  Hospitalist Service  [facility]  Securely message with the Vocera Web Console (learn more here)  Text page via [facility] Paging/Directory         ______________________________________________________________________    Chief Complaint   Increasing generalized weakness  Alteration in mental status    Unable to obtain a history from the patient due to confusion and being a poor historian with request from emergency room with prior history of?  Dementia  Most of the history was taken from emergency room service report as per patient's family as daughter was present at bedside earlier  Review of electronic medical records      History of Present Illness   Eh Callahan is a 82 year old [place] gentleman with known  history of chronic kidney disease, insulin requiring diabetes mellitus, hypertension, atrial fibrillation not on chronic anticoagulation, hepatitis C, depression who lives at home with family and has a presents in the emergency room earlier today due to increasing generalized, decreased oral intake, and alteration in mental state.  There was no reports of any cough, chills, vomiting spells, abdominal pain, productive sputum, diarrhea, bleeding tendencies such as melanotic stools, blood per rectum or coffee-ground emesis.  No reported gross hematuria but patient has urinary incontinence requiring adult diaper.  Upon initial presentation he demonstrated severe uncontrolled hypertension, febrile with T-max of  101.2F, tachycardic but fortunately not hypoxic.  Initial diagnostic lab works revealed no significant leukocytosis, has a pending lactic acid levels, mildly elevated creatinine compared to baseline, with reassuring bicarb and electrolytes.  Detectable troponin of 39, mild thrombocytopenia 128 with chest x-ray portable study showed no acute disease, CT of the head showed no acute intracranial abnormality.  However urine analysis demonstrated significant pyuria, bacteriuria and positive nitrites with expected glucosuria.    He was diagnosed with underlying sepsis with high suspicion secondary to his UTI was treated with IV antibiotics and his case was referred to us for further evaluation and care hence this hospitalization.     Review of Systems    Cannot be obtained accurately as patient is a poor historian.  Other review of systems as per earlier accounts as seen in HPI    Past Medical History    I have reviewed this patient's medical history and updated it with pertinent information if needed.   Past Medical History:   Diagnosis Date     Anatomical narrow angle glaucoma      Atrial fibrillation (H)      Chronic infection     history of hepatitis C     CKD (chronic kidney disease) stage 2, GFR 60-89 ml/min       Diabetes mellitus (H)      Hepatitis C without mention of hepatic coma      Hypertension      PTSD (post-traumatic stress disorder)        Past Surgical History   I have reviewed this patient's surgical history and updated it with pertinent information if needed.  Past Surgical History:   Procedure Laterality Date     APPENDECTOMY       EYE SURGERY       LAPAROSCOPIC HEPATECTOMY PARTIAL Left 11/22/2016    Procedure: LAPAROSCOPIC HEPATECTOMY PARTIAL;  Surgeon: Rick Mckeon MD;  Location:  OR       Social History   I have reviewed this patient's social history and updated it with pertinent information if needed.  Social History     Tobacco Use     Smoking status: Former     Smokeless tobacco: Never   Substance Use Topics     Alcohol use: No     Alcohol/week: 0.0 standard drinks     Drug use: No       Family History     No significant family history    Prior to Admission Medications   Prior to Admission Medications   Prescriptions Last Dose Informant Patient Reported? Taking?   Carboxymeth-Glycerin-Polysorb (REFRESH OPTIVE ADVANCED) 0.5-1-0.5 % SOLN   Yes No   Sig: Place 1 drop into both eyes 2 times daily as needed    Multiple Vitamins-Minerals (MULTIVITAMIN ADULT) TABS   Yes No   Sig: Take 1 tablet by mouth daily   Vitamin D3 (CHOLECALCIFEROL) 25 mcg (1000 units) tablet   Yes No   Sig: Take 25 mcg by mouth daily   acetaminophen (TYLENOL) 500 MG tablet   Yes No   Sig: Take 1,000 mg by mouth every 8 hours as needed for mild pain   alfuzosin ER (UROXATRAL) 10 MG 24 hr tablet   No No   Sig: Take 1 tablet (10 mg) by mouth daily WILL NEED APPT FOR FUTURE REFILLS   aspirin (ASA) 81 MG EC tablet   No No   Sig: Take 1 tablet (81 mg) by mouth daily   atorvastatin (LIPITOR) 40 MG tablet   No No   Sig: Take 1 tablet (40 mg) by mouth every evening   insulin aspart (NOVOLOG PEN) 100 UNIT/ML pen   Yes No   Sig: Inject 5 Units Subcutaneous 3 times daily (with meals)   insulin detemir (LEVEMIR PEN) 100 UNIT/ML pen   Yes No    Sig: Inject 30 Units Subcutaneous every morning   isosorbide dinitrate (ISORDIL) 20 MG tablet   Yes No   Sig: Take 20 mg by mouth 2 times daily   metoprolol tartrate (LOPRESSOR) 25 MG tablet   No No   Sig: Take 0.5 tablets (12.5 mg) by mouth 2 times daily   pantoprazole (PROTONIX) 40 MG enteric coated tablet   Yes No   Sig: Take 40 mg by mouth daily as needed    traZODone (DESYREL) 150 MG tablet   Yes No   Sig: Take 75 mg by mouth At Bedtime       Facility-Administered Medications: None     Allergies   Allergies   Allergen Reactions     Amlodipine Swelling     Edema at 10 mg , fine at 5 mg     Lisinopril Cough     Metoprolol      Other reaction(s): Bradycardia  Metoprolol at 50mg bid -> HR 45 -50, unclear if sx (has fatigue)  May tolerate lower doses if needed       Physical Exam   Vital Signs: Temp: (!) 101.2  F (38.4  C) Temp src: Oral BP: (!) 176/101 Pulse: 103   Resp: 22 SpO2: 96 % O2 Device: None (Room air)    Weight: 147 lbs 11.33 oz    HEENT; Atraumatic, normocephalic, pinkish conjuctiva, pupils bilateral reactive   Skin: warm and moist, no rashes  Lungs: equal chest expansion, clear to auscultation, no wheezes, no stridor, no crackles,   Heart: normal rate, normal rhythm, no rubs or gallops.   Abdomen: normal bowel sounds, no tenderness, no peritoneal signs, no guarding  Extremities: no deformities, no edema   Neuro; alert and oriented x1, moves all extremities spontaneously  Psych; not following instructions        Data   Data reviewed today: I reviewed all medications, new labs and imaging results over the last 24 hours. I personally reviewed the EKG tracing showing Normal sinus rhythm at 92 bpm, LVH with early repolarization.    Recent Labs   Lab 10/12/22  1520   WBC 9.6   HGB 14.0   MCV 94   *   *   POTASSIUM 4.5   CHLORIDE 99   CO2 26   BUN 48.3*   CR 2.40*   ANIONGAP 10   DOMINGO 9.2   *   ALBUMIN 3.5   PROTTOTAL 7.0   BILITOTAL 1.0   ALKPHOS 99   ALT 32   AST 30     Most Recent 3  CBC's:Recent Labs   Lab Test 10/12/22  1520 08/23/21  1058 08/04/21  2149   WBC 9.6 5.9 5.7   HGB 14.0 13.8 13.0*   MCV 94 95 92   * 122* 116*     Most Recent 3 BMP's:Recent Labs   Lab Test 10/12/22  1520 08/23/21  1416 08/23/21  1059 08/23/21  1058 08/05/21  0704 08/05/21  0629   *  --   --  141  --  135   POTASSIUM 4.5  --   --  4.5  --  4.6   CHLORIDE 99  --   --  109  --  105   CO2 26  --   --  27  --  25   BUN 48.3*  --   --  61*  --  54*   CR 2.40*  --  2.0* 1.93*  --  1.77*   ANIONGAP 10  --   --  5  --  5   DOMINGO 9.2  --   --  8.9  --  8.0*   * 233*  --  228*   < > 434*    < > = values in this interval not displayed.     Most Recent 3 Hemoglobins:Recent Labs   Lab Test 10/12/22  1520 08/23/21  1058 08/04/21  2149   HGB 14.0 13.8 13.0*     Most Recent 3 Troponin's:Recent Labs   Lab Test 08/23/21  1058 08/04/21  2149 04/29/21  1117 04/29/21  1100 08/02/20  0629 08/01/20  1025   TROPI  --   --   --  <0.015 0.639* 1.545*   TROPONIN <0.015 <0.015 0.01  --   --   --      Most Recent TSH and T4:Recent Labs   Lab Test 08/05/21  0629   TSH 0.75     Most Recent 6 glucoses:Recent Labs   Lab Test 10/12/22  1520 08/23/21  1416 08/23/21  1058 08/05/21  1155 08/05/21  0704 08/05/21  0629   * 233* 228* 173* 419* 434*     Most Recent Urinalysis:Recent Labs   Lab Test 10/12/22  1530   COLOR Light Yellow   APPEARANCE Clear   URINEGLC 200*   URINEBILI Negative   URINEKETONE Negative   SG 1.018   UBLD Moderate*   URINEPH 6.0   PROTEIN 300*   NITRITE Positive*   LEUKEST Small*   RBCU 3*   WBCU 48*     Most Recent ABG:Recent Labs   Lab Test 08/23/21  1102 11/22/16  0933   PH 7.38 7.41   PO2  --  283*   PCO2  --  39   HCO3  --  24     Most Recent ESR & CRP:Recent Labs   Lab Test 09/19/18  1829   SED 14   CRP <2.9     Recent Results (from the past 24 hour(s))   XR Chest Port 1 View    Narrative    CHEST ONE VIEW  10/12/2022 4:01 PM     HISTORY: Fever.    COMPARISON: August 1, 2020      Impression     IMPRESSION: No acute disease.

## 2022-10-12 NOTE — ED NOTES
"[facility]  ED Nurse Handoff Report    Eh Callahan is a 82 year old male   ED Chief complaint: Altered Mental Status  . ED Diagnosis:   Final diagnoses:   Sepsis without acute organ dysfunction, due to unspecified organism (H)   Acute cystitis without hematuria     Allergies:   Allergies   Allergen Reactions     Amlodipine Swelling     Edema at 10 mg , fine at 5 mg     Lisinopril Cough     Metoprolol      Other reaction(s): Bradycardia  Metoprolol at 50mg bid -> HR 45 -50, unclear if sx (has fatigue)  May tolerate lower doses if needed       Code Status: Full Code  Activity level - Baseline/Home:  Total Care. Activity Level - Current:   Total Care. Lift room needed: Yes. Bariatric: No   Needed: Yes   Isolation: No. Infection: Hepatitis C.     Vital Signs:   Vitals:    10/12/22 1600 10/12/22 1700 10/12/22 1711 10/12/22 1800   BP: (!) 213/98  (!) 176/101 (!) 182/96   Pulse: 103   105   Resp: 22 15     Temp:       TempSrc:       SpO2: 96% 100%     Weight:       Height: 1.702 m (5' 7\")          Cardiac Rhythm:  ,      Pain level:    Patient confused: Yes. Patient Falls Risk: Yes.   Elimination Status: Has voided   Patient Report - Initial Complaint: altered mental status. Focused Assessment:    Neuro Cognitive  Neuro Cognitive  Cognitive Follows Commands: inconsistent   Neuro Cognitive (Adult) Cognitive/Neuro/Behavioral WDL: .WDL except; orientation; mood/behavior  (Pt is [place] speaking.  used but pt still not responding appropriately to questions. Per family pt has h/o of dementia.)  Level of Consciousness: confused  Arousal Level: opens eyes spontaneously  Orientation: disoriented to; place; time; situation  (Pt is able to tell nurse name but not answer other questions appropriately.)  Mood/Behavior: restless  (pt grabbing at BP cuff)   Vasquez Coma Scale Best Eye Response: 4-->(E4) spontaneous  Best Motor Response: 5-->(M5) localizes pain  Best Verbal Response: 4-->(V4) confused "  Vasquez Coma Scale Score: 13   Other flowsheet entries Best Language: 0 - No aphasia  Facial Symmetry: Symmetrical  Swallow/Gag: Normal  CW         1540  Cardiac  Cardiac  Cardiac (Adult) Cardiac WDL: WDL  Additional Documentation: ECG (Group)  (normal sinus)        Tests Performed: lab work, head CT, chest xray. Abnormal Results:   Labs Ordered and Resulted from Time of ED Arrival to Time of ED Departure   COMPREHENSIVE METABOLIC PANEL - Abnormal       Result Value    Sodium 135 (*)     Potassium 4.5      Chloride 99      Carbon Dioxide (CO2) 26      Anion Gap 10      Urea Nitrogen 48.3 (*)     Creatinine 2.40 (*)     Calcium 9.2      Glucose 173 (*)     Alkaline Phosphatase 99      AST 30      ALT 32      Protein Total 7.0      Albumin 3.5      Bilirubin Total 1.0      GFR Estimate 26 (*)    TROPONIN T, HIGH SENSITIVITY - Abnormal    Troponin T, High Sensitivity 39 (*)    ROUTINE UA WITH MICROSCOPIC REFLEX TO CULTURE - Abnormal    Color Urine Light Yellow      Appearance Urine Clear      Glucose Urine 200 (*)     Bilirubin Urine Negative      Ketones Urine Negative      Specific Gravity Urine 1.018      Blood Urine Moderate (*)     pH Urine 6.0      Protein Albumin Urine 300 (*)     Urobilinogen Urine Normal      Nitrite Urine Positive (*)     Leukocyte Esterase Urine Small (*)     Bacteria Urine Many (*)     Mucus Urine Present (*)     RBC Urine 3 (*)     WBC Urine 48 (*)     Squamous Epithelials Urine <1      Hyaline Casts Urine 17 (*)    AMMONIA - Abnormal    Ammonia 11 (*)    CBC WITH PLATELETS AND DIFFERENTIAL - Abnormal    WBC Count 9.6      RBC Count 4.54      Hemoglobin 14.0      Hematocrit 42.8      MCV 94      MCH 30.8      MCHC 32.7      RDW 11.8      Platelet Count 128 (*)     % Neutrophils 86      % Lymphocytes 7      % Monocytes 6      % Eosinophils 1      % Basophils 0      % Immature Granulocytes 0      NRBCs per 100 WBC 0      Absolute Neutrophils 8.2      Absolute Lymphocytes 0.7 (*)      Absolute Monocytes 0.5      Absolute Eosinophils 0.1      Absolute Basophils 0.0      Absolute Immature Granulocytes 0.0      Absolute NRBCs 0.0     INFLUENZA A/B & SARS-COV2 PCR MULTIPLEX - Normal    Influenza A PCR Negative      Influenza B PCR Negative      RSV PCR Negative      SARS CoV2 PCR Negative     LACTIC ACID WHOLE BLOOD   URINE CULTURE   BLOOD CULTURE   BLOOD CULTURE   URINE CULTURE      CT Head w/o Contrast   Final Result   IMPRESSION:   1.  No CT evidence for acute intracranial process, allowing for motion.   2.  Brain atrophy and presumed chronic microvascular ischemic changes as above.      XR Chest Port 1 View   Preliminary Result   IMPRESSION: No acute disease.       .   Treatments provided: NS bolus 100ml/hr, tylenol 650 mg suppository and maxipme  Family Comments: Daughter at bedside and aware of plan of care  OBS brochure/video discussed/provided to patient:  N/A  ED Medications:   Medications   sodium chloride 0.9% infusion ( Intravenous New Bag 10/12/22 1536)   ceFEPIme (MAXIPIME) 2 g vial to attach to  ml bag for ADULTS or 50 ml bag for PEDS (0 g Intravenous Stopped 10/12/22 1742)   acetaminophen (TYLENOL) Suppository 650 mg (650 mg Rectal Given 10/12/22 1747)     Drips infusing:  Yes  For the majority of the shift, the patient's behavior Green. Interventions performed were n/a.    Sepsis treatment initiated: No     Patient tested for COVID 19 prior to admission: YES    ED Nurse Name/Phone Number: Becky Lopez RN,   6:16 PM   RECEIVING UNIT ED HANDOFF REVIEW    Above ED Nurse Handoff Report was reviewed: Yes  Reviewed by: María Newton RN on October 12, 2022 at 7:16 PM

## 2022-10-12 NOTE — ED TRIAGE NOTES
Pt arrives to the ED via EMS from home. Per EMS pt was last acting himself per family before noon. Around noon, when family tried to give him lunch he was not interested in eating and was not acting himself. EMS called.  for EMS and hypertensive with BP over 200 systolic. H/o of hypertension. Pt c/o of chest pain. Pt able to tell nurse his name.

## 2022-10-13 NOTE — PLAN OF CARE
OT: Orders received. Chart reviewed and discussed with care team.  OT not indicated, per PT - Pt lives in [facility] with 4 family members. 3 Flights of stairs to get up to unit. No elevator or WC accessibility. Family provides total assist for getting pt up the stairs, ADLs such as feeding, dressing and bathing. Family reports pt had been able to use FWW and to walk short distances with family support for balance; Otherwises uses WC.  Defer mobility to PT and discharge recommendations to care team.  Will complete orders.

## 2022-10-13 NOTE — CONSULTS
Cardiology Consultation      Eh Callahan MRN# [id number]   YOB: 1940 Age: 82 year old   Date of Admission: 10/12/2022     Reason for consult: Elevated troponin             Assessment and Plan:     1. Elevated troponin    ECG shows SR with LVH and repolarization changes    Trops minimally elevated 39, 42, 50. This is likely more consistent with demand ischemia in setting of sepsis, significant hypertension and CKD     No clear chest pain    Work on better BP control and Given his TRANG on CKD with creat 2.4 on admission, his dementia, recommend medical management    Continue ASA, amlodipine (new), metoprolol, Imdur, statin            - Last LDL in 2020 was 90         2. History of afib    In SR here    Not on AC, agree with this given his frailty      3. HTN    PTA meds isordil 20 mg BID, metoprolol 12.5 mg BID    Currently on isordil 20 mg BID, metoprolol 25 mg BID. Adding amlodipine 5 mg today.  Could add hydralazine if needed    Follow      4. Sepsis and UTI    Per internal medicine                    Chief Complaint:   Altered Mental Status           History of Present Illness:   Patient with significant dementia and very limited communication. History obtained from chart review and from family who are at the bedside.     This patient is a 82 year old [place] male with history of diabetes mellitus, CKD stage III, hypertension, atrial fibrillation (not on anticoagulation), hepatitis C, depression and dementia who lives at home with family presents 10/12/22 with increasing generalized weakness, decreased oral intake and alteration in mental status.     He was febrile with temp 101.2  F on presentation. BP was significantly elevated 217/112.     Creat was 2.4 (baseline creatinine less than 2), WBC count of 12.2.  UA was positive.   He was diagnosed with sepsis secondary to UTI. He is on abx.     ECG on admission showed SR, LVH with repolarization changes.   Trop minimally elevated at 39.     Echo  "10/13/22 showed LVEF 55% without RWMAs.   He has been on tele and the ST depression and TWI noted.  The hospitalist notes indicated that when asked about chest pain, patient answered \"yes\" to chest pain, though on leading questioning he answers yes to pain everywhere in the body.    Repeat ECG done and unchanged from admission.   Trops are trending up 42 and 50, though still just mildly elevated.     His family tells me that occasionally he says he has faster heart beat at home.  He has never noted chest pain.            Physical Exam:   Vitals were reviewed  Blood pressure (!) 154/72, pulse 94, temperature 99.7  F (37.6  C), temperature source Temporal, resp. rate 16, height 1.702 m (5' 7\"), weight 67 kg (147 lb 11.3 oz), SpO2 95 %.  Temperatures:  Current - Temp: 99.7  F (37.6  C); Max - Temp  Av.8  F (37.7  C)  Min: 98.8  F (37.1  C)  Max: 101.2  F (38.4  C)  Respiration range: Resp  Av.6  Min: 15  Max: 24  Pulse range: Pulse  Av.8  Min: 88  Max: 105  Blood pressure range: Systolic (24hrs), Av , Min:154 , Max:217   ; Diastolic (24hrs), Av, Min:66, Max:112    Pulse oximetry range: SpO2  Av.7 %  Min: 92 %  Max: 100 %    Intake/Output Summary (Last 24 hours) at 10/13/2022 1059  Last data filed at 10/13/2022 0700  Gross per 24 hour   Intake 1166 ml   Output --   Net 1166 ml     Constitutional:   awake, frail appearing, minimal communication     Eyes:   No icterus     Neck:   supple, symmetrical, trachea midline, no JVD appreciated            Pulmonary/Lungs:   Clear anterior and laterally, unable to sit forward to listen to posterior lung fields     Cardiovascular/heart:   Regular, distant S1s2, no pitting edema     GI/Abdomen:   Soft, non-tender, BS+     Musculoskeletal/back:   Frail      Neurologic:   MAEE, significant dementia limits communication     Skin:   No rashes noted     Additional findings:                    Past Medical History:   I have reviewed this patient's past medical " history  Past Medical History:   Diagnosis Date     Anatomical narrow angle glaucoma      Atrial fibrillation (H)      Chronic infection     history of hepatitis C     CKD (chronic kidney disease) stage 2, GFR 60-89 ml/min      Diabetes mellitus (H)      Hepatitis C without mention of hepatic coma      Hypertension      PTSD (post-traumatic stress disorder)              Past Surgical History:   I have reviewed this patient's past surgical history  Past Surgical History:   Procedure Laterality Date     APPENDECTOMY       EYE SURGERY       LAPAROSCOPIC HEPATECTOMY PARTIAL Left 11/22/2016    Procedure: LAPAROSCOPIC HEPATECTOMY PARTIAL;  Surgeon: Rick Mckeon MD;  Location:  OR               Social History:   I have reviewed this patient's social history  Social History     Tobacco Use     Smoking status: Former     Smokeless tobacco: Never   Substance Use Topics     Alcohol use: No     Alcohol/week: 0.0 standard drinks             Family History:   I have reviewed this patient's family history  Family History   Problem Relation Age of Onset     Diabetes No family hx of         He is not aware of any diabetes in his family     Kidney Disease No family hx of              Allergies:     Allergies   Allergen Reactions     Amlodipine Swelling     Edema at 10 mg , fine at 5 mg     Lisinopril Cough     Metoprolol      Other reaction(s): Bradycardia  Metoprolol at 50mg bid -> HR 45 -50, unclear if sx (has fatigue)  May tolerate lower doses if needed             Medications:   I have reviewed this patient's current medications  Medications Prior to Admission   Medication Sig Dispense Refill Last Dose     acetaminophen (TYLENOL) 500 MG tablet Take 1,000 mg by mouth every 8 hours as needed for mild pain        aspirin (ASA) 81 MG EC tablet Take 1 tablet (81 mg) by mouth daily 100 tablet 0 10/12/2022 at am     atorvastatin (LIPITOR) 40 MG tablet Take 1 tablet (40 mg) by mouth every evening 30 tablet 1 10/11/2022      Carboxymeth-Glycerin-Polysorb 0.5-1-0.5 % SOLN Place 1 drop into both eyes 2 times daily as needed         insulin aspart (NOVOLOG PEN) 100 UNIT/ML pen Inject 4 Units Subcutaneous 3 times daily (with meals)   10/12/2022 at am     insulin detemir (LEVEMIR PEN) 100 UNIT/ML pen Inject 30 Units Subcutaneous At Bedtime   10/11/2022     isosorbide dinitrate (ISORDIL) 20 MG tablet Take 20 mg by mouth 2 times daily   10/12/2022 at am     latanoprost (XALATAN) 0.005 % ophthalmic solution Place 1 drop into both eyes At Bedtime   Past Month     metoprolol tartrate (LOPRESSOR) 25 MG tablet Take 0.5 tablets (12.5 mg) by mouth 2 times daily 30 tablet 1 10/12/2022 at am     Multiple Vitamins-Minerals (MULTIVITAMIN ADULT) TABS Take 1 tablet by mouth daily   10/12/2022 at am     traZODone (DESYREL) 150 MG tablet Take 75 mg by mouth At Bedtime    10/11/2022     Vitamin D3 (CHOLECALCIFEROL) 25 mcg (1000 units) tablet Take 25 mcg by mouth daily   10/12/2022 at am     Current Facility-Administered Medications Ordered in Epic   Medication Dose Route Frequency Last Rate Last Admin     acetaminophen (TYLENOL) tablet 1,000 mg  1,000 mg Oral Q8H PRN         amLODIPine (NORVASC) tablet 5 mg  5 mg Oral Daily         aspirin (ASA) tablet 325 mg  325 mg Oral Daily         atorvastatin (LIPITOR) tablet 40 mg  40 mg Oral QPM   40 mg at 10/12/22 2014     cefTRIAXone (ROCEPHIN) 2 g in sodium chloride 0.9 % 100 mL intermittent infusion  2 g Intravenous Q24H         dextrose 5% in lactated ringers infusion   Intravenous Continuous 75 mL/hr at 10/13/22 0948 New Bag at 10/13/22 0948     glucose gel 15-30 g  15-30 g Oral Q15 Min PRN   30 g at 10/13/22 0859    Or     dextrose 50 % injection 25-50 mL  25-50 mL Intravenous Q15 Min PRN        Or     glucagon injection 1 mg  1 mg Subcutaneous Q15 Min PRN         docusate sodium (COLACE) capsule 100 mg  100 mg Oral BID         guaiFENesin (ROBITUSSIN) 20 mg/mL solution 10 mL  10 mL Oral Q4H PRN         insulin  aspart (NovoLOG) injection (RAPID ACTING)  1-3 Units Subcutaneous TID AC         insulin aspart (NovoLOG) injection (RAPID ACTING)  1-3 Units Subcutaneous At Bedtime         [Held by provider] insulin detemir (LEVEMIR PEN) injection 10 Units  10 Units Subcutaneous At Bedtime   10 Units at 10/12/22 2144     isosorbide dinitrate (ISORDIL) tablet 20 mg  20 mg Oral BID   20 mg at 10/13/22 0911     labetalol (NORMODYNE/TRANDATE) injection 20 mg  20 mg Intravenous Q4H PRN         latanoprost (XALATAN) 0.005 % ophthalmic solution 1 drop  1 drop Both Eyes At Bedtime   1 drop at 10/12/22 2121     lidocaine (LMX4) cream   Topical Q1H PRN         lidocaine 1 % 0.1-1 mL  0.1-1 mL Other Q1H PRN         melatonin tablet 1 mg  1 mg Oral At Bedtime PRN         metoprolol tartrate (LOPRESSOR) tablet 25 mg  25 mg Oral BID   25 mg at 10/13/22 0911     multivitamin, therapeutic (THERA-VIT) tablet 1 tablet  1 tablet Oral Daily   1 tablet at 10/13/22 0911     nitroGLYcerin (NITROSTAT) 0.4 MG sublingual tablet             nitroGLYcerin (NITROSTAT) sublingual tablet 0.4 mg  0.4 mg Sublingual Q5 Min PRN   0.4 mg at 10/13/22 1018     OLANZapine zydis (zyPREXA) ODT tab 5 mg  5 mg Oral Q6H PRN         ondansetron (ZOFRAN ODT) ODT tab 4 mg  4 mg Oral Q6H PRN        Or     ondansetron (ZOFRAN) injection 4 mg  4 mg Intravenous Q6H PRN         senna-docusate (SENOKOT-S/PERICOLACE) 8.6-50 MG per tablet 1 tablet  1 tablet Oral BID PRN        Or     senna-docusate (SENOKOT-S/PERICOLACE) 8.6-50 MG per tablet 2 tablet  2 tablet Oral BID PRN         sodium chloride (PF) 0.9% PF flush 3 mL  3 mL Intracatheter Q8H         sodium chloride (PF) 0.9% PF flush 3 mL  3 mL Intracatheter q1 min prn         traZODone (DESYREL) half-tab 75 mg  75 mg Oral At Bedtime   75 mg at 10/12/22 2015     Vitamin D3 (CHOLECALCIFEROL) tablet 25 mcg  25 mcg Oral Daily   25 mcg at 10/13/22 0911     No current [facility]-ordered outpatient medications on file.             Review of  Systems:     The 10 point Review of Systems is negative other than noted in the HPI            Data:   All laboratory data reviewed  Results for orders placed or performed during the hospital encounter of 10/12/22 (from the past 24 hour(s))   EKG 12-lead, tracing only   Result Value Ref Range    Systolic Blood Pressure  mmHg    Diastolic Blood Pressure  mmHg    Ventricular Rate 93 BPM    Atrial Rate 93 BPM    ID Interval 150 ms    QRS Duration 66 ms     ms    QTc 427 ms    P Axis 61 degrees    R AXIS 50 degrees    T Axis 159 degrees    Interpretation ECG       Sinus rhythm  Left ventricular hypertrophy with repolarization abnormality  Abnormal ECG  When compared with ECG of 23-AUG-2021 10:46,  No significant change was found  Confirmed by - EMERGENCY ROOM, PHYSICIAN ([id number]),  KATY DUNAWAY ([id number]) on 10/13/2022 6:44:35 AM     iStat Gases (lactate) venous, POCT   Result Value Ref Range    Lactic Acid POCT 1.7 <=2.0 mmol/L    Bicarbonate Venous POCT 27 21 - 28 mmol/L    pCO2V Venous POCT 54 (H) 40 - 50 mm Hg    pH Venous POCT 7.31 (L) 7.32 - 7.43    pO2 Venous POCT <15 (L) 25 - 47 mm Hg   CBC with platelets differential    Narrative    The following orders were created for panel order CBC with platelets differential.  Procedure                               Abnormality         Status                     ---------                               -----------         ------                     CBC with platelets and d...[[id number]]  Abnormal            Final result                 Please view results for these tests on the individual orders.   Comprehensive metabolic panel   Result Value Ref Range    Sodium 135 (L) 136 - 145 mmol/L    Potassium 4.5 3.4 - 5.3 mmol/L    Chloride 99 98 - 107 mmol/L    Carbon Dioxide (CO2) 26 22 - 29 mmol/L    Anion Gap 10 7 - 15 mmol/L    Urea Nitrogen 48.3 (H) 8.0 - 23.0 mg/dL    Creatinine 2.40 (H) 0.67 - 1.17 mg/dL    Calcium 9.2 8.8 - 10.2 mg/dL    Glucose 173 (H) 70 - 99  mg/dL    Alkaline Phosphatase 99 40 - 129 U/L    AST 30 10 - 50 U/L    ALT 32 10 - 50 U/L    Protein Total 7.0 6.4 - 8.3 g/dL    Albumin 3.5 3.5 - 5.2 g/dL    Bilirubin Total 1.0 <=1.2 mg/dL    GFR Estimate 26 (L) >60 mL/min/1.73m2   Troponin T, High Sensitivity   Result Value Ref Range    Troponin T, High Sensitivity 39 (H) <=22 ng/L   Blood Culture Peripheral Blood    Specimen: Peripheral Blood   Result Value Ref Range    Culture No growth after 12 hours    Symptomatic; Unknown Influenza A/B & SARS-CoV2 (COVID-19) Virus PCR Multiplex Nasopharyngeal    Specimen: Nasopharyngeal; Swab   Result Value Ref Range    Influenza A PCR Negative Negative    Influenza B PCR Negative Negative    RSV PCR Negative Negative    SARS CoV2 PCR Negative Negative    Narrative    Testing was performed using the Xpert Xpress CoV2/Flu/RSV Assay on the [employer] Gene[employer]pert Instrument. This test should be ordered for the detection of SARS-CoV-2 and influenza viruses in individuals who meet clinical and/or epidemiological criteria. Test performance is unknown in asymptomatic patients. This test is for in vitro diagnostic use under the FDA EUA for laboratories certified under CLIA to perform high or moderate complexity testing. This test has not been FDA cleared or approved. A negative result does not rule out the presence of PCR inhibitors in the specimen or target RNA in concentration below the limit of detection for the assay. If only one viral target is positive but coinfection with multiple targets is suspected, the sample should be re-tested with another FDA cleared, approved, or authorized test, if coinfection would change clinical management. This test was validated by the [facility] [email or web address]. These laboratories are certified under the Clinical Laboratory Improvement Amendments of 1988 (CLIA-88) as qualified to perform high complexity laboratory testing.   Ammonia   Result Value Ref Range    Ammonia 11 (L) 16 - 60 umol/L   CBC  with platelets and differential   Result Value Ref Range    WBC Count 9.6 4.0 - 11.0 10e3/uL    RBC Count 4.54 4.40 - 5.90 10e6/uL    Hemoglobin 14.0 13.3 - 17.7 g/dL    Hematocrit 42.8 40.0 - 53.0 %    MCV 94 78 - 100 fL    MCH 30.8 26.5 - 33.0 pg    MCHC 32.7 31.5 - 36.5 g/dL    RDW 11.8 10.0 - 15.0 %    Platelet Count 128 (L) 150 - 450 10e3/uL    % Neutrophils 86 %    % Lymphocytes 7 %    % Monocytes 6 %    % Eosinophils 1 %    % Basophils 0 %    % Immature Granulocytes 0 %    NRBCs per 100 WBC 0 <1 /100    Absolute Neutrophils 8.2 1.6 - 8.3 10e3/uL    Absolute Lymphocytes 0.7 (L) 0.8 - 5.3 10e3/uL    Absolute Monocytes 0.5 0.0 - 1.3 10e3/uL    Absolute Eosinophils 0.1 0.0 - 0.7 10e3/uL    Absolute Basophils 0.0 0.0 - 0.2 10e3/uL    Absolute Immature Granulocytes 0.0 <=0.4 10e3/uL    Absolute NRBCs 0.0 10e3/uL   Hemoglobin A1c   Result Value Ref Range    Hemoglobin A1C 9.0 (H) <5.7 %   UA with Microscopic reflex to Culture    Specimen: Urine, Catheter   Result Value Ref Range    Color Urine Light Yellow Colorless, Straw, Light Yellow, Yellow    Appearance Urine Clear Clear    Glucose Urine 200 (A) Negative mg/dL    Bilirubin Urine Negative Negative    Ketones Urine Negative Negative mg/dL    Specific Gravity Urine 1.018 1.003 - 1.035    Blood Urine Moderate (A) Negative    pH Urine 6.0 5.0 - 7.0    Protein Albumin Urine 300 (A) Negative mg/dL    Urobilinogen Urine Normal Normal, 2.0 mg/dL    Nitrite Urine Positive (A) Negative    Leukocyte Esterase Urine Small (A) Negative    Bacteria Urine Many (A) None Seen /HPF    Mucus Urine Present (A) None Seen /LPF    RBC Urine 3 (H) <=2 /HPF    WBC Urine 48 (H) <=5 /HPF    Squamous Epithelials Urine <1 <=1 /HPF    Hyaline Casts Urine 17 (H) <=2 /LPF    Narrative    Urine Culture ordered based on laboratory criteria   Blood Culture Arm, Left    Specimen: Arm, Left; Blood   Result Value Ref Range    Culture No growth after 12 hours     Narrative    Only an Aerobic Blood  Culture Bottle was collected, interpret results with caution.       XR Chest Port 1 View    Narrative    CHEST ONE VIEW  10/12/2022 4:01 PM     HISTORY: Fever.    COMPARISON: August 1, 2020      Impression    IMPRESSION: No acute disease.    JAZ LÓPEZ MD         SYSTEM ID:  [id number]   CT Head w/o Contrast    Narrative    EXAM: CT HEAD W/O CONTRAST  LOCATION: [facility]  DATE/TIME: 10/12/2022 5:38 PM    INDICATION: Altered mental status  COMPARISON:  CT 08/23/2021.  TECHNIQUE: Routine CT Head without IV contrast. Multiplanar reformats. Dose reduction techniques were used.    FINDINGS:  Mild motion degradation.  INTRACRANIAL CONTENTS: No intracranial hemorrhage, extraaxial collection, or mass effect.  No CT evidence of acute infarct. Mild to moderate presumed chronic small vessel ischemic changes. Moderate generalized volume loss. No hydrocephalus.     VISUALIZED ORBITS/SINUSES/MASTOIDS: Prior bilateral cataract surgery. Visualized portions of the orbits are otherwise unremarkable. No paranasal sinus mucosal disease. No middle ear or mastoid effusion.    BONES/SOFT TISSUES: No acute abnormality.      Impression    IMPRESSION:  1.  No CT evidence for acute intracranial process, allowing for motion.  2.  Brain atrophy and presumed chronic microvascular ischemic changes as above.   Lactic acid whole blood   Result Value Ref Range    Lactic Acid 0.8 0.7 - 2.0 mmol/L   Glucose by meter   Result Value Ref Range    GLUCOSE BY METER POCT 118 (H) 70 - 99 mg/dL   Troponin T, High Sensitivity   Result Value Ref Range    Troponin T, High Sensitivity 42 (H) <=22 ng/L   Glucose by meter   Result Value Ref Range    GLUCOSE BY METER POCT 109 (H) 70 - 99 mg/dL   CBC with platelets differential    Narrative    The following orders were created for panel order CBC with platelets differential.  Procedure                               Abnormality         Status                     ---------                                -----------         ------                     CBC with platelets and d...[[id number]]  Abnormal            Final result                 Please view results for these tests on the individual orders.   Comprehensive metabolic panel   Result Value Ref Range    Sodium 137 136 - 145 mmol/L    Potassium 4.5 3.4 - 5.3 mmol/L    Chloride 105 98 - 107 mmol/L    Carbon Dioxide (CO2) 21 (L) 22 - 29 mmol/L    Anion Gap 11 7 - 15 mmol/L    Urea Nitrogen 42.7 (H) 8.0 - 23.0 mg/dL    Creatinine 2.24 (H) 0.67 - 1.17 mg/dL    Calcium 8.7 (L) 8.8 - 10.2 mg/dL    Glucose 62 (L) 70 - 99 mg/dL    Alkaline Phosphatase 78 40 - 129 U/L    AST 28 10 - 50 U/L    ALT 27 10 - 50 U/L    Protein Total 6.3 (L) 6.4 - 8.3 g/dL    Albumin 2.8 (L) 3.5 - 5.2 g/dL    Bilirubin Total 1.5 (H) <=1.2 mg/dL    GFR Estimate 29 (L) >60 mL/min/1.73m2   Troponin T, High Sensitivity   Result Value Ref Range    Troponin T, High Sensitivity 50 (H) <=22 ng/L   CBC with platelets and differential   Result Value Ref Range    WBC Count 12.2 (H) 4.0 - 11.0 10e3/uL    RBC Count 4.56 4.40 - 5.90 10e6/uL    Hemoglobin 14.0 13.3 - 17.7 g/dL    Hematocrit 42.8 40.0 - 53.0 %    MCV 94 78 - 100 fL    MCH 30.7 26.5 - 33.0 pg    MCHC 32.7 31.5 - 36.5 g/dL    RDW 11.9 10.0 - 15.0 %    Platelet Count 124 (L) 150 - 450 10e3/uL    % Neutrophils 88 %    % Lymphocytes 8 %    % Monocytes 4 %    % Eosinophils 0 %    % Basophils 0 %    % Immature Granulocytes 0 %    NRBCs per 100 WBC 0 <1 /100    Absolute Neutrophils 10.6 (H) 1.6 - 8.3 10e3/uL    Absolute Lymphocytes 1.0 0.8 - 5.3 10e3/uL    Absolute Monocytes 0.5 0.0 - 1.3 10e3/uL    Absolute Eosinophils 0.1 0.0 - 0.7 10e3/uL    Absolute Basophils 0.0 0.0 - 0.2 10e3/uL    Absolute Immature Granulocytes 0.1 <=0.4 10e3/uL    Absolute NRBCs 0.0 10e3/uL   Glucose by meter   Result Value Ref Range    GLUCOSE BY METER POCT 49 (LL) 70 - 99 mg/dL   Echocardiogram Complete   Result Value Ref Range    LVEF  55-60%     Narrative     [id number]  [id number]  [id number]  [id number]^KARL^AL^ROMA     [facility]  Echocardiography Laboratory  [address]  [place] [address]     Name: CHIKIS NEWBERRY  MRN: [id number]  : 1940  Study Date: 10/13/2022 07:54 AM  Age: 82 yrs  Gender: Male  Patient Location: \[facility]\""  Reason For Study: Hypertension (HTN)  Ordering Physician: NAIDA BARAJAS  Referring Physician: Basilio Alfaro MD  Performed By: Meera Otero RDCS     BSA: 1.8 m2  Height: 67 in  Weight: 147 lb  HR: 92  BP: 175/87 mmHg  ______________________________________________________________________________  Procedure  Complete Portable Echo Adult.  ______________________________________________________________________________  Interpretation Summary     The visual ejection fraction is 55-60%.  The right ventricle is normal in size and function.  Moderate concentric left ventricular hypertrophy.  There is mild (1+) tricuspid regurgitation.  There is mild (1+) aortic regurgitation.  RVSP = 46mmHg + RAP. The IVC is not visualized.     No prior echo available for comparison.  ______________________________________________________________________________  Left Ventricle  The left ventricle is normal in size. There is moderate concentric left  ventricular hypertrophy. Left ventricular systolic function is normal. Grade I  or early diastolic dysfunction. The visual ejection fraction is 55-60%. No  regional wall motion abnormalities noted.     Right Ventricle  The right ventricle is normal in size and function.     Atria  Normal left atrial size. Right atrial size is normal.     Mitral Valve  There is trace mitral regurgitation.     Tricuspid Valve  There is mild (1+) tricuspid regurgitation. The right ventricular systolic  pressure is approximated at 46.7 mmHg plus the right atrial pressure.     Aortic Valve  There is mild trileaflet aortic sclerosis. There is mild (1+) aortic  regurgitation.     Pulmonic Valve  There  is trace pulmonic valvular regurgitation.     Vessels  Normal size ascending aorta. Inferior vena cava not well visualized for  estimation of right atrial pressure.     Pericardium  There is no pericardial effusion.     Rhythm  Sinus rhythm was noted.  ______________________________________________________________________________  MMode/2D Measurements & Calculations  IVSd: 1.4 cm     LVIDd: 3.4 cm  LVIDs: 2.1 cm  LVPWd: 1.4 cm  FS: 38.2 %  LV mass(C)d: 165.3 grams  LV mass(C)dI: 93.2 grams/m2  Ao root diam: 2.7 cm  LA dimension: 3.4 cm  asc Aorta Diam: 3.2 cm  LA/Ao: 1.3  LA Volume (BP): 38.6 ml  LA Volume Index (BP): 21.8 ml/m2  RWT: 0.83     Doppler Measurements & Calculations  MV E max dagoberto: 81.2 cm/sec  MV A max dagoberto: 133.0 cm/sec  MV E/A: 0.61  MV dec time: 0.20 sec  AI P1/2t: 298.9 msec  PA acc time: 0.12 sec  TR max dagoberto: 341.5 cm/sec  TR max P.7 mmHg  E/E' av.0  Lateral E/e': 22.7  Medial E/e': 13.3     ______________________________________________________________________________  Report approved by: MD Denae Borges 10/13/2022 09:31 AM         Glucose by meter   Result Value Ref Range    GLUCOSE BY METER POCT 57 (L) 70 - 99 mg/dL   Glucose by meter   Result Value Ref Range    GLUCOSE BY METER POCT 145 (H) 70 - 99 mg/dL   EKG 12-lead, tracing only   Result Value Ref Range    Systolic Blood Pressure  mmHg    Diastolic Blood Pressure  mmHg    Ventricular Rate 92 BPM    Atrial Rate 92 BPM    MD Interval 144 ms    QRS Duration 68 ms     ms    QTc 445 ms    P Axis 60 degrees    R AXIS 49 degrees    T Axis 150 degrees    Interpretation ECG       Sinus rhythm  Moderate voltage criteria for LVH, may be normal variant  T wave abnormality, consider inferolateral ischemia  Abnormal ECG  When compared with ECG of 12-OCT-2022 15:15,  No significant change was found         Lab Results   Component Value Date    CHOL 163 2020     Lab Results   Component Value Date    HDL 58 2020     Lab  Results   Component Value Date    LDL 90 07/31/2020     Lab Results   Component Value Date    TRIG 74 07/31/2020     No results found for: CHOLHDLRATIO  TSH   Date Value Ref Range Status   08/05/2021 0.75 0.40 - 4.00 mU/L Final   01/18/2020 1.66 0.40 - 4.00 mU/L Final

## 2022-10-13 NOTE — PROVIDER NOTIFICATION
"Paged Dr. Mondragon at 0910:  BG 49 and /95. Gave glucose gel and will recheck BG shortly. Please advise/come assess pt, thank you     0912 Dr. Mondragon responded to give IV labetalol and he would come to round shortly.    Dr. Mondragon arrived on unit at around 0917.  Assessed pt at bedside.  BP somewhat improved at 175/x - ok per Dr. Mondragon to not give IV labetalol, just scheduled PO medications and monitor.  Also ordered one dose of D50 IV push 25mg to help BG which was still 57.    Gave these interventions, continuing to monitor.  BG improved to 145.            Paged Dr. Mondragon again with high priority at 1005:  Tele just called, Inverted T's and ST depression which is change from this morning. Pt states some chest discomfort Please advise, thank you     Called charge RN to assess pt as well; just about to call a RRT when another nurse noted Dr. Mondragon to still be present on unit.  He was immediately able to come to bedside and reassess pt.      Ordered EKG and stat Troponin; 1 dose of nitroglycerin; assessed that pt would respond \"yes\" to pain in a variety of places and not just his chest.  Aspirin to be given.  EKG completed with minimal changes from last one performed on the 11th.  Pt family educated and reassured.  Will continue to monitor closely.         Paged Dr. Mondragon at 1421:  Pt's bg's in the mid 200's range now (last was 257). Continue fluids with dextrose or return to NS? Thank you     Received response to return to NS.  Thank you.        "

## 2022-10-13 NOTE — PROGRESS NOTES
[facility]    Medicine Progress Note - Hospitalist Service    Date of Admission:  10/12/2022    Assessment & Plan          82-year old Montene[place]in male with history of diabetes mellitus, CKD stage III, hypertension, atrial fibrillation (not on anticoagulation), hepatitis C, depression and dementia who lives at home with family presents with increasing generalized weakness, decreased oral intake and alteration in mental status    At baseline he needs help of 2 people to get up, able to feed himself and able to answer simple questions in short sentences.    Vitals on presentation: Temperature 101.2  F, Blood pressure 217/112, respiratory rate of 24, oxygen saturation 97% on room air and heart rate of 96.BUN/creatinine was 48.3/2.4 (baseline creatinine less than 2), WBC count of 12.2.  UA was positive for nitrate and leukocyte esterase and showed 48 WBCs per high-power field.      1. Sepsis secondary to UTI    Continue ceftriaxone.    Follow-up blood cultures and urine cultures.    Check noncontrast CT abdomen, patient has mild diffuse tenderness.    2. Metabolic encephalopathy on pre-existing dementia secondary to UTI/hypoglycemia.    Patient is weaker, at baseline he is able to set up but needs assistance of 2 people to get out of bed and currently he cannot get up in the bed.    Continue home dose of trazodone 75 mg at bedtime, add as needed Zyprexa for agitation.    We will get PT/OT consultation.    Palliative care consult to address goals of care.    3. Hypoglycemia with underlying insulin-dependent diabetes mellitus.    Blood sugar 49 on morning of 10/13, got Levemir 10 units night before (home dose Levemir 30 units nightly and NovoLog 4 units before each meal).  Hold Levemir and decrease the sliding scale intensity.  Start D5 LR at 75 mill per hour.    Hypoglycemia due to sepsis and poor p.o. intake.    4. Uncontrolled hypertension.    Prior to admission on metoprolol 12.5 mg twice daily and  Isordil 20 mg 2 times daily.    Last blood pressure is 176/85, will add amlodipine 5 mg daily.  As needed hydralazine and labetalol available for systolic blood pressure about 200.    5. TRANG on CKD.    Baseline creatinine less than 2, presents with creatinine of 2.4.    Likely prerenal, IV hydration as above.    Patient is incontinent of urine.    6. History of atrial fibrillation.    Currently in sinus rhythm.    Not on anticoagulation at baseline.    7. History of hepatitis C.    LFTs within normal limits.       Diet: Moderate Consistent Carb (60 g CHO per Meal) Diet    DVT Prophylaxis: Pneumatic Compression Devices  Liu Catheter: Not present  Central Lines: None  Cardiac Monitoring: ACTIVE order. Indication: sepsis  Code Status: Full Code      Disposition Plan      Expected Discharge Date: 10/14/2022                The patient's care was discussed with the Patient's daughter at bedside and RN.    Jeff Mondragon MD  Hospitalist Service  [employer]  Securely message with the Vocera Web Console (learn more here)  Text page via [employer] Paging/Directory         Clinically Significant Risk Factors Present on Admission             # Hypoalbuminemia: Albumin = 2.8 g/dL (Ref range: 3.5 - 5.2 g/dL) on admission, will monitor as appropriate    # Thrombocytopenia: Plts = 124 10e3/uL (Ref range: 150 - 450 10e3/uL) on admission, will monitor for bleeding           ______________________________________________________________________    Interval History   Patient continues to be confused and very weak.  Is incontinent of urine.  Was hypoglycemic this morning.    Data reviewed today: I reviewed all medications, new labs and imaging results over the last 24 hours.     Physical Exam   Vital Signs: Temp: 98.8  F (37.1  C) Temp src: Temporal BP: (!) 176/85 (ok per Alanna to give scheduled PO BP meds rather than IV) Pulse: 99   Resp: 17 SpO2: 92 % O2 Device: None (Room air)    Weight: 147 lbs 11.33 oz  General  Appearance: Frail elderly male who is able to follow simple commands and answers in unintelligible voice/mumbles.  Eyes: No icterus  HEENT: Dry mucosa  Respiratory: Clear to auscultation  Cardiovascular: S1 and S2 is normal  GI: Soft but mild diffuse tenderness.  Lymph/Hematologic: Not examined  Genitourinary: Not examined  Skin: No rash  Musculoskeletal: No edema  Neurologic: Lethargic, moving all extremities.  Psychiatric: Not assessed      Data   Recent Labs   Lab 10/13/22  0920 10/13/22  0854 10/13/22  0635 10/12/22  2009 10/12/22  1520   WBC  --   --  12.2*  --  9.6   HGB  --   --  14.0  --  14.0   MCV  --   --  94  --  94   PLT  --   --  124*  --  128*   NA  --   --  137  --  135*   POTASSIUM  --   --  4.5  --  4.5   CHLORIDE  --   --  105  --  99   CO2  --   --  21*  --  26   BUN  --   --  42.7*  --  48.3*   CR  --   --  2.24*  --  2.40*   ANIONGAP  --   --  11  --  10   DOMINGO  --   --  8.7*  --  9.2   GLC 57* 49* 62*   < > 173*   ALBUMIN  --   --  2.8*  --  3.5   PROTTOTAL  --   --  6.3*  --  7.0   BILITOTAL  --   --  1.5*  --  1.0   ALKPHOS  --   --  78  --  99   ALT  --   --  27  --  32   AST  --   --  28  --  30    < > = values in this interval not displayed.     Recent Results (from the past 24 hour(s))   XR Chest Port 1 View    Narrative    CHEST ONE VIEW  10/12/2022 4:01 PM     HISTORY: Fever.    COMPARISON: August 1, 2020      Impression    IMPRESSION: No acute disease.    JAZ LÓPEZ MD         SYSTEM ID:  [id number]   CT Head w/o Contrast    Narrative    EXAM: CT HEAD W/O CONTRAST  LOCATION: [facility]  DATE/TIME: 10/12/2022 5:38 PM    INDICATION: Altered mental status  COMPARISON:  CT 08/23/2021.  TECHNIQUE: Routine CT Head without IV contrast. Multiplanar reformats. Dose reduction techniques were used.    FINDINGS:  Mild motion degradation.  INTRACRANIAL CONTENTS: No intracranial hemorrhage, extraaxial collection, or mass effect.  No CT evidence of acute infarct. Mild to  moderate presumed chronic small vessel ischemic changes. Moderate generalized volume loss. No hydrocephalus.     VISUALIZED ORBITS/SINUSES/MASTOIDS: Prior bilateral cataract surgery. Visualized portions of the orbits are otherwise unremarkable. No paranasal sinus mucosal disease. No middle ear or mastoid effusion.    BONES/SOFT TISSUES: No acute abnormality.      Impression    IMPRESSION:  1.  No CT evidence for acute intracranial process, allowing for motion.  2.  Brain atrophy and presumed chronic microvascular ischemic changes as above.   Echocardiogram Complete   Result Value    LVEF  55-60%    [facility]    [id number]  [id number]  [id number]  [id number]^KARL^AL^ROMA     [facility]  Echocardiography Laboratory  [address] [place] [address]     Name: CHIKIS NEWBERRY  MRN: [id number]  : 1940  Study Date: 10/13/2022 07:54 AM  Age: 82 yrs  Gender: Male  Patient Location: [facility]  Reason For Study: Hypertension (HTN)  Ordering Physician: NAIDA BARAJAS  Referring Physician: Basilio Alfaro MD  Performed By: Meera Otero RDCS     BSA: 1.8 m2  Height: 67 in  Weight: 147 lb  HR: 92  BP: 175/87 mmHg  ______________________________________________________________________________  Procedure  Complete Portable Echo Adult.  ______________________________________________________________________________  Interpretation Summary     The visual ejection fraction is 55-60%.  The right ventricle is normal in size and function.  Moderate concentric left ventricular hypertrophy.  There is mild (1+) tricuspid regurgitation.  There is mild (1+) aortic regurgitation.  RVSP = 46mmHg + RAP. The IVC is not visualized.     No prior echo available for comparison.  ______________________________________________________________________________  Left Ventricle  The left ventricle is normal in size. There is moderate concentric left  ventricular hypertrophy. Left ventricular systolic function is  normal. Grade I  or early diastolic dysfunction. The visual ejection fraction is 55-60%. No  regional wall motion abnormalities noted.     Right Ventricle  The right ventricle is normal in size and function.     Atria  Normal left atrial size. Right atrial size is normal.     Mitral Valve  There is trace mitral regurgitation.     Tricuspid Valve  There is mild (1+) tricuspid regurgitation. The right ventricular systolic  pressure is approximated at 46.7 mmHg plus the right atrial pressure.     Aortic Valve  There is mild trileaflet aortic sclerosis. There is mild (1+) aortic  regurgitation.     Pulmonic Valve  There is trace pulmonic valvular regurgitation.     Vessels  Normal size ascending aorta. Inferior vena cava not well visualized for  estimation of right atrial pressure.     Pericardium  There is no pericardial effusion.     Rhythm  Sinus rhythm was noted.  ______________________________________________________________________________  MMode/2D Measurements & Calculations  IVSd: 1.4 cm     LVIDd: 3.4 cm  LVIDs: 2.1 cm  LVPWd: 1.4 cm  FS: 38.2 %  LV mass(C)d: 165.3 grams  LV mass(C)dI: 93.2 grams/m2  Ao root diam: 2.7 cm  LA dimension: 3.4 cm  asc Aorta Diam: 3.2 cm  LA/Ao: 1.3  LA Volume (BP): 38.6 ml  LA Volume Index (BP): 21.8 ml/m2  RWT: 0.83     Doppler Measurements & Calculations  MV E max dagoberto: 81.2 cm/sec  MV A max dagoberto: 133.0 cm/sec  MV E/A: 0.61  MV dec time: 0.20 sec  AI P1/2t: 298.9 msec  PA acc time: 0.12 sec  TR max dagoberto: 341.5 cm/sec  TR max P.7 mmHg  E/E' av.0  Lateral E/e': 22.7  Medial E/e': 13.3     ______________________________________________________________________________  Report approved by: MD Denae Borges 10/13/2022 09:31 AM           Medications     dextrose 5% lactated ringers 75 mL/hr at 10/13/22 0948       amLODIPine  5 mg Oral Daily     atorvastatin  40 mg Oral QPM     cefTRIAXone  2 g Intravenous Q24H     insulin aspart  1-3 Units Subcutaneous TID AC     insulin  aspart  1-3 Units Subcutaneous At Bedtime     [Held by provider] insulin detemir  10 Units Subcutaneous At Bedtime     isosorbide dinitrate  20 mg Oral BID     latanoprost  1 drop Both Eyes At Bedtime     metoprolol tartrate  25 mg Oral BID     multivitamin, therapeutic  1 tablet Oral Daily     sodium chloride (PF)  3 mL Intracatheter Q8H     traZODone  75 mg Oral At Bedtime     Vitamin D3  25 mcg Oral Daily

## 2022-10-13 NOTE — PROGRESS NOTES
I was called to the bedside again as ST depression and T wave inversions were reported on telemetry.  When patient's daughter asked if he is having chest pain he said yes and pointed to the chest but on leading questioning he answers yes to pain everywhere in the body.  His vitals are stable and clinically about the same at this morning and is in no acute distress.    He was given aspirin 325 mg and nitroglycerin 0.4 mg sublingual.  Stat EKG was done which is unchanged from admission EKG and shows LVH and associated ST changes.  Troponin trend was noted 39--> 42--> 50.  Echocardiogram showed EF of 55% with no regional wall motion abnormalities.  Physical examination unchanged from earlier.    I will repeat troponin once.  We will also consult cardiology for uptrending troponin.  If patient has symptoms again other troponin is higher, will consider heparin drip.    I talked to the daughters Minerva Otero, Moustapha and Candis.  They agree with the doing coronary angiogram if indicated but did not want CPR or mechanical ventilation in the event of cardiorespiratory arrest.  CODE STATUS updated to DNR/DNI.    Jeff Mondragon MD  Internal Medicine Hospitalist  Pager: [phone]

## 2022-10-13 NOTE — PROGRESS NOTES
"   10/13/22 0800   Appointment Info   Signing Clinician's Name / Credentials (PT) EVARISTO Tena   Student Supervision On-site supervision provided  (Otilia Napier DPT)   Rehab Comments (PT) pt provides minimal engagement; daughter interprets; total assist for all mobility       Present yes  (daughter)   Living Environment   People in Home spouse;child(tin), adult   Current Living Arrangements apartment  (townhouse)   Home Accessibility stairs to enter home   Number of Stairs, Main Entrance greater than 10 stairs  (3 flights of stairs to get into townhouse)   Stair Railings, Main Entrance railings safe and in good condition   Transportation Anticipated family or friend will provide   Living Environment Comments Pt lives in [facility] with 4 family members. 3 Flights of stairs to get up to unit. No elevator or WC accessibility. Family provides total assist for getting pt up the stairs; Family reports pt had been able to use FWW and to walk short distances with family support for balance; Otherwises uses WC   Self-Care   Usual Activity Tolerance fair   Current Activity Tolerance poor   Regular Exercise No   Equipment Currently Used at Home walker, standard;wheelchair, manual   Fall history within last six months no   Activity/Exercise/Self-Care Comment pt is completely dependent for all iADLs. Family assists pt with toileting, showering, bathing, etc.   General Information   Onset of Illness/Injury or Date of Surgery 10/12/22   Referring Physician Alejandro Martini MD   Patient/Family Therapy Goals Statement (PT) family desires pt to return home; receptive to potential equipment needs to make this happen   Pertinent History of Current Problem (include personal factors and/or comorbidities that impact the POC) per H&P \"Eh Callahan is a 82 year old Luxembour[place] gentleman with known history of chronic kidney disease, insulin requiring diabetes mellitus, hypertension, atrial fibrillation not on " "chronic anticoagulation, hepatitis C, depression who lives at home with family and has a presents in the emergency room earlier today due to increasing generalized, decreased oral intake, and alteration in mental state.  There was no reports of any cough, chills, vomiting spells, abdominal pain, productive sputum, diarrhea, bleeding tendencies such as melanotic stools, blood per rectum or coffee-ground emesis.  No reported gross hematuria but patient has urinary incontinence requiring adult diaper.  Upon initial presentation he demonstrated severe uncontrolled hypertension, febrile with T-max of  101.2F, tachycardic but fortunately not hypoxic. He was diagnosed with underlying sepsis with high suspicion secondary to his UTI was treated with IV antibiotics and his case was referred to us for further evaluation and care hence this hospitalization.\" see chart for more detail   General Observations Pt lethargic, disoriented, minimal ability to follow commands, unable to actively move any extremities   Cognition   Affect/Mental Status (Cognition) unable/difficult to assess;low arousal/lethargic;confused   Orientation Status (Cognition) disoriented to;person;place;situation;time;unable/difficult to assess   Follows Commands (Cognition) does not follow one-step commands;repetition of directions required;verbal cues/prompting required;unable/difficult to assess  (requires commands to be repeated multiple times by daughter ())   Cognitive Status Comments Pt disoriented and mostly non-verbal. Daughter interprets and pt only able to give yes/no answers some of the time   Pain Assessment   Patient Currently in Pain No   Integumentary/Edema   Integumentary/Edema no deficits were identifed   Posture    Posture Comments pt bedbound; unable to fully assess   Range of Motion (ROM)   Range of Motion ROM deficits secondary to weakness   ROM Comment PROM of UE and LE WNL; pt unable to actively move extremities other than " 40deg knee flex   Strength (Manual Muscle Testing)   Strength Comments pt unable to actively move any extremities other than 40deg knee flexion when instructed on SLR. Singificant muscle shaking during this task   Bed Mobility   Comment, (Bed Mobility) pt unable to move in bed; would be completely dependent for bed mobility at this time   Transfers   Comment, (Transfers) pt unable to move in bed; would be completely dependent for bed mobility at this time   Gait/Stairs (Locomotion)   Comment, (Gait/Stairs) pt unable to stand   Balance   Balance Comments unable to assess; anticipate pt would require total assist for balance   Sensory Examination   Sensory Perception WFL   Sensory Perception Comments pt inconsistent with reporting during light touch testing due to language barrier and cognition   Coordination   Coordination Comments pt unable to move extremities, unable to assess at this time   Muscle Tone   Muscle Tone Comments pt with UE and LE fasciculations/tremors at rest   Clinical Impression   Criteria for Skilled Therapeutic Intervention Yes, treatment indicated   PT Diagnosis (PT) impaired functional mobility   Influenced by the following impairments cognition, decreased ROM, weakness, impaired balance   Functional limitations due to impairments bed mobility, sitting,  transfers, gait, stairs   Clinical Presentation (PT Evaluation Complexity) Stable/Uncomplicated   Clinical Presentation Rationale clinical judgement   Clinical Decision Making (Complexity) low complexity   Planned Therapy Interventions (PT) balance training;bed mobility training;gait training;home exercise program;neuromuscular re-education;patient/family education;ROM (range of motion);strengthening;stair training;transfer training;progressive activity/exercise;home program guidelines   Anticipated Equipment Needs at Discharge (PT) lift device;walker, standard;wheelchair  (pt has WC and FWW; would benefit from lift device in [facility])   Risk  & Benefits of therapy have been explained evaluation/treatment results reviewed;care plan/treatment goals reviewed;risks/benefits reviewed;current/potential barriers reviewed;participants voiced agreement with care plan;participants included;patient;daughter   PT Total Evaluation Time   PT Rodney Low Complexity Minutes (48130) 15   Physical Therapy Goals   PT Frequency 5x/week   PT Predicted Duration/Target Date for Goal Attainment 10/20/22   PT Goals Bed Mobility;Transfers;Gait   PT: Bed Mobility Moderate assist;Supine to/from sit   PT: Transfers Moderate assist;Sit to/from stand;Bed to/from chair;Assistive device   PT: Gait Moderate assist;10 feet;Standard walker   Therapeutic Activity   Therapeutic Activities: dynamic activities to improve functional performance Minutes (14809) 5   Treatment Detail/Skilled Intervention Pt supine in bed upon entry. Daughter present; Pt unable to actively move any extremities other than 40deg knee flex; Pt providing minimal engagement. Daughter electing for pt to stay in bed; Daughter educated on DC options and equipment that would be required in home to assist with this level of mobility at this time   PT Discharge Planning   PT Plan progress all mobility; lift to chair   PT Discharge Recommendation (DC Rec) Long term care facility;Leaving home requires significant assistance;Leaving home requires significant taxing effort;home with assist;Transitional Care Facility   PT Rationale for DC Rec Pt currently performing below baseline. Pt is currently bedbound and unable to mobilize trunk or extremities. Pt would require total assist for all mobility and all ADLs and iADLs. Pt lives with 4 family members who provide 24/7 A for all ADLs such as feeding, toileting, bathing, etc. Pt [facility] is not  accesible and pt would require stretcher transport into home at this time. Recommend LTC facility due to pt current impairments and level of function. If pt were to go home, recommend lift  device for home since family manually assists with mobility at baseline. TCU trial could be appropriate if participation increases?   PT Brief overview of current status unable to move extremities at this time, would be total assist for all mobility; Pt is disoriented and provides minimal engagement. Daughter is    Total Session Time   Timed Code Treatment Minutes 5   Total Session Time (sum of timed and untimed services) 20

## 2022-10-13 NOTE — CONSULTS
[facility]  Palliative Care Consultation   Text Page    Assessment & Plan   Eh Callahan is a 82 year old male who was admitted on 10/12/2022.   Consulted by Dr. Mondragon to assist with goals of care.    Recommendations:  1. Goals of Care- No CPR- Do NOT Intubate  Hospitalization goals discussed with patient, and daughters.  Goal to complete medical work up and discharge home.  Goal for ongoing restorative cares with the exception of DNR, Do NOT intubate.   Decisional Capacity- Unreliable. Patient does not have an advance directive. Per  informed consent policy next of kin should be involved in all consent and decision making.  Family at the bedside noted patient's son Sindy Dubon is first contact.  POLST none on file.  Would recommend completion reflecting code status prior to discharge.     2. Pain   Non specific report of generalized pain.  Chest pain noted and cardiac work up ordered and in process.  Patient does not have chronic pain, is not on chronic opiate therapy.   [place] Board of Pharmacy Data Base Reviewed:    YES; As expected, no concern for misuse/abuse of controlled medications based on this report.    - acetaminophen prn, monitor liver enzymes given history of hepatitis.   - positioning, heat, cold prn    3. Generalized weakness  Progression since 2020, family notes significant decline in the past few days.  State that the patient typically requires two people to assist him and a four wheel walker is utilized at times.  State that he is 'stubborn' may try to get up without assist, they deny falls at home.   - PT/OT while inpatient  - home health cares if recommended by therapy    4. Decreased PO intake  Cachexia on assessment with muscle wasting.  No reported weight loss.  Family note decreased PO intake and refusal to eat at times.  They note minimal PO intake for the past 2 days and nausea/vomiting day prior to admission.    - treatment of underlying  condition  - diet as tolerated, per primary team     5. Spiritual Care  Oriented to Spiritual Health as part of Palliative Care team. Spiritual Health Services declined at this time.  Spiritual Background: Tenriism    6. Care Planning  Appreciate Care of interdisciplinary team.  Medications for discharge - none from palliative standpoint at this time.      Medical Decision Making and Goals of Care:  Discussed on October 13, 2022 with TANNER Catalan CNP:   Met with patient and two daughters at the bedside.  Introduced palliative care and purpose of the consult.  Reviewed patient's presentation to the hospital and subsequent improvement.  Reviewed recent medical history at home and more chronic changes over the past 2 years.  Candis reviewed their impression of the impact that his dementia has had on Awed.  She reviewed his previous cognitively sharp state noting that he was a  and was skilled and remembering many details and specific information.  She stated that he has completely changed from that place and now is impulsive and stubborn at times with his behavior.  She notes that the changes have occurred most significantly over the past 2 years, during the pandemic.      Discussed the goals of care including code status and facilitation of discharge.  They verbalized agreement to the DNR/DNI, stated that he would not want machines keeping him alive.  They reviewed their goals of medical treatment and resolution of infection followed by discharge home again with assist of family.  Discussed the concerns of the provider team in regards to the ability to prevent and treat medical conditions in the setting of a patient with advancing dementia.  Aspiration events, falls and nutritional status were reviewed as common issues that could cause re-hospitalization.  Discussed the options for care including ongoing selective measures v hospice enrollment and focus on symptom management at home.  They  verbalized understanding.  Candis stated that they would like to discuss these goals as a family.  She noted that he has not been hospitalized multiple times and that he seems comfortable at home.      Offered to return and discuss implications of care following his cardiac work up.  They verbalized agreement to that plan.      Thank you for involving us in the patient's care.     TANNER Catalna CNP  Pain Management and Palliative Care  [facility]  Pgr: [phone]    Time Spent on this Encounter   Total unit/floor time 85 minutes, time consisted of the following, examination of the patient, reviewing the record and completing documentation. >50% of time spent in counseling and coordination of care, Bedside Nurse Hiwot and Maliaist Alanna.  Time spend counseling with patient and family consisted of the following topics, goals of care, advance care planning and care planning for discharge.    Understanding of disease process:   This has been discussed with family, patient.    History of Present Illness   History is obtained from the patient, electronic health record and patient's family    Eh Callahan is a 82 year old male with a past medical history of diabetes, CKD, hypertension, atrial fibrillation, hepatitis C, depression and dementia, who presents with generalized weakness, decreased oral intake and altered mental status.  Work up has revealed UTI, sepsis.    This is in the setting of progressive dementia with near total dependence at baseline.  He has had no recent hospitalizations, weight loss or loss of function.  Baseline functional status consists of feeding himself.  Requires heavy assist of 2 family members and FWW to ambulate at home.        Past Medical History   I have reviewed this patient's medical history and updated it with pertinent information if needed.   Past Medical History:   Diagnosis Date     Anatomical narrow angle glaucoma      Atrial fibrillation  (H)      Chronic infection     history of hepatitis C     CKD (chronic kidney disease) stage 2, GFR 60-89 ml/min      Diabetes mellitus (H)      Hepatitis C without mention of hepatic coma      Hypertension      PTSD (post-traumatic stress disorder)        Past Surgical History   I have reviewed this patient's surgical history and updated it with pertinent information if needed.  Past Surgical History:   Procedure Laterality Date     APPENDECTOMY       EYE SURGERY       LAPAROSCOPIC HEPATECTOMY PARTIAL Left 11/22/2016    Procedure: LAPAROSCOPIC HEPATECTOMY PARTIAL;  Surgeon: Rick Mckeon MD;  Location: [facility]       Prior to Admission Medications   Prior to Admission Medications   Prescriptions Last Dose Informant Patient Reported? Taking?   Carboxymeth-Glycerin-Polysorb 0.5-1-0.5 % SOLN   Yes Yes   Sig: Place 1 drop into both eyes 2 times daily as needed    Multiple Vitamins-Minerals (MULTIVITAMIN ADULT) TABS 10/12/2022 at am  Yes Yes   Sig: Take 1 tablet by mouth daily   Vitamin D3 (CHOLECALCIFEROL) 25 mcg (1000 units) tablet 10/12/2022 at am  Yes Yes   Sig: Take 25 mcg by mouth daily   acetaminophen (TYLENOL) 500 MG tablet   Yes Yes   Sig: Take 1,000 mg by mouth every 8 hours as needed for mild pain   aspirin (ASA) 81 MG EC tablet 10/12/2022 at am  No Yes   Sig: Take 1 tablet (81 mg) by mouth daily   atorvastatin (LIPITOR) 40 MG tablet 10/11/2022  No Yes   Sig: Take 1 tablet (40 mg) by mouth every evening   insulin aspart (NOVOLOG PEN) 100 UNIT/ML pen 10/12/2022 at am  Yes Yes   Sig: Inject 4 Units Subcutaneous 3 times daily (with meals)   insulin detemir (LEVEMIR PEN) 100 UNIT/ML pen 10/11/2022  Yes Yes   Sig: Inject 30 Units Subcutaneous At Bedtime   isosorbide dinitrate (ISORDIL) 20 MG tablet 10/12/2022 at am  Yes Yes   Sig: Take 20 mg by mouth 2 times daily   latanoprost (XALATAN) 0.005 % ophthalmic solution Past Month  Yes Yes   Sig: Place 1 drop into both eyes At Bedtime   metoprolol tartrate  (LOPRESSOR) 25 MG tablet 10/12/2022 at am  No Yes   Sig: Take 0.5 tablets (12.5 mg) by mouth 2 times daily   traZODone (DESYREL) 150 MG tablet 10/11/2022  Yes Yes   Sig: Take 75 mg by mouth At Bedtime       Facility-Administered Medications: None     Allergies   Allergies   Allergen Reactions     Amlodipine Swelling     Edema at 10 mg , fine at 5 mg     Lisinopril Cough     Metoprolol      Other reaction(s): Bradycardia  Metoprolol at 50mg bid -> HR 45 -50, unclear if sx (has fatigue)  May tolerate lower doses if needed       Social History   I have updated and reviewed the following Social History Narrative:   Social History     Social History Narrative    Immigrated in 2002 from [facility].  Previously lived in [place] before moving here.  Daughter lives locally as well.             Living situation: [facility] home       Support system: family       Actual/Potential Caregiver: family       Functional status: dependent for ADLs, able to feed himself  History of substance use/abuse: no    Family History   I have reviewed this patient's family history and updated it with pertinent information if needed.   Family History   Problem Relation Age of Onset     Diabetes No family hx of         He is not aware of any diabetes in his family     Kidney Disease No family hx of        Review of Systems   The 10 point Review of Systems is negative other than noted in the HPI or here.     Physical Exam   Temp:  [98.8  F (37.1  C)-101.2  F (38.4  C)] 99.5  F (37.5  C)  Pulse:  [] 94  Resp:  [15-24] 17  BP: (144-217)/() 144/72  SpO2:  [92 %-100 %] 100 %  147 lbs 11.33 oz  Exam:  GEN:  Cachectic, elderly male, lying in bed.  Alert, not oriented, appears comfortable, NAD.  HEENT:  Normocephalic/atraumatic, no scleral icterus, no nasal discharge, mouth moist.  CV:  RRR, S1, S2; no murmurs or other irregularities noted.  +3 DP/PT pulses bilatererally; no edema BLE.  RESP:  Clear to auscultation bilaterally without  rales/rhonchi/wheezing/retractions.  Symmetric chest rise on inhalation noted.  Normal respiratory effort.  ABD:  Rounded, soft, non-tender/non-distended.  +BS  EXT:  Edema & pulses as noted above.  CMS intact x 4.     SKIN:  Dry to touch, no exanthems noted in the visualized areas.    PAIN BEHAVIOR: Cooperative  Psych:  Normal affect.  Calm, confused.    Data   Results for orders placed or performed during the hospital encounter of 10/12/22 (from the past 24 hour(s))   EKG 12-lead, tracing only   Result Value Ref Range    Systolic Blood Pressure  mmHg    Diastolic Blood Pressure  mmHg    Ventricular Rate 93 BPM    Atrial Rate 93 BPM    NM Interval 150 ms    QRS Duration 66 ms     ms    QTc 427 ms    P Axis 61 degrees    R AXIS 50 degrees    T Axis 159 degrees    Interpretation ECG       Sinus rhythm  Left ventricular hypertrophy with repolarization abnormality  Abnormal ECG  When compared with ECG of 23-AUG-2021 10:46,  No significant change was found  Confirmed by - EMERGENCY ROOM, PHYSICIAN ([id number]),  KATY DUNAWAY ([id number]) on 10/13/2022 6:44:35 AM     iStat Gases (lactate) venous, POCT   Result Value Ref Range    Lactic Acid POCT 1.7 <=2.0 mmol/L    Bicarbonate Venous POCT 27 21 - 28 mmol/L    pCO2V Venous POCT 54 (H) 40 - 50 mm Hg    pH Venous POCT 7.31 (L) 7.32 - 7.43    pO2 Venous POCT <15 (L) 25 - 47 mm Hg   CBC with platelets differential    Narrative    The following orders were created for panel order CBC with platelets differential.  Procedure                               Abnormality         Status                     ---------                               -----------         ------                     CBC with platelets and d...[[id number]]  Abnormal            Final result                 Please view results for these tests on the individual orders.   Comprehensive metabolic panel   Result Value Ref Range    Sodium 135 (L) 136 - 145 mmol/L    Potassium 4.5 3.4 - 5.3 mmol/L    Chloride 99 98  - 107 mmol/L    Carbon Dioxide (CO2) 26 22 - 29 mmol/L    Anion Gap 10 7 - 15 mmol/L    Urea Nitrogen 48.3 (H) 8.0 - 23.0 mg/dL    Creatinine 2.40 (H) 0.67 - 1.17 mg/dL    Calcium 9.2 8.8 - 10.2 mg/dL    Glucose 173 (H) 70 - 99 mg/dL    Alkaline Phosphatase 99 40 - 129 U/L    AST 30 10 - 50 U/L    ALT 32 10 - 50 U/L    Protein Total 7.0 6.4 - 8.3 g/dL    Albumin 3.5 3.5 - 5.2 g/dL    Bilirubin Total 1.0 <=1.2 mg/dL    GFR Estimate 26 (L) >60 mL/min/1.73m2   Troponin T, High Sensitivity   Result Value Ref Range    Troponin T, High Sensitivity 39 (H) <=22 ng/L   Blood Culture Peripheral Blood    Specimen: Peripheral Blood   Result Value Ref Range    Culture No growth after 12 hours    Symptomatic; Unknown Influenza A/B & SARS-CoV2 (COVID-19) Virus PCR Multiplex Nasopharyngeal    Specimen: Nasopharyngeal; Swab   Result Value Ref Range    Influenza A PCR Negative Negative    Influenza B PCR Negative Negative    RSV PCR Negative Negative    SARS CoV2 PCR Negative Negative    Narrative    Testing was performed using the Xpert Xpress CoV2/Flu/RSV Assay on the "[employer] GeneXpert Instrument. This test should be ordered for the detection of SARS-CoV-2 and influenza viruses in individuals who meet clinical and/or epidemiological criteria. Test performance is unknown in asymptomatic patients. This test is for in vitro diagnostic use under the FDA EUA for laboratories certified under CLIA to perform high or moderate complexity testing. This test has not been FDA cleared or approved. A negative result does not rule out the presence of PCR inhibitors in the specimen or target RNA in concentration below the limit of detection for the assay. If only one viral target is positive but coinfection with multiple targets is suspected, the sample should be re-tested with another FDA cleared, approved, or authorized test, if coinfection would change clinical management. This test was validated by the [facility]. These  laboratories are certified under the Clinical Laboratory Improvement Amendments of 1988 (CLIA-88) as qualified to perform high complexity laboratory testing.   Ammonia   Result Value Ref Range    Ammonia 11 (L) 16 - 60 umol/L   CBC with platelets and differential   Result Value Ref Range    WBC Count 9.6 4.0 - 11.0 10e3/uL    RBC Count 4.54 4.40 - 5.90 10e6/uL    Hemoglobin 14.0 13.3 - 17.7 g/dL    Hematocrit 42.8 40.0 - 53.0 %    MCV 94 78 - 100 fL    MCH 30.8 26.5 - 33.0 pg    MCHC 32.7 31.5 - 36.5 g/dL    RDW 11.8 10.0 - 15.0 %    Platelet Count 128 (L) 150 - 450 10e3/uL    % Neutrophils 86 %    % Lymphocytes 7 %    % Monocytes 6 %    % Eosinophils 1 %    % Basophils 0 %    % Immature Granulocytes 0 %    NRBCs per 100 WBC 0 <1 /100    Absolute Neutrophils 8.2 1.6 - 8.3 10e3/uL    Absolute Lymphocytes 0.7 (L) 0.8 - 5.3 10e3/uL    Absolute Monocytes 0.5 0.0 - 1.3 10e3/uL    Absolute Eosinophils 0.1 0.0 - 0.7 10e3/uL    Absolute Basophils 0.0 0.0 - 0.2 10e3/uL    Absolute Immature Granulocytes 0.0 <=0.4 10e3/uL    Absolute NRBCs 0.0 10e3/uL   Hemoglobin A1c   Result Value Ref Range    Hemoglobin A1C 9.0 (H) <5.7 %   UA with Microscopic reflex to Culture    Specimen: Urine, Catheter   Result Value Ref Range    Color Urine Light Yellow Colorless, Straw, Light Yellow, Yellow    Appearance Urine Clear Clear    Glucose Urine 200 (A) Negative mg/dL    Bilirubin Urine Negative Negative    Ketones Urine Negative Negative mg/dL    Specific Gravity Urine 1.018 1.003 - 1.035    Blood Urine Moderate (A) Negative    pH Urine 6.0 5.0 - 7.0    Protein Albumin Urine 300 (A) Negative mg/dL    Urobilinogen Urine Normal Normal, 2.0 mg/dL    Nitrite Urine Positive (A) Negative    Leukocyte Esterase Urine Small (A) Negative    Bacteria Urine Many (A) None Seen /HPF    Mucus Urine Present (A) None Seen /LPF    RBC Urine 3 (H) <=2 /HPF    WBC Urine 48 (H) <=5 /HPF    Squamous Epithelials Urine <1 <=1 /HPF    Hyaline Casts Urine 17 (H) <=2  /LPF    Narrative    Urine Culture ordered based on laboratory criteria   Blood Culture Arm, Left    Specimen: Arm, Left; Blood   Result Value Ref Range    Culture No growth after 12 hours     Narrative    Only an Aerobic Blood Culture Bottle was collected, interpret results with caution.       XR Chest Port 1 View    Narrative    CHEST ONE VIEW  10/12/2022 4:01 PM     HISTORY: Fever.    COMPARISON: August 1, 2020      Impression    IMPRESSION: No acute disease.    JAZ LÓPEZ MD         SYSTEM ID:  [id number]   CT Head w/o Contrast    Narrative    EXAM: CT HEAD W/O CONTRAST  LOCATION: [facility]  DATE/TIME: 10/12/2022 5:38 PM    INDICATION: Altered mental status  COMPARISON:  CT 08/23/2021.  TECHNIQUE: Routine CT Head without IV contrast. Multiplanar reformats. Dose reduction techniques were used.    FINDINGS:  Mild motion degradation.  INTRACRANIAL CONTENTS: No intracranial hemorrhage, extraaxial collection, or mass effect.  No CT evidence of acute infarct. Mild to moderate presumed chronic small vessel ischemic changes. Moderate generalized volume loss. No hydrocephalus.     VISUALIZED ORBITS/SINUSES/MASTOIDS: Prior bilateral cataract surgery. Visualized portions of the orbits are otherwise unremarkable. No paranasal sinus mucosal disease. No middle ear or mastoid effusion.    BONES/SOFT TISSUES: No acute abnormality.      Impression    IMPRESSION:  1.  No CT evidence for acute intracranial process, allowing for motion.  2.  Brain atrophy and presumed chronic microvascular ischemic changes as above.   Lactic acid whole blood   Result Value Ref Range    Lactic Acid 0.8 0.7 - 2.0 mmol/L   Glucose by meter   Result Value Ref Range    GLUCOSE BY METER POCT 118 (H) 70 - 99 mg/dL   Troponin T, High Sensitivity   Result Value Ref Range    Troponin T, High Sensitivity 42 (H) <=22 ng/L   Glucose by meter   Result Value Ref Range    GLUCOSE BY METER POCT 109 (H) 70 - 99 mg/dL   CBC with platelets  differential    Narrative    The following orders were created for panel order CBC with platelets differential.  Procedure                               Abnormality         Status                     ---------                               -----------         ------                     CBC with platelets and d...[[id number]]  Abnormal            Final result                 Please view results for these tests on the individual orders.   Comprehensive metabolic panel   Result Value Ref Range    Sodium 137 136 - 145 mmol/L    Potassium 4.5 3.4 - 5.3 mmol/L    Chloride 105 98 - 107 mmol/L    Carbon Dioxide (CO2) 21 (L) 22 - 29 mmol/L    Anion Gap 11 7 - 15 mmol/L    Urea Nitrogen 42.7 (H) 8.0 - 23.0 mg/dL    Creatinine 2.24 (H) 0.67 - 1.17 mg/dL    Calcium 8.7 (L) 8.8 - 10.2 mg/dL    Glucose 62 (L) 70 - 99 mg/dL    Alkaline Phosphatase 78 40 - 129 U/L    AST 28 10 - 50 U/L    ALT 27 10 - 50 U/L    Protein Total 6.3 (L) 6.4 - 8.3 g/dL    Albumin 2.8 (L) 3.5 - 5.2 g/dL    Bilirubin Total 1.5 (H) <=1.2 mg/dL    GFR Estimate 29 (L) >60 mL/min/1.73m2   Troponin T, High Sensitivity   Result Value Ref Range    Troponin T, High Sensitivity 50 (H) <=22 ng/L   CBC with platelets and differential   Result Value Ref Range    WBC Count 12.2 (H) 4.0 - 11.0 10e3/uL    RBC Count 4.56 4.40 - 5.90 10e6/uL    Hemoglobin 14.0 13.3 - 17.7 g/dL    Hematocrit 42.8 40.0 - 53.0 %    MCV 94 78 - 100 fL    MCH 30.7 26.5 - 33.0 pg    MCHC 32.7 31.5 - 36.5 g/dL    RDW 11.9 10.0 - 15.0 %    Platelet Count 124 (L) 150 - 450 10e3/uL    % Neutrophils 88 %    % Lymphocytes 8 %    % Monocytes 4 %    % Eosinophils 0 %    % Basophils 0 %    % Immature Granulocytes 0 %    NRBCs per 100 WBC 0 <1 /100    Absolute Neutrophils 10.6 (H) 1.6 - 8.3 10e3/uL    Absolute Lymphocytes 1.0 0.8 - 5.3 10e3/uL    Absolute Monocytes 0.5 0.0 - 1.3 10e3/uL    Absolute Eosinophils 0.1 0.0 - 0.7 10e3/uL    Absolute Basophils 0.0 0.0 - 0.2 10e3/uL    Absolute Immature  Granulocytes 0.1 <=0.4 10e3/uL    Absolute NRBCs 0.0 10e3/uL   Glucose by meter   Result Value Ref Range    GLUCOSE BY METER POCT 49 (LL) 70 - 99 mg/dL   Echocardiogram Complete   Result Value Ref Range    LVEF  55-60%     [facility]    [id number]  [id number]  [id number]  [id number]^KARL^AL^ROMA     [facility]  Echocardiography Laboratory  [address] [place] [address]     Name: CHIKIS NEWBERRY  MRN: [id number]  : 1940  Study Date: 10/13/2022 07:54 AM  Age: 82 yrs  Gender: Male  Patient Location: [facility]  Reason For Study: Hypertension (HTN)  Ordering Physician: NAIDA BARAJAS  Referring Physician: Basilio Alfaro MD  Performed By: Meera Otero RDCS     BSA: 1.8 m2  Height: 67 in  Weight: 147 lb  HR: 92  BP: 175/87 mmHg  ______________________________________________________________________________  Procedure  Complete Portable Echo Adult.  ______________________________________________________________________________  Interpretation Summary     The visual ejection fraction is 55-60%.  The right ventricle is normal in size and function.  Moderate concentric left ventricular hypertrophy.  There is mild (1+) tricuspid regurgitation.  There is mild (1+) aortic regurgitation.  RVSP = 46mmHg + RAP. The IVC is not visualized.     No prior echo available for comparison.  ______________________________________________________________________________  Left Ventricle  The left ventricle is normal in size. There is moderate concentric left  ventricular hypertrophy. Left ventricular systolic function is normal. Grade I  or early diastolic dysfunction. The visual ejection fraction is 55-60%. No  regional wall motion abnormalities noted.     Right Ventricle  The right ventricle is normal in size and function.     Atria  Normal left atrial size. Right atrial size is normal.     Mitral Valve  There is trace mitral regurgitation.     Tricuspid Valve  There is mild (1+) tricuspid  regurgitation. The right ventricular systolic  pressure is approximated at 46.7 mmHg plus the right atrial pressure.     Aortic Valve  There is mild trileaflet aortic sclerosis. There is mild (1+) aortic  regurgitation.     Pulmonic Valve  There is trace pulmonic valvular regurgitation.     Vessels  Normal size ascending aorta. Inferior vena cava not well visualized for  estimation of right atrial pressure.     Pericardium  There is no pericardial effusion.     Rhythm  Sinus rhythm was noted.  ______________________________________________________________________________  MMode/2D Measurements & Calculations  IVSd: 1.4 cm     LVIDd: 3.4 cm  LVIDs: 2.1 cm  LVPWd: 1.4 cm  FS: 38.2 %  LV mass(C)d: 165.3 grams  LV mass(C)dI: 93.2 grams/m2  Ao root diam: 2.7 cm  LA dimension: 3.4 cm  asc Aorta Diam: 3.2 cm  LA/Ao: 1.3  LA Volume (BP): 38.6 ml  LA Volume Index (BP): 21.8 ml/m2  RWT: 0.83     Doppler Measurements & Calculations  MV E max dagoberto: 81.2 cm/sec  MV A max dagoberto: 133.0 cm/sec  MV E/A: 0.61  MV dec time: 0.20 sec  AI P1/2t: 298.9 msec  PA acc time: 0.12 sec  TR max dagoberto: 341.5 cm/sec  TR max P.7 mmHg  E/E' av.0  Lateral E/e': 22.7  Medial E/e': 13.3     ______________________________________________________________________________  Report approved by: MD Denae Borges 10/13/2022 09:31 AM         Glucose by meter   Result Value Ref Range    GLUCOSE BY METER POCT 57 (L) 70 - 99 mg/dL   Glucose by meter   Result Value Ref Range    GLUCOSE BY METER POCT 145 (H) 70 - 99 mg/dL   EKG 12-lead, tracing only   Result Value Ref Range    Systolic Blood Pressure  mmHg    Diastolic Blood Pressure  mmHg    Ventricular Rate 92 BPM    Atrial Rate 92 BPM    WY Interval 144 ms    QRS Duration 68 ms     ms    QTc 445 ms    P Axis 60 degrees    R AXIS 49 degrees    T Axis 150 degrees    Interpretation ECG       Sinus rhythm  Moderate voltage criteria for LVH, may be normal variant  T wave abnormality, consider  inferolateral ischemia  Abnormal ECG  When compared with ECG of 12-OCT-2022 15:15,  No significant change was found     Troponin T, High Sensitivity   Result Value Ref Range    Troponin T, High Sensitivity 46 (H) <=22 ng/L   Glucose by meter   Result Value Ref Range    GLUCOSE BY METER POCT 253 (H) 70 - 99 mg/dL   Troponin T, High Sensitivity   Result Value Ref Range    Troponin T, High Sensitivity 48 (H) <=22 ng/L   Lactic Acid STAT   Result Value Ref Range    Lactic Acid 1.0 0.7 - 2.0 mmol/L   Glucose by meter   Result Value Ref Range    GLUCOSE BY METER POCT 257 (H) 70 - 99 mg/dL   Glucose by meter   Result Value Ref Range    GLUCOSE BY METER POCT 257 (H) 70 - 99 mg/dL   Glucose by meter   Result Value Ref Range    GLUCOSE BY METER POCT 257 (H) 70 - 99 mg/dL

## 2022-10-13 NOTE — PLAN OF CARE
Goal Outcome Evaluation:      Plan of Care Reviewed With: patient, caregiver, child    Overall Patient Progress: no changeOverall Patient Progress: no change     Pt Alert to self, calm and sleeping. VSS; Elevated BP. PIV infusing NS at 75ml/hr.  Pt denies pain. Pt non ambulatory at baseline. Assist x2 with lift. Voiding good amounts. Incontinent of bowel and bladder. Carb count diet. Pt's daughter refused  services, signed waiver. Waiver in chart. Will continue to monitor pt.

## 2022-10-13 NOTE — PLAN OF CARE
Goal Outcome Evaluation:  Pt admitted to  626.  Alert to self, anxious. Vital signs, low grade fever, remote telemetry monitoring.  Incontinent of urine and stool , camille, skin care and linen change done.  Skin intact but dry.  Iv infusing, has small emesis.  Took pills by mouth without any problems.  Denies pain.  Pt is non ambulatory at baseline, turned, repositioned.  Blood glucose monitored.  Falls risk precautions.  Pt's daughter refused  services, signed waiver.    Plan of Care Reviewed With: patient, family

## 2022-10-14 NOTE — PROGRESS NOTES
Cross cover.  Glucose has been elevated, latest was 375 at 2 AM.  -Noted there was episode of hypoglycemia yesterday and his Levemir was held.  -Ordered NovoLog 5 units subcu x1.

## 2022-10-14 NOTE — PROGRESS NOTES
[facility]    Medicine Progress Note - Hospitalist Service    Date of Admission:  10/12/2022    Assessment & Plan                 Eh Callahan is a 82 year old [place] gentleman with known history of chronic kidney disease, insulin requiring diabetes mellitus, hypertension, atrial fibrillation not on chronic anticoagulation, hepatitis C, depression who lives at home with family and has a presents in the emergency room earlier today due to increasing generalized, decreased oral intake, and alteration in mental state.    Problem list:    #1 alteration in mental state, generalized weakness, found with tachycardia, hypotension, fever spikes  #2 sepsis secondary to urinary tract infection urinary cultures showing isolated Klebsiella  #3 infectious encephalopathy    -Continue current inpatient care at risk for clinical deterioration.  -Remain on IV antibiotics currently receiving ceftriaxone Will await further information with sensitivity of isolated Klebsiella   -Blood cultures showing no growth to date   -CT of the abdomen and pelvis minus contrast earlier showed moderate amount of stool in the rectum, small bilateral pleural effusion and cholelithiasis   -Currently has no ongoing abdominal symptomatology, abdominal exam showed soft abdomen, nontender and no guarding.    -Monitor cultures, adjust antibiotics accordingly.  Follow infectious markers  -As needed antipyretics    #4 severe uncontrolled hypertension  -Home regimen reviewed on metoprolol 12.5 mg twice daily and Isordil  -I increase his metoprolol to 25 mg twice daily and continued his Isordil  -Also noted notes at allergy list regarding prior bradycardia with metoprolol while on 50 mg dosing  -We will continue to monitor  -May need as needed hydralazine if with severe persistent uncontrolled hypertension    #5 detectable troponin likely from type II AMI from underlying a sepsis with concomitant TRANG on top of CKD and uncontrolled  hypertension  -Appreciate input from cardiology service  -No clear evidence of ACS and urgent coronary angiogram  -May consider outpatient restratification with Lexiscan cardiac stress testing once much more stable  -Echocardiogram showed preserved EF, mild AR, mild TR, no regional wall motion abnormality seen    #6 insulin requiring diabetes mellitus  -Earlier complicated with episode of hypoglycemia   -Now with increasing oral intake, anticipating further issues with hyperglycemia   -I resume short acting NovoLog prandial coverage at 1 unit per 15 g of carbohydrate, initiated back basal Lantus coverage at 10 units.  At home he takes 30 units at nighttime.  This can be further titrated and adjusted based on subsequent glucose levels and depending on his oral intake as well.      #7 TRANG on top of CKD  -Gentle hydration with isotonic solution, currently at 75 mL/h  -Monitor metabolic panel.  Pending metabolic panel today  -Anticipating improvement once blood pressure levels, sepsis improves    #8 history of atrial fibrillation  -EKG showing NSR  -On metoprolol  -Not on chronic anticoagulants    #9 history of hepatitis C    #10 physical deconditioning  -Appreciate input from PT, OT and social service  Family still hopeful for home discharge.  Physical therapy recommending long-term care versus TCU if participation increases.             Diet: Moderate Consistent Carb (60 g CHO per Meal) Diet    DVT Prophylaxis: Heparin SQ and Pneumatic Compression Devices  Liu Catheter: Not present  Central Lines: None  Cardiac Monitoring: ACTIVE order. Indication: sepsis  Code Status: No CPR- Do NOT Intubate appreciate palliative care input.  Will respect wishes and converted to a medical order    Disposition Plan     Expected Discharge Date: Anticipating this gentleman will still be needing inpatient care at least in the next 2 more days               The patient's care was discussed with the Bedside Nurse, Patient and Patient's  Family.    Alejandro Martini MD, MD  Hospitalist Service  [facility]  Securely message with the [employer] Web Console (learn more here)  Text page via [employer] Paging/Directory         Clinically Significant Risk Factors Present on Admission                     ______________________________________________________________________    Interval History   Continuing service care today.  Seen and examined.  Chart reviewed.  Met our patient while he is sleeping and has no reported episodes of abdominal pain, or recurrent nausea or vomiting.  No diarrhea.  No bleeding tendencies.  Afebrile this morning.  He is not requiring any oxygen support.  He remained to be a poor historian but multiple family members namely his wife and 2 daughters were present at bedside and updated regarding his clinical status.  Family mentioned that he was able to tolerate oral diet earlier.    Data reviewed today: I reviewed all medications, new labs and imaging results over the last 24 hours. I personally reviewed .    Physical Exam   Vital Signs: Temp: 97.8  F (36.6  C) Temp src: Temporal BP: (!) 156/75 Pulse: 76   Resp: 20 SpO2: 97 % O2 Device: None (Room air)    Weight: 147 lbs 11.33 oz  HEENT; Atraumatic, normocephalic, pinkish conjuctiva, pupils bilateral reactive   Skin: warm and moist, no rashes  Lungs: equal chest expansion, clear to auscultation, no wheezes, no stridor, no crackles,   Heart: normal rate, normal rhythm, no rubs or gallops.   Abdomen: normal bowel sounds, no tenderness, no peritoneal signs, no guarding  Extremities: no deformities, no edema   Neuro; poor historian, sleeping, does not follow all simple verbal instructions, moves all extremities spontaneously, limited neuro exam  Psych; not combative, not agitated        Data   Recent Labs   Lab 10/14/22  0801 10/14/22  0155 10/13/22  2136 10/13/22  0854 10/13/22  0635 10/12/22  2009 10/12/22  1520   WBC  --   --   --   --  12.2*  --  9.6   HGB  --    --   --   --  14.0  --  14.0   MCV  --   --   --   --  94  --  94   PLT  --   --   --   --  124*  --  128*   NA  --   --   --   --  137  --  135*   POTASSIUM  --   --   --   --  4.5  --  4.5   CHLORIDE  --   --   --   --  105  --  99   CO2  --   --   --   --  21*  --  26   BUN  --   --   --   --  42.7*  --  48.3*   CR  --   --   --   --  2.24*  --  2.40*   ANIONGAP  --   --   --   --  11  --  10   DOMINGO  --   --   --   --  8.7*  --  9.2   * 375* 357*   < > 62*   < > 173*   ALBUMIN  --   --   --   --  2.8*  --  3.5   PROTTOTAL  --   --   --   --  6.3*  --  7.0   BILITOTAL  --   --   --   --  1.5*  --  1.0   ALKPHOS  --   --   --   --  78  --  99   ALT  --   --   --   --  27  --  32   AST  --   --   --   --  28  --  30    < > = values in this interval not displayed.     Recent Results (from the past 24 hour(s))   CT Abdomen Pelvis w/o Contrast    Narrative    CT ABDOMEN PELVIS WITHOUT CONTRAST 10/13/2022 1:13 PM    CLINICAL HISTORY: Abdominal pain. Sepsis.  TECHNIQUE: CT scan of the abdomen and pelvis was performed without IV  contrast. Multiplanar reformats were obtained. Dose reduction  techniques were used.  CONTRAST: None.  COMPARISON: CT of the chest, abdomen, and pelvis performed 8/23/2021.    FINDINGS:   LOWER CHEST: Small bilateral pleural effusions. Mild interlobular  septal thickening at both lung bases suggests pulmonary edema.  Coronary artery calcification.    HEPATOBILIARY: Postoperative changes of prior left hepatectomy.  Previously described indeterminate low-density liver lesions are not  well seen on today's noncontrast scan. Small calcified gallstone  within the gallbladder, not well seen on the previous exam.    PANCREAS: Normal.    SPLEEN: Normal.    ADRENAL GLANDS: Normal.    KIDNEYS/BLADDER: Scattered cortical scarring is noted in the right  kidney. The kidneys are otherwise unremarkable. No hydronephrosis.    BOWEL: Moderate amount of stool in the colon, similar to the previous  exam. No  bowel obstruction. No convincing evidence for colitis or  diverticulitis. Appendectomy.    PELVIC ORGANS: Mild prostatic enlargement.    LYMPH NODES: No enlarged lymph nodes are identified in the abdomen or  pelvis.    VASCULATURE: Moderate atherosclerotic aortoiliac calcification.    ADDITIONAL FINDINGS: None.    MUSCULOSKELETAL: Degenerative changes in the lumbar spine.      Impression    IMPRESSION:   1.  Moderate amount of stool in the rectum, similar to the previous  exam.  2.  Mild smooth intralobular septal thickening at both lung bases  suggests pulmonary edema.  3.  Small bilateral pleural effusions.  4.  Cholelithiasis.     OUMAR VICKERS MD         SYSTEM ID:  [id number]     Medications     sodium chloride 75 mL/hr at 10/14/22 1148       amLODIPine  5 mg Oral Daily     aspirin  325 mg Oral Daily     atorvastatin  40 mg Oral QPM     cefTRIAXone  2 g Intravenous Q24H     insulin aspart  1-7 Units Subcutaneous TID AC     insulin aspart  1-5 Units Subcutaneous At Bedtime     insulin aspart   Subcutaneous TID w/meals     insulin glargine  10 Units Subcutaneous QAM AC     isosorbide dinitrate  20 mg Oral BID     latanoprost  1 drop Both Eyes At Bedtime     metoprolol tartrate  25 mg Oral BID     multivitamin, therapeutic  1 tablet Oral Daily     sodium chloride (PF)  3 mL Intracatheter Q8H     traZODone  75 mg Oral At Bedtime     Vitamin D3  25 mcg Oral Daily

## 2022-10-14 NOTE — PLAN OF CARE
Goal Outcome Evaluation:    Overall Patient Progress: improving    Pt alert and oriented to self, confused. Pt SBP elevated, low grade temp early on in the shift, Tylenol given, effective. Pt Montenegrin speaking-family present in room to translate, pt's children declined  services. Pt on RA, no report of pain. Pt baseline bedrest, A2 with ceiling lift. Pt tolerating moderate carb diet- needs help with feeding. Pills need to be crushed and pt likes them in applesauce. Pt incontinent of bowel and bladder. 1 BM this shift.  at 2200, 2 units given. 2 am , provider notified, 5 units of insulin ordered and given. Sepsis protocol triggered, lactic acid came back at 0.9. Troponin drawn every 6 hours. Plan TBD. Palliative care consulted.

## 2022-10-14 NOTE — CONSULTS
Care Management Initial Consult    General Information  Assessment completed with: Patient, Family,    Type of CM/SW Visit: Initial Assessment    Primary Care Provider verified and updated as needed: Yes   Readmission within the last 30 days: no previous admission in last 30 days      Reason for Consult: discharge planning  Advance Care Planning:            Communication Assessment  Patient's communication style: spoken language (non-English)    Hearing Difficulty or Deaf: no   Wear Glasses or Blind: no    Cognitive  Cognitive/Neuro/Behavioral: .WDL except  Level of Consciousness: confused  Arousal Level: arouses to voice  Orientation: disoriented to, place, time, situation  Mood/Behavior: calm  Best Language: 0 - No aphasia  Speech: slow    Living Environment:   People in home: other (see comments) (Multiple family members)     Current living Arrangements: apartment      Able to return to prior arrangements: yes  Living Arrangement Comments: Needs lift    Family/Social Support:  Care provided by:    Provides care for: no one, unable/limited ability to care for self  Marital Status:   Children          Description of Support System: Involved, Supportive    Support Assessment: Adequate family and caregiver support    Current Resources:   Patient receiving home care services: No     Community Resources: None  Equipment currently used at home: walker, rolling  Supplies currently used at home: None    Employment/Financial:  Employment Status:          Financial Concerns:             Lifestyle & Psychosocial Needs:  Social Determinants of Health     Tobacco Use: Not on file   Alcohol Use: Not on file   Financial Resource Strain: Not on file   Food Insecurity: Not on file   Transportation Needs: Not on file   Physical Activity: Not on file   Stress: Not on file   Social Connections: Not on file   Intimate Partner Violence: Not on file   Depression: Not on file   Housing Stability: Not on file       Functional  Status:  Prior to admission patient needed assistance:   Dependent ADLs:: Ambulation-walker     Assesssment of Functional Status: Not at baseline with ADL Functioning    Mental Health Status:          Chemical Dependency Status:                Values/Beliefs:  Spiritual, Cultural Beliefs, Mandaen Practices, Values that affect care:                 Additional Information:    Met with pt/family at bedside to discuss discharge planning. Pt family explained that they still plan to bring pt home at discharge and would like homecare set up. Family states that they are open to homecare agencies and would be open to any help that they could get through homecare. Sent homecare referral PT/OT/RN/HHA/SW to [facility]. Pt/family explained that they have a WC at home but anticipate that the will need a lift. Paged MD for DME order, started the application for a lift.  Family anticipates that they will need help with transport and are aware of potential cost if pt does not increase mobility prior to discharge. SW following.     SEAN Valencia, [facility]  Inpatient Care Coordination  Ortho/Spine Unit  [phone]  Renetta Natarajan, [facility]

## 2022-10-14 NOTE — PLAN OF CARE
Patient on side of bed with PT, otherwise has been bedrest. Appetite fair. No c/o pain. Blood sugars elevated, insulin adjusted, lantus added and given.

## 2022-10-14 NOTE — PLAN OF CARE
Day RN ([phone])    Patient vital signs are at baseline: No,  Reason:  Pt intermittently febrile with frequent low grade temps.  Bp continues to be elevated, especially this morning which has improved since then with scheduled PO medications.  Additional new bp med added today and IV labetalol available PRN for SBP's over 200.  Tachycardic at times.  Tolerating room air.  Patient able to ambulate as they were prior to admission or with assist devices provided by therapies during their stay:  No,  Reason:  Baseline mostly immobile, only up with A2 and ceiling lift today.  Repositioning facilitated though pt doing some turning and shifting independently.  Patient MUST void prior to discharge:  Yes  Patient able to tolerate oral intake:  Yes  Pain has adequate pain control using Oral analgesics:  Yes  Does patient have an identified :  Yes  Has goal D/C date and time been discussed with patient:  Yes    Pt A/O x self - Ecuadorean speaking and pt's adult children are acting as interpreters, they declined interpreting services through hospital and signed waiver.  VS as noted above, fevers intermittently.  Tele.  Pt denied pain overall, stated chest pain x1 but also endorsed pain in all areas of his body at the same time so likely to be dementia related.  Skin ok.  Up A2 with ceiling lift.  Voiding adequately - incontinent of bowel and bladder.  1x bm this shift.  Tolerating regular diet well.  Intermittently pills needed to be crushed and mixed with water to be swallowed per family recommendation. BG was 49* (see provider notify note for whole sequence of events), 145, 253, 292, and 271.  Continuing to monitor troponins. Plan is TBD - palliative care consulted;  Pt's code status changed to DNR/DNI this shift.  Will continue to monitor.

## 2022-10-15 NOTE — PLAN OF CARE
Goal Outcome Evaluation:       Pt Alert to self only. Disoriented to time, place and situation. Assist x 2 with lift device. VSS except BP slightly elevated. LS clear. BS +. Tolerating Mod Cho diet. . PIV infusing NS 2 75 mL/hr. On Rocephin for UTI. PRN Tylenol given for comfort.   Consult: Palliative and cardiology    Plan to discharge home with home care services

## 2022-10-15 NOTE — PLAN OF CARE
Ax2 with lift. Modified Carb diet.  VS - BP elevated. Prn labetolol given. BP - 166/82.     02 - 92% on RA. LS - diminished.  Tylenol given for pain, and low grade fever.  Medication and cares refused at around 0200. More compliant in the morning.  BM this shift. Soft/creamy.

## 2022-10-15 NOTE — PLAN OF CARE
Goal Outcome Evaluation: Goal partially met      Plan of Care Reviewed With: child, patient    Patient A&O to self only, slow speech, Ax2 with lift. VS: Elevated BP, /86, 154/73, Hydralazine scheduled. Elevated BG,  & 318, Lantus dose increased to 25 Units, patient refusing meals and meds sometimes but family at bedside helping. Incontinent of bowel and bladder, total feeder with family assisting.   Plan: Continue with abx and IVF, manage BG. Discharge home in 1-2 days.

## 2022-10-15 NOTE — PLAN OF CARE
Goal Outcome Evaluation:                      Bed rest, RA, PIV infusing NS @100, BS checks, Inc of b&b, alert to self only. Family at bedside. Pt able to move independently with repositioning but is inconsistent - q2 hour repo    Sepsis protocol triggered, LA 1.4. no sx, continue to monitor    Discharge date TBD - discharge to home with homecare

## 2022-10-15 NOTE — PROGRESS NOTES
[facility]    Medicine Progress Note - Hospitalist Service    Date of Admission:  10/12/2022    Assessment & Plan                 Eh Callahan is a 82 year old [place] gentleman with known history of chronic kidney disease, insulin requiring diabetes mellitus, hypertension, atrial fibrillation not on chronic anticoagulation, hepatitis C, depression who lives at home with family and has a presents in the emergency room earlier today due to increasing generalized, decreased oral intake, and alteration in mental state.    Problem list:    #1 alteration in mental state, generalized weakness, found with tachycardia, hypotension, fever spikes  #2 sepsis secondary to urinary tract infection urinary cultures showing isolated Klebsiella  #3 infectious encephalopathy-improving    -Continue current inpatient care at risk for clinical deterioration.  -Remain on IV antibiotics currently receiving ceftriaxone.  -Sensitivity panel showing Klebsiella sensitive to quinolones, ceftriaxone   -Blood cultures showing no growth to date   -CT of the abdomen and pelvis minus contrast earlier showed moderate amount of stool in the rectum, small bilateral pleural effusion and cholelithiasis   -Currently has no ongoing abdominal symptomatology, abdominal exam showed soft abdomen, nontender and no guarding.    -Monitor cultures, adjust antibiotics accordingly.  Follow infectious markers  -As needed antipyretics    #4 severe uncontrolled hypertension  -Home regimen reviewed on metoprolol 12.5 mg twice daily and Isordil  -I increase his metoprolol to 25 mg twice daily and continued his Isordil  -Also noted notes at allergy list regarding prior bradycardia with metoprolol while on 50 mg dosing  -We will continue to monitor  -Initially I was intending to increase his amlodipine dose to 10 mg from home regimen to 5 mg.  However documentation noted at allergy section that he cannot tolerate 10 mg due to increasing leg swelling  the past  -Added oral hydralazine starting at 25 mg every 8 hours    -May need as needed IV hydralazine if with severe persistent uncontrolled hypertension    #5 detectable troponin likely from type II AMI from underlying a sepsis with concomitant TRANG on top of CKD and uncontrolled hypertension  -Appreciate input from cardiology service  -No clear evidence of ACS and urgent coronary angiogram  -May consider outpatient restratification with Lexiscan cardiac stress testing once much more stable  -Echocardiogram showed preserved EF, mild AR, mild TR, no regional wall motion abnormality seen    #6 insulin requiring diabetes mellitus  -Earlier complicated with episode of hypoglycemia   -Now with increasing oral intake, anticipating further issues with hyperglycemia   -Overnight still exhibiting increasing, worsening uncontrolled hyperglycemia  -Increase his Lantus to 25 units daily, increase his prandial short acting NovoLog 1 unit per 10 g of carbohydrates at mealtime  -May also increase he is insulin sliding scale to high intensity if still with uncontrolled hyperglycemia       #7 TRANG on top of CKD  -Gentle hydration with isotonic solution, currently at 75 mL/h  -Monitor metabolic panel.  Creatinine still slightly increasing  -Stopped his aspirin, he is not receiving any ACE inhibitor's or ARB  -Continue on gentle hydration.  No nausea or vomiting or diarrhea  -Anticipating improvement once blood pressure levels, sepsis improves    #8 history of atrial fibrillation  -EKG showing NSR  -On metoprolol  -Not on chronic anticoagulants    #9 history of hepatitis C    #10 physical deconditioning  -Appreciate input from PT, OT and social service  Family still hopeful for home discharge.  Physical therapy recommending long-term care versus TCU if participation increases.             Diet: Moderate Consistent Carb (60 g CHO per Meal) Diet    DVT Prophylaxis: Heparin SQ and Pneumatic Compression Devices  Liu Catheter: Not  present  Central Lines: None  Cardiac Monitoring: None  Code Status: No CPR- Do NOT Intubate appreciate palliative care input.  Will respect wishes and converted to a medical order    Disposition Plan     Expected Discharge Date: Anticipating this gentleman will still be needing inpatient care at least in the next 2 more days               The patient's care was discussed with the Bedside Nurse, Patient and Patient's Family.    Alejandro Martini MD, MD  Hospitalist Service  [facility]  Securely message with the Vocera Web Console (learn more here)  Text page via [employer] Paging/Directory         Clinically Significant Risk Factors Present on Admission                     ______________________________________________________________________    Interval History   Continuing service care today.  Seen and examined.  Chart reviewed.   Family updated as one of his sons is present at bedside.  It appears to me that our patient is much better in terms of mentation as he is more interactive and was able to provide appropriate answers with questions such as stating his name.  No reported diarrhea.  No bleeding tendencies.  Currently afebrile.  He is not requiring any oxygen support.    Data reviewed today: I reviewed all medications, new labs and imaging results over the last 24 hours. I personally reviewed .    Physical Exam   Vital Signs: Temp: 99.3  F (37.4  C) Temp src: Temporal BP: (!) 184/86 Pulse: 90   Resp: 20 SpO2: 94 % O2 Device: None (Room air)    Weight: 147 lbs 11.33 oz  HEENT; Atraumatic, normocephalic, pinkish conjuctiva, pupils bilateral reactive   Skin: warm and moist, no rashes  Lungs: equal chest expansion, clear to auscultation, no wheezes, no stridor, no crackles,   Heart: normal rate, normal rhythm, no rubs or gallops.   Abdomen: normal bowel sounds, no tenderness, no peritoneal signs, no guarding  Extremities: no deformities, no edema   Neuro; poor historian, awake, alert,  oriented to his name, following some simple verbal instructions  moves all extremities spontaneously, limited neuro exam  Psych; not combative, not agitated        Data   Recent Labs   Lab 10/15/22  0749 10/15/22  0646 10/14/22  2204 10/14/22  1229 10/14/22  1135 10/13/22  0854 10/13/22  0635 10/12/22  2009 10/12/22  1520   WBC  --  7.7  --   --  9.6  --  12.2*  --  9.6   HGB  --  12.8*  --   --  11.6*  --  14.0  --  14.0   MCV  --  99  --   --  95  --  94  --  94   PLT  --  128*  --   --  110*  --  124*  --  128*   NA  --  138  --   --  133*  --  137  --  135*   POTASSIUM  --  3.8  --   --  5.4*  --  4.5  --  4.5   CHLORIDE  --  106  --   --  103  --  105  --  99   CO2  --  18*  --   --  20*  --  21*  --  26   BUN  --  45.2*  --   --  48.9*  --  42.7*  --  48.3*   CR  --  2.61*  --   --  2.54*  --  2.24*  --  2.40*   ANIONGAP  --  14  --   --  10  --  11  --  10   DOMINGO  --  8.2*  --   --  7.5*  --  8.7*  --  9.2   * 311* 271*   < > 466*   < > 62*   < > 173*   ALBUMIN  --   --   --   --   --   --  2.8*  --  3.5   PROTTOTAL  --   --   --   --   --   --  6.3*  --  7.0   BILITOTAL  --   --   --   --   --   --  1.5*  --  1.0   ALKPHOS  --   --   --   --   --   --  78  --  99   ALT  --   --   --   --   --   --  27  --  32   AST  --   --   --   --   --   --  28  --  30    < > = values in this interval not displayed.     No results found for this or any previous visit (from the past 24 hour(s)).  Medications     sodium chloride 75 mL/hr at 10/14/22 1651       amLODIPine  5 mg Oral Daily     [Held by provider] aspirin  325 mg Oral Daily     atorvastatin  40 mg Oral QPM     cefTRIAXone  2 g Intravenous Q24H     heparin ANTICOAGULANT  5,000 Units Subcutaneous Q12H     insulin aspart  1-7 Units Subcutaneous TID AC     insulin aspart  1-5 Units Subcutaneous At Bedtime     insulin aspart   Subcutaneous TID w/meals     insulin glargine  15 Units Subcutaneous Once     [START ON 10/16/2022] insulin glargine  25 Units  Subcutaneous QAM AC     isosorbide dinitrate  20 mg Oral BID     latanoprost  1 drop Both Eyes At Bedtime     metoprolol tartrate  25 mg Oral BID     multivitamin, therapeutic  1 tablet Oral Daily     sodium chloride (PF)  3 mL Intracatheter Q8H     traZODone  75 mg Oral At Bedtime     Vitamin D3  25 mcg Oral Daily

## 2022-10-15 NOTE — PROVIDER NOTIFICATION
Pt refusing to let us take his blood sugar, or give him his Heparin. Daughter in room translating for him. Grabbed hold of aides arm hard, and tried to grab heparin injection. Otherwise calm. Takes pills crush w/ apple sauce.

## 2022-10-16 NOTE — PLAN OF CARE
Ax2 with lift. A&Ox1 - self. Modified Carb diet.  VSS. Elevated BP. 02 - 96% on RA. LS - clear.  Blood sugars - 239 & 167.  IVF infusing 75ml/hr.  Slept well. Voiding.

## 2022-10-16 NOTE — PLAN OF CARE
Patient up with therapy to sit on bedside. Moves with assist of two. Gave tylenol for headache, recheck patient was sleeping. Appetite poor. Family in room.

## 2022-10-16 NOTE — PROGRESS NOTES
[facility]    Medicine Progress Note - Hospitalist Service    Date of Admission:  10/12/2022    Assessment & Plan                 Eh Callahan is a 82 year old [place] gentleman with known history of chronic kidney disease, insulin requiring diabetes mellitus, hypertension, atrial fibrillation not on chronic anticoagulation, hepatitis C, depression who lives at home with family and has a presents in the emergency room earlier today due to increasing generalized, decreased oral intake, and alteration in mental state.    Problem list:    #1 alteration in mental state, generalized weakness, found with tachycardia, hypotension, fever spikes  #2 sepsis secondary to urinary tract infection urinary cultures showing isolated Klebsiella  #3 infectious encephalopathy-improving    -Continue current inpatient care at risk for clinical deterioration.  -Remain on IV antibiotics currently receiving ceftriaxone.  -Sensitivity panel showing Klebsiella sensitive to quinolones, ceftriaxone   -Blood cultures showing no growth to date   -CT of the abdomen and pelvis minus contrast earlier showed moderate amount of stool in the rectum, small bilateral pleural effusion and cholelithiasis   -Currently has no ongoing abdominal symptomatology, abdominal exam showed soft abdomen, nontender and no guarding.    -Monitor cultures, adjust antibiotics accordingly.  Follow infectious markers  -As needed antipyretics    #4 severe uncontrolled hypertension  -Home regimen reviewed on metoprolol 12.5 mg twice daily and Isordil  -I increase his metoprolol to 25 mg twice daily and continued his Isordil  -Also noted notes at allergy list regarding prior bradycardia with metoprolol while on 50 mg dosing  -We will continue to monitor  -Initially I was intending to increase his amlodipine dose to 10 mg from home regimen to 5 mg.  However documentation noted at allergy section that he cannot tolerate 10 mg due to increasing leg swelling  the past  -Still with elevated blood pressure levels.  Further optimization with his hydralazine and increased to 50 mg every 8 hours    -May need as needed IV hydralazine if with severe persistent uncontrolled hypertension    #5 detectable troponin likely from type II AMI from underlying a sepsis with concomitant TRANG on top of CKD and uncontrolled hypertension  -Appreciate input from cardiology service  -No clear evidence of ACS and urgent coronary angiogram  -May consider outpatient restratification with Lexiscan cardiac stress testing once much more stable  -Echocardiogram showed preserved EF, mild AR, mild TR, no regional wall motion abnormality seen    #6 insulin requiring diabetes mellitus  -Earlier complicated with episode of hypoglycemia   -Now with increasing oral intake, anticipating further issues with hyperglycemia   -Overnight still exhibiting increasing, worsening uncontrolled hyperglycemia  -Increase his Lantus to 25 units daily, increase his prandial short acting NovoLog 1 unit per 10 g of carbohydrates at mealtime  -May also increase he is insulin sliding scale to high intensity if still with uncontrolled hyperglycemia   -Currently glucose levels improvement acceptable ranges    #7 TRANG on top of CKD    -Monitor metabolic panel.  Creatinine slightly better.  Nonoliguric.  -Not hypoxic and does not appear to be volume overloaded.  Continue IV fluids changed to half-normal saline at 75 MLS per hour  -Stopped his aspirin, he is not receiving any ACE inhibitor's or ARB  -Continue on gentle hydration.  No nausea or vomiting or diarrhea  -Anticipating improvement once blood pressure levels, sepsis improves    #8 history of atrial fibrillation  -EKG showing NSR  -On metoprolol  -Not on chronic anticoagulants    #9 history of hepatitis C    #10 physical deconditioning  -Appreciate input from PT, OT and social service  Family still hopeful for home discharge.  Physical therapy recommending long-term care  versus TCU if participation increases.             Diet: Moderate Consistent Carb (60 g CHO per Meal) Diet    DVT Prophylaxis: Heparin SQ and Pneumatic Compression Devices  Liu Catheter: Not present  Central Lines: None  Cardiac Monitoring: None  Code Status: No CPR- Do NOT Intubate appreciate palliative care input.  Will respect wishes and converted to a medical order    Disposition Plan     Expected Discharge Date: Anticipating this gentleman will still be needing inpatient care at least in the next 36-48 more hours.               The patient's care was discussed with the Bedside Nurse, Patient and Patient's Family.    Alejandro Martini MD, MD  Hospitalist Service  [facility]  Securely message with the Vocera Web Console (learn more here)  Text page via [employer] Paging/Directory         Clinically Significant Risk Factors Present on Admission                     ______________________________________________________________________    Interval History   Continuing service care today.  Seen and examined.  Chart reviewed.   Family updated as one of his daughteris present at bedside.  He is sleeping initially but was easily awakened by minimal physical stimuli.  He was able to state his name.  Follows simple verbal instructions he also mentioned about some tenderness at the stomach area.  No significant issues such as vomiting, diarrhea, bleeding tendencies or any hypoxia and he is not requiring any oxygen support.  Currently afebrile.    Data reviewed today: I reviewed all medications, new labs and imaging results over the last 24 hours. I personally reviewed .    Physical Exam   Vital Signs: Temp: 98.2  F (36.8  C) Temp src: Temporal BP: (!) 181/79 Pulse: 86   Resp: 16 SpO2: 100 % O2 Device: None (Room air)    Weight: 147 lbs 11.33 oz  HEENT; Atraumatic, normocephalic, pinkish conjuctiva, pupils bilateral reactive   Skin: warm and moist, no rashes  Lungs: equal chest expansion, clear to  auscultation, no wheezes, no stridor, no crackles,   Heart: normal rate, normal rhythm, no rubs or gallops.   Abdomen: normal bowel sounds, no tenderness, no peritoneal signs, no guarding  Extremities: no deformities, no edema   Neuro; poor historian, awake, alert, oriented to his name, following some simple verbal instructions  moves all extremities spontaneously, limited neuro exam  Psych; not combative, not agitated        Data   Recent Labs   Lab 10/16/22  0930 10/16/22  0810 10/16/22  0809 10/15/22  0749 10/15/22  0646 10/14/22  1229 10/14/22  1135 10/13/22  0854 10/13/22  0635 10/12/22  2009 10/12/22  1520   WBC  --   --   --   --  7.7  --  9.6  --  12.2*  --  9.6   HGB  --   --   --   --  12.8*  --  11.6*  --  14.0  --  14.0   MCV  --   --   --   --  99  --  95  --  94  --  94   PLT  --   --   --   --  128*  --  110*  --  124*  --  128*   NA  --  143  --   --  138  --  133*  --  137  --  135*   POTASSIUM  --  3.7  --   --  3.8  --  5.4*  --  4.5  --  4.5   CHLORIDE  --  112*  --   --  106  --  103  --  105  --  99   CO2  --  17*  --   --  18*  --  20*  --  21*  --  26   BUN  --  42.3*  --   --  45.2*  --  48.9*  --  42.7*  --  48.3*   CR  --  2.53*  --   --  2.61*  --  2.54*  --  2.24*  --  2.40*   ANIONGAP  --  14  --   --  14  --  10  --  11  --  10   DOMINGO  --  8.1*  --   --  8.2*  --  7.5*  --  8.7*  --  9.2   * 160* 146*   < > 311*   < > 466*   < > 62*   < > 173*   ALBUMIN  --   --   --   --   --   --   --   --  2.8*  --  3.5   PROTTOTAL  --   --   --   --   --   --   --   --  6.3*  --  7.0   BILITOTAL  --   --   --   --   --   --   --   --  1.5*  --  1.0   ALKPHOS  --   --   --   --   --   --   --   --  78  --  99   ALT  --   --   --   --   --   --   --   --  27  --  32   AST  --   --   --   --   --   --   --   --  28  --  30    < > = values in this interval not displayed.     No results found for this or any previous visit (from the past 24 hour(s)).  Medications     NaCl         amLODIPine  5 mg  Oral Daily     [Held by provider] aspirin  325 mg Oral Daily     atorvastatin  40 mg Oral QPM     cefTRIAXone  2 g Intravenous Q24H     famotidine  20 mg Intravenous Once     heparin ANTICOAGULANT  5,000 Units Subcutaneous Q12H     hydrALAZINE  50 mg Oral Q8H CARMELLA     insulin aspart  1-7 Units Subcutaneous TID AC     insulin aspart  1-5 Units Subcutaneous At Bedtime     insulin aspart   Subcutaneous TID w/meals     insulin glargine  25 Units Subcutaneous QAM AC     isosorbide dinitrate  20 mg Oral BID     latanoprost  1 drop Both Eyes At Bedtime     metoprolol tartrate  25 mg Oral BID     multivitamin, therapeutic  1 tablet Oral Daily     [START ON 10/17/2022] pantoprazole  40 mg Oral QAM AC     sodium chloride (PF)  3 mL Intracatheter Q8H     traZODone  75 mg Oral At Bedtime     Vitamin D3  25 mcg Oral Daily

## 2022-10-17 NOTE — PLAN OF CARE
Pt. Disoriented to time, place, and situation. VSS except /66. Gren[place] speaking, family at bedside. Medications crushed in applesauce this morning, pt took small spoonful and refused the rest. Insulin given this morning.  and 156. Pt. Ax2 with a lift. 2 Bms today. Total feed. LR running at 60ml/hr. Plan to go home with home care tomorrow. Will continue to provide supportive cares.

## 2022-10-17 NOTE — PROGRESS NOTES
Care Management Follow Up    Length of Stay (days): 5    Expected Discharge Date: 10/18/2022     Concerns to be Addressed:       Patient plan of care discussed at interdisciplinary rounds: Yes    Anticipated Discharge Disposition: Home     Anticipated Discharge Services:    Anticipated Discharge DME:      Patient/family educated on Medicare website which has current facility and service quality ratings:    Education Provided on the Discharge Plan:    Patient/Family in Agreement with the Plan:      Referrals Placed by CM/SW:    Private pay costs discussed: transportation costs    Additional Information:    Pt has been accepted to [facility] for PT/OT/RN/HHA/SW. Referral for Pito lift was sent over the weekend to [facility] (P: [phone] F: [phone]). Called [facility] who explained that they were not open over the weekend but will review the referral. [facility] explained that it can take 24 hours to process a DME referral. This writer requested a call back to ensure that they have all of the necessary information.     Addendum    Updated family at bedside that homecare is set up but the lift is in progress.     Addendum    Attempted to call [facility] to inquire about lift availability, was placed on an extended hold. Will continue to follow up with [facility].     SEAN Valencia, [facility]  Inpatient Care Coordination  Ortho/Spine Unit  [phone]  Renetta Natarajan, LEVY

## 2022-10-17 NOTE — PROGRESS NOTES
[facility]    Medicine Progress Note - Hospitalist Service    Date of Admission:  10/12/2022    Assessment & Plan                 Eh Callahan is a 82 year old [place] gentleman with known history of chronic kidney disease, insulin requiring diabetes mellitus, hypertension, atrial fibrillation not on chronic anticoagulation, hepatitis C, depression who lives at home with family and has a presents in the emergency room earlier today due to increasing generalized, decreased oral intake, and alteration in mental state.    Problem list:    #1 alteration in mental state, generalized weakness, found with tachycardia, hypotension, fever spikes  #2 sepsis secondary to urinary tract infection urinary cultures showing isolated Klebsiella  #3 infectious encephalopathy-improving    -Continue current inpatient care at risk for clinical deterioration.  -Remain on IV antibiotics currently receiving ceftriaxone.  -Sensitivity panel showing Klebsiella sensitive to quinolones, ceftriaxone   -Blood cultures showing no growth to date   -CT of the abdomen and pelvis minus contrast earlier showed moderate amount of stool in the rectum, small bilateral pleural effusion and cholelithiasis   -Currently has no ongoing abdominal symptomatology, abdominal exam showed soft abdomen, nontender and no guarding.    -Monitor cultures, adjust antibiotics accordingly.  Follow infectious markers  -As needed antipyretics    #4 severe uncontrolled hypertension-improving  -Home regimen reviewed on metoprolol 12.5 mg twice daily and Isordil  -I increase his metoprolol to 25 mg twice daily and continued his Isordil  -Also noted notes at allergy list regarding prior bradycardia with metoprolol while on 50 mg dosing  -We will continue to monitor  -Initially I was intending to increase his amlodipine dose to 10 mg from home regimen to 5 mg.  However documentation noted at allergy section that he cannot tolerate 10 mg due to increasing  leg swelling the past  -Still with elevated blood pressure levels.  Further optimization with his hydralazine and increased to 50 mg every 8 hours    -May need as needed IV hydralazine if with severe persistent uncontrolled hypertension    #5 detectable troponin likely from type II AMI from underlying a sepsis with concomitant TRANG on top of CKD and uncontrolled hypertension  -Appreciate input from cardiology service  -No clear evidence of ACS and urgent coronary angiogram  -May consider outpatient restratification with Lexiscan cardiac stress testing once much more stable  -Echocardiogram showed preserved EF, mild AR, mild TR, no regional wall motion abnormality seen    #6 insulin requiring diabetes mellitus-earlier uncontrolled hyperglycemia improving  -Earlier complicated with episode of hypoglycemia   -Now with increasing oral intake, anticipating further issues with hyperglycemia   -Overnight still exhibiting increasing, worsening uncontrolled hyperglycemia  -Increase his Lantus to 25 units daily, increase his prandial short acting NovoLog 1 unit per 10 g of carbohydrates at mealtime  -May also increase he is insulin sliding scale to high intensity if still with uncontrolled hyperglycemia   -Currently glucose levels improvement acceptable ranges    #7 TRANG on top of CKD    -Monitor metabolic panel.  Creatinine slightly better.  Nonoliguric.  -Not hypoxic and does not appear to be volume overloaded.    -Still with elevated creatinine but on the downward trend however still exhibiting metabolic acidosis non anion gap  -Change IV fluid support to LR solution at 60 mL/h  -Stopped his aspirin, he is not receiving any ACE inhibitor's or ARB  -Continue on gentle hydration.  No nausea or vomiting or diarrhea  -Anticipating improvement once blood pressure levels, sepsis improves    #8 history of atrial fibrillation  -EKG showing NSR  -On metoprolol  -Not on chronic anticoagulants    #9 history of hepatitis C    #10  physical deconditioning  -Appreciate input from PT, OT and social service  Family still hopeful for home discharge.  Physical therapy recommending long-term care versus TCU if participation increases.             Diet: Moderate Consistent Carb (60 g CHO per Meal) Diet    DVT Prophylaxis: Heparin SQ and Pneumatic Compression Devices  Ames Catheter: Not present  Central Lines: None  Cardiac Monitoring: None  Code Status: No CPR- Do NOT Intubate appreciate palliative care input.  Will respect wishes and converted to a medical order    Disposition Plan     Expected Discharge Date: Anticipating this gentleman will still be needing inpatient care at least in the next 24 more hours.               The patient's care was discussed with the Bedside Nurse, Patient and Patient's Family.    Alejandro Martini MD, MD  Hospitalist Service  [facility]  Securely message with the Vocera Web Console (learn more here)  Text page via [employer] Paging/Directory         Clinically Significant Risk Factors Present on Admission                      ______________________________________________________________________    Interval History   Continuing service care today.  Seen and examined.  Chart reviewed.   Family updated as one of his daughteris present at bedside.  He is more awake this morning as I found him sitting at the edge of his bed.  He was able to state his name, demonstrated interaction and following some simple verbal instructions.  Afebrile.  Not requiring any oxygen support.  He endorses no ongoing issues with any pain.  He also stated that he is getting a little bit hungry this morning.  No reported diarrhea.  No issues of being combative.    Data reviewed today: I reviewed all medications, new labs and imaging results over the last 24 hours. I personally reviewed .    Physical Exam   Vital Signs: Temp: 98.4  F (36.9  C) Temp src: Temporal BP: (!) 146/66 Pulse: 86   Resp: 18 SpO2: 100 % O2 Device: None  (Room air)    Weight: 147 lbs 11.33 oz  HEENT; Atraumatic, normocephalic, pinkish conjuctiva, pupils bilateral reactive   Skin: warm and moist, no rashes  Lungs: equal chest expansion, clear to auscultation, no wheezes, no stridor, no crackles,   Heart: normal rate, normal rhythm, no rubs or gallops.   Abdomen: normal bowel sounds, no tenderness, no peritoneal signs, no guarding  Extremities: no deformities, no edema   Neuro; poor historian, awake, alert, oriented to his name, following some simple verbal instructions  moves all extremities spontaneously  Psych; not combative, not agitated        Data   Recent Labs   Lab 10/17/22  0840 10/17/22  0745 10/17/22  0214 10/16/22  1523 10/16/22  1020 10/16/22  0930 10/16/22  0810 10/15/22  0749 10/15/22  0646 10/14/22  1229 10/14/22  1135 10/13/22  0854 10/13/22  0635 10/12/22  2009 10/12/22  1520   WBC  --   --   --   --  7.6  --   --   --  7.7  --  9.6  --  12.2*  --  9.6   HGB  --   --   --   --  12.6*  --   --   --  12.8*  --  11.6*  --  14.0  --  14.0   MCV  --   --   --   --  92  --   --   --  99  --  95  --  94  --  94   PLT  --   --   --   --  138*  --   --   --  128*  --  110*  --  124*  --  128*     --   --   --   --   --  143  --  138  --  133*  --  137  --  135*   POTASSIUM 3.8  --   --   --   --   --  3.7  --  3.8  --  5.4*  --  4.5  --  4.5   CHLORIDE 111*  --   --   --   --   --  112*  --  106  --  103  --  105  --  99   CO2 17*  --   --   --   --   --  17*  --  18*  --  20*  --  21*  --  26   BUN 34.4*  --   --   --   --   --  42.3*  --  45.2*  --  48.9*  --  42.7*  --  48.3*   CR 2.44*  --   --   --   --   --  2.53*  --  2.61*  --  2.54*  --  2.24*  --  2.40*   ANIONGAP 13  --   --   --   --   --  14  --  14  --  10  --  11  --  10   DOMINGO 7.9*  --   --   --   --   --  8.1*  --  8.2*  --  7.5*  --  8.7*  --  9.2   * 153* 136*   < >  --    < > 160*   < > 311*   < > 466*   < > 62*   < > 173*   ALBUMIN  --   --   --   --   --   --   --   --   --    --   --   --  2.8*  --  3.5   PROTTOTAL  --   --   --   --   --   --   --   --   --   --   --   --  6.3*  --  7.0   BILITOTAL  --   --   --   --   --   --   --   --   --   --   --   --  1.5*  --  1.0   ALKPHOS  --   --   --   --   --   --   --   --   --   --   --   --  78  --  99   ALT  --   --   --   --   --   --   --   --   --   --   --   --  27  --  32   AST  --   --   --   --   --   --   --   --   --   --   --   --  28  --  30    < > = values in this interval not displayed.     No results found for this or any previous visit (from the past 24 hour(s)).  Medications     lactated ringers         amLODIPine  5 mg Oral Daily     [Held by provider] aspirin  325 mg Oral Daily     atorvastatin  40 mg Oral QPM     cefTRIAXone  2 g Intravenous Q24H     heparin ANTICOAGULANT  5,000 Units Subcutaneous Q12H     hydrALAZINE  50 mg Oral Q8H CARMELLA     insulin aspart  1-7 Units Subcutaneous TID AC     insulin aspart  1-5 Units Subcutaneous At Bedtime     insulin aspart   Subcutaneous TID w/meals     insulin glargine  25 Units Subcutaneous QAM AC     isosorbide dinitrate  20 mg Oral BID     latanoprost  1 drop Both Eyes At Bedtime     metoprolol tartrate  25 mg Oral BID     multivitamin, therapeutic  1 tablet Oral Daily     pantoprazole  40 mg Oral QAM AC     sodium chloride (PF)  3 mL Intracatheter Q8H     traZODone  75 mg Oral At Bedtime     Vitamin D3  25 mcg Oral Daily

## 2022-10-18 NOTE — PLAN OF CARE
"INPATIENT NOTE: [id number]     PRIMARY PROBLEM:Acute cystitis without hematuria           Vital signs:  Temp: 99.7  F (37.6  C) Temp src: Temporal BP: (!) 142/63 Pulse: 97   Resp: 24 SpO2: 93 % O2 Device: None (Room air) Oxygen Delivery: 1 LPM Height: 170.2 cm (5' 7\") Weight: 67 kg (147 lb 11.3 oz)  Estimated body mass index is 23.13 kg/m  as calculated from the following:    Height as of this encounter: 1.702 m (5' 7\").    Weight as of this encounter: 67 kg (147 lb 11.3 oz).        Orientation: Alert to and Self  Neuro: Intact   Pain status: denying pain.   Resp: Lung sounds are Clear. Denies any SOB.   Cardiac: WNL, Denies any Chest Pain   GI: Bowels are active x 4 Quads. Last BM 10/1722  : Voiding with no concern    Skin: WNL   LDA: Peripheral IV   Infusions: SL  Diet: Moderate consistent carb   Activity: are on bedrest. Lift  Consults: PT  Shift Event: pt speaks [place]er. Pt is confused and family member at bedside. Pt take pills crush in apple  Sauce.     Will continue to monitor and provide cares.     Akhil Bush RN    " Detail Level: Detailed Consent: The patient's consent was obtained including but not limited to risks of crusting, scabbing, blistering, scarring, darker or lighter pigmentary change, recurrence, incomplete removal and infection. Post-Care Instructions: I reviewed with the patient in detail post-care instructions. Patient is to wear sunprotection, and avoid picking at any of the treated lesions. Pt may apply Vaseline to crusted or scabbing areas. Duration Of Freeze Thaw-Cycle (Seconds): 0 Render Post-Care Instructions In Note?: no

## 2022-10-18 NOTE — PROGRESS NOTES
Care Management Follow Up    Length of Stay (days): 6    Expected Discharge Date: 10/18/2022     Concerns to be Addressed:       Patient plan of care discussed at interdisciplinary rounds: Yes    Anticipated Discharge Disposition: Home     Anticipated Discharge Services:    Anticipated Discharge DME:      Patient/family educated on Medicare website which has current facility and service quality ratings:    Education Provided on the Discharge Plan:    Patient/Family in Agreement with the Plan:      Referrals Placed by CM/SW:    Private pay costs discussed: Not applicable    Additional Information:    Spoke with [employer] (P: [phone] F: [phone]) who explained that they have the referral and are reviewing it. They will get back today if they need more information.     Addendum    [employer] called and explained that they have all necessary information and will contact daughter for delivery. [employer] is hopeful for delivery tomorrow.    Met with pt daughter at bedside to inform that [employer] will be calling to set up matt delivery. Pt daughter expressed understanding and confirmed her phone number.     SEAN Valencia, [facility]  Inpatient Care Coordination  Ortho/Spine Unit  [phone]  Renetta Natarajan, [facility]

## 2022-10-18 NOTE — PROGRESS NOTES
[facility]    Medicine Progress Note - Hospitalist Service    Date of Admission:  10/12/2022    Assessment & Plan                 Eh Callahan is a 82 year old [place] gentleman with known history of chronic kidney disease, insulin requiring diabetes mellitus, hypertension, atrial fibrillation not on chronic anticoagulation, hepatitis C, depression who lives at home with family and has a presents in the emergency room earlier today due to increasing generalized, decreased oral intake, and alteration in mental state.    Problem list:    #1 alteration in mental state, generalized weakness, found with tachycardia, hypotension, fever spikes  #2 sepsis secondary to urinary tract infection urinary cultures showing isolated Klebsiella  #3 infectious encephalopathy-improving    -Continue current inpatient care at risk for clinical deterioration.  -Remain on IV antibiotics currently receiving ceftriaxone.  -Sensitivity panel showing Klebsiella sensitive to quinolones, ceftriaxone   -Blood cultures showing no growth to date   -CT of the abdomen and pelvis minus contrast earlier showed moderate amount of stool in the rectum, small bilateral pleural effusion and cholelithiasis   -Currently has no ongoing abdominal symptomatology, abdominal exam showed soft abdomen, nontender and no guarding.    -Monitor cultures, adjust antibiotics accordingly.  Follow infectious markers  -As needed antipyretics    #4 severe uncontrolled hypertension-improving  -Home regimen reviewed on metoprolol 12.5 mg twice daily and Isordil  -I increase his metoprolol to 25 mg twice daily and continued his Isordil  -Also noted notes at allergy list regarding prior bradycardia with metoprolol while on 50 mg dosing  -We will continue to monitor  -Initially I was intending to increase his amlodipine dose to 10 mg from home regimen to 5 mg.  However documentation noted at allergy section that he cannot tolerate 10 mg due to increasing  leg swelling the past  -Still with elevated blood pressure levels.  Further optimization with his hydralazine and increased to 50 mg every 8 hours    -May need as needed IV hydralazine if with severe persistent uncontrolled hypertension    #5 detectable troponin likely from type II AMI from underlying a sepsis with concomitant TRANG on top of CKD and uncontrolled hypertension  -Appreciate input from cardiology service  -No clear evidence of ACS and urgent coronary angiogram  -May consider outpatient restratification with Lexiscan cardiac stress testing once much more stable  -Echocardiogram showed preserved EF, mild AR, mild TR, no regional wall motion abnormality seen    #6 insulin requiring diabetes mellitus-earlier uncontrolled hyperglycemia improving  -Earlier complicated with episode of hypoglycemia   -Now with increasing oral intake, anticipating further issues with hyperglycemia     -He had another episodeof hypoglycemia this morning at 60.  -However he is eating better  -I decrease his Lantus to 18 units daily and decrease short acting NovoLog to 1 unit for every 15 g of carbohydrate    -May also increase he is insulin sliding scale to high intensity if still with uncontrolled hyperglycemia   -Currently glucose levels improvement acceptable ranges    #7 TRANG on top of CKD    -Creatinine remained flat and stable  -Still having non anion gap metabolic acidosis  -Unsure if his creatinine is likely at baseline levels as prior comparison was a year ago  -I have formally requested nephrology input  -Likely will be needing kidney ultrasound  -He is nonoliguric-Change IV fluid support to LR solution at 60 mL/h  -Stopped his aspirin, he is not receiving any ACE inhibitor's or ARB      #8 history of atrial fibrillation  -EKG showing NSR  -On metoprolol  -Not on chronic anticoagulants    #9 history of hepatitis C    #10 physical deconditioning  -Appreciate input from PT, OT and social service  Family still hopeful for  home discharge.  Physical therapy recommending long-term care versus TCU if participation increases.             Diet: Moderate Consistent Carb (60 g CHO per Meal) Diet    DVT Prophylaxis: Heparin SQ and Pneumatic Compression Devices  Liu Catheter: Not present  Central Lines: None  Cardiac Monitoring: None  Code Status: No CPR- Do NOT Intubate appreciate palliative care input.  Will respect wishes and converted to a medical order    Disposition Plan     Expected Discharge Date: Anticipating this gentleman will still be needing inpatient care at least another 24 more hours.               The patient's care was discussed with the Bedside Nurse, Patient and Patient's Family.    Alejandro Martini MD, MD  Hospitalist Service  [facility]  Securely message with the Vocera Web Console (learn more here)  Text page via [employer] Paging/Directory         Clinically Significant Risk Factors Present on Admission                      ______________________________________________________________________    Interval History   Continuing service care today.  Seen and examined.  Chart reviewed.   Family updated as one of his daughters present at bedside.  Patient's daughter mentioned that our patient is looking much better.  He was able to tolerate his breakfast tray this morning no reported vomiting.  Remained afebrile.  No reported diarrhea.  He remained to be a poor historian.  He is not requiring any oxygen support.      Data reviewed today: I reviewed all medications, new labs and imaging results over the last 24 hours. I personally reviewed .    Physical Exam   Vital Signs: Temp: 99  F (37.2  C) Temp src: Temporal BP: (!) 155/67 Pulse: 93   Resp: 18 SpO2: 94 % O2 Device: None (Room air)    Weight: 147 lbs 11.33 oz  HEENT; Atraumatic, normocephalic, pinkish conjuctiva, pupils bilateral reactive   Skin: warm and moist, no rashes  Lungs: equal chest expansion, clear to auscultation, no wheezes, no stridor,  no crackles,   Heart: normal rate, normal rhythm, no rubs or gallops.   Abdomen: normal bowel sounds, no tenderness, no peritoneal signs, no guarding  Extremities: no deformities, no edema   Neuro; poor historian, awake, alert, oriented to his name, following some simple verbal instructions  moves all extremities spontaneously  Psych; not combative, not agitated        Data   Recent Labs   Lab 10/18/22  1003 10/18/22  0803 10/18/22  0745 10/17/22  1103 10/17/22  0840 10/16/22  1523 10/16/22  1020 10/16/22  0930 10/16/22  0810 10/15/22  0749 10/15/22  0646 10/14/22  1229 10/14/22  1135 10/13/22  0854 10/13/22  0635 10/12/22  2009 10/12/22  1520   WBC  --   --   --   --   --   --  7.6  --   --   --  7.7  --  9.6  --  12.2*  --  9.6   HGB  --   --   --   --   --   --  12.6*  --   --   --  12.8*  --  11.6*  --  14.0  --  14.0   MCV  --   --   --   --   --   --  92  --   --   --  99  --  95  --  94  --  94   PLT  --   --   --   --   --   --  138*  --   --   --  128*  --  110*  --  124*  --  128*   NA  --   --  143  --  141  --   --   --  143  --  138  --  133*  --  137  --  135*   POTASSIUM  --   --  3.8  --  3.8  --   --   --  3.7  --  3.8  --  5.4*  --  4.5  --  4.5   CHLORIDE  --   --  113*  --  111*  --   --   --  112*  --  106  --  103  --  105  --  99   CO2  --   --  17*  --  17*  --   --   --  17*  --  18*  --  20*  --  21*  --  26   BUN  --   --  34.1*  --  34.4*  --   --   --  42.3*  --  45.2*  --  48.9*  --  42.7*  --  48.3*   CR  --   --  2.67*  --  2.44*  --   --   --  2.53*  --  2.61*  --  2.54*  --  2.24*  --  2.40*   ANIONGAP  --   --  13  --  13  --   --   --  14  --  14  --  10  --  11  --  10   DOMINGO  --   --  8.0*  --  7.9*  --   --   --  8.1*  --  8.2*  --  7.5*  --  8.7*  --  9.2   * 60* 63*   < > 187*   < >  --    < > 160*   < > 311*   < > 466*   < > 62*   < > 173*   ALBUMIN  --   --   --   --   --   --   --   --   --   --   --   --   --   --  2.8*  --  3.5   PROTTOTAL  --   --   --   --   --    --   --   --   --   --   --   --   --   --  6.3*  --  7.0   BILITOTAL  --   --   --   --   --   --   --   --   --   --   --   --   --   --  1.5*  --  1.0   ALKPHOS  --   --   --   --   --   --   --   --   --   --   --   --   --   --  78  --  99   ALT  --   --   --   --   --   --   --   --   --   --   --   --   --   --  27  --  32   AST  --   --   --   --   --   --   --   --   --   --   --   --   --   --  28  --  30    < > = values in this interval not displayed.     No results found for this or any previous visit (from the past 24 hour(s)).  Medications     lactated ringers 60 mL/hr at 10/17/22 1013       amLODIPine  5 mg Oral Daily     [Held by provider] aspirin  325 mg Oral Daily     atorvastatin  40 mg Oral QPM     cefTRIAXone  2 g Intravenous Q24H     heparin ANTICOAGULANT  5,000 Units Subcutaneous Q12H     hydrALAZINE  50 mg Oral Q8H CARMELLA     insulin aspart  1-7 Units Subcutaneous TID AC     insulin aspart  1-5 Units Subcutaneous At Bedtime     insulin aspart   Subcutaneous TID w/meals     [START ON 10/19/2022] insulin glargine  18 Units Subcutaneous QAM AC     isosorbide dinitrate  20 mg Oral BID     latanoprost  1 drop Both Eyes At Bedtime     metoprolol tartrate  25 mg Oral BID     multivitamin, therapeutic  1 tablet Oral Daily     pantoprazole  40 mg Oral QAM AC     sodium chloride (PF)  3 mL Intracatheter Q8H     traZODone  75 mg Oral At Bedtime     Vitamin D3  25 mcg Oral Daily

## 2022-10-18 NOTE — PROGRESS NOTES
[facility]    Palliative Care Chart Check    This patient's most recent hospitalist note, most recent consultant notes, medication profile and labs from the past 24 hours have been reviewed.  Patient is preparing for discharge home with HHC and therapies.  Family to support.  DME order for [employer] lift and equipment delivery is in process.      Recommendation:  It has been determined that no change is necessary to the current plan of care at this time.   The chart will be reviewed regularly and the patient will be seen if necessary.   If you would like the patient to be seen, please contact the service at [phone] and ask to have the patient seen.  Time Spent 15 minutes, none in direct contact with patient or family. >50% spent in chart review, documentation and care coordination with  Bedside Nurse Janet      Thank you!    TANNER Catalan CNP:  Pain Management and Palliative Care  [facility]  Pgr: [phone]

## 2022-10-18 NOTE — PLAN OF CARE
Pt. Disoriented to time, situation, and place. VSS except /67. Meds crushed and taken in applesauce. LR running at 60ml/hr. Generalized pain 5/10, PRN tylenol given. BG 60 this morning. Breakfast/juice was provided and recheck was 150. Mod carb diet. Ax2 with a lift. Nephrology consulted due to lab results. Potential discharge home tomorrow with home cares. Family at bedside most of the day. Pt refused meds when family was not there, Isordil and lasix rescheduled for 1800 when family is back. Will continue to provide supportive cares.

## 2022-10-18 NOTE — CONSULTS
Consult Date: 10/18/2022    REQUESTING PHYSICIAN:  Alejandro Martini MD    CONSULTANT:  Gavin Wilson MD    REASON FOR CONSULTATION:  Acute kidney injury in the setting of chronic kidney disease stage IIIB.    HISTORY OF PRESENT ILLNESS:  This is an 82-year-old [place] male admitted on 10/12/2022 with depressed mental status and poor p.o. intake.  He was noted to have UTI with urine cultures growing Klebsiella.  He was treated with IV Rocephin, which he is still on.  He has a history of chronic kidney disease stage IIIB, associated with diabetes and hypertension.  He had mild deterioration in kidney function in the hospital.  We are asked to see him.    08/20/2020 creatinine 1.85, estimated GFR 34.  08/21/2021, creatinine 1.77-2.05, estimated GFR 30-35.  10/12/2022.  On admission, creatinine 2.40, estimated GFR 26.  10/15/2022, creatinine 2.61, estimated GFR 24.  10/18/2022, creatinine 2.67, estimated GFR 23.  He was treated with IV fluids and antibiotics.    PAST MEDICAL HISTORY:  Includes diabetes, hypertension, atrial fibrillation, hepatitis C.    PAST SURGICAL HISTORY:  Includes appendectomy, eye surgery and laparoscopic partial hepatectomy.    SOCIAL HISTORY:  He does not smoke or drink.  He lives at home with his family.    FAMILY HISTORY:  Noncontributory.    Prior to admission, he was on vitamin D.   Fluids in the ER, metoprolol and insulin as well as other medications as listed.    REVIEW OF SYSTEMS:  The patient is nonconversant.    PHYSICAL EXAMINATION:    VITAL SIGNS:  He is afebrile, pulse 89, blood pressure 177/82.  He is resting comfortably in bed.  He has some mild grunting respiration.  HEENT:  Shows no trauma.  The eyes show arcus senilis bilaterally.  Poor dentition.  NECK:  Supple.  LUNGS:  Scattered wheezes and some rales in the bases.  HEART:  Regular rhythm, normal S1, S2.  There is mild jugular venous distention.  ABDOMEN:  Soft and nontender.  EXTREMITIES:  Lower extremities, No edema.   Palpable but diminished pedal pulses.    LABORATORY DATA:  As noted above.  Today, his Blood chemistry shows sodium 143, potassium 3.8, chloride 113, bicarbonate 17, BUN 34, creatinine 2.67, estimated GFR 23, calcium 8.0.  Hemoglobin 12.6.  CT scan done on 10/13/2022 without contrast showed right cortical scarring with no hydronephrosis.    IMPRESSION:    1.  Chronic kidney disease, stage IIIB.  This is associated with poorly controlled hypertension and diabetes.  We will monitor his creatinine and other chemistries.  He will need followup in renal clinic after discharge.  He can come to our clinic if desired.  2.  Acute kidney injury.  The acuity of this change in creatinine is uncertain.  In 2021 he had a creatinine of 1.7-2.0, and now his creatinine is 2.4-2.6.  This may simply be progression of chronic kidney disease or it may be decline, acute decline in kidney function associated with his urinary tract infection.  He is on IV antibiotics and these will be changed to oral per his Internal Medicine doctor.  Currently, he appears to be mildly fluid overloaded on exam.  I am going to stop his IV fluid and start him on oral Lasix at 40 mg per day.  His blood pressure is not well enough controlled.  We will continue metoprolol and amlodipine at the current doses.  I am going to increase the Isordil 20 mg t.i.d. and increase the hydralazine to 50 mg t.i.d.  Again, I am also adding a diuretic, which should help with his blood pressure as well.  His pulse is 89 today.  If need be his metoprolol could be increased to 50 mg twice a day.    We will follow his laboratories and weights closely and he will need followup as an outpatient after discharge.    Gavin Wilson MD        D: 10/18/2022   T: 10/18/2022   MT: KATRIN    Name:     CHIKIS NEWBERRY  MRN:      [id number]        Account:      [id number]   :      1940           Consult Date: 10/18/2022     Document: [id number]

## 2022-10-18 NOTE — PLAN OF CARE
Goal Outcome Evaluation:      Plan of Care Reviewed With: family    Overall Patient Progress: improvingOverall Patient Progress: improving     No c/o pain. Bed mobilty with 2 assist. Bailee regular diet with feeding. Blood sugars 184 & 111. Incontinent of bowel & bladder. Infreq cough. Family at bedside.

## 2022-10-18 NOTE — PLAN OF CARE
Cross cover    Called regarding some hypoglcyemia, was resistant to drinking orange juice so discussed with nursing giving IV dextrose. Will hold his lantus for now.

## 2022-10-19 NOTE — PROGRESS NOTES
CLINICAL NUTRITION SERVICES  -  ASSESSMENT NOTE      Recommendations:   Liberalize diet to 3 gram K/2 gram Na (omitted phosphorus and CHO restrictions) as electrolytes not elevated and in light of poor PO intakes/malnutrition.  Deferring ability to further liberalize to MD/Nephrology teams.    - Appreciate any encouragement surrounding PO intakes.  - 4 oz strawberry Ensure offerings with meals TID.      MALNUTRITION:  % Weight Loss:  None noted  % Intake:  <75% for > 7 days (moderate malnutrition)  Subcutaneous Fat Loss:  Orbital region moderate depletion and Upper arm region moderate depletion  Muscle Loss:  Temporal region moderate depletion, Clavicle bone region moderate to severe depletion and Acromion bone region moderate to severe depletion --> did not visualize LEs today  Fluid Retention: None currently documented    Malnutrition Diagnosis: Moderate malnutrition  In Context of:  Acute illness or injury with underlying chronic illness or disease          REASON FOR ASSESSMENT  Eh Callahan is a 82 year old male seen by Registered Dietitian for LOS.    PMH of: CKD, DMII, hepatitis C, depression, dementia.    Admit 2/2: AMS, sepsis, TRANG.     NUTRITION HISTORY  - Information obtained from patient's family in room and chart.  Patient himself declined to participate in conversation, he wanted to sleep.  - Diet at home: Regular.  Family encouraged meals TID.  For breakfast patient likes oatmeal and milk.  Lunch and dinner may consist of peanut butter sandwiches and milk +/- fruit.  - Barriers to PO intakes: Family describes that patient's PO intakes have been relatively consistent PTA.  They note he may sometimes refuse meals, but not consistently.    - Use of oral supplements: None PTA.  - Chewing/swallowing issues: Denies.  - Allergies: NKFA.      CURRENT NUTRITION ORDERS  Diet Order:     Renal/Mod CHO    Current Intake/Tolerance:  Variable PO intakes, 0-100%, since admisison.  Family notes Awjeannette has been refusing  "PO intakes when he's not feeling well.  They feel he is consistently eating less than usual when compared to home setting.  They would be ok with strawberry Ensure offerings.      Obtained from Chart/Interdisciplinary Team:  - No documentation of PI  - Stooling patterns reviewed    ANTHROPOMETRICS  Height: 5' 7\"  Weight: 147 lbs 11.33 oz  Body mass index is 23.13 kg/m .  Weight Status:  Normal BMI  Weight History:  Wt Readings from Last 10 Encounters:   10/12/22 67 kg (147 lb 11.3 oz)   08/05/21 63.6 kg (140 lb 3.4 oz)   08/02/20 64.8 kg (142 lb 12.8 oz)   05/28/20 67.6 kg (149 lb)   05/22/20 67.6 kg (149 lb)   02/03/20 72.3 kg (159 lb 4.8 oz)   01/18/20 71.1 kg (156 lb 11.2 oz)   01/05/20 73.6 kg (162 lb 3.2 oz)   10/05/19 79.9 kg (176 lb 2.4 oz)   10/17/18 72.5 kg (159 lb 12.8 oz)     - Family deny wt loss.  Limited trends overall but wt is stable when compared to only 1 data point available in 2021 and consistent with that in 2020.     LABS  Labs reviewed.      MEDICATIONS  Medications reviewed.      ASSESSED NUTRITION NEEDS PER APPROVED PRACTICE GUIDELINES:    Dosing Weight 67 kg   Estimated Energy Needs: 25-30+ Kcal/Kg  Justification: minimum maintenance  Estimated Protein Needs: 1-1.2+ g pro/Kg  Justification: preservation of lean body mass, consideration of CKD  Estimated Fluid Needs: per MD      NUTRITION DIAGNOSIS:  Inadequate oral intake related to decreased appetite as evidenced by meeting <50-75% needs during admit.     NUTRITION INTERVENTIONS  Recommendations / Nutrition Prescription  See above.      Implementation  Nutrition education: Provided education on above.    Medical Food Supplement: As above.     Nutrition Goals  Patient to consistently consume at least 50% of meals or supplements TID while acutely admitted.      MONITORING AND EVALUATION:  Progress towards goals will be monitored and evaluated per protocol and Practice Guidelines          Ana Nice RDN, LD  Clinical Dietitian  3rd " floor/ICU: [phone]  All other floors: [phone]  Weekend/holiday: [phone]  Office: [phone]

## 2022-10-19 NOTE — PROGRESS NOTES
Care Management Follow Up    Length of Stay (days): 7    Expected Discharge Date: 10/19/2022     Concerns to be Addressed:       Patient plan of care discussed at interdisciplinary rounds: Yes    Anticipated Discharge Disposition: Home     Anticipated Discharge Services:    Anticipated Discharge DME:      Patient/family educated on Medicare website which has current facility and service quality ratings:    Education Provided on the Discharge Plan:    Patient/Family in Agreement with the Plan:      Referrals Placed by CM/SW:    Private pay costs discussed: Not applicable    Additional Information:    Met with pt/daughter at bedside to discuss discharge plans. Pt daughter explained that the matt lift was delivered and is set up in their home. Pt is set up with [facility] for PT/OT/RN/HHA/SW upon discharge, will alert [facility] once orders are in. Will set medical transport for pt once medically ready.     SEAN Valencia, [facility]  Inpatient Care Coordination  Ortho/Spine Unit  [phone]  Renetta Natarajan, [facility]

## 2022-10-19 NOTE — PROGRESS NOTES
[facility]    Medicine Progress Note - Hospitalist Service    Date of Admission:  10/12/2022    Assessment & Plan                 Eh Callahan is a 82 year old Citizen of the [place] gentleman with known history of chronic kidney disease, insulin requiring diabetes mellitus, hypertension, atrial fibrillation not on chronic anticoagulation, hepatitis C, depression who lives at home with family and has a presents in the emergency room earlier today due to increasing generalized, decreased oral intake, and alteration in mental state.    Problem list:    #1 alteration in mental state, generalized weakness, found with tachycardia, hypotension, fever spikes  #2 sepsis secondary to urinary tract infection urinary cultures showing isolated Klebsiella  #3 infectious encephalopathy-improving    -Continue current inpatient care at risk for clinical deterioration.  -Remain on IV antibiotics currently receiving ceftriaxone.  -Will be finishing his ceftriaxone today day 7  -Sensitivity panel showing Klebsiella sensitive to quinolones, ceftriaxone   -Blood cultures showing no growth to date   -CT of the abdomen and pelvis minus contrast earlier showed moderate amount of stool in the rectum, small bilateral pleural effusion and cholelithiasis   -Currently has no ongoing abdominal symptomatology, abdominal exam showed soft abdomen, nontender and no guarding.    -Monitor cultures, adjust antibiotics accordingly.  Follow infectious markers  -As needed antipyretics    #4 severe uncontrolled hypertension-improving  -Home regimen reviewed on metoprolol 12.5 mg twice daily and Isordil    -Initially I was intending to increase his amlodipine dose to 10 mg from home regimen to 5 mg.  However documentation noted at allergy section that he cannot tolerate 10 mg due to increasing leg swelling the past  -Still with elevated blood pressure levels.  Further optimization with his hydralazine and increased to 50 mg every 8 hours  Increase  metoprolol to 50 mg.  Closely monitor heart rate as previously stated that he was not able to tolerate this dosing due to bradycardia but currently heart rate is permitting  -Lasix added by nephrology service    -May need as needed IV hydralazine if with severe persistent uncontrolled hypertension    #5 detectable troponin likely from type II AMI from underlying a sepsis with concomitant TRANG on top of CKD and uncontrolled hypertension  -Appreciate input from cardiology service  -No clear evidence of ACS and urgent coronary angiogram  -May consider outpatient restratification with Lexiscan cardiac stress testing once much more stable  -Echocardiogram showed preserved EF, mild AR, mild TR, no regional wall motion abnormality seen    #6 insulin requiring diabetes mellitus-earlier uncontrolled hyperglycemia improving  -Earlier complicated with episode of hypoglycemia   -Now with increasing oral intake, anticipating further issues with hyperglycemia     -He had another episodeof hypoglycemia this morning at 60.  -However he is eating better  -I decrease his Lantus to 18 units daily and decrease short acting NovoLog to 1 unit for every 15 g of carbohydrate    -May also increase he is insulin sliding scale to high intensity if still with uncontrolled hyperglycemia   -Currently glucose levels improvement acceptable ranges    #7 TRANG on top of CKD    -Creatinine remained flat and stable  -Still having non anion gap metabolic acidosis  -Unsure if his creatinine is likely at baseline levels as prior comparison was a year ago  -Highly appreciate input from nephrology service  -Added sodium bicarb and oral Lasix.  IV fluids discontinued  -Approach to further optimize blood pressure levels    -Stopped his aspirin, he is not receiving any ACE inhibitor's or ARB  -Recheck metabolic panel in the morning      #8 history of atrial fibrillation  -EKG showing NSR  -On metoprolol  -Not on chronic anticoagulants    #9 history of hepatitis  C    #10 physical deconditioning  -Appreciate input from PT, OT and social service  Family still hopeful for home discharge.  Physical therapy recommending long-term care versus TCU if participation increases.    #11  Moderate protein calorie malnutrition             Diet: Combination Diet 3 gm K Diet; 2 gm NA Diet  Snacks/Supplements Adult: Ensure Enlive; With Meals    DVT Prophylaxis: Heparin SQ and Pneumatic Compression Devices  Liu Catheter: Not present  Central Lines: None  Cardiac Monitoring: None  Code Status: No CPR- Do NOT Intubate appreciate palliative care input.  Will respect wishes and converted to a medical order    Disposition Plan     Expected Discharge Date: Anticipating this gentleman will still be needing inpatient care at least another 24 more hours.     Will be a good candidate for hospital discharge if no further worsening of creatinine, stable metabolic acidosis, no issues of vomiting and improving blood pressure levels            The patient's care was discussed with the Patient and Patient's Family.    Alejandro Martini MD, MD  Hospitalist Service  [facility]  Securely message with the Vocera Web Console (learn more here)  Text page via [employer] Paging/Directory         Clinically Significant Risk Factors Present on Admission                      ______________________________________________________________________    Interval History   Continuing service care today.  Seen and examined.  Chart reviewed.   This morning he appears to be awake, alert but still pleasantly confused.  There is still some intermittent bouts of patient's refusing oral medications and food.  However no reported actual vomiting this morning.  No complaints of abdominal pain.  Remained afebrile.  No reported bleeding tendencies.  RRT was called last night for an apparent chest discomfort and elevated blood pressure levels.  Subsequent work-up did not show any clear evidence of ischemic  findings and blood pressure subsequently improved as well.        Data reviewed today: I reviewed all medications, new labs and imaging results over the last 24 hours. I personally reviewed .    Physical Exam   Vital Signs: Temp: 99.7  F (37.6  C) Temp src: Temporal BP: (!) 175/66 Pulse: 89   Resp: 20 SpO2: 93 % O2 Device: Nasal cannula Oxygen Delivery: 2 LPM  Weight: 147 lbs 11.33 oz  HEENT; Atraumatic, normocephalic, pinkish conjuctiva, pupils bilateral reactive   Skin: warm and moist, no rashes  Lungs: equal chest expansion, clear to auscultation, no wheezes, no stridor, no crackles,   Heart: normal rate, normal rhythm, no rubs or gallops.   Abdomen: normal bowel sounds, no tenderness, no peritoneal signs, no guarding  Extremities: no deformities, no edema   Neuro; poor historian, awake, alert, oriented to his name, following some simple verbal instructions  moves all extremities spontaneously  Psych; not combative, not agitated        Data   Recent Labs   Lab 10/19/22  1218 10/19/22  0825 10/19/22  0734 10/18/22  2056 10/18/22  1941 10/18/22  0803 10/18/22  0745 10/16/22  1523 10/16/22  1020 10/15/22  0749 10/15/22  0646 10/13/22  0854 10/13/22  0635 10/12/22  2009 10/12/22  1520   WBC  --   --   --   --  7.1  --   --   --  7.6  --  7.7   < > 12.2*  --  9.6   HGB  --   --   --   --  13.1*  --   --   --  12.6*  --  12.8*   < > 14.0  --  14.0   MCV  --   --   --   --  93  --   --   --  92  --  99   < > 94  --  94   PLT  --   --   --   --  133*  --   --   --  138*  --  128*   < > 124*  --  128*   NA  --   --  141  --  143  --  143   < >  --    < > 138   < > 137  --  135*   POTASSIUM  --   --  3.6  --  4.8  --  3.8   < >  --    < > 3.8   < > 4.5  --  4.5   CHLORIDE  --   --  111*  --  111*  --  113*   < >  --    < > 106   < > 105  --  99   CO2  --   --  19*  --  17*  --  17*   < >  --    < > 18*   < > 21*  --  26   BUN  --   --  31.0*  --  32.4*  --  34.1*   < >  --    < > 45.2*   < > 42.7*  --  48.3*   CR  --   --   2.82*  --  2.75*  --  2.67*   < >  --    < > 2.61*   < > 2.24*  --  2.40*   ANIONGAP  --   --  11  --  15  --  13   < >  --    < > 14   < > 11  --  10   DOMINGO  --   --  8.2*  --  8.3*  --  8.0*   < >  --    < > 8.2*   < > 8.7*  --  9.2   * 88 99   < > 191*   < > 63*   < >  --    < > 311*   < > 62*   < > 173*   ALBUMIN  --   --   --   --   --   --   --   --   --   --   --   --  2.8*  --  3.5   PROTTOTAL  --   --   --   --   --   --   --   --   --   --   --   --  6.3*  --  7.0   BILITOTAL  --   --   --   --   --   --   --   --   --   --   --   --  1.5*  --  1.0   ALKPHOS  --   --   --   --   --   --   --   --   --   --   --   --  78  --  99   ALT  --   --   --   --   --   --   --   --   --   --   --   --  27  --  32   AST  --   --   --   --   --   --   --   --   --   --   --   --  28  --  30    < > = values in this interval not displayed.     No results found for this or any previous visit (from the past 24 hour(s)).  Medications      amLODIPine  5 mg Oral Daily    [Held by provider] aspirin  325 mg Oral Daily    atorvastatin  40 mg Oral QPM    cefTRIAXone  2 g Intravenous Q24H    furosemide  40 mg Oral Daily    heparin ANTICOAGULANT  5,000 Units Subcutaneous Q12H    hydrALAZINE  50 mg Oral TID    insulin aspart  1-7 Units Subcutaneous TID AC    insulin aspart  1-5 Units Subcutaneous At Bedtime    insulin aspart   Subcutaneous TID w/meals    [Held by provider] insulin glargine  18 Units Subcutaneous QAM AC    isosorbide dinitrate  20 mg Oral TID    latanoprost  1 drop Both Eyes At Bedtime    metoprolol tartrate  50 mg Oral BID    multivitamin, therapeutic  1 tablet Oral Daily    pantoprazole  40 mg Oral QAM AC    sodium bicarbonate  650 mg Oral BID    sodium chloride (PF)  3 mL Intracatheter Q8H    traZODone  75 mg Oral At Bedtime    Vitamin D3  25 mcg Oral Daily

## 2022-10-19 NOTE — PROGRESS NOTES
10/19/22 1200   Appointment Info   Signing Clinician's Name / Credentials (SLP) Leida Ac MA [facility], [place] SLP   General Information   Referring Physician Douglas Joe DO   Pertinent History of Current Problem Eh Callahan is a 82 year old [place] gentleman with known history of chronic kidney disease, insulin requiring diabetes mellitus, hypertension, atrial fibrillation not on chronic anticoagulation, hepatitis C, depression who lives at home with family and has a presents in the emergency room 10/1/22 due to increasing generalized, decreased oral intake, and alteration in mental state. SLP consulted for bedside swallow eval.       Present   (daughter chose to  (ipad offered). Pt often interacted with SLP in English.)   Language [place]raltarian   Type of Evaluation   Type of Evaluation Swallow Evaluation   Oral Motor   Oral Musculature generally intact   Dentition (Oral Motor)   Dentition (Oral Motor) natural dentition;adequate dentition   Facial Symmetry (Oral Motor)   Facial Symmetry (Oral Motor) WNL   Lip Function (Oral Motor)   Lip Range of Motion (Oral Motor) WNL   Tongue Function (Oral Motor)   Tongue ROM (Oral Motor) WNL   Jaw Function (Oral Motor)   Jaw Function (Oral Motor) WNL   Cough/Swallow/Gag Reflex (Oral Motor)   Soft Palate/Velum (Oral Motor) WNL   Volitional Throat Clear/Cough (Oral Motor) WNL   Volitional Swallow (Oral Motor) WNL   Vocal Quality/Secretion Management (Oral Motor)   Vocal Quality (Oral Motor) WNL   Secretion Management (Oral Motor) WNL   General Swallowing Observations   Past History of Dysphagia No dysphagia hx documented in EMR. Family reports pt sometimes coughs when drinking but states that is most often when he is lethargic and drinking in a reclined position.   Respiratory Support (General Swallowing Observations) nasal cannula   Current Diet/Method of Nutritional Intake (General Swallowing Observations, NIS) regular diet;thin liquids (level 0)    Swallowing Evaluation Clinical swallow evaluation   Clinical Swallow Evaluation   Feeding Assistance minimal assistance required   Clinical Swallow Evaluation Textures Trialed thin liquids;solid foods   Clinical Swallow Eval: Thin Liquid Texture Trial   Mode of Presentation, Thin Liquids straw;fed by clinician   Volume of Liquid or Food Presented 8 oz thin H20   Oral Phase of Swallow WFL   Pharyngeal Phase of Swallow intact   Diagnostic Statement WNL with thin liquids (drinking while sitting upright)   Clinical Swallow Evaluation: Solid Food Texture Trial   Mode of Presentation self-fed   Volume Presented 2 misti crackers   Oral Phase WFL   Oral Residue   (none)   Pharyngeal Phase intact   Diagnostic Statement Pt declined offer of puree, ate crackers per his preference. Mastication WFL, trace oral residue cleared with wash of thin H20 by straw. no overt signs or symptoms of aspiration.   Esophageal Phase of Swallow   Patient reports or presents with symptoms of esophageal dysphagia No   Swallowing Recommendations   Diet Consistency Recommendations thin liquids (level 0);regular diet   Supervision Level for Intake distant supervision needed   Mode of Delivery Recommendations bolus size, small;slow rate of intake   Swallowing Maneuver Recommendations alternate food and liquid intake   Monitoring/Assistance Required (Eating/Swallowing) stop eating activities when fatigue is present   Recommended Feeding/Eating Techniques (Swallow Eval) maintain upright sitting position for eating   Medication Administration Recommendations, Swallowing (SLP) pills in puree at baseline per daughter   Instrumental Assessment Recommendations instrumental evaluation not recommended at this time   General Therapy Interventions   Planned Therapy Interventions Dysphagia Treatment   Dysphagia treatment Instruction of safe swallow strategies;Modified diet education   Clinical Impression   Criteria for Skilled Therapeutic Interventions Met  (SLP Eval) Yes, treatment indicated   SLP Diagnosis mild oropharyngeal dysphagia   Risks & Benefits of therapy have been explained evaluation/treatment results reviewed;care plan/treatment goals reviewed;participants voiced agreement with care plan;participants included;patient;daughter   Clinical Impression Comments Clinical dysphagia exam completed. The pt's daughter was present, assisted with hx and interpreting.  Daughter reports cough with liquids, but describes this only happens when very fatigued and drinking in a reclined position.     During this exam the patient demonstrated generally intact oral mechanism. He consumed PO trials of regular textures and thin liquids. Mastication WFL, trace oral residue cleared with liquid wash. Laryngeal elevation felt strong to palpation and swallow response was timely. Voice clear and no direct signs/symptoms of aspiration were present with any PO trials.     Recommend a regular texture diet and thin liquids. Rec small bites/sips when upright and alert. Do not feed when reclined or too lethargic. SLP will follow for a short tx course for safe swallow education.   SLP Total Evaluation Time   Eval: oral/pharyngeal swallow function, clinical swallow Minutes (26141) 20   SLP Goals   Therapy Frequency (SLP Eval) 3 times/wk   SLP Predicted Duration/Target Date for Goal Attainment 11/18/22   SLP Goals Swallow   SLP: Safely tolerate diet without signs/symptoms of aspiration Regular diet;Thin liquids;With use of swallow precautions;With assistance/supervision   Interventions   Interventions Quick Adds Swallowing Dysfunction   Swallowing Dysfunction &/or Oral Function for Feeding   Treatment of Swallowing Dysfunction &/or Oral Function for Feeding Minutes (38645) 10   Symptoms Noted During/After Treatment None   Treatment Detail/Skilled Intervention educated pt/daughter about importance of PO only when alert, upright & safety precautions. Educated her about option for thickened  liquids if function worsened, but at this time thin with safety precautions is recommended to which she agreed   SLP Discharge Planning   SLP Plan diet tolerance, dysphagia education   SLP Discharge Recommendation home with assist;home with home care speech therapy   SLP Rationale for DC Rec benefits from SLP to ensure max safe swallowing with baseline diet. likely to reach SLP goals prior to hospital discharge.   SLP Brief overview of current status  Recommend a regular texture diet and thin liquids. Rec small bites/sips when upright and alert. Do not feed when reclined or too lethargic. SLP will follow for a short tx course for safe swallow education.   Total Session Time   Total Session Time (sum of timed and untimed services) 30

## 2022-10-19 NOTE — PROGRESS NOTES
Alert to self only. Removes nasal cannula frequently. Decreased appetite, did not eat breakfast/lunch family at bedside helping w/feeding. Took meds crushed in applesauce. QID blood sugars. Pt very pleasant/cooperative this shift. Incontinent bowel/bladder. PT worked with pt this afternoon, was able to get him standing but very unsteady. Assist 2/walker/gait belt.

## 2022-10-19 NOTE — PROGRESS NOTES
SPIRITUAL HEALTH SERVICES Progress Note  [facility] 6      Saw pt Sukhil,Awed H per his length of stay.  Patient was sleeping, but his dauhter was at the bedside.      Oriented them to [facility].      This author and other chaplains remain available per pt/family request.    Frank Aguillon M.Div.   Intern  Phone: [phone]

## 2022-10-19 NOTE — PLAN OF CARE
Goal Outcome Evaluation:       Pt confused, somnolent, hypertensive, complained of chest pain per daughter, rapid called, IV hydralazine given, EKG done, lab results showed elevated BNP and troponin. Pt incontinent bowel and bladder, assist of two with the lift, poor appetite, refusing PO meds, will continue to monitor.

## 2022-10-19 NOTE — PLAN OF CARE
Goal Outcome Evaluation:               Pt is confused,  quiet, smiles.daughter in room and helps to translate and communicate with pt. LS wheezing, productive cough present.pt removed oxygen tubing and  oximeter. Pt refused to drink and eat. BG @ 02: was 111. VSS. Pt is incont. B&B. Pt is A-2 , lift. Pt needs IV lasix, refused yesterday to take oral tablet. Pt had critical labs yesterday. Blood collected this morning for recheck .

## 2022-10-19 NOTE — CODE/RAPID RESPONSE
Rapid response called around 7 for CP. Patient is Irish speaking but wife at bedside states he developed chest pain tonight. BP noted to be very elevated >200 as he has been refusing to take some of his PO meds tonight. Revieved SL nitroglycerin and BP is already coming down to 180's. Notes reviewed and noted to have been seen by cardiology earlier this admit and BP meds have been adjusted.     Add PRN hydralazine and follow up EKG, troponins. Mostly will need to get BP better controlled as suspect that is cause of his CP and not ACS but will follow up workup.        Addendum:  BP has improved and EKG without acute ischemic changes. Troponin minimally elevated at same level as earlier this admission. No evidence of ACS, cont BP management and encourage him to take his oral BP meds.

## 2022-10-20 NOTE — PLAN OF CARE
Goal Outcome Evaluation:       Pt alert to self, confused, bp elevated 176/79, hydralazine and metoprolol given. Denied chest pain,  , insulin given, family at bedside, will continue to monitor.

## 2022-10-20 NOTE — PROVIDER NOTIFICATION
Paged Dr Joe:    Pt c/o chest pain. Difficult to assess what he rates it. Sounds like this is int. He states something is wrong. /76, HR 76. O2 93% RA. RR 16. Please advise!     This apparently happens when his BP increases. Trop ordered. Give Hydralazine.

## 2022-10-20 NOTE — PROGRESS NOTES
Assessment and Plan:   CKD-3B: on sodium bicarbonate 650 mg po bid.     TRANG: now on lasix 40 mg po daily. This may help with BP as well. Low K/ low Na diet.   Cr: 10/15 2.61, today Cr 2.94, drifting upwards. HCO3 low at 17. K 4.2.     Increase bicarb dose.   Decrease lasix dose.     Nephrology F/U as outpt: can go to our office if desired, 1-2 weeks. ([facility], [phone], hospital follow up). Labs, RP, in one week.             Interval History:   UTI: Klebsiella. S/P rocephin.   DM  HT: on metoprolol 50 mg bid, isordil 20 mg tid, hydralazine 50 mg tid, amlodipine 5 mg per day.  /67, P 72.  Ok for discharge on current meds.   Hep C    Afib               Review of Systems:   No SOB. No pain. Poor historian.           Medications:       amLODIPine  5 mg Oral Daily     [Held by provider] aspirin  325 mg Oral Daily     atorvastatin  40 mg Oral QPM     furosemide  40 mg Oral Daily     heparin ANTICOAGULANT  5,000 Units Subcutaneous Q12H     hydrALAZINE  50 mg Oral TID     insulin aspart  1-7 Units Subcutaneous TID AC     insulin aspart  1-5 Units Subcutaneous At Bedtime     insulin aspart   Subcutaneous TID w/meals     [Held by provider] insulin glargine  18 Units Subcutaneous QAM AC     isosorbide dinitrate  20 mg Oral TID     latanoprost  1 drop Both Eyes At Bedtime     metoprolol tartrate  50 mg Oral BID     multivitamin, therapeutic  1 tablet Oral Daily     pantoprazole  40 mg Oral QAM AC     sodium bicarbonate  650 mg Oral BID     sodium chloride (PF)  3 mL Intracatheter Q8H     traZODone  75 mg Oral At Bedtime     Vitamin D3  25 mcg Oral Daily         Current active medications and PTA medications reviewed, see medication list for details.            Physical Exam:   Vitals were reviewed  Patient Vitals for the past 24 hrs:   BP Temp Temp src Pulse Resp SpO2   10/20/22 0850 (!) 150/67 98.2  F (36.8  C) Temporal 72 16 95 %   10/20/22 0011 138/58 98.5  F (36.9  C) Temporal 60 16 97 %   10/19/22 2150  130/51 -- -- -- -- --   10/19/22 2037 (!) 176/79 -- -- 88 -- --   10/19/22 1656 (!) 168/81 98.7  F (37.1  C) Temporal 78 18 100 %   10/19/22 1551 (!) 145/62 98  F (36.7  C) Temporal 93 -- 100 %   10/19/22 1330 (!) 149/62 98.1  F (36.7  C) Temporal 78 18 95 %       Temp:  [98  F (36.7  C)-98.7  F (37.1  C)] 98.2  F (36.8  C)  Pulse:  [60-93] 72  Resp:  [16-18] 16  BP: (130-176)/(51-81) 150/67  SpO2:  [95 %-100 %] 95 %    Temperatures:  Current - Temp: 98.2  F (36.8  C); Max - Temp  Av.3  F (36.8  C)  Min: 98  F (36.7  C)  Max: 98.7  F (37.1  C)  Respiration range: Resp  Av  Min: 16  Max: 18  Pulse range: Pulse  Av.2  Min: 60  Max: 93  Blood pressure range: Systolic (24hrs), Av , Min:130 , Max:176   ; Diastolic (24hrs), Av, Min:51, Max:81    Pulse oximetry range: SpO2  Av.4 %  Min: 95 %  Max: 100 %    No intake/output data recorded.      Intake/Output Summary (Last 24 hours) at 10/20/2022 1113  Last data filed at 10/20/2022 0854  Gross per 24 hour   Intake 100 ml   Output --   Net 100 ml       Alert, resting in bed  Lungs with few basilar rales, + faint wheezing  Cor RRR nl S1 S2 no M  LE no edema       Wt Readings from Last 4 Encounters:   10/12/22 67 kg (147 lb 11.3 oz)   21 63.6 kg (140 lb 3.4 oz)   20 64.8 kg (142 lb 12.8 oz)   20 67.6 kg (149 lb)          Data:          Lab Results   Component Value Date     10/20/2022     10/19/2022     10/18/2022     2021     2020     2020    Lab Results   Component Value Date    CHLORIDE 106 10/20/2022    CHLORIDE 111 10/19/2022    CHLORIDE 111 10/18/2022    CHLORIDE 109 2021    CHLORIDE 105 2021    CHLORIDE 99 2021    CHLORIDE 100 2021    CHLORIDE 100 2020    CHLORIDE 99 2020    Lab Results   Component Value Date    BUN 34.6 10/20/2022    BUN 31.0 10/19/2022    BUN 32.4 10/18/2022    BUN 61 2021    BUN 54 2021    BUN 66  08/04/2021    BUN 59 04/29/2021    BUN 49 08/02/2020    BUN 48 08/01/2020      Lab Results   Component Value Date    POTASSIUM 4.2 10/20/2022    POTASSIUM 3.6 10/19/2022    POTASSIUM 4.8 10/18/2022    POTASSIUM 4.5 08/23/2021    POTASSIUM 4.6 08/05/2021    POTASSIUM 4.7 08/04/2021    POTASSIUM 4.1 04/29/2021    POTASSIUM 3.5 08/02/2020    POTASSIUM 3.6 08/01/2020    Lab Results   Component Value Date    CO2 17 10/20/2022    CO2 19 10/19/2022    CO2 17 10/18/2022    CO2 27 08/23/2021    CO2 25 08/05/2021    CO2 28 08/04/2021    CO2 29 04/29/2021    CO2 29 08/02/2020    CO2 27 08/01/2020    Lab Results   Component Value Date    CR 2.94 10/20/2022    CR 2.82 10/19/2022    CR 2.75 10/18/2022    CR 1.76 04/29/2021    CR 1.85 08/02/2020    CR 1.74 08/01/2020        Recent Labs   Lab Test 10/18/22  1941 10/16/22  1020 10/15/22  0646   WBC 7.1 7.6 7.7   HGB 13.1* 12.6* 12.8*   HCT 39.3* 38.1* 41.4   MCV 93 92 99   * 138* 128*     Recent Labs   Lab Test 10/13/22  0635 10/12/22  1520 08/23/21  1058 05/17/16  1933 03/28/16  0920   AST 28 30 62*   < > 25   ALT 27 32 78*   < > 38   ALKPHOS 78 99 94   < > 146   BILITOTAL 1.5* 1.0 1.1   < > 1.2   LAURA  --  11*  --   --  50    < > = values in this interval not displayed.       Recent Labs   Lab Test 10/19/22  0734 08/23/21  1058 08/04/21  2149   MAG 1.5* 2.3 2.2     Recent Labs   Lab Test 10/19/22  0734 11/22/16  1340   PHOS 3.1 2.3*     Recent Labs   Lab Test 10/20/22  0656 10/19/22  0734 10/18/22  1941   DOMINGO 8.4* 8.2* 8.3*       Lab Results   Component Value Date    DOMINGO 8.4 (L) 10/20/2022     Lab Results   Component Value Date    WBC 7.1 10/18/2022    HGB 13.1 (L) 10/18/2022    HCT 39.3 (L) 10/18/2022    MCV 93 10/18/2022     (L) 10/18/2022     Lab Results   Component Value Date     10/20/2022    POTASSIUM 4.2 10/20/2022    CHLORIDE 106 10/20/2022    CO2 17 (L) 10/20/2022     (H) 10/20/2022     Lab Results   Component Value Date    BUN 34.6 (H)  10/20/2022    CR 2.94 (H) 10/20/2022     Lab Results   Component Value Date    MAG 1.5 (L) 10/19/2022     Lab Results   Component Value Date    PHOS 3.1 10/19/2022       Creatinine   Date Value Ref Range Status   10/20/2022 2.94 (H) 0.67 - 1.17 mg/dL Final   10/19/2022 2.82 (H) 0.67 - 1.17 mg/dL Final   10/18/2022 2.75 (H) 0.67 - 1.17 mg/dL Final   10/18/2022 2.67 (H) 0.67 - 1.17 mg/dL Final   10/17/2022 2.44 (H) 0.67 - 1.17 mg/dL Final   10/16/2022 2.53 (H) 0.67 - 1.17 mg/dL Final   04/29/2021 1.76 (H) 0.66 - 1.25 mg/dL Final   08/02/2020 1.85 (H) 0.66 - 1.25 mg/dL Final   08/01/2020 1.74 (H) 0.66 - 1.25 mg/dL Final   07/31/2020 1.78 (H) 0.66 - 1.25 mg/dL Final   05/23/2020 1.72 (H) 0.66 - 1.25 mg/dL Final   05/22/2020 1.89 (H) 0.66 - 1.25 mg/dL Final       Attestation:  I have reviewed today's vital signs, notes, medications, labs and imaging.     Gavin Wilson MD

## 2022-10-20 NOTE — PROGRESS NOTES
Care Management Follow Up    Length of Stay (days): 8    Expected Discharge Date: 10/20/2022     Concerns to be Addressed:       Patient plan of care discussed at interdisciplinary rounds: Yes    Anticipated Discharge Disposition: Home     Anticipated Discharge Services:    Anticipated Discharge DME:      Patient/family educated on Medicare website which has current facility and service quality ratings:    Education Provided on the Discharge Plan:    Patient/Family in Agreement with the Plan:      Referrals Placed by CM/SW:    Private pay costs discussed: Not applicable    Additional Information:    Updated by [facility] that pt is open with homecare through [facility].     Insurance cannot cover multiple homecare companies in which pt would need to continue with [facility] who does not provide PT or switch to [facility].    Spoke with pt daughter who explained that she is the PCA and cannot discontinue these services for pt to get skilled nursing in which she will not get services through [facility]. Pt will work with her [facility]  (P: [phone]) to see if there is any additional services that will be covered under their insurance.     Left VM for [facility] (P: [phone]) to determine if they will need orders.     will set up transport when medically ready.     Addendum  Spoke with [facility] who request discharge paperwork be faxed to (F: [phone])    Addendum    Updated by [facility] that they can accept pt if services differ from their current services with [facility]. Pt will discharge with both services from [facility] and PT/OT/RN with [facility].     SEAN Valencia, [facility]  Inpatient Care Coordination  Ortho/Spine Unit  [phone]  Renetta Natarajan, [facility]

## 2022-10-20 NOTE — PLAN OF CARE
Goal Outcome Evaluation:                  Pt is Alert self.  Confused. Removes  Oxygen ,nasal cannula .Family at bed side. Pt very pleasant/cooperative. Incontinent bowel/bladder. IV  to RUE, SL  flushed as ordered.Pt continues BG checks and  VSS checks. Drank sips of water X4.

## 2022-10-20 NOTE — PROGRESS NOTES
[facility]    Medicine Progress Note - Hospitalist Service    Date of Admission:  10/12/2022    Assessment & Plan   Eh Callahan is a 82 year old [place] gentleman with known history of chronic kidney disease, insulin requiring diabetes mellitus, hypertension, atrial fibrillation not on chronic anticoagulation, hepatitis C, depression who lives at home with family and has a presents in the emergency room earlier today due to increasing generalized, decreased oral intake, and alteration in mental state.    TRANG on CKD 3  Metabolic acidosis  -baseline appears to be 1.7-2 range due to DM and HTN  -during admission has worsened, ACE and ASA now held  -placed on bicarb and increased again today  -lasix resumed then decreased from 40 to 20 by nephrology, they have now signed off  -repeat BMP in am to ensure not worsening despite interventions    Toxic encephalopathy  UTI with sepsis  -Sensitivity panel showing Klebsiella sensitive to quinolones, ceftriaxone   -Blood cultures showing no growth to date   -placed on Rocephin and completed course 10/19  -clinically doing great, per family he's back to his neurologic baseline but likely does have some level of dementia at baseline per familiy    uncontrolled hypertension  -multifactorial from renal disease, medication changes and his refusal to take his meds at times  -at home was on Imdur 20 BID, lopressor 25 BID  -has gone through a couple adjustments while here but currently on Norvasc 5, hydralaizne 50 TID, Imdur BID, lopressor 50 BID and lasix has been added as well  -overall much improved and no longer symptomatic but still somewhat labile      Type 2 NSTEMI  -Appreciate input from cardiology service  -No clear evidence of ACS and urgent coronary angiogram  -May consider outpatient restratification with Lexiscan cardiac stress testing once much more stable  -Echocardiogram showed preserved EF, mild AR, mild TR, no regional wall motion abnormality  seen    DMT2, poorly controlled  -Earlier complicated with episode of hypoglycemia as well as poor PO intake  -had been holding long acting insulin but will resume today as he's eating better, previously at 18 units but decrease to 13 as PO intake still somewhat low  -cont ISS, change to DM diet      atrial fibrillation  -EKG showing NSR, on metoprolol  -Not on chronic anticoagulants, discuss with PCP but suspect related to falls     physical deconditioning  -Appreciate input from PT, OT and social service  -likely home w/HHC    Moderate protein calorie malnutrition  -appreciate nutrition services     Diet: Snacks/Supplements Adult: Ensure Enlive; With Meals  Combination Diet 3 gm K Diet; Moderate Consistent Carb (60 g CHO per Meal) Diet; 2 gm NA Diet    DVT Prophylaxis: Heparin SQ  Liu Catheter: Not present  Central Lines: None  Cardiac Monitoring: None  Code Status: No CPR- Do NOT Intubate      Disposition Plan   Likely medically stable next 24-48 hours, will go home with [facility]     The patient's care was discussed with the Bedside Nurse, Patient and Patient's Family.    Douglas Joe DO  Hospitalist Service  [facility]  Securely message with the Vocera Web Console (learn more here)  Text page via [employer] Paging/Directory   ______________________________________________________________________    Interval History   No significant overnight events, continues to have occasional behaviors at night like refusing meds but has been taking them. Through family he denies any pain, no headache. Blood sugar has been difficult to control, eating much better today    Data reviewed today: I reviewed all medications, new labs and imaging results over the last 24 hours.    Physical Exam   Vital Signs: Temp: 98.2  F (36.8  C) Temp src: Temporal BP: (!) 150/67 Pulse: 72   Resp: 16 SpO2: 95 % O2 Device: None (Room air) Oxygen Delivery: 2 LPM  Weight: 147 lbs 11.33 oz  Constitutional: awake, alert and  cooperative  Eyes: pupils equal, round and reactive to light and conjunctiva normal  ENT: normocepalic, without obvious abnormality, atramatic  Respiratory: no increased work of breathing, good air exchange and clear to auscultation  Cardiovascular: regular rate and rhythm and no murmur noted  GI: normal bowel sounds, soft, non-distended and non-tender  Skin: no bruising or bleeding  Neurologic: alert, moving extremeties, appears weak    Data   Recent Labs   Lab 10/20/22  1153 10/20/22  0755 10/20/22  0656 10/19/22  0825 10/19/22  0734 10/18/22  2056 10/18/22  1941 10/16/22  1523 10/16/22  1020 10/15/22  0749 10/15/22  0646   WBC  --   --   --   --   --   --  7.1  --  7.6  --  7.7   HGB  --   --   --   --   --   --  13.1*  --  12.6*  --  12.8*   MCV  --   --   --   --   --   --  93  --  92  --  99   PLT  --   --   --   --   --   --  133*  --  138*  --  128*   NA  --   --  140  --  141  --  143   < >  --    < > 138   POTASSIUM  --   --  4.2  --  3.6  --  4.8   < >  --    < > 3.8   CHLORIDE  --   --  106  --  111*  --  111*   < >  --    < > 106   CO2  --   --  17*  --  19*  --  17*   < >  --    < > 18*   BUN  --   --  34.6*  --  31.0*  --  32.4*   < >  --    < > 45.2*   CR  --   --  2.94*  --  2.82*  --  2.75*   < >  --    < > 2.61*   ANIONGAP  --   --  17*  --  11  --  15   < >  --    < > 14   DOMINGO  --   --  8.4*  --  8.2*  --  8.3*   < >  --    < > 8.2*   * 279* 299*   < > 99   < > 191*   < >  --    < > 311*    < > = values in this interval not displayed.     No results found for this or any previous visit (from the past 24 hour(s)).  Medications       amLODIPine  5 mg Oral Daily     [Held by provider] aspirin  325 mg Oral Daily     atorvastatin  40 mg Oral QPM     furosemide  20 mg Oral Daily     heparin ANTICOAGULANT  5,000 Units Subcutaneous Q12H     hydrALAZINE  50 mg Oral TID     insulin aspart  1-7 Units Subcutaneous TID AC     insulin aspart  1-5 Units Subcutaneous At Bedtime     insulin aspart    Subcutaneous TID w/meals     insulin glargine  13 Units Subcutaneous QAM AC     isosorbide dinitrate  20 mg Oral TID     latanoprost  1 drop Both Eyes At Bedtime     metoprolol tartrate  50 mg Oral BID     multivitamin, therapeutic  1 tablet Oral Daily     pantoprazole  40 mg Oral QAM AC     sodium bicarbonate  1,300 mg Oral BID     sodium chloride (PF)  3 mL Intracatheter Q8H     traZODone  75 mg Oral At Bedtime     Vitamin D3  25 mcg Oral Daily

## 2022-10-20 NOTE — DISCHARGE INSTRUCTIONS
Your home care referral was sent to [facility]  If you haven't heard from them within the next 24-48 hours,  Please call them at [phone]

## 2022-10-20 NOTE — PLAN OF CARE
"Pt alert to self, confused. Citizen of [place] speaking, family in room helping translate. BP elevated, causing chest pain. MD notified, gave 0.4mg nitrostat, and 50mg hydralazine - managed well. Blood sugars - 279, 370 - managed with sliding scale Novolog insulin. Took medication crushed with applesauce. IV saline locked. Will continue to monitor.    BP (!) 161/78 (BP Location: Left arm)   Pulse 73   Temp 98.5  F (36.9  C) (Temporal)   Resp 16   Ht 1.702 m (5' 7\")   Wt 67 kg (147 lb 11.3 oz)   SpO2 93%   BMI 23.13 kg/m    "

## 2022-10-21 NOTE — PROGRESS NOTES
[facility]    Medicine Progress Note - Hospitalist Service    Date of Admission:  10/12/2022    Assessment & Plan   Eh Callahan is a 82 year old [place] gentleman with known history of chronic kidney disease, insulin requiring diabetes mellitus, hypertension, atrial fibrillation not on chronic anticoagulation, hepatitis C, depression who lives at home with family and has a presents in the emergency room earlier today due to increasing generalized, decreased oral intake, and alteration in mental state.     TRANG on CKD 3  Metabolic acidosis  -baseline appears to be 1.7-2 range due to DM and HTN  -during admission has worsened, ACE and ASA now held  -placed on bicarb   -lasix resumed then decreased from 40 to 20 by nephrology,   -Creatinine slowly increasing over the past few days and today jumped from 2.9-3.3.  -Per nephrology likely secondary to prerenal etiology.  Plan is to hold Lasix, give back a small amount of IV fluid and reassess.  -According to patient's daughter he does not drink very much fluid and only likes milk.  -He is potentially going to discharge today but due to increasing creatinine we will keep overnight with interventions above and recheck tomorrow.  Will need close outpatient following.     Toxic encephalopathy  UTI with sepsis  -Sensitivity panel showing Klebsiella sensitive to quinolones, ceftriaxone   -Blood cultures showing no growth to date   -placed on Rocephin and completed course 10/19  -clinically doing great, per family he's back to his neurologic baseline but likely does have some level of dementia at baseline per familiy     uncontrolled hypertension  -multifactorial from renal disease, medication changes and his refusal to take his meds at times  -at home was on Imdur 20 BID, lopressor 25 BID  -has gone through a couple adjustments while here but currently on Norvasc 5, hydralaizne 50 TID, Imdur BID, lopressor 50 BID and lasix has been added as well  -overall  much improved and no longer symptomatic but still somewhat labile        Type 2 NSTEMI  -Appreciate input from cardiology service  -No clear evidence of ACS and urgent coronary angiogram  -May consider outpatient restratification with Lexiscan cardiac stress testing once much more stable  -Echocardiogram showed preserved EF, mild AR, mild TR, no regional wall motion abnormality seen     DMT2, poorly controlled  -Earlier complicated with episode of hypoglycemia as well as poor PO intake  -had been holding long acting insulin and resumed on 10/20.    -Lantus started at 13 units every morning.  Since blood sugars are elevated will increase back to his home dose of 18 units in the morning.  -Gave an additional 7 units of NPH right now since blood sugars are in the 300s.  -cont ISS, DM diet       atrial fibrillation  -EKG showing NSR, on metoprolol  -Not on chronic anticoagulants, discuss with PCP but suspect related to falls      physical deconditioning  -Appreciate input from PT, OT and social service  -likely home w/HHC     Moderate protein calorie malnutrition  -appreciate nutrition services          Diet: Snacks/Supplements Adult: Ensure Enlive; With Meals  Combination Diet 3 gm K Diet; Moderate Consistent Carb (60 g CHO per Meal) Diet; 2 gm NA Diet    DVT Prophylaxis: Heparin SQ  Liu Catheter: Not present  Central Lines: None  Cardiac Monitoring: None  Code Status: No CPR- Do NOT Intubate      Disposition Plan      Expected Discharge Date: 10/22/2022      Destination: home with help/services         If creatinine improving.  The patient's care was discussed with the Bedside RN and the patient's daughter.    Naresh Simon MD  Hospitalist Service  [facility]  Securely message with the Vocera Web Console (learn more here)  Text page via [employer] Paging/Directory         Clinically Significant Risk Factors              # Hypoalbuminemia: Lowest albumin = 2.8 g/dL (Ref range: 3.5-5.2) at  10/13/2022  6:35 AM, will monitor as appropriate           # Moderate Malnutrition: based on nutrition assessment        ______________________________________________________________________    Interval History   Sleepy.  History is obtained with the help of the patient's daughter who translates.  She said he sleeps during the day and stays up at night        Physical Exam   Vital Signs: Temp: 97.6  F (36.4  C) Temp src: Temporal BP: 138/54 Pulse: 79   Resp: 18 SpO2: 92 % O2 Device: None (Room air)    Weight: 147 lbs 11.33 oz    Vital signs reviewed  General: Sleepy  CV: regular rate and rhythm, no murmurs or rubs  Lungs:  Clear to ascultation bilaterally, normal respiratory effort  HEENT:  Pupil round, equal, conjuctivae, sclerae and lids normal, neck is supple  Extremities:  No edema  Psychiatric: Sleepy  Skin:  No rashes or wounds evident    Data   Creatinine 3.3 from 2.9 from 2.8  Glucose 323  Hemoglobin 12.7

## 2022-10-21 NOTE — PLAN OF CARE
Pt is alert to self and family, assist of 2 and lift for transfers, pt denied pain, VSS, on RA. SL. Checked and changed, incontinent for bladder, no bowel movement. BG at 2 am. 235. Dtr at the bedside. No chest pain reported or noted. Plan to discharge home with services.

## 2022-10-21 NOTE — PLAN OF CARE
Goal Outcome Evaluation:     [id number] RN    VSS   Pt disorientated to place, time and situation  Family at bedside. Daughter very helpful with cares and POC reviewed with her.  Pt denies pain. Needs much encouragement with meds. Took them crushed with applesauce.   Pt has poor appetite but daughter said he has had more today than yesterday.   BG monitoring. Changes to insulins changed. See MAR. , 323 and 335 this shift  Cms intact. Skin intact.   Elevated Creatinine 3.32  Nephrology consulted again today. IV fluids started if creatinine decreases with am labs he is ok with pt discharging.  Possible discharge home with family tomorrow.  Pt turns independently in bed and is assist of 2 to raise in bed but he declined  to get OOB to recliner with lift.

## 2022-10-21 NOTE — PROGRESS NOTES
Care Management Follow Up    Length of Stay (days): 9    Expected Discharge Date: 10/22/2022     Concerns to be Addressed:       Patient plan of care discussed at interdisciplinary rounds: Yes    Anticipated Discharge Disposition: Home     Anticipated Discharge Services:    Anticipated Discharge DME:      Patient/family educated on Medicare website which has current facility and service quality ratings:    Education Provided on the Discharge Plan:    Patient/Family in Agreement with the Plan:      Referrals Placed by CM/SW:    Private pay costs discussed: transportation costs    Additional Information:    Set stretcher transport for 10/22 1600. Will cancel if pt not medically stable. Pt will discharge with PT/OT/RN through [facility]. Orders to also be faxed to [facility] (F: [phone]) for PCA services.     SEAN Valencia, [facility]  Inpatient Care Coordination  Ortho/Spine Unit  [phone]  Renetta Natarajan, [facility]

## 2022-10-21 NOTE — PLAN OF CARE
Goal Outcome Evaluation:  Vital signs stable.  Pt alert but confused to place, time, situation.  Family at bedside.  Denies pain, is reluctant to take medications, needs lots of encouragement and time to take medications.  Appetite was poor at supper.  Pills crushed and taken with apple sauce.  Blood glucose monitored.  Oral, skin and camille cares done.  Care plan reviewed with pt and daughter.

## 2022-10-21 NOTE — PROGRESS NOTES
Assessment and Plan:   CKD-3b:    TRANG: Cr has been drifting upwards for lasst 6 days. HCO3 improved. Imaging with no hydro. He has been on lasix for the last 3 days. On oral bicarb. On vit D. No IVF for 3 days. No IV contrast or NSAID noted.   Cr: 2.82 > 2.94 > 3.32. K 3.7, HCO3 29.     Will stop lasix and give back some IVF.No obvious nephrotoxic exposure. No evidence of obstruction on CT. He may be dry.   Re-check labs in am.   If Cr improving can discharge.                   Interval History:   UTI: Klebsiella. S/P IV rocephin.   DM: BS have been high, 235 - 370.   HT: on amlodipine, hydral, isordil, metoprolol. Avg /66.   Hep C  Afib                 Review of Systems:   Family notes poor po intake.          Medications:       amLODIPine  5 mg Oral Daily     [Held by provider] aspirin  325 mg Oral Daily     atorvastatin  40 mg Oral QPM     furosemide  20 mg Oral Daily     heparin ANTICOAGULANT  5,000 Units Subcutaneous Q12H     hydrALAZINE  50 mg Oral TID     insulin aspart  1-7 Units Subcutaneous TID AC     insulin aspart  1-5 Units Subcutaneous At Bedtime     insulin aspart   Subcutaneous TID w/meals     insulin glargine  13 Units Subcutaneous QAM AC     isosorbide dinitrate  20 mg Oral TID     latanoprost  1 drop Both Eyes At Bedtime     metoprolol tartrate  50 mg Oral BID     multivitamin, therapeutic  1 tablet Oral Daily     pantoprazole  40 mg Oral QAM AC     sodium bicarbonate  1,300 mg Oral BID     sodium chloride (PF)  3 mL Intracatheter Q8H     traZODone  75 mg Oral At Bedtime     Vitamin D3  25 mcg Oral Daily         Current active medications and PTA medications reviewed, see medication list for details.            Physical Exam:   Vitals were reviewed  Patient Vitals for the past 24 hrs:   BP Temp Temp src Pulse Resp SpO2   10/21/22 0900 (!) 159/60 -- -- 79 18 92 %   10/21/22 0756 (!) 164/67 98.6  F (37  C) Temporal 89 18 90 %   10/21/22 0034 133/47 99.4  F (37.4  C) Temporal 81 16 93  %   10/20/22 2012 (!) 168/66 98.4  F (36.9  C) -- 83 16 95 %   10/20/22 1609 (!) 150/67 98.4  F (36.9  C) -- 76 18 93 %   10/20/22 1401 (!) 161/78 -- -- 73 -- --   10/20/22 1350 (!) 178/76 98.5  F (36.9  C) Temporal 74 16 93 %       Temp:  [98.4  F (36.9  C)-99.4  F (37.4  C)] 98.6  F (37  C)  Pulse:  [73-89] 79  Resp:  [16-18] 18  BP: (133-178)/(47-78) 159/60  SpO2:  [90 %-95 %] 92 %    Temperatures:  Current - Temp: 98.6  F (37  C); Max - Temp  Av.7  F (37.1  C)  Min: 98.4  F (36.9  C)  Max: 99.4  F (37.4  C)  Respiration range: Resp  Av  Min: 16  Max: 18  Pulse range: Pulse  Av.3  Min: 73  Max: 89  Blood pressure range: Systolic (24hrs), Av , Min:133 , Max:178   ; Diastolic (24hrs), Av, Min:47, Max:78    Pulse oximetry range: SpO2  Av.7 %  Min: 90 %  Max: 95 %    I/O last 3 completed shifts:  In: 220 [P.O.:220]  Out: -       Intake/Output Summary (Last 24 hours) at 10/21/2022 1214  Last data filed at 10/21/2022 1000  Gross per 24 hour   Intake 270 ml   Output --   Net 270 ml       Alert, non-conversant  Lungs with clear BS  Cor RRR nl S1 S2 no M  LE no edema       Wt Readings from Last 4 Encounters:   10/12/22 67 kg (147 lb 11.3 oz)   21 63.6 kg (140 lb 3.4 oz)   20 64.8 kg (142 lb 12.8 oz)   20 67.6 kg (149 lb)          Data:          Lab Results   Component Value Date     10/21/2022     10/20/2022     10/19/2022     2021     2020     2020    Lab Results   Component Value Date    CHLORIDE 104 10/21/2022    CHLORIDE 106 10/20/2022    CHLORIDE 111 10/19/2022    CHLORIDE 109 2021    CHLORIDE 105 2021    CHLORIDE 99 2021    CHLORIDE 100 2021    CHLORIDE 100 2020    CHLORIDE 99 2020    Lab Results   Component Value Date    BUN 44.5 10/21/2022    BUN 34.6 10/20/2022    BUN 31.0 10/19/2022    BUN 61 2021    BUN 54 2021    BUN 66 2021    BUN 59 2021    BUN 49  08/02/2020    BUN 48 08/01/2020      Lab Results   Component Value Date    POTASSIUM 3.7 10/21/2022    POTASSIUM 4.2 10/20/2022    POTASSIUM 3.6 10/19/2022    POTASSIUM 4.5 08/23/2021    POTASSIUM 4.6 08/05/2021    POTASSIUM 4.7 08/04/2021    POTASSIUM 4.1 04/29/2021    POTASSIUM 3.5 08/02/2020    POTASSIUM 3.6 08/01/2020    Lab Results   Component Value Date    CO2 20 10/21/2022    CO2 17 10/20/2022    CO2 19 10/19/2022    CO2 27 08/23/2021    CO2 25 08/05/2021    CO2 28 08/04/2021    CO2 29 04/29/2021    CO2 29 08/02/2020    CO2 27 08/01/2020    Lab Results   Component Value Date    CR 3.32 10/21/2022    CR 2.94 10/20/2022    CR 2.82 10/19/2022    CR 1.76 04/29/2021    CR 1.85 08/02/2020    CR 1.74 08/01/2020        Recent Labs   Lab Test 10/21/22  0636 10/18/22  1941 10/16/22  1020   WBC 6.6 7.1 7.6   HGB 12.7* 13.1* 12.6*   HCT 38.9* 39.3* 38.1*   MCV 92 93 92    133* 138*     Recent Labs   Lab Test 10/13/22  0635 10/12/22  1520 08/23/21  1058 05/17/16  1933 03/28/16  0920   AST 28 30 62*   < > 25   ALT 27 32 78*   < > 38   ALKPHOS 78 99 94   < > 146   BILITOTAL 1.5* 1.0 1.1   < > 1.2   LAURA  --  11*  --   --  50    < > = values in this interval not displayed.       Recent Labs   Lab Test 10/19/22  0734 08/23/21  1058 08/04/21  2149   MAG 1.5* 2.3 2.2     Recent Labs   Lab Test 10/19/22  0734 11/22/16  1340   PHOS 3.1 2.3*     Recent Labs   Lab Test 10/21/22  0636 10/20/22  0656 10/19/22  0734   DOMINGO 8.6* 8.4* 8.2*       Lab Results   Component Value Date    DOMINGO 8.6 (L) 10/21/2022     Lab Results   Component Value Date    WBC 6.6 10/21/2022    HGB 12.7 (L) 10/21/2022    HCT 38.9 (L) 10/21/2022    MCV 92 10/21/2022     10/21/2022     Lab Results   Component Value Date     10/21/2022    POTASSIUM 3.7 10/21/2022    CHLORIDE 104 10/21/2022    CO2 20 (L) 10/21/2022     (H) 10/21/2022     Lab Results   Component Value Date    BUN 44.5 (H) 10/21/2022    CR 3.32 (H) 10/21/2022     Lab Results    Component Value Date    MAG 1.5 (L) 10/19/2022     Lab Results   Component Value Date    PHOS 3.1 10/19/2022       Creatinine   Date Value Ref Range Status   10/21/2022 3.32 (H) 0.67 - 1.17 mg/dL Final   10/20/2022 2.94 (H) 0.67 - 1.17 mg/dL Final   10/19/2022 2.82 (H) 0.67 - 1.17 mg/dL Final   10/18/2022 2.75 (H) 0.67 - 1.17 mg/dL Final   10/18/2022 2.67 (H) 0.67 - 1.17 mg/dL Final   10/17/2022 2.44 (H) 0.67 - 1.17 mg/dL Final   04/29/2021 1.76 (H) 0.66 - 1.25 mg/dL Final   08/02/2020 1.85 (H) 0.66 - 1.25 mg/dL Final   08/01/2020 1.74 (H) 0.66 - 1.25 mg/dL Final   07/31/2020 1.78 (H) 0.66 - 1.25 mg/dL Final   05/23/2020 1.72 (H) 0.66 - 1.25 mg/dL Final   05/22/2020 1.89 (H) 0.66 - 1.25 mg/dL Final       Attestation:  I have reviewed today's vital signs, notes, medications, labs and imaging.     Gavin Wilson MD

## 2022-10-21 NOTE — PROGRESS NOTES
[facility]    Palliative Care Chart Check or Chart Review    This patient's most recent hospitalist note, medication profile and labs from the past 24 hours have been reviewed in follow up care.    [facility] database review:  Yes   0 mg Daily Morphine Equivalent based on amount dispensed  =0 mg Daily Morphine Equivalent  Past 24 hr    Goals of Care- No CPR- Do NOT Intubate  Hospitalization goals discussed with patient, and daughters.  Goal to complete medical work up and discharge home.  Goal for ongoing restorative cares with the exception of DNR, Do NOT intubate.   Decisional Capacity- Unreliable. Patient does not have an advance directive. Per  informed consent policy next of kin should be involved in all consent and decision making.  Family at the bedside noted patient's son Sindy Dubon is first contact.  POLST none on file.     Recommendation:   Plan: discharge home with home care service.     Care coordination with social work DINO Valencia   It has been determined that no change is necessary to the current plan of care at this time.     The chart will be reviewed regularly and the patient will be seen if necessary.   If you would like the patient to be seen, please contact the service at [phone] and ask to have the patient seen.    Time Spent 10 minutes, none in direct contact with patient or family. >50% spent in chart review, documentation and care coordination with    DINO Valencia      Thank you!    TANNER Desir CNP APRN, CNP:  Pain Management and Palliative Care  [facility]  Pgr: [phone]

## 2022-10-21 NOTE — PROVIDER NOTIFICATION
Call placed to Dr. Gavin Wilson  [phone]    Dr. Simon would like to have you assess this patients Creatinine Level of 3.32  Would he be appropriate to discharge today? Care Coordinator has stretcher lined up for 1600 if ok to discharge home with [facility] services.

## 2022-10-21 NOTE — PROGRESS NOTES
Speech Language Therapy Discharge Summary    Reason for therapy discharge:    All goals and outcomes met, no further needs identified.    Progress towards therapy goal(s). See goals on Care Plan in [facility] electronic health record for goal details.  Goals met    Therapy recommendation(s):    No further therapy is recommended.     Recommend a regular texture diet and thin liquids. Rec caregivers ensure pt sits upright for meals and takes small bites/sips at a slow pace. Feed only when alert/engaged.

## 2022-10-22 NOTE — PLAN OF CARE
Physical Therapy Discharge Summary    Reason for therapy discharge:    Discharged to home with home therapy.    Progress towards therapy goal(s). See goals on Care Plan in [facility] electronic health record for goal details.  Goals not met.  Barriers to achieving goals:   limited tolerance for therapy and discharge from facility.    Therapy recommendation(s):    Continued therapy is recommended.  Rationale/Recommendations:  Pt very limited with mobility and with participation in therapy during IP stay. Recommending continued HH PT in order to progress strength, endurance, activity tolerance, and overall safety and independence with all functional mobility; also, to decrease caregiver burden.     Note: PT recommending LTC or TCU given high level of assist required. Pt and family deciding to return home with home cares, pt has good social support system at home and previously had support from family.

## 2022-10-22 NOTE — PROGRESS NOTES
Care Management Follow Up    Length of Stay (days): 10    Expected Discharge Date: 10/22/2022     Concerns to be Addressed:     Discharge planning  Patient plan of care discussed at interdisciplinary rounds: Yes    Anticipated Discharge Disposition: Home     Anticipated Discharge Services:  Home care thru [facility] RN PT OT  Anticipated Discharge DME:      Patient/family educated on Medicare website which has current facility and service quality ratings: yes   Education Provided on the Discharge Plan:  Candis Rodas  Patient/Family in Agreement with the Plan:  yes    Referrals Placed by CM/SW:  Home care  Private pay costs discussed: transportation costs    Additional Information:  Patient discharging home with home care RN PT OT thru [facility]. [facility] notified of patient discharging today. Discharge orders also faxed to [facility] (F: [phone]) for PCA services.     Sister at bedside and agreable to  Discharge plan. Medication to be filled prior to discharge.      [facility] transportation scheduled at 1600 today.     Tanvir Foster RN Case Manager  Inpatient Care Coordination   [facility]   [phone]          Tanvir Foster RN

## 2022-10-22 NOTE — PROGRESS NOTES
Assessment and Plan:   CKD/TRANG: yesterday stopped diuretic and gave IVF. CKd due to DM and HT.   Labs show K 3.7, Na 143, HCO3 22, Cr 3.32 > 3.32.     Date/Time Weight Weight Method   10/22/22 0736 66.1 kg (145 lb 12.8 oz) Bed scale   10/12/22 1518 67 kg (147 lb 11.3 oz)      Hold further diuretic.   OK for discharge with follow up labs next week with primary MD.   Nephrology follow up in our clinic in 1-2 weeks.                 Interval History:   UTI: Klebsiella. S/P IV rocephin.   DM: BS have been high, 235 - 370.   HT: on amlodipine, hydral, isordil, metoprolol. Avg /55. OK for discharge on current meds.   Hep C  Afib                    Review of Systems:   Non-conversant. Nurses note poor po intake.           Medications:       amLODIPine  5 mg Oral Daily     [Held by provider] aspirin  325 mg Oral Daily     atorvastatin  40 mg Oral QPM     heparin ANTICOAGULANT  5,000 Units Subcutaneous Q12H     hydrALAZINE  50 mg Oral TID     insulin aspart  1-7 Units Subcutaneous TID AC     insulin aspart  1-5 Units Subcutaneous At Bedtime     insulin aspart   Subcutaneous TID w/meals     insulin glargine  18 Units Subcutaneous QAM AC     isosorbide dinitrate  20 mg Oral TID     latanoprost  1 drop Both Eyes At Bedtime     metoprolol tartrate  50 mg Oral BID     multivitamin, therapeutic  1 tablet Oral Daily     pantoprazole  40 mg Oral QAM AC     sodium bicarbonate  1,300 mg Oral BID     sodium chloride (PF)  3 mL Intracatheter Q8H     traZODone  75 mg Oral At Bedtime     Vitamin D3  25 mcg Oral Daily         Current active medications and PTA medications reviewed, see medication list for details.            Physical Exam:   Vitals were reviewed  Patient Vitals for the past 24 hrs:   BP Temp Temp src Pulse Resp SpO2 Weight   10/22/22 0736 (!) 148/58 98.3  F (36.8  C) Temporal 83 16 94 % 66.1 kg (145 lb 12.8 oz)   10/22/22 0009 125/57 98.1  F (36.7  C) Temporal 76 17 (!) 88 % --   10/21/22 1606 132/51 98.6  F  (37  C) Oral 83 16 (!) 88 % --   10/21/22 1325 138/54 97.6  F (36.4  C) Temporal 79 18 -- --       Temp:  [97.6  F (36.4  C)-98.6  F (37  C)] 98.3  F (36.8  C)  Pulse:  [76-83] 83  Resp:  [16-18] 16  BP: (125-148)/(51-58) 148/58  SpO2:  [88 %-94 %] 94 %    Temperatures:  Current - Temp: 98.3  F (36.8  C); Max - Temp  Av.2  F (36.8  C)  Min: 97.6  F (36.4  C)  Max: 98.6  F (37  C)  Respiration range: Resp  Av.8  Min: 16  Max: 18  Pulse range: Pulse  Av.3  Min: 76  Max: 83  Blood pressure range: Systolic (24hrs), Av , Min:125 , Max:148   ; Diastolic (24hrs), Av, Min:51, Max:58    Pulse oximetry range: SpO2  Av %  Min: 88 %  Max: 94 %    I/O last 3 completed shifts:  In: 300 [P.O.:300]  Out: -       Intake/Output Summary (Last 24 hours) at 10/22/2022 0905  Last data filed at 10/21/2022 1415  Gross per 24 hour   Intake 300 ml   Output --   Net 300 ml       Somnolent  No resp distress  Lungs with clear BS  Cor RRR nl S1 S2 no M no JVD  LE no edema       Wt Readings from Last 4 Encounters:   10/22/22 66.1 kg (145 lb 12.8 oz)   21 63.6 kg (140 lb 3.4 oz)   20 64.8 kg (142 lb 12.8 oz)   20 67.6 kg (149 lb)          Data:          Lab Results   Component Value Date     10/22/2022     10/21/2022     10/20/2022     2021     2020     2020    Lab Results   Component Value Date    CHLORIDE 107 10/22/2022    CHLORIDE 104 10/21/2022    CHLORIDE 106 10/20/2022    CHLORIDE 109 2021    CHLORIDE 105 2021    CHLORIDE 99 2021    CHLORIDE 100 2021    CHLORIDE 100 2020    CHLORIDE 99 2020    Lab Results   Component Value Date    BUN 44.8 10/22/2022    BUN 44.5 10/21/2022    BUN 34.6 10/20/2022    BUN 61 2021    BUN 54 2021    BUN 66 2021    BUN 59 2021    BUN 49 2020    BUN 48 2020      Lab Results   Component Value Date    POTASSIUM 3.7 10/22/2022    POTASSIUM 3.7  10/21/2022    POTASSIUM 4.2 10/20/2022    POTASSIUM 4.5 08/23/2021    POTASSIUM 4.6 08/05/2021    POTASSIUM 4.7 08/04/2021    POTASSIUM 4.1 04/29/2021    POTASSIUM 3.5 08/02/2020    POTASSIUM 3.6 08/01/2020    Lab Results   Component Value Date    CO2 22 10/22/2022    CO2 20 10/21/2022    CO2 17 10/20/2022    CO2 27 08/23/2021    CO2 25 08/05/2021    CO2 28 08/04/2021    CO2 29 04/29/2021    CO2 29 08/02/2020    CO2 27 08/01/2020    Lab Results   Component Value Date    CR 3.32 10/22/2022    CR 3.32 10/21/2022    CR 2.94 10/20/2022    CR 1.76 04/29/2021    CR 1.85 08/02/2020    CR 1.74 08/01/2020        Recent Labs   Lab Test 10/21/22  0636 10/18/22  1941 10/16/22  1020   WBC 6.6 7.1 7.6   HGB 12.7* 13.1* 12.6*   HCT 38.9* 39.3* 38.1*   MCV 92 93 92    133* 138*     Recent Labs   Lab Test 10/13/22  0635 10/12/22  1520 08/23/21  1058 05/17/16  1933 03/28/16  0920   AST 28 30 62*   < > 25   ALT 27 32 78*   < > 38   ALKPHOS 78 99 94   < > 146   BILITOTAL 1.5* 1.0 1.1   < > 1.2   LAURA  --  11*  --   --  50    < > = values in this interval not displayed.       Recent Labs   Lab Test 10/19/22  0734 08/23/21  1058 08/04/21  2149   MAG 1.5* 2.3 2.2     Recent Labs   Lab Test 10/19/22  0734 11/22/16  1340   PHOS 3.1 2.3*     Recent Labs   Lab Test 10/22/22  0605 10/21/22  0636 10/20/22  0656   DOMINGO 8.6* 8.6* 8.4*       Lab Results   Component Value Date    DOMINGO 8.6 (L) 10/22/2022     Lab Results   Component Value Date    WBC 6.6 10/21/2022    HGB 12.7 (L) 10/21/2022    HCT 38.9 (L) 10/21/2022    MCV 92 10/21/2022     10/21/2022     Lab Results   Component Value Date     10/22/2022    POTASSIUM 3.7 10/22/2022    CHLORIDE 107 10/22/2022    CO2 22 10/22/2022     (H) 10/22/2022     Lab Results   Component Value Date    BUN 44.8 (H) 10/22/2022    CR 3.32 (H) 10/22/2022     Lab Results   Component Value Date    MAG 1.5 (L) 10/19/2022     Lab Results   Component Value Date    PHOS 3.1 10/19/2022        Creatinine   Date Value Ref Range Status   10/22/2022 3.32 (H) 0.67 - 1.17 mg/dL Final   10/21/2022 3.32 (H) 0.67 - 1.17 mg/dL Final   10/20/2022 2.94 (H) 0.67 - 1.17 mg/dL Final   10/19/2022 2.82 (H) 0.67 - 1.17 mg/dL Final   10/18/2022 2.75 (H) 0.67 - 1.17 mg/dL Final   10/18/2022 2.67 (H) 0.67 - 1.17 mg/dL Final   04/29/2021 1.76 (H) 0.66 - 1.25 mg/dL Final   08/02/2020 1.85 (H) 0.66 - 1.25 mg/dL Final   08/01/2020 1.74 (H) 0.66 - 1.25 mg/dL Final   07/31/2020 1.78 (H) 0.66 - 1.25 mg/dL Final   05/23/2020 1.72 (H) 0.66 - 1.25 mg/dL Final   05/22/2020 1.89 (H) 0.66 - 1.25 mg/dL Final       Attestation:  I have reviewed today's vital signs, notes, medications, labs and imaging.     Gavin Wilson MD

## 2022-10-22 NOTE — PLAN OF CARE
Goal Outcome Evaluation:  Pt non english speaking. Writer spoke pt language. Pt AOX3 with intermittent confusion. Vss. Pt incontinent. Diaper changed x3 this shift saturated with urine. Son at bedside. Denies chest pain.

## 2022-10-22 NOTE — PROGRESS NOTES
[facility]    Hospitalist Progress Note  Name: Eh Callahan    MRN: [id number]  Provider:  Lebron Crawford DO  Date of Service: 10/22/2022    Summary of Stay: Eh Callahan is a 82 year old [place] gentleman with known history of chronic kidney disease, insulin requiring diabetes mellitus, hypertension, atrial fibrillation not on chronic anticoagulation, hepatitis C, depression who lives at home with family and has a presents in the emergency room earlier today due to increasing generalized, decreased oral intake, and alteration in mental state.    TODAY'S PLAN:  Cr stable today.  Appreciate Nephrology recommendations.  Discharge home with home care/PT/OT.  Discussed discharge medications and follow up recommendations with pt and daughter at bedside.  All questions answered.  We also discussed that the patient will be on new medications and should stop some previous medications so it is important to follow the discharge medications list and take meds as indicated.  Pt's daughter expressed understanding.  Appreciate SW assistance with discharge arrangements.    Problem List:   TRANG on CKD 3  Metabolic acidosis  -baseline appears to be 1.7-2 range due to DM and HTN  -during admission has worsened, ACE and ASA now held  -placed on bicarb   -lasix resumed then decreased from 40 to 20 by nephrology, now on hold due to increasing Cr  -Per nephrology likely secondary to prerenal etiology.  Plan is to hold Lasix, give back a small amount of IV fluid and reassess.  -According to patient's daughter he does not drink very much fluid and only likes milk     Toxic encephalopathy  UTI with sepsis  -Sensitivity panel showing Klebsiella sensitive to quinolones, ceftriaxone   -Blood cultures showing no growth to date   -placed on Rocephin and completed course 10/19  -clinically doing great, per family he's back to his neurologic baseline but likely does have some level of dementia at baseline per  lupe     uncontrolled hypertension  -multifactorial from renal disease, medication changes and his refusal to take his meds at times  -at home was on Imdur 20 BID, lopressor 25 BID  -has gone through a couple adjustments while here but currently on Norvasc 5, hydralaizne 50 TID, Imdur BID, lopressor 50 BID and lasix has been added as well  -overall much improved and no longer symptomatic but still somewhat labile     Type 2 NSTEMI  -Appreciate input from cardiology service  -No clear evidence of ACS and urgent coronary angiogram  -May consider outpatient restratification with Lexiscan cardiac stress testing once much more stable  -Echocardiogram showed preserved EF, mild AR, mild TR, no regional wall motion abnormality seen     DMT2, poorly controlled  -Earlier complicated with episode of hypoglycemia as well as poor PO intake  -had been holding long acting insulin and resumed on 10/20.    -Lantus started at 13 units every morning.  Since blood sugars are elevated will increase back to his home dose of 18 units in the morning.  -Gave an additional 7 units of NPH right now since blood sugars are in the 300s.  -cont ISS, DM diet       atrial fibrillation  -EKG showing NSR, on metoprolol  -Not on chronic anticoagulants, discuss with PCP but suspect related to falls      physical deconditioning  -Appreciate input from PT, OT and social service  -likely home w/HHC     Moderate protein calorie malnutrition  -appreciate nutrition services    DVT Prophylaxis: Pneumatic Compression Devices  Code Status: No CPR- Do NOT Intubate  Diet: Snacks/Supplements Adult: Ensure Enlive; With Meals  Combination Diet 3 gm K Diet; Moderate Consistent Carb (60 g CHO per Meal) Diet; 2 gm NA Diet    Liu Catheter: Not present  Disposition: Expected discharge today to home with home care/PT/OT. Goals prior to discharge include stable kidney function.   Family updated today: Yes      Interval History   Pt seen and examined.  Pt denies pain.   Daughter assisted with translation.    -Data reviewed today: I personally reviewed all new labs and imaging results over the last 24 hours.     Physical Exam   Temp: 98.3  F (36.8  C) Temp src: Temporal BP: (!) 156/56 Pulse: 80   Resp: 16 SpO2: 94 % O2 Device: None (Room air)    Vitals:    10/12/22 1518 10/22/22 0736   Weight: 67 kg (147 lb 11.3 oz) 66.1 kg (145 lb 12.8 oz)     Vital Signs with Ranges  Temp:  [97.6  F (36.4  C)-98.6  F (37  C)] 98.3  F (36.8  C)  Pulse:  [76-83] 80  Resp:  [16-18] 16  BP: (125-156)/(51-58) 156/56  SpO2:  [88 %-94 %] 94 %  I/O last 3 completed shifts:  In: 300 [P.O.:300]  Out: -     GENERAL: No apparent distress. Awake, alert  HEENT: Normocephalic, atraumatic. Extraocular movements intact.  CARDIOVASCULAR: Regular rate and rhythm without murmurs or rubs. No S3.  PULMONARY: Clear bilaterally.  GASTROINTESTINAL: Soft, non-tender, non-distended. Bowel sounds normoactive.   EXTREMITIES: No cyanosis or clubbing. No edema.  NEUROLOGICAL: CN 2-12 grossly intact, no focal neurological deficits.  DERMATOLOGICAL: No rash, ulcer, bruising, nor jaundice.    Medications       amLODIPine  5 mg Oral Daily     [Held by provider] aspirin  325 mg Oral Daily     atorvastatin  40 mg Oral QPM     heparin ANTICOAGULANT  5,000 Units Subcutaneous Q12H     hydrALAZINE  50 mg Oral TID     insulin aspart  1-7 Units Subcutaneous TID AC     insulin aspart  1-5 Units Subcutaneous At Bedtime     insulin aspart   Subcutaneous TID w/meals     insulin glargine  18 Units Subcutaneous QAM AC     isosorbide dinitrate  20 mg Oral TID     latanoprost  1 drop Both Eyes At Bedtime     metoprolol tartrate  50 mg Oral BID     multivitamin, therapeutic  1 tablet Oral Daily     pantoprazole  40 mg Oral QAM AC     sodium bicarbonate  1,300 mg Oral BID     sodium chloride (PF)  3 mL Intracatheter Q8H     traZODone  75 mg Oral At Bedtime     Vitamin D3  25 mcg Oral Daily     Data     Laboratory:  Recent Labs   Lab 10/21/22  0636  10/18/22  1941 10/16/22  1020   WBC 6.6 7.1 7.6   HGB 12.7* 13.1* 12.6*   HCT 38.9* 39.3* 38.1*   MCV 92 93 92    133* 138*     Recent Labs   Lab 10/22/22  0738 10/22/22  0605 10/22/22  0248 10/21/22  0803 10/21/22  0636 10/20/22  0755 10/20/22  0656   NA  --  143  --   --  139  --  140   POTASSIUM  --  3.7  --   --  3.7  --  4.2   CHLORIDE  --  107  --   --  104  --  106   CO2  --  22  --   --  20*  --  17*   ANIONGAP  --  14  --   --  15  --  17*   * 184* 143*   < > 288*   < > 299*   BUN  --  44.8*  --   --  44.5*  --  34.6*   CR  --  3.32*  --   --  3.32*  --  2.94*   GFRESTIMATED  --  18*  --   --  18*  --  21*   DOMINGO  --  8.6*  --   --  8.6*  --  8.4*    < > = values in this interval not displayed.     No results for input(s): CULT in the last 168 hours.    Imaging:  No results found for this or any previous visit (from the past 24 hour(s)).      Lebron Crawford DO  [facility] Hospital[facility]  [address] [place] [address]  10/22/2022

## 2022-10-22 NOTE — PLAN OF CARE
Goal Outcome Evaluation:       VSS   Pt disorientated situation  Daughter very helpful with cares and POC reviewed with her.  Pt denies pain. Needs much encouragement with meds. Took them crushed with applesauce.   Pt has poor appetite today. BG monitoring. See MAR. BG  198 and 307 this shift  Cms intact. Skin intact.   Elevated Creatinine 3.32Pt turns independently in bed and is assist of 2 to raise in bed but he declined  to get OOB to recliner with lift.  Pt discharging via stretcher at 1600. Discharge medications and instructions given to pt and daughter. Forms signed and on chart. All belongings taken with pt.copy of letter about pt admission dates given to daughter

## 2022-10-23 NOTE — DISCHARGE SUMMARY
Hospitalist Discharge Summary  [facility]    Eh Callahan MRN# [id number]   YOB: 1940 Age: 82 year old     Date of Admission:  10/12/2022  Date of Discharge:  10/22/2022  4:45 PM  Admitting Physician:  Alejandro Martini MD  Discharge Physician:  Lebron Crawford DO  Discharging Service:  Hospitalist     Primary Provider: Basilio Alfaro          Discharge Diagnosis:     TRANG on CKD 3  Metabolic acidosis  -baseline appears to be 1.7-2 range due to DM and HTN  -during admission has worsened, ACE and ASA now held  -placed on bicarb   -lasix resumed then decreased from 40 to 20 by nephrology, now on hold due to increasing Cr  -Per nephrology likely secondary to prerenal etiology.  Plan is to hold Lasix, give back a small amount of IV fluid and reassess.  -According to patient's daughter he does not drink very much fluid and only likes milk     Toxic encephalopathy  UTI with sepsis  -Sensitivity panel showing Klebsiella sensitive to quinolones, ceftriaxone   -Blood cultures showing no growth to date   -placed on Rocephin and completed course 10/19  -clinically doing great, per family he's back to his neurologic baseline but likely does have some level of dementia at baseline per familiy     uncontrolled hypertension  -multifactorial from renal disease, medication changes and his refusal to take his meds at times  -at home was on Imdur 20 BID, lopressor 25 BID  -has gone through a couple adjustments while here but currently on Norvasc 5, hydralaizne 50 TID, Imdur BID, lopressor 50 BID and lasix has been added as well  -overall much improved and no longer symptomatic but still somewhat labile     Type 2 NSTEMI  -Appreciate input from cardiology service  -No clear evidence of ACS and urgent coronary angiogram  -May consider outpatient restratification with Lexiscan cardiac stress testing once much more stable  -Echocardiogram showed preserved EF, mild AR, mild TR, no regional wall  motion abnormality seen     DMT2, poorly controlled  -Earlier complicated with episode of hypoglycemia as well as poor PO intake  -had been holding long acting insulin and resumed on 10/20.    -Lantus started at 13 units every morning.  Since blood sugars are elevated will increase back to his home dose of 18 units in the morning.  -Gave an additional 7 units of NPH right now since blood sugars are in the 300s.  -cont ISS, DM diet       atrial fibrillation  -EKG showing NSR, on metoprolol  -Not on chronic anticoagulants, discuss with PCP but suspect related to falls      physical deconditioning  -Appreciate input from PT, OT and social service  -likely home w/HHC     Moderate protein calorie malnutrition  -appreciate nutrition services             Discharge Disposition:     Discharged to home with home care/PT           Allergies:     Allergies   Allergen Reactions     Amlodipine Swelling     Edema at 10 mg , fine at 5 mg     Lisinopril Cough     Metoprolol      Other reaction(s): Bradycardia  Metoprolol at 50mg bid -> HR 45 -50, unclear if sx (has fatigue)  May tolerate lower doses if needed              Discharge Medications:     Discharge Medication List as of 10/22/2022  3:34 PM      START taking these medications    Details   amLODIPine (NORVASC) 5 MG tablet Take 1 tablet (5 mg) by mouth daily, Disp-30 tablet, R-0, E-Prescribe      hydrALAZINE (APRESOLINE) 50 MG tablet Take 1 tablet (50 mg) by mouth 3 times daily, Disp-90 tablet, R-0, E-Prescribe      insulin glargine (LANTUS PEN) 100 UNIT/ML pen Inject 18 Units Subcutaneous every morning (before breakfast), Disp-15 mL, R-0, E-PrescribeIf Lantus is not covered by insurance, may substitute Basaglar or Semglee or other insulin glargine product per insurance preference at same dose and frequency.         sodium bicarbonate 650 MG tablet Take 2 tablets (1,300 mg) by mouth 2 times daily, Disp-60 tablet, R-0, E-Prescribe         CONTINUE these medications which have  "CHANGED    Details   isosorbide dinitrate (ISORDIL) 20 MG tablet Take 1 tablet (20 mg) by mouth 3 times daily, Disp-90 tablet, R-0, E-Prescribe      metoprolol tartrate (LOPRESSOR) 50 MG tablet Take 1 tablet (50 mg) by mouth 2 times daily, Disp-60 tablet, R-0, E-Prescribe         CONTINUE these medications which have NOT CHANGED    Details   aspirin (ASA) 81 MG EC tablet Take 1 tablet (81 mg) by mouth daily, Disp-100 tablet,R-0, E-Prescribe      atorvastatin (LIPITOR) 40 MG tablet Take 1 tablet (40 mg) by mouth every evening, Disp-30 tablet,R-1, E-Prescribe      Carboxymeth-Glycerin-Polysorb 0.5-1-0.5 % SOLN Place 1 drop into both eyes 2 times daily as needed , Historical      insulin aspart (NOVOLOG PEN) 100 UNIT/ML pen Inject 4 Units Subcutaneous 3 times daily (with meals), Historical      latanoprost (XALATAN) 0.005 % ophthalmic solution Place 1 drop into both eyes At Bedtime, Historical      Multiple Vitamins-Minerals (MULTIVITAMIN ADULT) TABS Take 1 tablet by mouth daily, Historical      traZODone (DESYREL) 150 MG tablet Take 75 mg by mouth At Bedtime , Historical      Vitamin D3 (CHOLECALCIFEROL) 25 mcg (1000 units) tablet Take 25 mcg by mouth daily, Historical         STOP taking these medications       acetaminophen (TYLENOL) 500 MG tablet Comments:   Reason for Stopping:         insulin detemir (LEVEMIR PEN) 100 UNIT/ML pen Comments:   Reason for Stopping:                      Condition on Discharge:     Discharge condition: Fair   Discharge vitals: Blood pressure 139/54, pulse 77, temperature 97.5  F (36.4  C), temperature source Temporal, resp. rate 16, height 1.702 m (5' 7\"), weight 66.1 kg (145 lb 12.8 oz), SpO2 92 %.   Code status on discharge: DNR / DNI      BASIC PHYSICAL EXAMINATION:  GENERAL: No apparent distress.  CARDIOVASCULAR: Regular rate and rhythm without murmurs.  PULMONARY: Clear to auscultation bilaterally.   GASTROINTESTINAL: Abdomen soft, non-tender.  EXTREMITIES: No edema, pulses " intact.  NEUROLOGIC: No focal deficits.            History of Illness:   See detailed admission note for full details.               Procedures excluding imaging which is summarized below:     Please see details in the electronic medical record.           Consultations:     CARE MANAGEMENT / SOCIAL WORK IP CONSULT  PHYSICAL THERAPY ADULT IP CONSULT  OCCUPATIONAL THERAPY ADULT IP CONSULT  PALLIATIVE CARE ADULT IP CONSULT  OCCUPATIONAL THERAPY ADULT IP CONSULT  PHYSICAL THERAPY ADULT IP CONSULT  CARDIOLOGY IP CONSULT  NEPHROLOGY IP CONSULT  SPEECH LANGUAGE PATH ADULT IP CONSULT          Significant Results:     Results for orders placed or performed during the hospital encounter of 10/12/22   CT Head w/o Contrast    Narrative    EXAM: CT HEAD W/O CONTRAST  LOCATION: [facility]  DATE/TIME: 10/12/2022 5:38 PM    INDICATION: Altered mental status  COMPARISON:  CT 08/23/2021.  TECHNIQUE: Routine CT Head without IV contrast. Multiplanar reformats. Dose reduction techniques were used.    FINDINGS:  Mild motion degradation.  INTRACRANIAL CONTENTS: No intracranial hemorrhage, extraaxial collection, or mass effect.  No CT evidence of acute infarct. Mild to moderate presumed chronic small vessel ischemic changes. Moderate generalized volume loss. No hydrocephalus.     VISUALIZED ORBITS/SINUSES/MASTOIDS: Prior bilateral cataract surgery. Visualized portions of the orbits are otherwise unremarkable. No paranasal sinus mucosal disease. No middle ear or mastoid effusion.    BONES/SOFT TISSUES: No acute abnormality.      Impression    IMPRESSION:  1.  No CT evidence for acute intracranial process, allowing for motion.  2.  Brain atrophy and presumed chronic microvascular ischemic changes as above.   XR Chest Port 1 View    Narrative    CHEST ONE VIEW  10/12/2022 4:01 PM     HISTORY: Fever.    COMPARISON: August 1, 2020      Impression    IMPRESSION: No acute disease.    JAZ LÓPEZ MD         SYSTEM ID:   [id number]   CT Abdomen Pelvis w/o Contrast    Narrative    CT ABDOMEN PELVIS WITHOUT CONTRAST 10/13/2022 1:13 PM    CLINICAL HISTORY: Abdominal pain. Sepsis.  TECHNIQUE: CT scan of the abdomen and pelvis was performed without IV  contrast. Multiplanar reformats were obtained. Dose reduction  techniques were used.  CONTRAST: None.  COMPARISON: CT of the chest, abdomen, and pelvis performed 8/23/2021.    FINDINGS:   LOWER CHEST: Small bilateral pleural effusions. Mild interlobular  septal thickening at both lung bases suggests pulmonary edema.  Coronary artery calcification.    HEPATOBILIARY: Postoperative changes of prior left hepatectomy.  Previously described indeterminate low-density liver lesions are not  well seen on today's noncontrast scan. Small calcified gallstone  within the gallbladder, not well seen on the previous exam.    PANCREAS: Normal.    SPLEEN: Normal.    ADRENAL GLANDS: Normal.    KIDNEYS/BLADDER: Scattered cortical scarring is noted in the right  kidney. The kidneys are otherwise unremarkable. No hydronephrosis.    BOWEL: Moderate amount of stool in the colon, similar to the previous  exam. No bowel obstruction. No convincing evidence for colitis or  diverticulitis. Appendectomy.    PELVIC ORGANS: Mild prostatic enlargement.    LYMPH NODES: No enlarged lymph nodes are identified in the abdomen or  pelvis.    VASCULATURE: Moderate atherosclerotic aortoiliac calcification.    ADDITIONAL FINDINGS: None.    MUSCULOSKELETAL: Degenerative changes in the lumbar spine.      Impression    IMPRESSION:   1.  Moderate amount of stool in the rectum, similar to the previous  exam.  2.  Mild smooth intralobular septal thickening at both lung bases  suggests pulmonary edema.  3.  Small bilateral pleural effusions.  4.  Cholelithiasis.     OUMAR VICKERS MD         SYSTEM ID:  [id number]   Echocardiogram Complete     Value    LVEF  55-60%    Narrative    [id number]  [id number]  [id number]  [id number]^KARL^AL^ROMA      [facility]  Echocardiography Laboratory  [address]  [place] [address]     Name: CHIKIS NEWBERRY  MRN: [id number]  : 1940  Study Date: 10/13/2022 07:54 AM  Age: 82 yrs  Gender: Male  Patient Location: [facility]  Reason For Study: Hypertension (HTN)  Ordering Physician: NAIDA BARAJAS  Referring Physician: Basilio lAfaro MD  Performed By: Meera Otero RDCS     BSA: 1.8 m2  Height: 67 in  Weight: 147 lb  HR: 92  BP: 175/87 mmHg  ______________________________________________________________________________  Procedure  Complete Portable Echo Adult.  ______________________________________________________________________________  Interpretation Summary     The visual ejection fraction is 55-60%.  The right ventricle is normal in size and function.  Moderate concentric left ventricular hypertrophy.  There is mild (1+) tricuspid regurgitation.  There is mild (1+) aortic regurgitation.  RVSP = 46mmHg + RAP. The IVC is not visualized.     No prior echo available for comparison.  ______________________________________________________________________________  Left Ventricle  The left ventricle is normal in size. There is moderate concentric left  ventricular hypertrophy. Left ventricular systolic function is normal. Grade I  or early diastolic dysfunction. The visual ejection fraction is 55-60%. No  regional wall motion abnormalities noted.     Right Ventricle  The right ventricle is normal in size and function.     Atria  Normal left atrial size. Right atrial size is normal.     Mitral Valve  There is trace mitral regurgitation.     Tricuspid Valve  There is mild (1+) tricuspid regurgitation. The right ventricular systolic  pressure is approximated at 46.7 mmHg plus the right atrial pressure.     Aortic Valve  There is mild trileaflet aortic sclerosis. There is mild (1+) aortic  regurgitation.     Pulmonic Valve  There is trace pulmonic valvular regurgitation.      Vessels  Normal size ascending aorta. Inferior vena cava not well visualized for  estimation of right atrial pressure.     Pericardium  There is no pericardial effusion.     Rhythm  Sinus rhythm was noted.  ______________________________________________________________________________  MMode/2D Measurements & Calculations  IVSd: 1.4 cm     LVIDd: 3.4 cm  LVIDs: 2.1 cm  LVPWd: 1.4 cm  FS: 38.2 %  LV mass(C)d: 165.3 grams  LV mass(C)dI: 93.2 grams/m2  Ao root diam: 2.7 cm  LA dimension: 3.4 cm  asc Aorta Diam: 3.2 cm  LA/Ao: 1.3  LA Volume (BP): 38.6 ml  LA Volume Index (BP): 21.8 ml/m2  RWT: 0.83     Doppler Measurements & Calculations  MV E max dagoberto: 81.2 cm/sec  MV A max dagoberto: 133.0 cm/sec  MV E/A: 0.61  MV dec time: 0.20 sec  AI P1/2t: 298.9 msec  PA acc time: 0.12 sec  TR max dagoberto: 341.5 cm/sec  TR max P.7 mmHg  E/E' av.0  Lateral E/e': 22.7  Medial E/e': 13.3     ______________________________________________________________________________  Report approved by: MD Denae Borges 10/13/2022 09:31 AM               Transthoracic Echocardiogram Results:  No results found for this or any previous visit (from the past 4320 hour(s)).             Pending Results:     Unresulted Labs Ordered in the Past 30 Days of this Admission     No orders found from 2022 to 10/13/2022.                      Discharge Instructions and Follow-Up:     Discharge instructions and follow-up:   Discharge Procedure Orders   Medication Therapy Management Referral   Referral Priority: Routine Referral Type: Med Therapy Management   Requested Specialty: Pharmacist   Number of Visits Requested: 1     Home Care Referral   Referral Priority: Routine: Next available opening Referral Type: Home Health Therapies & Aides   Number of Visits Requested: 1     Reason for your hospital stay   Order Comments: Urinary Tract Infection, Acute Kidney Injury, Hypertension     Follow-up and recommended labs and tests    Order Comments: Follow up  with primary care provider, Basilio Alfaro, within 4-5 days for hospital follow- up.  The following labs/tests are recommended: CBC, BMP.      Follow up with Dr. Wilson of Nephrology in 1-2 weeks.    Your medications have changed so be sure to only take what is on the discharge medications list.  Your primary care doctor or nephrologist may need to adjust these further.  If able, check your blood pressure at least once daily in the morning.  Record these values and take them to your primary care doctor.  Continue checking your blood sugar at least 3-4 times daily (before meals and at bedtime).  If your blood sugar is consistently above 200 you may benefit from increasing your insulin regimen and should call your primary care doctor.  If your blood sugar is less than 80, do not give yourself insulin and call your primary care doctor for medication adjustments.     Activity   Order Comments: Your activity upon discharge: activity as tolerated     Order Specific Question Answer Comments   Is discharge order? Yes      Pito Lift Order   Order Comments: Pito Lift Documentation:   Patient is non-weight bearing: Yes.     I, the undersigned, certify that the above prescribed supplies are medically necessary for this patient and is both reasonable and necessary in reference to accepted standards of medical and necessary in reference to accepted standards of medical practice in the treatment of this patient's condition and is not prescribed as a convenience.     Order Specific Question Answer Comments   Type: Pito Lift (Manual)    Sling Type: Half Body Sling    Length of Need: Lifetime    The face to face evaluation was performed on: 10/15/2022      Diet   Order Comments: Follow this diet upon discharge: Orders Placed This Encounter      Snacks/Supplements Adult: Ensure Enlive; With Meals      Combination Diet 3 gm K Diet; Moderate Consistent Carb (60 g CHO per Meal) Diet; 2 gm NA Diet     Order Specific Question Answer  Comments   Is discharge order? Yes              Hospital Course:     Eh Callahan is a 82 year old [place] gentleman with known history of chronic kidney disease, insulin requiring diabetes mellitus, hypertension, atrial fibrillation not on chronic anticoagulation, hepatitis C, depression who lives at home with family and presented to the emergency department on 10/12/2022 with chief complaint of increasing generalized weakness, decreased oral intake, and altered mental status.  In the emergency department, the patient was febrile to 101.2  F, blood pressure 176/101, heart rate 103, respiratory rate 22, SPO2 96% on room air.  Initial lab work showed platelet 28, BUN/creatinine 48.3/2.40, glucose 173, WBC 9.6.  Urinalysis showed moderate blood, 300 protein, positive nitrites, small leukocyte esterase, 40 WBC.  Chest x-ray was unremarkable.  The patient was started on IV ceftriaxone for the treatment of urinary tract infection.  The patient's blood pressure medications were adjusted and his uncontrolled hypertension was improved.  Palliative care was consulted for goals of care with the patient and family.  Family elected for DNR/DNI.  Physical therapy was consulted to see the patient and recommended LTC for the patient.  Family declined this electing to bring the patient home with family assist in addition to home care and PT/OT.  Urine cultures eventually were positive for Klebsiella sensitive to quinolones and ceftriaxone.  The patient did complete his IV antibiotics in the hospital.  The patient's creatinine remained elevated and nephrology was consulted to see the patient.  On 10/18, a rapid response was called for chest pain.  Blood pressure at that time was significantly elevated with systolic greater than 200.  Chest pain improved with antihypertensives.  Further adjustments to his antihypertensive regimen was made with improvement in his blood pressure.  On 7/22/2022, the patient was discharged home with home  care/PT/OT and family assist.    The patient was seen, examined, and counseled on this day. The hospitalization and plan of care was reviewed with the patient extensively. All questions were addressed and the patient agreed to follow-up as noted above.      Total time spent in face to face contact with the patient and coordinating discharge was:  36 Minutes    Lebron Crawford DO  [facility] Hospitalist  [address] [place] [address]  10/23/2022

## 2022-10-25 NOTE — PROGRESS NOTES
Clinic Care Coordination Contact  [facility]/Voicemail       Clinical Data: Care Coordinator Outreach  Outreach attempted x 2. Unable to leave message on patient's voicemail with call back information and requested return call.  Plan: Care Coordinator will do no further outreaches at this time.    JOHN Mcintosh  [phone]  [facility]

## 2022-11-10 PROBLEM — R62.7 FAILURE TO THRIVE IN ADULT: Status: ACTIVE | Noted: 2022-01-01

## 2022-11-10 PROBLEM — F03.90 DEMENTIA WITHOUT BEHAVIORAL DISTURBANCE, PSYCHOTIC DISTURBANCE, MOOD DISTURBANCE, OR ANXIETY, UNSPECIFIED DEMENTIA SEVERITY, UNSPECIFIED DEMENTIA TYPE (H): Status: ACTIVE | Noted: 2022-01-01

## 2022-11-10 PROBLEM — E86.0 DEHYDRATION: Status: ACTIVE | Noted: 2022-01-01

## 2022-11-10 PROBLEM — R79.89 ELEVATED TROPONIN: Status: ACTIVE | Noted: 2022-01-01

## 2022-11-10 PROBLEM — I10 HYPERTENSION, UNSPECIFIED TYPE: Status: ACTIVE | Noted: 2022-01-01

## 2022-11-10 PROBLEM — N18.9 CHRONIC RENAL FAILURE, UNSPECIFIED CKD STAGE: Status: ACTIVE | Noted: 2022-01-01

## 2022-11-10 PROBLEM — E10.69 TYPE 1 DIABETES MELLITUS WITH OTHER SPECIFIED COMPLICATION (H): Status: ACTIVE | Noted: 2022-01-01

## 2022-11-10 NOTE — LETTER
[facility] ICU  [address] [place] [facility] [address]  [phone]    Re: Eh Callahan  [address]  [place] [address]  [phone] (home)     : 1940      To Whom It May Concern:      Eh Callahan was hospitalized from 11/10/2022 through present due to medical illness.  Family presence would be beneficial in the treatment of this patient.      Sincerely,        Lebron Crawford DO

## 2022-11-10 NOTE — LETTER
[facility] ICU  [address]  [facility] [address]  [phone]    Re: Eh Callahan  [address]  [place] [facility] [place] [address]  [phone] (home)     : 1940      To Whom It May Concern:      Eh Callahan was hospitalized from 11/10/2022 to present due to medical illness.  Please excuse Dax Otero from work as his father is critically ill and the patient would greatly benefit from Dax's presence at bedside.      Sincerely,        Lebron Crawford, DO

## 2022-11-11 NOTE — PLAN OF CARE
Goal Outcome Evaluation:  Vitals:  Pain: Pt c/o headache. Tylenol given along with am meds. Pt vomitted within the hour. Rectal suppository given. Intervention effective per pt.   Neuro: Pt lethargic this shift. Pt arouses spountaneously and with voice. Pt makes eye contact and reaches up to hold hands. Son was here to translate and reports that pt is not making much sense and rambling and talking to himself. Later pt was refusing BP meds d/t some more nausea but also refusing further intervention for that.   Respiratory: Pt desatted  Cardiac/Tele: Bp elevated this shift. Pt refusing meds at this time. MD aware; see provider note.   GI/: one episode of stool incontinence. Intermittent nausea. Refusing further antiemetics after this am dose of zofran.   LDAs: PIV infusing NS  Labs: trops slightly trending up. MD notified.   Diet: NPO until speech evaluates.   Activity: not OOB. repo  Plan: continue to monitor per POC.

## 2022-11-11 NOTE — ED NOTES
[facility]  ED Nurse Handoff Report    Eh Callahan is a 82 year old male   ED Chief complaint: Hyperglycemia  . ED Diagnosis:   Final diagnoses:   Failure to thrive in adult   Dementia without behavioral disturbance, psychotic disturbance, mood disturbance, or anxiety, unspecified dementia severity, unspecified dementia type (H)   Elevated troponin   Dehydration   Type 1 diabetes mellitus with other specified complication (H)   Chronic renal failure, unspecified CKD stage   Hypertension, unspecified type     Allergies:   Allergies   Allergen Reactions     Amlodipine Swelling     Edema at 10 mg , fine at 5 mg     Lisinopril Cough     Metoprolol      Other reaction(s): Bradycardia  Metoprolol at 50mg bid -> HR 45 -50, unclear if sx (has fatigue)  May tolerate lower doses if needed       Code Status: DNR / DNI  Activity level - Baseline/Home:  Assist X 1. Activity Level - Current:   Assist X 2. Lift room needed: No. Bariatric: No   Needed: No   Isolation: No. Infection: Not Applicable.     Vital Signs:   Vitals:    11/10/22 2120 11/10/22 2130 11/10/22 2140 11/10/22 2150   BP: (!) 186/94 (!) 175/83 (!) 181/85 (!) 184/88   Pulse: 69 64 64 71   Resp:       Temp:       TempSrc:       SpO2: 99% 99% 97% 98%       Cardiac Rhythm:  ,      Pain level:    Patient confused: Yes. Patient Falls Risk: Yes.   Elimination Status: Has voided   Patient Report - Initial Complaint: Hyperglycemia, generalized weakness. Focused Assessment: pt hx of dementia,  for ems, hypertensive not eating as much, generalized weakness.    Tests Performed: see results. Abnormal Results: see results.  Labs Ordered and Resulted from Time of ED Arrival to Time of ED Departure   COMPREHENSIVE METABOLIC PANEL - Abnormal       Result Value    Sodium 133 (*)     Potassium 5.4 (*)     Chloride 102      Carbon Dioxide (CO2) 23      Anion Gap 8      Urea Nitrogen 49.2 (*)     Creatinine 2.43 (*)     Calcium 8.2 (*)     Glucose 274  (*)     Alkaline Phosphatase 175 (*)     AST 34      ALT 42      Protein Total 6.7      Albumin 3.2 (*)     Bilirubin Total 0.6      GFR Estimate 26 (*)    TROPONIN T, HIGH SENSITIVITY - Abnormal    Troponin T, High Sensitivity 49 (*)    CBC WITH PLATELETS AND DIFFERENTIAL - Abnormal    WBC Count 6.6      RBC Count 4.02 (*)     Hemoglobin 12.6 (*)     Hematocrit 37.9 (*)     MCV 94      MCH 31.3      MCHC 33.2      RDW 13.2      Platelet Count 212      % Neutrophils 57      % Lymphocytes 24      % Monocytes 10      % Eosinophils 7      % Basophils 1      % Immature Granulocytes 1      NRBCs per 100 WBC 0      Absolute Neutrophils 3.9      Absolute Lymphocytes 1.6      Absolute Monocytes 0.6      Absolute Eosinophils 0.4      Absolute Basophils 0.0      Absolute Immature Granulocytes 0.0      Absolute NRBCs 0.0     MAGNESIUM - Abnormal    Magnesium 1.4 (*)    LACTIC ACID WHOLE BLOOD - Normal    Lactic Acid 0.8     INFLUENZA A/B & SARS-COV2 PCR MULTIPLEX - Normal    Influenza A PCR Negative      Influenza B PCR Negative      RSV PCR Negative      SARS CoV2 PCR Negative     PHOSPHORUS - Normal    Phosphorus 3.2     TROPONIN T, HIGH SENSITIVITY   GLUCOSE MONITOR NURSING POCT   GLUCOSE MONITOR NURSING POCT     XR Chest 2 Views   Final Result   IMPRESSION: There are new bibasilar opacities which may represent effusion with associated atelectasis. There is left greater than right associated patchy airspace opacities concerning for superimposed pneumonia/pneumonitis. Cardiac and mediastinal    silhouettes are within normal limits. Osseous structures are stable.      CT Head w/o Contrast   Final Result   IMPRESSION:   1.  No finding for acute infarct, hemorrhage, or mass.      2.  Moderate to advanced volume loss and mild presumed sequela of chronic microvascular ischemic change, similar to prior.         Treatments provided: see MAR  Family Comments: daughter at bedside  OBS brochure/video discussed/provided to patient:   No  ED Medications:   Medications   0.9% sodium chloride BOLUS (500 mLs Intravenous New Bag 11/10/22 2059)     Followed by   sodium chloride 0.9% infusion (has no administration in time range)   aspirin EC tablet 81 mg (has no administration in time range)   atorvastatin (LIPITOR) tablet 40 mg (has no administration in time range)   hydrALAZINE (APRESOLINE) tablet 50 mg (has no administration in time range)   isosorbide dinitrate (ISORDIL) tablet 20 mg (has no administration in time range)   metoprolol tartrate (LOPRESSOR) tablet 50 mg ( Oral Automatically Held 11/13/22 2000)   multivitamin, therapeutic (THERA-VIT) tablet 1 tablet (has no administration in time range)   sodium bicarbonate tablet 1,300 mg (has no administration in time range)   traZODone (DESYREL) half-tab 75 mg (has no administration in time range)   Vitamin D3 (CHOLECALCIFEROL) tablet 25 mcg (has no administration in time range)   lidocaine 1 % 0.1-1 mL (has no administration in time range)   lidocaine (LMX4) cream (has no administration in time range)   sodium chloride (PF) 0.9% PF flush 3 mL (has no administration in time range)   sodium chloride (PF) 0.9% PF flush 3 mL (has no administration in time range)   melatonin tablet 1 mg (has no administration in time range)   sodium zirconium cyclosilicate (LOKELMA) packet 10 g (has no administration in time range)   ondansetron (ZOFRAN ODT) ODT tab 4 mg (has no administration in time range)     Or   ondansetron (ZOFRAN) injection 4 mg (has no administration in time range)   senna-docusate (SENOKOT-S/PERICOLACE) 8.6-50 MG per tablet 1 tablet (has no administration in time range)     Or   senna-docusate (SENOKOT-S/PERICOLACE) 8.6-50 MG per tablet 2 tablet (has no administration in time range)   acetaminophen (TYLENOL) tablet 650 mg (has no administration in time range)     Or   acetaminophen (TYLENOL) Suppository 650 mg (has no administration in time range)   glucose gel 15-30 g (has no administration  in time range)     Or   dextrose 50 % injection 25-50 mL (has no administration in time range)     Or   glucagon injection 1 mg (has no administration in time range)   insulin aspart (NovoLOG) injection (RAPID ACTING) (has no administration in time range)   insulin aspart (NovoLOG) injection (RAPID ACTING) (has no administration in time range)   calcium gluconate 10 % injection 1 g (0 g Intravenous Stopped 11/10/22 2127)   nitroGLYcerin (NITROSTAT) sublingual tablet 0.4 mg (0.4 mg Sublingual Given 11/10/22 2058)     Drips infusing:  No  For the majority of the shift, the patient's behavior Green. Interventions performed were n/a.    Sepsis treatment initiated: No     Patient tested for COVID 19 prior to admission: YES    ED Nurse Name/Phone Number: Pratibha Galloway RN,   10:23 PM    RECEIVING UNIT ED HANDOFF REVIEW    Above ED Nurse Handoff Report was reviewed: Yes  Reviewed by: Michelle Jean RN on November 10, 2022 at 10:54 PM

## 2022-11-11 NOTE — PLAN OF CARE
Pt A&O to self. Speaks Danish. Daughter stayed throughout the night. VSS except for elevated BP. Tele SR w/ ST depression. Freq. Moaning and restlessness. LS diminished. Has a productive infreq. Cough. Abd. Rounded. Incontinent stool overnight x2. External cath in place. IV Azithromycin and Rocephin infused. On  ml/hr. NPO.  and 259. Will cont POC.

## 2022-11-11 NOTE — H&P
[facility]  History and Physical - Hospitalist Service  Date of Admission:  11/10/2022    Assessment & Plan     Mr. Eh Callahan is a 82 year old male with a past medical history of dementia, CKD3, HTN, PTSD, afib and anemia admitted on 11/10/2022 with worsening weakness and poor PO intake. Pending further work up and clinical improvement.  Had a recent admission for the same from 10/12-10/22.     Acute metabolic encephalopathy, concern for septic encephalopathy vs progression of dementia vs possible aspiration   Concern for pneumonia vs atelectatsis vs pneumonitis   History of Alzheimer's dementia  Patient presented with acute worsening of his weakness, inability to tolerate p.o. intake and worsening mental status per family's report.  He had a recent hospitalization on 10/12-10/22 was discharged to home at that time.  On current admission, CT head without any acute findings to explain change in mental status.  Chest x-ray with questionable pneumonia versus atelectasis versus pneumonitis.  It has been having a hard time managing his secretions, concern for potential aspiration events resulting in xray findings. Differential for worsening mental status includes septic encephalopathy vs progression of dementia vs possible aspiration. Plan for further work up/treatment as below.   - antibiotics to cover for community-acquired/aspiration pneumonia empirically although patient is afebrile and has not had a cough or other pneumonia symptoms, history is limited given his underlying dementia   -Ceftriaxone and azithromycin, history of recent admission, may need to consider hospital aquired pathogens   - sputum culture if able to obtain   - blood cultures with any fevers   - SLP evaluation in the morning, given concern for swallowing and possible aspiration pneumonitis on x-ray  - incentive spirometry   - PT evaluation / OT evaluation for cog evals   - palliative consult to assist with family  discussion regarding probable progression of disease     CKD stage II vs TRANG on CKD   Hyperkalemia , hyponatremia , hypomagnesemia   - BMP in AM , Mag in AM , replace PRN   - PTA sodium bicarb  - lokelma x1 , monitor K+ closely   - will consider nephrology consult in AM , no emergent need at this time     Elevated alkaline phosphatase   - repeat LFTs in AM     Troponin elevation , stable   Denies any chest pain or anginal equivalents but history is limited due to his underlying dementia. Low concern for active ACS at this time.   - repeat troponin in AM   - repeat EKG pending     Normocytic anemia, chronic   - repeat CBC in AM     Hyperglycemia  DM2  - LDSSI while inpatient, will monitor and titrate as needed     Atrial fibrillation  - cardiac monitoring , PTA metoprolol -with hold parameters    Hypertension -continue hydralazine, Isordil , PRN IV hydralazine for SBP > 180   HLD -PTA atorvastatin, ASA  History of hepatitis C  PTSD -PTA trazodone    Drug Induced Platelet Defect due to Aspirin       Diet: NPO for Medical/Clinical Reasons Except for: Meds pending SLP evaluation  DVT Prophylaxis: Pneumatic Compression Devices  Liu Catheter: Not present  Central Lines: None  Cardiac Monitoring: ACTIVE order. Indication: failture to thrive, ensure no arrythmia  Code Status: Full Code    Clinically Significant Risk Factors Present on Admission        # Hyperkalemia: Highest K = 5.4 mmol/L (Ref range: 3.4-5.3) in last 2 days, will monitor as appropriate  # Hyponatremia: Lowest Na = 133 mmol/L (Ref range: 136-145) in last 2 days, will monitor as appropriate  # Hypocalcemia: Lowest Ca = 8.2 mg/dL (Ref range: 8.5 - 10.1 mg/dL) in last 2 days, will monitor and replace as appropriate   # Hypomagnesemia: Lowest Mg = 1.4 mg/dL (Ref range: 1.7-2.3) in last 2 days, will replace as needed   # Hypoalbuminemia: Lowest albumin = 3.2 g/dL (Ref range: 3.5-5.2) at 11/10/2022  7:29 PM, will monitor as appropriate     # Hypertension: home  medication list includes antihypertensive(s)   # Dementia: noted on problem list           Disposition Plan      Expected Discharge Date: 11/12/2022                The patient's care was discussed with the Bedside Nurse, Patient and Patient's Family.    Karen Santos DO  Hospitalist Service  [facility]  Securely message with the Vocera Web Console (learn more here)  Text page via [facility] Paging/Directory         ______________________________________________________________________    Chief Complaint   Altered mental status    History is obtained from the patient, electronic health record, emergency department physician and patient's family    History of Present Illness   Mr. Eh Callahan is a 82 year old male with a past medical history of dementia, CKD3, HTN, PTSD, afib and anemia admitted on 11/10/2022 with worsening weakness and poor PO intake.    Patient presented with acute worsening of his weakness, inability to tolerate p.o. intake and worsening mental status per family's report.  He had a recent hospitalization on 10/12-10/22 was discharged to home at that time.  Family was worried about his altered mental status and ability to tolerate p.o. intake and decreased appetite.  He otherwise is unreliable historian given his underlying dementia. Family reports that he often pools saliva and has been spitting often. No noted fever or chills.     Review of Systems    Review of systems not obtained due to patient factors - mental status and underlying dementia     Past Medical History    I have reviewed this patient's medical history and updated it with pertinent information if needed.   Past Medical History:   Diagnosis Date     Anatomical narrow angle glaucoma      Atrial fibrillation (H)      Chronic infection     history of hepatitis C     CKD (chronic kidney disease) stage 2, GFR 60-89 ml/min      Diabetes mellitus (H)      Hepatitis C without mention of hepatic coma       Hypertension      PTSD (post-traumatic stress disorder)        Past Surgical History   I have reviewed this patient's surgical history and updated it with pertinent information if needed.  Past Surgical History:   Procedure Laterality Date     APPENDECTOMY       EYE SURGERY       LAPAROSCOPIC HEPATECTOMY PARTIAL Left 11/22/2016    Procedure: LAPAROSCOPIC HEPATECTOMY PARTIAL;  Surgeon: Rick Mckeon MD;  Location: [facility] OR       Social History   I have reviewed this patient's social history and updated it with pertinent information if needed.  Social History     Tobacco Use     Smoking status: Former     Smokeless tobacco: Never   Substance Use Topics     Alcohol use: No     Alcohol/week: 0.0 standard drinks     Drug use: No       Family History     No significant family history, including no history of: dementia     Prior to Admission Medications   Prior to Admission Medications   Prescriptions Last Dose Informant Patient Reported? Taking?   Carboxymeth-Glycerin-Polysorb 0.5-1-0.5 % SOLN   Yes No   Sig: Place 1 drop into both eyes 2 times daily as needed    Multiple Vitamins-Minerals (MULTIVITAMIN ADULT) TABS   Yes No   Sig: Take 1 tablet by mouth daily   Vitamin D3 (CHOLECALCIFEROL) 25 mcg (1000 units) tablet   Yes No   Sig: Take 25 mcg by mouth daily   amLODIPine (NORVASC) 5 MG tablet   No No   Sig: Take 1 tablet (5 mg) by mouth daily   aspirin (ASA) 81 MG EC tablet   No No   Sig: Take 1 tablet (81 mg) by mouth daily   atorvastatin (LIPITOR) 40 MG tablet   No No   Sig: Take 1 tablet (40 mg) by mouth every evening   hydrALAZINE (APRESOLINE) 50 MG tablet   No No   Sig: Take 1 tablet (50 mg) by mouth 3 times daily   insulin aspart (NOVOLOG PEN) 100 UNIT/ML pen   Yes No   Sig: Inject 4 Units Subcutaneous 3 times daily (with meals)   insulin glargine (LANTUS PEN) 100 UNIT/ML pen   No No   Sig: Inject 18 Units Subcutaneous every morning (before breakfast)   isosorbide dinitrate (ISORDIL) 20 MG tablet   No No   Sig:  Take 1 tablet (20 mg) by mouth 3 times daily   latanoprost (XALATAN) 0.005 % ophthalmic solution   Yes No   Sig: Place 1 drop into both eyes At Bedtime   metoprolol tartrate (LOPRESSOR) 50 MG tablet   No No   Sig: Take 1 tablet (50 mg) by mouth 2 times daily   sodium bicarbonate 650 MG tablet   No No   Sig: Take 2 tablets (1,300 mg) by mouth 2 times daily   traZODone (DESYREL) 150 MG tablet   Yes No   Sig: Take 75 mg by mouth At Bedtime       Facility-Administered Medications: None     Allergies   Allergies   Allergen Reactions     Amlodipine Swelling     Edema at 10 mg , fine at 5 mg     Lisinopril Cough     Metoprolol      Other reaction(s): Bradycardia  Metoprolol at 50mg bid -> HR 45 -50, unclear if sx (has fatigue)  May tolerate lower doses if needed       Physical Exam   Vital Signs: Temp: 98.1  F (36.7  C) Temp src: Axillary BP: (!) 179/82 Pulse: 73   Resp: 16 SpO2: 92 % O2 Device: None (Room air)      Constitutional: awake, cooperative, no apparent distress, and appears stated age  HEENT: Normocephalic, atraumatic. Pooling of oral secretions.   Respiratory: Normal work of breathing, good air exchange, clear to auscultation bilaterally although with poor respiratory effort, no crackles or wheezing   Cardiovascular: Regular rate and rhythm, no murmurs appreciated   GI: Soft and nontender to palpation, nondistended, no rebound or guarding  Skin: normal skin color, texture, turgor  Musculoskeletal: No deformities, no edema present  Neurologic: Alert, not oriented, responds to simple questions, moves all 4 extremities, no focal deficits   Neuropsychiatric: General: normal, calm and normal eye contact      Data   Data reviewed today: I reviewed all medications, new labs and imaging results over the last 24 hours. I personally reviewed imaging and EKG.     Recent Labs   Lab 11/10/22  2228 11/10/22  1929   WBC  --  6.6   HGB  --  12.6*   MCV  --  94   PLT  --  212   NA  --  133*   POTASSIUM  --  5.4*   CHLORIDE  --   102   CO2  --  23   BUN  --  49.2*   CR  --  2.43*   ANIONGAP  --  8   DOMINGO  --  8.2*   * 274*   ALBUMIN  --  3.2*   PROTTOTAL  --  6.7   BILITOTAL  --  0.6   ALKPHOS  --  175*   ALT  --  42   AST  --  34     6.6    \    12.6 (L)    /    212   N 57    L N/A    133 (L)    102    49.2 (H) /   ------------------------------------ 258 (H)   ALT 42   AST 34    (H)   ALB 3.2 (L)   Ca 8.2 (L)  5.4 (H)    23    2.43 (H) \    % RETIC N/A    LDH N/A  Troponin N/A    BNP N/A    CK N/A  INR N/A   PTT N/A    D-dimer N/A    Fibrinogen N/A    Antithrombin N/A  Ferritin N/A  CRP N/A    IL-6 N/A  Recent Results (from the past 24 hour(s))   CT Head w/o Contrast    Narrative    EXAM: CT HEAD W/O CONTRAST  LOCATION: [facility]  DATE/TIME: 11/10/2022 8:32 PM    INDICATION: HTN, AMS.  COMPARISON: CT brain 10/12/2022.  TECHNIQUE: Routine CT Head without IV contrast. Multiplanar reformats. Dose reduction techniques were used.    FINDINGS:  INTRACRANIAL CONTENTS: No finding for intracranial hemorrhage, mass, or acute infarct. There is moderate prominence of the lateral and third ventricles and sulci. A small amount of low-attenuation change is seen within the periventricular deep white   matter both hemispheres, nonspecific but compatible with sequela of chronic microvascular ischemic change given the patient's age. No transcortical areas of low attenuation change. No mass effect or midline shift.    Cerebellar tonsils are normally positioned. Incidental note is made of a partially empty sella. Generalized thinning of the corpus callosum which otherwise appears normally formed.    VISUALIZED ORBITS/SINUSES/MASTOIDS: Prior cataract surgeries. No paranasal sinus mucosal disease. No middle ear or mastoid effusion.    BONES/SOFT TISSUES: Calvarium is intact, without fracture or suspicious lytic or blastic foci.      Impression    IMPRESSION:  1.  No finding for acute infarct, hemorrhage, or mass.    2.   Moderate to advanced volume loss and mild presumed sequela of chronic microvascular ischemic change, similar to prior.   XR Chest 2 Views    Narrative    EXAM: XR CHEST 2 VIEWS  LOCATION: [facility]  DATE/TIME: 11/10/2022 8:47 PM    INDICATION: AMS  COMPARISON: 10/12/2022      Impression    IMPRESSION: There are new bibasilar opacities which may represent effusion with associated atelectasis. There is left greater than right associated patchy airspace opacities concerning for superimposed pneumonia/pneumonitis. Cardiac and mediastinal   silhouettes are within normal limits. Osseous structures are stable.

## 2022-11-11 NOTE — PROGRESS NOTES
[facility]    Medicine Progress Note - Hospitalist Service    Date of Admission:  11/10/2022    Assessment & Plan     Mr. Eh Callahan is a 82 year old male with a past medical history of dementia, CKD3, HTN, PTSD, afib and anemia admitted on 11/10/2022 with worsening weakness and poor PO intake. Pending further work up and clinical improvement.  Had a recent admission for the same from 10/12-10/22.     Acute metabolic encephalopathy, concern for septic encephalopathy vs progression of dementia vs possible aspiration   Concern for pneumonia vs atelectatsis vs pneumonitis   History of Alzheimer's dementia  Patient presented with acute worsening of his weakness, inability to tolerate p.o. intake and worsening mental status per family's report.  He had a recent hospitalization on 10/12-10/22 was discharged to home at that time.  On current admission, CT head without any acute findings to explain change in mental status.  Chest x-ray with questionable pneumonia versus atelectasis versus pneumonitis.  It has been having a hard time managing his secretions, concern for potential aspiration events resulting in xray findings. Differential for worsening mental status includes septic encephalopathy vs progression of dementia vs possible aspiration. Plan for further work up/treatment as below.   - antibiotics to cover for community-acquired/aspiration pneumonia empirically although patient is afebrile and has not had a cough or other pneumonia symptoms, history is limited given his underlying dementia   -Ceftriaxone and azithromycin, history of recent admission, may need to consider hospital aquired pathogens and broaden coverage with any fever   - sputum culture if able to obtain   - blood cultures with any fevers   - SLP evaluation in the morning, given concern for swallowing and possible aspiration pneumonitis on x-ray  - incentive spirometry   - PT evaluation / OT evaluation for cog evals   -  palliative consult to assist with family discussion regarding probable progression of disease     Concern for potential pericarditis  Troponin elevation , stable   Denies any chest pain or anginal equivalents but history is limited due to his underlying dementia. Low concern for active ACS at this time. repeat EKG pending with questionable early repolarization pericarditis.  Patient's back discomfort and inability to localize symptoms given his underlying dementia, in the setting of his elevated troponin in addition to overall feeling ill and EKG changes we will try steroid and monitor response.   - repeat troponin in AM   - trial prednisone 20 mg daily   - increase sliding scale insulin as below with close monitoring of blood glucose in the setting of trialing prednisone    CKD stage II vs TRANG on CKD   Hyperkalemia- resolved, hyponatremia , hypomagnesemia   - BMP in AM , Mag in AM , replace PRN   - PTA sodium bicarb resumed   - lokelma x1 , monitor K+ closely   - will consider nephrology consult in AM , no emergent need at this time     Elevated alkaline phosphatase , improving   - repeat LFTs in AM     Normocytic anemia, chronic   - repeat CBC in AM     Hyperglycemia  DM2  - MDSSI while inpatient, will monitor and titrate as needed especially in setting of starting steroids      Atrial fibrillation  - cardiac monitoring , PTA metoprolol -with hold parameters    Hypertension -continue hydralazine, Isordil , PRN IV hydralazine for SBP > 180   HLD -PTA atorvastatin, ASA  History of hepatitis C  PTSD -PTA trazodone         Diet: NPO for Medical/Clinical Reasons Except for: Meds    DVT Prophylaxis: Pneumatic Compression Devices  Liu Catheter: Not present  Central Lines: None  Cardiac Monitoring: ACTIVE order. Indication: failture to thrive, ensure no arrythmia  Code Status: Full Code      Disposition Plan      Expected Discharge Date: 11/12/2022                The patient's care was discussed with the Bedside Nurse,  Patient and Patient's Family.    Karen Santos, DO  Hospitalist Service  [facility]  Securely message with the [employer] Web Console (learn more here)  Text page via [employer] Paging/Directory       Clinically Significant Risk Factors Present on Admission        # Hyperkalemia: Highest K = 5.4 mmol/L (Ref range: 3.4-5.3) in last 2 days, will monitor as appropriate  # Hyponatremia: Lowest Na = 133 mmol/L (Ref range: 136-145) in last 2 days, will monitor as appropriate  # Hypocalcemia: Lowest Ca = 8.2 mg/dL (Ref range: 8.5 - 10.1 mg/dL) in last 2 days, will monitor and replace as appropriate   # Hypomagnesemia: Lowest Mg = 1.4 mg/dL (Ref range: 1.7-2.3) in last 2 days, will replace as needed   # Hypoalbuminemia: Lowest albumin = 3.2 g/dL (Ref range: 3.5-5.2) at 11/10/2022  7:29 PM, will monitor as appropriate     # Hypertension: home medication list includes antihypertensive(s)   # Dementia: noted on problem list         ______________________________________________________________________    Interval History   Patient states he has pain but is unable to localize where his pain is  He had reported headache earlier in the day to nursing staff   He is unable to report any other concerns or complaints, history is limited due to his underlying dementia  Family at bedside reporting that he seems delirious    Data reviewed today: I reviewed all medications, new labs and imaging results over the last 24 hours. I personally reviewed no images or EKG's today.    Physical Exam   Vital Signs: Temp: 98.7  F (37.1  C) Temp src: Oral BP: (!) 168/76 Pulse: 81   Resp: 16 SpO2: 92 % O2 Device: None (Room air)    Weight: 143 lbs 1.6 oz     Constitutional: awake, cooperative, no apparent distress, and appears stated age  HEENT: Normocephalic, atraumatic. Pooling of oral secretions.   Respiratory: Normal work of breathing, good air exchange, clear to auscultation bilaterally although with poor respiratory effort, no  crackles or wheezing   Cardiovascular: Regular rate and rhythm, no murmurs appreciated   GI: Soft and nontender to palpation, nondistended, no rebound or guarding  Skin: normal skin color, texture, turgor  Musculoskeletal: No deformities, no edema present  Neurologic: Alert, not oriented, responds to simple questions, moves all 4 extremities, no focal deficits   Neuropsychiatric: General: normal, calm and normal eye contact      Data   Recent Labs   Lab 11/11/22  1754 11/11/22  1239 11/11/22  0923 11/11/22  0700 11/10/22  2228 11/10/22  1929   WBC  --   --   --  8.1  --  6.6   HGB  --   --   --  12.5*  --  12.6*   MCV  --   --   --  99  --  94   PLT  --   --   --  151  --  212   NA  --   --   --  133*  --  133*   POTASSIUM  --   --   --  4.5  --  5.4*   CHLORIDE  --   --   --  103  --  102   CO2  --   --   --  15*  --  23   BUN  --   --   --  45.5*  --  49.2*   CR  --   --   --  2.22*  --  2.43*   ANIONGAP  --   --   --  15  --  8   DOMINGO  --   --   --  7.8*  --  8.2*   * 310* 306* 318*   < > 274*   ALBUMIN  --   --   --  2.5*  --  3.2*   PROTTOTAL  --   --   --  6.1*  --  6.7   BILITOTAL  --   --   --  0.9  --  0.6   ALKPHOS  --   --   --  150*  --  175*   ALT  --   --   --  32  --  42   AST  --   --   --  29  --  34    < > = values in this interval not displayed.     Recent Results (from the past 24 hour(s))   CT Head w/o Contrast    Narrative    EXAM: CT HEAD W/O CONTRAST  LOCATION: [facility]  DATE/TIME: 11/10/2022 8:32 PM    INDICATION: HTN, AMS.  COMPARISON: CT brain 10/12/2022.  TECHNIQUE: Routine CT Head without IV contrast. Multiplanar reformats. Dose reduction techniques were used.    FINDINGS:  INTRACRANIAL CONTENTS: No finding for intracranial hemorrhage, mass, or acute infarct. There is moderate prominence of the lateral and third ventricles and sulci. A small amount of low-attenuation change is seen within the periventricular deep white   matter both hemispheres, nonspecific  but compatible with sequela of chronic microvascular ischemic change given the patient's age. No transcortical areas of low attenuation change. No mass effect or midline shift.    Cerebellar tonsils are normally positioned. Incidental note is made of a partially empty sella. Generalized thinning of the corpus callosum which otherwise appears normally formed.    VISUALIZED ORBITS/SINUSES/MASTOIDS: Prior cataract surgeries. No paranasal sinus mucosal disease. No middle ear or mastoid effusion.    BONES/SOFT TISSUES: Calvarium is intact, without fracture or suspicious lytic or blastic foci.      Impression    IMPRESSION:  1.  No finding for acute infarct, hemorrhage, or mass.    2.  Moderate to advanced volume loss and mild presumed sequela of chronic microvascular ischemic change, similar to prior.   XR Chest 2 Views    Narrative    EXAM: XR CHEST 2 VIEWS  LOCATION: [facility]  DATE/TIME: 11/10/2022 8:47 PM    INDICATION: AMS  COMPARISON: 10/12/2022      Impression    IMPRESSION: There are new bibasilar opacities which may represent effusion with associated atelectasis. There is left greater than right associated patchy airspace opacities concerning for superimposed pneumonia/pneumonitis. Cardiac and mediastinal   silhouettes are within normal limits. Osseous structures are stable.     Medications     sodium chloride 125 mL/hr at 11/11/22 1017       aspirin  81 mg Oral Daily     atorvastatin  40 mg Oral QPM     azithromycin  500 mg Intravenous Q24H     cefTRIAXone  1 g Intravenous Q24H     hydrALAZINE  50 mg Oral TID     insulin aspart  1-6 Units Subcutaneous Q4H     isosorbide dinitrate  20 mg Oral TID     metoprolol tartrate  50 mg Oral BID     multivitamin, therapeutic  1 tablet Oral Daily     predniSONE  20 mg Oral Daily     sodium bicarbonate  1,300 mg Oral BID     sodium chloride (PF)  3 mL Intracatheter Q8H     traZODone  75 mg Oral At Bedtime     Vitamin D3  25 mcg Oral Daily

## 2022-11-11 NOTE — CONSULTS
[facility]  Palliative Care Consultation    Text Page    Assessment & Plan   Eh Callahan is a 82 year old male who was admitted on 11/10/2022.   Consulted by Dr. Santos to assist with goals of care, and development of plan of care     Recommendations:  1. Goals of Care- Full Code-restorative cares  Hospitalization goals discussed Discussed overall goals of care. Discussed code status and further hospitalizations. Patient's eldest son expressed that he is the one that helps make decisions. He will talk with his family about further goals given a likely progressive disease.   Decisional Capacity- Unreliable. Patient does not have an advance directive. Per  informed consent policy next of kin should be involved if patient becomes unable.  -Patient has 13 adult children, a few live out of the country but most of them live in [place]. All are involved in goals discusssions  POLST consider completing should goals of care change.     2. Pain, patient complains of headaches at times   -Agree with tylenol as needed for headaches    3. Dysphagia  -Discussed concerns with secretions and swallowing  -Appreciate the input of SLP for on going recommendations    4. Spiritual Care  Oriented to Spiritual Health as part of Palliative Care team. Spiritual Health Services declined at this time.  Spiritual Background: [employer]    5. Care Planning  Appreciate Care of SW/Care coordinator.    Medical Decision Making and Goals of Care:  Discussed on November 11, 2022 with Jazymn Rivera NP: Met with patient and son at the bedside. Discussed and reviewed overall goals of care. Family would like to see how they can best meet his needs at home. They are not ready for a change in care options such as hospice however we discussed this today.They will continue to talk as a family. We discussed that although swallowing can improve and get worse over time it may not change the overall progression. He reports that they  have been seeing a decline and will talk about whether hospitalizations are the continued approach. Discussed that I will plan to follow up again on Monday.     Thank you for involving us in the patient's care.     Jazmyn Rivera NP  Pain Management and Palliative Care  [facility]  Pgr: [phone]    Time Spent on this Encounter   Total unit/floor time 68 minutes, time consisted of the following, examination of the patient, reviewing the record and completing documentation. >50% of time spent in counseling and coordination of care, Hospitalist Dr Santos.  Time spend counseling with family consisted of the following topics, goals of care, education about diagnosis, education about prognosis, care planning for discharge and symptom management.    Understanding of disease process:   This has been discussed with patient's son.    History of Present Illness   Unable to obtain a history from the patient due to confusion  Electronic medical record    Eh Callahan is a 82 year old male with a past medical history of Dementia, CKD3, HTN, PTSD, Atrial fibrillation, anemia, history of hepatitis C,  DM2, who presents with worsening weakness and poor PO intake.      Past Medical History   I have reviewed this patient's medical history and updated it with pertinent information if needed.   Past Medical History:   Diagnosis Date     Anatomical narrow angle glaucoma      Atrial fibrillation (H)      Chronic infection     history of hepatitis C     CKD (chronic kidney disease) stage 2, GFR 60-89 ml/min      Diabetes mellitus (H)      Hepatitis C without mention of hepatic coma      Hypertension      PTSD (post-traumatic stress disorder)        Past Surgical History   I have reviewed this patient's surgical history and updated it with pertinent information if needed.  Past Surgical History:   Procedure Laterality Date     APPENDECTOMY       EYE SURGERY       LAPAROSCOPIC HEPATECTOMY PARTIAL Left  11/22/2016    Procedure: LAPAROSCOPIC HEPATECTOMY PARTIAL;  Surgeon: Rick Mckeon MD;  Location: [facility] OR       Prior to Admission Medications   Prior to Admission Medications   Prescriptions Last Dose Informant Patient Reported? Taking?   Carboxymeth-Glycerin-Polysorb 0.5-1-0.5 % SOLN   Yes No   Sig: Place 1 drop into both eyes 2 times daily as needed    Multiple Vitamins-Minerals (MULTIVITAMIN ADULT) TABS   Yes No   Sig: Take 1 tablet by mouth daily   Vitamin D3 (CHOLECALCIFEROL) 25 mcg (1000 units) tablet   Yes No   Sig: Take 25 mcg by mouth daily   amLODIPine (NORVASC) 5 MG tablet   No No   Sig: Take 1 tablet (5 mg) by mouth daily   aspirin (ASA) 81 MG EC tablet   No No   Sig: Take 1 tablet (81 mg) by mouth daily   atorvastatin (LIPITOR) 40 MG tablet   No No   Sig: Take 1 tablet (40 mg) by mouth every evening   hydrALAZINE (APRESOLINE) 50 MG tablet   No No   Sig: Take 1 tablet (50 mg) by mouth 3 times daily   insulin aspart (NOVOLOG PEN) 100 UNIT/ML pen   Yes No   Sig: Inject 4 Units Subcutaneous 3 times daily (with meals)   insulin glargine (LANTUS PEN) 100 UNIT/ML pen   No No   Sig: Inject 18 Units Subcutaneous every morning (before breakfast)   isosorbide dinitrate (ISORDIL) 20 MG tablet   No No   Sig: Take 1 tablet (20 mg) by mouth 3 times daily   latanoprost (XALATAN) 0.005 % ophthalmic solution   Yes No   Sig: Place 1 drop into both eyes At Bedtime   metoprolol tartrate (LOPRESSOR) 50 MG tablet   No No   Sig: Take 1 tablet (50 mg) by mouth 2 times daily   sodium bicarbonate 650 MG tablet   No No   Sig: Take 2 tablets (1,300 mg) by mouth 2 times daily   traZODone (DESYREL) 150 MG tablet   Yes No   Sig: Take 75 mg by mouth At Bedtime       Facility-Administered Medications: None     Allergies   Allergies   Allergen Reactions     Amlodipine Swelling     Edema at 10 mg , fine at 5 mg     Lisinopril Cough     Metoprolol      Other reaction(s): Bradycardia  Metoprolol at 50mg bid -> HR 45 -50, unclear if sx  (has fatigue)  May tolerate lower doses if needed       Social History   I have updated and reviewed the following Social History Narrative:   Social History     Social History Narrative    Immigrated in 2002 from [place].  Previously lived in [place] before moving here.  Daughter lives locally as well.         Family History   I have reviewed this patient's family history and updated it with pertinent information if needed.   Family History   Problem Relation Age of Onset     Diabetes No family hx of         He is not aware of any diabetes in his family     Kidney Disease No family hx of        Review of Systems   Review of systems not obtained due to patient factors - confusion  -headache    Physical Exam   Temp:  [98.1  F (36.7  C)-98.7  F (37.1  C)] 98.7  F (37.1  C)  Pulse:  [64-83] 81  Resp:  [16] 16  BP: (145-218)/() 168/76  SpO2:  [92 %-99 %] 92 %  143 lbs 1.6 oz  Exam:  GENERAL APPEARANCE:  in no distress, thin  ENT:  Mouth and posterior oropharynx normal, moist mucous membranes  EYES:  EOM, conjunctivae, lids, pupils and irises normal  RESP:  respiratory effort and palpation of chest normal, no respiratory distress  CV:  Palpation and auscultation of heart done , regular rate and rhythm, no murmur, rub, or gallop  ABDOMEN:  normal bowel sounds, soft, nontender, no hepatosplenomegaly or other masses  PSYCH:  unable to answer orientation questions, calm cooperative      Data  I have personally reviewed all labs prior to medication changes  Results for orders placed or performed during the hospital encounter of 11/10/22 (from the past 24 hour(s))   EKG 12-lead, tracing only   Result Value Ref Range    Systolic Blood Pressure  mmHg    Diastolic Blood Pressure  mmHg    Ventricular Rate 72 BPM    Atrial Rate 72 BPM    VT Interval 150 ms    QRS Duration 82 ms     ms    QTc 435 ms    P Axis 56 degrees    R AXIS 48 degrees    T Axis 118 degrees    Interpretation ECG       Sinus rhythm  Possible Left  atrial enlargement  Left ventricular hypertrophy  ST elevation, consider early repolarization, pericarditis, or injury  T wave abnormality, consider lateral ischemia  Abnormal ECG  When compared with ECG of 18-OCT-2022 19:10,  Nonspecific T wave abnormality no longer evident in Inferior leads  Nonspecific T wave abnormality no longer evident in Anterior leads  Confirmed by - EMERGENCY ROOM, PHYSICIAN ([id number]),  YAJAIRA JIMENEZ (Rock) on 11/11/2022 6:35:34 AM     CBC with platelets differential    Narrative    The following orders were created for panel order CBC with platelets differential.  Procedure                               Abnormality         Status                     ---------                               -----------         ------                     CBC with platelets and d...[[id number]]  Abnormal            Final result                 Please view results for these tests on the individual orders.   Comprehensive metabolic panel   Result Value Ref Range    Sodium 133 (L) 136 - 145 mmol/L    Potassium 5.4 (H) 3.4 - 5.3 mmol/L    Chloride 102 98 - 107 mmol/L    Carbon Dioxide (CO2) 23 22 - 29 mmol/L    Anion Gap 8 7 - 15 mmol/L    Urea Nitrogen 49.2 (H) 8.0 - 23.0 mg/dL    Creatinine 2.43 (H) 0.67 - 1.17 mg/dL    Calcium 8.2 (L) 8.8 - 10.2 mg/dL    Glucose 274 (H) 70 - 99 mg/dL    Alkaline Phosphatase 175 (H) 40 - 129 U/L    AST 34 10 - 50 U/L    ALT 42 10 - 50 U/L    Protein Total 6.7 6.4 - 8.3 g/dL    Albumin 3.2 (L) 3.5 - 5.2 g/dL    Bilirubin Total 0.6 <=1.2 mg/dL    GFR Estimate 26 (L) >60 mL/min/1.73m2   Lactic acid whole blood   Result Value Ref Range    Lactic Acid 0.8 0.7 - 2.0 mmol/L   Symptomatic; Unknown Influenza A/B & SARS-CoV2 (COVID-19) Virus PCR Multiplex Nose    Specimen: Nose; Swab   Result Value Ref Range    Influenza A PCR Negative Negative    Influenza B PCR Negative Negative    RSV PCR Negative Negative    SARS CoV2 PCR Negative Negative    Narrative    Testing was performed  using the Xpert Xpress CoV2/Flu/RSV Assay on the [employer] GeneXpert Instrument. This test should be ordered for the detection of SARS-CoV-2 and influenza viruses in individuals who meet clinical and/or epidemiological criteria. Test performance is unknown in asymptomatic patients. This test is for in vitro diagnostic use under the FDA EUA for laboratories certified under CLIA to perform high or moderate complexity testing. This test has not been FDA cleared or approved. A negative result does not rule out the presence of PCR inhibitors in the specimen or target RNA in concentration below the limit of detection for the assay. If only one viral target is positive but coinfection with multiple targets is suspected, the sample should be re-tested with another FDA cleared, approved, or authorized test, if coinfection would change clinical management. This test was validated by the [email or web address]. These laboratories are certified under the Clinical Laboratory Improvement Amendments of 1988 (CLIA-88) as qualified to perform high complexity laboratory testing.   Troponin T, High Sensitivity   Result Value Ref Range    Troponin T, High Sensitivity 49 (H) <=22 ng/L   CBC with platelets and differential   Result Value Ref Range    WBC Count 6.6 4.0 - 11.0 10e3/uL    RBC Count 4.02 (L) 4.40 - 5.90 10e6/uL    Hemoglobin 12.6 (L) 13.3 - 17.7 g/dL    Hematocrit 37.9 (L) 40.0 - 53.0 %    MCV 94 78 - 100 fL    MCH 31.3 26.5 - 33.0 pg    MCHC 33.2 31.5 - 36.5 g/dL    RDW 13.2 10.0 - 15.0 %    Platelet Count 212 150 - 450 10e3/uL    % Neutrophils 57 %    % Lymphocytes 24 %    % Monocytes 10 %    % Eosinophils 7 %    % Basophils 1 %    % Immature Granulocytes 1 %    NRBCs per 100 WBC 0 <1 /100    Absolute Neutrophils 3.9 1.6 - 8.3 10e3/uL    Absolute Lymphocytes 1.6 0.8 - 5.3 10e3/uL    Absolute Monocytes 0.6 0.0 - 1.3 10e3/uL    Absolute Eosinophils 0.4 0.0 - 0.7 10e3/uL    Absolute Basophils 0.0 0.0 - 0.2 10e3/uL     Absolute Immature Granulocytes 0.0 <=0.4 10e3/uL    Absolute NRBCs 0.0 10e3/uL   Magnesium   Result Value Ref Range    Magnesium 1.4 (L) 1.7 - 2.3 mg/dL   Phosphorus   Result Value Ref Range    Phosphorus 3.2 2.5 - 4.5 mg/dL   CT Head w/o Contrast    Narrative    EXAM: CT HEAD W/O CONTRAST  LOCATION: [facility]  DATE/TIME: 11/10/2022 8:32 PM    INDICATION: HTN, AMS.  COMPARISON: CT brain 10/12/2022.  TECHNIQUE: Routine CT Head without IV contrast. Multiplanar reformats. Dose reduction techniques were used.    FINDINGS:  INTRACRANIAL CONTENTS: No finding for intracranial hemorrhage, mass, or acute infarct. There is moderate prominence of the lateral and third ventricles and sulci. A small amount of low-attenuation change is seen within the periventricular deep white   matter both hemispheres, nonspecific but compatible with sequela of chronic microvascular ischemic change given the patient's age. No transcortical areas of low attenuation change. No mass effect or midline shift.    Cerebellar tonsils are normally positioned. Incidental note is made of a partially empty sella. Generalized thinning of the corpus callosum which otherwise appears normally formed.    VISUALIZED ORBITS/SINUSES/MASTOIDS: Prior cataract surgeries. No paranasal sinus mucosal disease. No middle ear or mastoid effusion.    BONES/SOFT TISSUES: Calvarium is intact, without fracture or suspicious lytic or blastic foci.      Impression    IMPRESSION:  1.  No finding for acute infarct, hemorrhage, or mass.    2.  Moderate to advanced volume loss and mild presumed sequela of chronic microvascular ischemic change, similar to prior.   XR Chest 2 Views    Narrative    EXAM: XR CHEST 2 VIEWS  LOCATION: [facility]  DATE/TIME: 11/10/2022 8:47 PM    INDICATION: AMS  COMPARISON: 10/12/2022      Impression    IMPRESSION: There are new bibasilar opacities which may represent effusion with associated atelectasis.  There is left greater than right associated patchy airspace opacities concerning for superimposed pneumonia/pneumonitis. Cardiac and mediastinal   silhouettes are within normal limits. Osseous structures are stable.   Troponin T, High Sensitivity   Result Value Ref Range    Troponin T, High Sensitivity 50 (H) <=22 ng/L   Glucose by meter   Result Value Ref Range    GLUCOSE BY METER POCT 258 (H) 70 - 99 mg/dL   Glucose by meter   Result Value Ref Range    GLUCOSE BY METER POCT 250 (H) 70 - 99 mg/dL   Glucose by meter   Result Value Ref Range    GLUCOSE BY METER POCT 259 (H) 70 - 99 mg/dL   Troponin T, High Sensitivity   Result Value Ref Range    Troponin T, High Sensitivity 53 (H) <=22 ng/L   Hepatic panel   Result Value Ref Range    Protein Total 6.1 (L) 6.4 - 8.3 g/dL    Albumin 2.5 (L) 3.5 - 5.2 g/dL    Bilirubin Total 0.9 <=1.2 mg/dL    Alkaline Phosphatase 150 (H) 40 - 129 U/L    AST 29 10 - 50 U/L    ALT 32 10 - 50 U/L    Bilirubin Direct <0.20 0.00 - 0.30 mg/dL   Basic metabolic panel   Result Value Ref Range    Sodium 133 (L) 136 - 145 mmol/L    Potassium 4.5 3.4 - 5.3 mmol/L    Chloride 103 98 - 107 mmol/L    Carbon Dioxide (CO2) 15 (L) 22 - 29 mmol/L    Anion Gap 15 7 - 15 mmol/L    Urea Nitrogen 45.5 (H) 8.0 - 23.0 mg/dL    Creatinine 2.22 (H) 0.67 - 1.17 mg/dL    Calcium 7.8 (L) 8.8 - 10.2 mg/dL    Glucose 318 (H) 70 - 99 mg/dL    GFR Estimate 29 (L) >60 mL/min/1.73m2   CBC with platelets   Result Value Ref Range    WBC Count 8.1 4.0 - 11.0 10e3/uL    RBC Count 4.02 (L) 4.40 - 5.90 10e6/uL    Hemoglobin 12.5 (L) 13.3 - 17.7 g/dL    Hematocrit 39.8 (L) 40.0 - 53.0 %    MCV 99 78 - 100 fL    MCH 31.1 26.5 - 33.0 pg    MCHC 31.4 (L) 31.5 - 36.5 g/dL    RDW 13.2 10.0 - 15.0 %    Platelet Count 151 150 - 450 10e3/uL   Magnesium   Result Value Ref Range    Magnesium 1.7 1.7 - 2.3 mg/dL   Phosphorus   Result Value Ref Range    Phosphorus 2.8 2.5 - 4.5 mg/dL

## 2022-11-11 NOTE — PHARMACY-ADMISSION MEDICATION HISTORY
Admission medication history interview status for this patient is complete. See [facility] admission navigator for allergy information, prior to admission medications and immunization status.     Medication history interview done, indicate source(s): Family- daughter, Candis  Medication history resources (including written lists, pill bottles, clinic record):Bobby  Pharmacy:     Changes made to PTA medication list:  Added: none  Changed: Lantus 18 qam --> 18 qhs  Reported as Not Taking: amlodipine  Removed: none    Actions taken by pharmacist (provider contacted, etc): ajay     Additional medication history information: Patient was recently admitted to [facility] from 10/12-10/22. No new medication changes per his daughter, except she said he is no longer taking amlodipine. Amlodipine was a new discharge medication from 10/22.     Medication reconciliation/reorder completed by provider prior to medication history?  Y   (Y/N)       Prior to Admission medications    Medication Sig Last Dose Taking? Auth Provider Long Term End Date   aspirin (ASA) 81 MG EC tablet Take 1 tablet (81 mg) by mouth daily 11/10/2022 at AM Yes Norman Leahy MD     atorvastatin (LIPITOR) 40 MG tablet Take 1 tablet (40 mg) by mouth every evening 11/9/2022 at PM Yes Norman Leahy MD Yes    Carboxymeth-Glycerin-Polysorb 0.5-1-0.5 % SOLN Place 1 drop into both eyes 2 times daily as needed   at prn Yes Reported, Patient     hydrALAZINE (APRESOLINE) 50 MG tablet Take 1 tablet (50 mg) by mouth 3 times daily 11/10/2022 Yes Lebron Crawford, DO Yes    insulin aspart (NOVOLOG PEN) 100 UNIT/ML pen Inject 4 Units Subcutaneous 3 times daily (with meals) 11/10/2022 Yes Stephen Natarajna MD Yes    insulin glargine (LANTUS PEN) 100 UNIT/ML pen Inject 18 Units Subcutaneous every morning (before breakfast)  Patient taking differently: Inject 18 Units Subcutaneous At Bedtime 11/9/2022 at hs Yes Lebron Crawford, DO Yes    isosorbide  dinitrate (ISORDIL) 20 MG tablet Take 1 tablet (20 mg) by mouth 3 times daily 11/10/2022 Yes Lebron Crawford,  Yes    latanoprost (XALATAN) 0.005 % ophthalmic solution Place 1 drop into both eyes At Bedtime 11/9/2022 at hs Yes Unknown, Entered By History Yes    metoprolol tartrate (LOPRESSOR) 50 MG tablet Take 1 tablet (50 mg) by mouth 2 times daily 11/10/2022 Yes Lebron Crawford,  Yes    Multiple Vitamins-Minerals (MULTIVITAMIN ADULT) TABS Take 1 tablet by mouth daily 11/10/2022 Yes Unknown, Entered By History     sodium bicarbonate 650 MG tablet Take 2 tablets (1,300 mg) by mouth 2 times daily 11/10/2022 Yes Lebron Crawford DO     traZODone (DESYREL) 150 MG tablet Take 75 mg by mouth At Bedtime  11/9/2022 at hs Yes Unknown, Entered By History Yes    Vitamin D3 (CHOLECALCIFEROL) 25 mcg (1000 units) tablet Take 25 mcg by mouth daily 11/10/2022 Yes Unknown, Entered By History     amLODIPine (NORVASC) 5 MG tablet Take 1 tablet (5 mg) by mouth daily  Patient not taking: Reported on 11/11/2022 Not Taking  Lebron Crawford DO Yes

## 2022-11-11 NOTE — ED NOTES
Bed: ED11  Expected date: 11/10/22  Expected time: 6:57 PM  Means of arrival: Ambulance  Comments:  [id number]

## 2022-11-11 NOTE — PROVIDER NOTIFICATION
0808 MD notified: FYI trops are trending slightly up.     1022 MD notified that pt vomitted right after he received am PO meds. Gave zofran, BP still elevated and O2 dropping into 88-89. Put on 2 L NC. O2 95%.     1042 verified with MD okay to give suppository tylenol after PO since pt threw it up. Suppository given.     Pt reported intervention effective.     11:05 MD notified that speech was not able to see pt d/t N/V episode and that pt needs q 4 BG monitoring d/t NPO status.     1350 MD notified that pt is now refusing PO meds and refusing anymore antiemetic intervention. Son reporting that pt is just refusing everything and not making much sense. Pt cannot pinpoint any symptoms except for intermittent nausea. Reports that headache has still resolved.

## 2022-11-12 NOTE — PROGRESS NOTES
Clinical dysphagia exam completed. The pt's daughter was present, assisted with history and interpreting.  Daughter reports occassional cough with liquids and patient with poor po intake, consuming mostly liquids. During this exam the patient demonstrated generally intact oral mechanism. He consumed PO trials of regular textures, pureed and thin liquids. Mastication prolonged but WFL, trace oral residue cleared with liquid wash. Laryngeal elevation felt strong to palpation and swallow response was timely. Voice clear and no direct signs/symptoms of aspiration were present with any PO trials.     Recommend a regular texture diet and thin liquids, with staff and family self selecting softer food options. Safe swallow strategies include small bites/sips when upright and alert. Do not feed when reclined or too lethargic. SLP will follow for a short treatment course for diet tolerance and safe swallow education.     IP Clinical Dysphagia Examination  [facility]  11/12/22 1055   Appointment Info   Signing Clinician's Name / Credentials (SLP) Teo Tom MS CCC-SLP   General Information   Onset of Illness/Injury or Date of Surgery 11/10/22   Referring Physician Karen Santos  DO   Patient/Family Therapy Goal Statement (SLP) Patient unable to state. Daughter wants to get him eating again.   Pertinent History of Current Problem Mr. Eh Callahan is a 82 year old male with a past medical history of dementia, CKD3, HTN, PTSD, afib and anemia admitted on 11/10/2022 with worsening weakness and poor PO intake. Pending further work up and clinical improvement.  Had a recent admission for the same from 10/12-10/22.   General Observations Patient is alert and cooperative with evaluation.       Present   (Daughter choosing to interpret for patient rather than using iPad)   Language Nepalese   Pain Assessment   Patient Currently in Pain No   Type of Evaluation   Type of Evaluation  Swallow Evaluation   Oral Motor   Oral Musculature generally intact   Mucosal Quality dry   Dentition (Oral Motor)   Dentition (Oral Motor) natural dentition;adequate dentition   Facial Symmetry (Oral Motor)   Facial Symmetry (Oral Motor) WNL   Lip Function (Oral Motor)   Lip Range of Motion (Oral Motor) WNL   Tongue Function (Oral Motor)   Tongue ROM (Oral Motor) WNL   Jaw Function (Oral Motor)   Jaw Function (Oral Motor) WNL   Cough/Swallow/Gag Reflex (Oral Motor)   Soft Palate/Velum (Oral Motor) WNL   Volitional Throat Clear/Cough (Oral Motor) WNL   Volitional Swallow (Oral Motor) WNL   Vocal Quality/Secretion Management (Oral Motor)   Vocal Quality (Oral Motor) WNL   Secretion Management (Oral Motor) WNL   General Swallowing Observations   Current Diet/Method of Nutritional Intake (General Swallowing Observations, NIS) NPO   Respiratory Support (General Swallowing Observations) none   Past History of Dysphagia Clinical Dysphagia Evaluation on 10/19/22 with discharge on Regular diet with thin liquids.   Swallowing Evaluation Clinical swallow evaluation   Clinical Swallow Evaluation   Feeding Assistance dependent  (hand over hand; independent with feeding himself misti lowe)   Clinical Swallow Evaluation Textures Trialed thin liquids;pureed;solid foods   Clinical Swallow Eval: Thin Liquid Texture Trial   Mode of Presentation, Thin Liquids cup;self-fed  (with hand over hand)   Volume of Liquid or Food Presented   (4 ounces apple juice)   Oral Phase of Swallow WFL   Pharyngeal Phase of Swallow intact   Diagnostic Statement WNL with thin liquids (drinking while sitting upright)   Clinical Swallow Evaluation: Puree Solid Texture Trial   Mode of Presentation, Puree spoon;fed by clinician   Volume of Puree Presented 2 ounces apple sauce   Oral Phase, Puree WFL  (prolonged mastication)   Pharyngeal Phase, Puree intact   Diagnostic Statement WFL with pureed   Clinical Swallow Evaluation: Solid Food Texture Trial    Mode of Presentation self-fed   Volume Presented 1 misti cracker   Oral Phase WFL  (prolonged mastication and trying to take second bite prior to swallowing first bite)   Pharyngeal Phase intact   Diagnostic Statement WFL. Mastication prolonged but WFL, trace oral residue cleared with wash. No overt signs or symptoms of aspiration.   Esophageal Phase of Swallow   Patient reports or presents with symptoms of esophageal dysphagia No   Swallowing Recommendations   Diet Consistency Recommendations thin liquids (level 0);regular diet  (with staff/family to self select softer food choices)   Supervision Level for Intake 1:1 supervision needed   Mode of Delivery Recommendations bolus size, small;slow rate of intake   Swallowing Maneuver Recommendations alternate food and liquid intake   Monitoring/Assistance Required (Eating/Swallowing) stop eating activities when fatigue is present   Recommended Feeding/Eating Techniques (Swallow Eval) maintain upright sitting position for eating   Instrumental Assessment Recommendations instrumental evaluation not recommended at this time   General Therapy Interventions   Planned Therapy Interventions Dysphagia Treatment   Dysphagia treatment Instruction of safe swallow strategies;Modified diet education   Clinical Impression   Criteria for Skilled Therapeutic Interventions Met (SLP Eval) Yes, treatment indicated   Risks & Benefits of therapy have been explained evaluation/treatment results reviewed;care plan/treatment goals reviewed;participants voiced agreement with care plan;participants included;patient;daughter   Clinical Impression Comments Clinical dysphagia exam completed. The pt's daughter was present, assisted with history and interpreting.  Daughter reports occassional cough with liquids and patient with poor po intake, consuming mostly liquids. During this exam the patient demonstrated generally intact oral mechanism. He consumed PO trials of regular textures, pureed and  thin liquids. Mastication prolonged but WFL, trace oral residue cleared with liquid wash. Laryngeal elevation felt strong to palpation and swallow response was timely. Voice clear and no direct signs/symptoms of aspiration were present with any PO trials. Recommend a regular texture diet and thin liquids, with staff and family self selecting softer food options. Safe swallow strategies include small bites/sips when upright and alert. Do not feed when reclined or too lethargic. SLP will follow for a short treatment course for diet tolerance and safe swallow education.   SLP Total Evaluation Time   Eval: oral/pharyngeal swallow function, clinical swallow Minutes ([id number]) 34   Total Session Time   Total Session Time (sum of timed and untimed services) 34

## 2022-11-12 NOTE — PLAN OF CARE
Pt alert to self. BP elevated, all other VSS. Tele SR w/ ST depression. C/o pain, PRN tylenol given. Bedrest. Incontinent. Speech saw pt today, changed him to regular diet. Pt Refusing to eat. Refused PT.  and 320. Paged MD to change Novolog to with meals. IVF running. Treating with abx and prednisone. Crushed meds in apple sauce. Possible discharge home tomorrow.

## 2022-11-12 NOTE — PLAN OF CARE
Goal Outcome Evaluation:      Plan of Care Reviewed With: patient, family        Confused, restless, pulling IV, oxygen line- Haldol given, tylenol given for headache. Incontinent bladder and bowel. NPO, IV fluid and ABXs. Continue to monitor.

## 2022-11-12 NOTE — PHARMACY-VANCOMYCIN DOSING SERVICE
Pharmacy Vancomycin Initial Note  Date of Service 2022  Patient's  1940  82 year old, male    Indication: Aspiration Pneumonia, Community Acquired Pneumonia and Hospital-Acquired Pneumonia    Current estimated CrCl = Estimated Creatinine Clearance: 22 mL/min (A) (based on SCr of 2.38 mg/dL (H)).    Creatinine for last 3 days  11/10/2022:  7:29 PM Creatinine 2.43 mg/dL  2022:  7:00 AM Creatinine 2.22 mg/dL  2022:  8:11 AM Creatinine 2.38 mg/dL    Recent Vancomycin Level(s) for last 3 days  No results found for requested labs within last 72 hours.      Vancomycin IV Administrations (past 72 hours)      No vancomycin orders with administrations in past 72 hours.                Nephrotoxins and other renal medications (From now, onward)    None          Contrast Orders - past 72 hours (72h ago, onward)    None          Insight[employer] Prediction of Planned Initial Vancomycin Regimen    Loading dose: 1500 mg at 18:00 2022.  Regimen: 500 mg IV every 24 hours.  Exposure target: AUC24 (range)400-600 mg/L.hr   AUC24,ss: 427 mg/L.hr  Probability of AUC24 > 400: 57 %  Ctrough,ss: 15 mg/L  Probability of Ctrough,ss > 20: 22 %  Probability of nephrotoxicity (Lodise SARAH ): 10 %       Plan:  1. Start vancomycin 1500 mg LD x1, then 500 mg IV q24h.   2. Vancomycin monitoring method: AUC  3. Vancomycin therapeutic monitoring goal: 400-600 mg*h/L  4. Pharmacy will check vancomycin levels as appropriate in 1-3 Days.    5. Serum creatinine levels will be ordered daily for the first week of therapy and at least twice weekly for subsequent weeks.      Leander Gloria, [facility]

## 2022-11-12 NOTE — PROGRESS NOTES
[facility]  Medicine Progress Note - Hospitalist Service  Date of Admission:  11/10/2022    Assessment & Plan   Mr. Eh Callahan is a 82 year old male with a past medical history of dementia, CKD3, HTN, PTSD, afib and anemia admitted on 11/10/2022 with worsening weakness and poor PO intake. Pending further work up and clinical improvement.  Had a recent admission for the same from 10/12-10/22.  Patient was seen by SLP today and was cleared for diet, his delirium continues to wax and wane.  Medication management as below.    Acute metabolic encephalopathy, concern for possible septic encephalopathy vs delirium vsprogression of dementia vs possible aspiration   Concern for pneumonia vs atelectatsis vs pneumonitis   Patient presented with acute worsening of his weakness, inability to tolerate p.o. intake and worsening mental status per family's report.  He had a recent hospitalization on 10/12-10/22 was discharged to home at that time.  On current admission, CT head without any acute findings to explain change in mental status.  Chest x-ray with questionable pneumonia versus atelectasis versus pneumonitis.  It has been having a hard time managing his secretions, concern for potential aspiration events resulting in xray findings. Differential for worsening mental status includes septic encephalopathy vs progression of dementia vs possible aspiration. CXR 11/12/22 with worsened infiltrates, now requiring 3 L supplemental oxygen.  -discontinue Ceftriaxone and azithromycin   -transitioned to vanc and cefepime given repeat CXR worsening and patient now requiring supplemental oxygen 3 L   - sputum culture if able to obtain   - SLP evaluation completed 11/12/22    -Recommended a regular texture diet and thin liquids, with staff and family self selecting softer food options    -Safe swallow strategies include small bites/sips when upright and alert     -Do not feed when reclined or too lethargic   -SLP  will follow for a short treatment course for diet tolerance and safe swallow education  - incentive spirometry if able   - PT evaluation / OT evaluation for cog evals   - palliative consulted , remains full code with full cares at this time, progressive nature of his disease was discussed with family per palliative team, appreciate assistance     Hospital associated delirium   History of Alzheimer's dementia  - start risperidone 0.25 mg daily   - schedule melatonin 5 mg with supper   - haldol PRN agitation   - EKG to monitor QTc     Concern for potential pericarditis   Troponin elevation, stable   Elevated ESR and CRP   Denies any chest pain or anginal equivalents but history is limited due to his underlying dementia. Low concern for active ACS at this time. repeat EKG pending with questionable early repolarization pericarditis.  Patient's back discomfort and inability to localize symptoms given his underlying dementia, in the setting of his elevated troponin in addition to overall feeling ill and possible EKG changes, attempted a steroid trial, possible mild change in symptoms after 1 dose, ESR and CRP are elevated.   - repeat troponin in AM   - repeat EKG pending   - repeat ESR and CRP in AM   - continue prednisone 20 mg daily for now   - repeat TTE to rule out pericarditis     CKD stage II vs TRANG on CKD , creatinine improving   Hyperkalemia- resolved, hyponatremia , hypomagnesemia   - BMP in AM , Mag in AM , replace PRN   - PTA sodium bicarb resumed   - will consider nephrology consult , no emergent need at this time     Elevated alkaline phosphatase , improving   - repeat LFTs in AM     Normocytic anemia, chronic   - repeat CBC in AM     Hyperglycemia  DM2- MDSSI while inpatient, started glargine at 10 unit(s) (decreased from PTA dose of 18 unit(s))will monitor and titrate as needed especially in setting of starting steroids    Atrial fibrillation - cardiac monitoring , PTA metoprolol -with hold parameters    Hypertension -continue hydralazine, Isordil , PRN IV hydralazine for SBP > 180   HLD -PTA atorvastatin, ASA  History of hepatitis C  PTSD -PTA trazodone       Diet: Combination Diet Regular Diet; Thin Liquids (level 0) (No straws)    DVT Prophylaxis: Pneumatic Compression Devices  Liu Catheter: Not present  Central Lines: None  Cardiac Monitoring: ACTIVE order. Indication: failture to thrive, ensure no arrythmia  Code Status: Full Code      Disposition Plan     Expected Discharge Date: 11/13/2022                The patient's care was discussed with the Bedside Nurse, Patient and Patient's Family.    Karen Santos, DO  Hospitalist Service  [facility]  Securely message with the Vocera Web Console (learn more here)  Text page via [employer] Paging/Directory       Clinically Significant Risk Factors        # Hyperkalemia: Highest K = 5.4 mmol/L (Ref range: 3.4-5.3) in last 2 days, will monitor as appropriate  # Hyponatremia: Lowest Na = 133 mmol/L (Ref range: 136-145) in last 2 days, will monitor as appropriate    # Hypomagnesemia: Lowest Mg = 1.4 mg/dL (Ref range: 1.7-2.3) in last 2 days, will replace as needed   # Hypoalbuminemia: Lowest albumin = 2.5 g/dL (Ref range: 3.5-5.2) at 11/11/2022  7:00 AM, will monitor as appropriate                 ______________________________________________________________________    Interval History   Patient continues to state that he has pain but is unable to localize where this pain is    His family is reporting that they have not gotten any additional information about his pain either    He is slightly more awake but the family reports that this has been waxing and waning consistently    Family at bedside reporting that he continues to act delirious    Data reviewed today: I reviewed all medications, new labs and imaging results over the last 24 hours. I personally reviewed no images or EKG's today.    Physical Exam   Vital Signs: Temp: 97.6  F (36.4  C)  Temp src: Oral BP: (!) 186/91 Pulse: 89   Resp: 20 SpO2: 93 % O2 Device: Nasal cannula Oxygen Delivery: 3 LPM  Weight: 143 lbs 1.6 oz     Constitutional: awake, no apparent distress, and appears stated age  HEENT: Normocephalic, atraumatic.   Respiratory: Normal work of breathing, fair air exchange, coarse breath sounds bilaterally, no  wheezing appreciated  Cardiovascular: Regular rate and rhythm, no murmurs appreciated   GI: Soft and nontender to palpation, nondistended, no rebound or guarding  Skin: normal skin color, texture, turgor  Musculoskeletal: No deformities, no edema present  Neurologic: Alert, not oriented, responds to simple questions appropriately, moves all 4 extremities, no focal deficits   Neuropsychiatric: General: normal, calm and normal eye contact      Data   Recent Labs   Lab 11/12/22  1503 11/12/22  1001 11/12/22  0811 11/12/22  0803 11/11/22  0923 11/11/22  0700 11/10/22  2228 11/10/22  1929   WBC  --   --   --  9.4  --  8.1  --  6.6   HGB  --   --   --  11.3*  --  12.5*  --  12.6*   MCV  --   --   --  98  --  99  --  94   PLT  --   --   --  184  --  151  --  212   NA  --   --  138  --   --  133*  --  133*   POTASSIUM  --   --  4.9  --   --  4.5  --  5.4*   CHLORIDE  --   --  107  --   --  103  --  102   CO2  --   --  17*  --   --  15*  --  23   BUN  --   --  47.2*  --   --  45.5*  --  49.2*   CR  --   --  2.38*  --   --  2.22*  --  2.43*   ANIONGAP  --   --  14  --   --  15  --  8   DOMINGO  --   --  8.0*  --   --  7.8*  --  8.2*   * 283* 334*  --    < > 318*   < > 274*   ALBUMIN  --   --   --   --   --  2.5*  --  3.2*   PROTTOTAL  --   --   --   --   --  6.1*  --  6.7   BILITOTAL  --   --   --   --   --  0.9  --  0.6   ALKPHOS  --   --   --   --   --  150*  --  175*   ALT  --   --   --   --   --  32  --  42   AST  --   --   --   --   --  29  --  34    < > = values in this interval not displayed.     Recent Results (from the past 24 hour(s))   XR Chest Port 1 View    Narrative    EXAM:  XR CHEST PORT 1 VIEW  LOCATION: [facility]  DATE/TIME: 11/12/2022 8:56 AM    INDICATION: Dementia, hypertension, COPD, weakness, anemia, metabolic encephalopathy, concern for pneumonia  COMPARISON: 11/10/2022      Impression    IMPRESSION: Small bilateral pleural effusions. Interval worsening of bilateral lung opacities suspicious for pneumonia. Normal heart size.     Medications       aspirin  81 mg Oral Daily     atorvastatin  40 mg Oral QPM     ceFEPIme  1 g Intravenous Q12H     hydrALAZINE  50 mg Oral TID     insulin aspart  1-6 Units Subcutaneous TID w/meals     insulin glargine  10 Units Subcutaneous QAM AC     isosorbide dinitrate  20 mg Oral TID     melatonin  5 mg Oral Daily with supper     metoprolol tartrate  50 mg Oral BID     multivitamin, therapeutic  1 tablet Oral Daily     [START ON 11/13/2022] predniSONE  20 mg Oral Daily     risperiDONE  0.25 mg Oral Daily     sodium bicarbonate  1,300 mg Oral BID     sodium chloride (PF)  3 mL Intracatheter Q8H     traZODone  75 mg Oral At Bedtime     vancomycin  1,000 mg Intravenous Q12H     Vitamin D3  25 mcg Oral Daily

## 2022-11-12 NOTE — PROGRESS NOTES
Goal Outcome Evaluation:       Plan of Care Reviewed With: Patient, Son and Daughter     Overall Patient Progress: No Change    Outcome Evaluation:   SR with mild ST depression per monitor. Tylenol given for headache. IV compazine and IV Zofran given for nausea.  Patient shows signs of discomfort after meds given. Incontinent of bowel or bladder or he does not tell staff or family when he needs to go.  Patient inconsistent in following demands, PRN IV Haldol not needed.     Plan: NS infusing 125 ml/hr. Remains NPO except meds until speech therapy evaluates. BS AND VS checks Q4H. Family at bedside.

## 2022-11-13 NOTE — PROGRESS NOTES
[facility]    Hospitalist RRT Note  Name: Eh Callahan    MRN: [id number]  Provider: Ana Barrera MD  Date of Service: 11/13/2022    Assessment & Plan   Summary of Stay: Eh Callahan is a 82 year old male who was admitted on 11/10/2022 RRT was called for chest pain.    Patient admitted for acute metabolic encephalopathy has underlying dementia is being treated for pneumonia.  Has been having atypical chest pain troponins were followed and is empirically being treated with steroids for possible pericarditis.   Patient son noted that patient complained of chest pain which is not usual for him however from records it seems like patient has been having some chest pains and were worked up.  Patient has dementia could not characterize chest pain.  He said yes to pretty much all symptoms.  His blood pressure was elevated and seemed anxious 2.    Chest pain: Cannot characterize likely atypical  -Patient does have cardiac risk factors we will check troponin  -Repeated EKG with no acute significant change no new change when compared to prior EKG  -We will check serial troponin  -Monitor on telemetry  -Patient is on long-acting nitro gave sublingual nitroglycerin  -Treat blood pressure with hydralazine  -Patient is on steroids we will also give GI cocktail as GERD is in differential  -Vitals are stable  -Echo has been ordered  -Continue to monitor on telemetry  -Patient is on steroids will need GI prophylaxis            Interval History   RRT called for acute chest pain    -Data reviewed today: I reviewed all new labs and imaging reports over the last 24 hours. I personally reviewed the EKG tracing showing Sinus rhythm with.  T inverted in lateral leads not new when compared to prior    Physical Exam   Temp: 97.8  F (36.6  C) Temp src: Oral BP: (!) 203/94 Pulse: 87   Resp: 20 SpO2: 97 % O2 Device: Nasal cannula Oxygen Delivery: 2 LPM  Vitals:    11/10/22 2337   Weight: 64.9 kg (143 lb 1.6 oz)     Vital Signs  with Ranges  Temp:  [97.6  F (36.4  C)-98.3  F (36.8  C)] 97.8  F (36.6  C)  Pulse:  [86-93] 87  Resp:  [19-20] 20  BP: (169-203)/(75-94) 203/94  SpO2:  [93 %-100 %] 97 %  No intake/output data recorded.      GEN:  Alert, awake, appears comfortable, NAD.  HEENT:  Normocephalic/atraumatic, no scleral icterus, no nasal discharge, mouth dry  CV:  Regular rate and rhythm, no murmur or JVD.  S1 + S2 noted, no S3 or S4.  LUNGS: Poor inspiratory effort basilar crackle symmetric chest rise on inhalation noted.  EXT:  No edema.  No cyanosis.  No joint synovitis noted.  SKIN:  Dry to touch,   Medications       nitroGLYcerin         aspirin  81 mg Oral Daily     atorvastatin  40 mg Oral QPM     ceFEPIme  2 g Intravenous Q24H     famotidine  10 mg Oral BID     hydrALAZINE  50 mg Oral TID     insulin aspart  1-6 Units Subcutaneous TID w/meals     insulin glargine  10 Units Subcutaneous QAM AC     isosorbide dinitrate  20 mg Oral TID     lidocaine viscous 2% 15 mL and maalox/mylanta w/ simethicone 15 mL (GI COCKTAIL)  30 mL Oral Once     melatonin  5 mg Oral Daily with supper     metoprolol tartrate  50 mg Oral BID     multivitamin, therapeutic  1 tablet Oral Daily     predniSONE  20 mg Oral Daily     risperiDONE  0.25 mg Oral Daily     sodium bicarbonate  1,300 mg Oral BID     sodium chloride (PF)  3 mL Intracatheter Q8H     traZODone  75 mg Oral At Bedtime     vancomycin  500 mg Intravenous Q24H     Vitamin D3  25 mcg Oral Daily     Data     No results for input(s): PH, PHV, PO2, PO2V, SAT, PCO2, PCO2V, HCO3, HCO3V in the last 168 hours.  Recent Labs   Lab 11/12/22  0803 11/11/22  0700 11/10/22  1929   WBC 9.4 8.1 6.6   HGB 11.3* 12.5* 12.6*   HCT 35.6* 39.8* 37.9*   MCV 98 99 94    151 212     Recent Labs   Lab 11/13/22  0212 11/12/22  2351 11/12/22  2130 11/12/22  1001 11/12/22  0811 11/11/22 0923 11/11/22  0700 11/10/22  2228 11/10/22  1929   NA  --   --   --   --  138  --  133*  --  133*   POTASSIUM  --   --   --    --  4.9  --  4.5  --  5.4*   CHLORIDE  --   --   --   --  107  --  103  --  102   CO2  --   --   --   --  17*  --  15*  --  23   ANIONGAP  --   --   --   --  14  --  15  --  8   * 311* 287*   < > 334*   < > 318*   < > 274*   BUN  --   --   --   --  47.2*  --  45.5*  --  49.2*   CR  --   --   --   --  2.38*  --  2.22*  --  2.43*   GFRESTIMATED  --   --   --   --  27*  --  29*  --  26*   DOMINGO  --   --   --   --  8.0*  --  7.8*  --  8.2*    < > = values in this interval not displayed.     Recent Labs   Lab 11/11/22  0700 11/10/22  1929   AST 29 34   ALT 32 42   ALKPHOS 150* 175*   BILITOTAL 0.9 0.6     No results for input(s): INR in the last 168 hours.  No results for input(s): TROPONIN, TROPI, TROPR, TROPONINIS in the last 168 hours.    Invalid input(s): TROPT, TROP, TROPONINIES, TNIH    Recent Results (from the past 24 hour(s))   XR Chest Port 1 View    Narrative    EXAM: XR CHEST PORT 1 VIEW  LOCATION: [facility]  DATE/TIME: 11/12/2022 8:56 AM    INDICATION: Dementia, hypertension, COPD, weakness, anemia, metabolic encephalopathy, concern for pneumonia  COMPARISON: 11/10/2022      Impression    IMPRESSION: Small bilateral pleural effusions. Interval worsening of bilateral lung opacities suspicious for pneumonia. Normal heart size.

## 2022-11-13 NOTE — PLAN OF CARE
Pt alert to self. Elevated BP, decreases after scheduled BP meds, all other VSS. On 1 1/2 nasal canula. Tele ST w/ slight ST depression. Denies pain. Denies nausea. Bedrest. Incontinent. LS inspiratory wheezes. Congested cough. Encourage oral intake.  and 259. COVID and influenza swabs done today, came back negative. Bolus given this afternoon. Family in room with pt. Pt saw pt today. Continue monitoring O2 sats and encourage oral intake.

## 2022-11-13 NOTE — PROGRESS NOTES
"[facility] RCAT     Date: 11/12/22  Admission Dx: Acute respiratory failure  Pulmonary History n/a  Home Nebulizer/MDI Use: n/a  Home Oxygen: n/a  Acuity Level (RCAT flow sheet): 2  Aerosol Therapy initiated: Duoneb Q4 (with MD order MM), Mucomyst Q4 (MD order)      Pulmonary Hygiene initiated: flutter valve      Volume Expansion initiated: is      Current Oxygen Requirements: 2 lpm=96%  Current SpO2:    Re-evaluation date: 11/16/22    Patient Education:      See \"RT Assessments\" flow sheet for patient assessment scoring and Acuity Level Details.           "

## 2022-11-13 NOTE — PLAN OF CARE
"PT/OT: Received orders for evaluation and treatment. Therapist met with daughter (Candis, primary caregiver) at bedside today.  Had previously attempted to speak to son yesterday; son stated that he is \"in and out of the house, it is better to wait for Candis since she is with him all day.\"  Patient admitted from 10/12-10/22 for worsening weakness and poor PO intake.  During patient's previous hospital stay, patient worked with physical therapy to increase strength and mobility.  Patient unable to progress past standing at edge of bed and returned to home with stretcher transport (to negotiate 3 flights to apartment), full cares from family and walker.  Daughter stated that patient has been able to stand intermittently at bedside to don/doff briefs (otherwise family would roll him in bed to complete bathing and brief change).  Daughter reports that patient is unsteady in standing and due to his dementia, unable reliably follow directions.  Family also has wheelchair for use in home.      At previous discharge from hospital, patient was referred to home care for home PT/OT.  Daughter reports that a home evaluation was completed, however patient is required to see a doctor within 1 week of discharge in order for home care to continue.  Family is unable to bring patient to the clinic for this follow up visit.  No home care therapy has been performed.      Therapist asked daughter if therapy in hospital would be helpful.  Daughter stated that she did not think it would change patient's mobility, especially since is unable to follow directions.  Daughter stated primary concern is patient's eating.  When asked if family had all of the equipment required to continue to care for the patient at home, daughter stated that they need a hospital bed and a transfer sheet.      At this time, physical therapy and occupational therapy will complete orders as ongoing therapy during last visit did not improve patient's mobility during " previous stay; patient remains significantly limited in ability to participate safely in session secondary to dementia. Daughter in agreement.  Family may be more open to hospice if education provided on equipment that is available for family to use when enrolled in program (I.e, hospital bed). Therapist will work to determine availability of a transfer sheet for home use.      Anticipate that patient will require a stretcher transport for discharge to home again (unable to negotiate 3 flights of stairs into home).  Likely will be unable to participate in home care as family is unable to bring him to clinic for follow up doctor visits.

## 2022-11-13 NOTE — PROGRESS NOTES
BP (!) 173/75   Pulse 87   Temp 97.6  F (36.4  C) (Oral)   Resp 20   Wt 64.9 kg (143 lb 1.6 oz)   SpO2 98%   BMI 22.41 kg/m      A&Ox4 with sometimes gets confused. St with st depression. Bedrest, speech is low. pts children are at beside. Crushed meds,with apple sauce. Continue with plan of care.

## 2022-11-13 NOTE — PROGRESS NOTES
[facility]  Medicine Progress Note - Hospitalist Service  Date of Admission:  11/10/2022    Assessment & Plan   Mr. Eh Callahan is a 82 year old male with a past medical history of dementia, CKD3, HTN, PTSD, afib and anemia admitted on 11/10/2022 with worsening weakness and poor PO intake. Pending further work up and clinical improvement.  Had a recent admission for the same from 10/12-10/22.  Patient was seen by SLP 11/12 and was cleared for diet, his delirium continues to wax and wane, he has had ongoing hypoxia requiring 2 L oxygen, episode of chest pain, cardiology consult pending. Pending ongoing clinical improvement.     Acute hypoxic respiratory failure   Acute metabolic encephalopathy, resolving    concern for possible septic encephalopathy vs delirium vsprogression of dementia vs possible aspiration   Concern for pneumonia vs atelectatsis vs pneumonitis   Pleural effusions   Patient presented with acute worsening of his weakness, inability to tolerate p.o. intake and worsening mental status per family's report.  He had a recent hospitalization on 10/12-10/22 was discharged to home at that time.  On current admission, CT head without any acute findings to explain change in mental status.  Chest x-ray with questionable pneumonia versus atelectasis versus pneumonitis.  It has been having a hard time managing his secretions, concern for potential aspiration events resulting in xray findings. Differential for worsening mental status includes septic encephalopathy vs progression of dementia vs possible aspiration. SLP cleared for soft diet with supervision and slowly. CXR 11/12/22 with worsened infiltrates and worsening oxygenation requiring 3 L oxygen. Oxygen requirements down to 2 L but with congested lung sounds.   - thoracentesis for pleural effusions ordered 11/13/22    Effusions are small on xray but also noted on TTE, will see if drainable per IR   - viral panel pending   -  transitioned to vanc and cefepime given repeat CXR 11/12 with worsened infiltrates and patient now requiring supplemental oxygen 3 L in setting of recent hospitalization   - sputum culture if able to obtain   - SLP evaluation completed 11/12/22    -Regular texture diet and thin liquids    -Staff/family self selecting softer food options    -Small bites/sips when upright and alert     -Do not feed when reclined or too lethargic   -SLP will follow for a short treatment course for diet tolerance and education  - incentive spirometry, aerobika if able   - palliative consulted , remains full code with full cares at this time, progressive nature of his disease was discussed with family per palliative team, appreciate assistance     Hospital associated delirium , improving   History of Alzheimer's dementia , stable   Mental status improving per provider exams and per family reports.   - start risperidone 0.25 mg daily   - schedule melatonin 5 mg with supper   - haldol PRN agitation   - EKG done to monitor QTc 457    Concern for potential pericarditis , resolving   Troponin elevation, stable   Elevated ESR and CRP , stable   Denies any chest pain or anginal equivalents but history is limited due to his underlying dementia. Low concern for active ACS at this time. repeat EKG pending with questionable early repolarization pericarditis, no ischemic changes but troponin continues to be elevated.    RRT evening of 11/12-13 for chest pain/discomfort. Attempted a steroid trial, for possible pericarditis, minimal change noted, although ESR and CRP remain elevated.   - cardiology consult to assist   - repeat TTE performed with    EF 60-65%, moderate ventricular hypertrophy and pleural effusion , no evidence of pericarditis, repeat EKG overnight without evidence of pericarditis   - repeat ESR and CRP in AM     CKD stage II vs TRANG on CKD , creatinine worsening, suspect pre-renal in setting of decreased PO intake the past few days    Hyperkalemia- resolved, hyponatremia , hypomagnesemia   -  ml bolus 11/13/22 (EF 60-65% 11/13/22)   - BMP in AM , Mag in AM , replace PRN   - PTA sodium bicarb   - will consider nephrology consult if creatinine not improving, no emergent need at this time     Elevated alkaline phosphatase , improving   Normocytic anemia, chronic , improving - repeat CBC in AM   Hyperglycemia, DM2- MDSSI while inpatient, increased glargine to 15 unit(s) (which remains a decreased dose from PTA dose of 18 unit(s))will monitor and titrate as needed   Atrial fibrillation - cardiac monitoring , PTA metoprolol -with hold parameters   Hypertension -continue hydralazine, Isordil , PRN IV hydralazine for SBP > 180   HLD -PTA atorvastatin, ASA  History of hepatitis C  PTSD -PTA trazodone       Diet: Combination Diet Regular Diet; Thin Liquids (level 0) (No straws)    DVT Prophylaxis: Pneumatic Compression Devices  Liu Catheter: Not present  Central Lines: None  Cardiac Monitoring: ACTIVE order. Indication: failture to thrive, ensure no arrythmia  Code Status: Full Code      Disposition Plan      Expected Discharge Date: 11/14/2022, 12:00 PM              The patient's care was discussed with the Bedside Nurse, Patient and Patient's Family.    Karen Santos DO  Hospitalist Service  [facility]  Securely message with the Vocera Web Console (learn more here)  Text page via [employer] Paging/Directory       Clinically Significant Risk Factors          # Hypocalcemia: Lowest Ca = 8 mg/dL (Ref range: 8.5 - 10.1 mg/dL) in last 2 days, will monitor and replace as appropriate     # Hypoalbuminemia: Lowest albumin = 2.5 g/dL (Ref range: 3.5-5.2) at 11/11/2022  7:00 AM, will monitor as appropriate                 ______________________________________________________________________    Interval History   Patient feels well during exam this morning   He denies any pain and is comfortable resting in bed   Seems more alert  than prior exams     His family is reporting that they have not gotten any additional information about his pain either, they have not heard or picked up on any new complaints   They do feel his BP seems to be related to his symptoms? Unclear     Family at bedside reporting no other new changes, concerns or complaints     Data reviewed today: I reviewed all medications, new labs and imaging results over the last 24 hours. I personally reviewed no images or EKG's today.    Physical Exam   Vital Signs: Temp: 97.4  F (36.3  C) Temp src: Oral BP: (!) 140/66 Pulse: 85   Resp: 18 SpO2: 100 % O2 Device: Nasal cannula Oxygen Delivery: 2 LPM  Weight: 143 lbs 1.6 oz     Constitutional: awake, no apparent distress, and appears stated age, resting comfortably   HEENT: Normocephalic, atraumatic. Bilateral cataracts.   Respiratory: Normal work of breathing, fair air exchange, coarse breath sounds bilaterally, no wheezing appreciated, congested breath sounds   Cardiovascular: Regular rate and rhythm, no murmurs appreciated   GI: Soft and nontender to palpation, nondistended, no rebound or guarding  Skin: normal skin color, texture, turgor  Musculoskeletal: No deformities, no edema present  Neurologic: Alert, only oriented to self, responds to simple questions appropriately, moves all 4 extremities, no focal deficits   Neuropsychiatric: General: normal, calm and normal eye contact      Data   Recent Labs   Lab 11/13/22  0735 11/13/22  0656 11/13/22  0527 11/12/22  1001 11/12/22  0811 11/12/22  0803 11/11/22  0923 11/11/22  0700   WBC 10.7  --   --   --   --  9.4  --  8.1   HGB 12.0*  --   --   --   --  11.3*  --  12.5*   MCV 97  --   --   --   --  98  --  99     --   --   --   --  184  --  151     --   --   --  138  --   --  133*   POTASSIUM 4.6  --   --   --  4.9  --   --  4.5   CHLORIDE 106  --   --   --  107  --   --  103   CO2 18*  --   --   --  17*  --   --  15*   BUN 47.0*  --   --   --  47.2*  --   --  45.5*    CR 2.55*  --   --   --  2.38*  --   --  2.22*   ANIONGAP 14  --   --   --  14  --   --  15   DOMINGO 8.3*  --   --   --  8.0*  --   --  7.8*   * 334* 320*   < > 334*  --    < > 318*   ALBUMIN 3.2*  --   --   --   --   --   --  2.5*   PROTTOTAL 6.6  --   --   --   --   --   --  6.1*   BILITOTAL 0.5  --   --   --   --   --   --  0.9   ALKPHOS 143*  --   --   --   --   --   --  150*   ALT 32  --   --   --   --   --   --  32   AST 25  --   --   --   --   --   --  29    < > = values in this interval not displayed.     Recent Results (from the past 24 hour(s))   Echocardiogram Limited   Result Value    LVEF  60-65%    Narrative    [id number]  [id number]  [id number]  [id number]^DANNY^STEW     [facility]  Echocardiography Laboratory  [address] [place] [address]     Name: CHIKIS NEWBERRY  MRN: [id number]  : 1940  Study Date: 2022 08:11 AM  Age: 82 yrs  Gender: Male  Patient Location: [facility]  Reason For Study: Pericarditis, Other, Please Specify in Comments  Ordering Physician: STEW DELGADO  Referring Physician: [facility]  Performed By: Tiffanie Pierson RDCS     BSA: 1.8 m2  Height: 67 in  Weight: 143 lb  HR: 103  BP: 186/91 mmHg  ______________________________________________________________________________  Procedure  Limited Portable Echo Adult. Optison (NDC #7999-1989) given intravenously.  ______________________________________________________________________________  Interpretation Summary     There is moderate concentric left ventricular hypertrophy.  The visual ejection fraction is 60-65%.  pleural effusion  The study was technically difficult.  ______________________________________________________________________________  Left Ventricle  The left ventricle is normal in size. There is moderate concentric left  ventricular hypertrophy. Left ventricular systolic function is normal. The  visual ejection fraction is 60-65%. Grade I or early diastolic dysfunction.  No  regional wall motion abnormalities noted.     Right Ventricle  The right ventricle is normal in structure, function and size.     Atria  Normal left atrial size. Right atrial size is normal. There is no color  Doppler evidence of an atrial shunt.     Mitral Valve  The mitral valve is normal in structure and function. There is no mitral  regurgitation noted.     Tricuspid Valve  The tricuspid valve is normal in structure and function. There is trace to  mild tricuspid regurgitation. The right ventricular systolic pressure is  approximated at 57.0 mmHg plus the right atrial pressure. Severe (>55mmHg)  pulmonary hypertension is present.     Aortic Valve  There is mild trileaflet aortic sclerosis. There is trace to mild aortic  regurgitation.     Pulmonic Valve  The pulmonic valve is not well visualized.     Pericardium  There is no pericardial effusion. pleural effusion.     Rhythm  Sinus rhythm was noted.     ______________________________________________________________________________  MMode/2D Measurements & Calculations  TAPSE: 2.2 cm     Doppler Measurements & Calculations  TR max dagoberto: 377.3 cm/sec  TR max P.0 mmHg     ______________________________________________________________________________  Report approved by: Iain Salamanca 2022 10:58 AM           Medications       acetylcysteine  4 mL Nebulization Q4H     aspirin  81 mg Oral Daily     atorvastatin  40 mg Oral QPM     ceFEPIme  2 g Intravenous Q24H     famotidine  10 mg Oral BID     hydrALAZINE  50 mg Oral TID     insulin aspart  1-6 Units Subcutaneous TID w/meals     [START ON 2022] insulin glargine  15 Units Subcutaneous QAM AC     isosorbide dinitrate  20 mg Oral TID     melatonin  5 mg Oral Daily with supper     metoprolol tartrate  50 mg Oral BID     multivitamin, therapeutic  1 tablet Oral Daily     predniSONE  20 mg Oral Daily     risperiDONE  0.25 mg Oral Daily     sodium bicarbonate  1,300 mg Oral BID     sodium  chloride (PF)  3 mL Intracatheter Q8H     traZODone  75 mg Oral At Bedtime     vancomycin  500 mg Intravenous Q24H     Vitamin D3  25 mcg Oral Daily

## 2022-11-13 NOTE — PLAN OF CARE
Patient alert to self only this shift. RRT called for acute chest pain. Nitroglycerin given x3 and a GI cocktail given with relief of chest pain. PT's BG over night  311, 321, 320.  Son at bedside all night helping with interpreting. PIV SL, pt hypertensive PRN hydralazine given x1. Will continue plan of care.

## 2022-11-13 NOTE — CONSULTS
Cardiology Consultation      Eh Callahan MRN# [id number]   YOB: 1940 Age: 82 year old   Date of Admission: 11/10/2022     Reason for consult:  Atypical chest pain           Assessment and Plan:     1. Atypical chest pain and ST elevation consistent with pericarditis. Would not do steroids for pericarditis.  May do colchicine 0.6 mg twice per day as tolerated    Due to dementia and renal dysfunction, conservative management from that standpoint.    Will sign off, please call with questions.    60 minutes spent today in review of past medical record, discussion with staff and postvisit charting.             Chief Complaint:   Hyperglycemia           History of Present Illness:   This patient is a 82 year old male with dementia and trivially elevated troponin who comes in with chest discomfort.  He is sleeping at this point and not conversant.  Echo was normal.  ECG from 11/10 and  both have ST elevation consistent with pericarditis.  He had been put on steroids which we do not typically do.  He is currently being treated for an infection.             Physical Exam:   Vitals were reviewed  Blood pressure (!) 209/101, pulse 99, temperature 99.6  F (37.6  C), temperature source Axillary, resp. rate 18, weight 64.9 kg (143 lb 1.6 oz), SpO2 98 %.  Temperatures:  Current - Temp: 99.6  F (37.6  C); Max - Temp  Av.1  F (36.7  C)  Min: 97.4  F (36.3  C)  Max: 99.6  F (37.6  C)  Respiration range: Resp  Av.3  Min: 18  Max: 20  Pulse range: Pulse  Av.1  Min: 85  Max: 107  Blood pressure range: Systolic (24hrs), Av , Min:140 , Max:209   ; Diastolic (24hrs), Av, Min:66, Max:105    Pulse oximetry range: SpO2  Av.9 %  Min: 93 %  Max: 100 %    Intake/Output Summary (Last 24 hours) at 2022 8809  Last data filed at 2022 0539  Gross per 24 hour   Intake 3 ml   Output --   Net 3 ml     Constitutional:   Sleeping, no apparent distress, and appears stated age     Eyes:   Lids  and lashes normal, pupils equal, round and reactive to light, extra ocular muscles intact, sclera clear, conjunctiva normal     Neck:   supple, symmetrical, trachea midline,      Back:   symmetric     Lungs:   Symmetric regular     Cardiovascular:        Abdomen:   non-tender     Musculoskeletal:   motor strength is 5 out of 5 all extremities bilaterally     Neurologic:   Grossly nonfocal     Skin:   normal skin color, texture, turgor     Additional findings:                  Past Medical History:   I have reviewed this patient's past medical history  Past Medical History:   Diagnosis Date     Anatomical narrow angle glaucoma      Atrial fibrillation (H)      Chronic infection     history of hepatitis C     CKD (chronic kidney disease) stage 2, GFR 60-89 ml/min      Diabetes mellitus (H)      Hepatitis C without mention of hepatic coma      Hypertension      PTSD (post-traumatic stress disorder)              Past Surgical History:   I have reviewed this patient's past surgical history  Past Surgical History:   Procedure Laterality Date     APPENDECTOMY       EYE SURGERY       LAPAROSCOPIC HEPATECTOMY PARTIAL Left 11/22/2016    Procedure: LAPAROSCOPIC HEPATECTOMY PARTIAL;  Surgeon: Rick Mckeon MD;  Location:  OR               Social History:   I have reviewed this patient's social history  Social History     Tobacco Use     Smoking status: Former     Smokeless tobacco: Never   Substance Use Topics     Alcohol use: No     Alcohol/week: 0.0 standard drinks             Family History:   I have reviewed this patient's family history  Family History   Problem Relation Age of Onset     Diabetes No family hx of         He is not aware of any diabetes in his family     Kidney Disease No family hx of              Allergies:     Allergies   Allergen Reactions     Amlodipine Swelling     Edema at 10 mg , fine at 5 mg     Lisinopril Cough     Metoprolol      Other reaction(s): Bradycardia  Metoprolol at 50mg bid -> HR  45 -50, unclear if sx (has fatigue)  May tolerate lower doses if needed             Medications:   I have reviewed this patient's current medications  Medications Prior to Admission   Medication Sig Dispense Refill Last Dose     aspirin (ASA) 81 MG EC tablet Take 1 tablet (81 mg) by mouth daily 100 tablet 0 11/10/2022 at AM     atorvastatin (LIPITOR) 40 MG tablet Take 1 tablet (40 mg) by mouth every evening 30 tablet 1 11/9/2022 at PM     Carboxymeth-Glycerin-Polysorb 0.5-1-0.5 % SOLN Place 1 drop into both eyes 2 times daily as needed     at prn     hydrALAZINE (APRESOLINE) 50 MG tablet Take 1 tablet (50 mg) by mouth 3 times daily 90 tablet 0 11/10/2022     insulin aspart (NOVOLOG PEN) 100 UNIT/ML pen Inject 4 Units Subcutaneous 3 times daily (with meals)   11/10/2022     insulin glargine (LANTUS PEN) 100 UNIT/ML pen Inject 18 Units Subcutaneous every morning (before breakfast) (Patient taking differently: Inject 18 Units Subcutaneous At Bedtime) 15 mL 0 11/9/2022 at hs     isosorbide dinitrate (ISORDIL) 20 MG tablet Take 1 tablet (20 mg) by mouth 3 times daily 90 tablet 0 11/10/2022     latanoprost (XALATAN) 0.005 % ophthalmic solution Place 1 drop into both eyes At Bedtime   11/9/2022 at hs     metoprolol tartrate (LOPRESSOR) 50 MG tablet Take 1 tablet (50 mg) by mouth 2 times daily 60 tablet 0 11/10/2022     Multiple Vitamins-Minerals (MULTIVITAMIN ADULT) TABS Take 1 tablet by mouth daily   11/10/2022     sodium bicarbonate 650 MG tablet Take 2 tablets (1,300 mg) by mouth 2 times daily 60 tablet 0 11/10/2022     traZODone (DESYREL) 150 MG tablet Take 75 mg by mouth At Bedtime    11/9/2022 at hs     Vitamin D3 (CHOLECALCIFEROL) 25 mcg (1000 units) tablet Take 25 mcg by mouth daily   11/10/2022     amLODIPine (NORVASC) 5 MG tablet Take 1 tablet (5 mg) by mouth daily (Patient not taking: Reported on 11/11/2022) 30 tablet 0 Not Taking     Current Facility-Administered Medications Ordered in Epic   Medication Dose  Route Frequency Last Rate Last Admin     acetaminophen (TYLENOL) tablet 650 mg  650 mg Oral Q6H PRN   650 mg at 11/13/22 1326    Or     acetaminophen (TYLENOL) Suppository 650 mg  650 mg Rectal Q6H PRN   650 mg at 11/11/22 1124     acetylcysteine (MUCOMYST) 10 % nebulizer solution 4 mL  4 mL Nebulization Q4H         aspirin EC tablet 81 mg  81 mg Oral Daily   81 mg at 11/13/22 0806     atorvastatin (LIPITOR) tablet 40 mg  40 mg Oral QPM   40 mg at 11/12/22 2143     ceFEPIme (MAXIPIME) 2 g vial to attach to  ml bag for ADULTS or 50 ml bag for PEDS  2 g Intravenous Q24H   2 g at 11/12/22 1812     glucose gel 15-30 g  15-30 g Oral Q15 Min PRN        Or     dextrose 50 % injection 25-50 mL  25-50 mL Intravenous Q15 Min PRN        Or     glucagon injection 1 mg  1 mg Subcutaneous Q15 Min PRN         glucose gel 15-30 g  15-30 g Oral Q15 Min PRN        Or     dextrose 50 % injection 25-50 mL  25-50 mL Intravenous Q15 Min PRN        Or     glucagon injection 1 mg  1 mg Subcutaneous Q15 Min PRN         famotidine (PEPCID) tablet 10 mg  10 mg Oral BID   10 mg at 11/13/22 0859     haloperidol lactate (HALDOL) injection 2 mg  2 mg Intravenous Q6H PRN        Or     haloperidol (HALDOL) tablet 2 mg  2 mg Oral Q6H PRN         hydrALAZINE (APRESOLINE) injection 10 mg  10 mg Intravenous Q4H PRN         hydrALAZINE (APRESOLINE) tablet 50 mg  50 mg Oral TID   50 mg at 11/13/22 1326     HYDROmorphone (DILAUDID) tablet 2 mg  2 mg Oral Q4H PRN         insulin aspart (NovoLOG) injection (RAPID ACTING)  1-7 Units Subcutaneous TID AC   3 Units at 11/13/22 1333     insulin aspart (NovoLOG) injection (RAPID ACTING)  1-5 Units Subcutaneous At Bedtime         [START ON 11/14/2022] insulin glargine (LANTUS PEN) injection 15 Units  15 Units Subcutaneous QAM AC         ipratropium - albuterol 0.5 mg/2.5 mg/3 mL (DUONEB) neb solution 3 mL  3 mL Nebulization Q4H         ipratropium - albuterol 0.5 mg/2.5 mg/3 mL (DUONEB) neb solution 3 mL  3  mL Nebulization Q4H PRN         isosorbide dinitrate (ISORDIL) tablet 20 mg  20 mg Oral TID   20 mg at 11/13/22 1326     lidocaine (LMX4) cream   Topical Q1H PRN         lidocaine 1 % 0.1-1 mL  0.1-1 mL Other Q1H PRN         melatonin tablet 5 mg  5 mg Oral Daily with supper   5 mg at 11/12/22 1823     metoprolol tartrate (LOPRESSOR) tablet 50 mg  50 mg Oral BID   50 mg at 11/13/22 0806     multivitamin, therapeutic (THERA-VIT) tablet 1 tablet  1 tablet Oral Daily   1 tablet at 11/13/22 0806     naloxone (NARCAN) injection 0.2 mg  0.2 mg Intravenous Q2 Min PRN        Or     naloxone (NARCAN) injection 0.4 mg  0.4 mg Intravenous Q2 Min PRN        Or     naloxone (NARCAN) injection 0.2 mg  0.2 mg Intramuscular Q2 Min PRN        Or     naloxone (NARCAN) injection 0.4 mg  0.4 mg Intramuscular Q2 Min PRN         nitroGLYcerin (NITROSTAT) sublingual tablet 0.4 mg  0.4 mg Sublingual Q5 Min PRN   0.4 mg at 11/13/22 0254     ondansetron (ZOFRAN ODT) ODT tab 4 mg  4 mg Oral Q6H PRN        Or     ondansetron (ZOFRAN) injection 4 mg  4 mg Intravenous Q6H PRN   4 mg at 11/11/22 1826     prochlorperazine (COMPAZINE) injection 5 mg  5 mg Intravenous Q6H PRN   5 mg at 11/11/22 1559    Or     prochlorperazine (COMPAZINE) tablet 5 mg  5 mg Oral Q6H PRN        Or     prochlorperazine (COMPAZINE) suppository 12.5 mg  12.5 mg Rectal Q12H PRN         risperiDONE (risperDAL) tablet 0.25 mg  0.25 mg Oral Daily   0.25 mg at 11/13/22 0859     senna-docusate (SENOKOT-S/PERICOLACE) 8.6-50 MG per tablet 1 tablet  1 tablet Oral BID PRN        Or     senna-docusate (SENOKOT-S/PERICOLACE) 8.6-50 MG per tablet 2 tablet  2 tablet Oral BID PRN         sodium bicarbonate tablet 1,300 mg  1,300 mg Oral BID   1,300 mg at 11/13/22 0805     sodium chloride (PF) 0.9% PF flush 3 mL  3 mL Intracatheter Q8H   3 mL at 11/13/22 1338     sodium chloride (PF) 0.9% PF flush 3 mL  3 mL Intracatheter q1 min prn         traZODone (DESYREL) half-tab 75 mg  75 mg Oral  At Bedtime   75 mg at 11/11/22 2204     vancomycin (VANCOCIN) 500 mg vial to attach to  mL bag  500 mg Intravenous Q24H         Vitamin D3 (CHOLECALCIFEROL) tablet 25 mcg  25 mcg Oral Daily   25 mcg at 11/13/22 0806     No current [facility]-ordered outpatient medications on file.             Review of Systems:   The 10 point Review of Systems is negative other than noted in the HPI            Data:   All laboratory data reviewed  Results for orders placed or performed during the hospital encounter of 11/10/22 (from the past 24 hour(s))   Glucose by meter   Result Value Ref Range    GLUCOSE BY METER POCT 320 (H) 70 - 99 mg/dL   Glucose by meter   Result Value Ref Range    GLUCOSE BY METER POCT 287 (H) 70 - 99 mg/dL   Glucose by meter   Result Value Ref Range    GLUCOSE BY METER POCT 311 (H) 70 - 99 mg/dL   Glucose by meter   Result Value Ref Range    GLUCOSE BY METER POCT 321 (H) 70 - 99 mg/dL   EKG 12-lead, tracing only   Result Value Ref Range    Systolic Blood Pressure  mmHg    Diastolic Blood Pressure  mmHg    Ventricular Rate 93 BPM    Atrial Rate 93 BPM    MN Interval 132 ms    QRS Duration 74 ms     ms    QTc 457 ms    P Axis 48 degrees    R AXIS 48 degrees    T Axis 164 degrees    Interpretation ECG       Sinus rhythm  Possible Left atrial enlargement  ST & T wave abnormality, consider lateral ischemia  Abnormal ECG  When compared with ECG of 10-NOV-2022 19:27,  Nonspecific T wave abnormality now evident in Inferior leads     Troponin T, High Sensitivity   Result Value Ref Range    Troponin T, High Sensitivity 68 (H) <=22 ng/L   Extra Tube *Canceled*    Narrative    The following orders were created for panel order Extra Tube.  Procedure                               Abnormality         Status                     ---------                               -----------         ------                     Extra Blue Top Tube[[id number]]                                                         Extra Red Top  Tube[[id number]]                                                          Extra Purple Top Tube[[id number]]                                                       Extra Heparinized Syringe[[id number]]                                                     Please view results for these tests on the individual orders.   Glucose by meter   Result Value Ref Range    GLUCOSE BY METER POCT 320 (H) 70 - 99 mg/dL   Glucose by meter   Result Value Ref Range    GLUCOSE BY METER POCT 334 (H) 70 - 99 mg/dL   Magnesium   Result Value Ref Range    Magnesium 1.8 1.7 - 2.3 mg/dL   Basic metabolic panel   Result Value Ref Range    Sodium 138 136 - 145 mmol/L    Potassium 4.6 3.4 - 5.3 mmol/L    Chloride 106 98 - 107 mmol/L    Carbon Dioxide (CO2) 18 (L) 22 - 29 mmol/L    Anion Gap 14 7 - 15 mmol/L    Urea Nitrogen 47.0 (H) 8.0 - 23.0 mg/dL    Creatinine 2.55 (H) 0.67 - 1.17 mg/dL    Calcium 8.3 (L) 8.8 - 10.2 mg/dL    Glucose 363 (H) 70 - 99 mg/dL    GFR Estimate 24 (L) >60 mL/min/1.73m2   CBC with platelets   Result Value Ref Range    WBC Count 10.7 4.0 - 11.0 10e3/uL    RBC Count 3.90 (L) 4.40 - 5.90 10e6/uL    Hemoglobin 12.0 (L) 13.3 - 17.7 g/dL    Hematocrit 37.9 (L) 40.0 - 53.0 %    MCV 97 78 - 100 fL    MCH 30.8 26.5 - 33.0 pg    MCHC 31.7 31.5 - 36.5 g/dL    RDW 13.9 10.0 - 15.0 %    Platelet Count 210 150 - 450 10e3/uL   CRP inflammation   Result Value Ref Range    CRP Inflammation 14.90 (H) <5.00 mg/L   Erythrocyte sedimentation rate auto   Result Value Ref Range    Erythrocyte Sedimentation Rate 28 (H) 0 - 20 mm/hr   Hepatic panel   Result Value Ref Range    Protein Total 6.6 6.4 - 8.3 g/dL    Albumin 3.2 (L) 3.5 - 5.2 g/dL    Bilirubin Total 0.5 <=1.2 mg/dL    Alkaline Phosphatase 143 (H) 40 - 129 U/L    AST 25 10 - 50 U/L    ALT 32 10 - 50 U/L    Bilirubin Direct <0.20 0.00 - 0.30 mg/dL   Troponin T, High Sensitivity   Result Value Ref Range    Troponin T, High Sensitivity 62 (H) <=22 ng/L   Echocardiogram Limited   Result  Value Ref Range    LVEF  60-65%     [facility]    [id number]  [id number]  [id number]  [id number]^DANNY^STEW     [facility]  Echocardiography Laboratory  [address]  [place] [id number]     Name: CHIKIS NEWBERRY  MRN: [id number]  : 1940  Study Date: 2022 08:11 AM  Age: 82 yrs  Gender: Male  Patient Location: [facility]  Reason For Study: Pericarditis, Other, Please Specify in Comments  Ordering Physician: STEW DELGADO  Referring Physician: Katherin [facility]  Performed By: Tiffanie Pierson RDCS     BSA: 1.8 m2  Height: 67 in  Weight: 143 lb  HR: 103  BP: 186/91 mmHg  ______________________________________________________________________________  Procedure  Limited Portable Echo Adult. Optison (NDC #8151-0963) given intravenously.  ______________________________________________________________________________  Interpretation Summary     There is moderate concentric left ventricular hypertrophy.  The visual ejection fraction is 60-65%.  pleural effusion  The study was technically difficult.  ______________________________________________________________________________  Left Ventricle  The left ventricle is normal in size. There is moderate concentric left  ventricular hypertrophy. Left ventricular systolic function is normal. The  visual ejection fraction is 60-65%. Grade I or early diastolic dysfunction. No  regional wall motion abnormalities noted.     Right Ventricle  The right ventricle is normal in structure, function and size.     Atria  Normal left atrial size. Right atrial size is normal. There is no color  Doppler evidence of an atrial shunt.     Mitral Valve  The mitral valve is normal in structure and function. There is no mitral  regurgitation noted.     Tricuspid Valve  The tricuspid valve is normal in structure and function. There is trace to  mild tricuspid regurgitation. The right ventricular systolic pressure is  approximated at 57.0 mmHg plus the right atrial pressure.  Severe (>55mmHg)  pulmonary hypertension is present.     Aortic Valve  There is mild trileaflet aortic sclerosis. There is trace to mild aortic  regurgitation.     Pulmonic Valve  The pulmonic valve is not well visualized.     Pericardium  There is no pericardial effusion. pleural effusion.     Rhythm  Sinus rhythm was noted.     ______________________________________________________________________________  MMode/2D Measurements & Calculations  TAPSE: 2.2 cm     Doppler Measurements & Calculations  TR max dagoberto: 377.3 cm/sec  TR max P.0 mmHg     ______________________________________________________________________________  Report approved by: Iain Salamanca 2022 10:58 AM         Glucose by meter   Result Value Ref Range    GLUCOSE BY METER POCT 259 (H) 70 - 99 mg/dL       Lab Results   Component Value Date    CHOL 163 2020     Lab Results   Component Value Date    HDL 58 2020     Lab Results   Component Value Date    LDL 90 2020     Lab Results   Component Value Date    TRIG 74 2020     No results found for: CHOLERIN  TSH   Date Value Ref Range Status   2021 0.75 0.40 - 4.00 mU/L Final   2020 1.66 0.40 - 4.00 mU/L Final

## 2022-11-14 NOTE — PROGRESS NOTES
[facility]  Palliative Care Progress Note  Text Page     Assessment & Plan   Eh Callahan is a 82 year old male who was admitted on 11/10/2022.   Consulted by Dr. Santos to assist with goals of care, and development of plan of care      Recommendations:  1. Goals of Care- Full Code-restorative cares  Hospitalization goals discussed discussed concerns for increased lethargy and unable to eat and drink. At this time family is still on board with full code and restorative cares. Family conference setup for 0900 on 11/15 to continue with goals of care conversations  Decisional Capacity- Unreliable. Patient does not have an advance directive. Per  informed consent policy next of kin should be involved if patient becomes unable.  -Patient has 13 adult children, a few live out of the country but most of them live in [place]. All are involved in goals discusssions  POLST consider completing should goals of care change.      2. Pain, patient complains of headaches at times   -Agree with tylenol as needed for headaches     3. Dysphagia  -Discussed concerns with secretions and swallowing  -Appreciate the input of SLP for on going recommendations     4. Spiritual Care  Oriented to Spiritual Health as part of Palliative Care team. Spiritual Health Services declined at this time.  Spiritual Background: [employer]     5. Care Planning  Appreciate Care of SW/Care coordinator.    Medical Decision Making and Goals of Care:  Discussed on November 14, 2022 with Jazmyn Rivera NP: Met with son Vin in patient's room. Patient is unable to participate in conversation due to lethargy. Discussed and reviewed goals. Plan is to folloow up with all the children at 0900 to talk through next steps. Questions asked and answered as able. Medical team updated.    Jazmyn Rivera NP  Pain Management and Palliative Care  [facility]  Pgr: [phone]    Time Spent on this Encounter   Total unit/floor  time 19 minutes, time consisted of the following, examination of the patient, reviewing the record and completing documentation. >50% of time spent in counseling and coordination of care, Bedside Nurse Leonora and Hospitalist Dr Cage.  Time spend counseling with family consisted of the following topics, goals of care, education about diagnosis, education about prognosis, care planning for discharge and symptom management.         Review of Systems   Unable to assess due to confusion    Physical Exam   Temp:  [97.3  F (36.3  C)-99.6  F (37.6  C)] 97.7  F (36.5  C)  Pulse:  [] 102  Resp:  [18-24] 20  BP: (140-213)/() 196/87  SpO2:  [96 %-100 %] 99 %  143 lbs 1.6 oz  Exam:  GENERAL APPEARANCE:  lethargic and moaning  RESP:  no respiratory distress  CV:  Palpation and auscultation of heart done , regular rate and rhythm, no murmur, rub, or gallop  ABDOMEN:  normal bowel sounds, soft, nontender, no hepatosplenomegaly or other masses  PSYCH:  lethargic, restless at times    Medications       acetylcysteine  4 mL Nebulization Q4H     aspirin  81 mg Oral Daily     atorvastatin  40 mg Oral QPM     ceFEPIme  2 g Intravenous Q24H     famotidine  10 mg Oral BID     hydrALAZINE  50 mg Oral TID     insulin aspart  1-7 Units Subcutaneous TID AC     insulin aspart  1-5 Units Subcutaneous At Bedtime     insulin glargine  15 Units Subcutaneous QAM AC     ipratropium - albuterol 0.5 mg/2.5 mg/3 mL  3 mL Nebulization Q4H     isosorbide dinitrate  20 mg Oral TID     melatonin  5 mg Oral Daily with supper     metoprolol tartrate  50 mg Oral BID     multivitamin, therapeutic  1 tablet Oral Daily     risperiDONE  0.25 mg Oral Daily     sodium bicarbonate  1,300 mg Oral BID     sodium chloride (PF)  3 mL Intracatheter Q8H     traZODone  75 mg Oral At Bedtime     vancomycin  500 mg Intravenous Q24H     Vitamin D3  25 mcg Oral Daily       Data   Results for orders placed or performed during the hospital encounter of 11/10/22 (from  the past 24 hour(s))   Glucose by meter   Result Value Ref Range    GLUCOSE BY METER POCT 259 (H) 70 - 99 mg/dL   Respiratory Panel PCR    Specimen: Nasopharyngeal; Swab   Result Value Ref Range    Adenovirus Not Detected Not Detected    Coronavirus Not Detected Not Detected    Human Metapneumovirus Not Detected Not Detected    Human Rhin/Enterovirus Not Detected Not Detected    Influenza A Not Detected Not Detected    Influenza A, H1 Not Detected Not Detected    Influenza A 2009 H1N1 Not Detected Not Detected    Influenza A, H3 Not Detected Not Detected    Influenza B Not Detected Not Detected    Parainfluenza Virus 1 Not Detected Not Detected    Parainfluenza Virus 2 Not Detected Not Detected    Parainfluenza Virus 3 Not Detected Not Detected    Parainfluenza Virus 4 Not Detected Not Detected    Respiratory Syncytial Virus A Not Detected Not Detected    Respiratory Syncytial Virus B Not Detected Not Detected    Chlamydia Pneumoniae Not Detected Not Detected    Mycoplasma Pneumoniae Not Detected Not Detected    Narrative    The [employer] Respiratory Panel is a qualitative nucleic acid, multiplex, in vitro diagnostic test for the simultaneous detection and identification of multiple respiratory viral and bacterial nucleic acids in nasopharyngeal swabs collected in viral transport media from individual exhibiting signs and symptoms of respiratory infection. The assay has received FDA approval for the testing of nasopharyngeal (NP) swabs only. The Infect[facility] at [facility] has validated the performance characteristics for bronchial alveolar lavage specimens. This test is used for clinical purposes and should not be regarded as investigational or for research. This laboratory is certified under the Clinical Laboratory Improvement Amendments of 1988 (CLIA-88) as qualified to perform high complexity clinical laboratory testing.    Symptomatic; Yes; 11/11/2022 Influenza A/B & SARS-CoV2  (COVID-19) Virus PCR Multiplex Nasopharyngeal    Specimen: Nasopharyngeal; Swab   Result Value Ref Range    Influenza A PCR Negative Negative    Influenza B PCR Negative Negative    RSV PCR Negative Negative    SARS CoV2 PCR Negative Negative    Narrative    Testing was performed using the Xpert Xpress CoV2/Flu/RSV Assay on the [employer] GeneXpert Instrument. This test should be ordered for the detection of SARS-CoV-2 and influenza viruses in individuals who meet clinical and/or epidemiological criteria. Test performance is unknown in asymptomatic patients. This test is for in vitro diagnostic use under the FDA EUA for laboratories certified under CLIA to perform high or moderate complexity testing. This test has not been FDA cleared or approved. A negative result does not rule out the presence of PCR inhibitors in the specimen or target RNA in concentration below the limit of detection for the assay. If only one viral target is positive but coinfection with multiple targets is suspected, the sample should be re-tested with another FDA cleared, approved, or authorized test, if coinfection would change clinical management. This test was validated by the [facility]. These laboratories are certified under the Clinical Laboratory Improvement Amendments of 1988 (CLIA-88) as qualified to perform high complexity laboratory testing.   Troponin T, High Sensitivity   Result Value Ref Range    Troponin T, High Sensitivity 60 (H) <=22 ng/L   Lactate Dehydrogenase   Result Value Ref Range    Lactate Dehydrogenase 217 0 - 250 U/L   Protein total   Result Value Ref Range    Protein Total 6.1 (L) 6.4 - 8.3 g/dL   Lactic Acid STAT   Result Value Ref Range    Lactic Acid 1.3 0.7 - 2.0 mmol/L   Glucose by meter   Result Value Ref Range    GLUCOSE BY METER POCT 304 (H) 70 - 99 mg/dL   Glucose by meter   Result Value Ref Range    GLUCOSE BY METER POCT 288 (H) 70 - 99 mg/dL   Glucose by meter   Result Value Ref Range     GLUCOSE BY METER POCT 242 (H) 70 - 99 mg/dL   Glucose by meter   Result Value Ref Range    GLUCOSE BY METER POCT 317 (H) 70 - 99 mg/dL

## 2022-11-14 NOTE — PROGRESS NOTES
Care Management Follow Up    Length of Stay (days): 4    Expected Discharge Date: 11/15/2022     Concerns to be Addressed: care coordination/care conferences, discharge planning, decision making     Patient plan of care discussed at interdisciplinary rounds: Yes    Anticipated Discharge Disposition: Other (Comments) (TBD)     Anticipated Discharge Services: None  Anticipated Discharge DME: None    Patient/family educated on Medicare website which has current facility and service quality ratings: no  Patient/Family in Agreement with the Plan: yes    Referrals Placed by CM/SW: Homecare  Private pay costs discussed: Not applicable    Additional Information:  Care management following for plan of care and discharge plan.  Verified that patient receiving home care RN services prior to admission through [facility]. Anticipate recommending adding home care Speech Therapy upon discharge.   Care Management will continue to follow therapy and other discipline recommendations if additional needs arise.      Jazmyn Pollard, RN      Jazmyn Pollard RN Case Manager  Inpatient Care Coordination  [facility]   [phone]

## 2022-11-14 NOTE — PLAN OF CARE
Pt oriented to self. Minimal english; daughter in room to translate. 1.5 L O2 but patient frequently removes. HTN in 180's; hydralazine given with effect. BG elevated over night @ 242. PAINAD score of 2 but unsure if this could be related to difficulty breathing. Bedrest. Incont x2. Regular diet with thin liquids. Continues to try and remove IV despite no-no in place.    Goal Outcome Evaluation:      Plan of Care Reviewed With: patient, family    Overall Patient Progress: no change    Outcome Evaluation: No d/c plans at this time

## 2022-11-14 NOTE — PLAN OF CARE
Patient alert to self, follows commands inconsistently. Speaks some english. Refused to take most PO medications this evening, attempted to give multiple times. BP remains elevated. Tele=SR w/ ST depression. Remains on 1.5 L O2 via  NC. Coughing intermittently, wheezing. Replacing BG with sliding scale insulin. Lost IV access, new line placed. Incontinent. Poor intake. Family at bedside. Discharge plan TBD. Will continue to monitor and follow plan of care.    BP (!) 180/82   Pulse 89   Temp 97.3  F (36.3  C) (Oral)   Resp 20   Wt 64.9 kg (143 lb 1.6 oz)   SpO2 97%   BMI 22.41 kg/m      Lisa Webster RN on 11/13/2022 at 10:43 PM

## 2022-11-14 NOTE — PLAN OF CARE
BP (!) 194/104 (BP Location: Left arm)   Pulse 104   Temp 98  F (36.7  C) (Axillary)   Resp 18   Wt 64.9 kg (143 lb 1.6 oz)   SpO2 100%   BMI 22.41 kg/m    Neuro: alert to self  Cardiac: tele- SR  Lungs: NC 1.5L, congested weak cough  GI: incontinent   : incontinent   Pain: sleeping most of shift  IV: saline locked  Meds: maxipime, hydralazine, vanco  Labs/tests: lactic 1.3, 1.6  Diet: reg  Activity: assist x2/lift  Misc: meds crushed in applesauce. Thoracentesis today- R side, 750 mL out.  Plan: Will continue to monitor and provide cares.

## 2022-11-14 NOTE — PROGRESS NOTES
Patient tolerated Thoracentesis of right side well.  750 mL of laura fluid drained done by Dr. Hall. Dressing applied with no drainage.  No albumin given. Consent was obtained by son who was at bedside during procedure. PT left unit in stable condition, report called to floor nurse.

## 2022-11-14 NOTE — PROGRESS NOTES
[facility]  Medicine Progress Note - Hospitalist Service  Date of Admission:  11/10/2022    Assessment & Plan   Mr. Eh Callahan is a 82 year old male with a past medical history of dementia, CKD3, HTN, PTSD, afib and anemia admitted on 11/10/2022 with worsening weakness and poor PO intake. Pending further work up and clinical improvement.  Had a recent admission for the same from 10/12-10/22.  Patient was seen by SLP 11/12 and was cleared for diet, his delirium continues to wax and wane, he has had ongoing hypoxia requiring 2 L oxygen, episode of chest pain, cardiology consult pending. Pending ongoing clinical improvement.     Acute hypoxic respiratory failure   Acute metabolic encephalopathy, resolving   Concern for possible septic encephalopathy vs delirium vsprogression of dementia vs possible aspiration   Concern for pneumonia vs atelectatsis vs pneumonitis   Pleural effusions   Patient presented with acute worsening of his weakness, inability to tolerate p.o. intake and worsening mental status per family's report.  He had a recent hospitalization on 10/12-10/22 was discharged to home at that time.  On current admission, CT head without any acute findings to explain change in mental status.  Chest x-ray with questionable pneumonia versus atelectasis versus pneumonitis.  It has been having a hard time managing his secretions, concern for potential aspiration events resulting in xray findings. Differential for worsening mental status includes septic encephalopathy vs progression of dementia vs possible aspiration. SLP cleared for soft diet with supervision and slowly. CXR 11/12/22 with worsened infiltrates and worsening oxygenation requiring 3 L oxygen. Oxygen requirements down to 2 L but with congested lung sounds.   - thoracentesis for pleural effusions ordered 11/13/22.  Patient scheduled for thoracentesis today.  -Effusions are small on xray but also noted on TTE, will see if drainable  per IR   -Viral panel pending   -Transitioned to vanc and cefepime given repeat CXR 11/12 with worsened infiltrates and patient now requiring supplemental oxygen 3 L in setting of recent hospitalization   - sputum culture if able to obtain   - SLP evaluation completed 11/12/22               -Regular texture diet and thin liquids    -Staff/family self selecting softer food options    -Small bites/sips when upright and alert     -Do not feed when reclined or too lethargic   -SLP will follow for a short treatment course for diet tolerance and education  - incentive spirometry, aerobika if able   -Patient remains full code.  Discussed with patient's son at bedside.  Seen by palliative care today and family conference planned for tomorrow  to discuss goals of care.    Hospital associated delirium , improving   History of Alzheimer's dementia , stable   -Patient looks lethargic today.  He is sleepy  - start risperidone 0.25 mg daily   - schedule melatonin 5 mg with supper   - haldol PRN agitation     Concern for potential pericarditis , resolving   Troponin elevation, stable   Elevated ESR and CRP , stable   -Unable to obtain history from the patient due to his underlying dementia.  -RRT evening of 11/12-13 for chest pain/discomfort. Attempted a steroid trial, for possible pericarditis, minimal change noted, although ESR and CRP remain elevated.   -cardiology consulted and evaluated patient for possible pericarditis.  - repeat TTE performed with    EF 60-65%, moderate ventricular hypertrophy and pleural effusion , no evidence of pericarditis, repeat EKG without evidence of pericarditis   -No steroids for pericarditis per cardiology.     CKD stage II vs TRANG on CKD , creatinine worsening, suspect pre-renal in setting of decreased PO intake the past few days   Hyperkalemia- resolved, hyponatremia , hypomagnesemia   -Received IV fluid on 11/13.  -PTA sodium bicarb   -Creatinine trending up but close to baseline.  We will  continue to monitor.  We will consult nephrology if creatinine continues to rise.    Elevated alkaline phosphatase , improving   Normocytic anemia, chronic , improving - repeat CBC in AM   Hyperglycemia, DM2- MDSSI while inpatient, increased glargine to 15 unit(s) (which remains a decreased dose from PTA dose of 18 unit(s))will monitor and titrate as needed   Atrial fibrillation - cardiac monitoring , PTA metoprolol -with hold parameters   Hypertension -continue hydralazine, Isordil , PRN IV hydralazine for SBP > 180   HLD -PTA atorvastatin, ASA  History of hepatitis C  PTSD -PTA trazodone       Diet: Combination Diet Regular Diet; Thin Liquids (level 0) (No straws)    DVT Prophylaxis: Pneumatic Compression Devices  Liu Catheter: Not present  Central Lines: None  Cardiac Monitoring: ACTIVE order. Indication: failture to thrive, ensure no arrythmia  Code Status: Full Code      Disposition Plan    Continue inpatient care.  Palliative care planning family conference tomorrow.      The patient's care was discussed with the Bedside Nurse, Patient and Patient's Family.    Amairani Cage MD, MD  Hospitalist Service  [facility]  Securely message with the Vocera Web Console (learn more here)  Text page via [employer] Paging/Directory         # Hypocalcemia: Lowest Ca = 8.3 mg/dL (Ref range: 8.5 - 10.1 mg/dL) in last 2 days, will monitor and replace as appropriate     # Hypoalbuminemia: Lowest albumin = 2.5 g/dL (Ref range: 3.5-5.2) at 11/11/2022  7:00 AM, will monitor as appropriate                 ______________________________________________________________________    Interval History   Patient seen and examined. Patient is not able to provide any history. Patient's son at bedside. Discussed goals of care and his son confirmed that he is full code.    Data reviewed today: I reviewed all medications, new labs and imaging results over the last 24 hours. I personally reviewed no images or EKG's  today.    Physical Exam   Vital Signs: Temp: 97.8  F (36.6  C) Temp src: Oral BP: (!) 173/74 Pulse: 93   Resp: 22 SpO2: 100 % O2 Device: Nasal cannula Oxygen Delivery: 1.5 LPM  Weight: 143 lbs 1.6 oz     Constitutional: awake, no apparent distress, and appears stated age, resting comfortably   HEENT: Normocephalic, atraumatic. Bilateral cataracts.   Respiratory: Normal work of breathing, fair air exchange, coarse breath sounds bilaterally, no wheezing appreciated, congested breath sounds   Cardiovascular: Regular rate and rhythm, no murmurs appreciated   GI: Soft and nontender to palpation, nondistended, no rebound or guarding  Skin: normal skin color, texture, turgor  Musculoskeletal: No deformities, no edema present  Neurologic: Alert, only oriented to self, responds to simple questions appropriately, moves all 4 extremities, no focal deficits   Neuropsychiatric: General: normal, calm and normal eye contact      Data   Recent Labs   Lab 11/14/22  0749 11/14/22  0255 11/13/22  2141 11/13/22  1820 11/13/22  1539 11/13/22  1302 11/13/22  0735 11/12/22  1001 11/12/22  0811 11/12/22  0803 11/11/22  0923 11/11/22  0700   WBC  --   --   --   --   --   --  10.7  --   --  9.4  --  8.1   HGB  --   --   --   --   --   --  12.0*  --   --  11.3*  --  12.5*   MCV  --   --   --   --   --   --  97  --   --  98  --  99   PLT  --   --   --   --   --   --  210  --   --  184  --  151   NA  --   --   --   --   --   --  138  --  138  --   --  133*   POTASSIUM  --   --   --   --   --   --  4.6  --  4.9  --   --  4.5   CHLORIDE  --   --   --   --   --   --  106  --  107  --   --  103   CO2  --   --   --   --   --   --  18*  --  17*  --   --  15*   BUN  --   --   --   --   --   --  47.0*  --  47.2*  --   --  45.5*   CR  --   --   --   --   --   --  2.55*  --  2.38*  --   --  2.22*   ANIONGAP  --   --   --   --   --   --  14  --  14  --   --  15   DOMINGO  --   --   --   --   --   --  8.3*  --  8.0*  --   --  7.8*   * 242* 288*   < >   --    < > 363*   < > 334*  --    < > 318*   ALBUMIN  --   --   --   --   --   --  3.2*  --   --   --   --  2.5*   PROTTOTAL  --   --   --   --  6.1*  --  6.6  --   --   --   --  6.1*   BILITOTAL  --   --   --   --   --   --  0.5  --   --   --   --  0.9   ALKPHOS  --   --   --   --   --   --  143*  --   --   --   --  150*   ALT  --   --   --   --   --   --  32  --   --   --   --  32   AST  --   --   --   --   --   --  25  --   --   --   --  29    < > = values in this interval not displayed.     Recent Results (from the past 24 hour(s))   US Thoracentesis Portable    Narrative    History: Patient with history of  right pleural effusion,  here for  right thoracentesis.    Procedure: Right ultrasound guided thoracentesis.    Radiologist: Dr. Reilly March     Medications: 10 mL 1% lidocaine with 8 .4% sodium bicarbonate local  anesthesia.    Nursing: Throughout the procedure the patient's vital signs and oxygen  saturations were continuously monitored and remained stable.    Fluoroscopy time: None.    IV contrast: None.    Complications: None.    Consent: Informed written and verbal consent was obtained.    Procedure/Findings:  Ultrasound evaluation revealed a moderate size   fluid collection in the right pleural space. The area in the lateral  chest was prepped and draped in usual sterile fashion.  The area  overlying was anesthetized with 10 ml 1% lidocaine. Under ultrasound  guidance a 5 [place], 7 cm GeoMetWatcheh needle/catheter  was advanced into the  fluid collection, with return of clear yellow fluid. The catheter was  advanced off the needle into the pleural space and the needle was  removed. Extension tubing was then connected to the catheter and  vacuum drainage. 750 cc' s of clear, straw color fluid was aspirated.   Repeat ultrasound revealed minimal fluid remaining.  The catheter was  removed with the patient performing a Valsalva maneuver.  The site was  cleansed and dressed.  The procedure was well tolerated.        Impression    Impression: Successful right thoracentesis.    Plan: Patient discharged to patient care unit in stable condition.   Interventional radiology post procedure standing orders for  thoracentesis.        GEOFFREY BARROS MD         SYSTEM ID:  [id number]     Medications       acetylcysteine  4 mL Nebulization Q4H     aspirin  81 mg Oral Daily     atorvastatin  40 mg Oral QPM     ceFEPIme  2 g Intravenous Q24H     famotidine  10 mg Oral BID     hydrALAZINE  50 mg Oral TID     insulin aspart  1-7 Units Subcutaneous TID AC     insulin aspart  1-5 Units Subcutaneous At Bedtime     insulin glargine  15 Units Subcutaneous QAM AC     ipratropium - albuterol 0.5 mg/2.5 mg/3 mL  3 mL Nebulization Q4H     isosorbide dinitrate  20 mg Oral TID     melatonin  5 mg Oral Daily with supper     metoprolol tartrate  50 mg Oral BID     multivitamin, therapeutic  1 tablet Oral Daily     risperiDONE  0.25 mg Oral Daily     sodium bicarbonate  1,300 mg Oral BID     sodium chloride (PF)  3 mL Intracatheter Q8H     traZODone  75 mg Oral At Bedtime     vancomycin  500 mg Intravenous Q24H     Vitamin D3  25 mcg Oral Daily

## 2022-11-15 NOTE — PROGRESS NOTES
[facility]  Medicine Progress Note - Hospitalist Service  Date of Admission:  11/10/2022    Assessment & Plan   Mr. Eh Callahan is a 82 year old male with a past medical history of dementia, CKD3, HTN, PTSD, afib and anemia admitted on 11/10/2022 with worsening weakness and poor PO intake. Pending further work up and clinical improvement.  Had a recent admission for the same from 10/12-10/22.  Patient was seen by SLP 11/12 and was cleared for diet, his delirium continues to wax and wane, he has had ongoing hypoxia requiring 2 L oxygen, episode of chest pain, cardiology consult pending. Pending ongoing clinical improvement.     11/15: Had extensive discussion with the family over the phone and at bedside.  Discussed with him about overall goals of care.  Plan is to continue restorative care.  Patient is full code    Acute hypoxic respiratory failure   Acute metabolic encephalopathy, resolving   Concern for possible septic encephalopathy vs delirium vsprogression of dementia vs possible aspiration   Concern for pneumonia vs atelectatsis vs pneumonitis   Pleural effusions   -Patient presented with acute worsening of his weakness, inability to tolerate p.o. intake and worsening mental status per family's report.  He had a recent hospitalization on 10/12-10/22 was discharged to home at that time.    -On current admission, CT head without any acute findings to explain change in mental status.    -Chest x-ray with questionable pneumonia versus atelectasis versus pneumonitis.  It has been having a hard time managing his secretions, concern for potential aspiration events resulting in xray findings.   -Differential for worsening mental status includes septic encephalopathy vs progression of dementia vs possible aspiration. SLP cleared for soft diet with supervision and slowly.   -CXR 11/12/22 with worsened infiltrates and worsening oxygenation requiring 3 L oxygen. Oxygen requirements down to 2 L but  with congested lung sounds.   -Thoracentesis done on 11/14 for pleural effusions.  Pleural fluid showed predominant lymphocytes, not consistent with infection.  -He was transitioned to vanc and cefepime given repeat CXR 11/12 with worsened infiltrates and patient now requiring supplemental oxygen 3 L in setting of recent hospitalization   -Vancomycin discontinued because of rising creatinine and negative cultures.  - SLP evaluation completed 11/12/22               -Regular texture diet and thin liquids    -Staff/family self selecting softer food options    -Small bites/sips when upright and alert     -Do not feed when reclined or too lethargic   -SLP will follow for a short treatment course for diet tolerance and education  - incentive spirometry, aerobika if able   -Patient remains full code.  Discussed with multiple family members at bedside and over the phone.  At this time family wants to continue restorative care.  Patient remains full code.    Hospital associated delirium , improving   History of Alzheimer's dementia , stable   -Patient looks lethargic and not taking much by mouth.  Currently he is not agitated.  -On risperidone 0.25 mg daily   -schedule melatonin 5 mg with supper   -haldol PRN agitation     Concern for potential pericarditis , resolving   Troponin elevation, stable   Elevated ESR and CRP , stable   -Unable to obtain history from the patient due to his underlying dementia and encephalopathy.  -RRT evening of 11/12-13 for chest pain/discomfort. Attempted a steroid trial, for possible pericarditis, minimal change noted, although ESR and CRP remain elevated.   -cardiology consulted and evaluated patient for possible pericarditis.  - repeat TTE performed with    EF 60-65%, moderate ventricular hypertrophy and pleural effusion , no evidence of pericarditis, repeat EKG without evidence of pericarditis   -No steroids for pericarditis per cardiology.     CKD stage II vs TRANG on CKD , creatinine  worsening, suspect pre-renal in setting of decreased PO intake the past few days   Hyperkalemia- resolved, hyponatremia , hypomagnesemia   -Received IV fluid on 11/13.  -PTA sodium bicarb   -Creatinine trending up but close to baseline.   -We will give him gentle hydration today because of his decreased oral intake.    Elevated alkaline phosphatase , improving     Normocytic anemia, chronic , improving - repeat CBC in AM     Hyperglycemia, DM2-currently on glargine insulin 50 units every morning, medium resistance insulin sliding scale.  Will monitor blood sugar and adjust insulin dose as needed.    Atrial fibrillation - cardiac monitoring.  Changed metoprolol to IV today.    Hypertension -continue hydralazine, Isordil , PRN IV hydralazine for SBP > 180     Hyperlipidemia-PTA atorvastatin, ASA    History of hepatitis C    PTSD -PTA trazodone       Diet: Combination Diet Regular Diet; Thin Liquids (level 0) (No straws)    DVT Prophylaxis: Pneumatic Compression Devices  Liu Catheter: Not present  Central Lines: None  Cardiac Monitoring: ACTIVE order. Indication: failture to thrive, ensure no arrythmia  Code Status: Full Code      Disposition Plan   Unclear at this time.  Patient more lethargic today.  Talked to the family at bedside and over the phone.  We will continue current care and monitor    The patient's care was discussed with the Bedside Nurse, Patient and Patient's Family.    Amairani Cage MD, MD  Hospitalist Service  [facility]  Securely message with the Vocera Web Console (learn more here)  Text page via [employer] Paging/Directory             # Hypoalbuminemia: Lowest albumin = 2.5 g/dL (Ref range: 3.5-5.2) at 11/11/2022  7:00 AM, will monitor as appropriate                 ______________________________________________________________________    Interval History   Patient seen and examined. Patient is not able to provide any history. Patient's son at bedside. Discussed goals  of care and his son confirmed that he is full code.    Data reviewed today: I reviewed all medications, new labs and imaging results over the last 24 hours. I personally reviewed no images or EKG's today.    Physical Exam   Vital Signs: Temp: 99.3  F (37.4  C) Temp src: Axillary BP: (!) 205/96 Pulse: 107   Resp: 24 SpO2: 91 % O2 Device: Oxymask Oxygen Delivery: 2 LPM  Weight: 143 lbs 1.6 oz     Constitutional: awake, no apparent distress, and appears stated age, resting comfortably   HEENT: Normocephalic, atraumatic. Bilateral cataracts.   Respiratory: Normal work of breathing, fair air exchange, coarse breath sounds bilaterally, no wheezing appreciated, congested breath sounds   Cardiovascular: Regular rate and rhythm, no murmurs appreciated   GI: Soft and nontender to palpation, nondistended, no rebound or guarding  Skin: normal skin color, texture, turgor  Musculoskeletal: No deformities, no edema present  Neurologic: Alert, only oriented to self, responds to simple questions appropriately, moves all 4 extremities, no focal deficits   Neuropsychiatric: General: normal, calm and normal eye contact      Data   Recent Labs   Lab 11/15/22  0746 11/15/22  0634 11/15/22  0211 11/14/22  1723 11/14/22  1502 11/14/22  1246 11/14/22  1208 11/13/22  1820 11/13/22  1539 11/13/22  1302 11/13/22  0735 11/11/22  0923 11/11/22  0700   WBC  --  7.4  --   --  7.3  --   --   --   --   --  10.7   < > 8.1   HGB  --  11.7*  --   --  11.4*  --   --   --   --   --  12.0*   < > 12.5*   MCV  --  99  --   --  100  --   --   --   --   --  97   < > 99   PLT  --  150  --   --  151  --   --   --   --   --  210   < > 151   NA  --  142  --   --   --   --  140  --   --   --  138   < > 133*   POTASSIUM  --  4.0  --   --   --   --  4.8  --   --   --  4.6   < > 4.5   CHLORIDE  --  110*  --   --   --   --  108*  --   --   --  106   < > 103   CO2  --  19*  --   --   --   --  20*  --   --   --  18*   < > 15*   BUN  --  45.1*  --   --   --   --  49.5*   --   --   --  47.0*   < > 45.5*   CR  --  2.66*  --   --   --   --  2.69*  --   --   --  2.55*   < > 2.22*   ANIONGAP  --  13  --   --   --   --  12  --   --   --  14   < > 15   DOMINGO  --  8.5*  --   --   --   --  8.2*  --   --   --  8.3*   < > 7.8*   * 238* 214*   < >  --    < > 351*  351*   < >  --    < > 363*   < > 318*   ALBUMIN  --   --   --   --   --   --   --   --   --   --  3.2*  --  2.5*   PROTTOTAL  --   --   --   --   --   --   --   --  6.1*  --  6.6  --  6.1*   BILITOTAL  --   --   --   --   --   --   --   --   --   --  0.5  --  0.9   ALKPHOS  --   --   --   --   --   --   --   --   --   --  143*  --  150*   ALT  --   --   --   --   --   --   --   --   --   --  32  --  32   AST  --   --   --   --   --   --   --   --   --   --  25  --  29    < > = values in this interval not displayed.     No results found for this or any previous visit (from the past 24 hour(s)).  Medications       acetylcysteine  4 mL Nebulization Q4H     aspirin  81 mg Oral Daily     atorvastatin  40 mg Oral QPM     ceFEPIme  2 g Intravenous Q24H     famotidine  10 mg Oral BID     hydrALAZINE  50 mg Oral TID     insulin aspart  1-7 Units Subcutaneous TID AC     insulin aspart  1-5 Units Subcutaneous At Bedtime     insulin glargine  15 Units Subcutaneous QAM AC     ipratropium - albuterol 0.5 mg/2.5 mg/3 mL  3 mL Nebulization Q4H     isosorbide dinitrate  20 mg Oral TID     melatonin  5 mg Oral Daily with supper     metoprolol  5 mg Intravenous Q6H     [Held by provider] metoprolol tartrate  50 mg Oral BID     multivitamin, therapeutic  1 tablet Oral Daily     risperiDONE  0.25 mg Oral Daily     sodium bicarbonate  1,300 mg Oral BID     sodium chloride (PF)  3 mL Intracatheter Q8H     traZODone  75 mg Oral At Bedtime     Vitamin D3  25 mcg Oral Daily

## 2022-11-15 NOTE — PLAN OF CARE
Goal Outcome Evaluation:  Care from 8548-2500    Vitals are Temp: 98  F (36.7  C) Temp src: Axillary BP: (!) 186/78 Pulse: 104   Resp: 18 SpO2: 98 %.    Unable to assess orientation d/t not speaking- appears disoriented x4. High blood pressures this shift. Given prn hydralazine and prn metropolol ordered. Pt refusing to open mouth to swallow pills. NOC hospitalist notified. Pt son in room with pt. O2 96%. Lung sound diminished. Tele monitoring. ST with ST depression. Continuing to monitor and provide cares.

## 2022-11-15 NOTE — PROGRESS NOTES
Paged Dr. Cage about pt elevated BP and BP medications that I have already given today. Ask him if he would like me to give the PRN metropolol right now or wait until 3:30pm to give the next dose of hydralazine.

## 2022-11-15 NOTE — PROGRESS NOTES
[facility]  Patient is currently open to home care services with [facility]. The patient is currently receiving SN services.  [facility]  and team have been notified of patient admission.  [facility] liaison will continue to follow patient during stay.  If appropriate provide orders to resume home care at time of discharge.    [place] that pt's PCP will not provide ongoing home care orders, therefore home care cannot continue to see the pt until he is seen in the clinic.

## 2022-11-15 NOTE — PLAN OF CARE
Goal Outcome Evaluation:     Disoriented X4. Confused/flat affect.  VSS elevated BP:182/86 and 198/89. Gave prn Metoprolol and hydralazine  19 Turn and repositioned. Incontinent of B&B. Low grade temp: 99. Oxygen 95% on 1.5L NC. Reg diet. Up with assist of 2 on mechanical lift. Continue to monitas

## 2022-11-15 NOTE — PLAN OF CARE
Pt lethargic all day. Disoriented x4. Tele ST with ST depression. BP elevated all day, PRN BP medications given and scheduled BP meds with little improvement. Page MD about elevated BP's, no response. Flagged lactic this morning, came back @1.2. On 1L oxymask for comfort. Paged MD this morning about changing medications to IV due to pt being lethargic and not wanted to take anything by mouth today.  and 185. Incontinent. Care conference with family held today. Bedrest. Continue monitoring BP and BS. Monitor pain and encourage oral intake.

## 2022-11-15 NOTE — PROGRESS NOTES
[facility]  Palliative Care Progress Note  Text Page     Assessment & Plan   Eh Callahan is a 82 year old male who was admitted on 11/10/2022.   Consulted by Dr. Santos to assist with goals of care, and development of plan of care      Recommendations:  1. Goals of Care- Full Code-restorative cares  Hospitalization goals discussed Discussed nutrition, family is concerned about him getting better without nutrition, discussed having family help with feeding as able. Discussed overall goals which are restorative. Did also discuss hospice as a care option at home if he does not improve and or the goals change. At this time, they want to see how he does.   Decisional Capacity- Unreliable. Patient does not have an advance directive. Per  informed consent policy next of kin should be involved if patient becomes unable.  -Patient has 13 adult children, a few live out of the country but most of them live in [place]. All are involved in goals discusssions  POLST consider completing should goals of care change.      2. Pain, patient complains of headaches at times   -Agree with tylenol as needed for headaches     3. Dysphagia  -Discussed concerns with secretions and swallowing  -Appreciate the input of SLP for on going recommendations  -Family would want to trial and feeding tube should he need it but also understands that if it would harm him they wouldn't want it. A further conversation will need to be had about risk vs benefit if this will be offered.     4. Spiritual Care  Oriented to Spiritual Health as part of Palliative Care team. Spiritual Health Services declined at this time.  Spiritual Background: Quaker     5. Care Planning  Appreciate Care of SW/Care coordinator.    Medical Decision Making and Goals of Care:  Discussed on November 15, 2022 with Jazmyn Rivera NP: Phone call placed to patient's children today. We discussed the concerns for progressive disease. They are concerned about  "his nutrition. We talked about the risk and potential harm. We discussed that at times in disease processes though we have medical interventions sometimes they can cause more harm than benefit. They verbalized understanding. We discussed that the hope is to have him wake more and able to swallow as able. Discussed that in the coming days if this does not improve we should continue talking about risk benefits of treatments. I did also bring up an option for hospice to allow him to die at home with family. They will continue to talk about this but are not ready for that today. They expressed that they believe in \"God\" and that HE has a plan and they are not scared of him dying and that he will be ok. Questions asked and answered. Will follow up in the coming days for support    Jazmyn Rivera NP  Pain Management and Palliative Care  [facility]  Pgr: [phone]    Time Spent on this Encounter   Total unit/floor time 29 minutes, time consisted of the following, examination of the patient, reviewing the record and completing documentation. >50% of time spent in counseling and coordination of care, Bedside Nurse Matt and Hospitalist Dr Cage.  Time spend counseling with family consisted of the following topics, goals of care, education about diagnosis, education about prognosis, care planning for discharge and symptom management.         Review of Systems   Unable to assess due to confusion    Physical Exam   Temp:  [98  F (36.7  C)-99.5  F (37.5  C)] 99.3  F (37.4  C)  Pulse:  [] 107  Resp:  [18-24] 24  BP: (165-205)/() 205/96  SpO2:  [91 %-100 %] 91 %  143 lbs 1.6 oz  Exam:  GENERAL APPEARANCE:  lethargic  RESP:  no respiratory distress  CV:  Palpation and auscultation of heart done , regular rate and rhythm, no murmur, rub, or gallop  ABDOMEN:  normal bowel sounds, soft, nontender, no hepatosplenomegaly or other masses  PSYCH:  lethargic, restless at times    Medications       " acetylcysteine  4 mL Nebulization Q4H     aspirin  81 mg Oral Daily     atorvastatin  40 mg Oral QPM     ceFEPIme  2 g Intravenous Q24H     famotidine  10 mg Oral BID     hydrALAZINE  50 mg Oral TID     insulin aspart  1-7 Units Subcutaneous TID AC     insulin aspart  1-5 Units Subcutaneous At Bedtime     insulin glargine  15 Units Subcutaneous QAM AC     ipratropium - albuterol 0.5 mg/2.5 mg/3 mL  3 mL Nebulization Q4H     isosorbide dinitrate  20 mg Oral TID     melatonin  5 mg Oral Daily with supper     metoprolol  5 mg Intravenous Q6H     [Held by provider] metoprolol tartrate  50 mg Oral BID     multivitamin, therapeutic  1 tablet Oral Daily     risperiDONE  0.25 mg Oral Daily     sodium bicarbonate  1,300 mg Oral BID     sodium chloride (PF)  3 mL Intracatheter Q8H     traZODone  75 mg Oral At Bedtime     Vitamin D3  25 mcg Oral Daily       Data   Results for orders placed or performed during the hospital encounter of 11/10/22 (from the past 24 hour(s))   CBC with platelets   Result Value Ref Range    WBC Count 7.3 4.0 - 11.0 10e3/uL    RBC Count 3.75 (L) 4.40 - 5.90 10e6/uL    Hemoglobin 11.4 (L) 13.3 - 17.7 g/dL    Hematocrit 37.3 (L) 40.0 - 53.0 %     78 - 100 fL    MCH 30.4 26.5 - 33.0 pg    MCHC 30.6 (L) 31.5 - 36.5 g/dL    RDW 14.1 10.0 - 15.0 %    Platelet Count 151 150 - 450 10e3/uL   Glucose by meter   Result Value Ref Range    GLUCOSE BY METER POCT 344 (H) 70 - 99 mg/dL   Glucose by meter   Result Value Ref Range    GLUCOSE BY METER POCT 292 (H) 70 - 99 mg/dL   Glucose by meter   Result Value Ref Range    GLUCOSE BY METER POCT 214 (H) 70 - 99 mg/dL   CBC with Platelets & Differential    Narrative    The following orders were created for panel order CBC with Platelets & Differential.  Procedure                               Abnormality         Status                     ---------                               -----------         ------                     CBC with platelets and d...[[id number]]   Abnormal            Final result                 Please view results for these tests on the individual orders.   Basic metabolic panel   Result Value Ref Range    Sodium 142 136 - 145 mmol/L    Potassium 4.0 3.4 - 5.3 mmol/L    Chloride 110 (H) 98 - 107 mmol/L    Carbon Dioxide (CO2) 19 (L) 22 - 29 mmol/L    Anion Gap 13 7 - 15 mmol/L    Urea Nitrogen 45.1 (H) 8.0 - 23.0 mg/dL    Creatinine 2.66 (H) 0.67 - 1.17 mg/dL    Calcium 8.5 (L) 8.8 - 10.2 mg/dL    Glucose 238 (H) 70 - 99 mg/dL    GFR Estimate 23 (L) >60 mL/min/1.73m2   CBC with platelets and differential   Result Value Ref Range    WBC Count 7.4 4.0 - 11.0 10e3/uL    RBC Count 3.81 (L) 4.40 - 5.90 10e6/uL    Hemoglobin 11.7 (L) 13.3 - 17.7 g/dL    Hematocrit 37.6 (L) 40.0 - 53.0 %    MCV 99 78 - 100 fL    MCH 30.7 26.5 - 33.0 pg    MCHC 31.1 (L) 31.5 - 36.5 g/dL    RDW 14.3 10.0 - 15.0 %    Platelet Count 150 150 - 450 10e3/uL    % Neutrophils 75 %    % Lymphocytes 15 %    % Monocytes 8 %    % Eosinophils 1 %    % Basophils 0 %    % Immature Granulocytes 1 %    NRBCs per 100 WBC 0 <1 /100    Absolute Neutrophils 5.6 1.6 - 8.3 10e3/uL    Absolute Lymphocytes 1.1 0.8 - 5.3 10e3/uL    Absolute Monocytes 0.6 0.0 - 1.3 10e3/uL    Absolute Eosinophils 0.1 0.0 - 0.7 10e3/uL    Absolute Basophils 0.0 0.0 - 0.2 10e3/uL    Absolute Immature Granulocytes 0.0 <=0.4 10e3/uL    Absolute NRBCs 0.0 10e3/uL   Glucose by meter   Result Value Ref Range    GLUCOSE BY METER POCT 235 (H) 70 - 99 mg/dL   Lactic Acid STAT   Result Value Ref Range    Lactic Acid 1.2 0.7 - 2.0 mmol/L

## 2022-11-15 NOTE — PROGRESS NOTES
Paged Dr. Cage via [email or web address] messaging that pt lethargic and unable to take oral medications. Asked to get medications changed to IV. Also told him that I was going to give PRN BP medications due to increased BP.

## 2022-11-16 NOTE — PROGRESS NOTES
Patient refusing nebs at this time. Continues to swat away mask for medication and oxygen even with family assistance. RT discussed with RN possible mitten use for continued refusal.     Bryan Dougherty, RT on 11/16/2022 at 4:01 PM

## 2022-11-16 NOTE — PROGRESS NOTES
SPIRITUAL HEALTH SERVICES  SPIRITUAL ASSESSMENT Progress Note  [facility] 3 MED SURG   TELE-ENCOUNTER      REFERRAL SOURCE: Referral from palliative  for prayers, Religion specific.     DEMOGRAPHIC: Pt Eh Callahan identifies as Religion and is of English descent.        ILLNESS CIRCUMSTANCE: I was introduced by jamila Hi to Eh and his daughter as the Lead Religion  and she oriented them to [facility].  [place] assessed emotional/spiritual needs and resources while offering reflective conversation, which integrated elements of illness and family narratives.     SPIRITUAL INTERVENTIONS: Eh and his daughter requested Islamic incantations/prayer via video call. We prayed together at their request.      PLAN: Unit  has been notified for follow up. [facility] is available to [facility] per request.     Vianey Sorto  Lead Religion   Pager [phone]    [facility] remains available 24/7 for emergent requests/referrals, either by having the switchboard page the on-call  or by entering an ASAP/STAT consult in Epic (this will also page the on-call ).

## 2022-11-16 NOTE — PROGRESS NOTES
Vitals: BP (!) 179/70   Pulse 93   Temp 97.2  F (36.2  C) (Axillary)   Resp 20   Wt 65.9 kg (145 lb 4 oz)   SpO2 97%   BMI 22.75 kg/m      Neuro: A&Ox self  Resp: RA, diminished with fine crackles, shallow breathing with infrequent dry cough  Cardiac: tele SR  GI: poor intake, incontinent of bowel  : incontinent  Skin: WDL  Lines/ Drains: PIV SL  Activity: A2 lift   Nutrition: Regular   Pain: Denies  Labs: Protocol    Plans: TBD

## 2022-11-16 NOTE — PLAN OF CARE
Goal Outcome Evaluation:       Alert to self only. Lethargic and sleepy. LS diminished with fine crackles. [place] and repositioned in bed. Patient's daughter states to stop continue Fluid because according to her patient had paracentesis done and does not need anymore fluids. She state that he is trying to eat, although he has very poor intake. BP continues to be elevated. Was able to take some of his oral medication. Tele ST/STdepression. Continue POC and monitoring.

## 2022-11-16 NOTE — PLAN OF CARE
Goal Outcome Evaluation:  B/p 189/79 this am gave prn iv hydralazine and b/p did come down to 158/71 then pt did take po hydralazine but not sure how much he actually consumed as pt did spit some out. Meds were crushed with apple sauce. Pt would not take meds afternoon. Family at bedside assisting . Ivf infusing. Repo ind and with staff assist. Incont changes. Confused. Tele sr. Bg stable.

## 2022-11-16 NOTE — PROGRESS NOTES
[facility]  Medicine Progress Note - Hospitalist Service  Date of Admission:  11/10/2022    Assessment & Plan   Mr. Eh Callahan is a 82 year old male with a past medical history of dementia, CKD3, HTN, PTSD, afib and anemia admitted on 11/10/2022 with worsening weakness and poor PO intake. He had a recent admission for the same from 10/12-10/22.  Patient was seen by SLP 11/12 and was cleared for diet, his delirium continues to wax and wane, he has had ongoing hypoxia requiring 2 L oxygen, episode of chest pain. Chest pain now resolved.   Pending ongoing clinical improvement.     11/15: Had extensive discussion with the family over the phone and at bedside. Discussed with him about overall goals of care.  Plan is to continue restorative care.  Patient is full code  11/16 : reordered IV fluid due to hypernatremia and rising creatinine.     Acute hypoxic respiratory failure   Acute metabolic encephalopathy, resolving   Concern for possible septic encephalopathy vs delirium vs progression of dementia vs possible aspiration   Concern for pneumonia vs atelectatsis vs pneumonitis   Pleural effusions   -Patient presented with acute worsening of his weakness, inability to tolerate p.o. intake and worsening mental status per family's report.  He had a recent hospitalization on 10/12-10/22 was discharged to home at that time.    -On current admission, CT head without any acute findings to explain change in mental status.    -Chest x-ray with questionable pneumonia versus atelectasis versus pneumonitis.  It has been having a hard time managing his secretions, concern for potential aspiration events resulting in xray findings.   -Differential for worsening mental status includes septic encephalopathy vs progression of dementia vs possible aspiration.   -CXR 11/12/22 with worsened infiltrates and worsening oxygenation requiring 3 L oxygen. Oxygen requirements down to 2 L but with congested lung sounds.    -He was transitioned to vanc and cefepime given repeat CXR 11/12 with worsened infiltrates and patient now requiring supplemental oxygen 3 L in setting of recent hospitalization   -SLP evaluation completed 11/12/22 and recommended regular texture diet and thin liquids   -Thoracentesis done on 11/14 for pleural effusions.  Pleural fluid showed predominant lymphocytes, not consistent with bacterial infection.  -Vancomycin discontinued on 11/15  because of rising creatinine and negative cultures.  - incentive spirometry, aerobika if able   -Patient remains full code.  Discussed with multiple family members at bedside and over the phone on 11/15.  At this time family wants to continue restorative care.  Patient remains full code. Palliative care following for determination of goal of care.     Hospital associated delirium , improving   History of Alzheimer's dementia , stable   -Patient more awake and interactive today. His daughter at bedside. He was able to answer some questions. Not agitated at this time.   -On risperidone 0.25 mg daily   -schedule melatonin 5 mg with supper   -haldol PRN agitation     Concern for potential pericarditis , resolving   Troponin elevation, stable   Elevated ESR and CRP , stable   -Unable to obtain reliable history from the patient due to his underlying dementia and encephalopathy.  -RRT evening of 11/12-13 for chest pain/discomfort. Attempted a steroid trial, for possible pericarditis, minimal change noted, although ESR and CRP remain elevated.   -cardiology consulted and evaluated patient for possible pericarditis.  - repeat TTE performed with EF 60-65%, moderate ventricular hypertrophy and pleural effusion , no evidence of pericarditis, repeat EKG without evidence of pericarditis   -No steroids for pericarditis per cardiology.  -Currently denies chest pain.      CKD stage II vs TRANG on CKD , creatinine worsening, suspect pre-renal in setting of decreased PO intake the past few days    Hyperkalemia- resolved  -Received IV fluid on 11/13.  -PTA sodium bicarb   -Creatinine trending up but close to baseline. Creatinine 2.74 today  -Ordered IV fluid on 11/15 but family declined IV fluid. Now starting to take some po intake. Encourage oral intake.  Will reorder IV fluid if family agrees    Hypernatremia: Serum sodium up from 142 to146 today.  Likely due to decreased fluid intake.  Per RN, her daughter refused IV fluid.  Will reorder half-normal saline at 75 ml/hr per hour today if family agrees.    Elevated alkaline phosphatase , improving     Normocytic anemia, chronic , improving - repeat CBC in AM     Hyperglycemia, DM2  Blood sugar 110s to low 200s.  currently on glargine insulin 15 units every morning, medium resistance insulin sliding scale.  Will monitor blood sugar and adjust insulin dose as needed.    Atrial fibrillation - cardiac monitoring. Changed metoprolol to IV because of hs inconsistent po intake.    Hypertensive urgency  -Blood pressure elevated in high 180's  -Very difficult to control blood pressure due to his inconsistent medication intake and agitation. Metoprolol changed to IV. Also ordered IV hydralazine and labetalol as needed for SBP >180. Requiring multiple doses of haldol today. Ordered Zyprexa 5 mg IM  x1 dose.      Hyperlipidemia-PTA atorvastatin, ASA    History of hepatitis C    PTSD -PTA trazodone       Diet: Combination Diet Regular Diet; Thin Liquids (level 0) (No straws)    DVT Prophylaxis: Pneumatic Compression Devices  Liu Catheter: Not present  Central Lines: None  Cardiac Monitoring: ACTIVE order. Indication: failture to thrive, ensure no arrythmia  Code Status: Full Code      Disposition Plan   Patient is more awake today and wants to eat.  Discharge date unclear at this moment.  We will continue restorative care.    The patient's care was discussed with the Bedside Nurse, Patient and Patient's Family.    Amairani Cage MD, MD  Hospitalist Service  [facility]    _________________________________________________________________    Interval History   Patient seen and examined.  Chart reviewed.  Also discussed with patient's daughter at bedside.  Patient is more alert and interactive today.  Was able to answer questions when his daughter talked to him in [place]ese language.  Stated that he is hungry.  Had oatmeal this morning and wants to eat more today.  Denies pain.  No nausea or vomiting.    Data reviewed today: I reviewed all medications, new labs and imaging results over the last 24 hours. I personally reviewed no images or EKG's today.    Physical Exam   Vital Signs: Temp: 99.1  F (37.3  C) Temp src: Axillary BP: (!) 158/71 Pulse: 78   Resp: 24 SpO2: 93 % O2 Device: None (Room air) Oxygen Delivery: 2 LPM  Weight: 145 lbs 4 oz     Constitutional: awake, no apparent distress, and appears stated age, resting comfortably   HEENT: Normocephalic, atraumatic. Bilateral cataracts.   Respiratory: Normal work of breathing, fair air exchange, coarse breath sounds bilaterally, no wheezing appreciated, congested breath sounds   Cardiovascular: Regular rate and rhythm, no murmurs appreciated   GI: Soft and nontender to palpation, nondistended, no rebound or guarding  Skin: normal skin color, texture, turgor  Musculoskeletal: No deformities, no edema present  Neurologic: Alert, only oriented to self, responds to simple questions appropriately, moves all 4 extremities, no focal deficits   Neuropsychiatric: General: normal, calm and normal eye contact      Data   Recent Labs   Lab 11/16/22  0733 11/16/22  0711 11/16/22  0203 11/15/22  0746 11/15/22  0634 11/14/22  1723 11/14/22  1502 11/14/22  1246 11/14/22  1208 11/13/22  1820 11/13/22  1539 11/13/22  1302 11/13/22  0735 11/11/22  0923 11/11/22  0700   WBC  --   --   --   --  7.4  --  7.3  --   --   --   --   --  10.7   < > 8.1   HGB  --   --   --   --  11.7*  --  11.4*  --   --   --   --   --  12.0*   <  > 12.5*   MCV  --   --   --   --  99  --  100  --   --   --   --   --  97   < > 99   PLT  --   --   --   --  150  --  151  --   --   --   --   --  210   < > 151   NA  --  146*  --   --  142  --   --   --  140  --   --   --  138   < > 133*   POTASSIUM  --  3.7  3.8  --   --  4.0  --   --   --  4.8  --   --   --  4.6   < > 4.5   CHLORIDE  --  112*  --   --  110*  --   --   --  108*  --   --   --  106   < > 103   CO2  --  22  --   --  19*  --   --   --  20*  --   --   --  18*   < > 15*   BUN  --  39.5*  --   --  45.1*  --   --   --  49.5*  --   --   --  47.0*   < > 45.5*   CR  --  2.74*  --   --  2.66*  --   --   --  2.69*  --   --   --  2.55*   < > 2.22*   ANIONGAP  --  12  --   --  13  --   --   --  12  --   --   --  14   < > 15   DOMINGO  --  8.9  --   --  8.5*  --   --   --  8.2*  --   --   --  8.3*   < > 7.8*   * 125* 150*   < > 238*   < >  --    < > 351*  351*   < >  --    < > 363*   < > 318*   ALBUMIN  --   --   --   --   --   --   --   --   --   --   --   --  3.2*  --  2.5*   PROTTOTAL  --   --   --   --   --   --   --   --   --   --  6.1*  --  6.6  --  6.1*   BILITOTAL  --   --   --   --   --   --   --   --   --   --   --   --  0.5  --  0.9   ALKPHOS  --   --   --   --   --   --   --   --   --   --   --   --  143*  --  150*   ALT  --   --   --   --   --   --   --   --   --   --   --   --  32  --  32   AST  --   --   --   --   --   --   --   --   --   --   --   --  25  --  29    < > = values in this interval not displayed.     No results found for this or any previous visit (from the past 24 hour(s)).  Medications     sodium chloride 75 mL/hr at 11/15/22 1607       acetylcysteine  4 mL Nebulization Q4H     aspirin  81 mg Oral Daily     atorvastatin  40 mg Oral QPM     ceFEPIme  2 g Intravenous Q24H     famotidine  10 mg Oral BID     hydrALAZINE  50 mg Oral TID     insulin aspart  1-7 Units Subcutaneous TID AC     insulin aspart  1-5 Units Subcutaneous At Bedtime     insulin glargine  15 Units Subcutaneous  QAM AC     ipratropium - albuterol 0.5 mg/2.5 mg/3 mL  3 mL Nebulization Q4H     isosorbide dinitrate  20 mg Oral TID     melatonin  5 mg Oral Daily with supper     metoprolol  5 mg Intravenous Q6H     [Held by provider] metoprolol tartrate  50 mg Oral BID     multivitamin, therapeutic  1 tablet Oral Daily     risperiDONE  0.25 mg Oral Daily     sodium bicarbonate  1,300 mg Oral BID     sodium chloride (PF)  3 mL Intracatheter Q8H     traZODone  75 mg Oral At Bedtime     Vitamin D3  25 mcg Oral Daily

## 2022-11-16 NOTE — PROGRESS NOTES
Vitals: BP (!) 179/70   Pulse 93   Temp 97.2  F (36.2  C) (Axillary)   Resp 20   Wt 65.9 kg (145 lb 4 oz)   SpO2 97%   BMI 22.75 kg/m      Neuro: A&Ox self, does not respond to questions or commands. Daughter helps to communicate.  Resp: Clear, RA  Cardiac: tele SR, BP 160s-170s gave PRN metroprolol x 1  GI: poor intake, does not comply with oral meds and sometimes refuses to eat or drink.   : incontinent  Skin: WDL  Lines/ Drains: PIV SL  Activity: A2 lift walker  Nutrition: Regular   Pain: Denies  Labs: Protocol k and Mag    Plans: TBD, daughter is interested in home care options

## 2022-11-16 NOTE — PROVIDER NOTIFICATION
Provider Notification: Agitation/ Hypertension    Data: RN notified by RT patient is refusing nebs and refusing oxygen despite being in the 80s. RN was able to obtain saturation and was above 90% on room air. Patient still agitated, IV Haldol given, patient restless though improved. /84, PRN IV hydralazine given. Recheck 15 mins after was 207/90. IV lopressor due at 1730 (scheduled Q6H), last given 1230. Had large saturated brief 30 mins ago.    Action: Updated provider. Order received for IM Zyprexa, and IV hydralazine,       Addendum: 1800  Data: Patient stating his chest hurts. Unable to describe or rate. Continues to be agitated, pinching staff and daughter.     Action: Instructed to give IV lopressor 30 mins after IV labetalol if BP not improving. RN to contact cross-crover if HTN persists. Order recieved for soft wrist restraints after unsuccessful attempts in applying mitts.     Addendum: 1930  Data: SBP remains in 190s despite receiving IV hydralazine 10 mg X2, IV Haldol 2 mg, IV Haldol 5 mg, IM Zyprexa 5 mg, IV labetalol 20 mg, and IV Lopressor 5 mg.     Action: Updated Dr. Mondragon and updated him of EKG completed. Instructed to give IV lopressor, wait 30 mins and give IV Hydralazine. Order received to discontinue continuous fluids.

## 2022-11-17 NOTE — PROGRESS NOTES
[facility]    Hospitalist Progress Note  Name: Eh Callahan    MRN: [id number]  Provider:  Lebron Crawford DO  Date of Service: 11/17/2022    Summary of Stay: Eh Callahan is a 82 year old male with a history of dementia, atrial fibrillation, hypertension, chronic kidney disease, PTSD admitted on 11/10/2022 with generalized weakness and poor oral intake.  Of note, the patient was recently hospitalized from 10/12 to 10/22/2022 for the treatment of toxic encephalopathy in the setting of a urinary tract infection with uncontrolled hypertension and acute kidney injury.  The patient was discharged home with family.  In the emergency department, the patient was found to have a temperature of 98.1  F, blood pressure 179/82, heart rate 73, respiratory rate 16, SPO2 92% on room air.  Initial lab work showed WBC 6.6, hemoglobin 12.6, sodium 133, potassium 5.4, BUN/creatinine 49.2/2.43, calcium 8.2, glucose 274, albumin 3.2, alkaline phosphatase 175.  CT head showed no acute changes, moderate to advanced volume loss and mild presumed sequela of chronic microvascular ischemic change.  Chest x-ray showed new bibasilar opacities which may represent effusion with associated atelectasis, left greater than right associated patchy airspace opacities concerning for superimposed pneumonia/pneumonitis.  The patient was started on IV ceftriaxone and azithromycin for treatment of community-acquired pneumonia.  Speech therapy was consulted to see the patient with concern for aspiration.  Palliative care was also consulted to see the patient with concern for progression to have his dementia.  Repeat chest x-ray on 11/12 showed worsening infiltrates.  In addition, the patient had increasing oxygen requirements.  Antibiotics were broadened to vancomycin and cefepime.  Vancomycin was eventually discontinued due to rising creatinine.  Speech therapy recommended regular texture diet with thin liquids.  The patient's  hospital course was complicated by patient refusal to take his oral medications.  Because of this the patient developed hypertensive urgency.  IV antihypertensives for an effective.  On 11/16, the patient was transferred to the ICU for nicardipine drip.  Multiple family discussions were held during the patient's hospitalization regarding CODE STATUS and a recommendation for hospice.    TODAY'S PLAN:  Son and daughter at bedside.  Per son, pt more calm this morning.  Pt still confused.  Refusing meds and not eating.  Pt on/off cardene drip overnight.  Hopeful to give pt oral meds this morning, but as of right now, pt still refusing/unable to.  Per son, pt asking for salt.  Per Palliative Note, family wishes for full restorative cares and would want a feeding tube if needed.  TF would be difficult as pt is already confused and pulling at lines.  Would likely need PEG tube, however, this would ultimately not change anything and the patient will continue to aspirate so ultimately PEG tube is not recommended.  Hospice was discussed, but family was not ready for this.  Pt would be hospice appropriate at this point.  Possible his mentation issues are related to his pneumonia vs progression of his dementia.  Will continue dialogue with family and discuss possible need for TF later today.    Pt also with AGMA this AM.  BS elevated.  LA normal and VBG unremarkable.  Will repeat BMP at 1000 and check betahydroxy.  Increase lantus to 20 units qAM.  Pt intermittently refusing sodium bicarb.    I participated in 36 minutes of critical care time in the evaluation and treatment of this patient with hypertensive urgency requiring nicardipine drip and worsening AGMA.      ADDENDUM:  Betahydroxy 5.6.  Increased lantus.  ?Starvation ketosis.  Also, bicarb down to 12.  Pt not able to take PO bicarb.  Will change to q4h ISS to control BS as BS was over 300 and pt not eating.  Plan to recheck BMP at 1500.      ADDENDUM #2:  Repeat BMP  shows improvement in AGMA.  Suspect chronic acidosis with some component of starvation ketosis.  Will plan for repeat BMP @ 2100.  Ongoing goals of care conversations.  Pt remains hospice/comfort care appropriate.    Problem List:   Acute Hypoxic Respiratory Failure  Acute Metabolic vs Septic Encephalopathy vs Delerium vs Progression of Dementia  Possible Pneumonia vs Atelectasis vs Pneumonitis  Bilateral Pleural Effusion  - CT head on admit showed no acute abnormality  - CXR on 11/10 showed new bibasilar opacities, L>R patchy airspace opacities with superimposed pneumonia/pneumonitis  - Repeat CXR on 11/12 showed worsening infiltrates and worsening oxygenation  - Initially on ceftriaxone and azithromycin - now transitioned to cefepime due to progression of infiltrates on CXR  - Vanco discontinued on 11/15 due to rising creatinine  - s/p R-sided Thoracentesiso n 11/14 with 750 mL out - fluid analysis consistent with transudative process  - Appreciate Palliative Care recommendations    Hypertensive Urgency  - Pt with uncontrolled BP.  Complicated by pt intermittently refusing medications  - Started on Nicardipine drip on 11/16 and transferred to the ICU  - Continue PTA Hydralazine 50 mg TID, Isordil 20 mg TID  - Also on IV hydralazine prn and IV lopressor 5 mg q6h  - PTA Amlodipine on hold while on Nicardipine drip    AGMA  Hyperkalemia - resolved  Chronic Kidney Disease Stage IIIb-IV  - Baseline Cr = 2.2-2.5  - Continue PTA Sodium Bicarbonate  - LA on 11/17 was 1.5, VBG unremarkable  - Check Betahydroxybutyrate  - Repeat BMP at 1000    Dementia  - Continue PTA risperidone  - Zyprexa prn    Type 2 Diabetes Mellitus  - A1C = 9.0 on 10/12/2022  - Increase lantus to 20 units qAM  - ISS    Hypernatremia  - Improved  - Daily BMP    Paroxysmal Atrial Fibrillation  - Continue IV metoprolol    Goals of Care  - Palliative Care consulted.  Multiple meetings with pt and family were conducted during this hospitalization.  Family  wishes for full restorative cares at this time.  Feeding tube was discussed and pt family wishes to attempt TF, if needed.  Hospice was discussed as well, but family does not think pt is ready for hospice.    Chronic Medical Problems:  Hx of Hepatitis C  PTSD    DVT Prophylaxis: Pneumatic Compression Devices  Code Status: Full Code  Diet: Combination Diet Regular Diet; Thin Liquids (level 0) (No straws)    Liu Catheter: Not present  Disposition: Expected discharge in >5 days to TCU vs home with family vs comfort care/hospice. Goals prior to discharge include tolerating oral diet, mentation improved, signed on to hospice.   Family updated today: Yes      Interval History   Pt seen and examined.  Pt with son at bedside who assists with translation.  Pt asking for salt.  Does not open eyes.    -Data reviewed today: I personally reviewed all new labs and imaging results over the last 24 hours.     Physical Exam   Temp: 97.6  F (36.4  C) Temp src: Temporal BP: 129/67 Pulse: 87   Resp: 19 SpO2: 99 % O2 Device: None (Room air) Oxygen Delivery: 3 LPM  Vitals:    11/10/22 2337 11/16/22 0558 11/17/22 0400   Weight: 64.9 kg (143 lb 1.6 oz) 65.9 kg (145 lb 4 oz) 65.1 kg (143 lb 8.3 oz)     Vital Signs with Ranges  Temp:  [97.6  F (36.4  C)-99.2  F (37.3  C)] 97.6  F (36.4  C)  Pulse:  [] 87  Resp:  [9-36] 19  BP: ()/() 129/67  SpO2:  [90 %-100 %] 99 %  I/O last 3 completed shifts:  In: 229.8 [P.O.:150; I.V.:79.8]  Out: 500 [Urine:500]    GENERAL: No apparent distress. Calm  HEENT: Normocephalic, atraumatic. Extraocular movements intact.  CARDIOVASCULAR: Regular rate and rhythm without murmurs or rubs. No S3.  PULMONARY: Clear bilaterally.  GASTROINTESTINAL: Soft, non-tender, mildly distended. Bowel sounds normoactive.   EXTREMITIES: No cyanosis or clubbing. No edema.  NEUROLOGICAL: CN 2-12 grossly intact, no focal neurological deficits.  DERMATOLOGICAL: No rash, ulcer, bruising, nor jaundice.    Medications      niCARdipine 2.5 mg/hr (11/17/22 0600)       acetylcysteine  4 mL Nebulization Q4H     aspirin  81 mg Oral Daily     atorvastatin  40 mg Oral QPM     ceFEPIme  2 g Intravenous Q24H     famotidine  10 mg Oral BID     hydrALAZINE  50 mg Oral TID     insulin aspart  1-7 Units Subcutaneous TID AC     insulin aspart  1-5 Units Subcutaneous At Bedtime     insulin glargine  15 Units Subcutaneous QAM AC     ipratropium - albuterol 0.5 mg/2.5 mg/3 mL  3 mL Nebulization Q4H     isosorbide dinitrate  20 mg Oral TID     melatonin  5 mg Oral Daily with supper     metoprolol  5 mg Intravenous Q6H     [Held by provider] metoprolol tartrate  50 mg Oral BID     multivitamin, therapeutic  1 tablet Oral Daily     risperiDONE  0.25 mg Oral Daily     sodium bicarbonate  1,300 mg Oral BID     sodium chloride (PF)  3 mL Intracatheter Q8H     traZODone  75 mg Oral At Bedtime     Vitamin D3  25 mcg Oral Daily     Data     Laboratory:  Recent Labs   Lab 11/17/22  0529 11/15/22  0634 11/14/22  1502   WBC 8.5 7.4 7.3   HGB 11.9* 11.7* 11.4*   HCT 36.2* 37.6* 37.3*   MCV 95 99 100   * 150 151     Recent Labs   Lab 11/17/22  0809 11/17/22  0607 11/17/22  0529 11/16/22  0733 11/16/22  0711 11/15/22  1851 11/15/22  1648 11/15/22  0746 11/15/22  0634 11/14/22  1246 11/14/22  1208 11/13/22  1820 11/13/22  1539 11/13/22  1302 11/13/22  0735 11/11/22  0923 11/11/22  0700 11/10/22  2228 11/10/22  1929   NA  --   --  142  --  146*  --   --   --  142  --  140  --   --   --  138   < > 133*  --  133*   POTASSIUM  --   --  4.9  --  3.7  3.8  --   --   --  4.0  --  4.8  --   --   --  4.6   < > 4.5  --  5.4*   CHLORIDE  --   --  106  --  112*  --   --   --  110*  --  108*  --   --   --  106   < > 103  --  102   CO2  --   --  14*  --  22  --   --   --  19*  --  20*  --   --   --  18*   < > 15*  --  23   ANIONGAP  --   --  22*  --  12  --   --   --  13  --  12  --   --   --  14   < > 15  --  8   * 292* 320*   < > 125*   < >  --    < > 238*   <  > 351*  351*   < >  --    < > 363*   < > 318*   < > 274*   BUN  --   --  45.4*  --  39.5*  --   --   --  45.1*  --  49.5*  --   --   --  47.0*   < > 45.5*  --  49.2*   CR  --   --  2.85*  --  2.74*  --   --   --  2.66*  --  2.69*  --   --   --  2.55*   < > 2.22*  --  2.43*   GFRESTIMATED  --   --  21*  --  22*  --   --   --  23*  --  23*  --   --   --  24*   < > 29*  --  26*   DOMINGO  --   --  8.5*  --  8.9  --   --   --  8.5*  --  8.2*  --   --   --  8.3*   < > 7.8*  --  8.2*   MAG  --   --  1.7  --   --   --  1.8  --   --   --  1.9  --   --   --  1.8   < > 1.7  --  1.4*   PHOS  --   --   --   --   --   --   --   --   --   --  4.0  --   --   --   --   --  2.8  --  3.2   PROTTOTAL  --   --   --   --   --   --   --   --   --   --   --   --  6.1*  --  6.6  --  6.1*  --  6.7   ALBUMIN  --   --   --   --   --   --   --   --   --   --   --   --   --   --  3.2*  --  2.5*  --  3.2*   BILITOTAL  --   --   --   --   --   --   --   --   --   --   --   --   --   --  0.5  --  0.9  --  0.6   ALKPHOS  --   --   --   --   --   --   --   --   --   --   --   --   --   --  143*  --  150*  --  175*   AST  --   --   --   --   --   --   --   --   --   --   --   --   --   --  25  --  29  --  34   ALT  --   --   --   --   --   --   --   --   --   --   --   --   --   --  32  --  32  --  42    < > = values in this interval not displayed.     No results for input(s): CULT in the last 168 hours.    Imaging:  No results found for this or any previous visit (from the past 24 hour(s)).      Lebron Crawford DO  [facility] Hospitalist  [address] [place] [address]  11/17/2022

## 2022-11-17 NOTE — PLAN OF CARE
ICU End of Shift Summary.  For vital signs and complete assessments, please see documentation flowsheets.      Pertinent assessments: Transfer to ICU at 2245. Pt afebrile. Confused. Denies pain. Nicardipine gtt started with improvement in BP's. Stopped for a while d/t low BP's then restarted later at lower rate, has been tolerating since. Restless and mumbling throughout night. Incontinent at 2300, moderate amount in brief. Swallowed pills crushed in applesauce tonight while alert without difficulty. Turned and repositioned throughout night. No skin issues identified. No BM noted since admission on 11/11, consider bowel meds as patient will allow.    Major Shift Events: Tx to ICU, Initiating Nicardipine gtt  Plan (Upcoming Events): Continue to titrate gtt as able  Discharge/Transfer Needs: TBD pending progress     Bedside Shift Report Completed   Bedside Safety Check Completed    Goal Outcome Evaluation:    Right wrist and Left wrist restraints continued 11/17/2022    Clinical Justification: Pulling lines, pulling tubes, and pulling equipment  Less Restrictive Alternative: 1:1 patient care, Repositioning, Disguise equipment, Reorientation  Attending Physician Notified: Yes, Attending Physician's Name: Dr. WALI Cage   New orders placed No         Trish Sifuentes, RN

## 2022-11-17 NOTE — PROGRESS NOTES
"CLINICAL NUTRITION SERVICES - ASSESSMENT NOTE     Nutrition Prescription    RECOMMENDATIONS FOR MDs/PROVIDERS TO ORDER:  Consider initiation of nutrition support if appropriate based on goals of care as established by patient/patient's family and medical team    Malnutrition Status:    % Intake: </= 50% for >/= 5 days (severe)  % Weight Loss: Weight loss does not meet criteria--no recent loss  Subcutaneous Fat Loss: Unable to assess  Muscle Loss: Unable to assess  Fluid Accumulation/Edema: None noted  Malnutrition Diagnosis: Unable to determine due to lack of NFPE       REASON FOR ASSESSMENT  Awjeannette Callahan is a/an 82 year old male assessed by the dietitian for LOS    Pt admitted d/t weakness and poor PO. Transferred to ICU 11/16 hypoxia and c/o chest pain, which has since resolved PMH significant for dementia, CKD3, HTN, PTSD, afib and anemia     NUTRITION HISTORY  - Information obtained from chart. Spoke with Palliative provider and RN--pt's family is in the process of setting goals of care at this time. Defer visit for now.  - Per previous RD note from 10/19/22: \"Diet at home: Regular.  Family encouraged meals TID.  For breakfast patient likes oatmeal and milk.  Lunch and dinner may consist of peanut butter sandwiches and milk +/- fruit.\"  - Pt resides at home with family  - Allergies: NKFA    CURRENT NUTRITION ORDERS  Diet: Regular  Intake/Tolerance: only 2 meals recorded, 25% of each. Pt is now refusing meds and PO    LABS- BUN 45.4, Cr 2.85, CRP 14.9 (11/13)  Recent Labs   Lab 11/17/22  0809 11/17/22  0607 11/17/22  0529 11/17/22  0200 11/16/22  2248 11/16/22  1801   * 292* 320* 249* 194* 249*       MEDICATIONS: lipitor, famotidine, sliding scale insulin, Lantus, mvit, vit D3    ANTHROPOMETRICS  Height: 170.2 cm (5' 7\")  Most Recent Weight: 65.1 kg (143 lb 8.3 oz)    IBW: 67.3 kg  Body mass index is 22.48 kg/m .  BMI: Normal BMI  Weight History: weight overall stable over past year, minimal weight " records including Care Everywhere review  Wt Readings from Last 10 Encounters:   11/17/22 65.1 kg (143 lb 8.3 oz)   10/22/22 66.1 kg (145 lb 12.8 oz)   08/05/21 63.6 kg (140 lb 3.4 oz)   08/02/20 64.8 kg (142 lb 12.8 oz)   05/28/20 67.6 kg (149 lb)   05/22/20 67.6 kg (149 lb)   02/03/20 72.3 kg (159 lb 4.8 oz)   01/18/20 71.1 kg (156 lb 11.2 oz)   01/05/20 73.6 kg (162 lb 3.2 oz)   10/05/19 79.9 kg (176 lb 2.4 oz)       Dosing Weight: 65.1 kg    ASSESSED NUTRITION NEEDS  Estimated Energy Needs: 1580+ kcals/day ([place] Jeor x AF 1.2)  Justification: Maintenance  Estimated Protein Needs: 52-65+ grams protein/day (0.8-1+ grams of pro/kg)  Justification: TRANG on CKD, but with inflammation  Estimated Fluid Needs: 1 mL/kcal or per provider pending fluid status    PHYSICAL FINDINGS  See malnutrition section above.    MALNUTRITION  As noted above    NUTRITION DIAGNOSIS  Inadequate oral intake related to pneumonia, advancing dementia as evidenced by refusal to take PO, difficulty handling own secretions      INTERVENTIONS  Implementation  Nutrition Education: Not appropriate at this time due to patient condition     Goals  Patient to consume % of nutritionally adequate meal trays TID, or the equivalent with supplements/snacks.     Monitoring/Evaluation  Progress toward goals will be monitored and evaluated per protocol.  Audra Araujo, TOBY, LD, [facility]  Pager - 3rd floor/ICU: [phone]  Pager - All other floors: [phone]  Pager - Weekend/holiday: [phone]  Office: [phone]

## 2022-11-17 NOTE — PLAN OF CARE
Goal Outcome Evaluation:    ICU End of Shift Summary.  For vital signs and complete assessments, please see documentation flowsheets.    Pertinent assessments: pt is disoriented x4. BP stable and off nicardipine since early this morning- see mar. Family at bedside.   Major Shift Events: Bp stable and restarted oral medications. Pt spitting meds and pudding out. Gave supp- had large bm. Pt had episode of coughing/ choking. Pt was laying down 30' and family was trying to feed pt. Pt was suctioned and required some oxygen. Pt made NPO and chest xray ordered. BS elevated- increased Lantus and higher sliding scale.  Plan (Upcoming Events): Palliative following. Monitor BS and BG.   Discharge/Transfer Needs: Possible discharge home with family on hospice.     Bedside Shift Report Completed: yes  Bedside Safety Check Completed: yes       Paged Hospitalist  [phone]  : I am going to make pt NPO and portable chest xray for some possible aspiration pna and choking episode. new crackles in bases. If not okay , can you call me . thanks natalia Coolye [phone]

## 2022-11-17 NOTE — PROGRESS NOTES
SPIRITUAL HEALTH SERVICES Progress Note     ICU    Met with attending nurse regarding triaged advance directive consult request.    Patient is not decisional.  Therefore, consult request cannot be processed.    Recommended that nurse talk to attending physician about next steps.    SHS remains available if further needs arise.    Rev. Ana Cristina Patel M.Div.  Staff   Phone  [phone]

## 2022-11-17 NOTE — PROGRESS NOTES
"    [facility]  Palliative Care Progress Note  Text Page     Assessment & Plan   Eh Callahan is a 82 year old male who was admitted on 11/10/2022.   Consulted by Dr. Santos to assist with goals of care, and development of plan of care      Recommendations:  1. Goals of Care- Full Code-restorative cares  Hospitalization goals discussed Discussed overall goals with family at the bedside. Discussed again a feeding tube and the risk and potential harm. Discussed concerns for BP and unable to take orals. Family is wondering next steps. Theya re wondering what the medical team is recommending as next steps. Discussed continuing cares vs comfort and they wanted to talk as a family and will call back with changes. Discussed code status and they stated that they are looking to the medical team for recommendations.  Decisional Capacity- Unreliable. Patient does not have an advance directive. Per  informed consent policy next of kin should be involved if patient becomes unable.  -Patient has 13 adult children, a few live out of the country but most of them live in [place]. All are involved in goals discusssions  POLST consider completing should goals of care change.      2. Pain, patient complains of headaches at times   -Agree with tylenol as needed for headaches  -Added low dose hydromorphone for \"feeling like a rock is holding down on me\"- per daughter     3. Dysphagia  -Discussed concerns with secretions and swallowing  -Appreciate the input of SLP for on going recommendations  -Family would like the medical team to make recommendations for feeding tube     4. Spiritual Care  Oriented to Spiritual Health as part of Palliative Care team. Spiritual Health Services declined at this time.  Spiritual Background: Hoahaoism     5. Care Planning  Appreciate Care of SW/Care coordinator.    Medical Decision Making and Goals of Care:  Discussed on November 17, 2022 with Jazmyn Rivera NP: Discussed with son " Allan over the phone and Candis in person at the bedside. Discussed the above. Brooke who is the eldest son-that lives here- family is deferring to him for final decisions. They will call him and let this writer know when he is available to talk. Await his call. Questions asked and answered.    Jazmyn Rivera NP  Pain Management and Palliative Care  [facility]  Pgr: [phone]    Time Spent on this Encounter   Total unit/floor time 38 minutes, time consisted of the following, examination of the patient, reviewing the record and completing documentation. >50% of time spent in counseling and coordination of care, Bedside Nurse Karen Andrade and Hospitalist Dr Crawford.  Time spend counseling with family consisted of the following topics, goals of care, education about diagnosis, education about prognosis, care planning for discharge and symptom management.         Review of Systems   Unable to assess due to confusion    Physical Exam   Temp:  [97.3  F (36.3  C)-99.2  F (37.3  C)] 97.5  F (36.4  C)  Pulse:  [] 82  Resp:  [9-36] 22  BP: ()/() 149/69  SpO2:  [90 %-100 %] 97 %  143 lbs 8.31 oz  Exam:  GENERAL APPEARANCE:  lethargic, moaning  RESP:  no respiratory distress  CV:  Palpation and auscultation of heart done , regular rate and rhythm, no murmur, rub, or gallop  ABDOMEN:  normal bowel sounds, soft, nontender, no hepatosplenomegaly or other masses  PSYCH:  Moaning, unable to follow    Medications     niCARdipine Stopped (11/17/22 0845)       aspirin  81 mg Oral Daily     atorvastatin  40 mg Oral QPM     ceFEPIme  2 g Intravenous Q24H     famotidine  10 mg Oral BID     hydrALAZINE  50 mg Oral TID     insulin aspart  1-7 Units Subcutaneous Q4H     [START ON 11/18/2022] insulin glargine  20 Units Subcutaneous QAM AC     ipratropium - albuterol 0.5 mg/2.5 mg/3 mL  3 mL Nebulization Q4H     isosorbide dinitrate  20 mg Oral TID     melatonin  5 mg Oral Daily with supper      metoprolol  5 mg Intravenous Q6H     [Held by provider] metoprolol tartrate  50 mg Oral BID     multivitamin, therapeutic  1 tablet Oral Daily     risperiDONE  0.25 mg Oral Daily     sodium bicarbonate  1,300 mg Oral BID     sodium chloride (PF)  3 mL Intracatheter Q8H     traZODone  75 mg Oral At Bedtime     Vitamin D3  25 mcg Oral Daily       Data   Results for orders placed or performed during the hospital encounter of 11/10/22 (from the past 24 hour(s))   Glucose by meter   Result Value Ref Range    GLUCOSE BY METER POCT 263 (H) 70 - 99 mg/dL   Glucose by meter   Result Value Ref Range    GLUCOSE BY METER POCT 249 (H) 70 - 99 mg/dL   EKG 12-lead, tracing only   Result Value Ref Range    Systolic Blood Pressure  mmHg    Diastolic Blood Pressure  mmHg    Ventricular Rate 105 BPM    Atrial Rate 105 BPM    PA Interval 116 ms    QRS Duration 74 ms     ms    QTc 459 ms    P Axis 43 degrees    R AXIS 42 degrees    T Axis 123 degrees    Interpretation ECG       Sinus tachycardia  Possible Left atrial enlargement  ST & T wave abnormality, consider lateral ischemia  Abnormal ECG     Lactic Acid STAT   Result Value Ref Range    Lactic Acid 1.0 0.7 - 2.0 mmol/L   Glucose by meter   Result Value Ref Range    GLUCOSE BY METER POCT 194 (H) 70 - 99 mg/dL   Glucose by meter   Result Value Ref Range    GLUCOSE BY METER POCT 249 (H) 70 - 99 mg/dL   Magnesium   Result Value Ref Range    Magnesium 1.7 1.7 - 2.3 mg/dL   CBC with platelets   Result Value Ref Range    WBC Count 8.5 4.0 - 11.0 10e3/uL    RBC Count 3.81 (L) 4.40 - 5.90 10e6/uL    Hemoglobin 11.9 (L) 13.3 - 17.7 g/dL    Hematocrit 36.2 (L) 40.0 - 53.0 %    MCV 95 78 - 100 fL    MCH 31.2 26.5 - 33.0 pg    MCHC 32.9 31.5 - 36.5 g/dL    RDW 14.3 10.0 - 15.0 %    Platelet Count 138 (L) 150 - 450 10e3/uL   Basic metabolic panel   Result Value Ref Range    Sodium 142 136 - 145 mmol/L    Potassium 4.9 3.4 - 5.3 mmol/L    Chloride 106 98 - 107 mmol/L    Carbon Dioxide (CO2)  14 (L) 22 - 29 mmol/L    Anion Gap 22 (H) 7 - 15 mmol/L    Urea Nitrogen 45.4 (H) 8.0 - 23.0 mg/dL    Creatinine 2.85 (H) 0.67 - 1.17 mg/dL    Calcium 8.5 (L) 8.8 - 10.2 mg/dL    Glucose 320 (H) 70 - 99 mg/dL    GFR Estimate 21 (L) >60 mL/min/1.73m2   Glucose by meter   Result Value Ref Range    GLUCOSE BY METER POCT 292 (H) 70 - 99 mg/dL   Lactic acid whole blood   Result Value Ref Range    Lactic Acid 1.5 0.7 - 2.0 mmol/L   Blood gas venous   Result Value Ref Range    pH Venous 7.36 7.32 - 7.43    pCO2 Venous 31 (L) 40 - 50 mm Hg    pO2 Venous 26 25 - 47 mm Hg    Bicarbonate Venous 18 (L) 21 - 28 mmol/L    Base Excess/Deficit (+/-) -6.8 -7.7 - 1.9 mmol/L    FIO2 3    Glucose by meter   Result Value Ref Range    GLUCOSE BY METER POCT 291 (H) 70 - 99 mg/dL   Basic metabolic panel   Result Value Ref Range    Sodium 141 136 - 145 mmol/L    Potassium 5.0 3.4 - 5.3 mmol/L    Chloride 107 98 - 107 mmol/L    Carbon Dioxide (CO2) 12 (L) 22 - 29 mmol/L    Anion Gap 22 (H) 7 - 15 mmol/L    Urea Nitrogen 50.0 (H) 8.0 - 23.0 mg/dL    Creatinine 2.89 (H) 0.67 - 1.17 mg/dL    Calcium 8.1 (L) 8.8 - 10.2 mg/dL    Glucose 328 (H) 70 - 99 mg/dL    GFR Estimate 21 (L) >60 mL/min/1.73m2   Ketone Beta-Hydroxybutyrate Quantitative   Result Value Ref Range    Ketone (Beta-Hydroxybutyrate) Quantitative 5.6 (HH) 0.0 - 0.6 mmol/L    Narrative    Result confirmed by repeated test

## 2022-11-17 NOTE — PROVIDER NOTIFICATION
Hospitalist paged    BP still in 200's systolic. Last check 210/98 after IV hydralazine given. This was also after straight cath for 500 ml. Please advise. Thanks!

## 2022-11-17 NOTE — PROGRESS NOTES
Goal Outcome Evaluation: 0194-2183    Plan of Care Reviewed With: Patient, Daughter    Overall Patient Progress: Not Progressing    Outcome Evaluation:   ST per monitor, remains hypertensive (SBP>190). Patient agitated throughout the last few hours with intermittent rests. Soft wrist restraints in place. Patient refused pills and assessment. Patient pinching and grabbing at staff's arms/clothes.     Plan: IV Maxipime Q24H. Continuous IVF discontinued. Labs in AM. Daughter at bedside, palliative following.

## 2022-11-17 NOTE — PROVIDER NOTIFICATION
11/17/22 1109   Significant Event   Significant Event Critical result/value   Critical Test Results/Notification   Critical Lab Result (Lab Name and Value) ketones 5.6   What Time Did The Lab Notify You? 1108   What Provider Did You Notify? Ty Winter   Was the Provider Notified Within 30 Min?  Yes

## 2022-11-18 NOTE — PROGRESS NOTES
"    [facility]  Palliative Care Progress Note  Text Page     Assessment & Plan   Eh Callahan is a 82 year old male who was admitted on 11/10/2022.   Consulted by Dr. Santos to assist with goals of care, and development of plan of care      Recommendations:  1. Goals of Care- Full Code-restorative cares  Hospitalization goals discussed family expressed that they feel strongly about nutrition and putting in a feeding tube. They verbalized the risk and harm it may cause.   Decisional Capacity- Unreliable. Patient does not have an advance directive. Per  informed consent policy next of kin should be involved if patient becomes unable.  -Patient has 13 adult children, a few live out of the country but most of them live in [place]. All are involved in goals discusssions  POLST consider completing should goals of care change.      2. Pain, patient complains of headaches at times   -Agree with tylenol as needed for headaches  -Added low dose hydromorphone for \"feeling like a rock is holding down on me\"- per daughter     3. Dysphagia  -Discussed concerns with secretions and swallowing  -Appreciate the input of SLP for on going recommendations  -Family feels strongly that a feeding tube is the right next step for him even with the risks     4. Spiritual Care  Oriented to Spiritual Health as part of Palliative Care team. Spiritual Health Services declined at this time.  Spiritual Background: Scientologist     5. Care Planning  Appreciate Care of SW/Care coordinator.    Medical Decision Making and Goals of Care:  Discussed on November 18, 2022 with Jazmyn Rivera, NP: Met with daughter Candis at the bedside and family decision maker Brooke on the phone. We discussed overall goals. Discussed nutrition and feeding tube. We talked about the risks and potential harm of placing a feeding tube, we discussed having to restrain him for placement and in order to keep the tube in place. They verbalized this and would " still like this addressed today. Discussed the concerns about worsening mental status today, family is worried and wanting to know why this is happening. Discussed overall goals which are still restorative. Questions asked and answered    Jazmyn Rivera NP  Pain Management and Palliative Care  [facility]  Pgr: [phone]    Time Spent on this Encounter   Total unit/floor time 42 minutes, time consisted of the following, examination of the patient, reviewing the record and completing documentation. >50% of time spent in counseling and coordination of care, Bedside Nurse Janeen and Hospitalist Dr Crawford.  Time spend counseling with family consisted of the following topics, goals of care, education about diagnosis, education about prognosis, care planning for discharge and symptom management.         Review of Systems   Unable to assess due to confusion    Physical Exam   Temp:  [97.3  F (36.3  C)-99.8  F (37.7  C)] 98.7  F (37.1  C)  Pulse:  [73-97] 95  Resp:  [16-34] 21  BP: (109-172)/() 157/76  SpO2:  [90 %-100 %] 100 %  147 lbs 4.28 oz  Exam:  GENERAL APPEARANCE:  lethargic, minimally repsonsive  RESP:  no respiratory distress  CV:  Palpation and auscultation of heart done , regular rate and rhythm, no murmur, rub, or gallop  ABDOMEN:  normal bowel sounds, soft, nontender, no hepatosplenomegaly or other masses  PSYCH:  lethargic    Medications     niCARdipine Stopped (11/17/22 0845)       aspirin  81 mg Oral Daily     atorvastatin  40 mg Oral QPM     ceFEPIme  2 g Intravenous Q24H     famotidine  10 mg Oral BID     hydrALAZINE  50 mg Oral TID     insulin aspart  1-12 Units Subcutaneous Q4H     insulin glargine  20 Units Subcutaneous QAM AC     ipratropium - albuterol 0.5 mg/2.5 mg/3 mL  3 mL Nebulization Q4H     isosorbide dinitrate  20 mg Oral TID     melatonin  5 mg Oral Daily with supper     metoprolol  5 mg Intravenous Q6H     [Held by provider] metoprolol tartrate  50 mg Oral BID      multivitamin, therapeutic  1 tablet Oral Daily     nitroGLYcerin 50 mg in D5W 250 mL (adult std)         risperiDONE  0.25 mg Oral Daily     sodium bicarbonate  1,300 mg Oral BID     sodium chloride (PF)  3 mL Intracatheter Q8H     traZODone  75 mg Oral At Bedtime     Vitamin D3  25 mcg Oral Daily       Data   Results for orders placed or performed during the hospital encounter of 11/10/22 (from the past 24 hour(s))   Lactic acid whole blood   Result Value Ref Range    Lactic Acid 1.5 0.7 - 2.0 mmol/L   Blood gas venous   Result Value Ref Range    pH Venous 7.36 7.32 - 7.43    pCO2 Venous 31 (L) 40 - 50 mm Hg    pO2 Venous 26 25 - 47 mm Hg    Bicarbonate Venous 18 (L) 21 - 28 mmol/L    Base Excess/Deficit (+/-) -6.8 -7.7 - 1.9 mmol/L    FIO2 3    Glucose by meter   Result Value Ref Range    GLUCOSE BY METER POCT 291 (H) 70 - 99 mg/dL   Basic metabolic panel   Result Value Ref Range    Sodium 141 136 - 145 mmol/L    Potassium 5.0 3.4 - 5.3 mmol/L    Chloride 107 98 - 107 mmol/L    Carbon Dioxide (CO2) 12 (L) 22 - 29 mmol/L    Anion Gap 22 (H) 7 - 15 mmol/L    Urea Nitrogen 50.0 (H) 8.0 - 23.0 mg/dL    Creatinine 2.89 (H) 0.67 - 1.17 mg/dL    Calcium 8.1 (L) 8.8 - 10.2 mg/dL    Glucose 328 (H) 70 - 99 mg/dL    GFR Estimate 21 (L) >60 mL/min/1.73m2   Ketone Beta-Hydroxybutyrate Quantitative   Result Value Ref Range    Ketone (Beta-Hydroxybutyrate) Quantitative 5.6 (HH) 0.0 - 0.6 mmol/L    Narrative    Result confirmed by repeated test   Glucose by meter   Result Value Ref Range    GLUCOSE BY METER POCT 287 (H) 70 - 99 mg/dL   Glucose by meter   Result Value Ref Range    GLUCOSE BY METER POCT 260 (H) 70 - 99 mg/dL   Basic metabolic panel   Result Value Ref Range    Sodium 141 136 - 145 mmol/L    Potassium 4.6 3.4 - 5.3 mmol/L    Chloride 110 (H) 98 - 107 mmol/L    Carbon Dioxide (CO2) 17 (L) 22 - 29 mmol/L    Anion Gap 14 7 - 15 mmol/L    Urea Nitrogen 51.9 (H) 8.0 - 23.0 mg/dL    Creatinine 2.92 (H) 0.67 - 1.17  mg/dL    Calcium 8.3 (L) 8.8 - 10.2 mg/dL    Glucose 305 (H) 70 - 99 mg/dL    GFR Estimate 21 (L) >60 mL/min/1.73m2   XR Chest Port 1 View    Narrative    EXAM: XR CHEST PORT 1 VIEW  LOCATION: [facility]  DATE/TIME: 11/17/2022 6:22 PM    INDICATION: Possible aspiration.  COMPARISON: 11/12/2022      Impression    IMPRESSION:     Increasing hazy opacities in the mid to lower lungs bilaterally, likely layering pleural effusions with adjacent compressive atelectasis. Superimposed infiltrate such as pneumonia or aspiration is difficult to exclude. No pneumothorax.    The cardiomediastinal silhouette is unremarkable. Aortic calcifications.   Glucose by meter   Result Value Ref Range    GLUCOSE BY METER POCT 191 (H) 70 - 99 mg/dL   Basic metabolic panel   Result Value Ref Range    Sodium 144 136 - 145 mmol/L    Potassium 4.4 3.4 - 5.3 mmol/L    Chloride 111 (H) 98 - 107 mmol/L    Carbon Dioxide (CO2) 19 (L) 22 - 29 mmol/L    Anion Gap 14 7 - 15 mmol/L    Urea Nitrogen 53.2 (H) 8.0 - 23.0 mg/dL    Creatinine 2.95 (H) 0.67 - 1.17 mg/dL    Calcium 8.2 (L) 8.8 - 10.2 mg/dL    Glucose 181 (H) 70 - 99 mg/dL    GFR Estimate 21 (L) >60 mL/min/1.73m2   Glucose by meter   Result Value Ref Range    GLUCOSE BY METER POCT 126 (H) 70 - 99 mg/dL   Glucose by meter   Result Value Ref Range    GLUCOSE BY METER POCT 140 (H) 70 - 99 mg/dL   Glucose by meter   Result Value Ref Range    GLUCOSE BY METER POCT 149 (H) 70 - 99 mg/dL   CBC with platelets   Result Value Ref Range    WBC Count 10.6 4.0 - 11.0 10e3/uL    RBC Count 3.91 (L) 4.40 - 5.90 10e6/uL    Hemoglobin 12.1 (L) 13.3 - 17.7 g/dL    Hematocrit 36.9 (L) 40.0 - 53.0 %    MCV 94 78 - 100 fL    MCH 30.9 26.5 - 33.0 pg    MCHC 32.8 31.5 - 36.5 g/dL    RDW 14.6 10.0 - 15.0 %    Platelet Count 143 (L) 150 - 450 10e3/uL   Basic metabolic panel   Result Value Ref Range    Sodium 144 136 - 145 mmol/L    Potassium 4.9 3.4 - 5.3 mmol/L    Chloride 110 (H) 98 - 107 mmol/L     Carbon Dioxide (CO2) 15 (L) 22 - 29 mmol/L    Anion Gap 19 (H) 7 - 15 mmol/L    Urea Nitrogen 54.1 (H) 8.0 - 23.0 mg/dL    Creatinine 3.05 (H) 0.67 - 1.17 mg/dL    Calcium 8.2 (L) 8.8 - 10.2 mg/dL    Glucose 190 (H) 70 - 99 mg/dL    GFR Estimate 20 (L) >60 mL/min/1.73m2   Ketone Beta-Hydroxybutyrate Quantitative   Result Value Ref Range    Ketone (Beta-Hydroxybutyrate) Quantitative 3.3 (HH) 0.0 - 0.6 mmol/L

## 2022-11-18 NOTE — PROGRESS NOTES
[facility]    Hospitalist Progress Note  Name: Eh Callahan    MRN: [id number]  Provider:  Lebron Crawofrd DO  Date of Service: 11/18/2022    Summary of Stay: Eh Callahan is a 82 year old male with a history of dementia, atrial fibrillation, hypertension, chronic kidney disease, PTSD admitted on 11/10/2022 with generalized weakness and poor oral intake.  Of note, the patient was recently hospitalized from 10/12 to 10/22/2022 for the treatment of toxic encephalopathy in the setting of a urinary tract infection with uncontrolled hypertension and acute kidney injury.  The patient was discharged home with family.  In the emergency department, the patient was found to have a temperature of 98.1  F, blood pressure 179/82, heart rate 73, respiratory rate 16, SPO2 92% on room air.  Initial lab work showed WBC 6.6, hemoglobin 12.6, sodium 133, potassium 5.4, BUN/creatinine 49.2/2.43, calcium 8.2, glucose 274, albumin 3.2, alkaline phosphatase 175.  CT head showed no acute changes, moderate to advanced volume loss and mild presumed sequela of chronic microvascular ischemic change.  Chest x-ray showed new bibasilar opacities which may represent effusion with associated atelectasis, left greater than right associated patchy airspace opacities concerning for superimposed pneumonia/pneumonitis.  The patient was started on IV ceftriaxone and azithromycin for treatment of community-acquired pneumonia.  Speech therapy was consulted to see the patient with concern for aspiration.  Palliative care was also consulted to see the patient with concern for progression to have his dementia.  Repeat chest x-ray on 11/12 showed worsening infiltrates.  In addition, the patient had increasing oxygen requirements.  Antibiotics were broadened to vancomycin and cefepime.  Vancomycin was eventually discontinued due to rising creatinine.  Speech therapy recommended regular texture diet with thin liquids.  The patient's  hospital course was complicated by patient refusal to take his oral medications.  Because of this the patient developed hypertensive urgency.  IV antihypertensives for an effective.  On 11/16, the patient was transferred to the ICU for nicardipine drip.  Multiple family discussions were held during the patient's hospitalization regarding CODE STATUS and a recommendation for hospice.  The patient continued to refuse oral intake and his condition worsened.  The patient did show signs of starvation ketosis.  Multiple discussions with the family were held regarding tube feeds.  It was recommended against tube feeds as it would not solve the underlying problem which is his advanced dementia and chronic aspiration.     TODAY'S PLAN: Daughter is at bedside.  Patient unresponsive today.  Was interactive yesterday but quite confused.  Patient is slightly tachypneic.  It was discussed with the patient's family that the patient is dying.  We discussed that his dementia has likely progressed.  In addition, the patient does not handling his secretions and is chronically aspirating.  We discussed that we have him on antibiotics for concern for aspiration pneumonia, but he will likely continue to aspirate and antibiotics may not be effective eventually.  We discussed that tube feeds ultimately will not change the underlying issue.  Patient's family expressed understanding, but also expressed that they wanted to make sure everything had been attempted.  Patient is family expressed understanding regarding the risks of starting tube feeds and placement of the NG tube and wished to proceed.  Patient's family member from [place] plans to come to the [place] tomorrow.  We will continue CODE STATUS discussions.  Overall prognosis is poor.    Problem List:   Acute Hypoxic Respiratory Failure  Acute Metabolic vs Septic Encephalopathy vs Delerium vs Progression of Dementia  Possible Pneumonia vs Atelectasis vs Pneumonitis  Bilateral Pleural  Effusion  - CT head on admit showed no acute abnormality  - CXR on 11/10 showed new bibasilar opacities, L>R patchy airspace opacities with superimposed pneumonia/pneumonitis  - Repeat CXR on 11/12 showed worsening infiltrates and worsening oxygenation  - Initially on ceftriaxone and azithromycin - now transitioned to cefepime due to progression of infiltrates on CXR  - Vanco discontinued on 11/15 due to rising creatinine  - s/p R-sided Thoracentesis on 11/14 with 750 mL out - fluid analysis consistent with transudative process  - Appreciate Palliative Care recommendations  - NPO     Hypertensive Urgency  - Pt with uncontrolled BP.  Complicated by pt intermittently refusing medications  - Started on Nicardipine drip on 11/16 and transferred to the ICU  - Continue PTA Hydralazine 50 mg TID, Isordil 20 mg TID  - Also on IV hydralazine prn and IV lopressor 5 mg q6h  - PTA Amlodipine on hold while on Nicardipine drip     AGMA - Suspect Starvation Ketosis  Hyperkalemia - resolved  Chronic Kidney Disease Stage IIIb-IV  - Baseline Cr = 2.2-2.5  - Continue PTA Sodium Bicarbonate  - LA on 11/17 was 1.5, VBG unremarkable  - Beta-hydroxy 5.6 -> 3.3  - Daily BMP     Dementia  - Continue PTA risperidone  - Zyprexa prn     Type 2 Diabetes Mellitus  - A1C = 9.0 on 10/12/2022  - Increase lantus to 20 units qAM  - ISS     Hypernatremia  - Improved  - Daily BMP     Paroxysmal Atrial Fibrillation  - Continue IV metoprolol     Goals of Care  - Palliative Care consulted.  Multiple meetings with pt and family were conducted during this hospitalization.  Family wishes for full restorative cares at this time.  Feeding tube was discussed and pt family wishes to attempt TF, if needed.  Hospice was discussed as well, but family does not think pt is ready for hospice.     Chronic Medical Problems:  Hx of Hepatitis C  PTSD    DVT Prophylaxis: Pneumatic Compression Devices  Code Status: Full Code  Diet: NPO for Medical/Clinical Reasons Except  for: No Exceptions    Liu Catheter: PRESENT, indication: Strict 1-2 Hour I&O  Disposition: Expected discharge pending clinical course  Family updated today: Yes      Interval History   Pt seen and examined.  Pt unresponsive.    -Data reviewed today: I personally reviewed all new labs and imaging results over the last 24 hours.     Physical Exam   Temp: 98  F (36.7  C) Temp src: Temporal BP: (!) 183/83 Pulse: 105   Resp: (!) 31 SpO2: 96 % O2 Device: None (Room air) Oxygen Delivery: 2 LPM  Vitals:    11/16/22 0558 11/17/22 0400 11/18/22 0504   Weight: 65.9 kg (145 lb 4 oz) 65.1 kg (143 lb 8.3 oz) 66.8 kg (147 lb 4.3 oz)     Vital Signs with Ranges  Temp:  [97.3  F (36.3  C)-99.8  F (37.7  C)] 98  F (36.7  C)  Pulse:  [] 105  Resp:  [16-34] 31  BP: (109-183)/() 183/83  SpO2:  [90 %-100 %] 96 %  I/O last 3 completed shifts:  In: 34.38 [I.V.:34.38]  Out: 340 [Urine:340]    GENERAL: Unresponive  HEENT: Normocephalic, atraumatic.   CARDIOVASCULAR: Regular rate and rhythm without murmurs or rubs. No S3.  PULMONARY: Coarse BS bilaterally  GASTROINTESTINAL: Soft, non-tender, non-distended. Bowel sounds normoactive.   EXTREMITIES: No cyanosis or clubbing. No edema.  NEUROLOGICAL: unresponsive to verbal stimuli  DERMATOLOGICAL: No rash, ulcer, bruising, nor jaundice.    Medications       aspirin  81 mg Oral Daily     atorvastatin  40 mg Oral QPM     ceFEPIme  2 g Intravenous Q24H     famotidine  10 mg Oral BID     hydrALAZINE  50 mg Oral TID     insulin aspart  1-12 Units Subcutaneous Q4H     insulin glargine  20 Units Subcutaneous QAM AC     isosorbide dinitrate  20 mg Oral TID     melatonin  5 mg Oral Daily with supper     metoprolol  5 mg Intravenous Q6H     [Held by provider] metoprolol tartrate  50 mg Oral BID     multivitamin, therapeutic  1 tablet Oral Daily     risperiDONE  0.25 mg Oral Daily     sodium bicarbonate  1,300 mg Oral BID     sodium chloride (PF)  3 mL Intracatheter Q8H     traZODone  75 mg  Oral At Bedtime     Vitamin D3  25 mcg Oral Daily     Data     Laboratory:  Recent Labs   Lab 11/18/22  0518 11/17/22  0529 11/15/22  0634   WBC 10.6 8.5 7.4   HGB 12.1* 11.9* 11.7*   HCT 36.9* 36.2* 37.6*   MCV 94 95 99   * 138* 150     Recent Labs   Lab 11/18/22  0820 11/18/22  0518 11/18/22  0332 11/18/22  0023 11/17/22 2122 11/17/22 1956 11/17/22  1556   NA  --  144  --   --  144  --  141   POTASSIUM  --  4.9  --   --  4.4  --  4.6   CHLORIDE  --  110*  --   --  111*  --  110*   CO2  --  15*  --   --  19*  --  17*   ANIONGAP  --  19*  --   --  14  --  14   * 190* 149*   < > 181*   < > 305*   BUN  --  54.1*  --   --  53.2*  --  51.9*   CR  --  3.05*  --   --  2.95*  --  2.92*   GFRESTIMATED  --  20*  --   --  21*  --  21*   DOMINGO  --  8.2*  --   --  8.2*  --  8.3*    < > = values in this interval not displayed.     No results for input(s): CULT in the last 168 hours.    Imaging:  Recent Results (from the past 24 hour(s))   XR Chest Port 1 View    Narrative    EXAM: XR CHEST PORT 1 VIEW  LOCATION: [facility]  DATE/TIME: 11/17/2022 6:22 PM    INDICATION: Possible aspiration.  COMPARISON: 11/12/2022      Impression    IMPRESSION:     Increasing hazy opacities in the mid to lower lungs bilaterally, likely layering pleural effusions with adjacent compressive atelectasis. Superimposed infiltrate such as pneumonia or aspiration is difficult to exclude. No pneumothorax.    The cardiomediastinal silhouette is unremarkable. Aortic calcifications.         Lebron Crawford DO  [facility] Hospitalist  [address] [place] [address]  11/18/2022

## 2022-11-18 NOTE — PROVIDER NOTIFICATION
DATE:  11/18/2022   TIME OF RECEIPT FROM LAB:    LAB TEST:  Ketones  LAB VALUE:  3.3  RESULTS GIVEN WITH READ-BACK TO (PROVIDER):  Tele hub called with result, improved from yesterday  TIME LAB VALUE REPORTED TO PROVIDER:   0503

## 2022-11-18 NOTE — PHARMACY-CONSULT NOTE
Pharmacy Tube Feeding Consult    Medication reviewed for administration by feeding tube and for potential food/drug interactions.    Recommendation:   - Asprin to chewable to crush   - All other med routes updated as appropriate    Pharmacy will continue to follow as new medications are ordered.      Ricardo Arias, Pharm.D., BCPS

## 2022-11-18 NOTE — CONSULTS
"CLINICAL NUTRITION SERVICES - BRIEF NOTE    Received provider order for \"Registered Dietitian to order TF per Medical Nutrition Therapy Guidelines\"     NEW FINDINGS   Patient discussed during IDT rounds and again with Provider. Patient's medical condition has been discussed extensively with family. Family is aware that patient is not managing his own secretions and that there are risks associated with tube feeding. Family wishes to proceed with NG tube placement and enteral nutrition support.     Current diet order: NPO    Labs: BUN 54.1, Cr 3.05, Anion gap- 19, Ketones- 3.3--likely d/t starvation     Medications/infusions: lipitor, famotidine, sliding scale insulin, Lantus, mvit, vit D3    ASSESSED NUTRITION NEEDS  Dosing Weight: 65.1 kg  Estimated Energy Needs: 1580+ kcals/day ([place] Jeor x AF 1.2)  Justification: Maintenance  Estimated Protein Needs: 65+ grams protein/day (1+ grams of pro/kg)  Justification: TRANG on CKD, but with inflammation  Estimated Fluid Needs: 1 mL/kcal or per provider pending fluid status    INTERVENTIONS  Implementation  Enteral Nutrition - Initiate- Jevity 1.5 Kareem @ goal of  45ml/hr  (1080ml/day)  will provide: 1620 kcals (103% of estimated needs), 68 g PRO (130%o of low end estimated needs), 820 ml free H20, 232 g CHO, and 22 g fiber daily.  Flushes of 120 ml q 4 hrs= 720 ml + 820 ml = 1540 ml  Patient is at high risk of refeeding syndrome based on the information available during this admission--slow advancement of TF based on labs--order baseline P and Mg, recheck x next 2 days, and then every Monday moving forward  Certavite daily    Monitoring/Evaluation  Will continue to monitor and evaluate per protocol.    Audra Araujo, TOBY, LD, [facility]  Pager - 3rd floor/ICU: [phone]  Pager - All other floors: [phone]  Pager - Weekend/holiday: [phone]  Office: [phone]        "

## 2022-11-18 NOTE — PLAN OF CARE
ICU End of Shift Summary.  For vital signs and complete assessments, please see documentation flowsheets.      Pertinent assessments: Afebrile. Lethargic and withdrawn throughout night. Arouses briefly with cares or repeated stimulation. No c/o pain, no signs of pain per CPOT. BP's elevated at times, prn metoprolol given with no effect, prn hydralazine given with some improvement. Incontinent of loose stool x 2. Urine retention, fischer placed with good urine return. Borderline low urine output after. Turned and repositioned throughout night.  Major Shift Events: Remained off nicardipine gtt. Remained strict NPO overnight d/t possible aspirating on PO intake. Oral cares done.  Plan (Upcoming Events): Monitor BP's closely and give Prn's  Discharge/Transfer Needs: TBD pending progress.     Bedside Shift Report Completed   Bedside Safety Check Completed    Goal Outcome Evaluation:    Right wrist and Left wrist restraints continued 11/18/2022    Clinical Justification: Pulling lines, pulling tubes, and pulling equipment  Less Restrictive Alternative: 1:1 patient care, Repositioning, De-escalation, Re-evaluate equipment  Attending Physician Notified: Yes, Attending Physician's Name: Dr. WALI Cage   New orders placed No         Trish Sifuentes, POLLO

## 2022-11-19 NOTE — PHARMACY
Pharmacy Tube Feeding Consult    Medication reviewed for administration by feeding tube and for potential food/drug interactions.    Recommendation: Recommend changing the following medications to a liquid dosage form: senokot-s, famotidine, tylenol     Pharmacy will continue to follow as new medications are ordered.

## 2022-11-19 NOTE — PROGRESS NOTES
ICU End of Shift Summary.  For vital signs and complete assessments, please see documentation flowsheets.      Pertinent assessments:  CV: SR/ST, patient had persistent HTN SBP >170 despite giving 1 dose of PRN hydralazine-telehub made aware. 1 dose of PRN Labetalol also given.   Neuro: Pt. Confused, moaning for majority of night. PRN tylenol and PRN PO dilaudid given for pain.   Pulm: Pt. On 2 L NC, pt. Had increased secretions and audible gurgling-oral suctioning attempted but patient unable to cough up secretions on own. Patient became tachycardic and tachypenic- telehub notified and ordered PRN NT suctioning which helped temporarily  Renal: Liu in place   GI: No BM overnight, TF's were advanced at 0100 to 25 mL/hour  Endo: Q4h blood sugars checks    Discharge/Transfer Needs: Pending clinical status     Bedside Shift Report Completed : Yes  Bedside Safety Check Completed: Yes

## 2022-11-19 NOTE — PROVIDER NOTIFICATION
Telehub called at 0240 and Fanny ABAD was spoken to about  elevated systolic blood pressure >180  despite giving PRN hydralazine and tylenol/dilaudid for pain control. Restart nicardipine drip?

## 2022-11-19 NOTE — PROVIDER NOTIFICATION
Telehub called at 0027 for increased secretion burden, pt. Unable to cough up audible secretions, tachycardic and tachypneic. Telehub Fanny ABAD notified, NT suctioning order?

## 2022-11-19 NOTE — PLAN OF CARE
ICU End of Shift Summary.  For vital signs and complete assessments, please see documentation flowsheets.     Pertinent assessments: Lethargic, does not follow commands. Occasionally moans. LS clear/diminished. On 2L O2. Weak, productive cough. Frequent oral suctioning needed. Keofeed intact. Liu in place, low UOP. BP soft. PIVx2 intact. Mitts in place.   Major Shift Events: Keofeed tube feeding started. BP low with MAPs in 50s-500cc bolus started. Family at bedside. 2 loose BMs. Tylenol given for comfort. Seroquel given for agitation.   Plan (Upcoming Events): Monitor respiratory status   Discharge/Transfer Needs: TBD    Bedside Shift Report Completed  Bedside Safety Check Completed        Right wrist and Left wrist restraints discontinued at 1900  on 11/18/2022.    Restraint discontinue criteria met, patient is calm, cooperative and safe. Restraints removed.     Patient's Response: No evidence of learning  Family Notification: Other  Attending Physician Notified: MD ordered restraint, Attending Physician's Name: Dr Ty Mathis, RN

## 2022-11-19 NOTE — PROGRESS NOTES
[facility]    Hospitalist Progress Note  Name: Eh Callahan    MRN: [id number]  Provider:  Lebron Crawford DO  Date of Service: 11/19/2022    Summary of Stay: Eh Callahan is a 82 year old male with a history of dementia, atrial fibrillation, hypertension, chronic kidney disease, PTSD admitted on 11/10/2022 with generalized weakness and poor oral intake.  Of note, the patient was recently hospitalized from 10/12 to 10/22/2022 for the treatment of toxic encephalopathy in the setting of a urinary tract infection with uncontrolled hypertension and acute kidney injury.  The patient was discharged home with family.  In the emergency department, the patient was found to have a temperature of 98.1  F, blood pressure 179/82, heart rate 73, respiratory rate 16, SPO2 92% on room air.  Initial lab work showed WBC 6.6, hemoglobin 12.6, sodium 133, potassium 5.4, BUN/creatinine 49.2/2.43, calcium 8.2, glucose 274, albumin 3.2, alkaline phosphatase 175.  CT head showed no acute changes, moderate to advanced volume loss and mild presumed sequela of chronic microvascular ischemic change.  Chest x-ray showed new bibasilar opacities which may represent effusion with associated atelectasis, left greater than right associated patchy airspace opacities concerning for superimposed pneumonia/pneumonitis.  The patient was started on IV ceftriaxone and azithromycin for treatment of community-acquired pneumonia.  Speech therapy was consulted to see the patient with concern for aspiration.  Palliative care was also consulted to see the patient with concern for progression to have his dementia.  Repeat chest x-ray on 11/12 showed worsening infiltrates.  In addition, the patient had increasing oxygen requirements.  Antibiotics were broadened to vancomycin and cefepime.  Vancomycin was eventually discontinued due to rising creatinine.  Speech therapy recommended regular texture diet with thin liquids.  The patient's  hospital course was complicated by patient refusal to take his oral medications.  Because of this the patient developed hypertensive urgency.  IV antihypertensives for an effective.  On 11/16, the patient was transferred to the ICU for nicardipine drip.  Multiple family discussions were held during the patient's hospitalization regarding CODE STATUS and a recommendation for hospice.  The patient continued to refuse oral intake and his condition worsened.  The patient did show signs of starvation ketosis.  Multiple discussions with the family were held regarding tube feeds.  It was recommended against tube feeds as it would not solve the underlying problem which is his advanced dementia and chronic aspiration.      TODAY'S PLAN: NG placed yesterday and tube feeds started.  Patient still unresponsive today and moaning.  Restarted oral medications.  Family at bedside.  We discussed that the patient is continuously aspirating and it is very possible that his oxygen requirements will continue to climb.  At some point, he may need to be intubated and placed on a mechanical ventilator.  Patient's family expressed understanding.  Ongoing family discussions, both the patient remains full code.  Patient is not a candidate for PEG tube given his aspiration issues as well as concern for anesthesia complicating the patient's dementia.  This was discussed with the family.  Overall, patient has a poor prognosis in the setting of advanced dementia with dysphagia and starvation ketosis.    Problem List:   Acute Hypoxic Respiratory Failure  Acute Metabolic vs Septic Encephalopathy vs Delerium vs Progression of Dementia  Possible Pneumonia vs Atelectasis vs Pneumonitis  Bilateral Pleural Effusion  - CT head on admit showed no acute abnormality  - CXR on 11/10 showed new bibasilar opacities, L>R patchy airspace opacities with superimposed pneumonia/pneumonitis  - Repeat CXR on 11/12 showed worsening infiltrates and worsening  oxygenation  - Initially on ceftriaxone and azithromycin - now transitioned to cefepime due to progression of infiltrates on CXR  - Vanco discontinued on 11/15 due to rising creatinine  - s/p R-sided Thoracentesis on 11/14 with 750 mL out - fluid analysis consistent with transudative process  - Appreciate Palliative Care recommendations  - NPO     Dysphagia  - NG tube placed on 11/18 and TF started.  This was after a long discussion with the patient's family.  It was recommended against proceeding with placing an NG tube and starting tube feeds as it would not solve the underlying problem which is his advanced dementia and dysphagia.  Family wished to proceed with placing NG tube to see if restarting his home medications would improve the patient's condition    Hypertensive Urgency  - Pt with uncontrolled BP.  Complicated by pt intermittently refusing medications  - Started on Nicardipine drip on 11/16 and transferred to the ICU  - Continue PTA Hydralazine 50 mg TID, Isordil 20 mg TID  - Also on IV hydralazine prn and IV lopressor 5 mg q6h  - PTA Amlodipine on hold while on Nicardipine drip     AGMA - Suspect Starvation Ketosis  Hyperkalemia - resolved  Chronic Kidney Disease Stage IIIb-IV  - Baseline Cr = 2.2-2.5  - Continue PTA Sodium Bicarbonate  - LA on 11/17 was 1.5, VBG unremarkable  - Beta-hydroxy 5.6 -> 3.3  - Daily BMP     Dementia  - Continue PTA risperidone  - Zyprexa prn     Type 2 Diabetes Mellitus  - A1C = 9.0 on 10/12/2022  - Increase lantus to 20 units qAM  - ISS     Hypernatremia  - Improved  - Daily BMP     Paroxysmal Atrial Fibrillation  - Continue IV metoprolol     Goals of Care  - Palliative Care consulted.  Multiple meetings with pt and family were conducted during this hospitalization.  Family wishes for full restorative cares at this time.  Feeding tube was discussed and pt family wishes to attempt TF, if needed.  Hospice was discussed as well, but family does not think pt is ready for  hospice.     Chronic Medical Problems:  Hx of Hepatitis C  PTSD    DVT Prophylaxis: Pneumatic Compression Devices  Code Status: Full Code  Diet: NPO for Medical/Clinical Reasons Except for: No Exceptions  Adult Formula Drip Feeding: Continuous Jevity 1.5; Other - Specify in Comment; Goal Rate: 45; mL/hr; Initial rate of 15 ml/hr advancing by 10 ml q 8 hrs to goal rate of 45 ml/hr; Do not advance tube feeding rate unless K+ is = or > 3.0, Mg++ is = ...    Liu Catheter: PRESENT, indication: Strict 1-2 Hour I&O  Disposition: Expected discharge pending clinical course.  Family updated today: Yes      Interval History   Pt seen and examined.  Pt moaning and unresponsive.    -Data reviewed today: I personally reviewed all new labs and imaging results over the last 24 hours.     Physical Exam   Temp: 97.7  F (36.5  C) Temp src: Temporal BP: (!) 158/63 Pulse: 65   Resp: 29 SpO2: 99 % O2 Device: Nasal cannula Oxygen Delivery: 3 LPM  Vitals:    11/17/22 0400 11/18/22 0504 11/19/22 0359   Weight: 65.1 kg (143 lb 8.3 oz) 66.8 kg (147 lb 4.3 oz) 66.1 kg (145 lb 11.6 oz)     Vital Signs with Ranges  Temp:  [97.3  F (36.3  C)-100  F (37.8  C)] 97.7  F (36.5  C)  Pulse:  [] 65  Resp:  [12-42] 29  BP: ()/(44-92) 158/63  SpO2:  [92 %-100 %] 99 %  I/O last 3 completed shifts:  In: 1183 [I.V.:503; NG/GT:460]  Out: 573 [Urine:573]    GENERAL: Moaning  HEENT: Normocephalic, atraumatic. Extraocular movements intact.  CARDIOVASCULAR: Regular rate and rhythm without murmurs or rubs. No S3.  PULMONARY: Coarse BS bilaterally  GASTROINTESTINAL: Soft, non-distended. Bowel sounds normoactive.   EXTREMITIES: No cyanosis or clubbing. No edema.  NEUROLOGICAL: CN 2-12 grossly intact, no focal neurological deficits.  DERMATOLOGICAL: No rash, ulcer, bruising, nor jaundice.    Medications     dextrose         [START ON 11/20/2022] aspirin  81 mg Per Feeding Tube Daily     atorvastatin  40 mg Per Feeding Tube QPM     ceFEPIme  2 g  Intravenous Q24H     [START ON 11/20/2022] cholecalciferol  25 mcg Per Feeding Tube Daily     [START ON 11/20/2022] famotidine  10 mg Per Feeding Tube Daily     hydrALAZINE  50 mg Oral or Feeding Tube TID     insulin aspart  2 Units Subcutaneous Q4H     insulin aspart  1-12 Units Subcutaneous Q4H     insulin glargine  20 Units Subcutaneous QAM AC     isosorbide dinitrate  20 mg Oral or Feeding Tube TID     melatonin  5 mg Per Feeding Tube Daily with supper     metoprolol tartrate  50 mg Oral BID     multivitamins w/minerals  15 mL Per Feeding Tube Daily     risperiDONE  0.25 mg Per Feeding Tube Daily     sodium bicarbonate  1,300 mg Per Feeding Tube BID     sodium chloride (PF)  3 mL Intracatheter Q8H     traZODone  75 mg Per Feeding Tube At Bedtime     Vitamin D3  25 mcg Oral or Feeding Tube Daily     Data     Laboratory:  Recent Labs   Lab 11/19/22  0528 11/18/22  0518 11/17/22  0529   WBC 10.2 10.6 8.5   HGB 12.5* 12.1* 11.9*   HCT 39.2* 36.9* 36.2*   MCV 98 94 95    143* 138*     Recent Labs   Lab 11/19/22  0810 11/19/22  0528 11/19/22  0348 11/18/22  0820 11/18/22  0518 11/18/22  0023 11/17/22 2122   NA  --  144  --   --  144  --  144   POTASSIUM  --  3.9  --   --  4.9  --  4.4   CHLORIDE  --  112*  --   --  110*  --  111*   CO2  --  22  --   --  15*  --  19*   ANIONGAP  --  10  --   --  19*  --  14   * 230* 222*   < > 190*   < > 181*   BUN  --  53.9*  --   --  54.1*  --  53.2*   CR  --  3.11*  --   --  3.05*  --  2.95*   GFRESTIMATED  --  19*  --   --  20*  --  21*   DOMINGO  --  8.5*  --   --  8.2*  --  8.2*    < > = values in this interval not displayed.     No results for input(s): CULT in the last 168 hours.    Imaging:  Recent Results (from the past 24 hour(s))   XR Abdomen Port 1 View    Narrative    XR ABDOMEN PORT 1 VIEW   11/18/2022 1:52 PM     HISTORY: confirm tip is post pylorus    COMPARISON: CT 10/13/2022.      Impression    IMPRESSION: Placement of feeding catheter, tip overlying the  distal  stomach, not postpyloric, consider advancement of approximately 10 cm.  No evidence of bowel obstruction. Persistent moderate bilateral  pleural effusions and basilar atelectasis. No acute bony abnormality.  Body wall edema.    CHANCE ELLIS MD         SYSTEM ID:  [id number]         Lebron Crawford DO  [facility] Hospitalist  [address] [place] [address]  11/19/2022

## 2022-11-20 NOTE — PROVIDER NOTIFICATION
Bruce DUBOIS notified because patient briefly went asystole and spontaneously returned back to SB then SR. Patient desaturating to high 80's and up to 5 L NC. Order placed to check electrolytes. Will continue to monitor.

## 2022-11-20 NOTE — PROVIDER NOTIFICATION
Hospitalist Alanna called and notified that patient HR mid 40's and softer blood pressures (94/45). Patient has been receiving scheduled anti-hypertensive medications via feeding tube. MD holding anti-hypertensive meds and  mL bolus given. Will continue to monitor patient.

## 2022-11-20 NOTE — PLAN OF CARE
Shift Events: no improvement in mentation or tolerance of secretions    Neuro: moans and sighs, not speaking or following commands, very weak   CV: SR, HTN  ID: afebrile  Pulm: LS coarse, 3L NC, unable to clear secretions, pts cough is weak, frequent PRN suctioning provided but family cautioned that this level of frequency is not sustainable and pt should try to clear secretions independently   GI: TF at goal, no BM  : UO low, patent fischer  Lines/gtts: PIVs SL  Skin: inact  Labs: stable    For vital signs and complete assessments, see documentation flowsheets.     Plan: transfer out of ICU

## 2022-11-20 NOTE — PROGRESS NOTES
Speech Language Therapy Discharge Summary    Reason for therapy discharge:    Change in medical status.    Progress towards therapy goal(s). See goals on Care Plan in [facility] electronic health record for goal details.  Goals not met.  Barriers to achieving goals:   limited tolerance for therapy.    Therapy recommendation(s):    No further therapy is recommended.

## 2022-11-20 NOTE — PLAN OF CARE
Goal Outcome Evaluation:    Not progressing Overall    ICU End of Shift Summary.  For vital signs and complete assessments, please see documentation flowsheets.      Pertinent assessments: Pt is [place] citizen speaking and non-verbal since this hospitalization with dementia at baseline. He does not follow commands given by family and does not engage in conversation. Pt moans with agitation and cares. HR remains labile with PACs, HR 40-110s. One run of self limiting SVT seen during increased stimulation from family. BP remains slightly elevated with -150s, scheduled BP medications remain held per provider. LS coarse throughout and diminished in the bases, maintaining sats with 4 L NC. Cannot cough up or swallow secretions, secretions remain deep in throat. Scopolamine patch in place and robinul given, no decrease in secretions seen. Family helping to oral suction as needed. Keofeed in place with TF at goal rate of 45 mL/hr with 120 mL free water flush q4h.  Arms and hands swollen and elevated. Family states soles of pt feet and palms of hands are darker than normal, explained how that is seen with organ failure and the dying process.     Major Shift Events: Code status changed- OK for intubation, NO CPR.     Plan (Upcoming Events): Continue cares

## 2022-11-20 NOTE — PROGRESS NOTES
[facility]    Hospitalist Progress Note  Name: Eh Callahan    MRN: [id number]  Provider:  Lebron Crawford DO  Date of Service: 11/20/2022    Summary of Stay: Eh Callahan is a 82 year old male with a history of dementia, atrial fibrillation, hypertension, chronic kidney disease, PTSD admitted on 11/10/2022 with generalized weakness and poor oral intake.  Of note, the patient was recently hospitalized from 10/12 to 10/22/2022 for the treatment of toxic encephalopathy in the setting of a urinary tract infection with uncontrolled hypertension and acute kidney injury.  The patient was discharged home with family.  In the emergency department, the patient was found to have a temperature of 98.1  F, blood pressure 179/82, heart rate 73, respiratory rate 16, SPO2 92% on room air.  Initial lab work showed WBC 6.6, hemoglobin 12.6, sodium 133, potassium 5.4, BUN/creatinine 49.2/2.43, calcium 8.2, glucose 274, albumin 3.2, alkaline phosphatase 175.  CT head showed no acute changes, moderate to advanced volume loss and mild presumed sequela of chronic microvascular ischemic change.  Chest x-ray showed new bibasilar opacities which may represent effusion with associated atelectasis, left greater than right associated patchy airspace opacities concerning for superimposed pneumonia/pneumonitis.  The patient was started on IV ceftriaxone and azithromycin for treatment of community-acquired pneumonia.  Speech therapy was consulted to see the patient with concern for aspiration.  Palliative care was also consulted to see the patient with concern for progression to have his dementia.  Repeat chest x-ray on 11/12 showed worsening infiltrates.  In addition, the patient had increasing oxygen requirements.  Antibiotics were broadened to vancomycin and cefepime.  Vancomycin was eventually discontinued due to rising creatinine.  Speech therapy recommended regular texture diet with thin liquids.  The patient's  hospital course was complicated by patient refusal to take his oral medications.  Because of this the patient developed hypertensive urgency.  IV antihypertensives for an effective.  On 11/16, the patient was transferred to the ICU for nicardipine drip.  Multiple family discussions were held during the patient's hospitalization regarding CODE STATUS and a recommendation for hospice.  The patient continued to refuse oral intake and his condition worsened.  The patient did show signs of starvation ketosis.  Multiple discussions with the family were held regarding tube feeds.  It was recommended against tube feeds as it would not solve the underlying problem which is his advanced dementia and chronic aspiration.    The patient did exhibit signs of multiorgan failure with rising creatinine and fluctuating heart rates.  On 11/20, the family elected for DNR, yes to prearrest intubation CODE STATUS.    TODAY'S PLAN: Updated family at bedside.  Kidney function is worsening.  Patient now with anasarca.  Patient still with copious oral secretions despite scopolamine patch.  Gave dose of glycopyrrolate this morning.  Patient with slight rise in his oxygen requirements.  He is also having heart rates ranging from 30s to 90s.  It was discussed with the family that the patient is likely entering multiorgan failure, as seen by his failing kidneys.  We discussed that as his organs begin to fail he will likely experience respiratory and cardiac issues.  The family was told the patient is actively dying.  The patient's family expressed that they understand the patient is dying.  CODE STATUS was readdressed.  It was recommended against doing CPR.  The patient's family, including his son, elected to change the patient's CODE STATUS to DNR.  Intubation was also readdressed.  At this time family would like intubation, if necessary.  All questions were answered.    Problem List:   Acute Hypoxic Respiratory Failure  Acute Metabolic vs  Septic Encephalopathy vs Delerium vs Progression of Dementia  Possible Pneumonia vs Atelectasis vs Pneumonitis  Bilateral Pleural Effusion  - CT head on admit showed no acute abnormality  - CXR on 11/10 showed new bibasilar opacities, L>R patchy airspace opacities with superimposed pneumonia/pneumonitis  - Repeat CXR on 11/12 showed worsening infiltrates and worsening oxygenation  - Initially on ceftriaxone and azithromycin - now transitioned to cefepime due to progression of infiltrates on CXR  - Vanco discontinued on 11/15 due to rising creatinine  - s/p R-sided Thoracentesis on 11/14 with 750 mL out - fluid analysis consistent with transudative process  - Appreciate Palliative Care recommendations  - NPO     Dysphagia  - NG tube placed on 11/18 and TF started.  This was after a long discussion with the patient's family.  It was recommended against proceeding with placing an NG tube and starting tube feeds as it would not solve the underlying problem which is his advanced dementia and dysphagia.  Family wished to proceed with placing NG tube to see if restarting his home medications would improve the patient's condition     Hypertensive Urgency  - Pt with uncontrolled BP.  Complicated by pt intermittently refusing medications  - Started on Nicardipine drip on 11/16 and transferred to the ICU  - Continue PTA Hydralazine 50 mg TID, Isordil 20 mg TID  - Also on IV hydralazine prn and IV lopressor 5 mg q6h  - PTA Amlodipine on hold while on Nicardipine drip     AGMA - Suspect Starvation Ketosis  Hyperkalemia - resolved  Chronic Kidney Disease Stage IIIb-IV  - Baseline Cr = 2.2-2.5  - Continue PTA Sodium Bicarbonate  - LA on 11/17 was 1.5, VBG unremarkable  - Beta-hydroxy 5.6 -> 3.3  - Daily BMP     Dementia  - Continue PTA risperidone  - Zyprexa prn     Type 2 Diabetes Mellitus  - A1C = 9.0 on 10/12/2022  - Increase lantus to 20 units qAM  - ISS     Hypernatremia  - Improved  - Daily BMP     Paroxysmal Atrial  Fibrillation  - Continue IV metoprolol     Goals of Care  - Palliative Care consulted.  Multiple meetings with pt and family were conducted during this hospitalization.  Family wishes for full restorative cares at this time.  Feeding tube was discussed and pt family wishes to attempt TF, if needed.  Hospice was discussed as well, but family does not think pt is ready for hospice.     Chronic Medical Problems:  Hx of Hepatitis C  PTSD    DVT Prophylaxis: Pneumatic Compression Devices  Code Status: No CPR- Pre-arrest intubation OK  Diet: NPO for Medical/Clinical Reasons Except for: No Exceptions  Adult Formula Drip Feeding: Continuous Jevity 1.5; Other - Specify in Comment; Goal Rate: 45; mL/hr; Initial rate of 15 ml/hr advancing by 10 ml q 8 hrs to goal rate of 45 ml/hr; Do not advance tube feeding rate unless K+ is = or > 3.0, Mg++ is = ...    Liu Catheter: PRESENT, indication: Strict 1-2 Hour I&O  Disposition: Expected discharge pending clinical course.  Family updated today: Yes      Interval History   Pt seen and examined.  Pt unresponsive, moaning.  Copious oral secreations.    -Data reviewed today: I personally reviewed all new labs and imaging results over the last 24 hours.     Physical Exam   Temp: 97.5  F (36.4  C) Temp src: Axillary BP: (!) 143/70 Pulse: 99   Resp: 27 SpO2: 100 % O2 Device: Nasal cannula Oxygen Delivery: 4 LPM  Vitals:    11/18/22 0504 11/19/22 0359 11/20/22 0522   Weight: 66.8 kg (147 lb 4.3 oz) 66.1 kg (145 lb 11.6 oz) 67.8 kg (149 lb 7.6 oz)     Vital Signs with Ranges  Temp:  [96.3  F (35.7  C)-98  F (36.7  C)] 97.5  F (36.4  C)  Pulse:  [43-99] 99  Resp:  [0-44] 27  BP: ()/(45-94) 143/70  SpO2:  [89 %-100 %] 100 %  I/O last 3 completed shifts:  In: 2610 [I.V.:600; NG/GT:1140]  Out: 450 [Urine:450]    GENERAL: Unresponsive, moaning  HEENT: Normocephalic, atraumatic. Extraocular movements intact.  CARDIOVASCULAR: Regular rate and rhythm without murmurs or rubs. No  S3.  PULMONARY: Coarse BS bilaterally  GASTROINTESTINAL: Soft, non-tender, non-distended. Bowel sounds normoactive.   EXTREMITIES: No cyanosis or clubbing. anasarca  NEUROLOGICAL: CN 2-12 grossly intact, no focal neurological deficits.  DERMATOLOGICAL: No rash, ulcer, bruising, nor jaundice.    Medications     dextrose         aspirin  81 mg Per Feeding Tube Daily     atorvastatin  40 mg Per Feeding Tube QPM     ceFEPIme  2 g Intravenous Q24H     cholecalciferol  25 mcg Per Feeding Tube Daily     famotidine  10 mg Per Feeding Tube Daily     [Held by provider] hydrALAZINE  50 mg Oral or Feeding Tube TID     insulin aspart  2 Units Subcutaneous Q4H     insulin aspart  1-12 Units Subcutaneous Q4H     insulin glargine  20 Units Subcutaneous QAM AC     [Held by provider] isosorbide dinitrate  20 mg Oral or Feeding Tube TID     melatonin  5 mg Per Feeding Tube Daily with supper     [Held by provider] metoprolol tartrate  50 mg Oral BID     multivitamins w/minerals  15 mL Per Feeding Tube Daily     risperiDONE  0.25 mg Per Feeding Tube Daily     scopolamine  1 patch Transdermal Q72H    And     scopolamine   Transdermal Q8H     sodium bicarbonate  1,300 mg Per Feeding Tube BID     sodium chloride (PF)  3 mL Intracatheter Q8H     traZODone  75 mg Per Feeding Tube At Bedtime     [Held by provider] Vitamin D3  25 mcg Oral or Feeding Tube Daily     Data     Laboratory:  Recent Labs   Lab 11/20/22  0553 11/19/22  0528 11/18/22  0518   WBC 9.8 10.2 10.6   HGB 12.3* 12.5* 12.1*   HCT 40.4 39.2* 36.9*   * 98 94   * 166 143*     Recent Labs   Lab 11/20/22  1159 11/20/22  0813 11/20/22  0553 11/20/22  0416 11/20/22  0319 11/19/22  0810 11/19/22  0528 11/18/22  0820 11/18/22  0518   NA  --   --  147*  --  146*  --  144  --  144   POTASSIUM  --   --   --   --  4.4  --  3.9  --  4.9   CHLORIDE  --   --   --   --  111*  --  112*  --  110*   CO2  --   --   --   --  24  --  22  --  15*   ANIONGAP  --   --   --   --  11  --   10  --  19*   * 225*  --  184* 240*   < > 230*   < > 190*   BUN  --   --   --   --  57.6*  --  53.9*  --  54.1*   CR  --   --  3.37*  --  3.53*  --  3.11*  --  3.05*   GFRESTIMATED  --   --  17*  --  17*  --  19*  --  20*   DOMINGO  --   --   --   --  8.1*  --  8.5*  --  8.2*    < > = values in this interval not displayed.     No results for input(s): CULT in the last 168 hours.    Imaging:  No results found for this or any previous visit (from the past 24 hour(s)).      Lebron Crawford DO  [facility] Hospitalist  [address] [place] [address]  11/20/2022

## 2022-11-20 NOTE — PROGRESS NOTES
ICU End of Shift Summary.  For vital signs and complete assessments, please see documentation flowsheets.      Pertinent assessments:  CV: HR SR 43-90, did go into asystole briefly (full screen and a half) and patient spontaneously returned back to SB then SR- MD notified. Softer pressures overnight- 500 mL NS bolus given and future anti-hypertensive medications have been auto-held by MD.   Neuro: Confused, does not follow commands, only moans. Tylenol PRN given for pain.  Pulm: 3 L NC most of shift, pt. Was desaturating to high 80's now on 4 L NC. Still has audible secretions that patient cannot clear on own- MD notified, scopolamine patch placed.   Renal: Liu, low UO  GI: TF at goal, no BM overnight  Endo: Q4h blood sugars with sliding scale  ID: [email or web address]    Discharge/Transfer Needs: Pending clinical status     Bedside Shift Report Completed : Yes  Bedside Safety Check Completed: Yes

## 2022-11-20 NOTE — PROGRESS NOTES
Cross cover note    Blood pressure 94/45 and heart rate of 45, sinus bradycardia.  Patient was started on antihypertensives today.  We will hold all of them and give NS bolus 500.    Jeff Mondragon MD  Internal Medicine Hospitalist  Pager: [phone]

## 2022-11-21 NOTE — PROGRESS NOTES
[facility]    Hospitalist Progress Note  Name: Eh Callahan    MRN: [id number]  Provider:  Lebron Crawford DO  Date of Service: 11/21/2022    Summary of Stay: Eh Callahan is a 82 year old male with a history of dementia, atrial fibrillation, hypertension, chronic kidney disease, PTSD admitted on 11/10/2022 with generalized weakness and poor oral intake.  Of note, the patient was recently hospitalized from 10/12 to 10/22/2022 for the treatment of toxic encephalopathy in the setting of a urinary tract infection with uncontrolled hypertension and acute kidney injury.  The patient was discharged home with family.  In the emergency department, the patient was found to have a temperature of 98.1  F, blood pressure 179/82, heart rate 73, respiratory rate 16, SPO2 92% on room air.  Initial lab work showed WBC 6.6, hemoglobin 12.6, sodium 133, potassium 5.4, BUN/creatinine 49.2/2.43, calcium 8.2, glucose 274, albumin 3.2, alkaline phosphatase 175.  CT head showed no acute changes, moderate to advanced volume loss and mild presumed sequela of chronic microvascular ischemic change.  Chest x-ray showed new bibasilar opacities which may represent effusion with associated atelectasis, left greater than right associated patchy airspace opacities concerning for superimposed pneumonia/pneumonitis.  The patient was started on IV ceftriaxone and azithromycin for treatment of community-acquired pneumonia.  Speech therapy was consulted to see the patient with concern for aspiration.  Palliative care was also consulted to see the patient with concern for progression to have his dementia.  Repeat chest x-ray on 11/12 showed worsening infiltrates.  In addition, the patient had increasing oxygen requirements.  Antibiotics were broadened to vancomycin and cefepime.  Vancomycin was eventually discontinued due to rising creatinine.  Speech therapy recommended regular texture diet with thin liquids.  The patient's  hospital course was complicated by patient refusal to take his oral medications.  Because of this the patient developed hypertensive urgency.  IV antihypertensives for an effective.  On 11/16, the patient was transferred to the ICU for nicardipine drip.  Multiple family discussions were held during the patient's hospitalization regarding CODE STATUS and a recommendation for hospice.  The patient continued to refuse oral intake and his condition worsened.  The patient did show signs of starvation ketosis.  Multiple discussions with the family were held regarding tube feeds.  It was recommended against tube feeds as it would not solve the underlying problem which is his advanced dementia and chronic aspiration.    The patient did exhibit signs of multiorgan failure with rising creatinine and fluctuating heart rates.  On 11/20, the family elected for DNR, yes to prearrest intubation CODE STATUS.  On 11/21, the patient had increasing moaning and did have an episode of emesis after suctioning.  CODE STATUS was readdressed with the family.  It was discussed that intubation would prolong the patient suffering.  The patient's family elected for a DNR/DNI CODE STATUS.    TODAY'S PLAN: Called to see patient as he vomited after suctioning.  Tube feeds held.  Patient still moaning and appears quite uncomfortable.  Given dose of IV Dilaudid.  Daughter and son at bedside.  With assistance of nursing staff we did discuss the patient's CODE STATUS.  We discussed that by intubating the patient it would prolong his suffering.  Family expressed understanding and elected for a DNR/DNI CODE STATUS.  IV Dilaudid held over the weekend with concern it was contributing to his encephalopathy.  Given the patient's moaning, will restart as needed IV Dilaudid.  Continue to hold tube feeds for now.  Patient remains with poor prognosis.    Problem List:   Acute Hypoxic Respiratory Failure  Acute Metabolic vs Septic Encephalopathy vs Delerium vs  Progression of Dementia  Possible Pneumonia vs Atelectasis vs Pneumonitis  Bilateral Pleural Effusion  - CT head on admit showed no acute abnormality  - CXR on 11/10 showed new bibasilar opacities, L>R patchy airspace opacities with superimposed pneumonia/pneumonitis  - Repeat CXR on 11/12 showed worsening infiltrates and worsening oxygenation  - Initially on ceftriaxone and azithromycin - now transitioned to cefepime due to progression of infiltrates on CXR  - Vanco discontinued on 11/15 due to rising creatinine  - s/p R-sided Thoracentesis on 11/14 with 750 mL out - fluid analysis consistent with transudative process  - Appreciate Palliative Care recommendations  - NPO     Dysphagia  - NG tube placed on 11/18 and TF started.  This was after a long discussion with the patient's family.  It was recommended against proceeding with placing an NG tube and starting tube feeds as it would not solve the underlying problem which is his advanced dementia and dysphagia.  Family wished to proceed with placing NG tube to see if restarting his home medications would improve the patient's condition     Hypertensive Urgency  - Pt with uncontrolled BP.  Complicated by pt intermittently refusing medications  - Started on Nicardipine drip on 11/16 and transferred to the ICU  - Continue PTA Hydralazine 50 mg TID, Isordil 20 mg TID  - Also on IV hydralazine prn and IV lopressor 5 mg q6h  - PTA Amlodipine on hold while on Nicardipine drip     AGMA - Suspect Starvation Ketosis  Hyperkalemia - resolved  Chronic Kidney Disease Stage IIIb-IV  - Baseline Cr = 2.2-2.5  - Continue PTA Sodium Bicarbonate  - LA on 11/17 was 1.5, VBG unremarkable  - Beta-hydroxy 5.6 -> 3.3  - Daily BMP     Dementia  - Continue PTA risperidone  - Zyprexa prn     Type 2 Diabetes Mellitus  - A1C = 9.0 on 10/12/2022  - Increase lantus to 20 units qAM  - ISS     Hypernatremia  - Improved  - Daily BMP     Paroxysmal Atrial Fibrillation  - Continue IV  metoprolol     Goals of Care  - Palliative Care consulted.  Multiple meetings with pt and family were conducted during this hospitalization.  Family wishes for full restorative cares at this time.  Feeding tube was discussed and pt family wishes to attempt TF, if needed.  Hospice was discussed as well, but family does not think pt is ready for hospice.  The patient had declining mentation with moaning and evidence of multiorgan failure.  After multiple family discussions, the patient's family elected for DNR/DNI CODE STATUS.     Chronic Medical Problems:  Hx of Hepatitis C  PTSD    DVT Prophylaxis: Pneumatic Compression Devices  Code Status: No CPR- Do NOT Intubate  Diet: NPO for Medical/Clinical Reasons Except for: No Exceptions  Adult Formula Drip Feeding: Continuous Jevity 1.5; Other - Specify in Comment; Goal Rate: 45; mL/hr; Initial rate of 15 ml/hr advancing by 10 ml q 8 hrs to goal rate of 45 ml/hr; Do not advance tube feeding rate unless K+ is = or > 3.0, Mg++ is = ...    Liu Catheter: PRESENT, indication: Strict 1-2 Hour I&O  Disposition: Expected discharge pending clinical course.  Family updated today: Yes      Interval History   Pt seen and examined.  Pt unresponsive.  Moaning.    -Data reviewed today: I personally reviewed all new labs and imaging results over the last 24 hours.     Physical Exam   Temp: 98.3  F (36.8  C) Temp src: Axillary BP: (!) 144/70 Pulse: 101   Resp: 24 SpO2: 90 % O2 Device: Nasal cannula Oxygen Delivery: 4 LPM  Vitals:    11/18/22 0504 11/19/22 0359 11/20/22 0522   Weight: 66.8 kg (147 lb 4.3 oz) 66.1 kg (145 lb 11.6 oz) 67.8 kg (149 lb 7.6 oz)     Vital Signs with Ranges  Temp:  [96.7  F (35.9  C)-98.3  F (36.8  C)] 98.3  F (36.8  C)  Pulse:  [] 101  Resp:  [8-62] 24  BP: (130-164)/(57-73) 144/70  SpO2:  [79 %-100 %] 90 %  I/O last 3 completed shifts:  In: 2003 [I.V.:3; NG/GT:965]  Out: 406 [Urine:406]    GENERAL: Unresponsive, monaing  HEENT: Normocephalic,  atraumatic.  CARDIOVASCULAR: Regular rate and rhythm without murmurs or rubs. No S3.  PULMONARY: Coarse BS bilaterally  GASTROINTESTINAL: Soft, non distended.   EXTREMITIES: No cyanosis or clubbing. No edema.  NEUROLOGICAL: withdraws to pain  DERMATOLOGICAL: No rash, ulcer, bruising, nor jaundice.    Medications     dextrose         aspirin  81 mg Per Feeding Tube Daily     atorvastatin  40 mg Per Feeding Tube QPM     ceFEPIme  2 g Intravenous Q24H     cholecalciferol  25 mcg Per Feeding Tube Daily     famotidine  10 mg Per Feeding Tube Daily     [Held by provider] hydrALAZINE  50 mg Oral or Feeding Tube TID     HYDROmorphone  0.5 mg Intravenous Once     insulin aspart  2 Units Subcutaneous Q4H     insulin aspart  1-12 Units Subcutaneous Q4H     insulin glargine  20 Units Subcutaneous QAM AC     [Held by provider] isosorbide dinitrate  20 mg Oral or Feeding Tube TID     melatonin  5 mg Per Feeding Tube Daily with supper     [Held by provider] metoprolol tartrate  50 mg Oral BID     multivitamins w/minerals  15 mL Per Feeding Tube Daily     risperiDONE  0.25 mg Per Feeding Tube Daily     scopolamine  1 patch Transdermal Q72H    And     scopolamine   Transdermal Q8H     sodium bicarbonate  1,300 mg Per Feeding Tube BID     sodium chloride (PF)  3 mL Intracatheter Q8H     traZODone  75 mg Per Feeding Tube At Bedtime     [Held by provider] Vitamin D3  25 mcg Oral or Feeding Tube Daily     Data     Laboratory:  Recent Labs   Lab 11/21/22  0534 11/20/22  0553 11/19/22  0528   WBC 6.5 9.8 10.2   HGB 11.6* 12.3* 12.5*   HCT 37.7* 40.4 39.2*   * 102* 98   * 142* 166     Recent Labs   Lab 11/21/22  0827 11/21/22  0534 11/21/22  0435 11/20/22  0813 11/20/22  0553 11/20/22  0416 11/20/22  0319 11/19/22  0810 11/19/22  0528   NA  --  145  --   --  147*  --  146*  --  144   POTASSIUM  --  4.7  --   --   --   --  4.4  --  3.9   CHLORIDE  --  110*  --   --   --   --  111*  --  112*   CO2  --  26  --   --   --   --  24   --  22   ANIONGAP  --  9  --   --   --   --  11  --  10   * 250* 239*   < >  --    < > 240*   < > 230*   BUN  --  65.2*  --   --   --   --  57.6*  --  53.9*   CR  --  3.41*  --   --  3.37*  --  3.53*  --  3.11*   GFRESTIMATED  --  17*  --   --  17*  --  17*  --  19*   DOMINGO  --  8.4*  --   --   --   --  8.1*  --  8.5*    < > = values in this interval not displayed.     No results for input(s): CULT in the last 168 hours.    Imaging:  No results found for this or any previous visit (from the past 24 hour(s)).      Lebron Crawford DO  [facility] Hospitalist  [address] [place] [address]  11/21/2022

## 2022-11-21 NOTE — PROGRESS NOTES
ICU End of Shift Summary.  For vital signs and complete assessments, please see documentation flowsheets.      Pertinent assessments:  CV: SR/ST   Neuro: confused, non-verbal, does not follow commands. Moaning increased during nursing cares/repositioning  Pulm: Remained on 4 L NC for entire shift so far, still has audible secretions that patient cannot clear on own despite scopolamine patch being in place.  Renal: Liu, low UO   GI: TF @ goal 45 mL/hour, no BM overnight   Endo: q4h blood sugar checks w/ sliding scale    Plan (Upcoming Events): Pt. DNR only  Discharge/Transfer Needs: Pending clinical status     Bedside Shift Report Completed : Yes  Bedside Safety Check Completed: Yes

## 2022-11-21 NOTE — PLAN OF CARE
Goal Outcome Evaluation:    ICU End of Shift Summary.  For vital signs and complete assessments, please see documentation flowsheets.      Pertinent assessments: Pt remains non-verbal and moaning. Moaning increases during cares, turns, and oral suctioning. Mouth deep suctioned for large amounts of tan/white thick and creamy secretions. After deep suctioning pt began throwing up. TF stopped at 090 and remains off. Zofran given, Dr. Crawford notified, AM PO meds held, and dilaudid 0.5 x1 given. During vomiting pt moaning and restlessness increased, resolved after dilaudid. Code status re-discussed with family, pt made DNR/DNI per family wishes. HR 70-110s, increasing throughout shift and with agitation. Temp also increasing throughout shift, T max 100.8, Tylenol given, temp down to 99.5. Liu remains in place, minimal UO. Increasing edema of bilateral hands. PRN Dilaudid given for pain.     Major Shift Events: Code status change. TF off d/t vomiting. PRN dilaudid for pain. Increased HR. Fevers.    Plan (Upcoming Events): Promote pt comfort, continue cares.

## 2022-11-21 NOTE — PLAN OF CARE
Problem: Oral Intake Inadequate  Goal: Improved Oral Intake  Outcome: Not Progressing     Problem: Enteral Nutrition  Goal: Feeding Tolerance  Outcome: Not Progressing   Goal Outcome Evaluation: Pt taking nothing by mouth for family even prior to initiation of TF, TF now off d/t significant emesis this morning.

## 2022-11-22 NOTE — PROGRESS NOTES
"    [facility]  Palliative Care Progress Note  Text Page     Assessment & Plan   Eh Callahan is a 82 year old male who was admitted on 11/10/2022.   Consulted by Dr. Santos to assist with goals of care, and development of plan of care      Recommendations:  1. Goals of Care- No CPR- Do NOT Intubate-restorative cares  Hospitalization goals discussed Discussed goals again. The family understands that he is dying. They want to continue with blood sugar monitoring, vitals and antibiotics because they thing this will add to his comfort. (HTN gives him chest pain). Code status changed to dnr dni today  Decisional Capacity- Unreliable. Patient does not have an advance directive. Per  informed consent policy next of kin should be involved if patient becomes unable.  -Patient has 13 adult children, a few live out of the country but most of them live in [place]. All are involved in goals discusssions  POLST consider completing should goals of care change.        1. Dyspnea  -OXygen as needed for comfort  -Atropine, scoplomine for secretions  -Hydromorphone for work of breathing    2. Pain, patient complains of headaches at times   -Agree with tylenol as needed for headaches  -Added low dose hydromorphone for \"feeling like a rock is holding down on me\"- per daughter  -Hydromorphone as needed for pain     3. Dysphagia  -Due to vomiting TF not recommended  -Atropine and robinol and scopalomine patch for secretions    4. Anxiety, restlessness  -PRN haloperidol  -PRN ativan     5. Spiritual Care  Oriented to Spiritual Health as part of Palliative Care team. Spiritual Health Services declined at this time.  Spiritual Background: Islam     5. Care Planning  Appreciate Care of SW/Care coordinator.    Medical Decision Making and Goals of Care:  Discussed on November 22, 2022 with Jazmyn Rivera NP: Met with family at the bedside. Discussed and reviewed the above. Discussed goals which are comfort with the " exception of antibiotics, vitals and BGM checks. They want to continue with oxygen. They are ok removing the feeding tube for comfort. Questions asked and answered    Jazmyn Rivera NP  Pain Management and Palliative Care  [facility]  Pgr: [phone]    Time Spent on this Encounter   Total unit/floor time 49 minutes, time consisted of the following, examination of the patient, reviewing the record and completing documentation. >50% of time spent in counseling and coordination of care, Bedside Nurse Ana Cristina and Hospitalist Dr Crawford.  Time spend counseling with family consisted of the following topics, goals of care, education about diagnosis, education about prognosis, care planning for discharge and symptom management.         Review of Systems   Unable to assess due to confusion    Physical Exam   Temp:  [97.1  F (36.2  C)-100.8  F (38.2  C)] 97.8  F (36.6  C)  Pulse:  [] 108  Resp:  [10-29] 24  BP: (103-150)/(47-68) 150/66  SpO2:  [83 %-100 %] 100 %  149 lbs 7.55 oz  Exam:  GENERAL APPEARANCE:  lethargic, minimally repsonsive  RESP:  dyspneic  CV:  Palpation and auscultation of heart done , regular rate and rhythm, no murmur, rub, or gallop  ABDOMEN:  normal bowel sounds, soft, nontender, no hepatosplenomegaly or other masses  PSYCH:  lethargic    Medications     dextrose         ceFEPIme  2 g Intravenous Q24H     [Held by provider] insulin aspart  4 Units Subcutaneous Q4H     [Held by provider] insulin aspart  1-12 Units Subcutaneous Q4H     [Held by provider] insulin glargine  20 Units Subcutaneous QAM AC     scopolamine  1 patch Transdermal Q72H    And     scopolamine   Transdermal Q8H     sodium chloride (PF)  3 mL Intracatheter Q8H       Data   Results for orders placed or performed during the hospital encounter of 11/10/22 (from the past 24 hour(s))   Glucose by meter   Result Value Ref Range    GLUCOSE BY METER POCT 130 (H) 70 - 99 mg/dL   Glucose by meter   Result Value Ref  Range    GLUCOSE BY METER POCT 139 (H) 70 - 99 mg/dL   Glucose by meter   Result Value Ref Range    GLUCOSE BY METER POCT 232 (H) 70 - 99 mg/dL   Glucose by meter   Result Value Ref Range    GLUCOSE BY METER POCT 162 (H) 70 - 99 mg/dL   CBC with platelets   Result Value Ref Range    WBC Count 6.3 4.0 - 11.0 10e3/uL    RBC Count 3.63 (L) 4.40 - 5.90 10e6/uL    Hemoglobin 11.4 (L) 13.3 - 17.7 g/dL    Hematocrit 37.0 (L) 40.0 - 53.0 %     (H) 78 - 100 fL    MCH 31.4 26.5 - 33.0 pg    MCHC 30.8 (L) 31.5 - 36.5 g/dL    RDW 15.6 (H) 10.0 - 15.0 %    Platelet Count 93 (L) 150 - 450 10e3/uL   Basic metabolic panel   Result Value Ref Range    Sodium 141 136 - 145 mmol/L    Potassium 5.4 (H) 3.4 - 5.3 mmol/L    Chloride 108 (H) 98 - 107 mmol/L    Carbon Dioxide (CO2) 24 22 - 29 mmol/L    Anion Gap 9 7 - 15 mmol/L    Urea Nitrogen 76.1 (H) 8.0 - 23.0 mg/dL    Creatinine 4.29 (H) 0.67 - 1.17 mg/dL    Calcium 8.1 (L) 8.8 - 10.2 mg/dL    Glucose 151 (H) 70 - 99 mg/dL    GFR Estimate 13 (L) >60 mL/min/1.73m2   Glucose by meter   Result Value Ref Range    GLUCOSE BY METER POCT 105 (H) 70 - 99 mg/dL   Glucose by meter   Result Value Ref Range    GLUCOSE BY METER POCT 129 (H) 70 - 99 mg/dL

## 2022-11-22 NOTE — PROGRESS NOTES
[facility]    Hospitalist Progress Note  Name: Eh Callahan    MRN: [id number]  Provider:  Lebron Crawford DO  Date of Service: 11/22/2022    Summary of Stay: Eh Callahan is a 82 year old male with a history of dementia, atrial fibrillation, hypertension, chronic kidney disease, PTSD admitted on 11/10/2022 with generalized weakness and poor oral intake.  Of note, the patient was recently hospitalized from 10/12 to 10/22/2022 for the treatment of toxic encephalopathy in the setting of a urinary tract infection with uncontrolled hypertension and acute kidney injury.  The patient was discharged home with family.  In the emergency department, the patient was found to have a temperature of 98.1  F, blood pressure 179/82, heart rate 73, respiratory rate 16, SPO2 92% on room air.  Initial lab work showed WBC 6.6, hemoglobin 12.6, sodium 133, potassium 5.4, BUN/creatinine 49.2/2.43, calcium 8.2, glucose 274, albumin 3.2, alkaline phosphatase 175.  CT head showed no acute changes, moderate to advanced volume loss and mild presumed sequela of chronic microvascular ischemic change.  Chest x-ray showed new bibasilar opacities which may represent effusion with associated atelectasis, left greater than right associated patchy airspace opacities concerning for superimposed pneumonia/pneumonitis.  The patient was started on IV ceftriaxone and azithromycin for treatment of community-acquired pneumonia.  Speech therapy was consulted to see the patient with concern for aspiration.  Palliative care was also consulted to see the patient with concern for progression to have his dementia.  Repeat chest x-ray on 11/12 showed worsening infiltrates.  In addition, the patient had increasing oxygen requirements.  Antibiotics were broadened to vancomycin and cefepime.  Vancomycin was eventually discontinued due to rising creatinine.  Speech therapy recommended regular texture diet with thin liquids.  The patient's  hospital course was complicated by patient refusal to take his oral medications.  Because of this the patient developed hypertensive urgency.  IV antihypertensives for an effective.  On 11/16, the patient was transferred to the ICU for nicardipine drip.  Multiple family discussions were held during the patient's hospitalization regarding CODE STATUS and a recommendation for hospice.  The patient continued to refuse oral intake and his condition worsened.  The patient did show signs of starvation ketosis.  Multiple discussions with the family were held regarding tube feeds.  It was recommended against tube feeds as it would not solve the underlying problem which is his advanced dementia and chronic aspiration.    The patient did exhibit signs of multiorgan failure with rising creatinine and fluctuating heart rates.  On 11/20, the family elected for DNR, yes to prearrest intubation CODE STATUS.  On 11/21, the patient had increasing moaning and did have an episode of emesis after suctioning.  CODE STATUS was readdressed with the family.  It was discussed that intubation would prolong the patient suffering.  The patient's family elected for a DNR/DNI CODE STATUS.  The patient's family was agreeable to stop lab draws as well.    TODAY'S PLAN:  Cr worse today and K+ increasing.  Absent bowel sounds.  Diffuse anasarca.  Pt in multiorgan failure.  Discussed with family at bedside.  We discussed that the patient is in multiorgan failure.  We discussed rising potassium levels and risks for cardiac arrhythmias.  Family enquired about dialysis.  The patient is not a candidate for dialysis given his comorbidities and lack of benefit of dialysis.  Family asked about restarting tube feeds as it is important in [employer] culture.  Family was told given his absent bowel sounds and emesis yesterday, it is medically contraindicated to restart the tube feeds.  We discussed that lab draws at this point are causing more harm than good.   Family was agreeable to stop lab draws and blood sugar checks.  Family asked if the patient was ready for hospice.  My recommendation to the family was to pursue hospice and comfort care.  Family expressed understanding.  Family at bedside and expressed understanding.  Appreciate Palliative Care assistance.  Prognosis grave.    Problem List:   Acute Hypoxic Respiratory Failure  Acute Metabolic vs Septic Encephalopathy vs Delerium vs Progression of Dementia  Possible Pneumonia vs Atelectasis vs Pneumonitis  Bilateral Pleural Effusion  - CT head on admit showed no acute abnormality  - CXR on 11/10 showed new bibasilar opacities, L>R patchy airspace opacities with superimposed pneumonia/pneumonitis  - Repeat CXR on 11/12 showed worsening infiltrates and worsening oxygenation  - Initially on ceftriaxone and azithromycin - now transitioned to cefepime due to progression of infiltrates on CXR  - Vanco discontinued on 11/15 due to rising creatinine  - s/p R-sided Thoracentesis on 11/14 with 750 mL out - fluid analysis consistent with transudative process  - Appreciate Palliative Care recommendations  - NPO     Dysphagia  - NG tube placed on 11/18 and TF started.  This was after a long discussion with the patient's family.  It was recommended against proceeding with placing an NG tube and starting tube feeds as it would not solve the underlying problem which is his advanced dementia and dysphagia.  Family wished to proceed with placing NG tube to see if restarting his home medications would improve the patient's condition  - Pt with episode of emesis and aspiration on 11/21.  Hypoactive BS.  Tube feeds held     Hypertensive Urgency  - Pt with uncontrolled BP.  Complicated by pt intermittently refusing medications  - Started on Nicardipine drip on 11/16 and transferred to the ICU  - Pt now hypotensive, nicardipine drip off and antihypertensives held  - Hold PTA Hydralazine 50 mg TID, Isordil 20 mg TID  - Also on IV  hydralazine prn and IV lopressor 5 mg q6h     AGMA - Suspect Starvation Ketosis  Hyperkalemia - resolved  Chronic Kidney Disease Stage IIIb-IV  - Baseline Cr = 2.2-2.5  - Continue PTA Sodium Bicarbonate  - LA on 11/17 was 1.5, VBG unremarkable  - Beta-hydroxy 5.6 -> 3.3  - Daily BMP     Dementia  - Continue PTA risperidone  - Zyprexa prn     Type 2 Diabetes Mellitus  - A1C = 9.0 on 10/12/2022  - Increase lantus to 20 units qAM - now on hold due to NPO and no tube feeds  - ISS     Hypernatremia  - Improved  - Daily BMP     Paroxysmal Atrial Fibrillation  - Continue IV metoprolol     Goals of Care  - Palliative Care consulted.  Multiple meetings with pt and family were conducted during this hospitalization.  Family wishes for full restorative cares at this time.  Feeding tube was discussed and pt family wishes to attempt TF, if needed.  Hospice was discussed as well, but family does not think pt is ready for hospice.  The patient had declining mentation with moaning and evidence of multiorgan failure.  After multiple family discussions, the patient's family elected for DNR/DNI CODE STATUS.     Chronic Medical Problems:  Hx of Hepatitis C  PTSD    DVT Prophylaxis: Pneumatic Compression Devices  Code Status: No CPR- Do NOT Intubate  Diet: Adult Formula Drip Feeding: Continuous Jevity 1.5; Other - Specify in Comment; Goal Rate: 45; mL/hr; Initial rate of 15 ml/hr advancing by 10 ml q 8 hrs to goal rate of 45 ml/hr; Do not advance tube feeding rate unless K+ is = or > 3.0, Mg++ is = ...  NPO for Medical/Clinical Reasons Except for: No Exceptions    Liu Catheter: PRESENT, indication: Strict 1-2 Hour I&O  Disposition: Expected discharge pending clinical course.  Family updated today: Yes      Interval History   Pt seen and examined.  Pt unresponsive.     -Data reviewed today: I personally reviewed all new labs and imaging results over the last 24 hours.     Physical Exam   Temp: 97.1  F (36.2  C) Temp src: Temporal BP:  (!) 150/66 Pulse: 107   Resp: 22 SpO2: 100 % O2 Device: Nasal cannula Oxygen Delivery: 4 LPM  Vitals:    11/18/22 0504 11/19/22 0359 11/20/22 0522   Weight: 66.8 kg (147 lb 4.3 oz) 66.1 kg (145 lb 11.6 oz) 67.8 kg (149 lb 7.6 oz)     Vital Signs with Ranges  Temp:  [97.1  F (36.2  C)-100.8  F (38.2  C)] 97.1  F (36.2  C)  Pulse:  [] 107  Resp:  [10-29] 22  BP: (103-150)/(47-68) 150/66  SpO2:  [83 %-100 %] 100 %  I/O last 3 completed shifts:  In: 270 [NG/GT:180]  Out: 613 [Urine:613]    GENERAL: Unresponsive, no moaning this morning  HEENT: Normocephalic, atraumatic.  CARDIOVASCULAR: Regular rate and rhythm without murmurs or rubs. No S3.  PULMONARY: Coarse BS bilaterally  GASTROINTESTINAL: Soft, non-tender, non-distended. Bowel sounds normoactive.   EXTREMITIES: No cyanosis or clubbing. Anasarca  NEUROLOGICAL: Unresponsive to verbal or noxious stimuli  DERMATOLOGICAL: No rash, ulcer, bruising, nor jaundice.    Medications     dextrose         aspirin  81 mg Per Feeding Tube Daily     atorvastatin  40 mg Per Feeding Tube QPM     ceFEPIme  2 g Intravenous Q24H     cholecalciferol  25 mcg Per Feeding Tube Daily     famotidine  10 mg Per Feeding Tube Daily     [Held by provider] hydrALAZINE  50 mg Oral or Feeding Tube TID     [Held by provider] insulin aspart  4 Units Subcutaneous Q4H     insulin aspart  1-12 Units Subcutaneous Q4H     [Held by provider] insulin glargine  20 Units Subcutaneous QAM AC     [Held by provider] isosorbide dinitrate  20 mg Oral or Feeding Tube TID     melatonin  5 mg Per Feeding Tube Daily with supper     [Held by provider] metoprolol tartrate  50 mg Oral BID     multivitamins w/minerals  15 mL Per Feeding Tube Daily     risperiDONE  0.25 mg Per Feeding Tube Daily     scopolamine  1 patch Transdermal Q72H    And     scopolamine   Transdermal Q8H     sodium bicarbonate  1,300 mg Per Feeding Tube BID     sodium chloride (PF)  3 mL Intracatheter Q8H     traZODone  75 mg Per Feeding Tube At  Bedtime     [Held by provider] Vitamin D3  25 mcg Oral or Feeding Tube Daily     Data     Laboratory:  Recent Labs   Lab 11/22/22  0528 11/21/22  0534 11/20/22  0553   WBC 6.3 6.5 9.8   HGB 11.4* 11.6* 12.3*   HCT 37.0* 37.7* 40.4   * 101* 102*   PLT 93* 126* 142*     Recent Labs   Lab 11/22/22  1018 11/22/22  0824 11/22/22  0528 11/21/22  0827 11/21/22  0534 11/20/22  0813 11/20/22  0553 11/20/22  0416 11/20/22  0319   NA  --   --  141  --  145  --  147*  --  146*   POTASSIUM  --   --  5.4*  --  4.7  --   --   --  4.4   CHLORIDE  --   --  108*  --  110*  --   --   --  111*   CO2  --   --  24  --  26  --   --   --  24   ANIONGAP  --   --  9  --  9  --   --   --  11   * 105* 151*   < > 250*   < >  --    < > 240*   BUN  --   --  76.1*  --  65.2*  --   --   --  57.6*   CR  --   --  4.29*  --  3.41*  --  3.37*  --  3.53*   GFRESTIMATED  --   --  13*  --  17*  --  17*  --  17*   DOMINGO  --   --  8.1*  --  8.4*  --   --   --  8.1*    < > = values in this interval not displayed.     No results for input(s): CULT in the last 168 hours.    Imaging:  No results found for this or any previous visit (from the past 24 hour(s)).      Lebron Crawford DO  [facility] Hospitalist  [address] [place] [address]  11/22/2022

## 2022-11-22 NOTE — PLAN OF CARE
Family at bedside throughout the shift. Pt not opening eyes, or responding much but moaning. See Mar for secretions management. TF removed per family. Liu remains. Dilaudid PRN controlling pain/moaning. Family stated to make pt comfortable but continue some cares.     SW: New Family members questioning nurse this evening on not giving enough pain meds. Education provided at length r/t end of life, pain assessments, organ failure, reasoning on removing KioFeed ect. RN left room after education when one daughter seemed upset (talking loudly with siblings and family, furrowing brown at RN). Requests to only have 4 family members in the room at a time were asked throughout the day with 6-8 family.

## 2022-11-22 NOTE — PLAN OF CARE
ICU End of Shift Summary.  For vital signs and complete assessments, please see documentation flowsheets.        Pertinent assessments:   Neuro: Pt moaning and restless at start of shift and with turns. Pt withdrawals from pain  Cardiac: Sinus tach 100-120s. BP WDL  Resp: LS course, Thick secretions with deep suctioning. Tolerated well this shift  GI: BS hypoactive, no BM this shift  : Creat elevated. Urine output from 00-06, 50 mL- reported to hospitalist  Skin: WDL, continue q2 hr turns, hand bilat edematous  Lines: 2 pivs  Drips: none    Major Shift Events:   Paged hospitalist for atropine gtts 2030 to decrease secretions-   0630- FYI paged hospitalist with lab results.   Pt has received dilaudid 3x this shift for pain. Pt has slept from 2200- end of shift.     Plan (Upcoming Events): Continue to keep comfortable and monitor cardiac status  Discharge/Transfer Needs: TBD     Bedside Shift Report Completed : Yes  Bedside Safety Check Completed: Yes

## 2022-11-22 NOTE — PROGRESS NOTES
CLINICAL NUTRITION SERVICES - REASSESSMENT NOTE     Nutrition Prescription    Malnutrition Status:    % Intake: </= 50% for >/= 5 days (severe)  % Weight Loss: Weight loss does not meet criteria--no recent loss  Subcutaneous Fat Loss: Unable to assess  Muscle Loss: Unable to assess  Fluid Accumulation/Edema: None noted  Malnutrition Diagnosis: Unable to determine due to lack of NFPE    Recommendations already ordered by Registered Dietitian (RD):  Collaboration with other providers- nutrition support will no longer be offered, patient does not have comfort care orders due to requests by family, but that is overall approach and EN has the potential to cause him further harm. MD will make patient NPO and discontinue feeding tubel       EVALUATION OF THE PROGRESS TOWARD GOALS   Diet: NPO    Nutrition Support:  Tube feeding trialed--pt with significant emesis, not tolerating    Intake/Tolerance:  Poor intake for the duration of admission     NEW FINDINGS   Pt discussed during IDT rounds and chart reviewed. Pt's renal function is worsening and he is not a dialysis candidate. Patient also not tolerating tube feeding. Feeding tube will be removed and patient will be made NPO. No plans for retrialing nutrition support. Patient is dying- family is supportive of overall comfort approach, but want to continue BG checks, antibiotics and antihypertensives    Weight: Weight trending up d/t fluid retention  Vitals:    11/10/22 2337 11/16/22 0558 11/17/22 0400 11/18/22 0504   Weight: 64.9 kg (143 lb 1.6 oz) 65.9 kg (145 lb 4 oz) 65.1 kg (143 lb 8.3 oz) 66.8 kg (147 lb 4.3 oz)    11/19/22 0359 11/20/22 0522   Weight: 66.1 kg (145 lb 11.6 oz) 67.8 kg (149 lb 7.6 oz)     Labs: K 5.4, BUN 76.1, Cr 4.29    GI: emesis    Skin: no concerns per nursing    Meds: reviewed    ASSESSED NUTRITION NEEDS  Dosing Weight: 65.1 kg  Estimated Energy Needs: 1580+ kcals/day ([place] St Jeor x AF 1.2)  Justification: Maintenance  Estimated Protein  Needs: 65+ grams protein/day (1+ grams of pro/kg)  Justification: TRANG on CKD, but with inflammation  Estimated Fluid Needs: 1 mL/kcal or per provider pending fluid status    MALNUTRITION  As noted above    Previous Goals   Patient to consume % of nutritionally adequate meal trays TID, or the equivalent with supplements/snacks.  Evaluation: Not met, no longer applicable    Previous Nutrition Diagnosis  Inadequate oral intake related to pneumonia, advancing dementia as evidenced by refusal to take PO, difficulty handling own secretions     Evaluation: Declining    CURRENT NUTRITION DIAGNOSIS  Inadequate  intake related to pneumonia, advancing dementia as evidenced by refusal to take PO, difficulty handling own secretions, intolerance of nutrition support      INTERVENTIONS  Implementation  Collaboration with other providers- nutrition support will no longer be offered, patient does not have comfort care orders due to requests by family, but that is overall approach and EN has the potential to cause him further harm. MD will make patient NPO and discontinue feeding tubel    Goals  No nutrition goals    Monitoring/Evaluation  Progress toward goals will be monitored and evaluated per protocol.    Audra Araujo, TOBY, LD, CNSC  Pager - 3rd floor/ICU: [phone]  Pager - All other floors: [phone]  Pager - Weekend/holiday: [phone]  Office: [phone]

## 2022-11-23 PROBLEM — N17.9 ACUTE KIDNEY FAILURE, UNSPECIFIED (H): Status: ACTIVE | Noted: 2022-01-01

## 2022-11-23 NOTE — PROGRESS NOTES
SPIRITUAL HEALTH SERVICES  SPIRITUAL ASSESSMENT Progress Note  [facility] 3 ICU       REFERRAL SOURCE: Text from unit  for pt death, Hinduism specific.     DEMOGRAPHIC: Pt Eh LOPES Issuzan identified as Hinduism and is of [place] descent.        ILLNESS CIRCUMSTANCE: I introduced myself to Eh's family as the Lead Hinduism  and oriented them to [facility].  Assessed emotional/spiritual needs and resources while offering reflective conversation, which integrated elements of illness and family narratives.     SPIRITUAL INTERVENTIONS: I offered end of life condolences and Islamic incantations/prayer at bedside via video phone to Eh and his family.       PLAN: No follow up needed at this time.     Vianey Sorto  Lead Hinduism   Pager [phone]    [facility] remains available 24/7 for emergent requests/referrals, either by having the switchboard page the on-call  or by entering an ASAP/STAT consult in Epic (this will also page the on-call ).

## 2022-11-23 NOTE — DISCHARGE SUMMARY
[facility]  Discharge Summary  Name: Eh Callahan    MRN: [id number]  YOB: 1940    Age: 82 year old  Date of Discharge:  11/23/2022 11:23 AM  Date of Admission: 11/10/2022  Primary Care Provider: Katherin Guerrero  Discharge Physician:  Bj Crisostomo DO  Discharging Service:  Hospitalist      Hospital Course  Eh Callahan is a 82 year old male with a history of dementia, atrial fibrillation, hypertension, chronic kidney disease, PTSD admitted on 11/10/2022 with generalized weakness and poor oral intake.  Of note, the patient was recently hospitalized from 10/12 to 10/22/2022 for the treatment of toxic encephalopathy in the setting of a urinary tract infection with uncontrolled hypertension and acute kidney injury.  The patient was discharged home with family.     In the emergency department, the patient was found to have a temperature of 98.1  F, blood pressure 179/82, heart rate 73, respiratory rate 16, SPO2 92% on room air.  Initial lab work showed WBC 6.6, hemoglobin 12.6, sodium 133, potassium 5.4, BUN/creatinine 49.2/2.43, calcium 8.2, glucose 274, albumin 3.2, alkaline phosphatase 175.  CT head showed no acute changes, moderate to advanced volume loss and mild presumed sequela of chronic microvascular ischemic change.  Chest x-ray showed new bibasilar opacities which may represent effusion with associated atelectasis, left greater than right associated patchy airspace opacities concerning for superimposed pneumonia/pneumonitis.     The patient was started on IV ceftriaxone and azithromycin for treatment of community-acquired pneumonia.  Speech therapy was consulted to see the patient with concern for aspiration.  Palliative care was also consulted to see the patient with concern for progression to have his dementia.     Repeat chest x-ray on 11/12 showed worsening infiltrates.  In addition, the patient had increasing oxygen requirements.  Antibiotics were broadened to vancomycin  and cefepime.     The patient's hospital course was complicated by patient refusal to take his oral medications.  Because of this the patient developed hypertensive urgency.  On 11/16, the patient was transferred to the ICU for nicardipine drip.      Multiple family discussions were held during the patient's hospitalization regarding CODE STATUS and a recommendation for hospice.  He was ultimately made DNR/DNI.  He continued to worsen until eventually passing on 11/23.     Discharge Diagnoses:  Acute Hypoxic Respiratory Failure  Acute Metabolic vs Septic Encephalopathy vs Delerium vs Progression of Dementia  Possible Pneumonia vs Atelectasis vs Pneumonitis  Bilateral Pleural Effusion  CT head on admit showed no acute abnormality.  CXR on 11/10 showed new bibasilar opacities, L>R patchy airspace opacities with superimposed pneumonia/pneumonitis.  Repeat CXR on 11/12 showed worsening infiltrates and worsening oxygenation.  Initially on ceftriaxone and azithromycin - now transitioned to cefepime due to progression of infiltrates on CXR.  Vanco discontinued on 11/15 due to rising creatinine. S/p R-sided Thoracentesis on 11/14 with 750 mL out - fluid analysis consistent with transudative process.  He was transitioned to DNR/DNI.  Ultimately passed on 11/23.      Dysphagia  NG tube placed on 11/18 and TF started.  This was after a long discussion with the patient's family.  It was recommended against proceeding with placing an NG tube and starting tube feeds as it would not solve the underlying problem which is his advanced dementia and dysphagia.  Family wished to proceed with placing NG tube to see if restarting his home medications would improve the patient's condition.  Pt with episode of emesis and aspiration on 11/21.  Hypoactive BS.  Tube feeds held.     Hypertensive Urgency  Pt with uncontrolled BP.  Complicated by pt intermittently refusing medications.  Started on Nicardipine drip on 11/16 and transferred to the  ICU.  Pt now with normalized BP, nicardipine drip off and antihypertensives held.     AGMA - Suspect Starvation Ketosis  Hyperkalemia - resolved  Chronic Kidney Disease Stage IIIb-IV  Baseline Cr = 2.2-2.5.  PTA bicarbonate was continued while inpatient.      Dementia  Provided with PTA risperidone, Zyprexa prn while inpatient.     Type 2 Diabetes Mellitus  A1C = 9.0 on 10/12/2022.  He was provided with insulin as needed while inpatient.      Hypernatremia related to poor PO intake: Improved with IVF.     Paroxysmal Atrial Fibrillation  Was given metoprolol for HR control     Goals of Care  Palliative Care consulted.  Multiple meetings with pt and family were conducted during this hospitalization.  Family initially wished for full restorative cares.   The patient ultimately had declining mentation with moaning and evidence of multiorgan failure.  After multiple family discussions, the patient's family elected for DNR/DNI CODE STATUS.      Chronic Medical Problems:  Hx of Hepatitis C  PTSD       Discharge Disposition:  Patient  during this admission    Allergies:  Allergies   Allergen Reactions     Amlodipine Swelling     Edema at 10 mg , fine at 5 mg     Lisinopril Cough     Metoprolol      Other reaction(s): Bradycardia  Metoprolol at 50mg bid -> HR 45 -50, unclear if sx (has fatigue)  May tolerate lower doses if needed        Discharge Medications:        Review of your medicines      UNREVIEWED medicines. Ask your doctor about these medicines      Dose / Directions   amLODIPine 5 MG tablet  Commonly known as: NORVASC  Used for: Primary hypertension      Dose: 5 mg  Take 1 tablet (5 mg) by mouth daily  Quantity: 30 tablet  Refills: 0     aspirin 81 MG EC tablet  Commonly known as: ASA  Used for: ACS (acute coronary syndrome) (H)      Dose: 81 mg  Take 1 tablet (81 mg) by mouth daily  Quantity: 100 tablet  Refills: 0     atorvastatin 40 MG tablet  Commonly known as: LIPITOR  Used for: ACS (acute coronary  syndrome) (H)      Dose: 40 mg  Take 1 tablet (40 mg) by mouth every evening  Quantity: 30 tablet  Refills: 1     Carboxymeth-Glycerin-Polysorb 0.5-1-0.5 % Soln  Used for: HCC (hepatocellular carcinoma) (H)      Dose: 1 drop  Place 1 drop into both eyes 2 times daily as needed  Refills: 0     hydrALAZINE 50 MG tablet  Commonly known as: APRESOLINE  Used for: Primary hypertension      Dose: 50 mg  Take 1 tablet (50 mg) by mouth 3 times daily  Quantity: 90 tablet  Refills: 0     insulin aspart 100 UNIT/ML pen  Commonly known as: NovoLOG PEN  Used for: HCC (hepatocellular carcinoma) (H)      Dose: 4 Units  Inject 4 Units Subcutaneous 3 times daily (with meals)  Refills: 0     insulin glargine 100 UNIT/ML pen  Commonly known as: LANTUS PEN  Used for: Type 2 diabetes mellitus with hyperglycemia, with long-term current use of insulin (H)      Dose: 18 Units  Inject 18 Units Subcutaneous every morning (before breakfast)  Quantity: 15 mL  Refills: 0     isosorbide dinitrate 20 MG tablet  Commonly known as: ISORDIL  Used for: Primary hypertension      Dose: 20 mg  Take 1 tablet (20 mg) by mouth 3 times daily  Quantity: 90 tablet  Refills: 0     latanoprost 0.005 % ophthalmic solution  Commonly known as: XALATAN      Dose: 1 drop  Place 1 drop into both eyes At Bedtime  Refills: 0     metoprolol tartrate 50 MG tablet  Commonly known as: LOPRESSOR  Used for: Primary hypertension      Dose: 50 mg  Take 1 tablet (50 mg) by mouth 2 times daily  Quantity: 60 tablet  Refills: 0     Multivitamin Adult Tabs      Dose: 1 tablet  Take 1 tablet by mouth daily  Refills: 0     sodium bicarbonate 650 MG tablet  Used for: CKD (chronic kidney disease) stage 2, GFR 60-89 ml/min      Dose: 1,300 mg  Take 2 tablets (1,300 mg) by mouth 2 times daily  Quantity: 60 tablet  Refills: 0     traZODone 150 MG tablet  Commonly known as: DESYREL      Dose: 75 mg  Take 75 mg by mouth At Bedtime  Refills: 0     Vitamin D3 25 mcg (1000 units)  "tablet  Commonly known as: CHOLECALCIFEROL      Dose: 25 mcg  Take 25 mcg by mouth daily  Refills: 0            Condition on Discharge:  Discharge condition:        Code status on discharge: Comfort Care     History of Illness:  See detailed admission note for full details.    Physical Exam:  Vital signs:  Temp: 97.6  F (36.4  C) Temp src: Axillary BP: (!) 140/58 Pulse: 115   Resp: 27 SpO2: 97 %   Oxygen Delivery: 3 LPM   Weight: 68.6 kg (151 lb 3.8 oz)  Estimated body mass index is 23.69 kg/m  as calculated from the following:    Height as of 10/12/22: 1.702 m (5' 7\").    Weight as of this encounter: 68.6 kg (151 lb 3.8 oz).    Wt Readings from Last 1 Encounters:   22 68.6 kg (151 lb 3.8 oz)     Please see death examination for physical exam.       Procedures other than Imaging:  None     Imaging:  No results found for this or any previous visit (from the past 48 hour(s)).     Consultations:  Consultation during this admission received from cardiology and palliative.       Recent Lab Results:  Recent Labs   Lab 22  0528 22  0534 22  0553   WBC 6.3 6.5 9.8   HGB 11.4* 11.6* 12.3*   HCT 37.0* 37.7* 40.4   * 101* 102*   PLT 93* 126* 142*          Lab Results   Component Value Date     2022     2022     2022     2021     2020     2020    Lab Results   Component Value Date    CHLORIDE 108 2022    CHLORIDE 110 2022    CHLORIDE 111 2022    CHLORIDE 109 2021    CHLORIDE 105 2021    CHLORIDE 99 2021    CHLORIDE 100 2021    CHLORIDE 100 2020    CHLORIDE 99 2020    Lab Results   Component Value Date    BUN 76.1 2022    BUN 65.2 2022    BUN 57.6 2022    BUN 61 2021    BUN 54 2021    BUN 66 2021    BUN 59 2021    BUN 49 2020    BUN 48 2020      Lab Results   Component Value Date    POTASSIUM 5.4 2022    " POTASSIUM 4.7 11/21/2022    POTASSIUM 4.4 11/20/2022    POTASSIUM 4.5 08/23/2021    POTASSIUM 4.6 08/05/2021    POTASSIUM 4.7 08/04/2021    POTASSIUM 4.1 04/29/2021    POTASSIUM 3.5 08/02/2020    POTASSIUM 3.6 08/01/2020    Lab Results   Component Value Date    CO2 24 11/22/2022    CO2 26 11/21/2022    CO2 24 11/20/2022    CO2 27 08/23/2021    CO2 25 08/05/2021    CO2 28 08/04/2021    CO2 29 04/29/2021    CO2 29 08/02/2020    CO2 27 08/01/2020    Lab Results   Component Value Date    CR 4.29 11/22/2022    CR 3.41 11/21/2022    CR 3.37 11/20/2022    CR 1.76 04/29/2021    CR 1.85 08/02/2020    CR 1.74 08/01/2020             Pending Results:    Unresulted Labs Ordered in the Past 30 Days of this Admission     No orders found from 10/11/2022 to 11/11/2022.           Discharge Instructions and Follow-Up:   Discharge Procedure Orders   Medication Therapy Management Referral   Referral Priority: Routine Referral Type: Med Therapy Management   Requested Specialty: Pharmacist   Number of Visits Requested: 1       Total time spent in face to face contact with the patient and coordinating discharge was:  <30 Minutes.

## 2022-11-23 NOTE — PLAN OF CARE
Problem: Oral Intake Inadequate  Goal: Improved Oral Intake  Outcome: Unable to Meet     Problem: Enteral Nutrition  Goal: Feeding Tolerance  Outcome: Unable to Meet   Goal Outcome Evaluation: Pt intolerant of EN and is not safe to swallow.

## 2022-11-23 NOTE — PLAN OF CARE
ICU End of Shift Summary.  For vital signs and complete assessments, please see documentation flowsheets.        Pertinent assessments:   Neuro: Pt unable to verbalize. Moaning with pain  Cardiac: Remains on monitor and BP checks Q6 , BP WDL, HR tachy; 110-130  Resp: LS course, tachypnic, remains on oxy mask at 3L, audible secrections, unable to suction. Received atropine x2, robinol x1  GI: BS absent  : Liu in place, oliguria- 40mL total this shift  Lines: 2 PIVs      Major Shift Events:   Pt has received dilaudid for pain and dyspnea x5 this shift. Son discussed family would like to take pt home on hospice. Informed son the message would be passed along to palliative team to start the process.     Plan (Upcoming Events): Continue with comfort and family to meet with palliative to discuss discharge needs  Discharge/Transfer Needs: TBD     Bedside Shift Report Completed : yes  Bedside Safety Check Completed: yes

## 2022-11-23 NOTE — PROGRESS NOTES
"    [facility]  Palliative Care Progress Note  Text Page     Assessment & Plan   Eh Callahan is a 82 year old male who was admitted on 11/10/2022.   Consulted by Dr. Santos to assist with goals of care, and development of plan of care      Recommendations:  1. Goals of Care- No CPR- Do NOT Intubate-restorative cares  Hospitalization goals discussed discussed symptom management. Patient's family would like to start trickle IV fluids for comfort. OK with medication administration. The do not want him home on hospice but rather him die int the hospital   Decisional Capacity- Unreliable. Patient does not have an advance directive. Per  informed consent policy next of kin should be involved if patient becomes unable.  -Patient has 13 adult children, a few live out of the country but most of them live in [place]. All are involved in goals discusssions  POLST consider completing should goals of care change.        1. Dyspnea  -OXygen as needed for comfort  -Atropine, scoplomine for secretions  -Hydromorphone for work of breathing  Ativan for anxiety      2. Pain, patient complains of headaches at times   -Agree with tylenol as needed for headaches  -Added low dose hydromorphone for \"feeling like a rock is holding down on me\"- per daughter  -Hydromorphone as needed for pain     3. Dysphagia  -Due to vomiting TF not recommended  -Atropine and robinol and scopalomine patch for secretions    4. Anxiety, restlessness  -PRN haloperidol  -PRN ativan     5. Spiritual Care  Oriented to Spiritual Health as part of Palliative Care team. Spiritual Health Services declined at this time.  Spiritual Background: [employer]     5. Care Planning  Appreciate Care of SW/Care coordinator.    Medical Decision Making and Goals of Care:  Discussed on November 23, 2022 with Jazmyn Rivera NP: met with family at the bedside. Discussed and reviewed symptoms and medications. Discussed goals as above. Family is aware that he is " dying. Questions asked and answered.    Jazmyn Rivera NP  Pain Management and Palliative Care  [facility]  Pgr: [phone]    Time Spent on this Encounter   Total unit/floor time 45 minutes, time consisted of the following, examination of the patient, reviewing the record and completing documentation. >50% of time spent in counseling and coordination of care, Bedside Nurse Ana Cristina and Hospitalist Dr Crisostomo.  Time spend counseling with family consisted of the following topics, goals of care, education about prognosis and symptom management.         Review of Systems   Unable to assess due to confusion    Physical Exam   Temp:  [97.6  F (36.4  C)-97.8  F (36.6  C)] 97.6  F (36.4  C)  Pulse:  [101-123] 111  Resp:  [19-61] 48  BP: (123-150)/(58-67) 140/58  SpO2:  [95 %-100 %] 98 %  151 lbs 3.77 oz  Exam:  GENERAL APPEARANCE:  unresponsive  RESP:  dyspneic  CV:  rate-normal  ABDOMEN:  normal bowel sounds, soft, nontender, no hepatosplenomegaly or other masses  PSYCH:  unresponsive no s/s of pain    Medications     dextrose         ceFEPIme  2 g Intravenous Q24H     [Held by provider] insulin aspart  4 Units Subcutaneous Q4H     [Held by provider] insulin aspart  1-12 Units Subcutaneous Q4H     [Held by provider] insulin glargine  20 Units Subcutaneous QAM AC     scopolamine  1 patch Transdermal Q72H    And     scopolamine   Transdermal Q8H     sodium chloride (PF)  3 mL Intracatheter Q8H       Data   Results for orders placed or performed during the hospital encounter of 11/10/22 (from the past 24 hour(s))   Glucose by meter   Result Value Ref Range    GLUCOSE BY METER POCT 129 (H) 70 - 99 mg/dL   Glucose by meter   Result Value Ref Range    GLUCOSE BY METER POCT 184 (H) 70 - 99 mg/dL   Glucose by meter   Result Value Ref Range    GLUCOSE BY METER POCT 234 (H) 70 - 99 mg/dL   Glucose by meter   Result Value Ref Range    GLUCOSE BY METER POCT 283 (H) 70 - 99 mg/dL

## 2023-08-12 NOTE — DEATH PRONOUNCEMENT
MD DEATH PRONOUNCEMENT    Called to pronounce Eh LOPES Ismail dead.    Physical Exam: Unresponsive to noxious stimuli, Spontaneous respirations absent, Breath sounds absent, Carotid pulse absent, Heart sounds absent and Pupillary light reflex absent    Patient was pronounced dead at 11:23 AM, November 23, 2022.    Preliminary cause of death:  Acute hypoxemic respiratory failure secondary to dysphagia   Acute renal failure cb hyperkalemia  Starvation ketosis      Principal Problem:    Hypertension, unspecified type  Active Problems:    Dehydration    Elevated troponin    Failure to thrive in adult    Type 1 diabetes mellitus with other specified complication (H)    Chronic renal failure, unspecified CKD stage    Dementia without behavioral disturbance, psychotic disturbance, mood disturbance, or anxiety, unspecified dementia severity, unspecified dementia type (H)     Infectious disease present?: NO    Communicable disease present? (examples: HIV, chicken pox, TB, Ebola, CJD) :  NO    Multi-drug resistant organism present? (example: MRSA): NO    Body disposition: Body released to the morgue.    Bj Crisostomo, DO        
Home

## 2023-10-20 NOTE — ED TRIAGE NOTES
Pt arrives from home for fever, AMS, hyperglycemia. EMS reports pt more resistant to ADL's per family mentating at baseline but has not eaten 2-3 days. , hypertensive for EMS. All other vitals stable. Pt nonverbal family on way.      Triage Assessment     Row Name 11/10/22 1917       Triage Assessment (Adult)    Airway WDL WDL       Respiratory WDL    Respiratory WDL WDL       Skin Circulation/Temperature WDL    Skin Circulation/Temperature WDL WDL       Cardiac WDL    Cardiac WDL WDL       Peripheral/Neurovascular WDL    Peripheral Neurovascular WDL WDL       Cognitive/Neuro/Behavioral WDL    Cognitive/Neuro/Behavioral WDL WDL              
English

## 2023-11-07 NOTE — NURSING NOTE
Chief Complaint   Patient presents with     Blood Draw     Labs Drawn      Labs drawn from previously started IV. IV discontinued.     Lauren Schoen, RN     Continue Tylenol and ibuprofen for pain and inflammation  Utilize ice as needed for swelling and pain  Follow-up with the PCP office for reevaluation recheck of your pain and symptoms as needed  Return to the ED if any worsening or concerning signs or symptoms present

## (undated) RX ORDER — SCOLOPAMINE TRANSDERMAL SYSTEM 1 MG/1
PATCH, EXTENDED RELEASE TRANSDERMAL
Status: DISPENSED
Start: 2017-04-05

## (undated) RX ORDER — ONDANSETRON 2 MG/ML
INJECTION INTRAMUSCULAR; INTRAVENOUS
Status: DISPENSED
Start: 2017-04-05

## (undated) RX ORDER — FENTANYL CITRATE 50 UG/ML
INJECTION, SOLUTION INTRAMUSCULAR; INTRAVENOUS
Status: DISPENSED
Start: 2017-04-05

## (undated) RX ORDER — AMPICILLIN AND SULBACTAM 2; 1 G/1; G/1
INJECTION, POWDER, FOR SOLUTION INTRAMUSCULAR; INTRAVENOUS
Status: DISPENSED
Start: 2017-04-05

## (undated) RX ORDER — CEFAZOLIN SODIUM 2 G/100ML
INJECTION, SOLUTION INTRAVENOUS
Status: DISPENSED
Start: 2017-04-19

## (undated) RX ORDER — SODIUM CHLORIDE 9 MG/ML
INJECTION, SOLUTION INTRAVENOUS
Status: DISPENSED
Start: 2017-04-05